# Patient Record
Sex: MALE | Race: WHITE | NOT HISPANIC OR LATINO | Employment: FULL TIME | URBAN - METROPOLITAN AREA
[De-identification: names, ages, dates, MRNs, and addresses within clinical notes are randomized per-mention and may not be internally consistent; named-entity substitution may affect disease eponyms.]

---

## 2018-07-05 ENCOUNTER — EVALUATION (OUTPATIENT)
Dept: PHYSICAL THERAPY | Facility: CLINIC | Age: 68
End: 2018-07-05
Payer: COMMERCIAL

## 2018-07-05 ENCOUNTER — TRANSCRIBE ORDERS (OUTPATIENT)
Dept: PHYSICAL THERAPY | Facility: CLINIC | Age: 68
End: 2018-07-05

## 2018-07-05 DIAGNOSIS — Z96.649 PRESENCE OF ARTIFICIAL HIP, UNSPECIFIED LATERALITY: Primary | ICD-10-CM

## 2018-07-05 DIAGNOSIS — Z96.642 H/O TOTAL HIP ARTHROPLASTY, LEFT: Primary | ICD-10-CM

## 2018-07-05 PROCEDURE — G8991 OTHER PT/OT GOAL STATUS: HCPCS

## 2018-07-05 PROCEDURE — 97161 PT EVAL LOW COMPLEX 20 MIN: CPT

## 2018-07-05 PROCEDURE — G8990 OTHER PT/OT CURRENT STATUS: HCPCS

## 2018-07-05 RX ORDER — ASPIRIN 325 MG
325 TABLET ORAL DAILY
COMMUNITY
End: 2019-11-05

## 2018-07-05 RX ORDER — EZETIMIBE AND SIMVASTATIN 10; 80 MG/1; MG/1
1 TABLET ORAL
COMMUNITY
End: 2018-07-20 | Stop reason: SDUPTHER

## 2018-07-05 RX ORDER — FENOFIBRATE 160 MG/1
160 TABLET ORAL DAILY
Status: ON HOLD | COMMUNITY
End: 2019-11-02 | Stop reason: SDUPTHER

## 2018-07-05 RX ORDER — OMEGA-3-ACID ETHYL ESTERS 1 G/1
2 CAPSULE, LIQUID FILLED ORAL 2 TIMES DAILY
Status: ON HOLD | COMMUNITY
End: 2019-11-02 | Stop reason: SDUPTHER

## 2018-07-05 RX ORDER — LEVOTHYROXINE SODIUM 0.03 MG/1
12.5 TABLET ORAL DAILY
Status: ON HOLD | COMMUNITY
End: 2019-11-02 | Stop reason: SDUPTHER

## 2018-07-05 RX ORDER — AMLODIPINE AND VALSARTAN 5; 160 MG/1; MG/1
1 TABLET ORAL DAILY
COMMUNITY
End: 2018-11-20 | Stop reason: DRUGHIGH

## 2018-07-05 RX ORDER — RANITIDINE 150 MG/1
150 TABLET ORAL 2 TIMES DAILY
Status: ON HOLD | COMMUNITY
End: 2019-11-02 | Stop reason: SDUPTHER

## 2018-07-05 NOTE — PROGRESS NOTES
PT Evaluation     Today's date: 2018  Patient name: Iliana Frias  : 1950  MRN: [id number]  Referring provider: Natalio Zhang MD  Dx:   Encounter Diagnosis     ICD-10-CM    1  H/O total hip arthroplasty, left [id number] 766                   Assessment  Impairments: abnormal gait, abnormal or restricted ROM, activity intolerance, impaired balance, impaired physical strength, lacks appropriate home exercise program, pain with function and weight-bearing intolerance  Functional limitations: pain with ambulation and daily activities, difficulty sleeping  Assessment details: Iliana Frias is a 79 y o  male presenting to PT with pain, decreased hip range of motion, decreased LE strength, balance deficits, and decreased tolerance to activity secondary to L VADIM 2 weeks ago  Patient would benefit from skilled PT services to address these impairments and to maximize function in order to improve quality of life  Thank you for the referral   Understanding of Dx/Px/POC: good   Prognosis: good    Goals  STG  1) L hip ROM will improve 50% in 4 weeks  2) L hip strength will improve 1/2 grade in 4 weeks  LTG  1) Patient is independent in HEP  2) Patient will ascend/descend one flight of stairs independently with no increased pain in 8 weeks  3) Ambulation will be improved to PLOF in 8 weeks  4) Patient will have one consecutive week of uninterrupted sleep due to pain within 8 weeks  5) Patient will return to full work schedule within 8 weeks      Plan  Patient would benefit from: skilled physical therapy  Planned modality interventions: cryotherapy  Planned therapy interventions: balance/weight bearing training, home exercise program, therapeutic exercise, therapeutic activities, stretching, strengthening, patient education, neuromuscular re-education and manual therapy  Frequency: 2x week  Duration in weeks: 8  Treatment plan discussed with: patient        Subjective Evaluation    History of Present Illness  Date of surgery: 2018  Mechanism of injury: Patient presents 2 weeks s/p L anterior approach VADIM  He ambulates into clinic today independently, no AD  He states he used a walker for about 3 days after procedure, and was able to begin walking without it comfortably  He had home PT after d/c from hospital, and states he has been performing them faithfully every day  He states he would like to return to a full work schedule, which requires him to be standing/walking for most of the day  Quality of life: good    Pain  Current pain ratin  At best pain ratin  At worst pain ratin  Quality: discomfort and sharp  Relieving factors: ice  Aggravating factors: stair climbing, walking and standing  Progression: no change      Diagnostic Tests  X-ray: normal  Treatments  Previous treatment: home therapy  Current treatment: physical therapy  Discharged from (in last 30 days): inpatient hospitalization and home health care  Patient Goals  Patient goals for therapy: decreased pain, decreased edema, improved balance, increased motion, increased strength, independence with ADLs/IADLs and return to work  Patient goal: full mass schedule, be able to walk and stand most of the day        Objective     Observations   Left Hip  Positive for adhesive scar, edema, incision and trophic changes       Additional Observation Details  Incision appears clean and intact, with steri-strips applied throughout    Tenderness     Additional Tenderness Details  No TTP reported throughout L hip    Active Range of Motion   Left Hip   Flexion: 80 degrees with pain  Extension: 0 degrees   Abduction: 30 degrees     Right Hip   Normal active range of motion    Passive Range of Motion   Left Hip   Flexion: 90 degrees with pain  Extension: WFL  Abduction: WFL  Adduction: WFL  External rotation (prone): [facility]  Internal rotation (prone): 10 and prone pain degrees with pain    Strength/Myotome Testing     Left Hip   Planes of Motion   Flexion: 3-  Extension: 3+  Abduction: 3+  Adduction: 3+    Isolated Muscles   Gluteus elyssa: 2+  Gluteus medius: 3+    HR: 84 bpm  Flowsheet Rows      Most Recent Value   PT/OT G-Codes   Current Score  47   Projected Score  68   FOTO information reviewed  Yes   Assessment Type  Evaluation   G code set  Other PT/OT Primary   Other PT Primary Current Status ()  CK   Other PT Primary Goal Status ()  CJ          Precautions: cardiac history, current cigarette smoker     Daily Treatment Diary      Manual                        PROM L hip                                                                                                                             Exercise Diary                        Bike                       LAQ                       Heel slides                       Calf stretch                       HS stretch                       SLR flex                       SLR abduction                       Glute bridge                                              Mini squats                       HR/TR                       Standing hip flexion                       Standing march                       Standing hip abduction                       Standing hip extension                                                                                                                                                     Modalities                        CP prn

## 2018-07-05 NOTE — LETTER
2018    [facility] [address]    Patient: Mat Branch   YOB: 1950   Date of Visit: 2018     Encounter Diagnosis     ICD-10-CM    1  H/O total hip arthroplasty, left K17 276        Dear Dr Orion Walker:    Please review the attached Plan of Care from [facility] recent visit  Please verify that you agree therapy should continue by signing the attached document and sending it back to our office  If you have any questions or concerns, please don't hesitate to call  Sincerely,    Sharon Saeed, PT      Referring Provider:      I certify that I have read the below Plan of Care and certify the need for these services furnished under this plan of treatment while under my care  Jessica Santos MD  [facility] [address]  VIA Facsimile: [phone]          PT Evaluation     Today's date: 2018  Patient name: Mat Branch  : 1950  MRN: [id number]  Referring provider: Justin Benton MD  Dx:   Encounter Diagnosis     ICD-10-CM    1  H/O total hip arthroplasty, left F62 277                   Assessment  Impairments: abnormal gait, abnormal or restricted ROM, activity intolerance, impaired balance, impaired physical strength, lacks appropriate home exercise program, pain with function and weight-bearing intolerance  Functional limitations: pain with ambulation and daily activities, difficulty sleeping  Assessment details: Mat Branch is a 79 y o  male presenting to PT with pain, decreased hip range of motion, decreased LE strength, balance deficits, and decreased tolerance to activity secondary to L VADIM 2 weeks ago  Patient would benefit from skilled PT services to address these impairments and to maximize function in order to improve quality of life   Thank you for the referral   Understanding of Dx/Px/POC: good   Prognosis: good    Goals  STG  1) L hip ROM will improve 50% in 4 weeks  2) L hip strength will improve 1/2 grade in 4 weeks  LTG  1) Patient is independent in HEP  2) Patient will ascend/descend one flight of stairs independently with no increased pain in 8 weeks  3) Ambulation will be improved to PLOF in 8 weeks  4) Patient will have one consecutive week of uninterrupted sleep due to pain within 8 weeks  5) Patient will return to full work schedule within 8 weeks  Plan  Patient would benefit from: skilled physical therapy  Planned modality interventions: cryotherapy  Planned therapy interventions: balance/weight bearing training, home exercise program, therapeutic exercise, therapeutic activities, stretching, strengthening, patient education, neuromuscular re-education and manual therapy  Frequency: 2x week  Duration in weeks: 8  Treatment plan discussed with: patient        Subjective Evaluation    History of Present Illness  Date of surgery: 2018  Mechanism of injury: Patient presents 2 weeks s/p L anterior approach VADIM  He ambulates into clinic today independently, no AD  He states he used a walker for about 3 days after procedure, and was able to begin walking without it comfortably  He had home PT after d/c from hospital, and states he has been performing them faithfully every day  He states he would like to return to a full work schedule, which requires him to be standing/walking for most of the day    Quality of life: good    Pain  Current pain ratin  At best pain ratin  At worst pain ratin  Quality: discomfort and sharp  Relieving factors: ice  Aggravating factors: stair climbing, walking and standing  Progression: no change      Diagnostic Tests  X-ray: normal  Treatments  Previous treatment: home therapy  Current treatment: physical therapy  Discharged from (in last 30 days): inpatient hospitalization and home health care  Patient Goals  Patient goals for therapy: decreased pain, decreased edema, improved balance, increased motion, increased strength, independence with ADLs/IADLs and return to work  Patient goal: full mass schedule, be able to walk and stand most of the day        Objective     Observations   Left Hip  Positive for adhesive scar, edema, incision and trophic changes       Additional Observation Details  Incision appears clean and intact, with steri-strips applied throughout    Tenderness     Additional Tenderness Details  No TTP reported throughout L hip    Active Range of Motion   Left Hip   Flexion: 80 degrees with pain  Extension: 0 degrees   Abduction: 30 degrees     Right Hip   Normal active range of motion    Passive Range of Motion   Left Hip   Flexion: 90 degrees with pain  Extension: WFL  Abduction: WFL  Adduction: WFL  External rotation (prone): WFL  Internal rotation (prone): 10 and prone pain degrees with pain    Strength/Myotome Testing     Left Hip   Planes of Motion   Flexion: 3-  Extension: 3+  Abduction: 3+  Adduction: 3+    Isolated Muscles   Gluteus elyssa: 2+  Gluteus medius: 3+    HR: 84 bpm  Flowsheet Rows      Most Recent Value   PT/OT G-Codes   Current Score  47   Projected Score  68   FOTO information reviewed  Yes   Assessment Type  Evaluation   G code set  Other PT/OT Primary   Other PT Primary Current Status ()  CK   Other PT Primary Goal Status ()  CJ          Precautions: cardiac history, current cigarette smoker     Daily Treatment Diary      Manual                        PROM L hip                                                                                                                             Exercise Diary                        Bike                       LAQ                       Heel slides                       Calf stretch                       HS stretch                       SLR flex                       SLR abduction                       Glute bridge                                              Mini squats                       HR/TR                       Standing hip flexion                       Standing march                       Standing hip abduction                       Standing hip extension                                                                                                                                                     Modalities                        CP prn

## 2018-07-11 ENCOUNTER — OFFICE VISIT (OUTPATIENT)
Dept: PHYSICAL THERAPY | Facility: CLINIC | Age: 68
End: 2018-07-11
Payer: COMMERCIAL

## 2018-07-11 DIAGNOSIS — Z96.642 H/O TOTAL HIP ARTHROPLASTY, LEFT: Primary | ICD-10-CM

## 2018-07-11 PROCEDURE — 97110 THERAPEUTIC EXERCISES: CPT

## 2018-07-11 PROCEDURE — 97140 MANUAL THERAPY 1/> REGIONS: CPT

## 2018-07-11 NOTE — PROGRESS NOTES
Daily Note     Today's date: 2018  Patient name: Ruth Ann Ritchie  : 1950  MRN: [id number]  Referring provider: Jude Mcnamara MD  Dx:   Encounter Diagnosis     ICD-10-CM    1  H/O total hip arthroplasty, left [id number]                   Subjective: Patient noted no pain pre or post treatment  Objective: See treatment diary below      Assessment: Tolerated treatment well  Patient was able to perform added exercises without increase of pain or complaints  Patient would benefit from continued PT      Plan: Continue per plan of care        Precautions: cardiac history, current cigarette smoker     Daily Treatment Diary      Manual                        PROM L hip  10 min                                                                                                                           Exercise Diary                        Bike 5'                     LAQ  5"x10                     Heel slides                       Calf stretch  seated 30"x3                     HS stretch  30"x3                     SLR flex  10x                     SLR abduction                       Glute bridge  5"10x                                            Mini squats  10xea                     HR/TR  20xea                     Standing hip flexion                       Standing march  20x                     Standing hip abduction                       Standing hip extension                                                                                                                                                     Modalities                        CP prn

## 2018-07-12 ENCOUNTER — OFFICE VISIT (OUTPATIENT)
Dept: PHYSICAL THERAPY | Facility: CLINIC | Age: 68
End: 2018-07-12
Payer: COMMERCIAL

## 2018-07-12 DIAGNOSIS — Z96.642 H/O TOTAL HIP ARTHROPLASTY, LEFT: Primary | ICD-10-CM

## 2018-07-12 PROCEDURE — 97140 MANUAL THERAPY 1/> REGIONS: CPT

## 2018-07-12 PROCEDURE — 97110 THERAPEUTIC EXERCISES: CPT

## 2018-07-12 NOTE — PROGRESS NOTES
Daily Note     Today's date: 2018  Patient name: Edenilson Black  : 1950  MRN: [id number]  Referring provider: Selina Arce MD  Dx:   Encounter Diagnosis     ICD-10-CM    1  H/O total hip arthroplasty, left B33 552                   Subjective: Patient has no complaints upon arrival today  He states he felt fine after yesterday's visit  He asked about return to work, as he was given information about returning 8-12 weeks from the date of surgery, and states he feels he is ready to return in 10 days  Objective: See treatment diary below  Progressed strengthening program this visit  Advised patient to contact surgeon's office about possible early return to work  Assessment: Tolerated treatment well  Patient able to tolerate addition of strengthening exercises with a few rest breaks required due to cardio and muscle endurance limitations  Continue to progress strengthening as patient tolerates  Patient would benefit from continued PT      Plan: Continue per plan of care  Progress treatment as tolerated           Precautions: cardiac history, current cigarette smoker     Daily Treatment Diary      Manual                      PROM L hip  10 min  AN                                                                                                                         Exercise Diary                      Bike 5' 6'                   LAQ 5"x10 3" 2x10                   Calf stretch seated 30"x3 NV                   HS stretch 30"x3 NV                   SLR flex 10x 10x                   SLR abduction   NP                   Glute bridge 5"10x 3" 2x10                                          Mini squats 10x 10x                   HR/TR 20xea 30 ea                   Standing hip flexion   10x                   Standing march 20x 2x10                   Standing hip abduction   10x                   Standing hip extension   10x                                                                                                                                                 Modalities                        CP prn

## 2018-07-16 ENCOUNTER — OFFICE VISIT (OUTPATIENT)
Dept: PHYSICAL THERAPY | Facility: CLINIC | Age: 68
End: 2018-07-16
Payer: COMMERCIAL

## 2018-07-16 DIAGNOSIS — Z96.642 H/O TOTAL HIP ARTHROPLASTY, LEFT: Primary | ICD-10-CM

## 2018-07-16 PROCEDURE — 97110 THERAPEUTIC EXERCISES: CPT

## 2018-07-16 PROCEDURE — 97140 MANUAL THERAPY 1/> REGIONS: CPT

## 2018-07-16 NOTE — PROGRESS NOTES
Daily Note     Today's date: 2018  Patient name: Nara Amaya  : 1950  MRN: [id number]  Referring provider: John Ruby MD  Dx:   Encounter Diagnosis     ICD-10-CM    1  H/O total hip arthroplasty, left G68 498                   Subjective: Patient denies pain or issues with L hip since previous session, stating he is feeling well  Objective: See treatment diary below  Assessment: Tolerated treatment well  Improved tolerance to strengthening activities this visit  Good performance of hip strength exercises, with patient showing appropriate level of fatigue due to muscle weakness  Patient would benefit from continued PT      Plan: Continue per plan of care  Progress treatment as tolerated           Precautions: cardiac history, current cigarette smoker     Daily Treatment Diary      Manual                    PROM L hip  10 min  AN  AN                                                                                                                       Exercise Diary                    Bike 5' 6' 7'                 LAQ 5"x10 3" 2x10 1# 2x10                 Calf stretch seated 30"x3 NV 30"x3                 HS stretch 30"x3 NV 30"x3                 SLR flex 10x 10x 2x10                 SLR abduction   NP NP                 Glute bridge 5"10x 3" 2x10 3" 2x10                                        Mini squats 10x 10x 2x10                 HR/TR 20xea 30 ea 30 ea                 Standing hip flexion   10x 2x10                 Standing march 20x 2x10 2x10                 Standing hip abduction   10x 2x10                 Standing hip extension   10x 2x10                                                                                                                                               Modalities                        CP prn

## 2018-07-19 ENCOUNTER — OFFICE VISIT (OUTPATIENT)
Dept: PHYSICAL THERAPY | Facility: CLINIC | Age: 68
End: 2018-07-19
Payer: COMMERCIAL

## 2018-07-19 DIAGNOSIS — Z96.642 H/O TOTAL HIP ARTHROPLASTY, LEFT: Primary | ICD-10-CM

## 2018-07-19 PROCEDURE — 97110 THERAPEUTIC EXERCISES: CPT

## 2018-07-19 PROCEDURE — 97140 MANUAL THERAPY 1/> REGIONS: CPT

## 2018-07-19 NOTE — PROGRESS NOTES
Daily Note     Today's date: 2018  Patient name: Rafy Sorensen  : 1950  MRN: [id number]  Referring provider: Phillip Corbin MD  Dx:   Encounter Diagnosis     ICD-10-CM    1  H/O total hip arthroplasty, left Z96 642        Start Time: 1372  Stop Time: 6264  Total time in clinic (min): 40 minutes    Subjective: Pt reports he is feeling good today, denied any pain currently  Mostly gets sore at night time  Objective: See treatment diary below    Precautions: cardiac history, current cigarette smoker     Daily Treatment Diary      Manual                  PROM L hip  10 min  AN  AN  NA                                                                                                                     Exercise Diary                  Bike 5' 6' 7'  7'               LAQ 5"x10 3" 2x10 1# 2x10  1# 2 x10               Calf stretch seated 30"x3 NV 30"x3  30"x3               HS stretch 30"x3 NV 30"x3  30" x 3               SLR flex 10x 10x 2x10  2 x10               SLR abduction   NP NP  2x10               Glute bridge 5"10x 3" 2x10 3" 2x10  3"  2 x10                                      Mini squats 10x 10x 2x10  2 x 10               HR/TR 20xea 30 ea 30 ea  30 ea               Standing hip flexion   10x 2x10  2x10               Standing march 20x 2x10 2x10  2x10               Standing hip abduction   10x 2x10  2 x 10                Standing hip extension   10x 2x10  2x10                                                                                                                                             Modalities                        CP prn                                                                           Assessment: Tolerated treatment well  No exacerbations of pain noted during treatment  Pt with good form and posture noted with exercises  Patient would benefit from continued PT  Plan: Continue per plan of care

## 2018-07-20 DIAGNOSIS — E78.5 HYPERLIPIDEMIA, UNSPECIFIED HYPERLIPIDEMIA TYPE: Primary | ICD-10-CM

## 2018-07-23 RX ORDER — EZETIMIBE AND SIMVASTATIN 10; 80 MG/1; MG/1
1 TABLET ORAL
Qty: 90 TABLET | Refills: 3 | Status: SHIPPED | OUTPATIENT
Start: 2018-07-23 | End: 2018-11-20 | Stop reason: DRUGHIGH

## 2018-11-20 ENCOUNTER — OFFICE VISIT (OUTPATIENT)
Dept: CARDIOLOGY CLINIC | Facility: CLINIC | Age: 68
End: 2018-11-20
Payer: COMMERCIAL

## 2018-11-20 VITALS
SYSTOLIC BLOOD PRESSURE: 160 MMHG | BODY MASS INDEX: 26.54 KG/M2 | WEIGHT: 149.8 LBS | OXYGEN SATURATION: 97 % | DIASTOLIC BLOOD PRESSURE: 80 MMHG | HEART RATE: 90 BPM | HEIGHT: 63 IN

## 2018-11-20 DIAGNOSIS — I49.1 ECTOPIC ATRIAL RHYTHM: ICD-10-CM

## 2018-11-20 DIAGNOSIS — I71.2 THORACIC AORTIC ANEURYSM WITHOUT RUPTURE (HCC): ICD-10-CM

## 2018-11-20 DIAGNOSIS — I10 ESSENTIAL HYPERTENSION, BENIGN: ICD-10-CM

## 2018-11-20 DIAGNOSIS — R09.89 BRUIT OF LEFT CAROTID ARTERY: ICD-10-CM

## 2018-11-20 DIAGNOSIS — E78.5 HYPERLIPIDEMIA, UNSPECIFIED HYPERLIPIDEMIA TYPE: ICD-10-CM

## 2018-11-20 DIAGNOSIS — I25.119 ATHEROSCLEROSIS OF NATIVE CORONARY ARTERY WITH ANGINA PECTORIS, UNSPECIFIED WHETHER NATIVE OR TRANSPLANTED HEART (HCC): Primary | ICD-10-CM

## 2018-11-20 PROBLEM — I71.20 THORACIC AORTIC ANEURYSM WITHOUT RUPTURE: Status: ACTIVE | Noted: 2018-11-20

## 2018-11-20 PROCEDURE — 93000 ELECTROCARDIOGRAM COMPLETE: CPT | Performed by: INTERNAL MEDICINE

## 2018-11-20 PROCEDURE — 99214 OFFICE O/P EST MOD 30 MIN: CPT | Performed by: INTERNAL MEDICINE

## 2018-11-20 RX ORDER — AMLODIPINE AND OLMESARTAN MEDOXOMIL 5; 40 MG/1; MG/1
1 TABLET ORAL DAILY
Qty: 90 TABLET | Refills: 5 | Status: SHIPPED | OUTPATIENT
Start: 2018-11-20 | End: 2018-11-26 | Stop reason: CLARIF

## 2018-11-20 RX ORDER — EZETIMIBE AND SIMVASTATIN 10; 40 MG/1; MG/1
1 TABLET ORAL
Qty: 90 TABLET | Refills: 5 | Status: ON HOLD | OUTPATIENT
Start: 2018-11-20 | End: 2019-11-02 | Stop reason: SDUPTHER

## 2018-11-20 NOTE — ASSESSMENT & PLAN NOTE
Ectopic low atrial rhythm with frequent premature atrial contractions is noted  He had similar rhythm during the last follow-up visit also  Is asymptomatic  Normal left ventricular function by echocardiogram in March 2018  Will obtain and review last blood work done in May 2018 to ensure electrolytes are okay  Will do a 24 hour Holter monitor to assess for PAC burden

## 2018-11-20 NOTE — PATIENT INSTRUCTIONS
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 in July 2018  10  Mild hypothyroidism  11  Pre diabetes    Echocardiogram March 2018 showed mildly increased left ventricular wall thickness, normal left ventricular systolic function and hyperdynamic wall motion, normal diastolic function, EF around 65%  Mild aortic valve sclerosis, trace tricuspid valve regurgitation, no pulmonary hypertension, borderline dilated aortic root  CTA of chest significant for 4 cm ascending thoracic aortic aneurysm with a focal atelectasis or scarring lower lungs without any lung mass or effusion  CARDIOLOGY ASSESSMENT & PLAN:  Essential hypertension, benign  Blood pressure is noted to be slightly elevated although repeat blood pressure check is better  Given that valsartan his on recall with like to change his antihypertensive regimen  We are discontinuing Exforge and beginning amlodipine-almost start an 5/40 mg once daily  Advised to monitor salt intake and quit smoking  We would like to have a repeat blood pressure check in 2-3 weeks following initiation of regimen  Goal blood pressure is less than 130/80 consistently  Bruit of left carotid artery  He has mild carotid bruit in the left carotid artery  Has no history of TIA/CVA  Is on reasonable statin therapy along with Zetia  He has followed with Dr Gladys Kwok in the past and would like to follow up with him  We will facilitate this      Thoracic aortic aneurysm without rupture (HCC)  Mild ectasia of ascending thoracic aorta is noted without prior history of rupture  We will consider follow-up evaluation in 1-2 years  Meanwhile optimal blood pressure control will be the strategy  Ectopic atrial rhythm  Ectopic low atrial rhythm with frequent premature atrial contractions is noted  He had similar rhythm during the last follow-up visit also  Is asymptomatic  Normal left ventricular function by echocardiogram in March 2018  Will obtain and review last blood work done in May 2018 to ensure electrolytes are okay  Will do a 24 hour Holter monitor to assess for PAC burden

## 2018-11-20 NOTE — PROGRESS NOTES
[address] [address]    [address], [address]  [address]  [address]  Phone#  [phone]  Fax#  [phone]  *-*-*-*-*-*-*-*-*-*-*-*-*-*-*-*-*-*-*-*-*-*-*-*-*-*-*-*-*-*-*-*-*-*-*-*-*-*-*-*-*-*-*-*-*-*-*-*-*-*-*-*-*-*    Meghan Headley DATE: 11/20/18 1:56 PM    PATIENT NAME: Marylou [facility]   1950    [id number]  Age: 79 y o  Sex: male    AUTHOR: Shlomo Frye MD  PRIMARYCARE PHYSICIAN: Shane Ross DO    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 in July 2018  10  Mild hypothyroidism  11  Pre diabetes    Echocardiogram March 2018 showed mildly increased left ventricular wall thickness, normal left ventricular systolic function and hyperdynamic wall motion, normal diastolic function, EF around 65%  Mild aortic valve sclerosis, trace tricuspid valve regurgitation, no pulmonary hypertension, borderline dilated aortic root  CTA of chest significant for 4 cm ascending thoracic aortic aneurysm with a focal atelectasis or scarring lower lungs without any lung mass or effusion  CURRENT ECG:  Results for orders placed or performed in visit on 11/20/18   POCT ECG    Narrative    Suspected ectopic atrial rhythm with frequent premature atrial contractions  P-wave morphology is negative in inferior leads suggestive of low atrial rhythm  Normal intervals and no significant ST T wave abnormalities    HR 90 bpm        CARDIOLOGY ASSESSMENT & PLAN:  Essential hypertension, benign  Blood pressure is noted to be slightly elevated although repeat blood pressure check is better  Given that valsartan his on recall with like to change his antihypertensive regimen  We are discontinuing Exforge and beginning amlodipine-almost start an 5/40 mg once daily  Advised to monitor salt intake and quit smoking  We would like to have a repeat blood pressure check in 2-3 weeks following initiation of regimen  Goal blood pressure is less than 130/80 consistently  Bruit of left carotid artery  He has mild carotid bruit in the left carotid artery  Has no history of TIA/CVA  Is on reasonable statin therapy along with Zetia  He has followed with Dr Forrest Jenkins in the past and would like to follow up with him  We will facilitate this  Thoracic aortic aneurysm without rupture (HCC)  Mild ectasia of ascending thoracic aorta is noted without prior history of rupture  We will consider follow-up evaluation in 1-2 years  Meanwhile optimal blood pressure control will be the strategy  Ectopic atrial rhythm  Ectopic low atrial rhythm with frequent premature atrial contractions is noted  He had similar rhythm during the last follow-up visit also  Is asymptomatic  Normal left ventricular function by echocardiogram in March 2018  Will obtain and review last blood work done in May 2018 to ensure electrolytes are okay  Will do a 24 hour Holter monitor to assess for PAC burden  INTERVAL HISTORY & HISTORY OF PRESENT ILLNESS:  Lidia Antonio is here for follow-up regarding his cardiac comorbidities which include:  Hypertension, mild thoracic aortic aneurysm and other comorbidities  He has undergone hip replacement procedure in July 2018  Reports overall being well with no significant symptoms  He is less active than before due to undergoing hip replacement  Has had no recent decline in exercise tolerance  Also reports that has been placed on levothyroxine and metformin by his primary physician Dr Mendoza Gar      There have been no recent active symptoms of chest discomfort or exertional angina  There is no dyspnea with usual activities or exertion  No orthopnea, PND or pedal edema  No palpitations, lightheadedness, presyncope, or syncope  Denies any recent hospitalizations or other illnesses  Reports being compliant with medications  Continues to smoke half a pack of cigarettes a day  REVIEW OF SYMPTOMS:    Positive for:  Slight decrease in mobility  No specific cardiac or other symptoms  Negative for: All remaining as reviewed below and in HPI  SYSTEM SYMPTOMS REVIEWED:  General--weight change, fever, night sweats  Respirato  Cardiovascular--chest pain, syncope, dyspnea on exertion, edema, decline in exercise tolerance, claudication   Gastrointestinal--persistent vomiting, diarrhea, abdominal distention, blood in stool   Muscular or skeletal--joint pain or swelling   Neurologic--headaches, syncope, abnormal movement  Hematologic--history of easy bruising and bleeding   Endocrine--thyroid enlargement, heat or cold intolerance, polyuria   Psychiatric--anxiety, depression     *-*-*-*-*-*-*-*-*-*-*-*-*-*-*-*-*-*-*-*-*-*-*-*-*-*-*-*-*-*-*-*-*-*-*-*-*-*-*-*-*-*-*-*-*-*-*-*-*-*-*-*-*-*-  VITAL SIGNS:  Vitals:    11/20/18 1312   BP: 160/80   BP Location: Left arm   Patient Position: Sitting   Cuff Size: Adult   Pulse: 90   SpO2: 97%   Weight: 67 9 kg (149 lb 12 8 oz)   Height: 5' 3" (1 6 m)     Reports that he just took his medications a short while ago today  Otherwise has been compliant with medications  Repeat blood pressure is around 145/85    *-*-*-*-*-*-*-*-*-*-*-*-*-*-*-*-*-*-*-*-*-*-*-*-*-*-*-*-*-*-*-*-*-*-*-*-*-*-*-*-*-*-*-*-*-*-*-*-*-*-*-*-*-*-  PHYSICAL EXAM:  General Appearance:    Alert, cooperative, no distress, appears stated age   Head, Eyes, ENT:    No obvious abnormality, moist mucous mebranes  Neck:   Supple, no JVD  Bruit noted in the left carotid   Back:     Symmetric, no curvature     Lungs: Respirations unlabored  Clear to auscultation bilaterally,    Chest wall:    No tenderness or deformity   Heart:    Regular rate and rhythm interrupted with premature beats, slightly distant HSs, no sig murmur appreciated  Abdomen:     Soft, non-tender, No obvious masses, or organomegaly   Extremities:   Extremities normal, no cyanosis or edema    Skin:   Skin color, texture, turgor normal, no rashes or lesions     *-*-*-*-*-*-*-*-*-*-*-*-*-*-*-*-*-*-*-*-*-*-*-*-*-*-*-*-*-*-*-*-*-*-*-*-*-*-*-*-*-*-*-*-*-*-*-*-*-*-*-*-*-*-  CURRENT MEDICATION LIST:    Current Outpatient Prescriptions:     aspirin 325 mg tablet, Take 325 mg by mouth daily, Disp: , Rfl:     co-enzyme Q-10 30 MG capsule, Take 30 mg by mouth 3 (three) times a day, Disp: , Rfl:     fenofibrate (TRIGLIDE) 160 MG tablet, Take 160 mg by mouth daily, Disp: , Rfl:     levothyroxine 25 mcg tablet, Take 12 5 mcg by mouth daily, Disp: , Rfl:     metFORMIN (GLUCOPHAGE) 500 mg tablet, Take 500 mg by mouth 2 (two) times a day with meals, Disp: , Rfl:     Multiple Vitamins-Minerals (MULTIVITAMIN ADULT PO), every 24 hours, Disp: , Rfl:     omega-3-acid ethyl esters (LOVAZA) 1 g capsule, Take 2 g by mouth 2 (two) times a day, Disp: , Rfl:     ranitidine (ZANTAC) 150 mg tablet, Take 150 mg by mouth 2 (two) times a day, Disp: , Rfl:     amlodipine-olmesartan (YONIS) 5-40 MG, Take 1 tablet by mouth daily, Disp: 90 tablet, Rfl: 5    ezetimibe-simvastatin (VYTORIN) 10-40 mg per tablet, Take 1 tablet by mouth daily at bedtime, Disp: 90 tablet, Rfl: 5    ALLERGIES:  Allergies   Allergen Reactions    Rofecoxib        *-*-*-*-*-*-*-*-*-*-*-*-*-*-*-*-*-*-*-*-*-*-*-*-*-*-*-*-*-*-*-*-*-*-*-*-*-*-*-*-*-*-*-*-*-*-*-*-*-*-*-*-*-*-    LABORATORY DATA:  I have personally reviewed pertinent labs      No results found for: NA, K, CL, CO2, ANIONGAP, BUN, CREATININE, EGFR, GLUCOSE, CALCIUM, AST, ALT, ALKPHOS, PROT, BILITOT, MG  No results found for: WBC, HGB, PLT  No results found for: PT, PTT, INR  No results found for: CKMB, BNP, DIGOXIN  No results found for: TSH  No results found for: CHOL, HDL, LDL, TRIG   No results found for: HGBA1C  No results found for: BLOODCX, SPUTUMCULTUR, GRAMSTAIN, URINECX, WOUNDCULT, BODYFLUIDCUL, MRSACULTURE, INFLUAPCR, INFLUBPCR, RSVPCR, LEGIONELLAUR, CDIFFTOXINB    *-*-*-*-*-*-*-*-*-*-*-*-*-*-*-*-*-*-*-*-*-*-*-*-*-*-*-*-*-*-*-*-*-*-*-*-*-*-*-*-*-*-*-*-*-*-*-*-*-*-*-*-*-*-  PREVIOUS CARDIOLOGY & RADIOLOGY RESULTS:  Results for orders placed in visit on 18   Echo complete with contrast if indicated    Narrative [facility] [id number]  [address], [id number] [phone]    Cardiovascular Report  _________________________________________________________________         Transthoracic Echocardiogram Report  Sherrie Kulkarni    MRN:                 [id number]  Account :           [de-identified]  Accession :         [id number]  Exam Date:           2018 12:45  Patient Location:    O  Ordering Phys:       Yesenia Morales, M CRUZ    Fabiroel Rubio)  Attending Phys:      GLO Garcia    (Zonia Thompson)  Technologist:        [facility],JOSE    Age:  74             :   1950           Gender:   M  Wt:   140 lb         Ht:    66 in  Indication:  CAD  BP:         /       HR:    Rhythm:              sinus  Technical Quality:   fair but adequate      MEASUREMENTS  2D ECHO  Body Surface Area                 1 7 m²  LV Diastolic Diameter PLAX        3 9 cm  LV Systolic Diameter PLAX         2 3 cm  IVS Diastolic Thickness           1 1 cm  LVPW Diastolic Thickness          1 0 cm  LV Relative Wall Thickness        0 5  LVOT Diameter                     2 2 cm  Aortic Root Diameter              4 1 cm  LA Systolic Diameter LX           3 2 cm  LA Area                           7 3 cm²  LV Ejection Fraction MOD 4C       70 7 %  LA Volume AL                      14 0 cm³  LA Volume AL Index                8 1 cm³/m²  RA Area 4C View                   9 2 cm²    DOPPLER  AV Peak Velocity                  122 2 cm/s  AV Peak Gradient                  6 0 mmHg  AV Mean Gradient                  3 4 mmHg  AV Velocity Time Integral         22 2 cm  LVOT Peak Velocity                113 9 cm/s  LVOT Peak Gradient                5 2 mmHg  LVOT Velocity Time Integral       22 4 cm  AV Area Cont Eq vti               4 0 cm²  AV Area Cont Eq pk                3 7 cm²  MV Area PHT                       3 7 cm²  Mitral E Point Velocity           66 5 cm/s  Mitral A Point Velocity           84 6 cm/s  Mitral E to A Ratio               0 8  MV E' Velocity                    5 9 cm/s  Mitral E to MV E' Ratio           11 3  TR Peak Velocity                  146 1 cm/s  TR Peak Gradient                  8 5 mmHg  TAPSE                             2 5 cm  Right Atrial Pressure             5 0 mmHg  Pulmonary Artery Systolic Pressu  29 5 mmHg  Right Ventricular Systolic Press  58 7 mmHg  PV Peak Velocity                  100 4 cm/s  PV Peak Gradient                  4 0 mmHg  Pulmonary Artery Diastolic Press  7 0 mmHg      FINDINGS  Left Ventricle  Normal left ventricular cavity size, mildly increased wall  thickness, normal left ventricular systolic function with  hyperdynamic wall motion  Ejection fraction is greater than 65%  Normal diastolic function  Right Ventricle  Normal right ventricle size and systolic function  Normal  estimated right ventricular systolic pressure, 14 mmHg  Right Atrium  Normal right atrial size  Left Atrium  Normal left atrial size  Aneurysmal interatrial septum with  bulge towards the right atrium  No color flow Doppler evidence  of patent foramen ovale or atrial septal defect    Mitral Valve  Normal appearing mitral valve leaflets  No significant mitral  valve regurgitation  Aortic Valve  Trileaflet aortic valve with mild sclerosis  No aortic valve  stenosis or regurgitation      Tricuspid Valve  Trace, physiologic tricuspid valve regurgitation  Pulmonic Valve  No pulmonic valve regurgitation  Pericardium  No pericardial effusion  Aorta  Mildly dilated aortic root and visualize portion of proximal  ascending aorta  Possible mild thoracic aortic aneurysm  Additional Findings  Inferior vena cava is normal in size and demonstrates normal  pulsatile change with sniff test     CONCLUSIONS  - Mildly increased left ventricular wall thickness, normal left  ventricular systolic function and hyperdynamic wall motion  EF  greater than 65%  - Normal diastolic function   - No significant chamber hypertrophy or enlargement  - Mild aortic valve sclerosis  No aortic valve stenosis or  regurgitation   - No significant mitral valve stenosis or regurgitation   - Trace, physiologic tricuspid valve regurgitation   - No pulmonary hypertension   - No pericardial effusion   - Mildly dilated aortic root and visualize portion of proximal  ascending aorta  Possible mild thoracic aortic aneurysm  Compared to previous echocardiogram from 7/29/2015 there is  overall no significant change  Shlomo Frye  (Electronically Signed)  Final Date:      19 March 2018 14:40  CV Report                   [facility]      [id number]  Final                                                     Page 3     No results found for this or any previous visit  No results found for this or any previous visit  No results found for this or any previous visit  Patient was never admitted       *-*-*-*-*-*-*-*-*-*-*-*-*-*-*-*-*-*-*-*-*-*-*-*-*-*-*-*-*-*-*-*-*-*-*-*-*-*-*-*-*-*-*-*-*-*-*-*-*-*-*-*-*-*-  SIGNATURES:   @S@   Shlomo Frye MD     CC:   Alexey Townsend, DO Richelle Jacobson DO   *-*-*-*-*-*-*-*-*-*-*-*-*-*-*-*-*-*-*-*-*-*-*-*-*-*-*-*-*-*-*-*-*-*-*-*-*-*-*-*-*-*-*-*-*-*-*-*-*-*-*-*-*-*-    Social History     Social History    Marital status: Single     Spouse name: N/A    Number of children: N/A    Years of education: N/A     Occupational History  Not on file  Social History Main Topics    Smoking status: Current Every Day Smoker     Packs/day: 1 00     Years: 50 00     Types: Cigarettes    Smokeless tobacco: Never Used      Comment: Heavy tobacco smoker; 10 cigarettes per day as per [employer]    Alcohol use Not on file    Drug use: Unknown    Sexual activity: Not on file     Other Topics Concern    Not on file     Social History Narrative    No narrative on file      No family history on file    Past Surgical History:   Procedure Laterality Date    CARDIAC CATHETERIZATION      CERVICAL FUSION      LUMBAR FUSION

## 2018-11-20 NOTE — ASSESSMENT & PLAN NOTE
He has mild carotid bruit in the left carotid artery  Has no history of TIA/CVA  Is on reasonable statin therapy along with Zetia  He has followed with Dr Chris Brannon in the past and would like to follow up with him  We will facilitate this

## 2018-11-20 NOTE — LETTER
November 20, 2018     Laura Littlejohn, [address] [facility] [address]    Patient: Ashu Mina   YOB: 1950   Date of Visit: 11/20/2018       Dear Dr Mayte Sin: Thank you for referring Zee Lucio to me for evaluation  Below are my notes for this consultation  If you have questions, please do not hesitate to call me  I look forward to following your patient along with you  Sincerely,        Shlomo Frye MD        CC: MD Shlomo Brown MD  11/20/2018  1:52 PM  [facility]   [facility]   [address] [facility]    [address], [address] [facility]  [id number]  Phone#  [phone]  Fax#  [phone]  *-*-*-*-*-*-*-*-*-*-*-*-*-*-*-*-*-*-*-*-*-*-*-*-*-*-*-*-*-*-*-*-*-*-*-*-*-*-*-*-*-*-*-*-*-*-*-*-*-*-*-*-*-*    Jackson Coronel DATE: 11/20/18 1:37 PM    PATIENT NAME: Mabel Leyva [facility]   1950    [id number]  Age: 79 y o  Sex: male    AUTHOR: Shlomo Frye MD  PRIMARYCARE PHYSICIAN: Laura Littlejohn DO    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 in July 2018  10  Mild hypothyroidism  11  Pre diabetes    Echocardiogram March 2018 showed mildly increased left ventricular wall thickness, normal left ventricular systolic function and hyperdynamic wall motion, normal diastolic function, EF around 65%  Mild aortic valve sclerosis, trace tricuspid valve regurgitation, no pulmonary hypertension, borderline dilated aortic root      CTA of chest significant for 4 cm ascending thoracic aortic aneurysm with a focal atelectasis or scarring lower lungs without any lung mass or effusion  CURRENT ECG:  Results for orders placed or performed in visit on 11/20/18   POCT ECG    Narrative    Suspected ectopic atrial rhythm with frequent premature atrial contractions  P-wave morphology is negative in inferior leads suggestive of low atrial rhythm  Normal intervals and no significant ST T wave abnormalities  HR 90 bpm        CARDIOLOGY ASSESSMENT & PLAN:  No problem-specific Assessment & Plan notes found for this encounter  INTERVAL HISTORY & HISTORY OF PRESENT ILLNESS:  Yolette Shaver is here for follow-up regarding his cardiac comorbidities which include:  Hypertension, mild thoracic aortic aneurysm and other comorbidities  He has undergone hip replacement procedure in July 2018  Reports overall being well with no significant symptoms  He is less active than before due to undergoing hip replacement  Has had no recent decline in exercise tolerance  Also reports that has been placed on levothyroxine and metformin by his primary physician Dr Ashwin Polk  There have been no recent active symptoms of chest discomfort or exertional angina  There is no dyspnea with usual activities or exertion  No orthopnea, PND or pedal edema  No palpitations, lightheadedness, presyncope, or syncope  Denies any recent hospitalizations or other illnesses  Reports being compliant with medications  Continues to smoke half a pack of cigarettes a day  REVIEW OF SYMPTOMS:    Positive for:  Slight decrease in mobility  No specific cardiac or other symptoms  Negative for: All remaining as reviewed below and in HPI  SYSTEM SYMPTOMS REVIEWED:  Generalweight change, fever, night sweats  Respirato      Cardiovascularchest pain, syncope, dyspnea on exertion, edema, decline in exercise tolerance, claudication   Gastrointestinalpersistent vomiting, diarrhea, abdominal distention, blood in stool Muscular or skeletaljoint pain or swelling   Neurologicheadaches, syncope, abnormal movement  Hematologichistory of easy bruising and bleeding   Endocrinethyroid enlargement, heat or cold intolerance, polyuria   Psychiatricanxiety, depression     *-*-*-*-*-*-*-*-*-*-*-*-*-*-*-*-*-*-*-*-*-*-*-*-*-*-*-*-*-*-*-*-*-*-*-*-*-*-*-*-*-*-*-*-*-*-*-*-*-*-*-*-*-*-  VITAL SIGNS:  Vitals:    11/20/18 1312   BP: 160/80   BP Location: Left arm   Patient Position: Sitting   Cuff Size: Adult   Pulse: 90   SpO2: 97%   Weight: 67 9 kg (149 lb 12 8 oz)   Height: 5' 3" (1 6 m)     Reports that he just took his medications a short while ago today  Otherwise has been compliant with medications  Repeat blood pressure is around 145/85    *-*-*-*-*-*-*-*-*-*-*-*-*-*-*-*-*-*-*-*-*-*-*-*-*-*-*-*-*-*-*-*-*-*-*-*-*-*-*-*-*-*-*-*-*-*-*-*-*-*-*-*-*-*-  PHYSICAL EXAM:  General Appearance:    Alert, cooperative, no distress, appears stated age   Head, Eyes, ENT:    No obvious abnormality, moist mucous mebranes  Neck:   Supple, no JVD  Bruit noted in the left carotid   Back:     Symmetric, no curvature  Lungs:     Respirations unlabored  Clear to auscultation bilaterally,    Chest wall:    No tenderness or deformity   Heart:    Regular rate and rhythm interrupted with premature beats, slightly distant HSs, no sig murmur appreciated     Abdomen:     Soft, non-tender, No obvious masses, or organomegaly   Extremities:   Extremities normal, no cyanosis or edema    Skin:   Skin color, texture, turgor normal, no rashes or lesions     *-*-*-*-*-*-*-*-*-*-*-*-*-*-*-*-*-*-*-*-*-*-*-*-*-*-*-*-*-*-*-*-*-*-*-*-*-*-*-*-*-*-*-*-*-*-*-*-*-*-*-*-*-*-  CURRENT MEDICATION LIST:    Current Outpatient Prescriptions:     amLODIPine-valsartan (EXFORGE) 5-160 MG per tablet, Take 1 tablet by mouth daily, Disp: , Rfl:     aspirin 325 mg tablet, Take 325 mg by mouth daily, Disp: , Rfl:     co-enzyme Q-10 30 MG capsule, Take 30 mg by mouth 3 (three) times a day, Disp: , Rfl:     ezetimibe-simvastatin (VYTORIN) 10-80 MG per tablet, Take 1 tablet by mouth daily at bedtime, Disp: 90 tablet, Rfl: 3    fenofibrate (TRIGLIDE) 160 MG tablet, Take 160 mg by mouth daily, Disp: , Rfl:     levothyroxine 25 mcg tablet, Take 12 5 mcg by mouth daily, Disp: , Rfl:     metFORMIN (GLUCOPHAGE) 500 mg tablet, Take 500 mg by mouth 2 (two) times a day with meals, Disp: , Rfl:     Multiple Vitamins-Minerals (MULTIVITAMIN ADULT PO), every 24 hours, Disp: , Rfl:     omega-3-acid ethyl esters (LOVAZA) 1 g capsule, Take 2 g by mouth 2 (two) times a day, Disp: , Rfl:     ranitidine (ZANTAC) 150 mg tablet, Take 150 mg by mouth 2 (two) times a day, Disp: , Rfl:     ALLERGIES:  Allergies   Allergen Reactions    Rofecoxib        *-*-*-*-*-*-*-*-*-*-*-*-*-*-*-*-*-*-*-*-*-*-*-*-*-*-*-*-*-*-*-*-*-*-*-*-*-*-*-*-*-*-*-*-*-*-*-*-*-*-*-*-*-*-    LABORATORY DATA:  I have personally reviewed pertinent labs      No results found for: NA, K, CL, CO2, ANIONGAP, BUN, CREATININE, EGFR, GLUCOSE, CALCIUM, AST, ALT, ALKPHOS, PROT, BILITOT, MG  No results found for: WBC, HGB, PLT  No results found for: PT, PTT, INR  No results found for: CKMB, BNP, DIGOXIN  No results found for: TSH  No results found for: CHOL, HDL, LDL, TRIG   No results found for: HGBA1C  No results found for: Roberto Hall, GRAMSTAIN, URINECX, WOUNDCULT, BODYFLUIDCUL, MRSACULTURE, INFLUAPCR, INFLUBPCR, RSVPCR, LEGIONELLAUR, CDIFFTOXINB    *-*-*-*-*-*-*-*-*-*-*-*-*-*-*-*-*-*-*-*-*-*-*-*-*-*-*-*-*-*-*-*-*-*-*-*-*-*-*-*-*-*-*-*-*-*-*-*-*-*-*-*-*-*-  PREVIOUS CARDIOLOGY & RADIOLOGY RESULTS:  Results for orders placed in visit on 03/19/18   Echo complete with contrast if indicated    Narrative [facility] [address] [id number]  Marisol Gonzáles [address] [place] [address], [id number] [phone]    Cardiovascular Report  _________________________________________________________________         Transthoracic Echocardiogram Report  KARINA Farzana So    MRN:                 [id number]  Account :           [de-identified]  Accession :         [id number]  Exam Date:           2018 12:45  Patient Location:    O  Ordering Phys:       GLO Grimes)  Attending Phys:      GLO Grimes)  Technologist:        [facility] JOSE ZHAO    Age:  74             :   1950           Gender:   M  Wt:   140 lb         Ht:    66 in  Indication:  CAD  BP:         /       HR:    Rhythm:              sinus  Technical Quality:   fair but adequate      MEASUREMENTS  2D ECHO  Body Surface Area                 1 7 m²  LV Diastolic Diameter PLAX        3 9 cm  LV Systolic Diameter PLAX         2 3 cm  IVS Diastolic Thickness           1 1 cm  LVPW Diastolic Thickness          1 0 cm  LV Relative Wall Thickness        0 5  LVOT Diameter                     2 2 cm  Aortic Root Diameter              4 1 cm  LA Systolic Diameter LX           3 2 cm  LA Area                           7 3 cm²  LV Ejection Fraction MOD 4C       70 7 %  LA Volume AL                      14 0 cm³  LA Volume AL Index                8 1 cm³/m²  RA Area 4C View                   9 2 cm²    DOPPLER  AV Peak Velocity                  122 2 cm/s  AV Peak Gradient                  6 0 mmHg  AV Mean Gradient                  3 4 mmHg  AV Velocity Time Integral         22 2 cm  LVOT Peak Velocity                113 9 cm/s  LVOT Peak Gradient                5 2 mmHg  LVOT Velocity Time Integral       22 4 cm  AV Area Cont Eq vti               4 0 cm²  AV Area Cont Eq pk                3 7 cm²  MV Area PHT                       3 7 cm²  Mitral E Point Velocity           66 5 cm/s  Mitral A Point Velocity           84 6 cm/s  Mitral E to A Ratio               0 8  MV E' Velocity                    5 9 cm/s  Mitral E to MV E' Ratio           11 3  TR Peak Velocity                  146 1 cm/s  TR Peak Gradient                  8 5 mmHg  TAPSE                             2 5 cm  Right Atrial Pressure             5 0 mmHg  Pulmonary Artery Systolic Pressu  66 2 mmHg  Right Ventricular Systolic Press  31 5 mmHg  PV Peak Velocity                  100 4 cm/s  PV Peak Gradient                  4 0 mmHg  Pulmonary Artery Diastolic Press  7 0 mmHg      FINDINGS  Left Ventricle  Normal left ventricular cavity size, mildly increased wall  thickness, normal left ventricular systolic function with  hyperdynamic wall motion  Ejection fraction is greater than 65%  Normal diastolic function  Right Ventricle  Normal right ventricle size and systolic function  Normal  estimated right ventricular systolic pressure, 14 mmHg  Right Atrium  Normal right atrial size  Left Atrium  Normal left atrial size  Aneurysmal interatrial septum with  bulge towards the right atrium  No color flow Doppler evidence  of patent foramen ovale or atrial septal defect    Mitral Valve  Normal appearing mitral valve leaflets  No significant mitral  valve regurgitation  Aortic Valve  Trileaflet aortic valve with mild sclerosis  No aortic valve  stenosis or regurgitation  Tricuspid Valve  Trace, physiologic tricuspid valve regurgitation  Pulmonic Valve  No pulmonic valve regurgitation  Pericardium  No pericardial effusion  Aorta  Mildly dilated aortic root and visualize portion of proximal  ascending aorta  Possible mild thoracic aortic aneurysm  Additional Findings  Inferior vena cava is normal in size and demonstrates normal  pulsatile change with sniff test     CONCLUSIONS  - Mildly increased left ventricular wall thickness, normal left  ventricular systolic function and hyperdynamic wall motion  EF  greater than 65%  - Normal diastolic function   - No significant chamber hypertrophy or enlargement  - Mild aortic valve sclerosis   No aortic valve stenosis or  regurgitation   - No significant mitral valve stenosis or regurgitation   - Trace, physiologic tricuspid valve regurgitation   - No pulmonary hypertension   - No pericardial effusion   - Mildly dilated aortic root and visualize portion of proximal  ascending aorta  Possible mild thoracic aortic aneurysm  Compared to previous echocardiogram from 7/29/2015 there is  overall no significant change  Shlomo Frye  (Electronically Signed)  Final Date:      19 March 2018 14:40  CV Report                   Helayne Spatz      [id number]  Final                                                     Page 3     No results found for this or any previous visit  No results found for this or any previous visit  No results found for this or any previous visit  Patient was never admitted  *-*-*-*-*-*-*-*-*-*-*-*-*-*-*-*-*-*-*-*-*-*-*-*-*-*-*-*-*-*-*-*-*-*-*-*-*-*-*-*-*-*-*-*-*-*-*-*-*-*-*-*-*-*-  SIGNATURES:   @XLL@   Shlomo Frye MD     CC:   Sue Enrique, DO Wylie Serve, DO   *-*-*-*-*-*-*-*-*-*-*-*-*-*-*-*-*-*-*-*-*-*-*-*-*-*-*-*-*-*-*-*-*-*-*-*-*-*-*-*-*-*-*-*-*-*-*-*-*-*-*-*-*-*-    Social History     Social History    Marital status: Single     Spouse name: N/A    Number of children: N/A    Years of education: N/A     Occupational History    Not on file  Social History Main Topics    Smoking status: Current Every Day Smoker     Packs/day: 1 00     Years: 50 00     Types: Cigarettes    Smokeless tobacco: Never Used      Comment: Heavy tobacco smoker; 10 cigarettes per day as per [employer]    Alcohol use Not on file    Drug use: Unknown    Sexual activity: Not on file     Other Topics Concern    Not on file     Social History Narrative    No narrative on file      No family history on file    Past Surgical History:   Procedure Laterality Date    CARDIAC CATHETERIZATION      CERVICAL FUSION      LUMBAR FUSION

## 2018-11-20 NOTE — ASSESSMENT & PLAN NOTE
Mild ectasia of ascending thoracic aorta is noted without prior history of rupture  We will consider follow-up evaluation in 1-2 years  Meanwhile optimal blood pressure control will be the strategy

## 2018-11-20 NOTE — ASSESSMENT & PLAN NOTE
Blood pressure is noted to be slightly elevated although repeat blood pressure check is better  Given that valsartan his on recall with like to change his antihypertensive regimen  We are discontinuing Exforge and beginning amlodipine-almost start an 5/40 mg once daily  Advised to monitor salt intake and quit smoking  We would like to have a repeat blood pressure check in 2-3 weeks following initiation of regimen  Goal blood pressure is less than 130/80 consistently

## 2018-11-26 ENCOUNTER — TELEPHONE (OUTPATIENT)
Dept: CARDIOLOGY CLINIC | Facility: CLINIC | Age: 68
End: 2018-11-26

## 2018-11-26 DIAGNOSIS — I10 ESSENTIAL HYPERTENSION, BENIGN: Primary | ICD-10-CM

## 2018-11-26 NOTE — TELEPHONE ENCOUNTER
Called patient told him that Jeff denied original rx that Dr Julia Mckeon so he changed it to Amlodipine 5 mg daily and losarten 50 mg daily   new rx called to [facility]

## 2018-11-27 ENCOUNTER — TRANSCRIBE ORDERS (OUTPATIENT)
Dept: ADMINISTRATIVE | Facility: HOSPITAL | Age: 68
End: 2018-11-27

## 2018-11-27 DIAGNOSIS — I65.23 BILATERAL CAROTID ARTERY STENOSIS: Primary | ICD-10-CM

## 2018-11-28 RX ORDER — LOSARTAN POTASSIUM 50 MG/1
50 TABLET ORAL DAILY
Qty: 90 TABLET | Refills: 2 | Status: ON HOLD
Start: 2018-11-28 | End: 2019-11-02 | Stop reason: SDUPTHER

## 2018-11-28 RX ORDER — AMLODIPINE BESYLATE 5 MG/1
5 TABLET ORAL DAILY
Qty: 90 TABLET | Refills: 2 | Status: SHIPPED | OUTPATIENT
Start: 2018-11-28 | End: 2018-12-10 | Stop reason: SDUPTHER

## 2018-11-29 ENCOUNTER — HOSPITAL ENCOUNTER (OUTPATIENT)
Dept: NON INVASIVE DIAGNOSTICS | Facility: HOSPITAL | Age: 68
Discharge: HOME | End: 2018-11-29
Attending: INTERNAL MEDICINE
Payer: COMMERCIAL

## 2018-11-29 DIAGNOSIS — I10 ESSENTIAL HYPERTENSION, BENIGN: ICD-10-CM

## 2018-11-29 PROCEDURE — 93225 XTRNL ECG REC<48 HRS REC: CPT

## 2018-12-04 ENCOUNTER — HOSPITAL ENCOUNTER (OUTPATIENT)
Dept: NON INVASIVE DIAGNOSTICS | Facility: HOSPITAL | Age: 68
Discharge: HOME | End: 2018-12-04
Attending: SURGERY
Payer: COMMERCIAL

## 2018-12-04 DIAGNOSIS — I65.23 BILATERAL CAROTID ARTERY STENOSIS: ICD-10-CM

## 2018-12-04 PROCEDURE — 93880 EXTRACRANIAL BILAT STUDY: CPT | Performed by: SURGERY

## 2018-12-04 PROCEDURE — 93880 EXTRACRANIAL BILAT STUDY: CPT

## 2018-12-06 PROCEDURE — 93227 XTRNL ECG REC<48 HR R&I: CPT | Performed by: INTERNAL MEDICINE

## 2018-12-10 ENCOUNTER — TRANSCRIBE ORDERS (OUTPATIENT)
Dept: ADMINISTRATIVE | Facility: HOSPITAL | Age: 68
End: 2018-12-10

## 2018-12-10 ENCOUNTER — CLINICAL SUPPORT (OUTPATIENT)
Dept: CARDIOLOGY CLINIC | Facility: CLINIC | Age: 68
End: 2018-12-10
Payer: COMMERCIAL

## 2018-12-10 ENCOUNTER — APPOINTMENT (OUTPATIENT)
Dept: LAB | Facility: HOSPITAL | Age: 68
End: 2018-12-10
Attending: INTERNAL MEDICINE
Payer: COMMERCIAL

## 2018-12-10 VITALS
BODY MASS INDEX: 27.11 KG/M2 | SYSTOLIC BLOOD PRESSURE: 160 MMHG | DIASTOLIC BLOOD PRESSURE: 80 MMHG | HEIGHT: 63 IN | WEIGHT: 153 LBS

## 2018-12-10 DIAGNOSIS — I10 HYPERTENSION, UNSPECIFIED TYPE: Primary | ICD-10-CM

## 2018-12-10 DIAGNOSIS — I10 ESSENTIAL HYPERTENSION, BENIGN: ICD-10-CM

## 2018-12-10 LAB
ALBUMIN SERPL BCP-MCNC: 4.3 G/DL (ref 3–5.2)
ALP SERPL-CCNC: 50 U/L (ref 43–122)
ALT SERPL W P-5'-P-CCNC: 54 U/L (ref 9–52)
ANION GAP SERPL CALCULATED.3IONS-SCNC: 5 MMOL/L (ref 5–14)
AST SERPL W P-5'-P-CCNC: 57 U/L (ref 17–59)
BILIRUB SERPL-MCNC: 0.4 MG/DL
BUN SERPL-MCNC: 18 MG/DL (ref 5–25)
CALCIUM ALBUM COR SERPL-MCNC: 10 MG/DL (ref 8.3–10.1)
CALCIUM SERPL-MCNC: 10.2 MG/DL (ref 8.4–10.2)
CHLORIDE SERPL-SCNC: 101 MMOL/L (ref 97–108)
CO2 SERPL-SCNC: 35 MMOL/L (ref 22–30)
CREAT SERPL-MCNC: 1.05 MG/DL (ref 0.7–1.5)
GFR SERPL CREATININE-BSD FRML MDRD: 74 ML/MIN/1.73SQ M
GLUCOSE SERPL-MCNC: 101 MG/DL (ref 70–99)
POTASSIUM SERPL-SCNC: 4.1 MMOL/L (ref 3.6–5)
PROT SERPL-MCNC: 7.3 G/DL (ref 5.9–8.4)
SODIUM SERPL-SCNC: 141 MMOL/L (ref 137–147)

## 2018-12-10 PROCEDURE — 36415 COLL VENOUS BLD VENIPUNCTURE: CPT

## 2018-12-10 PROCEDURE — 80053 COMPREHEN METABOLIC PANEL: CPT

## 2018-12-10 PROCEDURE — 99211 OFF/OP EST MAY X REQ PHY/QHP: CPT

## 2018-12-10 RX ORDER — AMLODIPINE BESYLATE 10 MG/1
10 TABLET ORAL DAILY
Qty: 90 TABLET | Refills: 3 | Status: ON HOLD | OUTPATIENT
Start: 2018-12-10 | End: 2019-11-02 | Stop reason: SDUPTHER

## 2018-12-10 NOTE — PATIENT INSTRUCTIONS
Patient came into the office today for 2-3 week BP check and brought his own home BP cuff  On Office cuff patient's blood pressure was 158/70 on left Arm, on patient's cuff was 160/80 on the right arm  Patient was taking amlodipine 5MG and Dr Marge Maguire is increasing this to 5 MG Twice a day equaling to 10MG a day  Patient is going to be coming back in the next 2 weeks for another Nurse visit BP check  If BP is still high when he comes back, Dr Marge Maguire will start him on Metoprolol and Losartan  Patient also has blood work to be done for his CMP to Check his Kidney Function

## 2018-12-10 NOTE — PROGRESS NOTES
Patient came into the office today for 2-3 week BP check and brought his own home BP cuff  On Office cuff patient's blood pressure was 158/70 on left Arm, on patient's cuff was 160/80 on the right arm  Patient was taking amlodipine 5MG and Dr Thalia Perdue is increasing this to 5 MG Twice a day equaling to 10MG a day  Patient is going to be coming back in the next 2 weeks for another Nurse visit BP check  If BP is still high when he comes back, Dr Thalia Perdue will start him on Metoprolol and Losartan  Patient also has blood work to be done for his CMP to Check his Kidney Function

## 2018-12-10 NOTE — PROGRESS NOTES
Home blood pressures are noted to be elevated with average blood pressure is 150s to 160s over 70s to 90s  We are increasing the amlodipine to 10 mg daily  I have given him a slip for blood work to assess renal function  If renal function is normal will increase the losartan to 100 mg daily  If it is not we will add metoprolol succinate 25 mg to his regimen  A 2 week follow-up nurse visit is being arranged  If the average blood pressures are still about target (less than 130/80) then will add metoprolol succinate

## 2018-12-28 ENCOUNTER — CLINICAL SUPPORT (OUTPATIENT)
Dept: CARDIOLOGY CLINIC | Facility: CLINIC | Age: 68
End: 2018-12-28
Payer: COMMERCIAL

## 2018-12-28 VITALS
BODY MASS INDEX: 27.11 KG/M2 | DIASTOLIC BLOOD PRESSURE: 60 MMHG | HEIGHT: 63 IN | HEART RATE: 88 BPM | WEIGHT: 153 LBS | SYSTOLIC BLOOD PRESSURE: 130 MMHG | OXYGEN SATURATION: 98 %

## 2018-12-28 DIAGNOSIS — I10 HYPERTENSION, UNSPECIFIED TYPE: Primary | ICD-10-CM

## 2018-12-28 PROCEDURE — 99211 OFF/OP EST MAY X REQ PHY/QHP: CPT

## 2018-12-28 NOTE — PATIENT INSTRUCTIONS
Pt was seen today for a 2 week Blood pressure nurse visit  Patient states he feels well with no symptoms  Blood pressure was 130/60  He did  his CMP, miroslava did say that it was normal and to continue his medications as instructed  Placed on a recall list for a 6 month return

## 2019-02-20 PROBLEM — E78.5 HYPERLIPIDEMIA: Status: ACTIVE | Noted: 2019-02-20

## 2019-02-20 PROBLEM — R73.03 PREDIABETES: Status: ACTIVE | Noted: 2019-02-20

## 2019-02-20 PROBLEM — E03.9 HYPOTHYROIDISM (ACQUIRED): Status: ACTIVE | Noted: 2019-02-20

## 2019-07-16 PROCEDURE — 88341 IMHCHEM/IMCYTCHM EA ADD ANTB: CPT | Performed by: PATHOLOGY

## 2019-07-16 PROCEDURE — 88305 TISSUE EXAM BY PATHOLOGIST: CPT | Performed by: PATHOLOGY

## 2019-07-16 PROCEDURE — 88342 IMHCHEM/IMCYTCHM 1ST ANTB: CPT | Performed by: PATHOLOGY

## 2019-07-17 ENCOUNTER — LAB REQUISITION (OUTPATIENT)
Dept: LAB | Facility: HOSPITAL | Age: 69
End: 2019-07-17
Payer: COMMERCIAL

## 2019-07-17 DIAGNOSIS — D49.2 NEOPLASM OF UNSPECIFIED BEHAVIOR OF BONE, SOFT TISSUE, AND SKIN: ICD-10-CM

## 2019-10-23 ENCOUNTER — APPOINTMENT (EMERGENCY)
Dept: CT IMAGING | Facility: HOSPITAL | Age: 69
DRG: 438 | End: 2019-10-23
Payer: COMMERCIAL

## 2019-10-23 ENCOUNTER — HOSPITAL ENCOUNTER (INPATIENT)
Facility: HOSPITAL | Age: 69
LOS: 8 days | Discharge: HOME | DRG: 438 | End: 2019-11-02
Attending: EMERGENCY MEDICINE | Admitting: FAMILY MEDICINE
Payer: COMMERCIAL

## 2019-10-23 ENCOUNTER — OFFICE VISIT (OUTPATIENT)
Dept: URGENT CARE | Facility: CLINIC | Age: 69
End: 2019-10-23
Payer: COMMERCIAL

## 2019-10-23 VITALS
OXYGEN SATURATION: 95 % | TEMPERATURE: 98.3 F | RESPIRATION RATE: 20 BRPM | HEART RATE: 97 BPM | SYSTOLIC BLOOD PRESSURE: 152 MMHG | DIASTOLIC BLOOD PRESSURE: 66 MMHG

## 2019-10-23 DIAGNOSIS — E03.9 HYPOTHYROIDISM (ACQUIRED): ICD-10-CM

## 2019-10-23 DIAGNOSIS — E78.2 MIXED HYPERLIPIDEMIA: ICD-10-CM

## 2019-10-23 DIAGNOSIS — I10 ESSENTIAL HYPERTENSION, BENIGN: ICD-10-CM

## 2019-10-23 DIAGNOSIS — R10.30 LOWER ABDOMINAL PAIN: Primary | ICD-10-CM

## 2019-10-23 DIAGNOSIS — K52.9 JEJUNITIS: ICD-10-CM

## 2019-10-23 DIAGNOSIS — E78.5 HYPERLIPIDEMIA, UNSPECIFIED HYPERLIPIDEMIA TYPE: ICD-10-CM

## 2019-10-23 DIAGNOSIS — K85.30 DRUG-INDUCED ACUTE PANCREATITIS WITHOUT INFECTION OR NECROSIS: ICD-10-CM

## 2019-10-23 DIAGNOSIS — Z72.0 TOBACCO ABUSE: ICD-10-CM

## 2019-10-23 DIAGNOSIS — R73.03 PREDIABETES: ICD-10-CM

## 2019-10-23 DIAGNOSIS — R10.84 GENERALIZED ABDOMINAL PAIN: Primary | ICD-10-CM

## 2019-10-23 PROBLEM — R10.9 ABDOMINAL PAIN: Status: ACTIVE | Noted: 2019-10-23

## 2019-10-23 LAB
ALBUMIN SERPL BCP-MCNC: 4 G/DL (ref 3–5.2)
ALP SERPL-CCNC: 45 U/L (ref 43–122)
ALT SERPL W P-5'-P-CCNC: 25 U/L (ref 9–52)
ANION GAP SERPL CALCULATED.3IONS-SCNC: 8 MMOL/L (ref 5–14)
AST SERPL W P-5'-P-CCNC: 40 U/L (ref 17–59)
BASOPHILS # BLD AUTO: 0.1 THOUSANDS/ΜL (ref 0–0.1)
BASOPHILS NFR BLD AUTO: 1 % (ref 0–1)
BILIRUB SERPL-MCNC: 0.5 MG/DL
BILIRUB UR QL STRIP: NEGATIVE
BUN SERPL-MCNC: 15 MG/DL (ref 5–25)
CALCIUM SERPL-MCNC: 9.7 MG/DL (ref 8.4–10.2)
CHLORIDE SERPL-SCNC: 103 MMOL/L (ref 97–108)
CLARITY UR: CLEAR
CO2 SERPL-SCNC: 29 MMOL/L (ref 22–30)
COLOR UR: YELLOW
CREAT SERPL-MCNC: 0.85 MG/DL (ref 0.7–1.5)
EOSINOPHIL # BLD AUTO: 0.4 THOUSAND/ΜL (ref 0–0.4)
EOSINOPHIL NFR BLD AUTO: 5 % (ref 0–6)
ERYTHROCYTE [DISTWIDTH] IN BLOOD BY AUTOMATED COUNT: 14.2 %
GFR SERPL CREATININE-BSD FRML MDRD: 90 ML/MIN/1.73SQ M
GLUCOSE SERPL-MCNC: 95 MG/DL (ref 70–99)
GLUCOSE UR STRIP-MCNC: NEGATIVE MG/DL
HCT VFR BLD AUTO: 37.4 % (ref 41–53)
HGB BLD-MCNC: 12.9 G/DL (ref 13.5–17.5)
HGB UR QL STRIP.AUTO: NEGATIVE
KETONES UR STRIP-MCNC: NEGATIVE MG/DL
LACTATE SERPL-SCNC: 1.2 MMOL/L (ref 0.7–2)
LEUKOCYTE ESTERASE UR QL STRIP: NEGATIVE
LIPASE SERPL-CCNC: 791 U/L (ref 23–300)
LYMPHOCYTES # BLD AUTO: 1.7 THOUSANDS/ΜL (ref 0.5–4)
LYMPHOCYTES NFR BLD AUTO: 21 % (ref 25–45)
MCH RBC QN AUTO: 34 PG (ref 26–34)
MCHC RBC AUTO-ENTMCNC: 34.6 G/DL (ref 31–36)
MCV RBC AUTO: 98 FL (ref 80–100)
MONOCYTES # BLD AUTO: 0.7 THOUSAND/ΜL (ref 0.2–0.9)
MONOCYTES NFR BLD AUTO: 9 % (ref 1–10)
NEUTROPHILS # BLD AUTO: 5.2 THOUSANDS/ΜL (ref 1.8–7.8)
NEUTS SEG NFR BLD AUTO: 64 % (ref 45–65)
NITRITE UR QL STRIP: NEGATIVE
PH UR STRIP.AUTO: 7 [PH]
PLATELET # BLD AUTO: 334 THOUSANDS/UL (ref 150–450)
PMV BLD AUTO: 8.8 FL (ref 8.9–12.7)
POTASSIUM SERPL-SCNC: 3.6 MMOL/L (ref 3.6–5)
PROT SERPL-MCNC: 7 G/DL (ref 5.9–8.4)
PROT UR STRIP-MCNC: NEGATIVE MG/DL
RBC # BLD AUTO: 3.81 MILLION/UL (ref 4.5–5.9)
SL AMB  POCT GLUCOSE, UA: NEGATIVE
SL AMB LEUKOCYTE ESTERASE,UA: NEGATIVE
SL AMB POCT BILIRUBIN,UA: NEGATIVE
SL AMB POCT BLOOD,UA: NORMAL
SL AMB POCT CLARITY,UA: CLEAR
SL AMB POCT COLOR,UA: YELLOW
SL AMB POCT KETONES,UA: NEGATIVE
SL AMB POCT NITRITE,UA: NEGATIVE
SL AMB POCT PH,UA: 6.5
SL AMB POCT SPECIFIC GRAVITY,UA: 1.01
SL AMB POCT URINE PROTEIN: NORMAL
SL AMB POCT UROBILINOGEN: 0.2
SODIUM SERPL-SCNC: 140 MMOL/L (ref 137–147)
SP GR UR STRIP.AUTO: 1.01 (ref 1–1.04)
UROBILINOGEN UA: NEGATIVE MG/DL
WBC # BLD AUTO: 8 THOUSAND/UL (ref 4.5–11)

## 2019-10-23 PROCEDURE — 83690 ASSAY OF LIPASE: CPT | Performed by: EMERGENCY MEDICINE

## 2019-10-23 PROCEDURE — 81003 URINALYSIS AUTO W/O SCOPE: CPT | Performed by: EMERGENCY MEDICINE

## 2019-10-23 PROCEDURE — 81002 URINALYSIS NONAUTO W/O SCOPE: CPT | Performed by: PHYSICIAN ASSISTANT

## 2019-10-23 PROCEDURE — 99285 EMERGENCY DEPT VISIT HI MDM: CPT

## 2019-10-23 PROCEDURE — 74177 CT ABD & PELVIS W/CONTRAST: CPT

## 2019-10-23 PROCEDURE — 85025 COMPLETE CBC W/AUTO DIFF WBC: CPT | Performed by: EMERGENCY MEDICINE

## 2019-10-23 PROCEDURE — S9088 SERVICES PROVIDED IN URGENT: HCPCS | Performed by: PHYSICIAN ASSISTANT

## 2019-10-23 PROCEDURE — 36415 COLL VENOUS BLD VENIPUNCTURE: CPT | Performed by: EMERGENCY MEDICINE

## 2019-10-23 PROCEDURE — 80053 COMPREHEN METABOLIC PANEL: CPT | Performed by: EMERGENCY MEDICINE

## 2019-10-23 PROCEDURE — 99213 OFFICE O/P EST LOW 20 MIN: CPT | Performed by: PHYSICIAN ASSISTANT

## 2019-10-23 PROCEDURE — 96376 TX/PRO/DX INJ SAME DRUG ADON: CPT

## 2019-10-23 PROCEDURE — 93005 ELECTROCARDIOGRAM TRACING: CPT

## 2019-10-23 PROCEDURE — 96361 HYDRATE IV INFUSION ADD-ON: CPT

## 2019-10-23 PROCEDURE — 99285 EMERGENCY DEPT VISIT HI MDM: CPT | Performed by: EMERGENCY MEDICINE

## 2019-10-23 PROCEDURE — 83605 ASSAY OF LACTIC ACID: CPT | Performed by: EMERGENCY MEDICINE

## 2019-10-23 PROCEDURE — 96374 THER/PROPH/DIAG INJ IV PUSH: CPT

## 2019-10-23 RX ORDER — MORPHINE SULFATE 4 MG/ML
4 INJECTION, SOLUTION INTRAMUSCULAR; INTRAVENOUS ONCE
Status: COMPLETED | OUTPATIENT
Start: 2019-10-23 | End: 2019-10-23

## 2019-10-23 RX ADMIN — SODIUM CHLORIDE 1000 ML: 0.9 INJECTION, SOLUTION INTRAVENOUS at 19:35

## 2019-10-23 RX ADMIN — MORPHINE SULFATE 4 MG: 4 INJECTION INTRAVENOUS at 22:37

## 2019-10-23 RX ADMIN — MORPHINE SULFATE 4 MG: 4 INJECTION INTRAVENOUS at 19:52

## 2019-10-23 RX ADMIN — IOHEXOL 100 ML: 350 INJECTION, SOLUTION INTRAVENOUS at 20:08

## 2019-10-23 NOTE — PATIENT INSTRUCTIONS
Due to the severity of pain  recommend patient proceed to the emergency room for further evaluation

## 2019-10-23 NOTE — PROGRESS NOTES
[facility]    NAME: Yissel Tinajero is a 81 y o  male  : 1950    MRN: [id number]  DATE: 2019  TIME: 6:21 PM    Assessment and Plan   Lower abdominal pain [R10 30]  1  Lower abdominal pain  POCT urine dip    Transfer to other facility       Patient Instructions   Patient Instructions   Due to the severity of pain  recommend patient proceed to the emergency room for further evaluation  Chief Complaint     Chief Complaint   Patient presents with    Abdominal Pain     Patient with lower abdominal pain  Denies anyu nausea and or vomitting  Has had it for approx 1 week  History of Present Illness   Yissel Tinajero presents to the clinic c/o  81-year-old male comes in with intermittent abdominal pain that has been going on for about a week  Pain is a constant ache that will shoot up to 10/10 pain  Pain is central and will radiate bilateral lower abdomen  Sometimes pain will radiate towards rectum  Pain is as bad as a kidney stone but feels completely different than previous kidney stones he has had  Denies any changes in bowel or bladder  Denies any nausea or vomiting  Pain seems to be worse after eating but says his appetite seems to be normal   He has never had pain like this before  Pain does not improve or worsen before after passing bowel or bladder  Pain is affecting his sleep and he may wake up 3-4 times a night due to sharp pain  He cannot find a comfortable position when it hits  Normal bowel habits 1 regular bowel movement every morning  No changes  No visible blood  Normal urinary habits are nocturia 1 time per night  This has not changed  He has not taken anything for pain as he says that he does not want to mask the pain  He has history of high blood pressure, hyperlipidemia, pre diabetes, carotid bruit, thoracic aortic aneurysm  He is a smoker      He does not currently have a primary care provider since insurance switched with   Dc[facility]  Review of Systems   Review of Systems   Constitutional: Positive for activity change and appetite change  Negative for chills, diaphoresis, fatigue and fever  Respiratory: Negative  Cardiovascular: Negative  Gastrointestinal: Positive for abdominal pain  Negative for abdominal distention, anal bleeding, blood in stool, constipation, diarrhea, nausea, rectal pain and vomiting  Genitourinary: Negative  Current Medications     Long-Term Medications   Medication Sig Dispense Refill    amLODIPine (NORVASC) 10 mg tablet Take 1 tablet (10 mg total) by mouth daily 90 tablet 3    aspirin 325 mg tablet Take 325 mg by mouth daily      ezetimibe-simvastatin (VYTORIN) 10-40 mg per tablet Take 1 tablet by mouth daily at bedtime 90 tablet 5    fenofibrate (TRIGLIDE) 160 MG tablet Take 160 mg by mouth daily      levothyroxine 25 mcg tablet Take 12 5 mcg by mouth daily      losartan (COZAAR) 50 mg tablet Take 1 tablet (50 mg total) by mouth daily 90 tablet 2    metFORMIN (GLUCOPHAGE) 500 mg tablet Take 500 mg by mouth 2 (two) times a day with meals      omega-3-acid ethyl esters (LOVAZA) 1 g capsule Take 2 g by mouth 2 (two) times a day      ranitidine (ZANTAC) 150 mg tablet Take 150 mg by mouth 2 (two) times a day         Current Allergies     Allergies as of 10/23/2019 - Reviewed 10/23/2019   Allergen Reaction Noted    Rofecoxib  03/21/2018          The following portions of the patient's history were reviewed and updated as appropriate: allergies, current medications, past family history, past medical history, past social history, past surgical history and problem list   Past Medical History:   Diagnosis Date    Avascular necrosis of bone of hip, left ([address] )     Hiatal hernia     Hyperlipidemia      Past Surgical History:   Procedure Laterality Date    CARDIAC CATHETERIZATION      CERVICAL FUSION      LUMBAR FUSION       History reviewed  No pertinent family history      Objective   BP 152/66   Pulse 97   Temp 98 3 °F (36 8 °C) (Tympanic)   Resp 20   SpO2 95%   No LMP for male patient  Physical Exam     Physical Exam   Constitutional: He is oriented to person, place, and time  He appears well-developed and well-nourished  Non-toxic appearance  He does not appear ill  No distress  Eyes: No scleral icterus  Cardiovascular: Normal rate, regular rhythm and normal heart sounds  Exam reveals no gallop and no friction rub  No murmur heard  Pulmonary/Chest: Effort normal and breath sounds normal  No stridor  No respiratory distress  He has no wheezes  He has no rhonchi  He has no rales  Abdominal: Soft  Normal appearance  Bowel sounds are increased  There is no hepatosplenomegaly  There is tenderness in the right lower quadrant, suprapubic area, left upper quadrant and left lower quadrant  There is no rigidity, no guarding and no CVA tenderness  No hernia  Genitourinary:   Genitourinary Comments: Deferred   Neurological: He is alert and oriented to person, place, and time  Skin: Skin is warm and dry  No pallor  Psychiatric: He has a normal mood and affect  His behavior is normal    Nursing note and vitals reviewed

## 2019-10-23 NOTE — ED PROVIDER NOTES
History  Chief Complaint   Patient presents with    Abdominal Pain     low abdominal pain the "shoots from side to side" hx kidney stones seen at urgent care sent her because there was blood in my urine     80 yo male presents with abdominal pain x 10 days  The patient reports an intermittent poorly localized lower abdominal pain that "shoots from side to side"  The pain has been progressively increasing in severity and frequency over the past few days  Last night It woke the patient from sleep 4 or 5 times so he decided to get "checked out" at an urgent care  The provider found blood in his urine and sent him to the ED to "rule out a kidney stone"  No back or flank pain  He denies N/V/D  (+) "Normal" bowel movement this morning  No fevers/chills  No dysuria or gross hematuria  (+) Moderate alcohol intake --> "a few [address] iced teas" with dinner earlier this week  (+) Mild abdominal distension  No other specific complaints  Prior to Admission Medications   Prescriptions Last Dose Informant Patient Reported? Taking?    Multiple Vitamins-Minerals (MULTIVITAMIN ADULT PO) 10/23/2019 at Unknown time Self Yes Yes   Sig: every 24 hours   amLODIPine (NORVASC) 10 mg tablet 10/23/2019 at Unknown time Self No Yes   Sig: Take 1 tablet (10 mg total) by mouth daily   aspirin 325 mg tablet 10/23/2019 at Unknown time Self Yes Yes   Sig: Take 325 mg by mouth daily   co-enzyme Q-10 30 MG capsule 10/23/2019 at Unknown time Self Yes Yes   Sig: Take 30 mg by mouth 3 (three) times a day   ezetimibe-simvastatin (VYTORIN) 10-40 mg per tablet 10/22/2019 at Unknown time Self No Yes   Sig: Take 1 tablet by mouth daily at bedtime   fenofibrate (TRIGLIDE) 160 MG tablet 10/23/2019 at Unknown time Self Yes Yes   Sig: Take 160 mg by mouth daily   levothyroxine 25 mcg tablet 10/23/2019 at Unknown time Self Yes Yes   Sig: Take 12 5 mcg by mouth daily   losartan (COZAAR) 50 mg tablet 10/23/2019 at Unknown time Self No Yes   Sig: Take 1 tablet (50 mg total) by mouth daily   metFORMIN (GLUCOPHAGE) 500 mg tablet 10/23/2019 at Unknown time Self Yes Yes   Sig: Take 500 mg by mouth 2 (two) times a day with meals   omega-3-acid ethyl esters (LOVAZA) 1 g capsule 10/23/2019 at Unknown time Self Yes Yes   Sig: Take 2 g by mouth 2 (two) times a day   ranitidine (ZANTAC) 150 mg tablet 10/23/2019 at Unknown time Self Yes Yes   Sig: Take 150 mg by mouth 2 (two) times a day      Facility-Administered Medications: None       Past Medical History:   Diagnosis Date    Avascular necrosis of bone of hip, left (HCC)     Hiatal hernia     Hyperlipidemia        Past Surgical History:   Procedure Laterality Date    CARDIAC CATHETERIZATION      CERVICAL FUSION      LUMBAR FUSION         History reviewed  No pertinent family history  I have reviewed and agree with the history as documented  Social History     Tobacco Use    Smoking status: Current Every Day Smoker     Packs/day: 1 00     Years: 50 00     Pack years: 50 00     Types: Cigarettes    Smokeless tobacco: Never Used    Tobacco comment: Heavy tobacco smoker; 10 cigarettes per day as per [employer]   Substance Use Topics    Alcohol use: Yes     Frequency: Monthly or less     Drinks per session: 1 or 2     Binge frequency: Never    Drug use: Never        Review of Systems   Constitutional: Negative for chills and fever  HENT: Negative for sore throat  Respiratory: Negative for cough and shortness of breath  Cardiovascular: Negative for chest pain and palpitations  Gastrointestinal: Positive for abdominal distention and abdominal pain  Negative for blood in stool, diarrhea, nausea and vomiting  Endocrine: Negative for cold intolerance and heat intolerance  Genitourinary: Negative for dysuria, flank pain, hematuria, scrotal swelling and testicular pain  Musculoskeletal: Negative for back pain  Skin: Negative for rash  Allergic/Immunologic: Negative for immunocompromised state  Neurological: Negative for headaches  Hematological: Negative for adenopathy  Psychiatric/Behavioral: The patient is not nervous/anxious  Physical Exam  Physical Exam   Constitutional: He is oriented to person, place, and time  He appears well-developed and well-nourished  No distress  HENT:   Head: Normocephalic and atraumatic  Eyes: Pupils are equal, round, and reactive to light  EOM are normal    Neck: Normal range of motion  Neck supple  Cardiovascular: Normal rate and regular rhythm  Pulmonary/Chest: Effort normal and breath sounds normal  No respiratory distress  Abdominal: Soft  He exhibits distension  There is generalized tenderness  There is no rebound, no guarding, no tenderness at McBurney's point and negative Uribe's sign  Musculoskeletal: Normal range of motion  He exhibits no edema  Neurological: He is alert and oriented to person, place, and time  Skin: Skin is warm and dry  Psychiatric: He has a normal mood and affect         Vital Signs  ED Triage Vitals   Temperature Pulse Respirations Blood Pressure SpO2   10/23/19 1853 10/23/19 1853 10/23/19 1853 10/23/19 1853 10/23/19 1853   98 7 °F (37 1 °C) 97 16 (!) 176/102 96 %      Temp Source Heart Rate Source Patient Position - Orthostatic VS BP Location FiO2 (%)   10/23/19 1853 10/23/19 2221 10/23/19 1853 10/23/19 1853 --   Tympanic Monitor Sitting Left arm       Pain Score       10/23/19 1853       Worst Possible Pain           Vitals:    10/24/19 0430 10/24/19 0709 10/24/19 1457 10/24/19 1504   BP:  134/75 141/76 141/76   Pulse: 90 69 68 68   Patient Position - Orthostatic VS:  Lying Lying Lying         Visual Acuity      ED Medications  Medications   amLODIPine (NORVASC) tablet 10 mg (10 mg Oral Given 10/24/19 0811)   aspirin tablet 325 mg (325 mg Oral Given 10/24/19 0811)   fenofibrate (TRICOR) tablet 168 mg (168 mg Oral Given 10/24/19 0812)   levothyroxine tablet 12 5 mcg (12 5 mcg Oral Given 10/24/19 0619)   losartan (COZAAR) tablet 50 mg (50 mg Oral Given 10/24/19 0811)   sodium chloride 0 9 % infusion (75 mL/hr Intravenous Rate/Dose Change 10/24/19 1438)   nicotine (NICODERM CQ) 21 mg/24 hr TD 24 hr patch 1 patch (1 patch Transdermal Not Given 10/24/19 0812)   pantoprazole (PROTONIX) injection 40 mg (40 mg Intravenous Given 10/24/19 0812)   enoxaparin (LOVENOX) subcutaneous injection 30 mg (30 mg Subcutaneous Given 10/24/19 0811)   influenza vaccine, high-dose (FLUZONE HIGH-DOSE) IM injection ZACK 0 5 mL (0 mL Intramuscular Hold 10/24/19 0559)   pravastatin (PRAVACHOL) tablet 80 mg (has no administration in time range)   morphine injection 2 mg (2 mg Intravenous Given 10/24/19 1244)   dicyclomine (BENTYL) capsule 10 mg (has no administration in time range)   magnesium sulfate IVPB (premix) SOLN 1 g (1 g Intravenous New Bag 10/24/19 1504)   sodium chloride 0 9 % bolus 1,000 mL (0 mL Intravenous Stopped 10/23/19 2236)   morphine (PF) 4 mg/mL injection 4 mg (4 mg Intravenous Given 10/1952)   iohexol (OMNIPAQUE) 350 MG/ML injection (MULTI-DOSE) 100 mL (100 mL Intravenous Given 10/23/19 2008)   morphine (PF) 4 mg/mL injection 4 mg (4 mg Intravenous Given 10/23/19 2237)   morphine injection 2 mg (2 mg Intravenous Given 10/24/19 0619)   potassium chloride (K-DUR,KLOR-CON) CR tablet 40 mEq (40 mEq Oral Given 10/24/19 1437)       Diagnostic Studies  Results Reviewed     Procedure Component Value Units Date/Time    Lipase [[id number]]  (Abnormal) Collected:  10/23/19 1931    Lab Status:  Final result Specimen:  Blood from Arm, Right Updated:  10/23/19 1954     Lipase 791 u/L     CMP [[id number]]  (Normal) Collected:  10/23/19 1931    Lab Status:  Final result Specimen:  Blood from Arm, Right Updated:  10/23/19 1954     Sodium 140 mmol/L      Potassium 3 6 mmol/L      Chloride 103 mmol/L      CO2 29 mmol/L      ANION GAP 8 mmol/L      BUN 15 mg/dL      Creatinine 0 85 mg/dL      Glucose 95 mg/dL      Calcium 9 7 mg/dL      AST 40 U/L ALT 25 U/L      Alkaline Phosphatase 45 U/L      Total Protein 7 0 g/dL      Albumin 4 0 g/dL      Total Bilirubin 0 50 mg/dL      eGFR 90 ml/min/1 73sq m     Narrative:       [place] guidelines for Chronic Kidney Disease (CKD):     Stage 1 with normal or high GFR (GFR > 90 mL/min/1 73 square meters)    Stage 2 Mild CKD (GFR = 60-89 mL/min/1 73 square meters)    Stage 3A Moderate CKD (GFR = 45-59 mL/min/1 73 square meters)    Stage 3B Moderate CKD (GFR = 30-44 mL/min/1 73 square meters)    Stage 4 Severe CKD (GFR = 15-29 mL/min/1 73 square meters)    Stage 5 End Stage CKD (GFR <15 mL/min/1 73 square meters)  Note: GFR calculation is accurate only with a steady state creatinine    Lactic acid, plasma [[id number]]  (Normal) Collected:  10/23/19 1931    Lab Status:  Final result Specimen:  Blood from Arm, Right Updated:  10/23/19 1950     LACTIC ACID 1 2 mmol/L     Narrative:       Result may be elevated if tourniquet was used during collection      CBC and differential [[id number]]  (Abnormal) Collected:  10/23/19 1931    Lab Status:  Final result Specimen:  Blood from Arm, Right Updated:  10/23/19 1944     WBC 8 00 Thousand/uL      RBC 3 81 Million/uL      Hemoglobin 12 9 g/dL      Hematocrit 37 4 %      MCV 98 fL      MCH 34 0 pg      MCHC 34 6 g/dL      RDW 14 2 %      MPV 8 8 fL      Platelets 328 Thousands/uL      Neutrophils Relative 64 %      Lymphocytes Relative 21 %      Monocytes Relative 9 %      Eosinophils Relative 5 %      Basophils Relative 1 %      Neutrophils Absolute 5 20 Thousands/µL      Lymphocytes Absolute 1 70 Thousands/µL      Monocytes Absolute 0 70 Thousand/µL      Eosinophils Absolute 0 40 Thousand/µL      Basophils Absolute 0 10 Thousands/µL     UA w Reflex to Microscopic w Reflex to Culture [[id number]]  (Normal) Collected:  10/23/19 1932    Lab Status:  Final result Specimen:  Urine, Clean Catch Updated:  10/23/19 1943     Color, UA Yellow     Clarity, UA Clear     Specific [place], UA 1 010     pH, UA 7 0     Leukocytes, UA Negative     Nitrite, UA Negative     Protein, UA Negative mg/dl      Glucose, UA Negative mg/dl      Ketones, UA Negative mg/dl      Bilirubin, UA Negative     Blood, UA Negative     UROBILINOGEN UA Negative mg/dL                  CT abdomen pelvis with contrast   Final Result by Daren Calvert MD (10/23 2036)      Findings suggestive of inflammatory or infectious jejunitis and mild distal duodenitis  No evidence of bowel obstruction or, colitis or diverticulitis  Workstation performed: [id number]                    Procedures  ECG 12 Lead Documentation Only  Date/Time: 10/23/2019 11:04 PM  Performed by: La Nena Malloy MD  Authorized by: La Nena Malloy MD     Indications / Diagnosis:  Abdominal Pain  ECG reviewed by me, the ED Provider: yes    Patient location:  ED  Interpretation:     Interpretation: normal    Rate:     ECG rate:  72 bpm    ECG rate assessment: normal    Rhythm:     Rhythm: sinus rhythm    Ectopy:     Ectopy: none    QRS:     QRS axis:  Normal  ST segments:     ST segments:  Normal  T waves:     T waves: normal             ED Course                               MDM  Number of Diagnoses or Management Options  Generalized abdominal pain:   Diagnosis management comments: The patient is comfortable appearing but with diffuse abdominal tenderness on exam  Vital signs are stable  Unclear etiology of his symptoms  Will check basic labs, UA, lipase, and a CT A/P  IVFs/Morphine administered, will continue to monitor in the ED  23:00 Mildly elevated lipase and nonspecific CT findings do not necessarily explain the patient's pain  Will place in the Observation Unit for overnight monitoring and further workup  Inpatient bed requested         Amount and/or Complexity of Data Reviewed  Clinical lab tests: ordered and reviewed  Tests in the radiology section of CPT®: ordered and reviewed    Patient Progress  Patient progress: stable      Disposition  Final diagnoses:   Generalized abdominal pain     Time reflects when diagnosis was documented in both MDM as applicable and the Disposition within this note     Time User Action Codes Description Comment    10/23/2019 11:02 PM Graciela Summers Add [R10 84] Generalized abdominal pain       ED Disposition     ED Disposition Condition Date/Time Comment    Admit Stable Wed Oct 23, 2019 11:02 PM Case was discussed with JUAN and the patient's admission status was agreed to be Admission Status: observation status to the service of Dr Diann Adams   Follow-up Information    None         Current Discharge Medication List      CONTINUE these medications which have NOT CHANGED    Details   amLODIPine (NORVASC) 10 mg tablet Take 1 tablet (10 mg total) by mouth daily  Qty: 90 tablet, Refills: 3    Associated Diagnoses: Essential hypertension, benign      aspirin 325 mg tablet Take 325 mg by mouth daily      co-enzyme Q-10 30 MG capsule Take 30 mg by mouth 3 (three) times a day      ezetimibe-simvastatin (VYTORIN) 10-40 mg per tablet Take 1 tablet by mouth daily at bedtime  Qty: 90 tablet, Refills: 5    Associated Diagnoses: Hyperlipidemia, unspecified hyperlipidemia type      fenofibrate (TRIGLIDE) 160 MG tablet Take 160 mg by mouth daily      levothyroxine 25 mcg tablet Take 12 5 mcg by mouth daily      losartan (COZAAR) 50 mg tablet Take 1 tablet (50 mg total) by mouth daily  Qty: 90 tablet, Refills: 2    Associated Diagnoses: Essential hypertension, benign      metFORMIN (GLUCOPHAGE) 500 mg tablet Take 500 mg by mouth 2 (two) times a day with meals      Multiple Vitamins-Minerals (MULTIVITAMIN ADULT PO) every 24 hours      omega-3-acid ethyl esters (LOVAZA) 1 g capsule Take 2 g by mouth 2 (two) times a day      ranitidine (ZANTAC) 150 mg tablet Take 150 mg by mouth 2 (two) times a day           No discharge procedures on file      ED Provider  Electronically Signed by           Jeremy Jeffery MD  10/24/19 1615

## 2019-10-24 PROBLEM — Z72.0 TOBACCO ABUSE: Status: ACTIVE | Noted: 2019-10-24

## 2019-10-24 LAB
ANION GAP SERPL CALCULATED.3IONS-SCNC: 5 MMOL/L (ref 5–14)
ATRIAL RATE: 72 BPM
BASOPHILS # BLD AUTO: 0 THOUSANDS/ΜL (ref 0–0.1)
BASOPHILS NFR BLD AUTO: 0 % (ref 0–1)
BUN SERPL-MCNC: 12 MG/DL (ref 5–25)
CALCIUM SERPL-MCNC: 8.6 MG/DL (ref 8.4–10.2)
CHLORIDE SERPL-SCNC: 106 MMOL/L (ref 97–108)
CO2 SERPL-SCNC: 29 MMOL/L (ref 22–30)
CREAT SERPL-MCNC: 0.79 MG/DL (ref 0.7–1.5)
EOSINOPHIL # BLD AUTO: 0.5 THOUSAND/ΜL (ref 0–0.4)
EOSINOPHIL NFR BLD AUTO: 8 % (ref 0–6)
ERYTHROCYTE [DISTWIDTH] IN BLOOD BY AUTOMATED COUNT: 14.3 %
EST. AVERAGE GLUCOSE BLD GHB EST-MCNC: 100 MG/DL
GFR SERPL CREATININE-BSD FRML MDRD: 93 ML/MIN/1.73SQ M
GLUCOSE SERPL-MCNC: 87 MG/DL (ref 70–99)
HBA1C MFR BLD: 5.1 % (ref 4.2–6.3)
HCT VFR BLD AUTO: 33.4 % (ref 41–53)
HCV AB SER QL: NORMAL
HGB BLD-MCNC: 11.3 G/DL (ref 13.5–17.5)
LYMPHOCYTES # BLD AUTO: 1.7 THOUSANDS/ΜL (ref 0.5–4)
LYMPHOCYTES NFR BLD AUTO: 26 % (ref 25–45)
MAGNESIUM SERPL-MCNC: 1.4 MG/DL (ref 1.6–2.3)
MCH RBC QN AUTO: 33.3 PG (ref 26–34)
MCHC RBC AUTO-ENTMCNC: 33.8 G/DL (ref 31–36)
MCV RBC AUTO: 98 FL (ref 80–100)
MONOCYTES # BLD AUTO: 0.6 THOUSAND/ΜL (ref 0.2–0.9)
MONOCYTES NFR BLD AUTO: 9 % (ref 1–10)
NEUTROPHILS # BLD AUTO: 3.7 THOUSANDS/ΜL (ref 1.8–7.8)
NEUTS SEG NFR BLD AUTO: 57 % (ref 45–65)
PHOSPHATE SERPL-MCNC: 2.9 MG/DL (ref 2.5–4.8)
PLATELET # BLD AUTO: 298 THOUSANDS/UL (ref 150–450)
PMV BLD AUTO: 8.3 FL (ref 8.9–12.7)
POTASSIUM SERPL-SCNC: 3.3 MMOL/L (ref 3.6–5)
PR INTERVAL: 144 MS
QRS AXIS: 40 DEGREES
QRSD INTERVAL: 84 MS
QT INTERVAL: 404 MS
QTC INTERVAL: 442 MS
RBC # BLD AUTO: 3.39 MILLION/UL (ref 4.5–5.9)
SODIUM SERPL-SCNC: 140 MMOL/L (ref 137–147)
T WAVE AXIS: 38 DEGREES
TSH SERPL DL<=0.05 MIU/L-ACNC: 5.31 UIU/ML (ref 0.47–4.68)
VENTRICULAR RATE: 72 BPM
WBC # BLD AUTO: 6.6 THOUSAND/UL (ref 4.5–11)

## 2019-10-24 PROCEDURE — 84100 ASSAY OF PHOSPHORUS: CPT | Performed by: NURSE PRACTITIONER

## 2019-10-24 PROCEDURE — C9113 INJ PANTOPRAZOLE SODIUM, VIA: HCPCS | Performed by: NURSE PRACTITIONER

## 2019-10-24 PROCEDURE — 93010 ELECTROCARDIOGRAM REPORT: CPT | Performed by: INTERNAL MEDICINE

## 2019-10-24 PROCEDURE — 86803 HEPATITIS C AB TEST: CPT | Performed by: NURSE PRACTITIONER

## 2019-10-24 PROCEDURE — 83735 ASSAY OF MAGNESIUM: CPT | Performed by: NURSE PRACTITIONER

## 2019-10-24 PROCEDURE — 99226 PR SBSQ OBSERVATION CARE/DAY 35 MINUTES: CPT | Performed by: FAMILY MEDICINE

## 2019-10-24 PROCEDURE — 99220 PR INITIAL OBSERVATION CARE/DAY 70 MINUTES: CPT | Performed by: NURSE PRACTITIONER

## 2019-10-24 PROCEDURE — 80048 BASIC METABOLIC PNL TOTAL CA: CPT | Performed by: NURSE PRACTITIONER

## 2019-10-24 PROCEDURE — 84443 ASSAY THYROID STIM HORMONE: CPT | Performed by: NURSE PRACTITIONER

## 2019-10-24 PROCEDURE — 85025 COMPLETE CBC W/AUTO DIFF WBC: CPT | Performed by: NURSE PRACTITIONER

## 2019-10-24 PROCEDURE — 83036 HEMOGLOBIN GLYCOSYLATED A1C: CPT | Performed by: NURSE PRACTITIONER

## 2019-10-24 RX ORDER — FENOFIBRATE 48 MG/1
160 TABLET, COATED ORAL DAILY
Status: DISCONTINUED | OUTPATIENT
Start: 2019-10-24 | End: 2019-11-02 | Stop reason: HOSPADM

## 2019-10-24 RX ORDER — ASPIRIN 325 MG
325 TABLET ORAL DAILY
Status: DISCONTINUED | OUTPATIENT
Start: 2019-10-24 | End: 2019-11-02 | Stop reason: HOSPADM

## 2019-10-24 RX ORDER — LOSARTAN POTASSIUM 50 MG/1
50 TABLET ORAL DAILY
Status: DISCONTINUED | OUTPATIENT
Start: 2019-10-24 | End: 2019-11-02 | Stop reason: HOSPADM

## 2019-10-24 RX ORDER — MAGNESIUM SULFATE 1 G/100ML
1 INJECTION INTRAVENOUS ONCE
Status: COMPLETED | OUTPATIENT
Start: 2019-10-24 | End: 2019-10-24

## 2019-10-24 RX ORDER — POTASSIUM CHLORIDE 20 MEQ/1
40 TABLET, EXTENDED RELEASE ORAL ONCE
Status: COMPLETED | OUTPATIENT
Start: 2019-10-24 | End: 2019-10-24

## 2019-10-24 RX ORDER — LEVOTHYROXINE SODIUM 0.03 MG/1
12.5 TABLET ORAL
Status: DISCONTINUED | OUTPATIENT
Start: 2019-10-24 | End: 2019-11-02 | Stop reason: HOSPADM

## 2019-10-24 RX ORDER — SODIUM CHLORIDE 9 MG/ML
50 INJECTION, SOLUTION INTRAVENOUS CONTINUOUS
Status: DISCONTINUED | OUTPATIENT
Start: 2019-10-24 | End: 2019-10-28

## 2019-10-24 RX ORDER — EZETIMIBE 10 MG/1
10 TABLET ORAL
Status: DISCONTINUED | OUTPATIENT
Start: 2019-10-24 | End: 2019-10-24

## 2019-10-24 RX ORDER — PRAVASTATIN SODIUM 40 MG
80 TABLET ORAL
Status: DISCONTINUED | OUTPATIENT
Start: 2019-10-24 | End: 2019-10-26

## 2019-10-24 RX ORDER — AMLODIPINE BESYLATE 10 MG/1
10 TABLET ORAL DAILY
Status: DISCONTINUED | OUTPATIENT
Start: 2019-10-24 | End: 2019-11-02 | Stop reason: HOSPADM

## 2019-10-24 RX ORDER — PANTOPRAZOLE SODIUM 40 MG/1
40 INJECTION, POWDER, FOR SOLUTION INTRAVENOUS
Status: DISCONTINUED | OUTPATIENT
Start: 2019-10-24 | End: 2019-11-01

## 2019-10-24 RX ORDER — NICOTINE 21 MG/24HR
1 PATCH, TRANSDERMAL 24 HOURS TRANSDERMAL DAILY
Status: DISCONTINUED | OUTPATIENT
Start: 2019-10-24 | End: 2019-11-02 | Stop reason: HOSPADM

## 2019-10-24 RX ORDER — PRAVASTATIN SODIUM 40 MG
80 TABLET ORAL
Status: DISCONTINUED | OUTPATIENT
Start: 2019-10-24 | End: 2019-10-24

## 2019-10-24 RX ORDER — DICYCLOMINE HYDROCHLORIDE 10 MG/1
10 CAPSULE ORAL 4 TIMES DAILY PRN
Status: DISCONTINUED | OUTPATIENT
Start: 2019-10-24 | End: 2019-10-27

## 2019-10-24 RX ADMIN — FENOFIBRATE 168 MG: 48 TABLET, FILM COATED ORAL at 08:12

## 2019-10-24 RX ADMIN — SODIUM CHLORIDE 125 ML/HR: 0.9 INJECTION, SOLUTION INTRAVENOUS at 01:35

## 2019-10-24 RX ADMIN — PRAVASTATIN SODIUM 80 MG: 40 TABLET ORAL at 21:43

## 2019-10-24 RX ADMIN — AMLODIPINE BESYLATE 10 MG: 10 TABLET ORAL at 08:11

## 2019-10-24 RX ADMIN — MAGNESIUM SULFATE IN DEXTROSE 1 G: 10 INJECTION, SOLUTION INTRAVENOUS at 15:04

## 2019-10-24 RX ADMIN — SODIUM CHLORIDE 75 ML/HR: 0.9 INJECTION, SOLUTION INTRAVENOUS at 18:04

## 2019-10-24 RX ADMIN — ENOXAPARIN SODIUM 30 MG: 30 INJECTION SUBCUTANEOUS at 08:11

## 2019-10-24 RX ADMIN — POTASSIUM CHLORIDE 40 MEQ: 20 TABLET, EXTENDED RELEASE ORAL at 14:37

## 2019-10-24 RX ADMIN — MORPHINE SULFATE 2 MG: 2 INJECTION, SOLUTION INTRAMUSCULAR; INTRAVENOUS at 06:19

## 2019-10-24 RX ADMIN — MORPHINE SULFATE 2 MG: 2 INJECTION, SOLUTION INTRAMUSCULAR; INTRAVENOUS at 18:04

## 2019-10-24 RX ADMIN — PANTOPRAZOLE SODIUM 40 MG: 40 INJECTION, POWDER, FOR SOLUTION INTRAVENOUS at 08:12

## 2019-10-24 RX ADMIN — LOSARTAN POTASSIUM 50 MG: 50 TABLET, FILM COATED ORAL at 08:11

## 2019-10-24 RX ADMIN — MORPHINE SULFATE 2 MG: 2 INJECTION, SOLUTION INTRAMUSCULAR; INTRAVENOUS at 08:09

## 2019-10-24 RX ADMIN — LEVOTHYROXINE SODIUM 12.5 MCG: 25 TABLET ORAL at 06:19

## 2019-10-24 RX ADMIN — ASPIRIN 325 MG ORAL TABLET 325 MG: 325 PILL ORAL at 08:11

## 2019-10-24 RX ADMIN — MORPHINE SULFATE 2 MG: 2 INJECTION, SOLUTION INTRAMUSCULAR; INTRAVENOUS at 12:44

## 2019-10-24 RX ADMIN — MORPHINE SULFATE 2 MG: 2 INJECTION, SOLUTION INTRAMUSCULAR; INTRAVENOUS at 21:44

## 2019-10-24 NOTE — SOCIAL WORK
LOS: 0 GMLOS: none    SW met w/ pt to present w/ and explain observation notice  Pt understood and had no other questions or concerns  Pt signed  Pt did not want a copy  SW placed original in scan bin to be scanned into EHR

## 2019-10-24 NOTE — SOCIAL WORK
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

## 2019-10-24 NOTE — UTILIZATION REVIEW
Initial Clinical Review  - Refaxed due to OBS order date correction (initially typed as 12/23)    Admission: Date/Time/Statement: 10/23 @ [address] This Encounter   Procedures    Place in Observation (expected length of stay for this patient is less than two midnights)     Standing Status:   Standing     Number of Occurrences:   1     Order Specific Question:   Admitting Physician     Answer:   Violeta  [[id number]]     Order Specific Question:   Level of Care     Answer:   Med Surg [16]     ED Arrival Information     Expected Arrival Acuity Means of Arrival Escorted By Service Admission Type    10/23/2019  10/23/2019 18:41 Emergent Walk-In Self Hospitalist Emergency    Arrival Complaint    Abdominal Pain        Chief Complaint   Patient presents with    Abdominal Pain     low abdominal pain the "shoots from side to side" hx kidney stones seen at urgent care sent her because there was blood in my urine     Assessment/Plan: 82 y/o male presents to ED from [facility] with abd pain for 10 days  Radiates to all quadrants  CT abdomen pelvis shows 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, mild inflammation of the distal 3rd and 4th portion of the duodenum, mild wall thickening of proximal jejunal loops, diverticulosis throughout the colon without diverticulitis or colitis, no bowel obstruction noted, pancreas unremarkable  On exam, decreased breath sounds, abd distension, decreased bowel sounds, abd tenderness  Admitted as observation due to abd pain  Continue IVF  IV PPI  GI consult  NPO   10/24 GI consult:  Patient's overall symptom complex may very well be from small bowel inflammation given its diffuse pattern which is nonlocalized  The more significant sharp pain which comes and goes randomly sounds much like spasticity  Clear liquid diet  Dicyclomine  Continue abd exams  Repeat lipase 10/25  Hold on EGD for now    10/24 Continued abd pain 6-8/10 and spasmodic in nature  Continue IV PPI, pain control      ED Triage Vitals   Temperature Pulse Respirations Blood Pressure SpO2   10/23/19 1853 10/23/19 1853 10/23/19 1853 10/23/19 1853 10/23/19 1853   98 7 °F (37 1 °C) 97 16 (!) 176/102 96 %      Temp Source Heart Rate Source Patient Position - Orthostatic VS BP Location FiO2 (%)   10/23/19 1853 10/23/19 2221 10/23/19 1853 10/23/19 1853 --   Tympanic Monitor Sitting Left arm       Pain Score       10/23/19 1853       Worst Possible Pain        Wt Readings from Last 1 Encounters:   10/24/19 69 5 kg (153 lb 3 5 oz)     Additional Vital Signs:   10/24/19 0709  97 3 °F (36 3 °C)   69  18  134/75  91 %  Nasal cannula 2L   10/24/19 0015  99 6 °F (37 6 °C)  81  18  111/53       10/23/19 2221    79  16  134/80  96 %  None (Room air)       Pertinent Labs/Diagnostic Test Results:   Results from last 7 days   Lab Units 10/24/19  0447 10/23/19  1931   WBC Thousand/uL 6 60 8 00   HEMOGLOBIN g/dL 11 3* 12 9*   HEMATOCRIT % 33 4* 37 4*   PLATELETS Thousands/uL 298 334   NEUTROS ABS Thousands/µL 3 70 5 20         Results from last 7 days   Lab Units 10/24/19  0447 10/23/19  1931   SODIUM mmol/L 140 140   POTASSIUM mmol/L 3 3* 3 6   CHLORIDE mmol/L 106 103   CO2 mmol/L 29 29   ANION GAP mmol/L 5 8   BUN mg/dL 12 15   CREATININE mg/dL 0 79 0 85   EGFR ml/min/1 73sq m 93 90   CALCIUM mg/dL 8 6 9 7   MAGNESIUM mg/dL 1 4*  --    PHOSPHORUS mg/dL 2 9  --      Results from last 7 days   Lab Units 10/23/19  1931   AST U/L 40   ALT U/L 25   ALK PHOS U/L 45   TOTAL PROTEIN g/dL 7 0   ALBUMIN g/dL 4 0   TOTAL BILIRUBIN mg/dL 0 50         Results from last 7 days   Lab Units 10/24/19  0447 10/23/19  1931   GLUCOSE RANDOM mg/dL 87 95       Results from last 7 days   Lab Units 10/24/19  0447   TSH 3RD GENERATON uIU/mL 5 310*         Results from last 7 days   Lab Units 10/23/19  1931   LACTIC ACID mmol/L 1 2     Results from last 7 days   Lab Units 10/23/19  1931   LIPASE u/L 791*     Results from last 7 days   Lab Units 10/23/19  1932 10/23/19  1803   CLARITY UA  Clear clear   COLOR UA  Yellow yellow   SPEC GRAV UA  1 010  --    PH UA  7 0  --    GLUCOSE UA mg/dl Negative negative   KETONES UA mg/dl Negative negative   BLOOD UA  Negative trace   PROTEIN UA mg/dl Negative trace   NITRITE UA  Negative negative   BILIRUBIN UA  Negative  --    BILIRUBIN UA POC   --  negative   UROBILINOGEN UA mg/dL Negative 0 2   LEUKOCYTES UA  Negative negative     10/23 EKG:  Normal sinus rhythm    10/23 CT abd/pelvis:  Findings suggestive of inflammatory or infectious jejunitis and mild distal duodenitis   No evidence of bowel obstruction or, colitis or diverticulitis      ED Treatment:   Medication Administration from 10/23/2019 1822 to 10/24/2019 0011       Date/Time Order Dose Route Action     10/23/2019 1935 sodium chloride 0 9 % bolus 1,000 mL 1,000 mL Intravenous New Bag     10/23/2019 1952 morphine (PF) 4 mg/mL injection 4 mg 4 mg Intravenous Given     10/23/2019 2237 morphine (PF) 4 mg/mL injection 4 mg 4 mg Intravenous Given        Past Medical History:   Diagnosis Date    Avascular necrosis of bone of hip, left (HCC)     Hiatal hernia     Hyperlipidemia      Present on Admission:   Essential hypertension, benign   Prediabetes   Hyperlipidemia   Hypothyroidism (acquired)   Abdominal pain   Tobacco abuse      Admitting Diagnosis: Abdominal pain [R10 9]  Generalized abdominal pain [R10 84]  Age/Sex: 81 y o  male  Admission Orders:    Medications:  amLODIPine 10 mg Oral Daily   aspirin 325 mg Oral Daily   co-enzyme Q-10 30 mg Oral Daily   enoxaparin 30 mg Subcutaneous Q24H [address]   ezetimibe 10 mg Oral HS   And      pravastatin 80 mg Oral HS   fenofibrate 168 mg Oral Daily   influenza vaccine 0 5 mL Intramuscular Once   levothyroxine 12 5 mcg Oral Early Morning   losartan 50 mg Oral Daily   nicotine 1 patch Transdermal Daily   pantoprazole 40 mg Intravenous Q24H [address]       sodium chloride 125 mL/hr Intravenous Continuous       dicyclomine 10 mg Oral 4x Daily PRN   morphine injection 2 mg Intravenous Q4H PRN x2       IP CONSULT TO GASTROENTEROLOGY  IP CONSULT TO CASE MANAGEMENT   SCDs  VS  Clear liquid diet    Network Utilization Review Department  [email or web address]  ATTENTION: Please call with any questions or concerns to [phone] and carefully listen to the prompts so that you are directed to the right person  All voicemails are confidential   [employer] all requests for admission clinical reviews, approved or denied determinations and any other requests to dedicated fax number below belonging to the campus where the patient is receiving treatment    FACILITY NAME UR FAX NUMBER   ADMISSION DENIALS (Administrative/Medical Necessity) [address] [facility] (Maternity/NICU/Pediatrics) [phone]   [facility] [address] [facility] [address] [facility] [phone]   [facility] [place] [address] [place] [address] [phone]   Rudy Contreras [address] [facility] [address] [place] [address] [phone]

## 2019-10-24 NOTE — ASSESSMENT & PLAN NOTE
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

## 2019-10-24 NOTE — NURSING NOTE
Pt asleep in bed  No signs of distress  Assessment remains unchanged  Pt appeared to be in pain  Pt stated that Vicodine did not help her  Also she was itching  oxycodone admin and benadryl    itching imporoved, pt still c/o pain  admin dil  The patient c/o pain

## 2019-10-24 NOTE — CONSULTS
Patient MRN: [id number]  Date of Service: 10/24/2019  Referring Physician: slim  Provider Creating Note: Kandace Portillo MD  PCP: Jaquan Fischer  Reason for Consult: abd pain  HPI  Dennys Nayak is a 81 y o  male who was admitted with Abdominal pain  He describes being in good health until about 1 week ago when he started having abdominal pain  This would occur in a somewhat generalized pattern in would be fairly intense lasting for 20-30 seconds and then resolving  No fever  No chills  No nausea or vomiting  No diarrhea or change in bowel habits  No GI bleeding  No new medicines  Patient does drink some alcohol but only at with dinner and nothing in excess  Has not happened before  Colonoscopy performed 2 years ago by Dr Mary Ellen Pack was supposedly unremarkable as patient was told to follow-up in 10 years  CT scan of the abdomen showing distal duodenal and jejunal inflammation  Duodenal diverticulum does not appear to be inflamed  Pain occurs at night  Also, he believes pain may also be slightly increased after meals  No signs or symptoms of peripheral vascular disease  Patient does mention having intermittent acid reflux symptoms for which he has been on ranitidine  Past Medical History:   Diagnosis Date    Avascular necrosis of bone of hip, left ([address] )     Hiatal hernia     Hyperlipidemia      Past Surgical History:   Procedure Laterality Date    CARDIAC CATHETERIZATION      CERVICAL FUSION      LUMBAR FUSION       Medications  Home Medications:   Prior to Admission medications    Medication Sig Start Date End Date Taking?  Authorizing Provider   amLODIPine (NORVASC) 10 mg tablet Take 1 tablet (10 mg total) by mouth daily 12/10/18  Yes Shlomo Frye MD   aspirin 325 mg tablet Take 325 mg by mouth daily   Yes Historical Provider, MD   co-enzyme Q-10 30 MG capsule Take 30 mg by mouth 3 (three) times a day   Yes Historical Provider, MD   ezetimibe-simvastatin (VYTORIN) 10-40 mg per tablet Take 1 tablet by mouth daily at bedtime 11/20/18  Yes Shlomo Frye MD   fenofibrate (TRIGLIDE) 160 MG tablet Take 160 mg by mouth daily   Yes Historical Provider, MD   levothyroxine 25 mcg tablet Take 12 5 mcg by mouth daily   Yes Historical Provider, MD   losartan (COZAAR) 50 mg tablet Take 1 tablet (50 mg total) by mouth daily 11/28/18  Yes Shlomo Frye MD   metFORMIN (GLUCOPHAGE) 500 mg tablet Take 500 mg by mouth 2 (two) times a day with meals   Yes Historical Provider, MD   Multiple Vitamins-Minerals (MULTIVITAMIN ADULT PO) every 24 hours   Yes Historical Provider, MD   omega-3-acid ethyl esters (LOVAZA) 1 g capsule Take 2 g by mouth 2 (two) times a day   Yes Historical Provider, MD   ranitidine (ZANTAC) 150 mg tablet Take 150 mg by mouth 2 (two) times a day   Yes Historical Provider, MD       Inhouse Medications    Current Facility-Administered Medications:     amLODIPine (NORVASC) tablet 10 mg, 10 mg, Oral, Daily, 10 mg at 10/24/19 0811    aspirin tablet 325 mg, 325 mg, Oral, Daily, 325 mg at 10/24/19 0811    co-enzyme Q-10 capsule 30 mg, 30 mg, Oral, Daily    enoxaparin (LOVENOX) subcutaneous injection 30 mg, 30 mg, Subcutaneous, Q24H DAMASO, 30 mg at 10/24/19 0811    ezetimibe (ZETIA) tablet 10 mg, 10 mg, Oral, HS **AND** pravastatin (PRAVACHOL) tablet 80 mg, 80 mg, Oral, HS    fenofibrate (TRICOR) tablet 168 mg, 168 mg, Oral, Daily, 168 mg at 10/24/19 0475    influenza vaccine, high-dose (FLUZONE HIGH-DOSE) IM injection ZACK 0 5 mL, 0 5 mL, Intramuscular, Once, Stopped at 10/24/19 0559    levothyroxine tablet 12 5 mcg, 12 5 mcg, Oral, Early Morning, 12 5 mcg at 10/24/19 0619    losartan (COZAAR) tablet 50 mg, 50 mg, Oral, Daily, 50 mg at 10/24/19 0811    morphine injection 2 mg, 2 mg, Intravenous, Q4H PRN, 2 mg at 10/24/19 0809    nicotine (NICODERM CQ) 21 mg/24 hr TD 24 hr patch 1 patch, 1 patch, Transdermal, Daily    pantoprazole (PROTONIX) injection 40 mg, 40 mg, Intravenous, Q24H [address], 40 mg at 10/24/19 0782    sodium chloride 0 9 % infusion, 125 mL/hr, Intravenous, Continuous, 125 mL/hr at 10/24/19 0135    Allergies  Allergies   Allergen Reactions    Rofecoxib      Social History   reports that he has been smoking cigarettes  He has a 50 00 pack-year smoking history  He has never used smokeless tobacco  He reports that he drinks alcohol  He reports that he does not use drugs  Family History  History reviewed  No pertinent family history  ROS  ROS: Denies CP, SOB, fever, weight loss, adenopathy, rash  All others negative except as noted in HPI  Objective   Vitals  Blood pressure 134/75, pulse 69, temperature (!) 97 3 °F (36 3 °C), temperature source Temporal, resp  rate 18, height 5' 3" (1 6 m), weight 69 5 kg (153 lb 3 5 oz), SpO2 91 %  Physical Exam   No distress  Patient's reading the newspaper  No icterus  No jaundice  Breathing nonlabored  Pulses regular  Abdomen is soft with mild diffuse tenderness throughout  No rebound or guarding  Bowel sounds present  No masses  No hernias appreciated  No extremity edema  Mood is pleasant  Alert orient x3, cooperative  Laboratory Studies  Lab Results   Component Value Date    WBC 6 60 10/24/2019    HGB 11 3 (L) 10/24/2019    HCT 33 4 (L) 10/24/2019     10/24/2019    MCV 98 10/24/2019     Lab Results   Component Value Date    CREATININE 0 79 10/24/2019    BUN 12 10/24/2019    SODIUM 140 10/24/2019    K 3 3 (L) 10/24/2019     10/24/2019    CO2 29 10/24/2019    CALCIUM 8 6 10/24/2019    ALKPHOS 45 10/23/2019    AST 40 10/23/2019    ALT 25 10/23/2019     Lipase 700  Imaging and Other Studies  CT scan results reviewed    Assessment and Plan:  Patient's overall symptom complex may very well be from small bowel inflammation given its diffuse pattern which is nonlocalized  The more significant sharp pain which comes and goes randomly sounds much like spasticity    The elevated lipase is of uncertain significance given that the pain does not seem to be localized to the epigastrium and does not radiate towards his back  Also pancreas appears normal on imaging study  Duodenal diverticulum I believe is an incidental finding as there does not seem to be any active inflammation within it  His symptoms do not seem to correlate with mesenteric ischemia as I would not expected to have pain nocturnally additionally, symptoms are new in onset and there has been no weight loss  Viral causes of inflammation would be most common in this situation, however, lack of fever diarrhea and leukocytosis which ordinarily accompany enteritis makes this cause also questionable      Suggestion  Clear liquid diet  Dicyclomine  Recheck lipase tomorrow  Serial abdominal exam  Will hold on endoscopic evaluation at this point as I do not think they have high yield given the location of his inflammatory changes    Principal Problem:    Abdominal pain  Active Problems:    Essential hypertension, benign    Prediabetes    Hypothyroidism (acquired)    Hyperlipidemia    Tobacco abuse      Carolina Masters MD

## 2019-10-24 NOTE — ASSESSMENT & PLAN NOTE
· Continue home amlodipine and losartan with holds  · Goal to maintain systolic blood pressure less than 160

## 2019-10-24 NOTE — PLAN OF CARE
Problem: Potential for Falls  Goal: Patient will remain free of falls  Description  INTERVENTIONS:  - Assess patient frequently for physical needs  -  Identify cognitive and physical deficits and behaviors that affect risk of falls    -  [place] fall precautions as indicated by assessment   - Educate patient/family on patient safety including physical limitations  - Instruct patient to call for assistance with activity based on assessment  - Modify environment to reduce risk of injury  - Consider OT/PT consult to assist with strengthening/mobility  Outcome: Progressing     Problem: PAIN - ADULT  Goal: Verbalizes/displays adequate comfort level or baseline comfort level  Description  Interventions:  - Encourage patient to monitor pain and request assistance  - Assess pain using appropriate pain scale  - Administer analgesics based on type and severity of pain and evaluate response  - Implement non-pharmacological measures as appropriate and evaluate response  - Consider cultural and social influences on pain and pain management  - Notify physician/advanced practitioner if interventions unsuccessful or patient reports new pain  Outcome: Progressing     Problem: INFECTION - ADULT  Goal: Absence or prevention of progression during hospitalization  Description  INTERVENTIONS:  - Assess and monitor for signs and symptoms of infection  - Monitor lab/diagnostic results  - Monitor all insertion sites, i e  indwelling lines, tubes, and drains  - Monitor endotracheal if appropriate and nasal secretions for changes in amount and color  - [place] appropriate cooling/warming therapies per order  - Administer medications as ordered  - Instruct and encourage patient and family to use good hand hygiene technique  - Identify and instruct in appropriate isolation precautions for identified infection/condition  Outcome: Progressing     Problem: SAFETY ADULT  Goal: Maintain or return to baseline ADL function  Description  INTERVENTIONS:  -  Assess patient's ability to carry out ADLs; assess patient's baseline for ADL function and identify physical deficits which impact ability to perform ADLs (bathing, care of mouth/teeth, toileting, grooming, dressing, etc )  - Assess/evaluate cause of self-care deficits   - Assess range of motion  - Assess patient's mobility; develop plan if impaired  - Assess patient's need for assistive devices and provide as appropriate  - Encourage maximum independence but intervene and supervise when necessary  - Involve family in performance of ADLs  - Assess for home care needs following discharge   - Consider OT consult to assist with ADL evaluation and planning for discharge  - Provide patient education as appropriate  Outcome: Progressing  Goal: Maintain or return mobility status to optimal level  Description  INTERVENTIONS:  - Assess patient's baseline mobility status (ambulation, transfers, stairs, etc )    - Identify cognitive and physical deficits and behaviors that affect mobility  - Identify mobility aids required to assist with transfers and/or ambulation (gait belt, sit-to-stand, lift, walker, cane, etc )  - [place] fall precautions as indicated by assessment  - Record patient progress and toleration of activity level on Mobility SBAR; progress patient to next Phase/Stage  - Instruct patient to call for assistance with activity based on assessment  - Consider rehabilitation consult to assist with strengthening/weightbearing, etc   Outcome: Progressing     Problem: DISCHARGE PLANNING  Goal: Discharge to home or other facility with appropriate resources  Description  INTERVENTIONS:  - Identify barriers to discharge w/patient and caregiver  - Arrange for needed discharge resources and transportation as appropriate  - Identify discharge learning needs (meds, wound care, etc )  - Arrange for interpretive services to assist at discharge as needed  - Refer to Case Management Department for coordinating discharge planning if the patient needs post-hospital services based on physician/advanced practitioner order or complex needs related to functional status, cognitive ability, or social support system  Outcome: Progressing     Problem: Knowledge Deficit  Goal: Patient/family/caregiver demonstrates understanding of disease process, treatment plan, medications, and discharge instructions  Description  Complete learning assessment and assess knowledge base    Interventions:  - Provide teaching at level of understanding  - Provide teaching via preferred learning methods  Outcome: Progressing

## 2019-10-24 NOTE — H&P
H&P- Inder Joyner 1950, 81 y o  male MRN: [id number]    Unit/Bed#: 7T [facility] 702-01 Encounter: [id number]    Primary Care Provider: Yuliya Castellanos DO   Date and time admitted to hospital: 10/23/2019  6:47 PM        * Abdominal pain  Assessment & Plan  · Reports to the emergency department tonight with a chief complaint of lower abdominal pain  · States that the pain has been ongoing for 1 week and at the pain comes and surges of 20-30 seconds, is sharp and can radiate to all quadrants  · He denies the pain being postprandial in nature, and does not endorse any aggravating or relieving factors  · He denies nausea, vomiting, diarrhea, constipation, blood in stools, hematuria  · He does endorse a previous history of kidney stones, however states this pain is different in nature  · He is afebrile, hemodynamically stable, white count within normal limits  · Lipase notably elevated at 791  · CT abdomen pelvis -- Wind[place] diverticulum again noted in the 2nd portion of the duodenum  Mild inflammation of the distal 3rd and 4th portion of the duodenum  Mild wall thickening proximal jejunal loops; Diverticulosis throughout the colon without diverticulitis or colitis; No bowel obstruction  · Received 1 L normal saline bolus in emergency department, will continue NSS @125mL/hour  · Protonix 40 mg IV Q 24 hours  · Serial abdominal exams  · GI consult pending  · Maintain NPO for now    Tobacco abuse  Assessment & Plan  · Cessation counseling  · Nicotine patch ordered  · Nasal cannula oxygen p r n   To maintain oxygen saturations over 88%    Hyperlipidemia  Assessment & Plan  · Continue home statin    Hypothyroidism (acquired)  Assessment & Plan  · Check TSH  · Continue home Synthroid    Prediabetes  Assessment & Plan  · Takes metformin as outpatient  · Check HbA1c  · Hold on SSI coverage for now    Essential hypertension, benign  Assessment & Plan  · Continue home amlodipine and losartan with holds  · Goal to maintain systolic blood pressure less than 160        VTE Prophylaxis: Enoxaparin (Lovenox)  / sequential compression device   Code Status:  Full code  POLST: POLST is not applicable to this patient  Discussion with family:  Patient will contact family independently    Anticipated Length of Stay:  Patient will be admitted on an Observation basis with an anticipated length of stay of less than 2 midnights  Justification for Hospital Stay:  Generalized abdominal pain    Total Time for Visit, including Counseling / Coordination of Care: 30 minutes  Greater than 50% of this total time spent on direct patient counseling and coordination of care  Chief Complaint:   My stomach hurts    History of Present Illness:    Oleg Thomas is a 81 y o  male with a past medical history of essential hypertension, prediabetes, hypothyroidism, hyperlipidemia, long-term tobacco use presents to the emergency department tonight with a chief complaint of one-week history of lower abdominal pain  He states the pain is sharp in nature, comes in spurts of 20-30 seconds, during which can radiate to all quadrants  He denies the pain being postprandial in nature, and does not endorse any aggravating or relieving factors  At its worse, the pain is a 10/10, and at its best the pain is nonexistent  He also endorses accompanying flatulence and abdominal distention  He denies nausea, vomiting, diarrhea, constipation, hematuria, flank pain, blood in stool, fevers  He is afebrile and without leukocytosis  His lipase is elevated at 791  CT abdomen pelvis shows 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, mild inflammation of the distal 3rd and 4th portion of the duodenum, mild wall thickening of proximal jejunal loops, diverticulosis throughout the colon without diverticulitis or colitis, no bowel obstruction noted, pancreas unremarkable    He reports feeling only mild to moderately improved after normal saline bolus and morphine administration in the emergency department  On assessment, he is sitting in hospital bed in no acute distress, is able to participate fully H&P  He is admitted as observation status for GI consult and IV fluid administration  Review of Systems:    Review of Systems   Constitutional: Negative for activity change, appetite change, chills, diaphoresis, fatigue and fever  HENT: Negative for congestion, sore throat, trouble swallowing and voice change  Eyes: Negative  Respiratory: Negative for cough, choking, chest tightness, shortness of breath and wheezing  Cardiovascular: Negative  Gastrointestinal: Positive for abdominal distention and abdominal pain  Negative for blood in stool, constipation, diarrhea, nausea and vomiting  Endocrine: Negative  Genitourinary: Negative for difficulty urinating, flank pain and hematuria  Musculoskeletal: Negative  Skin: Negative  Allergic/Immunologic: Negative  Neurological: Negative for dizziness, syncope, weakness, light-headedness, numbness and headaches  Hematological: Negative  Psychiatric/Behavioral: Negative  All other systems reviewed and are negative  Past Medical and Surgical History:     Past Medical History:   Diagnosis Date    Avascular necrosis of bone of hip, left ([address] )     Hiatal hernia     Hyperlipidemia        Past Surgical History:   Procedure Laterality Date    CARDIAC CATHETERIZATION      CERVICAL FUSION      LUMBAR FUSION         Meds/Allergies:    Prior to Admission medications    Medication Sig Start Date End Date Taking?  Authorizing Provider   amLODIPine (NORVASC) 10 mg tablet Take 1 tablet (10 mg total) by mouth daily 12/10/18  Yes Shlomo Frye MD   aspirin 325 mg tablet Take 325 mg by mouth daily   Yes Historical Provider, MD   co-enzyme Q-10 30 MG capsule Take 30 mg by mouth 3 (three) times a day   Yes Historical Provider, MD   ezetimibe-simvastatin (VYTORIN) 10-40 mg per tablet Take 1 tablet by mouth daily at bedtime 11/20/18  Yes Shlomo Frye MD   fenofibrate (TRIGLIDE) 160 MG tablet Take 160 mg by mouth daily   Yes Historical Provider, MD   levothyroxine 25 mcg tablet Take 12 5 mcg by mouth daily   Yes Historical Provider, MD   losartan (COZAAR) 50 mg tablet Take 1 tablet (50 mg total) by mouth daily 11/28/18  Yes Shlomo Frye MD   metFORMIN (GLUCOPHAGE) 500 mg tablet Take 500 mg by mouth 2 (two) times a day with meals   Yes Historical Provider, MD   Multiple Vitamins-Minerals (MULTIVITAMIN ADULT PO) every 24 hours   Yes Historical Provider, MD   omega-3-acid ethyl esters (LOVAZA) 1 g capsule Take 2 g by mouth 2 (two) times a day   Yes Historical Provider, MD   ranitidine (ZANTAC) 150 mg tablet Take 150 mg by mouth 2 (two) times a day   Yes Historical Provider, MD     I have reviewed home medications with patient personally  Allergies: Allergies   Allergen Reactions    Rofecoxib        Social History:     Marital Status: Single   Occupation:  Hospital clergy  Patient Pre-hospital Living Situation:  Lives at home alone  Patient Pre-hospital Level of Mobility:  Independent  Patient Pre-hospital Diet Restrictions:  None  Substance Use History:   Social History     Substance and Sexual Activity   Alcohol Use Not on file     Social History     Tobacco Use   Smoking Status Current Every Day Smoker    Packs/day: 1 00    Years: 50 00    Pack years: 50 00    Types: Cigarettes   Smokeless Tobacco Never Used   Tobacco Comment    Heavy tobacco smoker; 10 cigarettes per day as per [employer]     Social History     Substance and Sexual Activity   Drug Use Not on file       Family History:    History reviewed  No pertinent family history      Physical Exam:     Vitals:   Blood Pressure: 134/80 (10/23/19 2221)  Pulse: 79 (10/23/19 2221)  Temperature: 98 7 °F (37 1 °C) (10/23/19 1853)  Temp Source: Tympanic (10/23/19 1853)  Respirations: 16 (10/23/19 2221)  SpO2: 96 % (10/23/19 2221)    Physical Exam Constitutional: He is oriented to person, place, and time  Vital signs are normal  He appears well-developed and well-nourished  Non-toxic appearance  He does not have a sickly appearance  He does not appear ill  No distress  Nasal cannula in place  HENT:   Head: Normocephalic and atraumatic  Mouth/Throat: Oropharynx is clear and moist and mucous membranes are normal  No oropharyngeal exudate  Eyes: Pupils are equal, round, and reactive to light  EOM and lids are normal  Right eye exhibits no discharge  Left eye exhibits no discharge  No scleral icterus  Neck: Normal range of motion  Neck supple  No tracheal deviation present  Cardiovascular: Normal rate, regular rhythm and intact distal pulses  Exam reveals no gallop and no friction rub  No murmur heard  Pulses:       Radial pulses are 2+ on the right side, and 2+ on the left side  Dorsalis pedis pulses are 1+ on the right side, and 1+ on the left side  Heart sounds distant   Pulmonary/Chest: Effort normal  No accessory muscle usage  No tachypnea  No respiratory distress  He has decreased breath sounds (throughout posteriorly)  He exhibits no tenderness  Abdominal: Soft  He exhibits distension  Bowel sounds are decreased  There is tenderness (diffusely)  There is no guarding  Genitourinary:   Genitourinary Comments: Voiding independently   Musculoskeletal: Normal range of motion  He exhibits no edema, tenderness or deformity  Lymphadenopathy:     He has no cervical adenopathy  Neurological: He is alert and oriented to person, place, and time  No cranial nerve deficit  GCS eye subscore is 4  GCS verbal subscore is 5  GCS motor subscore is 6  Skin: Skin is warm and dry  Capillary refill takes less than 2 seconds  No rash noted  He is not diaphoretic  No erythema  No pallor  Psychiatric: He has a normal mood and affect   His speech is normal and behavior is normal  Judgment and thought content normal    Nursing note and vitals reviewed  Additional Data:     Lab Results: I have personally reviewed pertinent reports  Results from last 7 days   Lab Units 10/23/19  1931   WBC Thousand/uL 8 00   HEMOGLOBIN g/dL 12 9*   HEMATOCRIT % 37 4*   PLATELETS Thousands/uL 334   NEUTROS PCT % 64   LYMPHS PCT % 21*   MONOS PCT % 9   EOS PCT % 5     Results from last 7 days   Lab Units 10/23/19  1931   SODIUM mmol/L 140   POTASSIUM mmol/L 3 6   CHLORIDE mmol/L 103   CO2 mmol/L 29   BUN mg/dL 15   CREATININE mg/dL 0 85   ANION GAP mmol/L 8   CALCIUM mg/dL 9 7   ALBUMIN g/dL 4 0   TOTAL BILIRUBIN mg/dL 0 50   ALK PHOS U/L 45   ALT U/L 25   AST U/L 40   GLUCOSE RANDOM mg/dL 95                 Results from last 7 days   Lab Units 10/23/19  1931   LACTIC ACID mmol/L 1 2       Imaging: I have personally reviewed pertinent reports  CT abdomen pelvis with contrast   Final Result by Bryanna Purcell MD (10/23 2036)      Findings suggestive of inflammatory or infectious jejunitis and mild distal duodenitis  No evidence of bowel obstruction or, colitis or diverticulitis  Workstation performed: [id number]             EKG, Pathology, and Other Studies Reviewed on Admission:   · EKG:  Heart rate within normal limits    Allscripts / Epic Records Reviewed: Yes     ** Please Note: This note has been constructed using a voice recognition system   **

## 2019-10-24 NOTE — ASSESSMENT & PLAN NOTE
· Reports to the emergency department tonight with a chief complaint of lower abdominal pain  · States that the pain has been ongoing for 1 week and at the pain comes and surges of 20-30 seconds, is sharp and can radiate to all quadrants  · He denies the pain being postprandial in nature, and does not endorse any aggravating or relieving factors  · He denies nausea, vomiting, diarrhea, constipation, blood in stools, hematuria  · He does endorse a previous history of kidney stones, however states this pain is different in nature  · He is afebrile, hemodynamically stable, white count within normal limits  · Lipase notably elevated at 791  · CT abdomen pelvis -- [place] diverticulum again noted in the 2nd portion of the duodenum  Mild inflammation of the distal 3rd and 4th portion of the duodenum  Mild wall thickening proximal jejunal loops; Diverticulosis throughout the colon without diverticulitis or colitis;  No bowel obstruction  · Received 1 L normal saline bolus in emergency department, will continue NSS @125mL/hour  · Protonix 40 mg IV Q 24 hours  · Serial abdominal exams  · GI consult pending  · Maintain NPO for now

## 2019-10-24 NOTE — PLAN OF CARE
Problem: DISCHARGE PLANNING - CARE MANAGEMENT  Goal: Discharge to post-acute care or home with appropriate resources  Description  INTERVENTIONS:  - Conduct assessment to determine patient/family and health care team treatment goals, and need for post-acute services based on payer coverage, community resources, and patient preferences, and barriers to discharge  - Address psychosocial, clinical, and financial barriers to discharge as identified in assessment in conjunction with the patient/family and health care team  - Arrange appropriate level of post-acute services according to patients   needs and preference and payer coverage in collaboration with the physician and health care team  - Communicate with and update the patient/family, physician, and health care team regarding progress on the discharge plan  - Arrange appropriate transportation to post-acute venues  Outcome: Progressing   - Social work will follow for plan of care for any discharge needs that arise

## 2019-10-24 NOTE — PLAN OF CARE
Problem: Potential for Falls  Goal: Patient will remain free of falls  Description  INTERVENTIONS:  - Assess patient frequently for physical needs  -  Identify cognitive and physical deficits and behaviors that affect risk of falls    -  [place] fall precautions as indicated by assessment   - Educate patient/family on patient safety including physical limitations  - Instruct patient to call for assistance with activity based on assessment  - Modify environment to reduce risk of injury  - Consider OT/PT consult to assist with strengthening/mobility  Outcome: Progressing     Problem: PAIN - ADULT  Goal: Verbalizes/displays adequate comfort level or baseline comfort level  Description  Interventions:  - Encourage patient to monitor pain and request assistance  - Assess pain using appropriate pain scale  - Administer analgesics based on type and severity of pain and evaluate response  - Implement non-pharmacological measures as appropriate and evaluate response  - Consider cultural and social influences on pain and pain management  - Notify physician/advanced practitioner if interventions unsuccessful or patient reports new pain  Outcome: Progressing     Problem: INFECTION - ADULT  Goal: Absence or prevention of progression during hospitalization  Description  INTERVENTIONS:  - Assess and monitor for signs and symptoms of infection  - Monitor lab/diagnostic results  - Monitor all insertion sites, i e  indwelling lines, tubes, and drains  - Monitor endotracheal if appropriate and nasal secretions for changes in amount and color  - [place] appropriate cooling/warming therapies per order  - Administer medications as ordered  - Instruct and encourage patient and family to use good hand hygiene technique  - Identify and instruct in appropriate isolation precautions for identified infection/condition  Outcome: Progressing     Problem: SAFETY ADULT  Goal: Maintain or return to baseline ADL function  Description  INTERVENTIONS:  -  Assess patient's ability to carry out ADLs; assess patient's baseline for ADL function and identify physical deficits which impact ability to perform ADLs (bathing, care of mouth/teeth, toileting, grooming, dressing, etc )  - Assess/evaluate cause of self-care deficits   - Assess range of motion  - Assess patient's mobility; develop plan if impaired  - Assess patient's need for assistive devices and provide as appropriate  - Encourage maximum independence but intervene and supervise when necessary  - Involve family in performance of ADLs  - Assess for home care needs following discharge   - Consider OT consult to assist with ADL evaluation and planning for discharge  - Provide patient education as appropriate  Outcome: Progressing  Goal: Maintain or return mobility status to optimal level  Description  INTERVENTIONS:  - Assess patient's baseline mobility status (ambulation, transfers, stairs, etc )    - Identify cognitive and physical deficits and behaviors that affect mobility  - Identify mobility aids required to assist with transfers and/or ambulation (gait belt, sit-to-stand, lift, walker, cane, etc )  - [place] fall precautions as indicated by assessment  - Record patient progress and toleration of activity level on Mobility SBAR; progress patient to next Phase/Stage  - Instruct patient to call for assistance with activity based on assessment  - Consider rehabilitation consult to assist with strengthening/weightbearing, etc   Outcome: Progressing     Problem: DISCHARGE PLANNING  Goal: Discharge to home or other facility with appropriate resources  Description  INTERVENTIONS:  - Identify barriers to discharge w/patient and caregiver  - Arrange for needed discharge resources and transportation as appropriate  - Identify discharge learning needs (meds, wound care, etc )  - Arrange for interpretive services to assist at discharge as needed  - Refer to Case Management Department for coordinating discharge planning if the patient needs post-hospital services based on physician/advanced practitioner order or complex needs related to functional status, cognitive ability, or social support system  Outcome: Progressing     Problem: Knowledge Deficit  Goal: Patient/family/caregiver demonstrates understanding of disease process, treatment plan, medications, and discharge instructions  Description  Complete learning assessment and assess knowledge base    Interventions:  - Provide teaching at level of understanding  - Provide teaching via preferred learning methods  Outcome: Progressing     Problem: DISCHARGE PLANNING - CARE MANAGEMENT  Goal: Discharge to post-acute care or home with appropriate resources  Description  INTERVENTIONS:  - Conduct assessment to determine patient/family and health care team treatment goals, and need for post-acute services based on payer coverage, community resources, and patient preferences, and barriers to discharge  - Address psychosocial, clinical, and financial barriers to discharge as identified in assessment in conjunction with the patient/family and health care team  - Arrange appropriate level of post-acute services according to patients   needs and preference and payer coverage in collaboration with the physician and health care team  - Communicate with and update the patient/family, physician, and health care team regarding progress on the discharge plan  - Arrange appropriate transportation to post-acute venues  Outcome: Progressing

## 2019-10-24 NOTE — ASSESSMENT & PLAN NOTE
· Cessation counseling  · Nicotine patch ordered  · Nasal cannula oxygen p r n   To maintain oxygen saturations over 88%

## 2019-10-24 NOTE — NURSING NOTE
Pt received to the unit  Pt resting comfortably in bed  AAO x4  Denies any  pain  Lung clear/diminshed bilaterally  Denies chest pain/SOB  Vitals stable  All needs and call bell within reach of patient  Will continue to monitor

## 2019-10-24 NOTE — H&P (VIEW-ONLY)
Patient MRN: [id number]  Date of Service: 10/24/2019  Referring Physician: slim  Provider Creating Note: Kendra Mann MD  PCP: Emma Aburto  Reason for Consult: abd pain  HPI  Star Elizabeth is a 81 y o  male who was admitted with Abdominal pain  He describes being in good health until about 1 week ago when he started having abdominal pain  This would occur in a somewhat generalized pattern in would be fairly intense lasting for 20-30 seconds and then resolving  No fever  No chills  No nausea or vomiting  No diarrhea or change in bowel habits  No GI bleeding  No new medicines  Patient does drink some alcohol but only at with dinner and nothing in excess  Has not happened before  Colonoscopy performed 2 years ago by Dr Kayden Paz was supposedly unremarkable as patient was told to follow-up in 10 years  CT scan of the abdomen showing distal duodenal and jejunal inflammation  Duodenal diverticulum does not appear to be inflamed  Pain occurs at night  Also, he believes pain may also be slightly increased after meals  No signs or symptoms of peripheral vascular disease  Patient does mention having intermittent acid reflux symptoms for which he has been on ranitidine  Past Medical History:   Diagnosis Date    Avascular necrosis of bone of hip, left ([address] )     Hiatal hernia     Hyperlipidemia      Past Surgical History:   Procedure Laterality Date    CARDIAC CATHETERIZATION      CERVICAL FUSION      LUMBAR FUSION       Medications  Home Medications:   Prior to Admission medications    Medication Sig Start Date End Date Taking?  Authorizing Provider   amLODIPine (NORVASC) 10 mg tablet Take 1 tablet (10 mg total) by mouth daily 12/10/18  Yes Shlomo Frye MD   aspirin 325 mg tablet Take 325 mg by mouth daily   Yes Historical Provider, MD   co-enzyme Q-10 30 MG capsule Take 30 mg by mouth 3 (three) times a day   Yes Historical Provider, MD   ezetimibe-simvastatin (VYTORIN) 10-40 mg per tablet Take 1 tablet by mouth daily at bedtime 11/20/18  Yes Shlomo Frye MD   fenofibrate (TRIGLIDE) 160 MG tablet Take 160 mg by mouth daily   Yes Historical Provider, MD   levothyroxine 25 mcg tablet Take 12 5 mcg by mouth daily   Yes Historical Provider, MD   losartan (COZAAR) 50 mg tablet Take 1 tablet (50 mg total) by mouth daily 11/28/18  Yes Shlomo Frye MD   metFORMIN (GLUCOPHAGE) 500 mg tablet Take 500 mg by mouth 2 (two) times a day with meals   Yes Historical Provider, MD   Multiple Vitamins-Minerals (MULTIVITAMIN ADULT PO) every 24 hours   Yes Historical Provider, MD   omega-3-acid ethyl esters (LOVAZA) 1 g capsule Take 2 g by mouth 2 (two) times a day   Yes Historical Provider, MD   ranitidine (ZANTAC) 150 mg tablet Take 150 mg by mouth 2 (two) times a day   Yes Historical Provider, MD       Inhouse Medications    Current Facility-Administered Medications:     amLODIPine (NORVASC) tablet 10 mg, 10 mg, Oral, Daily, 10 mg at 10/24/19 0811    aspirin tablet 325 mg, 325 mg, Oral, Daily, 325 mg at 10/24/19 0811    co-enzyme Q-10 capsule 30 mg, 30 mg, Oral, Daily    enoxaparin (LOVENOX) subcutaneous injection 30 mg, 30 mg, Subcutaneous, Q24H DAMASO, 30 mg at 10/24/19 0811    ezetimibe (ZETIA) tablet 10 mg, 10 mg, Oral, HS **AND** pravastatin (PRAVACHOL) tablet 80 mg, 80 mg, Oral, HS    fenofibrate (TRICOR) tablet 168 mg, 168 mg, Oral, Daily, 168 mg at 10/24/19 7126    influenza vaccine, high-dose (FLUZONE HIGH-DOSE) IM injection ZACK 0 5 mL, 0 5 mL, Intramuscular, Once, Stopped at 10/24/19 0559    levothyroxine tablet 12 5 mcg, 12 5 mcg, Oral, Early Morning, 12 5 mcg at 10/24/19 0619    losartan (COZAAR) tablet 50 mg, 50 mg, Oral, Daily, 50 mg at 10/24/19 0811    morphine injection 2 mg, 2 mg, Intravenous, Q4H PRN, 2 mg at 10/24/19 0809    nicotine (NICODERM CQ) 21 mg/24 hr TD 24 hr patch 1 patch, 1 patch, Transdermal, Daily    pantoprazole (PROTONIX) injection 40 mg, 40 mg, Intravenous, Q24H [facility], 40 mg at 10/24/19 2169    sodium chloride 0 9 % infusion, 125 mL/hr, Intravenous, Continuous, 125 mL/hr at 10/24/19 0135    Allergies  Allergies   Allergen Reactions    Rofecoxib      Social History   reports that he has been smoking cigarettes  He has a 50 00 pack-year smoking history  He has never used smokeless tobacco  He reports that he drinks alcohol  He reports that he does not use drugs  Family History  History reviewed  No pertinent family history  ROS  ROS: Denies CP, SOB, fever, weight loss, adenopathy, rash  All others negative except as noted in HPI  Objective   Vitals  Blood pressure 134/75, pulse 69, temperature (!) 97 3 °F (36 3 °C), temperature source Temporal, resp  rate 18, height 5' 3" (1 6 m), weight 69 5 kg (153 lb 3 5 oz), SpO2 91 %  Physical Exam   No distress  Patient's reading the newspaper  No icterus  No jaundice  Breathing nonlabored  Pulses regular  Abdomen is soft with mild diffuse tenderness throughout  No rebound or guarding  Bowel sounds present  No masses  No hernias appreciated  No extremity edema  Mood is pleasant  Alert orient x3, cooperative  Laboratory Studies  Lab Results   Component Value Date    WBC 6 60 10/24/2019    HGB 11 3 (L) 10/24/2019    HCT 33 4 (L) 10/24/2019     10/24/2019    MCV 98 10/24/2019     Lab Results   Component Value Date    CREATININE 0 79 10/24/2019    BUN 12 10/24/2019    SODIUM 140 10/24/2019    K 3 3 (L) 10/24/2019     10/24/2019    CO2 29 10/24/2019    CALCIUM 8 6 10/24/2019    ALKPHOS 45 10/23/2019    AST 40 10/23/2019    ALT 25 10/23/2019     Lipase 700  Imaging and Other Studies  CT scan results reviewed    Assessment and Plan:  Patient's overall symptom complex may very well be from small bowel inflammation given its diffuse pattern which is nonlocalized  The more significant sharp pain which comes and goes randomly sounds much like spasticity    The elevated lipase is of uncertain significance given that the pain does not seem to be localized to the epigastrium and does not radiate towards his back  Also pancreas appears normal on imaging study  Duodenal diverticulum I believe is an incidental finding as there does not seem to be any active inflammation within it  His symptoms do not seem to correlate with mesenteric ischemia as I would not expected to have pain nocturnally additionally, symptoms are new in onset and there has been no weight loss  Viral causes of inflammation would be most common in this situation, however, lack of fever diarrhea and leukocytosis which ordinarily accompany enteritis makes this cause also questionable      Suggestion  Clear liquid diet  Dicyclomine  Recheck lipase tomorrow  Serial abdominal exam  Will hold on endoscopic evaluation at this point as I do not think they have high yield given the location of his inflammatory changes    Principal Problem:    Abdominal pain  Active Problems:    Essential hypertension, benign    Prediabetes    Hypothyroidism (acquired)    Hyperlipidemia    Tobacco abuse      Jasen Barnett MD

## 2019-10-24 NOTE — PROGRESS NOTES
Progress Note - Collette Shad 1950, 81 y o  male MRN: [id number]    Unit/Bed#: 7[facility] 702-01 Encounter: [id number]    Primary Care Provider: Martin Rankin DO   Date and time admitted to hospital: 10/23/2019  6:47 PM        * Abdominal pain  Assessment & Plan  Patient has abdominal pain for the last 1 week  He still having severe pain which is 6 to 8/10 and spasmodic in nature  · Lipase notably elevated at 791  · CT abdomen pelvis -- [place] diverticulum again noted in the 2nd portion of the duodenum  Mild inflammation of the distal 3rd and 4th portion of the duodenum  Mild wall thickening proximal jejunal loops; Diverticulosis throughout the colon without diverticulitis or colitis; No bowel obstruction  · Protonix 40 mg IV Q 24 hours  · Maintain NPO for now    Will maintain bowel rest today  Pain is out of proportion to findings at this time  Will recheck lipase level tomorrow and advanced diet tomorrow if the pain is better controlled  Discussed with GI  Tobacco abuse  Assessment & Plan  · Cessation counseling  · Nicotine patch ordered  · Nasal cannula oxygen p r n  To maintain oxygen saturations over 88%    Hyperlipidemia  Assessment & Plan  · Continue home statin    Hypothyroidism (acquired)  Assessment & Plan  · TSH is 5 31  · Continue home Synthroid    Prediabetes  Assessment & Plan  · Takes metformin as outpatient  · Hemoglobin A1c is 5 1  Discontinue metformin    Essential hypertension, benign  Assessment & Plan  · Continue home amlodipine and losartan with holds  · Goal to maintain systolic blood pressure less than 160      VTE Pharmacologic Prophylaxis:   Pharmacologic: Enoxaparin (Lovenox)  Mechanical VTE Prophylaxis in Place: Yes    Patient Centered Rounds: I have performed bedside rounds with nursing staff today      Discussions with Specialists or Other Care Team Provider:  Discussed with GI    Education and Discussions with Family / Patient:  Discussed with patient at bedside about hospital course    Time Spent for Care: 30 minutes  More than 50% of total time spent on counseling and coordination of care as described above  Current Length of Stay: 0 day(s)    Current Patient Status: Observation     Discharge Plan:  Pending progress    Code Status: Level 1 - Full Code      Subjective:   Patient complains of 6 to 8/10 abdominal pain which is spasmodic in nature which comes and goes  Denies any nausea ,vomiting or diarrhea  He has been suffering with the spasm and pains for the last 1 week and they mainly occur more at night    Objective:     Vitals:   Temp (24hrs), Av 9 °F (37 2 °C), Min:97 3 °F (36 3 °C), Max:99 7 °F (37 6 °C)    Temp:  [97 3 °F (36 3 °C)-99 7 °F (37 6 °C)] 97 3 °F (36 3 °C)  HR:  [69-97] 69  Resp:  [16-20] 18  BP: (111-176)/() 134/75  SpO2:  [91 %-96 %] 91 %  Body mass index is 27 14 kg/m²  Input and Output Summary (last 24 hours): Intake/Output Summary (Last 24 hours) at 10/24/2019 1413  Last data filed at 10/24/2019 1300  Gross per 24 hour   Intake 2420 ml   Output 150 ml   Net 2270 ml       Physical Exam:     Physical Exam   Constitutional: He is oriented to person, place, and time  He appears well-developed and well-nourished  HENT:   Head: Normocephalic and atraumatic  Right Ear: External ear normal    Left Ear: External ear normal    Mouth/Throat: Oropharynx is clear and moist    Eyes: Pupils are equal, round, and reactive to light  Conjunctivae and EOM are normal    Neck: Normal range of motion  Neck supple  Cardiovascular: Normal rate, regular rhythm, normal heart sounds and intact distal pulses  Pulmonary/Chest: Effort normal and breath sounds normal    Abdominal: Soft  Bowel sounds are normal  He exhibits no mass  There is tenderness  There is no rebound and no guarding  Tenderness upper epigastric region and periumbilical region   Genitourinary:   Genitourinary Comments: deferred   Musculoskeletal: Normal range of motion  Neurological: He is alert and oriented to person, place, and time  He has normal reflexes  Cranial nerves 2-12 are normal   Normal neurological exam   Skin: Skin is warm and dry  No rash noted  Psychiatric: He has a normal mood and affect  Nursing note and vitals reviewed  Additional Data:     Labs:    Results from last 7 days   Lab Units 10/24/19  0447   WBC Thousand/uL 6 60   HEMOGLOBIN g/dL 11 3*   HEMATOCRIT % 33 4*   PLATELETS Thousands/uL 298   NEUTROS PCT % 57   LYMPHS PCT % 26   MONOS PCT % 9   EOS PCT % 8*     Results from last 7 days   Lab Units 10/24/19  0447 10/23/19  1931   SODIUM mmol/L 140 140   POTASSIUM mmol/L 3 3* 3 6   CHLORIDE mmol/L 106 103   CO2 mmol/L 29 29   BUN mg/dL 12 15   CREATININE mg/dL 0 79 0 85   ANION GAP mmol/L 5 8   CALCIUM mg/dL 8 6 9 7   ALBUMIN g/dL  --  4 0   TOTAL BILIRUBIN mg/dL  --  0 50   ALK PHOS U/L  --  45   ALT U/L  --  25   AST U/L  --  40   GLUCOSE RANDOM mg/dL 87 95             Results from last 7 days   Lab Units 10/24/19  0447   HEMOGLOBIN A1C % 5 1     Results from last 7 days   Lab Units 10/23/19  1931   LACTIC ACID mmol/L 1 2           * I Have Reviewed All Lab Data Listed Above  * Additional Pertinent Lab Tests Reviewed:  [address] 68 Admission Reviewed    Imaging:    Imaging Reports Reviewed Today Include:  CT abdomen pelvis  Imaging Personally Reviewed by Myself Includes:  CT abdomen pelvis    Recent Cultures (last 7 days):           Last 24 Hours Medication List:     Current Facility-Administered Medications:  amLODIPine 10 mg Oral Daily ALEX Garner    aspirin 325 mg Oral Daily ALEX Garner    dicyclomine 10 mg Oral 4x Daily PRN Kash Cueto MD    enoxaparin 30 mg Subcutaneous Q24H 7945 ALEX Bui    fenofibrate 168 mg Oral Daily ALEX Garner    influenza vaccine 0 5 mL Intramuscular Once ALEX Garner    levothyroxine 12 5 mcg Oral Early Morning ALEX Garner    losartan 50 mg Oral Daily [place] ALEX Calabrese    magnesium sulfate 1 g Intravenous Once Keon Arriaga MD    morphine injection 2 mg Intravenous Q4H PRN Keon Arriaga MD    nicotine 1 patch Transdermal Daily ALEX Mas    pantoprazole 40 mg Intravenous Q24H 7969 ALEX Bui    potassium chloride 40 mEq Oral Once Keon Arriaga MD    pravastatin 80 mg Oral HS Edu Gold MD    sodium chloride 75 mL/hr Intravenous Continuous Keon Arriaga MD Last Rate: 125 mL/hr (10/24/19 0135)        Today, Patient Was Seen By: Keon Arriaga MD    ** Please Note: Dictation voice to text software may have been used in the creation of this document   **

## 2019-10-25 ENCOUNTER — APPOINTMENT (OUTPATIENT)
Dept: CT IMAGING | Facility: HOSPITAL | Age: 69
DRG: 438 | End: 2019-10-25
Payer: COMMERCIAL

## 2019-10-25 PROBLEM — J96.01 ACUTE RESPIRATORY FAILURE WITH HYPOXIA (HCC): Status: ACTIVE | Noted: 2019-10-25

## 2019-10-25 PROBLEM — E78.2 MIXED HYPERLIPIDEMIA: Status: ACTIVE | Noted: 2019-02-20

## 2019-10-25 LAB
ALBUMIN SERPL BCP-MCNC: 3.3 G/DL (ref 3–5.2)
ALP SERPL-CCNC: 39 U/L (ref 43–122)
ALT SERPL W P-5'-P-CCNC: 31 U/L (ref 9–52)
AMYLASE SERPL-CCNC: 210 IU/L (ref 30–110)
ANION GAP SERPL CALCULATED.3IONS-SCNC: 3 MMOL/L (ref 5–14)
AST SERPL W P-5'-P-CCNC: 42 U/L (ref 17–59)
BILIRUB SERPL-MCNC: 0.4 MG/DL
BUN SERPL-MCNC: 9 MG/DL (ref 5–25)
CALCIUM SERPL-MCNC: 8.3 MG/DL (ref 8.4–10.2)
CHLORIDE SERPL-SCNC: 106 MMOL/L (ref 97–108)
CO2 SERPL-SCNC: 28 MMOL/L (ref 22–30)
CREAT SERPL-MCNC: 0.74 MG/DL (ref 0.7–1.5)
GFR SERPL CREATININE-BSD FRML MDRD: 95 ML/MIN/1.73SQ M
GLUCOSE SERPL-MCNC: 93 MG/DL (ref 70–99)
LACTATE SERPL-SCNC: 1.2 MMOL/L (ref 0.7–2)
LDH SERPL-CCNC: 533 U/L (ref 313–618)
LIPASE SERPL-CCNC: 659 U/L (ref 23–300)
MAGNESIUM SERPL-MCNC: 1.6 MG/DL (ref 1.6–2.3)
POTASSIUM SERPL-SCNC: 3.6 MMOL/L (ref 3.6–5)
PROT SERPL-MCNC: 6 G/DL (ref 5.9–8.4)
SODIUM SERPL-SCNC: 137 MMOL/L (ref 137–147)

## 2019-10-25 PROCEDURE — 71275 CT ANGIOGRAPHY CHEST: CPT

## 2019-10-25 PROCEDURE — 99254 IP/OBS CNSLTJ NEW/EST MOD 60: CPT | Performed by: SPECIALIST

## 2019-10-25 PROCEDURE — C9113 INJ PANTOPRAZOLE SODIUM, VIA: HCPCS | Performed by: NURSE PRACTITIONER

## 2019-10-25 PROCEDURE — 83735 ASSAY OF MAGNESIUM: CPT | Performed by: FAMILY MEDICINE

## 2019-10-25 PROCEDURE — 74177 CT ABD & PELVIS W/CONTRAST: CPT

## 2019-10-25 PROCEDURE — 83605 ASSAY OF LACTIC ACID: CPT | Performed by: FAMILY MEDICINE

## 2019-10-25 PROCEDURE — 83690 ASSAY OF LIPASE: CPT | Performed by: INTERNAL MEDICINE

## 2019-10-25 PROCEDURE — 83615 LACTATE (LD) (LDH) ENZYME: CPT | Performed by: FAMILY MEDICINE

## 2019-10-25 PROCEDURE — 80053 COMPREHEN METABOLIC PANEL: CPT | Performed by: FAMILY MEDICINE

## 2019-10-25 PROCEDURE — 99233 SBSQ HOSP IP/OBS HIGH 50: CPT | Performed by: FAMILY MEDICINE

## 2019-10-25 PROCEDURE — 82150 ASSAY OF AMYLASE: CPT | Performed by: FAMILY MEDICINE

## 2019-10-25 RX ORDER — OXYCODONE HYDROCHLORIDE AND ACETAMINOPHEN 5; 325 MG/1; MG/1
2 TABLET ORAL EVERY 4 HOURS PRN
Status: DISCONTINUED | OUTPATIENT
Start: 2019-10-25 | End: 2019-10-26

## 2019-10-25 RX ORDER — DICYCLOMINE HYDROCHLORIDE 10 MG/1
10 CAPSULE ORAL
Status: DISCONTINUED | OUTPATIENT
Start: 2019-10-25 | End: 2019-10-27

## 2019-10-25 RX ORDER — HYDROMORPHONE HCL/PF 1 MG/ML
0.6 SYRINGE (ML) INJECTION
Status: DISCONTINUED | OUTPATIENT
Start: 2019-10-25 | End: 2019-10-26

## 2019-10-25 RX ORDER — LEVOFLOXACIN 5 MG/ML
750 INJECTION, SOLUTION INTRAVENOUS EVERY 24 HOURS
Status: DISCONTINUED | OUTPATIENT
Start: 2019-10-25 | End: 2019-11-01

## 2019-10-25 RX ORDER — OXYCODONE HYDROCHLORIDE AND ACETAMINOPHEN 5; 325 MG/1; MG/1
1 TABLET ORAL EVERY 4 HOURS PRN
Status: DISCONTINUED | OUTPATIENT
Start: 2019-10-25 | End: 2019-10-26

## 2019-10-25 RX ADMIN — LEVOTHYROXINE SODIUM 12.5 MCG: 25 TABLET ORAL at 05:36

## 2019-10-25 RX ADMIN — PRAVASTATIN SODIUM 80 MG: 40 TABLET ORAL at 21:13

## 2019-10-25 RX ADMIN — DICYCLOMINE HYDROCHLORIDE 10 MG: 10 CAPSULE ORAL at 12:47

## 2019-10-25 RX ADMIN — MORPHINE SULFATE 2 MG: 2 INJECTION, SOLUTION INTRAMUSCULAR; INTRAVENOUS at 05:36

## 2019-10-25 RX ADMIN — PANTOPRAZOLE SODIUM 40 MG: 40 INJECTION, POWDER, FOR SOLUTION INTRAVENOUS at 09:01

## 2019-10-25 RX ADMIN — HYDROMORPHONE HYDROCHLORIDE 0.6 MG: 1 INJECTION, SOLUTION INTRAMUSCULAR; INTRAVENOUS; SUBCUTANEOUS at 15:33

## 2019-10-25 RX ADMIN — ENOXAPARIN SODIUM 30 MG: 30 INJECTION SUBCUTANEOUS at 09:03

## 2019-10-25 RX ADMIN — DICYCLOMINE HYDROCHLORIDE 10 MG: 10 CAPSULE ORAL at 15:33

## 2019-10-25 RX ADMIN — DICYCLOMINE HYDROCHLORIDE 10 MG: 10 CAPSULE ORAL at 09:02

## 2019-10-25 RX ADMIN — LOSARTAN POTASSIUM 50 MG: 50 TABLET, FILM COATED ORAL at 09:02

## 2019-10-25 RX ADMIN — METRONIDAZOLE 500 MG: 500 INJECTION, SOLUTION INTRAVENOUS at 17:31

## 2019-10-25 RX ADMIN — AMLODIPINE BESYLATE 10 MG: 10 TABLET ORAL at 09:03

## 2019-10-25 RX ADMIN — SODIUM CHLORIDE 75 ML/HR: 0.9 INJECTION, SOLUTION INTRAVENOUS at 05:38

## 2019-10-25 RX ADMIN — LEVOFLOXACIN 750 MG: 750 INJECTION, SOLUTION INTRAVENOUS at 14:59

## 2019-10-25 RX ADMIN — ASPIRIN 325 MG ORAL TABLET 325 MG: 325 PILL ORAL at 09:03

## 2019-10-25 RX ADMIN — MORPHINE SULFATE 2 MG: 2 INJECTION, SOLUTION INTRAMUSCULAR; INTRAVENOUS at 09:01

## 2019-10-25 RX ADMIN — DICYCLOMINE HYDROCHLORIDE 10 MG: 10 CAPSULE ORAL at 21:12

## 2019-10-25 RX ADMIN — FENOFIBRATE 168 MG: 48 TABLET, FILM COATED ORAL at 09:02

## 2019-10-25 RX ADMIN — HYDROMORPHONE HYDROCHLORIDE 0.6 MG: 1 INJECTION, SOLUTION INTRAMUSCULAR; INTRAVENOUS; SUBCUTANEOUS at 21:07

## 2019-10-25 RX ADMIN — IOHEXOL 100 ML: 350 INJECTION, SOLUTION INTRAVENOUS at 12:03

## 2019-10-25 NOTE — ASSESSMENT & PLAN NOTE
Patient has abdominal pain for the last 1 week  He still having severe pain which is 6 to 8/10 and spasmodic in nature  Patient Has no improvement in his abdominal pain  Actually little worse today  Will repeat a stat CT of the abdomen with IV and p o  Contrast   Also ask General surgery for evaluation  · Lipase notably elevated at 791- 659  · CT abdomen pelvis -- [place] diverticulum again noted in the 2nd portion of the duodenum  Mild inflammation of the distal 3rd and 4th portion of the duodenum  Mild wall thickening proximal jejunal loops; Diverticulosis throughout the colon without diverticulitis or colitis; No bowel obstruction  · Protonix 40 mg IV Q 24 hours    Patient's pain and discomfort in the abdomen is out of proportion to his exam findings  Will see on the repeat CT scan if there is any change  Will also do a stat LDH and amylase level    Keep NPO for now

## 2019-10-25 NOTE — NURSING NOTE
Pt resting comfortably in bed, c/o abd pain rated as 8/10 morphine given, will monitor for effectiveness  Pt in no distress  No other concerns verbalized   Will monitor

## 2019-10-25 NOTE — NURSING NOTE
2nd 1/2 of contrast given  Pt resting comfortably in bed  No complaints  Pt no BM, just passing gas  Call bell ib reach  Will continue to monitor

## 2019-10-25 NOTE — UTILIZATION REVIEW
OBSERVATION 10/23 @ 5527 CONVERTED TO INPATIENT ADMISSION 10/25 @ 1655 DUE TO WORSENING ABD PAIN WITH ABNORMAL CT RESULTS NOW NEEDING IV ANTIBIOTICS, IV DILAUDID FOR PAIN CONTROL, CONTINUED IVF PER GENERAL SURGERY  10/25/19 1655  Inpatient Admission Once     Transfer Service: General Medicine       Question Answer Comment   Admitting Physician Alexei Morales    Level of Care Med Surg    Estimated length of stay More than 2 Midnights    Certification I certify that inpatient services are medically necessary for this patient for a duration of greater than two midnights  See H&P and MD Progress Notes for additional information about the patient's course of treatment  abd pain       10/25/19 1655       Continued Stay Review    Date: 10/25                          Current Patient Class: INPATIENT  Current Level of Care: med/surg    HPI:69 y o  male initially admitted as observation on 10/23 due to abd pain  Assessment/Plan:   10/25 Converted to Inpatient admission  Acute hypoxic respiratory failure with O2 sat 84% on RA  Check CTA PE study  Continues to c/o abd pain requiring IV morphine  States worse than yesterday  Keep NPO  Stat LDH, amylase  General surgery consult:  Duodenal diverticulitis vs acute pancreatitis possibly secondary to duodenal diverticulitis vs enteritis  Start IV antibiotics  IV dilaudid added for pain control      Pertinent Labs/Diagnostic Results:   Results from last 7 days   Lab Units 10/24/19  0447 10/23/19  1931   WBC Thousand/uL 6 60 8 00   HEMOGLOBIN g/dL 11 3* 12 9*   HEMATOCRIT % 33 4* 37 4*   PLATELETS Thousands/uL 298 334   NEUTROS ABS Thousands/µL 3 70 5 20         Results from last 7 days   Lab Units 10/25/19  0446 10/24/19  0447 10/23/19  1931   SODIUM mmol/L 137 140 140   POTASSIUM mmol/L 3 6 3 3* 3 6   CHLORIDE mmol/L 106 106 103   CO2 mmol/L 28 29 29   ANION GAP mmol/L 3* 5 8   BUN mg/dL 9 12 15   CREATININE mg/dL 0 74 0 79 0 85   EGFR ml/min/1 73sq m 95 93 90   CALCIUM mg/dL 8 3* 8 6 9 7   MAGNESIUM mg/dL 1 6 1 4*  --    PHOSPHORUS mg/dL  --  2 9  --      Results from last 7 days   Lab Units 10/25/19  0446 10/23/19  1931   AST U/L 42 40   ALT U/L 31 25   ALK PHOS U/L 39* 45   TOTAL PROTEIN g/dL 6 0 7 0   ALBUMIN g/dL 3 3 4 0   TOTAL BILIRUBIN mg/dL 0 40 0 50         Results from last 7 days   Lab Units 10/25/19  0446 10/24/19  0447 10/23/19  1931   GLUCOSE RANDOM mg/dL 93 87 95         Results from last 7 days   Lab Units 10/24/19  0447   HEMOGLOBIN A1C % 5 1   EAG mg/dl 100     Results from last 7 days   Lab Units 10/24/19  0447   TSH 3RD GENERATON uIU/mL 5 310*         Results from last 7 days   Lab Units 10/25/19  0914 10/23/19  1931   LACTIC ACID mmol/L 1 2 1 2       Results from last 7 days   Lab Units 10/24/19  0449   HEP C AB  Non-reactive     Results from last 7 days   Lab Units 10/25/19  0446 10/23/19  1931   LIPASE u/L 659* 791*   AMYLASE IU/L 210*  --      Results from last 7 days   Lab Units 10/23/19  1932 10/23/19  1803   CLARITY UA  Clear clear   COLOR UA  Yellow yellow   SPEC GRAV UA  1 010  --    PH UA  7 0  --    GLUCOSE UA mg/dl Negative negative   KETONES UA mg/dl Negative negative   BLOOD UA  Negative trace   PROTEIN UA mg/dl Negative trace   NITRITE UA  Negative negative   BILIRUBIN UA  Negative  --    BILIRUBIN UA POC   --  negative   UROBILINOGEN UA mg/dL Negative 0 2   LEUKOCYTES UA  Negative negative     10/25 CT abd/pelvis with PE study:  1  No evidence of pulmonary embolus  2  Mild COPD  3  4 mm nodular density right upper lobe favored to represent a small focus of mucus plugging   Tiny endobronchial lesion cannot be excluded   Follow-up CT chest in 6 months recommended to ensure stability/resolution   Suspected adherent mucus along the   left mainstem bronchus can also be reevaluated at that time    4  Persistent thickening and adjacent inflammatory changes about the duodenojejunal junction with mild thickening of proximal jejunal loops   Findings could represent represent infectious or inflammatory enteritis   Reactive changes from acute   pancreatitis cannot be excluded   No evidence of bowel obstruction  5  Fatty liver with prominent left lobe and lobular surface for which cirrhosis cannot be excluded  6  Mildly prominent low density branching structures in the pancreatic head favored to represent ductal prominence  This can be evaluated with outpatient MRCP  7   Small amount of abdominopelvic ascites, slightly increased  8  Trace bilateral pleural effusions with mild dependent atelectasis      Vital Signs:   10/25/19 0705  97 7 °F (36 5 °C)  74  18  143/73  93 %  Nasal cannula 2L   10/24/19 2344  98 5 °F (36 9 °C)  61  18  120/67  92 %  Nasal cannula 2L   10/24/19 1539          93 %  Nasal cannula 2L   10/24/19 1504    68  16  141/76  89 %Abnormal   Nasal cannula 3L   10/24/19 1457  98 6 °F (37 °C)  68  20  141/76  84 %Abnormal    None (Room air)       Medications:     Medications:  amLODIPine 10 mg Oral Daily   aspirin 325 mg Oral Daily   dicyclomine 10 mg Oral 4x Daily (AC & HS)   enoxaparin 30 mg Subcutaneous Q24H DAMASO   fenofibrate 168 mg Oral Daily   influenza vaccine 0 5 mL Intramuscular Once   levothyroxine 12 5 mcg Oral Early Morning   losartan 50 mg Oral Daily   nicotine 1 patch Transdermal Daily   pantoprazole 40 mg Intravenous Q24H DAMASO   pravastatin 80 mg Oral HS       sodium chloride 75 mL/hr Intravenous Continuous       dicyclomine 10 mg Oral 4x Daily PRN x1   morphine injection 2 mg Intravenous Q4H PRN x2     levofloxacin (LEVAQUIN) IVPB (premix) 750 mg   Dose: 750 mg  Freq: Every 24 hours Route: IV  Start: 10/25/19 1500  metroNIDAZOLE (FLAGYL) IVPB (premix) 500 mg   Dose: 500 mg  Freq: Every 8 hours Route: IV  Start: 10/25/19 1600  HYDROmorphone (DILAUDID) injection 0 6 mg   Dose: 0 6 mg  Freq: Every 3 hours PRN Route: IV x1 10/25    Discharge Plan: TBD    Network Utilization Review Department  [email or web address]  ATTENTION: Please call with any questions or concerns to [phone] and carefully listen to the prompts so that you are directed to the right person  All voicemails are confidential   Mandy Thompson all requests for admission clinical reviews, approved or denied determinations and any other requests to dedicated fax number below belonging to the campus where the patient is receiving treatment    FACILITY NAME UR FAX NUMBER   ADMISSION DENIALS (Administrative/Medical Necessity) [address] [facility] (Maternity/NICU/Pediatrics) [phone]   [facility] [id number] [place] [address] [address] [facility] [phone]   [facility] [address] [facility] [phone]   Elana Turner [address] [facility] [address] [place] [address] [phone]

## 2019-10-25 NOTE — ASSESSMENT & PLAN NOTE
Patient has acute hypoxic respiratory failure  Patient does not have any history of using any oxygen at home and here he is 84% on room air  He does have an extensive smoking history however on recent CT scan abdomen the lungs look clear    Will do an acute CT PE study

## 2019-10-25 NOTE — CONSULTS
Chief Complaint:  Abdominal pain      History of Present Illness:  Patient is a 82-year-old white male who is the [employer]  here at [facility]   He presented to the the hospital yesterday with a one-week history of abdominal pain  Pain is spasmodic in nature in the mid abdomen  He denies ever having this pain before  CT scan of the abdomen and pelvis demonstrated a duodenal diverticulum along with 2nd portion of the duodenum  There was some mild inflammation around the 3rd and 4th portion of the duodenum  Also some proximal mild the thickening of the jejunum  His lipase was also just under 800  He was admitted to the medical service for further diagnosis and treatment  Hospital day 2 he developed actually worsening abdominal pain out of proportion with physical exam and a repeat CT scan was obtained of the abdomen pelvis and also chest with p o  And IV contrast   There was no evidence of PE  Persistent thickening and adjacent inflammatory changes along the duodenal jejunal junction  Possible acute pancreatitis with ductal prominence  Consultation was obtained with general surgery in regards to this  Past Medical History:   Past Medical History:   Diagnosis Date    Avascular necrosis of bone of hip, left ([address] )     Hiatal hernia     Hyperlipidemia          Past Surgical History:    Past Surgical History:   Procedure Laterality Date    CARDIAC CATHETERIZATION      CERVICAL FUSION      LUMBAR FUSION           Allergies:     Allergies   Allergen Reactions    Rofecoxib          Medications:    Current Facility-Administered Medications:     amLODIPine (NORVASC) tablet 10 mg, 10 mg, Oral, Daily, ALEX Martinez, 10 mg at 10/25/19 1430    aspirin tablet 325 mg, 325 mg, Oral, Daily, ALEX Martinez, 325 mg at 10/25/19 0999    dicyclomine (BENTYL) capsule 10 mg, 10 mg, Oral, 4x Daily PRN, Kendra Mann MD, 10 mg at 10/25/19 0902    dicyclomine (BENTYL) capsule 10 mg, 10 mg, Oral, 4x Daily (AC & HS), Elis Ray MD, 10 mg at 10/25/19 1247    enoxaparin (LOVENOX) subcutaneous injection 30 mg, 30 mg, Subcutaneous, Q24H [facility], ALEX Martinez, 30 mg at 10/25/19 7655    fenofibrate (TRICOR) tablet 168 mg, 168 mg, Oral, Daily, ALEX Martinez, 168 mg at 10/25/19 0902    influenza vaccine, high-dose (FLUZONE HIGH-DOSE) IM injection ZACK 0 5 mL, 0 5 mL, Intramuscular, Once, ALEX Martinez, Stopped at 10/24/19 0559    levothyroxine tablet 12 5 mcg, 12 5 mcg, Oral, Early Morning, ALEX Martinez, 12 5 mcg at 10/25/19 0536    losartan (COZAAR) tablet 50 mg, 50 mg, Oral, Daily, ALEX Martinez, 50 mg at 10/25/19 0902    morphine injection 2 mg, 2 mg, Intravenous, Q4H PRN, Elis Ray MD, 2 mg at 10/25/19 0901    nicotine (NICODERM CQ) 21 mg/24 hr TD 24 hr patch 1 patch, 1 patch, Transdermal, Daily, ALEX Martinez    pantoprazole (PROTONIX) injection 40 mg, 40 mg, Intravenous, Q24H [facility], ALEX Martinez, 40 mg at 10/25/19 0901    [DISCONTINUED] ezetimibe (ZETIA) tablet 10 mg, 10 mg, Oral, HS **AND** pravastatin (PRAVACHOL) tablet 80 mg, 80 mg, Oral, HS, Starla Mehta MD, 80 mg at 10/24/19 2143    sodium chloride 0 9 % infusion, 75 mL/hr, Intravenous, Continuous, Elis Ray MD, Last Rate: 75 mL/hr at 10/25/19 0538, 75 mL/hr at 10/25/19 0538      Social History:  Social History     Social History     Substance and Sexual Activity   Alcohol Use Yes    Frequency: Monthly or less    Drinks per session: 1 or 2    Binge frequency: Never     Social History     Substance and Sexual Activity   Drug Use Never     Social History     Tobacco Use   Smoking Status Current Every Day Smoker    Packs/day: 1 00    Years: 50 00    Pack years: 50 00    Types: Cigarettes   Smokeless Tobacco Never Used   Tobacco Comment    Heavy tobacco smoker; 10 cigarettes per day as per [employer]         Family History:  History reviewed  No pertinent family history        Review of Systems: Please refer to the formal H&P but a 12 point review of systems was negative  Vitals:  Vitals:    10/25/19 0705   BP: 143/73   Pulse: 74   Resp: 18   Temp: 97 7 °F (36 5 °C)   SpO2: 93%       Physical Exam:  Patient is a middle-aged white male who is awake alert no distress  Vital signs as above  Abdomen soft flat tender in the epigastric area no palpable masses or hernias are noted  Lab Results: I have personally reviewed pertinent reports  See below  Imaging: I have personally reviewed pertinent reports  EKG, Pathology, and Other Studies: I have personally reviewed pertinent reports       Admission on 10/23/2019   Component Date Value    WBC 10/23/2019 8 00     RBC 10/23/2019 3 81*    Hemoglobin 10/23/2019 12 9*    Hematocrit 10/23/2019 37 4*    MCV 10/23/2019 98     MCH 10/23/2019 34 0     MCHC 10/23/2019 34 6     RDW 10/23/2019 14 2     MPV 10/23/2019 8 8*    Platelets 47/10/1270 334     Neutrophils Relative 10/23/2019 64     Lymphocytes Relative 10/23/2019 21*    Monocytes Relative 10/23/2019 9     Eosinophils Relative 10/23/2019 5     Basophils Relative 10/23/2019 1     Neutrophils Absolute 10/23/2019 5 20     Lymphocytes Absolute 10/23/2019 1 70     Monocytes Absolute 10/23/2019 0 70     Eosinophils Absolute 10/23/2019 0 40     Basophils Absolute 10/23/2019 0 10     Sodium 10/23/2019 140     Potassium 10/23/2019 3 6     Chloride 10/23/2019 103     CO2 10/23/2019 29     ANION GAP 10/23/2019 8     BUN 10/23/2019 15     Creatinine 10/23/2019 0 85     Glucose 10/23/2019 95     Calcium 10/23/2019 9 7     AST 10/23/2019 40     ALT 10/23/2019 25     Alkaline Phosphatase 10/23/2019 45     Total Protein 10/23/2019 7 0     Albumin 10/23/2019 4 0     Total Bilirubin 10/23/2019 0 50     eGFR 10/23/2019 90     Lipase 10/23/2019 791*    Color, UA 10/23/2019 Yellow     Clarity, UA 10/23/2019 Clear     Specific Gravity, UA 10/23/2019 1 010     pH, UA 10/23/2019 7 0     Leukocytes, UA 10/23/2019 Negative     Nitrite, UA 10/23/2019 Negative     Protein, UA 10/23/2019 Negative     Glucose, UA 10/23/2019 Negative     Ketones, UA 10/23/2019 Negative     Bilirubin, UA 10/23/2019 Negative     Blood, UA 10/23/2019 Negative     UROBILINOGEN UA 10/23/2019 Negative     LACTIC ACID 10/23/2019 1 2     TSH 3RD GENERATON 10/24/2019 5 310*    Sodium 10/24/2019 140     Potassium 10/24/2019 3 3*    Chloride 10/24/2019 106     CO2 10/24/2019 29     ANION GAP 10/24/2019 5     BUN 10/24/2019 12     Creatinine 10/24/2019 0 79     Glucose 10/24/2019 87     Calcium 10/24/2019 8 6     eGFR 10/24/2019 93     Magnesium 10/24/2019 1 4*    Phosphorus 10/24/2019 2 9     WBC 10/24/2019 6 60     RBC 10/24/2019 3 39*    Hemoglobin 10/24/2019 11 3*    Hematocrit 10/24/2019 33 4*    MCV 10/24/2019 98     MCH 10/24/2019 33 3     MCHC 10/24/2019 33 8     RDW 10/24/2019 14 3     MPV 10/24/2019 8 3*    Platelets 04/42/2629 298     Neutrophils Relative 10/24/2019 57     Lymphocytes Relative 10/24/2019 26     Monocytes Relative 10/24/2019 9     Eosinophils Relative 10/24/2019 8*    Basophils Relative 10/24/2019 0     Neutrophils Absolute 10/24/2019 3 70     Lymphocytes Absolute 10/24/2019 1 70     Monocytes Absolute 10/24/2019 0 60     Eosinophils Absolute 10/24/2019 0 50*    Basophils Absolute 10/24/2019 0 00     Hemoglobin A1C 10/24/2019 5 1     EAG 10/24/2019 100     Hepatitis C Ab 10/24/2019 Non-reactive     Ventricular Rate 10/23/2019 72     Atrial Rate 10/23/2019 72     NE Interval 10/23/2019 144     QRSD Interval 10/23/2019 84     QT Interval 10/23/2019 404     QTC Interval 10/23/2019 442     QRS Axis 10/23/2019 40     T Wave Axis 10/23/2019 38     Lipase 10/25/2019 659*    Magnesium 10/25/2019 1 6     Sodium 10/25/2019 137     Potassium 10/25/2019 3 6     Chloride 10/25/2019 106     CO2 10/25/2019 28     ANION GAP 10/25/2019 3*    BUN 10/25/2019 9     Creatinine 10/25/2019 0 74     Glucose 10/25/2019 93     Calcium 10/25/2019 8 3*    AST 10/25/2019 42     ALT 10/25/2019 31     Alkaline Phosphatase 10/25/2019 39*    Total Protein 10/25/2019 6 0     Albumin 10/25/2019 3 3     Total Bilirubin 10/25/2019 0 40     eGFR 10/25/2019 95     LD 10/25/2019 533     Amylase 10/25/2019 210*    LACTIC ACID 10/25/2019 1 2          Impression:  Duodenal diverticulitis? Acute pancreatitis possibly secondary to duodenal diverticulitis? Enteritis? Plan:  Discussed with internal medicine  Would recommend antibiotics for duodenal diverticulitis plus-minus enteritis  Follow clinical course  Will follow with you

## 2019-10-25 NOTE — PROGRESS NOTES
Progress Note - Ashleigh Potter 1950, 81 y o  male MRN: [id number]    Unit/Bed#: 7T [facility] 702-02 Encounter: [id number]    Primary Care Provider: Kevin Bacon DO   Date and time admitted to hospital: 10/23/2019  6:47 PM        Acute respiratory failure with hypoxia [facility])  Assessment & Plan  Patient has acute hypoxic respiratory failure  Patient does not have any history of using any oxygen at home and here he is 84% on room air  He does have an extensive smoking history however on recent CT scan abdomen the lungs look clear  Will do an acute CT PE study    * Abdominal pain  Assessment & Plan  Patient has abdominal pain for the last 1 week  He still having severe pain which is 6 to 8/10 and spasmodic in nature  Patient Has no improvement in his abdominal pain  Actually little worse today  Will repeat a stat CT of the abdomen with IV and p o  Contrast   Also ask General surgery for evaluation  · Lipase notably elevated at 791- 659  · CT abdomen pelvis -- [place] diverticulum again noted in the 2nd portion of the duodenum  Mild inflammation of the distal 3rd and 4th portion of the duodenum  Mild wall thickening proximal jejunal loops; Diverticulosis throughout the colon without diverticulitis or colitis; No bowel obstruction  · Protonix 40 mg IV Q 24 hours    Patient's pain and discomfort in the abdomen is out of proportion to his exam findings  Will see on the repeat CT scan if there is any change  Will also do a stat LDH and amylase level  Keep NPO for now    Tobacco abuse  Assessment & Plan  · Cessation counseling  · Nicotine patch ordered  · Nasal cannula oxygen p r n  To maintain oxygen saturations over 88%    Mixed hyperlipidemia  Assessment & Plan  · Continue home statin    Hypothyroidism (acquired)  Assessment & Plan  · TSH is 5 31  · Continue home Synthroid    Prediabetes  Assessment & Plan  · Takes metformin as outpatient  · Hemoglobin A1c is 5 1    Discontinue metformin    Essential hypertension, benign  Assessment & Plan  · Continue home amlodipine and losartan with holds  · Goal to maintain systolic blood pressure less than 160      VTE Pharmacologic Prophylaxis:   Pharmacologic: Enoxaparin (Lovenox)  Mechanical VTE Prophylaxis in Place: Yes    Patient Centered Rounds: I have performed bedside rounds with nursing staff today  Discussions with Specialists or Other Care Team Provider:  Will discuss with GI and surgery today    Education and Discussions with Family / Patient:  Discussed with patient at bedside about hospital course    Time Spent for Care: 45 minutes  More than 50% of total time spent on counseling and coordination of care as described above  Current Length of Stay: 0 day(s)    Current Patient Status: Observation   Certification Statement: The patient will continue to require additional inpatient hospital stay due to Acute abdominal pain and shortness of breath    Discharge Plan:  Pending progress    Code Status: Level 1 - Full Code      Subjective:   Patient denies any chest pain or shortness of breath  He is however hypoxic and was 84% on room air  He is also complaining of 6 to 8/10 abdominal pain and discomfort  He says it is spasmodic discomforting pain which he is very distressed from it is actually worse than yesterday    Objective:     Vitals:   Temp (24hrs), Av 3 °F (36 8 °C), Min:97 7 °F (36 5 °C), Max:98 6 °F (37 °C)    Temp:  [97 7 °F (36 5 °C)-98 6 °F (37 °C)] 97 7 °F (36 5 °C)  HR:  [61-74] 74  Resp:  [16-20] 18  BP: (120-143)/(67-76) 143/73  SpO2:  [84 %-93 %] 93 %  Body mass index is 26 87 kg/m²  Input and Output Summary (last 24 hours): Intake/Output Summary (Last 24 hours) at 10/25/2019 0849  Last data filed at 10/25/2019 0538  Gross per 24 hour   Intake 1660 ml   Output 2060 ml   Net -400 ml       Physical Exam:     Physical Exam   Constitutional: He is oriented to person, place, and time  He appears well-developed and well-nourished   He appears distressed  HENT:   Head: Normocephalic and atraumatic  Right Ear: External ear normal    Left Ear: External ear normal    Mouth/Throat: Oropharynx is clear and moist    Eyes: Conjunctivae and EOM are normal    Neck: Normal range of motion  Neck supple  Cardiovascular: Normal rate, regular rhythm and normal heart sounds  Pulmonary/Chest: Effort normal    Decreased breath sounds bilateral bases   Abdominal: Soft  He exhibits distension  He exhibits no mass  There is tenderness  There is no rebound and no guarding  Genitourinary:   Genitourinary Comments: deferred   Musculoskeletal: Normal range of motion  Neurological: He is alert and oriented to person, place, and time  He has normal reflexes  Cranial nerves 2-12 are normal   Normal neurological exam   Skin: Skin is warm and dry  No rash noted  Psychiatric: He has a normal mood and affect  Nursing note and vitals reviewed  Additional Data:     Labs:    Results from last 7 days   Lab Units 10/24/19  0447   WBC Thousand/uL 6 60   HEMOGLOBIN g/dL 11 3*   HEMATOCRIT % 33 4*   PLATELETS Thousands/uL 298   NEUTROS PCT % 57   LYMPHS PCT % 26   MONOS PCT % 9   EOS PCT % 8*     Results from last 7 days   Lab Units 10/25/19  0446   SODIUM mmol/L 137   POTASSIUM mmol/L 3 6   CHLORIDE mmol/L 106   CO2 mmol/L 28   BUN mg/dL 9   CREATININE mg/dL 0 74   ANION GAP mmol/L 3*   CALCIUM mg/dL 8 3*   ALBUMIN g/dL 3 3   TOTAL BILIRUBIN mg/dL 0 40   ALK PHOS U/L 39*   ALT U/L 31   AST U/L 42   GLUCOSE RANDOM mg/dL 93             Results from last 7 days   Lab Units 10/24/19  0447   HEMOGLOBIN A1C % 5 1     Results from last 7 days   Lab Units 10/23/19  1931   LACTIC ACID mmol/L 1 2           * I Have Reviewed All Lab Data Listed Above  * Additional Pertinent Lab Tests Reviewed:  All Labs For Current Hospital Admission Reviewed    Imaging:    Imaging Reports Reviewed Today Include:  CT abdomen pelvis  Imaging Personally Reviewed by Myself Includes:  CT abdomen pelvis    Recent Cultures (last 7 days):           Last 24 Hours Medication List:     Current Facility-Administered Medications:  amLODIPine 10 mg Oral Daily ALEX Garner    aspirin 325 mg Oral Daily ALEX Garner    dicyclomine 10 mg Oral 4x Daily PRN Kash Cueto MD    dicyclomine 10 mg Oral 4x Daily (AC & HS) Maggie Carbone MD    enoxaparin 30 mg Subcutaneous Q24H [address] ALEX Garner    fenofibrate 168 mg Oral Daily ALEX Garner    influenza vaccine 0 5 mL Intramuscular Once ALEX Garner    levothyroxine 12 5 mcg Oral Early Morning ALEX Garner    losartan 50 mg Oral Daily ALEX Garner    morphine injection 2 mg Intravenous Q4H PRN Maggie Carbone MD    nicotine 1 patch Transdermal Daily ALEX Garner    pantoprazole 40 mg Intravenous Q24H 7955 ALEX Bui    pravastatin 80 mg Oral HS Edi Duncan MD    sodium chloride 75 mL/hr Intravenous Continuous Maggie Carbone MD Last Rate: 75 mL/hr (10/25/19 0538)        Today, Patient Was Seen By: Maggie Carbone MD    ** Please Note: Dictation voice to text software may have been used in the creation of this document   **

## 2019-10-25 NOTE — PLAN OF CARE
Problem: Potential for Falls  Goal: Patient will remain free of falls  Description  INTERVENTIONS:  - Assess patient frequently for physical needs  -  Identify cognitive and physical deficits and behaviors that affect risk of falls    -  [place] fall precautions as indicated by assessment   - Educate patient/family on patient safety including physical limitations  - Instruct patient to call for assistance with activity based on assessment  - Modify environment to reduce risk of injury  - Consider OT/PT consult to assist with strengthening/mobility  Outcome: Progressing     Problem: PAIN - ADULT  Goal: Verbalizes/displays adequate comfort level or baseline comfort level  Description  Interventions:  - Encourage patient to monitor pain and request assistance  - Assess pain using appropriate pain scale  - Administer analgesics based on type and severity of pain and evaluate response  - Implement non-pharmacological measures as appropriate and evaluate response  - Consider cultural and social influences on pain and pain management  - Notify physician/advanced practitioner if interventions unsuccessful or patient reports new pain  Outcome: Progressing     Problem: INFECTION - ADULT  Goal: Absence or prevention of progression during hospitalization  Description  INTERVENTIONS:  - Assess and monitor for signs and symptoms of infection  - Monitor lab/diagnostic results  - Monitor all insertion sites, i e  indwelling lines, tubes, and drains  - Monitor endotracheal if appropriate and nasal secretions for changes in amount and color  - [place] appropriate cooling/warming therapies per order  - Administer medications as ordered  - Instruct and encourage patient and family to use good hand hygiene technique  - Identify and instruct in appropriate isolation precautions for identified infection/condition  Outcome: Progressing     Problem: SAFETY ADULT  Goal: Maintain or return to baseline ADL function  Description  INTERVENTIONS:  -  Assess patient's ability to carry out ADLs; assess patient's baseline for ADL function and identify physical deficits which impact ability to perform ADLs (bathing, care of mouth/teeth, toileting, grooming, dressing, etc )  - Assess/evaluate cause of self-care deficits   - Assess range of motion  - Assess patient's mobility; develop plan if impaired  - Assess patient's need for assistive devices and provide as appropriate  - Encourage maximum independence but intervene and supervise when necessary  - Involve family in performance of ADLs  - Assess for home care needs following discharge   - Consider OT consult to assist with ADL evaluation and planning for discharge  - Provide patient education as appropriate  Outcome: Progressing  Goal: Maintain or return mobility status to optimal level  Description  INTERVENTIONS:  - Assess patient's baseline mobility status (ambulation, transfers, stairs, etc )    - Identify cognitive and physical deficits and behaviors that affect mobility  - Identify mobility aids required to assist with transfers and/or ambulation (gait belt, sit-to-stand, lift, walker, cane, etc )  - [place] fall precautions as indicated by assessment  - Record patient progress and toleration of activity level on Mobility SBAR; progress patient to next Phase/Stage  - Instruct patient to call for assistance with activity based on assessment  - Consider rehabilitation consult to assist with strengthening/weightbearing, etc   Outcome: Progressing     Problem: DISCHARGE PLANNING  Goal: Discharge to home or other facility with appropriate resources  Description  INTERVENTIONS:  - Identify barriers to discharge w/patient and caregiver  - Arrange for needed discharge resources and transportation as appropriate  - Identify discharge learning needs (meds, wound care, etc )  - Arrange for interpretive services to assist at discharge as needed  - Refer to Case Management Department for coordinating discharge planning if the patient needs post-hospital services based on physician/advanced practitioner order or complex needs related to functional status, cognitive ability, or social support system  Outcome: Progressing     Problem: Knowledge Deficit  Goal: Patient/family/caregiver demonstrates understanding of disease process, treatment plan, medications, and discharge instructions  Description  Complete learning assessment and assess knowledge base    Interventions:  - Provide teaching at level of understanding  - Provide teaching via preferred learning methods  Outcome: Progressing     Problem: DISCHARGE PLANNING - CARE MANAGEMENT  Goal: Discharge to post-acute care or home with appropriate resources  Description  INTERVENTIONS:  - Conduct assessment to determine patient/family and health care team treatment goals, and need for post-acute services based on payer coverage, community resources, and patient preferences, and barriers to discharge  - Address psychosocial, clinical, and financial barriers to discharge as identified in assessment in conjunction with the patient/family and health care team  - Arrange appropriate level of post-acute services according to patients   needs and preference and payer coverage in collaboration with the physician and health care team  - Communicate with and update the patient/family, physician, and health care team regarding progress on the discharge plan  - Arrange appropriate transportation to post-acute venues  Outcome: Progressing

## 2019-10-26 PROBLEM — K52.9 JEJUNITIS: Status: ACTIVE | Noted: 2019-10-26

## 2019-10-26 PROBLEM — K85.30 DRUG-INDUCED ACUTE PANCREATITIS WITHOUT INFECTION OR NECROSIS: Status: ACTIVE | Noted: 2019-10-23

## 2019-10-26 LAB
ALBUMIN SERPL BCP-MCNC: 3.1 G/DL (ref 3–5.2)
ALP SERPL-CCNC: 39 U/L (ref 43–122)
ALT SERPL W P-5'-P-CCNC: 30 U/L (ref 9–52)
ANION GAP SERPL CALCULATED.3IONS-SCNC: 4 MMOL/L (ref 5–14)
AST SERPL W P-5'-P-CCNC: 39 U/L (ref 17–59)
BILIRUB SERPL-MCNC: 0.4 MG/DL
BUN SERPL-MCNC: 7 MG/DL (ref 5–25)
CALCIUM SERPL-MCNC: 8.4 MG/DL (ref 8.4–10.2)
CHLORIDE SERPL-SCNC: 106 MMOL/L (ref 97–108)
CHOLEST SERPL-MCNC: 86 MG/DL
CO2 SERPL-SCNC: 27 MMOL/L (ref 22–30)
CREAT SERPL-MCNC: 0.83 MG/DL (ref 0.7–1.5)
ERYTHROCYTE [DISTWIDTH] IN BLOOD BY AUTOMATED COUNT: 14.2 %
GFR SERPL CREATININE-BSD FRML MDRD: 91 ML/MIN/1.73SQ M
GLUCOSE SERPL-MCNC: 95 MG/DL (ref 70–99)
HCT VFR BLD AUTO: 34.1 % (ref 41–53)
HDLC SERPL-MCNC: 19 MG/DL
HGB BLD-MCNC: 11.7 G/DL (ref 13.5–17.5)
LDLC SERPL CALC-MCNC: 38 MG/DL
LIPASE SERPL-CCNC: 397 U/L (ref 23–300)
MCH RBC QN AUTO: 33.9 PG (ref 26–34)
MCHC RBC AUTO-ENTMCNC: 34.2 G/DL (ref 31–36)
MCV RBC AUTO: 99 FL (ref 80–100)
NONHDLC SERPL-MCNC: 67 MG/DL
PLATELET # BLD AUTO: 270 THOUSANDS/UL (ref 150–450)
PMV BLD AUTO: 8.7 FL (ref 8.9–12.7)
POTASSIUM SERPL-SCNC: 3.5 MMOL/L (ref 3.6–5)
PROT SERPL-MCNC: 5.9 G/DL (ref 5.9–8.4)
RBC # BLD AUTO: 3.43 MILLION/UL (ref 4.5–5.9)
SODIUM SERPL-SCNC: 137 MMOL/L (ref 137–147)
TRIGL SERPL-MCNC: 143 MG/DL
WBC # BLD AUTO: 5.5 THOUSAND/UL (ref 4.5–11)

## 2019-10-26 PROCEDURE — 99232 SBSQ HOSP IP/OBS MODERATE 35: CPT | Performed by: INTERNAL MEDICINE

## 2019-10-26 PROCEDURE — C9113 INJ PANTOPRAZOLE SODIUM, VIA: HCPCS | Performed by: NURSE PRACTITIONER

## 2019-10-26 PROCEDURE — 99232 SBSQ HOSP IP/OBS MODERATE 35: CPT | Performed by: SPECIALIST

## 2019-10-26 PROCEDURE — 85027 COMPLETE CBC AUTOMATED: CPT | Performed by: FAMILY MEDICINE

## 2019-10-26 PROCEDURE — 83690 ASSAY OF LIPASE: CPT | Performed by: FAMILY MEDICINE

## 2019-10-26 PROCEDURE — 99232 SBSQ HOSP IP/OBS MODERATE 35: CPT | Performed by: FAMILY MEDICINE

## 2019-10-26 PROCEDURE — 80061 LIPID PANEL: CPT | Performed by: FAMILY MEDICINE

## 2019-10-26 PROCEDURE — 80053 COMPREHEN METABOLIC PANEL: CPT | Performed by: FAMILY MEDICINE

## 2019-10-26 RX ORDER — HYDROCODONE BITARTRATE AND ACETAMINOPHEN 5; 325 MG/1; MG/1
1 TABLET ORAL EVERY 4 HOURS PRN
Status: DISCONTINUED | OUTPATIENT
Start: 2019-10-26 | End: 2019-10-27

## 2019-10-26 RX ORDER — HYDROCODONE BITARTRATE AND ACETAMINOPHEN 5; 325 MG/1; MG/1
2 TABLET ORAL EVERY 4 HOURS PRN
Status: DISCONTINUED | OUTPATIENT
Start: 2019-10-26 | End: 2019-10-27

## 2019-10-26 RX ORDER — HYDROCODONE BITARTRATE AND ACETAMINOPHEN 5; 325 MG/1; MG/1
1 TABLET ORAL EVERY 4 HOURS PRN
Status: DISCONTINUED | OUTPATIENT
Start: 2019-10-26 | End: 2019-10-26

## 2019-10-26 RX ORDER — OXYCODONE HYDROCHLORIDE AND ACETAMINOPHEN 5; 325 MG/1; MG/1
2 TABLET ORAL EVERY 4 HOURS PRN
Status: DISCONTINUED | OUTPATIENT
Start: 2019-10-26 | End: 2019-10-26

## 2019-10-26 RX ORDER — HYDROMORPHONE HCL/PF 1 MG/ML
0.8 SYRINGE (ML) INJECTION
Status: DISCONTINUED | OUTPATIENT
Start: 2019-10-26 | End: 2019-10-28

## 2019-10-26 RX ORDER — ONDANSETRON 4 MG/1
4 TABLET, ORALLY DISINTEGRATING ORAL EVERY 6 HOURS PRN
Status: DISCONTINUED | OUTPATIENT
Start: 2019-10-26 | End: 2019-11-02 | Stop reason: HOSPADM

## 2019-10-26 RX ORDER — HYDROCODONE BITARTRATE AND ACETAMINOPHEN 5; 325 MG/1; MG/1
2 TABLET ORAL EVERY 4 HOURS PRN
Status: DISCONTINUED | OUTPATIENT
Start: 2019-10-26 | End: 2019-10-26

## 2019-10-26 RX ORDER — HYDROMORPHONE HCL/PF 1 MG/ML
0.8 SYRINGE (ML) INJECTION
Status: DISCONTINUED | OUTPATIENT
Start: 2019-10-26 | End: 2019-10-26

## 2019-10-26 RX ORDER — OXYCODONE HYDROCHLORIDE AND ACETAMINOPHEN 5; 325 MG/1; MG/1
1 TABLET ORAL ONCE
Status: COMPLETED | OUTPATIENT
Start: 2019-10-26 | End: 2019-10-26

## 2019-10-26 RX ORDER — HYDROMORPHONE HCL/PF 1 MG/ML
1 SYRINGE (ML) INJECTION
Status: DISCONTINUED | OUTPATIENT
Start: 2019-10-26 | End: 2019-10-26

## 2019-10-26 RX ORDER — ACETAMINOPHEN 325 MG/1
650 TABLET ORAL EVERY 6 HOURS PRN
Status: DISCONTINUED | OUTPATIENT
Start: 2019-10-26 | End: 2019-11-02 | Stop reason: HOSPADM

## 2019-10-26 RX ORDER — POTASSIUM CHLORIDE 20 MEQ/1
20 TABLET, EXTENDED RELEASE ORAL ONCE
Status: COMPLETED | OUTPATIENT
Start: 2019-10-26 | End: 2019-10-26

## 2019-10-26 RX ADMIN — PANTOPRAZOLE SODIUM 40 MG: 40 INJECTION, POWDER, FOR SOLUTION INTRAVENOUS at 08:23

## 2019-10-26 RX ADMIN — OXYCODONE HYDROCHLORIDE AND ACETAMINOPHEN 1 TABLET: 5; 325 TABLET ORAL at 10:02

## 2019-10-26 RX ADMIN — HYDROMORPHONE HYDROCHLORIDE 0.8 MG: 1 INJECTION, SOLUTION INTRAMUSCULAR; INTRAVENOUS; SUBCUTANEOUS at 19:56

## 2019-10-26 RX ADMIN — ENOXAPARIN SODIUM 30 MG: 30 INJECTION SUBCUTANEOUS at 08:23

## 2019-10-26 RX ADMIN — HYDROMORPHONE HYDROCHLORIDE 0.6 MG: 1 INJECTION, SOLUTION INTRAMUSCULAR; INTRAVENOUS; SUBCUTANEOUS at 05:22

## 2019-10-26 RX ADMIN — POTASSIUM CHLORIDE 20 MEQ: 20 TABLET, EXTENDED RELEASE ORAL at 08:37

## 2019-10-26 RX ADMIN — DICYCLOMINE HYDROCHLORIDE 10 MG: 10 CAPSULE ORAL at 21:22

## 2019-10-26 RX ADMIN — DICYCLOMINE HYDROCHLORIDE 10 MG: 10 CAPSULE ORAL at 15:44

## 2019-10-26 RX ADMIN — LEVOTHYROXINE SODIUM 12.5 MCG: 25 TABLET ORAL at 05:08

## 2019-10-26 RX ADMIN — DICYCLOMINE HYDROCHLORIDE 10 MG: 10 CAPSULE ORAL at 11:37

## 2019-10-26 RX ADMIN — SODIUM CHLORIDE 50 ML/HR: 0.9 INJECTION, SOLUTION INTRAVENOUS at 08:25

## 2019-10-26 RX ADMIN — ASPIRIN 325 MG ORAL TABLET 325 MG: 325 PILL ORAL at 08:24

## 2019-10-26 RX ADMIN — FENOFIBRATE 168 MG: 48 TABLET, FILM COATED ORAL at 08:23

## 2019-10-26 RX ADMIN — METRONIDAZOLE 500 MG: 500 INJECTION, SOLUTION INTRAVENOUS at 08:23

## 2019-10-26 RX ADMIN — LEVOFLOXACIN 750 MG: 750 INJECTION, SOLUTION INTRAVENOUS at 15:44

## 2019-10-26 RX ADMIN — DICYCLOMINE HYDROCHLORIDE 10 MG: 10 CAPSULE ORAL at 08:24

## 2019-10-26 RX ADMIN — METRONIDAZOLE 500 MG: 500 INJECTION, SOLUTION INTRAVENOUS at 17:30

## 2019-10-26 RX ADMIN — AMLODIPINE BESYLATE 10 MG: 10 TABLET ORAL at 08:24

## 2019-10-26 RX ADMIN — OXYCODONE HYDROCHLORIDE AND ACETAMINOPHEN 2 TABLET: 5; 325 TABLET ORAL at 15:44

## 2019-10-26 RX ADMIN — LOSARTAN POTASSIUM 50 MG: 50 TABLET, FILM COATED ORAL at 08:24

## 2019-10-26 RX ADMIN — METRONIDAZOLE 500 MG: 500 INJECTION, SOLUTION INTRAVENOUS at 00:52

## 2019-10-26 RX ADMIN — OXYCODONE HYDROCHLORIDE AND ACETAMINOPHEN 1 TABLET: 5; 325 TABLET ORAL at 11:37

## 2019-10-26 NOTE — NURSING NOTE
Patient is A+Ox4, VSS   C/O mid-upper abdominal pain 8-10, worse after eating Jell-O per patient  Tolerating liquids  Percocet given, will reassess for effectiveness  Ambulates to BR with steady gait  Voiding adequately  IVFs infusing as ordered  Patient is sitting up comfortably in bed, call bell in reach  Will continue to monitor closely

## 2019-10-26 NOTE — ASSESSMENT & PLAN NOTE
Patient has acute hypoxic respiratory failure  Patient does not have any history of using any oxygen at home and here he is 84% on room air  He does have an extensive smoking history   CT PE study was negative    Patient was found to have small bilateral pleural effusions  Will decrease IV fluid rate and observe for now

## 2019-10-26 NOTE — ASSESSMENT & PLAN NOTE
· Continue home tricor as patient has history of hypertriglyceridemia    · Check lipid panel  · Hold statin

## 2019-10-26 NOTE — ASSESSMENT & PLAN NOTE
Patient CT abdomen pelvis shows acute jejunitis and also duodenitis  Initially try to treat without antibiotics however his pain was very severe  Will continue IV fluid hydration and clear liquid diet for now and also placed on Levaquin and Flagyl yesterday evening

## 2019-10-26 NOTE — NURSING NOTE
A&Ox4  Pt c/o abdominal pain intermittently throughout shift that was managed well with prn dilaudid  Lungs CTA  No SOB  Heart sounds present  No edema  Pulses +2 in all extremities  ABD soft, ND, with +BS x4  Skin warm D&I  VSS  IV site C/D/I and infusing  Pt resting comfortably with call bell in reach

## 2019-10-26 NOTE — ASSESSMENT & PLAN NOTE
Patient has abdominal pain for the last 1 week  He is still having severe pain which is 6 /10 and spasmodic in nature  Patient states he had a rough night with the pain and is starting to slowly feel a little bit better this morning  · Lipase notably elevated at [id number]  · CT abdomen pelvis repeated yesterday shows acute peripancreatic edema  No gallstones or pancreatic ductal stones visualized  · Check lipid panel  · Patient states he does have problems with triglycerides and does take multiple medications to help control his cholesterol which may be playing a factor in causing his acute pancreatitis  · Patient denies heavy alcohol use  · Protonix 40 mg IV Q 24 hours  · Will advance his diet to a full liquid diet by the evening if his pain stays controlled    · Continue Dilaudid for severe pain and Percocet for moderate pain and Tylenol for mild pain

## 2019-10-26 NOTE — PROGRESS NOTES
Progress Note - Mark Mean 1950, 81 y o  male MRN: [id number]    Unit/Bed#: 7T [facility] 702-02 Encounter: [id number]    Primary Care Provider: Baldo Draper DO   Date and time admitted to hospital: 10/23/2019  6:47 PM        Acute respiratory failure with hypoxia [facility])  Assessment & Plan  Patient has acute hypoxic respiratory failure  Patient does not have any history of using any oxygen at home and here he is 84% on room air  He does have an extensive smoking history   CT PE study was negative  Patient was found to have small bilateral pleural effusions  Will decrease IV fluid rate and observe for now    * Drug-induced acute pancreatitis without infection or necrosis  Assessment & Plan  Patient has abdominal pain for the last 1 week  He is still having severe pain which is 6 /10 and spasmodic in nature  Patient states he had a rough night with the pain and is starting to slowly feel a little bit better this morning  · Lipase notably elevated at [id number]  · CT abdomen pelvis repeated yesterday shows acute peripancreatic edema  No gallstones or pancreatic ductal stones visualized  · Check lipid panel  · Patient states he does have problems with triglycerides and does take multiple medications to help control his cholesterol which may be playing a factor in causing his acute pancreatitis  · Patient denies heavy alcohol use  · Protonix 40 mg IV Q 24 hours  · Will advance his diet to a full liquid diet by the evening if his pain stays controlled  · Continue Dilaudid for severe pain and Percocet for moderate pain and Tylenol for mild pain    Jejunitis  Assessment & Plan  Patient CT abdomen pelvis shows acute jejunitis and also duodenitis  Initially try to treat without antibiotics however his pain was very severe  Will continue IV fluid hydration and clear liquid diet for now and also placed on Levaquin and Flagyl yesterday evening      Tobacco abuse  Assessment & Plan  · Cessation counseling  · Nicotine patch ordered  · Nasal cannula oxygen p r n  To maintain oxygen saturations over 88%    Mixed hyperlipidemia  Assessment & Plan  · Continue home tricor as patient has history of hypertriglyceridemia  · Check lipid panel  · Hold statin    Hypothyroidism (acquired)  Assessment & Plan  · TSH is 5 31  · Continue home Synthroid    Prediabetes  Assessment & Plan  · Takes metformin as outpatient  · Hemoglobin A1c is 5 1  Discontinue metformin    Essential hypertension, benign  Assessment & Plan  · Continue home amlodipine and losartan with holds  · Goal to maintain systolic blood pressure less than 160      VTE Pharmacologic Prophylaxis:   Pharmacologic: Enoxaparin (Lovenox)  Mechanical VTE Prophylaxis in Place: Yes    Patient Centered Rounds: I have performed bedside rounds with nursing staff today  Discussions with Specialists or Other Care Team Provider:  Discussed with surgery    Education and Discussions with Family / Patient:  Discussed with patient at bedside about hospital course    Time Spent for Care: 30 minutes  More than 50% of total time spent on counseling and coordination of care as described above  Current Length of Stay: 1 day(s)    Current Patient Status: Inpatient   Certification Statement: The patient will continue to require additional inpatient hospital stay due to Abdominal pain    Discharge Plan:  Pending progress  If patient's pain is well controlled in the next 24-48 hours he will be discharged home  Will try to advance his diet to full liquid diet by dinner today    Code Status: Level 1 - Full Code      Subjective:   Patient states that he still has pain which is 4 to 5/10 in his abdomen but it is a little better than yesterday  He had a rough night and was in a lot of pain last night and needed Dilaudid    He denies any nausea vomiting but states that he had no appetite last 2 days    Objective:     Vitals:   Temp (24hrs), Av 6 °F (36 4 °C), Min:96 9 °F (36 1 °C), Max:98 1 °F (36 7 °C)    Temp:  [96 9 °F (36 1 °C)-98 1 °F (36 7 °C)] 96 9 °F (36 1 °C)  HR:  [68-78] 68  Resp:  [18] 18  BP: (112-135)/(67-70) 135/70  SpO2:  [86 %-94 %] 94 %  Body mass index is 26 67 kg/m²  Input and Output Summary (last 24 hours): Intake/Output Summary (Last 24 hours) at 10/26/2019 0832  Last data filed at 10/26/2019 0825  Gross per 24 hour   Intake 3357 5 ml   Output 2500 ml   Net 857 5 ml       Physical Exam:     Physical Exam   Constitutional: He is oriented to person, place, and time  He appears well-developed and well-nourished  He appears distressed  HENT:   Head: Normocephalic and atraumatic  Right Ear: External ear normal    Left Ear: External ear normal    Mouth/Throat: Oropharynx is clear and moist    Eyes: Pupils are equal, round, and reactive to light  Conjunctivae and EOM are normal    Neck: Normal range of motion  Neck supple  Cardiovascular: Normal rate, regular rhythm and normal heart sounds  Pulmonary/Chest: Effort normal    Mild decreased breath sounds bilateral bases   Abdominal: Soft  Bowel sounds are normal  He exhibits distension  He exhibits no mass  There is tenderness  There is no rebound and no guarding  Genitourinary:   Genitourinary Comments: deferred   Musculoskeletal: Normal range of motion  Neurological: He is alert and oriented to person, place, and time  He has normal reflexes  Cranial nerves 2-12 are normal   Normal neurological exam   Skin: Skin is warm and dry  No rash noted  Psychiatric: He has a normal mood and affect  Nursing note and vitals reviewed        Additional Data:     Labs:    Results from last 7 days   Lab Units 10/26/19  0453 10/24/19  0447   WBC Thousand/uL 5 50 6 60   HEMOGLOBIN g/dL 11 7* 11 3*   HEMATOCRIT % 34 1* 33 4*   PLATELETS Thousands/uL 270 298   NEUTROS PCT %  --  57   LYMPHS PCT %  --  26   MONOS PCT %  --  9   EOS PCT %  --  8*     Results from last 7 days   Lab Units 10/26/19  0643   SODIUM mmol/L 137   POTASSIUM mmol/L 3 5*   CHLORIDE mmol/L 106   CO2 mmol/L 27   BUN mg/dL 7   CREATININE mg/dL 0 83   ANION GAP mmol/L 4*   CALCIUM mg/dL 8 4   ALBUMIN g/dL 3 1   TOTAL BILIRUBIN mg/dL 0 40   ALK PHOS U/L 39*   ALT U/L 30   AST U/L 39   GLUCOSE RANDOM mg/dL 95             Results from last 7 days   Lab Units 10/24/19  0447   HEMOGLOBIN A1C % 5 1     Results from last 7 days   Lab Units 10/25/19  0914 10/23/19  1931   LACTIC ACID mmol/L 1 2 1 2           * I Have Reviewed All Lab Data Listed Above  * Additional Pertinent Lab Tests Reviewed:  [address] Admission Reviewed    Imaging:    Imaging Reports Reviewed Today Include:  CT chest abdomen pelvis  Imaging Personally Reviewed by Myself Includes:  CT chest abdomen pelvis    Recent Cultures (last 7 days):           Last 24 Hours Medication List:     Current Facility-Administered Medications:  acetaminophen 650 mg Oral Q6H PRN Charity Kent MD    amLODIPine 10 mg Oral Daily ALEX Davis    aspirin 325 mg Oral Daily Cheung NetteALEX cordova    dicyclomine 10 mg Oral 4x Daily PRN Dariela Gamboa MD    dicyclomine 10 mg Oral 4x Daily (AC & HS) Charity Kent MD    enoxaparin 30 mg Subcutaneous Q24H [facility] ALEX Davis    fenofibrate 168 mg Oral Daily Cheung Nette, SAPNANP    HYDROmorphone 0 8 mg Intravenous Q3H PRN Charity Kent MD    influenza vaccine 0 5 mL Intramuscular Once ALEX Davis    levofloxacin 750 mg Intravenous Q24H Charity Kent MD Last Rate: 750 mg (10/25/19 6809)   levothyroxine 12 5 mcg Oral Early Morning Cheung ALEX Perdue    losartan 50 mg Oral Daily ALEX Davis    metroNIDAZOLE 500 mg Intravenous Q8H Charity Kent MD Last Rate: 500 mg (10/26/19 0998)   nicotine 1 patch Transdermal Daily ALEX Davis    oxyCODONE-acetaminophen 1 tablet Oral Q4H PRN Evangelina Barrett MD    pantoprazole 40 mg Intravenous Q24H [facility] ALEX Davis    potassium chloride 20 mEq Oral Once Charity Kent MD    sodium chloride 50 mL/hr Intravenous Continuous Akua Phillips MD Last Rate: 50 mL/hr (10/26/19 1973)        Today, Patient Was Seen By: Akua Phillips MD    ** Please Note: Dictation voice to text software may have been used in the creation of this document   **

## 2019-10-27 PROCEDURE — C9113 INJ PANTOPRAZOLE SODIUM, VIA: HCPCS | Performed by: NURSE PRACTITIONER

## 2019-10-27 PROCEDURE — 83516 IMMUNOASSAY NONANTIBODY: CPT | Performed by: INTERNAL MEDICINE

## 2019-10-27 PROCEDURE — 93005 ELECTROCARDIOGRAM TRACING: CPT

## 2019-10-27 PROCEDURE — 99232 SBSQ HOSP IP/OBS MODERATE 35: CPT | Performed by: SPECIALIST

## 2019-10-27 PROCEDURE — 99232 SBSQ HOSP IP/OBS MODERATE 35: CPT | Performed by: FAMILY MEDICINE

## 2019-10-27 PROCEDURE — 86255 FLUORESCENT ANTIBODY SCREEN: CPT | Performed by: INTERNAL MEDICINE

## 2019-10-27 PROCEDURE — 82787 IGG 1 2 3 OR 4 EACH: CPT | Performed by: INTERNAL MEDICINE

## 2019-10-27 PROCEDURE — 82784 ASSAY IGA/IGD/IGG/IGM EACH: CPT | Performed by: INTERNAL MEDICINE

## 2019-10-27 PROCEDURE — 99232 SBSQ HOSP IP/OBS MODERATE 35: CPT | Performed by: INTERNAL MEDICINE

## 2019-10-27 RX ORDER — FUROSEMIDE 10 MG/ML
20 INJECTION INTRAMUSCULAR; INTRAVENOUS ONCE
Status: COMPLETED | OUTPATIENT
Start: 2019-10-27 | End: 2019-10-27

## 2019-10-27 RX ADMIN — METRONIDAZOLE 500 MG: 500 INJECTION, SOLUTION INTRAVENOUS at 13:57

## 2019-10-27 RX ADMIN — SODIUM CHLORIDE 50 ML/HR: 0.9 INJECTION, SOLUTION INTRAVENOUS at 05:47

## 2019-10-27 RX ADMIN — ENOXAPARIN SODIUM 30 MG: 30 INJECTION SUBCUTANEOUS at 08:49

## 2019-10-27 RX ADMIN — LEVOFLOXACIN 750 MG: 750 INJECTION, SOLUTION INTRAVENOUS at 15:03

## 2019-10-27 RX ADMIN — LEVOTHYROXINE SODIUM 12.5 MCG: 25 TABLET ORAL at 05:42

## 2019-10-27 RX ADMIN — METRONIDAZOLE 500 MG: 500 INJECTION, SOLUTION INTRAVENOUS at 21:41

## 2019-10-27 RX ADMIN — HYDROCODONE BITARTRATE AND ACETAMINOPHEN 1 TABLET: 5; 325 TABLET ORAL at 05:48

## 2019-10-27 RX ADMIN — HYDROMORPHONE HYDROCHLORIDE 0.8 MG: 1 INJECTION, SOLUTION INTRAMUSCULAR; INTRAVENOUS; SUBCUTANEOUS at 15:07

## 2019-10-27 RX ADMIN — ONDANSETRON 4 MG: 4 TABLET, ORALLY DISINTEGRATING ORAL at 12:07

## 2019-10-27 RX ADMIN — LOSARTAN POTASSIUM 50 MG: 50 TABLET, FILM COATED ORAL at 08:50

## 2019-10-27 RX ADMIN — FUROSEMIDE 20 MG: 10 INJECTION, SOLUTION INTRAMUSCULAR; INTRAVENOUS at 09:32

## 2019-10-27 RX ADMIN — ASPIRIN 325 MG ORAL TABLET 325 MG: 325 PILL ORAL at 08:50

## 2019-10-27 RX ADMIN — DICYCLOMINE HYDROCHLORIDE 10 MG: 10 CAPSULE ORAL at 05:44

## 2019-10-27 RX ADMIN — AMLODIPINE BESYLATE 10 MG: 10 TABLET ORAL at 08:50

## 2019-10-27 RX ADMIN — METRONIDAZOLE 500 MG: 500 INJECTION, SOLUTION INTRAVENOUS at 05:51

## 2019-10-27 RX ADMIN — DICYCLOMINE HYDROCHLORIDE 10 MG: 10 CAPSULE ORAL at 05:43

## 2019-10-27 RX ADMIN — HYDROMORPHONE HYDROCHLORIDE 0.8 MG: 1 INJECTION, SOLUTION INTRAMUSCULAR; INTRAVENOUS; SUBCUTANEOUS at 08:59

## 2019-10-27 RX ADMIN — PANTOPRAZOLE SODIUM 40 MG: 40 INJECTION, POWDER, FOR SOLUTION INTRAVENOUS at 08:50

## 2019-10-27 RX ADMIN — FENOFIBRATE 168 MG: 48 TABLET, FILM COATED ORAL at 08:49

## 2019-10-27 NOTE — PROGRESS NOTES
Progress Note - Michael Hernandez 1950, 81 y o  male MRN: [id number]    Unit/Bed#: 7T [facility] 702-02 Encounter: [id number]    Primary Care Provider: Sierra Phillip DO   Date and time admitted to hospital: 10/23/2019  6:47 PM        Acute respiratory failure with hypoxia [facility])  Assessment & Plan  Patient has acute hypoxic respiratory failure  Patient does not have any history of using any oxygen at home and here he is 84% on room air  He does have an extensive smoking history   CT PE study was negative  Patient was found to have small bilateral pleural effusions  Will decrease IV fluid rate and observe for now  Also give a dose of Lasix 20 mg IV once to help with his shortness of breath    * Drug-induced acute pancreatitis without infection or necrosis  Assessment & Plan  Patient has abdominal pain for the last 1 week  He is still having severe pain which is 6 /10 and spasmodic in nature  Patient states he had a rough night with the pain and also had 2 episodes of vomiting  Patient states he still feels like he has not improved over the last 3 days  Will discuss with GI and plan for EGD in the morning if they agree  · Lipase notably elevated at 791- [id number]  · CT abdomen pelvis repeated yesterday shows acute peripancreatic edema  No gallstones or pancreatic ductal stones visualized  · Lipid panel normal  · Patient states he does have problems with triglycerides and does take multiple medications to help control his cholesterol which may be playing a factor in causing his acute pancreatitis  · Patient denies heavy alcohol use  · Protonix 40 mg IV Q 24 hours    Jejunitis  Assessment & Plan  Patient CT abdomen pelvis shows acute jejunitis and also duodenitis  Initially try to treat without antibiotics however his pain was very severe  Will continue IV fluid hydration and full liquid diet for now and also placed on Levaquin and Flagyl yesterday evening      Tobacco abuse  Assessment & Plan  · Cessation counseling  · Nicotine patch ordered  · Nasal cannula oxygen p r n  To maintain oxygen saturations over 88%    Mixed hyperlipidemia  Assessment & Plan  · Continue home tricor as patient has history of hypertriglyceridemia  · Lipid panel shows normal triglyceride level  · Hold statin    Hypothyroidism (acquired)  Assessment & Plan  · TSH is 5 31  · Continue home Synthroid    Prediabetes  Assessment & Plan  · Takes metformin as outpatient  · Hemoglobin A1c is 5 1  Discontinue metformin    Essential hypertension, benign  Assessment & Plan  · Continue home amlodipine and losartan with holds  · Goal to maintain systolic blood pressure less than 160      VTE Pharmacologic Prophylaxis:   Pharmacologic: Enoxaparin (Lovenox)  Mechanical VTE Prophylaxis in Place: Yes    Patient Centered Rounds: I have performed bedside rounds with nursing staff today  Discussions with Specialists or Other Care Team Provider:  Discussed with surgery    Education and Discussions with Family / Patient:  Discussed with patient at bedside about hospital course    Time Spent for Care: 30 minutes  More than 50% of total time spent on counseling and coordination of care as described above  Current Length of Stay: 2 day(s)    Current Patient Status: Inpatient   Certification Statement: The patient will continue to require additional inpatient hospital stay due to Abdominal pain    Discharge Plan:  Pending progress  Still having significant abdominal pain and discomfort  Will have EGD done possibly tomorrow    Code Status: Level 1 - Full Code      Subjective:   Patient complains of 5/10 epigastric pain and he had 2 episodes of vomiting yesterday evening  He still does not feel well and feels like he is not making any progress    Denies any diarrhea    Objective:     Vitals:   Temp (24hrs), Av 3 °F (36 8 °C), Min:97 8 °F (36 6 °C), Max:98 6 °F (37 °C)    Temp:  [97 8 °F (36 6 °C)-98 6 °F (37 °C)] 98 6 °F (37 °C)  HR:  [67-78] 71  Resp: [16-20] 18  BP: (105-153)/(63-81) 153/81  SpO2:  [92 %-96 %] 96 %  Body mass index is 26 71 kg/m²  Input and Output Summary (last 24 hours): Intake/Output Summary (Last 24 hours) at 10/27/2019 1129  Last data filed at 10/27/2019 0817  Gross per 24 hour   Intake 1080 ml   Output 1400 ml   Net -320 ml       Physical Exam:     Physical Exam   Constitutional: He is oriented to person, place, and time  He appears well-developed and well-nourished  HENT:   Head: Normocephalic and atraumatic  Right Ear: External ear normal    Left Ear: External ear normal    Mouth/Throat: Oropharynx is clear and moist    Eyes: Pupils are equal, round, and reactive to light  Conjunctivae and EOM are normal    Neck: Normal range of motion  Neck supple  Cardiovascular: Normal rate, regular rhythm, normal heart sounds and intact distal pulses  Pulmonary/Chest: Effort normal and breath sounds normal    Abdominal: Soft  Bowel sounds are normal  He exhibits no mass  There is tenderness  There is no rebound and no guarding  Paraumbilical tenderness noted   Genitourinary:   Genitourinary Comments: deferred   Musculoskeletal: Normal range of motion  Neurological: He is alert and oriented to person, place, and time  He has normal reflexes  Cranial nerves 2-12 are normal   Normal neurological exam   Skin: Skin is warm and dry  No rash noted  Psychiatric: He has a normal mood and affect  Nursing note and vitals reviewed        Additional Data:     Labs:    Results from last 7 days   Lab Units 10/26/19  0453 10/24/19  0447   WBC Thousand/uL 5 50 6 60   HEMOGLOBIN g/dL 11 7* 11 3*   HEMATOCRIT % 34 1* 33 4*   PLATELETS Thousands/uL 270 298   NEUTROS PCT %  --  57   LYMPHS PCT %  --  26   MONOS PCT %  --  9   EOS PCT %  --  8*     Results from last 7 days   Lab Units 10/26/19  0643   SODIUM mmol/L 137   POTASSIUM mmol/L 3 5*   CHLORIDE mmol/L 106   CO2 mmol/L 27   BUN mg/dL 7   CREATININE mg/dL 0 83   ANION GAP mmol/L 4* CALCIUM mg/dL 8 4   ALBUMIN g/dL 3 1   TOTAL BILIRUBIN mg/dL 0 40   ALK PHOS U/L 39*   ALT U/L 30   AST U/L 39   GLUCOSE RANDOM mg/dL 95             Results from last 7 days   Lab Units 10/24/19  0447   HEMOGLOBIN A1C % 5 1     Results from last 7 days   Lab Units 10/25/19  0914 10/23/19  1931   LACTIC ACID mmol/L 1 2 1 2           * I Have Reviewed All Lab Data Listed Above  * Additional Pertinent Lab Tests Reviewed: Chon [address] Admission Reviewed    Imaging:    Imaging Reports Reviewed Today Include:  None  Imaging Personally Reviewed by Myself Includes:  None    Recent Cultures (last 7 days):           Last 24 Hours Medication List:     Current Facility-Administered Medications:  acetaminophen 650 mg Oral Q6H PRN Tam Kumar MD    amLODIPine 10 mg Oral Daily ALEX Roa    aspirin 325 mg Oral Daily ALEX Roa    enoxaparin 30 mg Subcutaneous Q24H 7955 ALEX Bui    fenofibrate 168 mg Oral Daily ALEX Roa    HYDROmorphone 0 8 mg Intravenous Q3H PRN Tam Kumar MD    influenza vaccine 0 5 mL Intramuscular Once ALEX Roa    levofloxacin 750 mg Intravenous Q24H Tam Kumar MD Last Rate: 750 mg (10/26/19 1544)   levothyroxine 12 5 mcg Oral Early Morning ALEX Roa    losartan 50 mg Oral Daily ALEX Roa    metroNIDAZOLE 500 mg Intravenous Q8H Tam Kumar MD Last Rate: 500 mg (10/27/19 0551)   nicotine 1 patch Transdermal Daily ALEX Roa    ondansetron 4 mg Oral Q6H PRN Sharyn FERRER PA-C    pantoprazole 40 mg Intravenous Q24H [address] ALEX Roa    sodium chloride 50 mL/hr Intravenous Continuous Tam Kumar MD Last Rate: 50 mL/hr (10/27/19 0547)        Today, Patient Was Seen By: Tam Kumar MD    ** Please Note: Dictation voice to text software may have been used in the creation of this document   **

## 2019-10-27 NOTE — ASSESSMENT & PLAN NOTE
Patient has acute hypoxic respiratory failure  Patient does not have any history of using any oxygen at home and here he is 84% on room air  He does have an extensive smoking history   CT PE study was negative    Patient was found to have small bilateral pleural effusions  Will decrease IV fluid rate and observe for now  Also give a dose of Lasix 20 mg IV once to help with his shortness of breath

## 2019-10-27 NOTE — ASSESSMENT & PLAN NOTE
Patient CT abdomen pelvis shows acute jejunitis and also duodenitis  Initially try to treat without antibiotics however his pain was very severe  Will continue IV fluid hydration and full liquid diet for now and also placed on Levaquin and Flagyl yesterday evening

## 2019-10-27 NOTE — PROGRESS NOTES
[facility]  Progress Note - Reyes Stephens 81 y o  male MRN: [id number]    Unit/Bed#: 7T [facility] 702-02 Encounter: [id number]    Subjective:   Endorses persistent lower quadrant abdominal discomfort  Ate clear liquids today and some pudding and tolerated without issue    Objective:     Vitals: Blood pressure 110/75, pulse 78, temperature 98 5 °F (36 9 °C), temperature source Temporal, resp  rate 16, height 5' 3" (1 6 m), weight 68 3 kg (150 lb 9 2 oz), SpO2 92 %  ,Body mass index is 26 67 kg/m²  Intake/Output Summary (Last 24 hours) at 10/26/2019 2317  Last data filed at 10/26/2019 2305  Gross per 24 hour   Intake 2780 ml   Output 975 ml   Net 1805 ml       Physical Exam:   Physical Exam:    General : elderly mamle normocephalic, atraumatic  Eyes : EOMI  Oropharynx : Mucous membranes are moist  Neck : No lymphadenopathy  CVS : S1 and S2 audible, regular  Respiratory : Clear to auscultation bilaterally     Abdomen : soft, epigastric tenderness to moderate palpation, no rebound/guarding, mild periumbilical tenderness, nondistended, normoactive bowel bowel sounds    Musculoskeletal : no edema bilaterally  Skin : No rashes  Neuro : Alert, Awake and Oriented x 3           Invasive Devices     Peripheral Intravenous Line            Peripheral IV 10/25/19 Left;Proximal;Ventral (anterior) Forearm 1 day                        Lab, Imaging and other studies:   Admission on 10/23/2019   Component Date Value    WBC 10/23/2019 8 00     RBC 10/23/2019 3 81*    Hemoglobin 10/23/2019 12 9*    Hematocrit 10/23/2019 37 4*    MCV 10/23/2019 98     MCH 10/23/2019 34 0     MCHC 10/23/2019 34 6     RDW 10/23/2019 14 2     MPV 10/23/2019 8 8*    Platelets 88/20/0314 334     Neutrophils Relative 10/23/2019 64     Lymphocytes Relative 10/23/2019 21*    Monocytes Relative 10/23/2019 9     Eosinophils Relative 10/23/2019 5     Basophils Relative 10/23/2019 1     Neutrophils Absolute 10/23/2019 5 20     Lymphocytes Absolute 10/23/2019 1 70     Monocytes Absolute 10/23/2019 0 70     Eosinophils Absolute 10/23/2019 0 40     Basophils Absolute 10/23/2019 0 10     Sodium 10/23/2019 140     Potassium 10/23/2019 3 6     Chloride 10/23/2019 103     CO2 10/23/2019 29     ANION GAP 10/23/2019 8     BUN 10/23/2019 15     Creatinine 10/23/2019 0 85     Glucose 10/23/2019 95     Calcium 10/23/2019 9 7     AST 10/23/2019 40     ALT 10/23/2019 25     Alkaline Phosphatase 10/23/2019 45     Total Protein 10/23/2019 7 0     Albumin 10/23/2019 4 0     Total Bilirubin 10/23/2019 0 50     eGFR 10/23/2019 90     Lipase 10/23/2019 791*    Color, UA 10/23/2019 Yellow     Clarity, UA 10/23/2019 Clear     Specific Gravity, UA 10/23/2019 1 010     pH, UA 10/23/2019 7 0     Leukocytes, UA 10/23/2019 Negative     Nitrite, UA 10/23/2019 Negative     Protein, UA 10/23/2019 Negative     Glucose, UA 10/23/2019 Negative     Ketones, UA 10/23/2019 Negative     Bilirubin, UA 10/23/2019 Negative     Blood, UA 10/23/2019 Negative     UROBILINOGEN UA 10/23/2019 Negative     LACTIC ACID 10/23/2019 1 2     TSH 3RD GENERATON 10/24/2019 5 310*    Sodium 10/24/2019 140     Potassium 10/24/2019 3 3*    Chloride 10/24/2019 106     CO2 10/24/2019 29     ANION GAP 10/24/2019 5     BUN 10/24/2019 12     Creatinine 10/24/2019 0 79     Glucose 10/24/2019 87     Calcium 10/24/2019 8 6     eGFR 10/24/2019 93     Magnesium 10/24/2019 1 4*    Phosphorus 10/24/2019 2 9     WBC 10/24/2019 6 60     RBC 10/24/2019 3 39*    Hemoglobin 10/24/2019 11 3*    Hematocrit 10/24/2019 33 4*    MCV 10/24/2019 98     MCH 10/24/2019 33 3     MCHC 10/24/2019 33 8     RDW 10/24/2019 14 3     MPV 10/24/2019 8 3*    Platelets 64/45/2583 298     Neutrophils Relative 10/24/2019 57     Lymphocytes Relative 10/24/2019 26     Monocytes Relative 10/24/2019 9     Eosinophils Relative 10/24/2019 8*    Basophils Relative 10/24/2019 0  Neutrophils Absolute 10/24/2019 3 70     Lymphocytes Absolute 10/24/2019 1 70     Monocytes Absolute 10/24/2019 0 60     Eosinophils Absolute 10/24/2019 0 50*    Basophils Absolute 10/24/2019 0 00     Hemoglobin A1C 10/24/2019 5 1     EAG 10/24/2019 100     Hepatitis C Ab 10/24/2019 Non-reactive     Ventricular Rate 10/23/2019 72     Atrial Rate 10/23/2019 72     RI Interval 10/23/2019 144     QRSD Interval 10/23/2019 84     QT Interval 10/23/2019 404     QTC Interval 10/23/2019 442     QRS Axis 10/23/2019 40     T Wave Axis 10/23/2019 38     Lipase 10/25/2019 659*    Magnesium 10/25/2019 1 6     Sodium 10/25/2019 137     Potassium 10/25/2019 3 6     Chloride 10/25/2019 106     CO2 10/25/2019 28     ANION GAP 10/25/2019 3*    BUN 10/25/2019 9     Creatinine 10/25/2019 0 74     Glucose 10/25/2019 93     Calcium 10/25/2019 8 3*    AST 10/25/2019 42     ALT 10/25/2019 31     Alkaline Phosphatase 10/25/2019 39*    Total Protein 10/25/2019 6 0     Albumin 10/25/2019 3 3     Total Bilirubin 10/25/2019 0 40     eGFR 10/25/2019 95     LD 10/25/2019 533     Amylase 10/25/2019 210*    LACTIC ACID 10/25/2019 1 2     WBC 10/26/2019 5 50     RBC 10/26/2019 3 43*    Hemoglobin 10/26/2019 11 7*    Hematocrit 10/26/2019 34 1*    MCV 10/26/2019 99     MCH 10/26/2019 33 9     MCHC 10/26/2019 34 2     RDW 10/26/2019 14 2     Platelets 31/40/0325 270     MPV 10/26/2019 8 7*    Sodium 10/26/2019 137     Potassium 10/26/2019 3 5*    Chloride 10/26/2019 106     CO2 10/26/2019 27     ANION GAP 10/26/2019 4*    BUN 10/26/2019 7     Creatinine 10/26/2019 0 83     Glucose 10/26/2019 95     Calcium 10/26/2019 8 4     AST 10/26/2019 39     ALT 10/26/2019 30     Alkaline Phosphatase 10/26/2019 39*    Total Protein 10/26/2019 5 9     Albumin 10/26/2019 3 1     Total Bilirubin 10/26/2019 0 40     eGFR 10/26/2019 91     Lipase 10/26/2019 397*    Cholesterol 10/26/2019 86     Triglycerides 10/26/2019 143     HDL, Direct 10/26/2019 19*    LDL Calculated 10/26/2019 38     Non-HDL-Chol (CHOL-HDL) 10/26/2019 67        I have personally reviewed pertinent reports  Assessment:  63-year-old male admitted with lower abdominal pain, initial CT showed inflammation of the jejunum and duodenum  Lipase on admission was 791  Repeat CT from 10/25 shows peripancreatic edema with mildly prominent low-density branching structures in the head concerning for ductal prominence  Liver function tests are within normal limits  His presentation may be consistent with groove pancreatitis given his elevated lipase, pancreatic and small bowel abnormalities on imaging  Would recommend supportive care with slow diet advancement, pain control and IV fluids given his ongoing abdominal discomfort  He may require further imaging with MRCP this admission versus referral for endoscopic ultrasound to exclude a pancreatic malignancy as an outpatient pending his clinical course  He is not having diarrhea which would be more consistent with infectious enteritis   WBC is normal      - Increase IVF to LR 150cc per hour until tolerating adequate PO  - Pain control per primary team  - for pancreatitis workup: send  igg4, celiac serologies, calcium and triglycerides (ordered)  - Continue full liquid diet as tolerated, if worsening abdominal pain, please make NPO  - If diarrhea please send stool studies for c diff and culture    Plan discussed with overnight covering provider, MEGHAN Fermin

## 2019-10-27 NOTE — ASSESSMENT & PLAN NOTE
Patient has abdominal pain for the last 1 week  He is still having severe pain which is 6 /10 and spasmodic in nature  Patient states he had a rough night with the pain and also had 2 episodes of vomiting  Patient states he still feels like he has not improved over the last 3 days  Will discuss with GI and plan for EGD in the morning if they agree  · Lipase notably elevated at [phone]  · CT abdomen pelvis repeated yesterday shows acute peripancreatic edema  No gallstones or pancreatic ductal stones visualized  · Lipid panel normal  · Patient states he does have problems with triglycerides and does take multiple medications to help control his cholesterol which may be playing a factor in causing his acute pancreatitis    · Patient denies heavy alcohol use  · Protonix 40 mg IV Q 24 hours

## 2019-10-27 NOTE — UTILIZATION REVIEW
Notification of Inpatient Admission/Inpatient Authorization Request  This is a Notification of Inpatient Admission/Request for Inpatient Authorization for our facility [facility]  Be advised that this patient was admitted to our facility under Inpatient Status  Please contact the Utilization Review Department where the patient is receiving care services for additional admission information  Facility: [facility]  Place of Service Code: 21   Place of Service Name: Inpatient Hospital  Presentation Date & Time: 10/23/2019  6:47 PM  Inpatient Admission Date & Time: 10/25/19 1655  Discharge Date & Time: No discharge date for patient encounter  Discharge Disposition (if discharged): Final discharge disposition not confirmed  Attending Physician and NPI#: Wen Ontiveros, [address] Kevin Rich [[id number]]  Admission Orders (From admission, onward)     Ordered        10/25/19 1655  Inpatient Admission  Once         10/23/19 2302  Place in Observation (expected length of stay for this patient is less than two midnights)  Once                 Network Utilization Review Department  Phone: [phone]; Fax [phone]  [email or web address]  ATTENTION: Please call with any questions or concerns to [phone] and carefully listen to the prompts so that you are directed to the right person  All voicemails are confidential   Lo Baltazar all requests for admission clinical reviews, approved or denied determinations and any other requests to dedicated fax number below belonging to the campus where the patient is receiving treatment

## 2019-10-27 NOTE — PROGRESS NOTES
Events of last night noted  Discussed with Dr Lashawn Rai DC Percocet  Continue Dilaudid  Complains of pain intermittently with nausea  No further vomiting  Normal bowel movement yesterday  Physical exam:  Middle-aged white male awake alert in bed in no distress  Abdomen flat firm tender in the midepigastric area  No masses noted  LFTs within normal limits  Lipase normalizing    CT scan:  Duodenitis/jejunitis among other things    EGD?

## 2019-10-27 NOTE — NURSING NOTE
Patient is A+Ox4, VSS   C/O 5/10 abdominal pain, Dilaudid given with relief  Patient is tolerating full liquid diet, denies N/V this morning  Patient states he thinks the tomato soup and pudding did not agree with his stomach last night and that is why he vomited x 1  Patient stated he felt immediate relief  Ambulates to  with steady gait  Patient is on O2-2L via n/c, dyspnic on exertion and notably SOB today  Lasix given as ordered and EKG complete  Patient is resting comfortably in bed, call bell in reach  Will continue to monitor closely

## 2019-10-27 NOTE — ASSESSMENT & PLAN NOTE
· Continue home tricor as patient has history of hypertriglyceridemia    · Lipid panel shows normal triglyceride level  · Hold statin

## 2019-10-27 NOTE — NURSING NOTE
Pt alert and oriented times three  Pt complaining of nausea from the percocet  Medicated with 0 8 of dilaudid which help the pain  Pt did vomit earlier  Call bell with in reach, continue to monitor

## 2019-10-28 ENCOUNTER — ANESTHESIA EVENT (INPATIENT)
Dept: GASTROENTEROLOGY | Facility: HOSPITAL | Age: 69
DRG: 438 | End: 2019-10-28
Payer: COMMERCIAL

## 2019-10-28 ENCOUNTER — ANESTHESIA (INPATIENT)
Dept: GASTROENTEROLOGY | Facility: HOSPITAL | Age: 69
DRG: 438 | End: 2019-10-28
Payer: COMMERCIAL

## 2019-10-28 ENCOUNTER — APPOINTMENT (INPATIENT)
Dept: GASTROENTEROLOGY | Facility: HOSPITAL | Age: 69
DRG: 438 | End: 2019-10-28
Payer: COMMERCIAL

## 2019-10-28 LAB
ALBUMIN SERPL BCP-MCNC: 3.2 G/DL (ref 3–5.2)
ALP SERPL-CCNC: 39 U/L (ref 43–122)
ALT SERPL W P-5'-P-CCNC: 31 U/L (ref 9–52)
ANION GAP SERPL CALCULATED.3IONS-SCNC: 8 MMOL/L (ref 5–14)
AST SERPL W P-5'-P-CCNC: 47 U/L (ref 17–59)
ATRIAL RATE: 63 BPM
BILIRUB SERPL-MCNC: 0.6 MG/DL
BUN SERPL-MCNC: 7 MG/DL (ref 5–25)
CALCIUM SERPL-MCNC: 9 MG/DL (ref 8.4–10.2)
CHLORIDE SERPL-SCNC: 104 MMOL/L (ref 97–108)
CO2 SERPL-SCNC: 27 MMOL/L (ref 22–30)
CREAT SERPL-MCNC: 0.88 MG/DL (ref 0.7–1.5)
ERYTHROCYTE [DISTWIDTH] IN BLOOD BY AUTOMATED COUNT: 14.4 %
GFR SERPL CREATININE-BSD FRML MDRD: 89 ML/MIN/1.73SQ M
GLUCOSE SERPL-MCNC: 84 MG/DL (ref 70–99)
HCT VFR BLD AUTO: 33.1 % (ref 41–53)
HGB BLD-MCNC: 11.1 G/DL (ref 13.5–17.5)
INR PPP: 1.23 (ref 0.91–1.09)
LIPASE SERPL-CCNC: 345 U/L (ref 23–300)
MCH RBC QN AUTO: 33.4 PG (ref 26–34)
MCHC RBC AUTO-ENTMCNC: 33.5 G/DL (ref 31–36)
MCV RBC AUTO: 100 FL (ref 80–100)
P AXIS: 54 DEGREES
PLATELET # BLD AUTO: 254 THOUSANDS/UL (ref 150–450)
PMV BLD AUTO: 9.2 FL (ref 8.9–12.7)
POTASSIUM SERPL-SCNC: 3.7 MMOL/L (ref 3.6–5)
PR INTERVAL: 184 MS
PROT SERPL-MCNC: 6 G/DL (ref 5.9–8.4)
PROTHROMBIN TIME: 13.6 SECONDS (ref 9.8–12)
QRS AXIS: 37 DEGREES
QRSD INTERVAL: 74 MS
QT INTERVAL: 424 MS
QTC INTERVAL: 433 MS
RBC # BLD AUTO: 3.31 MILLION/UL (ref 4.5–5.9)
SODIUM SERPL-SCNC: 139 MMOL/L (ref 137–147)
T WAVE AXIS: 42 DEGREES
VENTRICULAR RATE: 63 BPM
WBC # BLD AUTO: 5.2 THOUSAND/UL (ref 4.5–11)

## 2019-10-28 PROCEDURE — 88305 TISSUE EXAM BY PATHOLOGIST: CPT | Performed by: PATHOLOGY

## 2019-10-28 PROCEDURE — 80053 COMPREHEN METABOLIC PANEL: CPT | Performed by: FAMILY MEDICINE

## 2019-10-28 PROCEDURE — 0DJ08ZZ INSPECTION OF UPPER INTESTINAL TRACT, VIA NATURAL OR ARTIFICIAL OPENING ENDOSCOPIC: ICD-10-PCS | Performed by: INTERNAL MEDICINE

## 2019-10-28 PROCEDURE — C9113 INJ PANTOPRAZOLE SODIUM, VIA: HCPCS | Performed by: NURSE PRACTITIONER

## 2019-10-28 PROCEDURE — 85027 COMPLETE CBC AUTOMATED: CPT | Performed by: FAMILY MEDICINE

## 2019-10-28 PROCEDURE — 99232 SBSQ HOSP IP/OBS MODERATE 35: CPT | Performed by: FAMILY MEDICINE

## 2019-10-28 PROCEDURE — 83690 ASSAY OF LIPASE: CPT | Performed by: FAMILY MEDICINE

## 2019-10-28 PROCEDURE — 93010 ELECTROCARDIOGRAM REPORT: CPT | Performed by: INTERNAL MEDICINE

## 2019-10-28 PROCEDURE — 85610 PROTHROMBIN TIME: CPT | Performed by: PHYSICIAN ASSISTANT

## 2019-10-28 RX ORDER — LIDOCAINE HYDROCHLORIDE 10 MG/ML
INJECTION, SOLUTION INFILTRATION; PERINEURAL AS NEEDED
Status: DISCONTINUED | OUTPATIENT
Start: 2019-10-28 | End: 2019-10-28 | Stop reason: SURG

## 2019-10-28 RX ORDER — FUROSEMIDE 10 MG/ML
20 INJECTION INTRAMUSCULAR; INTRAVENOUS ONCE
Status: COMPLETED | OUTPATIENT
Start: 2019-10-28 | End: 2019-10-28

## 2019-10-28 RX ORDER — PROPOFOL 10 MG/ML
INJECTION, EMULSION INTRAVENOUS AS NEEDED
Status: DISCONTINUED | OUTPATIENT
Start: 2019-10-28 | End: 2019-10-28 | Stop reason: SURG

## 2019-10-28 RX ADMIN — AMLODIPINE BESYLATE 10 MG: 10 TABLET ORAL at 09:00

## 2019-10-28 RX ADMIN — PANTOPRAZOLE SODIUM 40 MG: 40 INJECTION, POWDER, FOR SOLUTION INTRAVENOUS at 13:40

## 2019-10-28 RX ADMIN — SODIUM CHLORIDE 50 ML/HR: 0.9 INJECTION, SOLUTION INTRAVENOUS at 05:28

## 2019-10-28 RX ADMIN — PROPOFOL 100 MG: 10 INJECTION, EMULSION INTRAVENOUS at 12:00

## 2019-10-28 RX ADMIN — FENOFIBRATE 168 MG: 48 TABLET, FILM COATED ORAL at 13:40

## 2019-10-28 RX ADMIN — LOSARTAN POTASSIUM 50 MG: 50 TABLET, FILM COATED ORAL at 09:01

## 2019-10-28 RX ADMIN — MORPHINE SULFATE 2 MG: 2 INJECTION, SOLUTION INTRAMUSCULAR; INTRAVENOUS at 12:31

## 2019-10-28 RX ADMIN — METRONIDAZOLE 500 MG: 500 INJECTION, SOLUTION INTRAVENOUS at 13:41

## 2019-10-28 RX ADMIN — FUROSEMIDE 20 MG: 10 INJECTION, SOLUTION INTRAVENOUS at 09:55

## 2019-10-28 RX ADMIN — ASPIRIN 325 MG ORAL TABLET 325 MG: 325 PILL ORAL at 13:40

## 2019-10-28 RX ADMIN — LEVOFLOXACIN 750 MG: 750 INJECTION, SOLUTION INTRAVENOUS at 14:38

## 2019-10-28 RX ADMIN — METRONIDAZOLE 500 MG: 500 INJECTION, SOLUTION INTRAVENOUS at 21:40

## 2019-10-28 RX ADMIN — SODIUM CHLORIDE: 0.9 INJECTION, SOLUTION INTRAVENOUS at 11:51

## 2019-10-28 RX ADMIN — METRONIDAZOLE 500 MG: 500 INJECTION, SOLUTION INTRAVENOUS at 05:47

## 2019-10-28 RX ADMIN — LIDOCAINE HYDROCHLORIDE 50 MG: 10 INJECTION, SOLUTION INFILTRATION; PERINEURAL at 12:00

## 2019-10-28 RX ADMIN — MORPHINE SULFATE 2 MG: 2 INJECTION, SOLUTION INTRAMUSCULAR; INTRAVENOUS at 21:42

## 2019-10-28 NOTE — ASSESSMENT & PLAN NOTE
Patient CT abdomen pelvis shows acute jejunitis and also duodenitis  Initially try to treat without antibiotics however his pain was very severe    Will continue IV fluid hydration and full liquid diet for now and cont on Levaquin and Flagyl

## 2019-10-28 NOTE — PROGRESS NOTES
Progress Note - Nery Smalls 1950, 81 y o  male MRN: [id number]    Unit/Bed#: 7T [facility] 702-02 Encounter: [id number]    Primary Care Provider: Randy Sawyer DO   Date and time admitted to hospital: 10/23/2019  6:47 PM      Patient's pulse ox is 95% on 1 for now and his breathing is better compared to last week when his pulse ox was down to 84%  Acute respiratory failure with hypoxia [facility])  Assessment & Plan  Patient has acute hypoxic respiratory failure  Patient does not have any history of using any oxygen at home and here he is 84% on room air  He does have an extensive smoking history   CT PE study was negative  Patient was found to have small bilateral pleural effusions  Will decrease IV fluid rate and observe for now  Also give a dose of Lasix 20 mg IV once to help with his shortness of breath again    * Drug-induced acute pancreatitis without infection or necrosis  Assessment & Plan  Patient has abdominal pain for the last 1 week  He had pretty severe pain for the last 3 days and today finally his pain is decreased  Will discuss with GI and plan for EGD today if they agree  · Lipase notably elevated at [phone]- [phone]  · CT abdomen pelvis repeated and shows acute peripancreatic edema  No gallstones or pancreatic ductal stones visualized  · Lipid panel normal  · Patient states he does have problems with triglycerides and does take multiple medications to help control his cholesterol which may be playing a factor in causing his acute pancreatitis  It may also be secondary to this adjacent duodenitis/jejunitis  · Patient denies heavy alcohol use  · Protonix 40 mg IV Q 24 hours  · Discontinue Dilaudid and use p r n  Morphine IV if required for pain control    Jejunitis  Assessment & Plan  Patient CT abdomen pelvis shows acute jejunitis and also duodenitis  Initially try to treat without antibiotics however his pain was very severe    Will continue IV fluid hydration and full liquid diet for now and cont on Levaquin and Flagyl     Tobacco abuse  Assessment & Plan  · Cessation counseling  · Nicotine patch ordered  · Nasal cannula oxygen p r n  To maintain oxygen saturations over 88%    Mixed hyperlipidemia  Assessment & Plan  · Continue home tricor as patient has history of hypertriglyceridemia  · Lipid panel shows normal triglyceride level  · Hold statin    Hypothyroidism (acquired)  Assessment & Plan  · TSH is 5 31  · Continue home Synthroid    Prediabetes  Assessment & Plan  · Takes metformin as outpatient  · Hemoglobin A1c is 5 1  Discontinue metformin    Essential hypertension, benign  Assessment & Plan  · Continue home amlodipine and losartan with holds  · Goal to maintain systolic blood pressure less than 160    Acute hypoxic respiratory failure:  Patient has no prior history of using oxygen  He has been here and while he has been here  He does have a longstanding history of smoking and does have some small bilateral pleural effusions  Will give him a dose of Lasix again and try to wean him off oxygen if required  Probably require an oxygen requirement study prior to discharge  VTE Pharmacologic Prophylaxis:   Pharmacologic: Enoxaparin (Lovenox)  Mechanical VTE Prophylaxis in Place: Yes    Patient Centered Rounds: I have performed bedside rounds with nursing staff today  Discussions with Specialists or Other Care Team Provider:  Discussed with GI    Education and Discussions with Family / Patient:  Discussed with patient at bedside about hospital course    Time Spent for Care: 30 minutes  More than 50% of total time spent on counseling and coordination of care as described above  Current Length of Stay: 3 day(s)    Current Patient Status: Inpatient   Certification Statement: The patient will continue to require additional inpatient hospital stay due to Abdominal pain and acute pancreatitis    Discharge Plan:  Pending progress    Will see what EGD shows and also advancement to a soft diet after EGD and observe his pain    Code Status: Level 1 - Full Code      Subjective:   Patient states that his abdominal pain has finally decreased since yesterday evening  There is no nausea vomiting reported  He is now NPO for an EGD this morning  He still states that he needs oxygen to help his breathing    Objective:     Vitals:   Temp (24hrs), Av 7 °F (36 5 °C), Min:97 5 °F (36 4 °C), Max:98 1 °F (36 7 °C)    Temp:  [97 5 °F (36 4 °C)-98 1 °F (36 7 °C)] 98 1 °F (36 7 °C)  HR:  [68-73] 68  Resp:  [18] 18  BP: (130-135)/(59-78) 130/59  SpO2:  [92 %-95 %] 95 %  Body mass index is 25 97 kg/m²  Input and Output Summary (last 24 hours): Intake/Output Summary (Last 24 hours) at 10/28/2019 0851  Last data filed at 10/28/2019 5454  Gross per 24 hour   Intake 2020 ml   Output 3000 ml   Net -980 ml       Physical Exam:     Physical Exam   Constitutional: He is oriented to person, place, and time  He appears well-developed and well-nourished  HENT:   Head: Normocephalic and atraumatic  Right Ear: External ear normal    Left Ear: External ear normal    Mouth/Throat: Oropharynx is clear and moist    Eyes: Pupils are equal, round, and reactive to light  Conjunctivae and EOM are normal    Neck: Normal range of motion  Neck supple  Cardiovascular: Normal rate, regular rhythm, normal heart sounds and intact distal pulses  Pulmonary/Chest:   Moderate air entry bilaterally with mild decreased breath sounds bilateral bases   Abdominal: Soft  Bowel sounds are normal  He exhibits no mass  There is no tenderness  There is no rebound and no guarding  Genitourinary:   Genitourinary Comments: deferred   Musculoskeletal: Normal range of motion  Neurological: He is alert and oriented to person, place, and time  He has normal reflexes  Cranial nerves 2-12 are normal   Normal neurological exam   Skin: Skin is warm and dry  No rash noted  Psychiatric: He has a normal mood and affect     Nursing note and vitals reviewed  Additional Data:     Labs:    Results from last 7 days   Lab Units 10/28/19  0435  10/24/19  0447   WBC Thousand/uL 5 20   < > 6 60   HEMOGLOBIN g/dL 11 1*   < > 11 3*   HEMATOCRIT % 33 1*   < > 33 4*   PLATELETS Thousands/uL 254   < > 298   NEUTROS PCT %  --   --  57   LYMPHS PCT %  --   --  26   MONOS PCT %  --   --  9   EOS PCT %  --   --  8*    < > = values in this interval not displayed  Results from last 7 days   Lab Units 10/28/19  0435   SODIUM mmol/L 139   POTASSIUM mmol/L 3 7   CHLORIDE mmol/L 104   CO2 mmol/L 27   BUN mg/dL 7   CREATININE mg/dL 0 88   ANION GAP mmol/L 8   CALCIUM mg/dL 9 0   ALBUMIN g/dL 3 2   TOTAL BILIRUBIN mg/dL 0 60   ALK PHOS U/L 39*   ALT U/L 31   AST U/L 47   GLUCOSE RANDOM mg/dL 84             Results from last 7 days   Lab Units 10/24/19  0447   HEMOGLOBIN A1C % 5 1     Results from last 7 days   Lab Units 10/25/19  0914 10/23/19  1931   LACTIC ACID mmol/L 1 2 1 2           * I Have Reviewed All Lab Data Listed Above  * Additional Pertinent Lab Tests Reviewed:  [facility] Admission Reviewed    Imaging:    Imaging Reports Reviewed Today Include:  None  Imaging Personally Reviewed by Myself Includes:  None    Recent Cultures (last 7 days):           Last 24 Hours Medication List:     Current Facility-Administered Medications:  acetaminophen 650 mg Oral Q6H PRN Milagros Chaudhry MD    amLODIPine 10 mg Oral Daily ALEX Augustine    aspirin 325 mg Oral Daily ALEX Augustine    enoxaparin 30 mg Subcutaneous Q24H 7955 ALEX Bui    fenofibrate 168 mg Oral Daily ALEX Augustine    influenza vaccine 0 5 mL Intramuscular Once ALEX Augustine    levofloxacin 750 mg Intravenous Q24H Milagros Chaudhry MD Last Rate: 750 mg (10/27/19 4966)   levothyroxine 12 5 mcg Oral Early Morning ALEX Augustine    losartan 50 mg Oral Daily ALEX Augustine    metroNIDAZOLE 500 mg Intravenous Q8H Milagros Chaudhry MD Last Rate: 500 mg (10/28/19 2722) morphine injection 2 mg Intravenous Q4H PRN Maggie Carbone MD    nicotine 1 patch Transdermal Daily ALEX Garner    ondansetron 4 mg Oral Q6H PRN Perico FERRER PA-C    pantoprazole 40 mg Intravenous Q24H [address] ALEX Garner    sodium chloride 50 mL/hr Intravenous Continuous Maggie Carbone MD Last Rate: 50 mL/hr (10/28/19 0528)        Today, Patient Was Seen By: Maggie Carbone MD    ** Please Note: Dictation voice to text software may have been used in the creation of this document   **

## 2019-10-28 NOTE — ASSESSMENT & PLAN NOTE
Patient has acute hypoxic respiratory failure  Patient does not have any history of using any oxygen at home and here he is 84% on room air  He does have an extensive smoking history   CT PE study was negative    Patient was found to have small bilateral pleural effusions  Will decrease IV fluid rate and observe for now  Also give a dose of Lasix 20 mg IV once to help with his shortness of breath again

## 2019-10-28 NOTE — PROGRESS NOTES
[facility]  Progress Note - Joyce Neri 81 y o  male MRN: [id number]    Unit/Bed#: 7T [facility] 702-02 Encounter: [id number]    Assessment/Plan:  Joyce Neri is a 81 y o  male p/w abdominal pain with CT and labs c/f pancreatitis with secondary inflammation of the bowel vs groove pancreatitis vs primary duodenitis/jejunitis  Overall he is improving slightly  No blood work performed today but prior blood work reviewed, as well as recent CT imaging      -- Continue IV fluids  -- Pain control and anti-emetics PRN  -- Please obtain IgG4, celiac serologies, triglycerides  -- Advance diet as tolerated  -- Consider EGD on this admission  -- May also need MRCP on this admission  [facility]  Available on [employer]  [email or web address]       Subjective:   Patient seen this AM  He continues to have some abdominal pain but overall feels better  Tolerating diet okay  Overall he feels slightly better  No fevers, chills  Objective:     Vitals: Blood pressure 135/78, pulse 73, temperature 97 6 °F (36 4 °C), temperature source Temporal, resp  rate 18, height 5' 3" (1 6 m), weight 68 4 kg (150 lb 12 7 oz), SpO2 92 %  ,Body mass index is 26 71 kg/m²        Intake/Output Summary (Last 24 hours) at 10/27/2019 2001  Last data filed at 10/27/2019 1701  Gross per 24 hour   Intake 1080 ml   Output 3200 ml   Net -2120 ml       Review of Systems: as per HPI  10 point ROS reviewed and negative, except as above    Physical Exam:     General: Well-appearing, NAD  Eyes: EOMI, no conjunctival icterus or pallor  ENT: No oral lesions appreciated on visualization  Lymph: No cervical, supraclavicular, submandibular, sara-umbilical lymphadenopathy noted  CV: RRR, no m/r/g appreciated  Resp: CTAB, normal chest rise  Abdominal: Soft, non-tender, non-distended, +bowel sounds, no masses appreciated  Extremities: Warm, no deformities, no edema  Skin: No rosemary  Neuro: CN II-XII grossly intact  Psych: Normal affect      Invasive Devices     Peripheral Intravenous Line            Peripheral IV 10/27/19 Right Hand less than 1 day                       Lab Results   Component Value Date    WBC 5 50 10/26/2019    HGB 11 7 (L) 10/26/2019    HCT 34 1 (L) 10/26/2019    MCV 99 10/26/2019     10/26/2019     Lab Results   Component Value Date    SODIUM 137 10/26/2019    K 3 5 (L) 10/26/2019     10/26/2019    CO2 27 10/26/2019    AGAP 4 (L) 10/26/2019    BUN 7 10/26/2019    CREATININE 0 83 10/26/2019    GLUC 95 10/26/2019    CALCIUM 8 4 10/26/2019    AST 39 10/26/2019    ALT 30 10/26/2019    ALKPHOS 39 (L) 10/26/2019    TP 5 9 10/26/2019    TBILI 0 40 10/26/2019    EGFR 91 10/26/2019         CBC: No results found for: WBC, HGB, HCT, MCV, PLT, ADJUSTEDWBC, MCH, MCHC, RDW, MPV, NRBC,   CMP: No results found for: NA, K, CL, CO2, ANIONGAP, BUN, CREATININE, GLUCOSE, CALCIUM, AST, ALT, ALKPHOS, PROT, BILITOT, EGFR,   Lipase: No results found for: LIPASE,  PT/INR: No results found for: PT, INR,   Troponin: No results found for: TROPONINI,   Magnesium: No components found for: MAG,   Phosphorous: No results found for: PHOS  Imaging Studies: I have personally reviewed pertinent reports  CT PE:  1  No evidence of pulmonary embolus  2  Mild COPD  3  4 mm nodular density right upper lobe favored to represent a small focus of mucus plugging  Tiny endobronchial lesion cannot be excluded  Follow-up CT chest in 6 months recommended to ensure stability/resolution  Suspected adherent mucus along the   left mainstem bronchus can also be reevaluated at that time  4  Persistent thickening and adjacent inflammatory changes about the duodenojejunal junction with mild thickening of proximal jejunal loops  Findings could represent represent infectious or inflammatory enteritis  Reactive changes from acute   pancreatitis cannot be excluded    No evidence of bowel obstruction  5  Fatty liver with prominent left lobe and lobular surface for which cirrhosis cannot be excluded  6  Mildly prominent low density branching structures in the pancreatic head favored to represent ductal prominence  This can be evaluated with outpatient MRCP  7   Small amount of abdominopelvic ascites, slightly increased  8  Trace bilateral pleural effusions with mild dependent atelectasis

## 2019-10-28 NOTE — PROGRESS NOTES
Patient Name: Inder Joyner  Patient MRN: [id number]  Date: 10/28/19  Service: [facility]    Subjective   Patient is feeling better overall with less abdominal pain  No events reported overnight per RN  NPO for possible endoscopy  Denies shortness of breath or dyspnea on exertion  No chest pain  Patient reports remote history of ulcer disease diagnosed by endoscopy 20+ years ago  He reports taking Aleve b i d  daily for at least 2 years for shoulder pain  Takes ranitidine daily  Vitals  Blood pressure 130/59, pulse 68, temperature 98 1 °F (36 7 °C), temperature source Temporal, resp  rate 18, height 5' 3" (1 6 m), weight 66 5 kg (146 lb 9 7 oz), SpO2 95 %  Physical Exam:     General Appearance:    Awake, alert, oriented x3, no distress, well developed, well    nourished   Head:    Normocephalic without obvious abnormality   Eyes:    PERRL, conjunctiva/corneas clear, EOM's intact        Neck:   Supple, no adenopathy   Throat:   Mucous membranes moist   Lungs:     Clear to auscultation bilaterally, no wheezing or rhonchi   Heart:    Regular rate and rhythm, S1 and S2 normal, no murmur   Abdomen:     Soft, diffusely tender, non-distended  bowel sounds active  No    masses, rebound or guarding  Extremities:   Extremities without edema   Psych  Derm:   Normal affect    No jaundice   Neurologic:   CNII-XII grossly intact   Speech intact         Laboratory Studies  Results from last 7 days   Lab Units 10/28/19  0435 10/26/19  0453 10/24/19  0447 10/23/19  1931   WBC Thousand/uL 5 20 5 50 6 60 8 00   HEMOGLOBIN g/dL 11 1* 11 7* 11 3* 12 9*   HEMATOCRIT % 33 1* 34 1* 33 4* 37 4*   PLATELETS Thousands/uL 254 270 298 334     Results from last 7 days   Lab Units 10/28/19  0435 10/26/19  0643 10/25/19  0446 10/24/19  0447 10/23/19  1931   SODIUM mmol/L 139 137 137 140 140   POTASSIUM mmol/L 3 7 3 5* 3 6 3 3* 3 6   CHLORIDE mmol/L 104 106 106 106 103   CO2 mmol/L 27 27 28 29 29   BUN mg/dL 7 7 9 12 15   CREATININE mg/dL 0 88 0 83 0 74 0 79 0 85   CALCIUM mg/dL 9 0 8 4 8 3* 8 6 9 7   ALBUMIN g/dL 3 2 3 1 3 3  --  4 0   TOTAL BILIRUBIN mg/dL 0 60 0 40 0 40  --  0 50   ALK PHOS U/L 39* 39* 39*  --  45   ALT U/L 31 30 31  --  25   AST U/L 47 39 42  --  40     Lab Results   Component Value Date    LIPASE 345 (H) 10/28/2019    LIPASE 397 (H) 10/26/2019    LIPASE 659 (H) 10/25/2019       Imaging and Other Studies      Inhouse Medications     Current Facility-Administered Medications:     acetaminophen (TYLENOL) tablet 650 mg, 650 mg, Oral, Q6H PRN    amLODIPine (NORVASC) tablet 10 mg, 10 mg, Oral, Daily, 10 mg at 10/27/19 0850    aspirin tablet 325 mg, 325 mg, Oral, Daily, 325 mg at 10/27/19 0850    enoxaparin (LOVENOX) subcutaneous injection 30 mg, 30 mg, Subcutaneous, Q24H DAMASO, 30 mg at 10/27/19 0849    fenofibrate (TRICOR) tablet 168 mg, 168 mg, Oral, Daily, 168 mg at 10/27/19 0849    HYDROmorphone (DILAUDID) injection 0 8 mg, 0 8 mg, Intravenous, Q3H PRN, 0 8 mg at 10/27/19 1507    influenza vaccine, high-dose (FLUZONE HIGH-DOSE) IM injection ZACK 0 5 mL, 0 5 mL, Intramuscular, Once, Stopped at 10/24/19 0559    levofloxacin (LEVAQUIN) IVPB (premix) 750 mg, 750 mg, Intravenous, Q24H, 750 mg at 10/27/19 1503    levothyroxine tablet 12 5 mcg, 12 5 mcg, Oral, Early Morning, 12 5 mcg at 10/27/19 0542    losartan (COZAAR) tablet 50 mg, 50 mg, Oral, Daily, 50 mg at 10/27/19 0850    metroNIDAZOLE (FLAGYL) IVPB (premix) 500 mg, 500 mg, Intravenous, Q8H, 500 mg at 10/28/19 0547    nicotine (NICODERM CQ) 21 mg/24 hr TD 24 hr patch 1 patch, 1 patch, Transdermal, Daily    ondansetron (ZOFRAN-ODT) dispersible tablet 4 mg, 4 mg, Oral, Q6H PRN, 4 mg at 10/27/19 1207    pantoprazole (PROTONIX) injection 40 mg, 40 mg, Intravenous, Q24H [address], 40 mg at 10/27/19 0850    sodium chloride 0 9 % infusion, 50 mL/hr, Intravenous, Continuous, 50 mL/hr at 10/28/19 0528      Assessment/Plan:    1   Acute diffuse abdominal pain with CT and labs consistent with pancreatitis and secondary inflammation of the small bowel, 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  Patient denied heavy alcohol use but does admit to social use  History of abnormal triglycerides, currently normal  Repeat CT 10/25 shows persistent peripancreatic edema and some branching low-density foci within the pancreatic head which may represent minimally dilated ductal structures  Lipase has improved  Would continue supportive care with pain control, antiemetics, IV fluids and bowel rest   Would consider further evaluation with MRCP vs referral for endoscopic ultrasound to exclude pancreatic malignancy  Await pending IgG levels, celiac serologies  Will plan for EGD today  Continue NPO  Discussed with Dr Petrona Forrest  2  Acute respiratory failure with hypoxia in the setting of significant smoking history  CT PE study negative but shows small bilateral pleural effusions  Currently 95% on O2 NC  Given Lasix per slim  3  Fatty liver with slightly low lobular surface contour suggesting cirrhosis noted  LFTs normal  Avoid hepatotoxic medications, alcohol  4  Remote history of peptic ulcer disease  Continue Protonix  Taking Aleve twice daily for shoulder pain - will evaluate with EGD for ulcer disease          Bryanna Ramirez PA-C

## 2019-10-28 NOTE — ANESTHESIA PREPROCEDURE EVALUATION
Review of Systems/Medical History          Cardiovascular  Hyperlipidemia, Hypertension ,    Pulmonary  Smoker ,   Comment: Pt was hypoxic this am with small B/l pleural effusion   Cause of hypoxia uncertain   Pt no h/o hypoxia ain past or COPD      GI/Hepatic     Hiatal hernia,   Comment: H/o pancreatitis jeunitis and duodenitis           Endo/Other  Diabetes type 2 Oral agent, History of thyroid disease , hypothyroidism,      GYN       Hematology   Musculoskeletal       Neurology   Psychology           Physical Exam    Airway    Mallampati score: III  TM Distance: >3 FB  Neck ROM: full     Dental       Cardiovascular  Cardiovascular exam normal    Pulmonary  Pulmonary exam normal Breath sounds clear to auscultation,     Other Findings        Anesthesia Plan  ASA Score- 3     Anesthesia Type- IV sedation with anesthesia with ASA Monitors  Additional Monitors:   Airway Plan:         Plan Factors-Patient not instructed to abstain from smoking on day of procedure  Patient did not smoke on day of surgery  Induction-     Postoperative Plan-     Informed Consent- Anesthetic plan and risks discussed with patient  I personally reviewed this patient with the CRNA  Discussed and agreed on the Anesthesia Plan with the CRNA             Lab Results   Component Value Date    HGBA1C 5 1 10/24/2019       Lab Results   Component Value Date    K 3 7 10/28/2019     10/28/2019    CO2 27 10/28/2019    BUN 7 10/28/2019    CREATININE 0 88 10/28/2019    CALCIUM 9 0 10/28/2019    CORRECTEDCA 10 0 12/10/2018    AST 47 10/28/2019    ALT 31 10/28/2019    ALKPHOS 39 (L) 10/28/2019    EGFR 89 10/28/2019       Lab Results   Component Value Date    WBC 5 20 10/28/2019    HGB 11 1 (L) 10/28/2019    HCT 33 1 (L) 10/28/2019     10/28/2019     10/28/2019     Normal sinus rhythm  Normal ECG  When compared with ECG of 27-OCT-2019 09:41, (unconfirmed)  No significant change was found  Confirmed by Yovany Perdue ([id number]) on 10/28/2019 7:55:06 AM    ECHO march 2018   CONCLUSIONS  - Mildly increased left ventricular wall thickness, normal left  ventricular systolic function and hyperdynamic wall motion  EF  greater than 65%  - Normal diastolic function   - No significant chamber hypertrophy or enlargement  - Mild aortic valve sclerosis  No aortic valve stenosis or  regurgitation   - No significant mitral valve stenosis or regurgitation   - Trace, physiologic tricuspid valve regurgitation   - No pulmonary hypertension   - No pericardial effusion   - Mildly dilated aortic root and visualize portion of proximal  ascending aorta   Possible mild thoracic aortic aneurysm      Compared to previous echocardiogram from 7/29/2015 there is  overall no significant change      Ather Uday  (Electronically Signed)  Final Date:      19 March 2018 14:40

## 2019-10-28 NOTE — ASSESSMENT & PLAN NOTE
Patient has abdominal pain for the last 1 week  He had pretty severe pain for the last 3 days and today finally his pain is decreased  Will discuss with GI and plan for EGD today if they agree  · Lipase notably elevated at [phone]  · CT abdomen pelvis repeated and shows acute peripancreatic edema  No gallstones or pancreatic ductal stones visualized  · Lipid panel normal  · Patient states he does have problems with triglycerides and does take multiple medications to help control his cholesterol which may be playing a factor in causing his acute pancreatitis  It may also be secondary to this adjacent duodenitis/jejunitis  · Patient denies heavy alcohol use  · Protonix 40 mg IV Q 24 hours  · Discontinue Dilaudid and use p r n   Morphine IV if required for pain control

## 2019-10-28 NOTE — ANESTHESIA POSTPROCEDURE EVALUATION
Post-Op Assessment Note    CV Status:  Stable  Pain Score: 0    Pain management: adequate     Mental Status:  Alert and awake   Hydration Status:  Euvolemic   PONV Controlled:  Controlled   Airway Patency:  Patent   Post Op Vitals Reviewed: Yes      Staff: CRNA           /63 (10/28/19 1211)    Temp      Pulse 84 (10/28/19 1211)   Resp 14 (10/28/19 1211)    SpO2 98 % (10/28/19 1211)

## 2019-10-28 NOTE — PLAN OF CARE
Problem: Potential for Falls  Goal: Patient will remain free of falls  Description  INTERVENTIONS:  - Assess patient frequently for physical needs  -  Identify cognitive and physical deficits and behaviors that affect risk of falls    -  Warners fall precautions as indicated by assessment   - Educate patient/family on patient safety including physical limitations  - Instruct patient to call for assistance with activity based on assessment  - Modify environment to reduce risk of injury  - Consider OT/PT consult to assist with strengthening/mobility  Outcome: Progressing     Problem: PAIN - ADULT  Goal: Verbalizes/displays adequate comfort level or baseline comfort level  Description  Interventions:  - Encourage patient to monitor pain and request assistance  - Assess pain using appropriate pain scale  - Administer analgesics based on type and severity of pain and evaluate response  - Implement non-pharmacological measures as appropriate and evaluate response  - Consider cultural and social influences on pain and pain management  - Notify physician/advanced practitioner if interventions unsuccessful or patient reports new pain  Outcome: Progressing     Problem: INFECTION - ADULT  Goal: Absence or prevention of progression during hospitalization  Description  INTERVENTIONS:  - Assess and monitor for signs and symptoms of infection  - Monitor lab/diagnostic results  - Monitor all insertion sites, i e  indwelling lines, tubes, and drains  - Monitor endotracheal if appropriate and nasal secretions for changes in amount and color  - Warners appropriate cooling/warming therapies per order  - Administer medications as ordered  - Instruct and encourage patient and family to use good hand hygiene technique  - Identify and instruct in appropriate isolation precautions for identified infection/condition  Outcome: Progressing     Problem: SAFETY ADULT  Goal: Maintain or return to baseline ADL function  Description  INTERVENTIONS:  -  Assess patient's ability to carry out ADLs; assess patient's baseline for ADL function and identify physical deficits which impact ability to perform ADLs (bathing, care of mouth/teeth, toileting, grooming, dressing, etc )  - Assess/evaluate cause of self-care deficits   - Assess range of motion  - Assess patient's mobility; develop plan if impaired  - Assess patient's need for assistive devices and provide as appropriate  - Encourage maximum independence but intervene and supervise when necessary  - Involve family in performance of ADLs  - Assess for home care needs following discharge   - Consider OT consult to assist with ADL evaluation and planning for discharge  - Provide patient education as appropriate  Outcome: Progressing  Goal: Maintain or return mobility status to optimal level  Description  INTERVENTIONS:  - Assess patient's baseline mobility status (ambulation, transfers, stairs, etc )    - Identify cognitive and physical deficits and behaviors that affect mobility  - Identify mobility aids required to assist with transfers and/or ambulation (gait belt, sit-to-stand, lift, walker, cane, etc )  - Bowler fall precautions as indicated by assessment  - Record patient progress and toleration of activity level on Mobility SBAR; progress patient to next Phase/Stage  - Instruct patient to call for assistance with activity based on assessment  - Consider rehabilitation consult to assist with strengthening/weightbearing, etc   Outcome: Progressing     Problem: DISCHARGE PLANNING  Goal: Discharge to home or other facility with appropriate resources  Description  INTERVENTIONS:  - Identify barriers to discharge w/patient and caregiver  - Arrange for needed discharge resources and transportation as appropriate  - Identify discharge learning needs (meds, wound care, etc )  - Arrange for interpretive services to assist at discharge as needed  - Refer to Case Management Department for coordinating discharge planning if the patient needs post-hospital services based on physician/advanced practitioner order or complex needs related to functional status, cognitive ability, or social support system  Outcome: Progressing     Problem: DISCHARGE PLANNING - CARE MANAGEMENT  Goal: Discharge to post-acute care or home with appropriate resources  Description  INTERVENTIONS:  - Conduct assessment to determine patient/family and health care team treatment goals, and need for post-acute services based on payer coverage, community resources, and patient preferences, and barriers to discharge  - Address psychosocial, clinical, and financial barriers to discharge as identified in assessment in conjunction with the patient/family and health care team  - Arrange appropriate level of post-acute services according to patients   needs and preference and payer coverage in collaboration with the physician and health care team  - Communicate with and update the patient/family, physician, and health care team regarding progress on the discharge plan  - Arrange appropriate transportation to post-acute venues  Outcome: Progressing     Problem: Knowledge Deficit  Goal: Patient/family/caregiver demonstrates understanding of disease process, treatment plan, medications, and discharge instructions  Description  Complete learning assessment and assess knowledge base    Interventions:  - Provide teaching at level of understanding  - Provide teaching via preferred learning methods  Outcome: Progressing

## 2019-10-28 NOTE — INTERVAL H&P NOTE
H&P reviewed  After examining the patient I find no changes in the patients condition since the H&P had been written      Vitals:    10/28/19 0900   BP: 123/63   Pulse:    Resp:    Temp:    SpO2:

## 2019-10-28 NOTE — NURSING NOTE
Pt was resting well, denies any pain  No complaints of any nausea  NPO after midnight for possible EGD  Call bell with in reach, continue to monitor

## 2019-10-29 ENCOUNTER — APPOINTMENT (INPATIENT)
Dept: MRI IMAGING | Facility: HOSPITAL | Age: 69
DRG: 438 | End: 2019-10-29
Payer: COMMERCIAL

## 2019-10-29 LAB
ALBUMIN SERPL BCP-MCNC: 3.6 G/DL (ref 3–5.2)
ALP SERPL-CCNC: 49 U/L (ref 43–122)
ALT SERPL W P-5'-P-CCNC: 43 U/L (ref 9–52)
ANION GAP SERPL CALCULATED.3IONS-SCNC: 8 MMOL/L (ref 5–14)
AST SERPL W P-5'-P-CCNC: 59 U/L (ref 17–59)
BILIRUB SERPL-MCNC: 0.4 MG/DL
BUN SERPL-MCNC: 13 MG/DL (ref 5–25)
CALCIUM SERPL-MCNC: 9.2 MG/DL (ref 8.4–10.2)
CHLORIDE SERPL-SCNC: 103 MMOL/L (ref 97–108)
CO2 SERPL-SCNC: 26 MMOL/L (ref 22–30)
CREAT SERPL-MCNC: 0.95 MG/DL (ref 0.7–1.5)
ENDOMYSIUM IGA SER QL: NEGATIVE
ERYTHROCYTE [DISTWIDTH] IN BLOOD BY AUTOMATED COUNT: 14.4 %
GFR SERPL CREATININE-BSD FRML MDRD: 82 ML/MIN/1.73SQ M
GLIADIN PEPTIDE IGA SER-ACNC: 4 UNITS (ref 0–19)
GLIADIN PEPTIDE IGG SER-ACNC: 2 UNITS (ref 0–19)
GLUCOSE SERPL-MCNC: 102 MG/DL (ref 70–99)
HCT VFR BLD AUTO: 36.4 % (ref 41–53)
HGB BLD-MCNC: 12.5 G/DL (ref 13.5–17.5)
IGA SERPL-MCNC: 336 MG/DL (ref 61–437)
IGG SERPL-MCNC: 755 MG/DL (ref 700–1600)
IGG1 SER-MCNC: 407 MG/DL (ref 248–810)
IGG2 SER-MCNC: 169 MG/DL (ref 130–555)
IGG3 SER-MCNC: 77 MG/DL (ref 15–102)
IGG4 SER-MCNC: 22 MG/DL (ref 2–96)
LIPASE SERPL-CCNC: 728 U/L (ref 23–300)
MCH RBC QN AUTO: 33.4 PG (ref 26–34)
MCHC RBC AUTO-ENTMCNC: 34.5 G/DL (ref 31–36)
MCV RBC AUTO: 97 FL (ref 80–100)
PLATELET # BLD AUTO: 281 THOUSANDS/UL (ref 150–450)
PMV BLD AUTO: 8.8 FL (ref 8.9–12.7)
POTASSIUM SERPL-SCNC: 3.5 MMOL/L (ref 3.6–5)
PROT SERPL-MCNC: 6.4 G/DL (ref 5.9–8.4)
RBC # BLD AUTO: 3.75 MILLION/UL (ref 4.5–5.9)
SODIUM SERPL-SCNC: 137 MMOL/L (ref 137–147)
TTG IGA SER-ACNC: <2 U/ML (ref 0–3)
TTG IGG SER-ACNC: <2 U/ML (ref 0–5)
WBC # BLD AUTO: 6.3 THOUSAND/UL (ref 4.5–11)

## 2019-10-29 PROCEDURE — 76376 3D RENDER W/INTRP POSTPROCES: CPT

## 2019-10-29 PROCEDURE — C9113 INJ PANTOPRAZOLE SODIUM, VIA: HCPCS | Performed by: NURSE PRACTITIONER

## 2019-10-29 PROCEDURE — 80053 COMPREHEN METABOLIC PANEL: CPT | Performed by: PHYSICIAN ASSISTANT

## 2019-10-29 PROCEDURE — 99232 SBSQ HOSP IP/OBS MODERATE 35: CPT | Performed by: INTERNAL MEDICINE

## 2019-10-29 PROCEDURE — 83690 ASSAY OF LIPASE: CPT | Performed by: PHYSICIAN ASSISTANT

## 2019-10-29 PROCEDURE — 74181 MRI ABDOMEN W/O CONTRAST: CPT

## 2019-10-29 PROCEDURE — 85027 COMPLETE CBC AUTOMATED: CPT | Performed by: PHYSICIAN ASSISTANT

## 2019-10-29 RX ORDER — DEXTROSE AND SODIUM CHLORIDE 5; .9 G/100ML; G/100ML
75 INJECTION, SOLUTION INTRAVENOUS CONTINUOUS
Status: DISCONTINUED | OUTPATIENT
Start: 2019-10-29 | End: 2019-11-01

## 2019-10-29 RX ADMIN — ONDANSETRON 4 MG: 4 TABLET, ORALLY DISINTEGRATING ORAL at 17:31

## 2019-10-29 RX ADMIN — AMLODIPINE BESYLATE 10 MG: 10 TABLET ORAL at 08:10

## 2019-10-29 RX ADMIN — LEVOFLOXACIN 750 MG: 750 INJECTION, SOLUTION INTRAVENOUS at 14:11

## 2019-10-29 RX ADMIN — MORPHINE SULFATE 2 MG: 2 INJECTION, SOLUTION INTRAMUSCULAR; INTRAVENOUS at 08:04

## 2019-10-29 RX ADMIN — METRONIDAZOLE 500 MG: 500 INJECTION, SOLUTION INTRAVENOUS at 13:08

## 2019-10-29 RX ADMIN — MORPHINE SULFATE 2 MG: 2 INJECTION, SOLUTION INTRAMUSCULAR; INTRAVENOUS at 21:48

## 2019-10-29 RX ADMIN — ONDANSETRON 4 MG: 4 TABLET, ORALLY DISINTEGRATING ORAL at 11:01

## 2019-10-29 RX ADMIN — LOSARTAN POTASSIUM 50 MG: 50 TABLET, FILM COATED ORAL at 08:11

## 2019-10-29 RX ADMIN — MORPHINE SULFATE 2 MG: 2 INJECTION, SOLUTION INTRAMUSCULAR; INTRAVENOUS at 13:05

## 2019-10-29 RX ADMIN — MORPHINE SULFATE 2 MG: 2 INJECTION, SOLUTION INTRAMUSCULAR; INTRAVENOUS at 17:31

## 2019-10-29 RX ADMIN — PANTOPRAZOLE SODIUM 40 MG: 40 INJECTION, POWDER, FOR SOLUTION INTRAVENOUS at 08:03

## 2019-10-29 RX ADMIN — ASPIRIN 325 MG ORAL TABLET 325 MG: 325 PILL ORAL at 08:03

## 2019-10-29 RX ADMIN — FENOFIBRATE 168 MG: 48 TABLET, FILM COATED ORAL at 08:03

## 2019-10-29 RX ADMIN — METRONIDAZOLE 500 MG: 500 INJECTION, SOLUTION INTRAVENOUS at 06:12

## 2019-10-29 RX ADMIN — LEVOTHYROXINE SODIUM 12.5 MCG: 25 TABLET ORAL at 06:14

## 2019-10-29 RX ADMIN — METRONIDAZOLE 500 MG: 500 INJECTION, SOLUTION INTRAVENOUS at 21:43

## 2019-10-29 RX ADMIN — DEXTROSE AND SODIUM CHLORIDE 75 ML/HR: 5; 900 INJECTION, SOLUTION INTRAVENOUS at 17:28

## 2019-10-29 RX ADMIN — ENOXAPARIN SODIUM 30 MG: 30 INJECTION SUBCUTANEOUS at 08:03

## 2019-10-29 NOTE — ASSESSMENT & PLAN NOTE
Patient CT abdomen pelvis shows acute jejunitis and also duodenitis  Initially try to treat without antibiotics however his pain was very severe    Will continue IV fluid hydration and full liquid diet for now and cont on Levaquin and Flagyl   It is now solid but low-fat low residual started by GI

## 2019-10-29 NOTE — PROGRESS NOTES
Progress Note - Leighann Pratt 1950, 81 y o  male MRN: [id number]    Unit/Bed#: 7T [facility] 702-02 Encounter: [id number]    Primary Care Provider: Yodit Parikh DO   Date and time admitted to hospital: 10/23/2019  6:47 PM        Jejunitis  Assessment & Plan  Patient CT abdomen pelvis shows acute jejunitis and also duodenitis  Initially try to treat without antibiotics however his pain was very severe  Will continue IV fluid hydration and full liquid diet for now and cont on Levaquin and Flagyl   It is now solid but low-fat low residual started by GI    Acute respiratory failure with hypoxia [facility])  Assessment & Plan  Patient has acute hypoxic respiratory failure  Patient does not have any history of using any oxygen at home and here he is 84% on room air  He does have an extensive smoking history   CT PE study was negative  Patient was found to have small bilateral pleural effusions  Will decrease IV fluid rate and observe for now  Also give a dose of Lasix 20 mg IV once to help with his shortness of breath again  Shortness of breath is improved now complaining abdominal pulse ox 92 without oxygen  Bilateral small  pleural effusion etiology most probably secondary to fluid will check 2D echo had last echo done in 2018, also some evidence of fluid in the belly changes in liver cirrhosis cannot be    Tobacco abuse  Assessment & Plan  · Cessation counseling  · Nicotine patch ordered  · Nasal cannula oxygen p r n  To maintain oxygen saturations over 88%    Mixed hyperlipidemia  Assessment & Plan  · Continue home tricor as patient has history of hypertriglyceridemia  · Lipid panel shows normal triglyceride level  · Hold statin    Hypothyroidism (acquired)  Assessment & Plan  · TSH is 5 31  · Continue home Synthroid    Prediabetes  Assessment & Plan  · Takes metformin as outpatient  · Hemoglobin A1c is 5 1    Discontinue metformin    Essential hypertension, benign  Assessment & Plan  · Continue home amlodipine and losartan with holds  · Goal to maintain systolic blood pressure less than 160    * Drug-induced acute pancreatitis without infection or necrosis  Assessment & Plan  Patient has abdominal pain for the last 1 week  He had pretty severe pain for the last 3 days and today finally his pain is decreased  Will discuss with GI and plan for EGD today if they agree  · Lipase notably elevated at [phone]- [phone]  · CT abdomen pelvis repeated and shows acute peripancreatic edema  No gallstones or pancreatic ductal stones visualized  · Lipid panel normal  · Patient states he does have problems with triglycerides and does take multiple medications to help control his cholesterol which may be playing a factor in causing his acute pancreatitis  It may also be secondary to this adjacent duodenitis/jejunitis  · Patient denies heavy alcohol use  · Protonix 40 mg IV Q 24 hours  · Discontinue Dilaudid and use p r n  Morphine IV if required for pain control  · Today abdominal pain is worse generalized did have some gravy discussed with GI low-fat diet MR CASTRO looking for pancreatic and duodenal duct  · Continue pain management PPI                        VTE Pharmacologic Prophylaxis:   Pharmacologic: Enoxaparin (Lovenox)  Mechanical VTE Prophylaxis in Place: Yes    Patient Centered Rounds: I have performed bedside rounds with nursing staff today  Discussions with Specialists or Other Care Team Provider:  GI    Education and Discussions with Family / Patient:  Discussed with patient in detail all medical in for may    Time Spent for Care: 30 minutes  More than 50% of total time spent on counseling and coordination of care as described above      Current Length of Stay: 4 day(s)    Current Patient Status: Inpatient   Certification Statement: The patient will continue to require additional inpatient hospital stay due to Abdominal pain    Discharge Plan:  When medically stable    Code Status: Level 1 - Full Code      Subjective: Complaining of generalized abdominal pain but no nausea vomiting no diarrhea pain is more worse than yesterday discussed with GI he will be going for MRCP    Objective:     Vitals:   Temp (24hrs), Av 5 °F (36 9 °C), Min:97 7 °F (36 5 °C), Max:98 8 °F (37 1 °C)    Temp:  [97 7 °F (36 5 °C)-98 8 °F (37 1 °C)] 97 7 °F (36 5 °C)  HR:  [69-88] 82  Resp:  [14-20] 18  BP: (107-156)/(57-83) 156/83  SpO2:  [92 %-98 %] 92 %  Body mass index is 25 85 kg/m²  Input and Output Summary (last 24 hours): Intake/Output Summary (Last 24 hours) at 10/29/2019 0901  Last data filed at 10/29/2019 0616  Gross per 24 hour   Intake 604 17 ml   Output    Net 604 17 ml       Physical Exam:     Physical Exam   Constitutional: He is oriented to person, place, and time  He appears well-developed and well-nourished  HENT:   Head: Normocephalic and atraumatic  Mouth/Throat: Oropharynx is clear and moist    Eyes: Pupils are equal, round, and reactive to light  Conjunctivae and EOM are normal    Neck: Normal range of motion  Neck supple  Cardiovascular: Normal rate, regular rhythm, normal heart sounds and intact distal pulses  Pulmonary/Chest: Effort normal    Decreased breath sound at bases   Abdominal: Soft  Bowel sounds are normal  He exhibits no mass  There is tenderness  There is no rebound and no guarding  Generalized discomfort tenderness in the mid abdomen  But no guarding   Genitourinary:   Genitourinary Comments: deferred   Musculoskeletal: Normal range of motion  Neurological: He is alert and oriented to person, place, and time  He has normal reflexes  Cranial nerves 2-12 are normal   Normal neurological exam   Skin: Skin is warm and dry  No rash noted  Psychiatric: He has a normal mood and affect  Nursing note and vitals reviewed          Additional Data:     Labs:    Results from last 7 days   Lab Units 10/28/19  0435  10/24/19  0447   WBC Thousand/uL 5 20   < > 6 60   HEMOGLOBIN g/dL 11 1*   < > 11 3* HEMATOCRIT % 33 1*   < > 33 4*   PLATELETS Thousands/uL 254   < > 298   NEUTROS PCT %  --   --  57   LYMPHS PCT %  --   --  26   MONOS PCT %  --   --  9   EOS PCT %  --   --  8*    < > = values in this interval not displayed  Results from last 7 days   Lab Units 10/28/19  0435   SODIUM mmol/L 139   POTASSIUM mmol/L 3 7   CHLORIDE mmol/L 104   CO2 mmol/L 27   BUN mg/dL 7   CREATININE mg/dL 0 88   ANION GAP mmol/L 8   CALCIUM mg/dL 9 0   ALBUMIN g/dL 3 2   TOTAL BILIRUBIN mg/dL 0 60   ALK PHOS U/L 39*   ALT U/L 31   AST U/L 47   GLUCOSE RANDOM mg/dL 84     Results from last 7 days   Lab Units 10/28/19  0946   INR  1 23*         Results from last 7 days   Lab Units 10/24/19  0447   HEMOGLOBIN A1C % 5 1     Results from last 7 days   Lab Units 10/25/19  0914 10/23/19  1931   LACTIC ACID mmol/L 1 2 1 2           * I Have Reviewed All Lab Data Listed Above  * Additional Pertinent Lab Tests Reviewed: All Labs Within Last 24 Hours Reviewed    Imaging:    Imaging Reports Reviewed Today Include:  MRI pending  Imaging Personally Reviewed by Myself Includes:   Old reviewed    Recent Cultures (last 7 days):           Last 24 Hours Medication List:     Current Facility-Administered Medications:  acetaminophen 650 mg Oral Q6H PRN Marylu Persaud MD    amLODIPine 10 mg Oral Daily Nicholas Comas, CRNP    aspirin 325 mg Oral Daily Nicholas Comas, CRNP    enoxaparin 30 mg Subcutaneous Q24H [address] Treviño, ALEX    fenofibrate 168 mg Oral Daily Nicholas Comas, CRBRONSON    influenza vaccine 0 5 mL Intramuscular Once Nicholas Comas, CRNP    levofloxacin 750 mg Intravenous Q24H Marylu Persaud MD Last Rate: 750 mg (10/28/19 1438)   levothyroxine 12 5 mcg Oral Early Morning Nicholas Comas, CRNP    losartan 50 mg Oral Daily Frederick Comas, CRNP    metroNIDAZOLE 500 mg Intravenous Q8H Marylu Persaud MD Last Rate: 500 mg (10/29/19 0612)   morphine injection 2 mg Intravenous Q4H PRN Marylu Persaud MD    nicotine 1 patch Transdermal Daily Nicholas Comas, CRNP ondansetron 4 mg Oral Q6H PRN Kellie Brice PA-C    pantoprazole 40 mg Intravenous Q24H 7940 ALEX Bui         Today, Patient Was Seen By: Lola River MD    ** Please Note: Dictation voice to text software may have been used in the creation of this document   **

## 2019-10-29 NOTE — ASSESSMENT & PLAN NOTE
Patient has acute hypoxic respiratory failure  Patient does not have any history of using any oxygen at home and here he is 84% on room air  He does have an extensive smoking history   CT PE study was negative    Patient was found to have small bilateral pleural effusions  Will decrease IV fluid rate and observe for now  Also give a dose of Lasix 20 mg IV once to help with his shortness of breath again  Shortness of breath is improved now complaining abdominal pulse ox 92 without oxygen  Bilateral small  pleural effusion etiology most probably secondary to fluid will check 2D echo had last echo done in 2018, also some evidence of fluid in the belly changes in liver cirrhosis cannot be

## 2019-10-29 NOTE — CASE MANAGEMENT
Patient continues to require inpatient hospitalization, currently being treated with IV ABX for jejunitis

## 2019-10-29 NOTE — ASSESSMENT & PLAN NOTE
Patient has abdominal pain for the last 1 week  He had pretty severe pain for the last 3 days and today finally his pain is decreased  Will discuss with GI and plan for EGD today if they agree  · Lipase notably elevated at [phone]- [phone]  · CT abdomen pelvis repeated and shows acute peripancreatic edema  No gallstones or pancreatic ductal stones visualized  · Lipid panel normal  · Patient states he does have problems with triglycerides and does take multiple medications to help control his cholesterol which may be playing a factor in causing his acute pancreatitis  It may also be secondary to this adjacent duodenitis/jejunitis  · Patient denies heavy alcohol use  · Protonix 40 mg IV Q 24 hours  · Discontinue Dilaudid and use p r n   Morphine IV if required for pain control  · Today abdominal pain is worse generalized did have some gravy discussed with GI low-fat diet MR CASTRO looking for pancreatic and duodenal duct  · Continue pain management PPI

## 2019-10-29 NOTE — PLAN OF CARE
Problem: Potential for Falls  Goal: Patient will remain free of falls  Description  INTERVENTIONS:  - Assess patient frequently for physical needs  -  Identify cognitive and physical deficits and behaviors that affect risk of falls    -  [place] fall precautions as indicated by assessment   - Educate patient/family on patient safety including physical limitations  - Instruct patient to call for assistance with activity based on assessment  - Modify environment to reduce risk of injury  - Consider OT/PT consult to assist with strengthening/mobility  Outcome: Progressing     Problem: PAIN - ADULT  Goal: Verbalizes/displays adequate comfort level or baseline comfort level  Description  Interventions:  - Encourage patient to monitor pain and request assistance  - Assess pain using appropriate pain scale  - Administer analgesics based on type and severity of pain and evaluate response  - Implement non-pharmacological measures as appropriate and evaluate response  - Consider cultural and social influences on pain and pain management  - Notify physician/advanced practitioner if interventions unsuccessful or patient reports new pain  Outcome: Progressing     Problem: INFECTION - ADULT  Goal: Absence or prevention of progression during hospitalization  Description  INTERVENTIONS:  - Assess and monitor for signs and symptoms of infection  - Monitor lab/diagnostic results  - Monitor all insertion sites, i e  indwelling lines, tubes, and drains  - Monitor endotracheal if appropriate and nasal secretions for changes in amount and color  - [place] appropriate cooling/warming therapies per order  - Administer medications as ordered  - Instruct and encourage patient and family to use good hand hygiene technique  - Identify and instruct in appropriate isolation precautions for identified infection/condition  Outcome: Progressing     Problem: SAFETY ADULT  Goal: Maintain or return to baseline ADL function  Description  INTERVENTIONS:  -  Assess patient's ability to carry out ADLs; assess patient's baseline for ADL function and identify physical deficits which impact ability to perform ADLs (bathing, care of mouth/teeth, toileting, grooming, dressing, etc )  - Assess/evaluate cause of self-care deficits   - Assess range of motion  - Assess patient's mobility; develop plan if impaired  - Assess patient's need for assistive devices and provide as appropriate  - Encourage maximum independence but intervene and supervise when necessary  - Involve family in performance of ADLs  - Assess for home care needs following discharge   - Consider OT consult to assist with ADL evaluation and planning for discharge  - Provide patient education as appropriate  Outcome: Progressing  Goal: Maintain or return mobility status to optimal level  Description  INTERVENTIONS:  - Assess patient's baseline mobility status (ambulation, transfers, stairs, etc )    - Identify cognitive and physical deficits and behaviors that affect mobility  - Identify mobility aids required to assist with transfers and/or ambulation (gait belt, sit-to-stand, lift, walker, cane, etc )  - [place] fall precautions as indicated by assessment  - Record patient progress and toleration of activity level on Mobility SBAR; progress patient to next Phase/Stage  - Instruct patient to call for assistance with activity based on assessment  - Consider rehabilitation consult to assist with strengthening/weightbearing, etc   Outcome: Progressing     Problem: DISCHARGE PLANNING  Goal: Discharge to home or other facility with appropriate resources  Description  INTERVENTIONS:  - Identify barriers to discharge w/patient and caregiver  - Arrange for needed discharge resources and transportation as appropriate  - Identify discharge learning needs (meds, wound care, etc )  - Arrange for interpretive services to assist at discharge as needed  - Refer to Case Management Department for coordinating discharge planning if the patient needs post-hospital services based on physician/advanced practitioner order or complex needs related to functional status, cognitive ability, or social support system  Outcome: Progressing     Problem: Knowledge Deficit  Goal: Patient/family/caregiver demonstrates understanding of disease process, treatment plan, medications, and discharge instructions  Description  Complete learning assessment and assess knowledge base    Interventions:  - Provide teaching at level of understanding  - Provide teaching via preferred learning methods  Outcome: Progressing     Problem: DISCHARGE PLANNING - CARE MANAGEMENT  Goal: Discharge to post-acute care or home with appropriate resources  Description  INTERVENTIONS:  - Conduct assessment to determine patient/family and health care team treatment goals, and need for post-acute services based on payer coverage, community resources, and patient preferences, and barriers to discharge  - Address psychosocial, clinical, and financial barriers to discharge as identified in assessment in conjunction with the patient/family and health care team  - Arrange appropriate level of post-acute services according to patients   needs and preference and payer coverage in collaboration with the physician and health care team  - Communicate with and update the patient/family, physician, and health care team regarding progress on the discharge plan  - Arrange appropriate transportation to post-acute venues  Outcome: Progressing

## 2019-10-29 NOTE — UTILIZATION REVIEW
Continued Stay Review    Date: 10/29                          Current Patient Class: INPATIENT  Current Level of Care: med/surg    HPI:69 y o  male initially admitted as observation on 10/23 and converted to inpatient admission on 10/25 due to worsening abd pain with abnormal CT results  Assessment/Plan:   10/29 Pt continues to have abd pain requiring IV morphine  Also continues with nausea  Reports episode of excruciating abd pain with nausea late last night after regular diet for dinner  On exam, diffusely tender abd with guarding  Acute pancreatitis with progressively rising lipase  Continue IV PPI  MRCP pending      Pertinent Labs/Diagnostic Results:   Results from last 7 days   Lab Units 10/29/19  0836 10/28/19  0435 10/26/19  0453 10/24/19  0447 10/23/19  1931   WBC Thousand/uL 6 30 5 20 5 50 6 60 8 00   HEMOGLOBIN g/dL 12 5* 11 1* 11 7* 11 3* 12 9*   HEMATOCRIT % 36 4* 33 1* 34 1* 33 4* 37 4*   PLATELETS Thousands/uL 281 254 270 298 334   NEUTROS ABS Thousands/µL  --   --   --  3 70 5 20         Results from last 7 days   Lab Units 10/29/19  0836 10/28/19  0435 10/26/19  0643 10/25/19  0446 10/24/19  0447   SODIUM mmol/L 137 139 137 137 140   POTASSIUM mmol/L 3 5* 3 7 3 5* 3 6 3 3*   CHLORIDE mmol/L 103 104 106 106 106   CO2 mmol/L 26 27 27 28 29   ANION GAP mmol/L 8 8 4* 3* 5   BUN mg/dL 13 7 7 9 12   CREATININE mg/dL 0 95 0 88 0 83 0 74 0 79   EGFR ml/min/1 73sq m 82 89 91 95 93   CALCIUM mg/dL 9 2 9 0 8 4 8 3* 8 6   MAGNESIUM mg/dL  --   --   --  1 6 1 4*   PHOSPHORUS mg/dL  --   --   --   --  2 9     Results from last 7 days   Lab Units 10/29/19  0836 10/28/19  0435 10/26/19  0643 10/25/19  0446 10/23/19  1931   AST U/L 59 47 39 42 40   ALT U/L 43 31 30 31 25   ALK PHOS U/L 49 39* 39* 39* 45   TOTAL PROTEIN g/dL 6 4 6 0 5 9 6 0 7 0   ALBUMIN g/dL 3 6 3 2 3 1 3 3 4 0   TOTAL BILIRUBIN mg/dL 0 40 0 60 0 40 0 40 0 50         Results from last 7 days   Lab Units 10/29/19  0836 10/28/19  0430 10/26/19  0643 10/25/19  0446 10/24/19  0447 10/23/19  1931   GLUCOSE RANDOM mg/dL 102* 84 95 93 87 95         Results from last 7 days   Lab Units 10/24/19  0447   HEMOGLOBIN A1C % 5 1   EAG mg/dl 100     Results from last 7 days   Lab Units 10/28/19  0946   PROTIME seconds 13 6*   INR  1 23*     Results from last 7 days   Lab Units 10/24/19  0447   TSH 3RD GENERATON uIU/mL 5 310*         Results from last 7 days   Lab Units 10/25/19  0914 10/23/19  1931   LACTIC ACID mmol/L 1 2 1 2     Results from last 7 days   Lab Units 10/24/19  0449   HEP C AB  Non-reactive     Results from last 7 days   Lab Units 10/29/19  0836 10/28/19  0435 10/26/19  0643 10/25/19  0446   LIPASE u/L 728* 345* 397* 659*   AMYLASE IU/L  --   --   --  210*     Results from last 7 days   Lab Units 10/23/19  1932 10/23/19  1803   CLARITY UA  Clear clear   COLOR UA  Yellow yellow   SPEC GRAV UA  1 010  --    PH UA  7 0  --    GLUCOSE UA mg/dl Negative negative   KETONES UA mg/dl Negative negative   BLOOD UA  Negative trace   PROTEIN UA mg/dl Negative trace   NITRITE UA  Negative negative   BILIRUBIN UA  Negative  --    BILIRUBIN UA POC   --  negative   UROBILINOGEN UA mg/dL Negative 0 2   LEUKOCYTES UA  Negative negative     10/28 EGD:  IMPRESSION:  Mild duodenitis only-distal duodenum and jejunum appeared normal  Nonobstructing Schatzki's ring  Small duodenal diverticulum versus prior peptic ulcer disease     MRCP pending    Vital Signs:   10/29/19 1501  98 2 °F (36 8 °C)  96  18  118/68  95 %  None (Room air)   10/29/19 0810  97 7 °F (36 5 °C)  82  18  156/83  92 %  None (Room air)       Medications:     Medications:  amLODIPine 10 mg Oral Daily   aspirin 325 mg Oral Daily   enoxaparin 30 mg Subcutaneous Q24H [facility]   fenofibrate 168 mg Oral Daily   influenza vaccine 0 5 mL Intramuscular Once   levofloxacin 750 mg Intravenous Q24H   levothyroxine 12 5 mcg Oral Early Morning   losartan 50 mg Oral Daily   metroNIDAZOLE 500 mg Intravenous Q8H nicotine 1 patch Transdermal Daily   pantoprazole 40 mg Intravenous Q24H [address]          acetaminophen 650 mg Oral Q6H PRN   morphine injection 2 mg Intravenous Q4H PRN x2   ondansetron 4 mg Oral Q6H PRN x1     sodium chloride 0 9 % infusion   Rate: 50 mL/hr Dose: 50 mL/hr  Freq: Continuous Route: IV  Indications of Use: IV Hydration  Last Dose: Stopped (10/28/19 1633)  Start: 10/24/19 0030 End: 10/28/19 1626    Discharge Plan: D    Network Utilization Review Department  [email or web address]  ATTENTION: Please call with any questions or concerns to [phone] and carefully listen to the prompts so that you are directed to the right person  All voicemails are confidential   Pina Burden all requests for admission clinical reviews, approved or denied determinations and any other requests to dedicated fax number below belonging to the campus where the patient is receiving treatment    FACILITY NAME UR FAX NUMBER   ADMISSION DENIALS (Administrative/Medical Necessity) [address] [facility] (Maternity/NICU/Pediatrics) [phone]   [facility] [address] [place] [address] [address] [facility] [phone]   Anders Buer[facility] [place] [address] [facility] [phone]   Sarthak Gunn [address] [phone]

## 2019-10-29 NOTE — PROGRESS NOTES
Patient Name: Yomaira Velasquez  Patient MRN: [id number]  Date: 10/29/19  Service: [facility]    Subjective   Patient reports brown/formed BM yesterday afternoon  No diarrhea reported  He ate mashed potatoes with gravy and beef around 6:00p yesterday evening  Around midnight he began having excruciating diffuse abdominal pain with nausea (no vomiting)  No fevers or chills  Still complains of abdominal pain this morning requiring narcotics  Feels less nauseated sitting up  AM labs ordered/pending  Vitals  Blood pressure 115/57, pulse 78, temperature 98 5 °F (36 9 °C), resp  rate 16, height 5' 3" (1 6 m), weight 66 2 kg (145 lb 15 1 oz), SpO2 93 %  Physical Exam:     General Appearance:    Awake, alert, oriented x3, mild distress 2/2 abdominal pain,   well developed, well  nourished   Head:    Normocephalic without obvious abnormality   Eyes:    PERRL, conjunctiva/corneas clear, EOM's intact        Neck:   Supple, no adenopathy   Throat:   Mucous membranes moist   Lungs:     Clear to auscultation bilaterally, no wheezing or rhonchi   Heart:    Regular rate and rhythm, S1 and S2 normal, no murmur   Abdomen:     Soft, diffusely tender  bowel sounds normoactive  No    masses, rebound  + guarding   Extremities:   Extremities without edema   Psych  Derm:   Normal affect    No jaundice   Neurologic:   CNII-XII grossly intact   Speech intact         Laboratory Studies  Results from last 7 days   Lab Units 10/28/19  0946 10/28/19  0435 10/26/19  0453 10/24/19  0447 10/23/19  1931   WBC Thousand/uL  --  5 20 5 50 6 60 8 00   HEMOGLOBIN g/dL  --  11 1* 11 7* 11 3* 12 9*   HEMATOCRIT %  --  33 1* 34 1* 33 4* 37 4*   PLATELETS Thousands/uL  --  254 270 298 334   INR  1 23*  --   --   --   --      Results from last 7 days   Lab Units 10/28/19  0435 10/26/19  0643 10/25/19  0446 10/24/19  0447 10/23/19  1931   SODIUM mmol/L 139 137 137 140 140   POTASSIUM mmol/L 3 7 3 5* 3 6 3 3* 3 6   CHLORIDE mmol/L 104 106 106 106 103   CO2 mmol/L 27 27 28 29 29   BUN mg/dL 7 7 9 12 15   CREATININE mg/dL 0 88 0 83 0 74 0 79 0 85   CALCIUM mg/dL 9 0 8 4 8 3* 8 6 9 7   ALBUMIN g/dL 3 2 3 1 3 3  --  4 0   TOTAL BILIRUBIN mg/dL 0 60 0 40 0 40  --  0 50   ALK PHOS U/L 39* 39* 39*  --  45   ALT U/L 31 30 31  --  25   AST U/L 47 39 42  --  40     Lab Results   Component Value Date    LIPASE 345 (H) 10/28/2019    LIPASE 397 (H) 10/26/2019    LIPASE 659 (H) 10/25/2019       Imaging and Other Studies      Inhouse Medications     Current Facility-Administered Medications:     acetaminophen (TYLENOL) tablet 650 mg, 650 mg, Oral, Q6H PRN    amLODIPine (NORVASC) tablet 10 mg, 10 mg, Oral, Daily, 10 mg at 10/28/19 0900    aspirin tablet 325 mg, 325 mg, Oral, Daily, 325 mg at 10/28/19 1340    enoxaparin (LOVENOX) subcutaneous injection 30 mg, 30 mg, Subcutaneous, Q24H [address], Stopped at 10/28/19 0900    fenofibrate (TRICOR) tablet 168 mg, 168 mg, Oral, Daily, 168 mg at 10/28/19 1340    influenza vaccine, high-dose (FLUZONE HIGH-DOSE) IM injection ZACK 0 5 mL, 0 5 mL, Intramuscular, Once, Stopped at 10/24/19 0559    levofloxacin (LEVAQUIN) IVPB (premix) 750 mg, 750 mg, Intravenous, Q24H, 750 mg at 10/28/19 1438    levothyroxine tablet 12 5 mcg, 12 5 mcg, Oral, Early Morning, 12 5 mcg at 10/29/19 0614    losartan (COZAAR) tablet 50 mg, 50 mg, Oral, Daily, 50 mg at 10/28/19 0901    metroNIDAZOLE (FLAGYL) IVPB (premix) 500 mg, 500 mg, Intravenous, Q8H, 500 mg at 10/29/19 0612    morphine injection 2 mg, 2 mg, Intravenous, Q4H PRN, 2 mg at 10/28/19 2142    nicotine (NICODERM CQ) 21 mg/24 hr TD 24 hr patch 1 patch, 1 patch, Transdermal, Daily    ondansetron (ZOFRAN-ODT) dispersible tablet 4 mg, 4 mg, Oral, Q6H PRN, 4 mg at 10/27/19 1207    pantoprazole (PROTONIX) injection 40 mg, 40 mg, Intravenous, Q24H [address], 40 mg at 10/28/19 1340      Assessment/Plan:    1   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 with CT and labs consistent with pancreatitis and secondary inflammation of the small bowel  Patient denied heavy alcohol (+social/professional use)  History of abnormal triglycerides, currently normal  Repeat CT 10/25 shows persistent peripancreatic edema and some branching low-density foci within the pancreatic head which may represent minimally dilated ductal structures  Lipase has improved  EGD yesterday - mild duodenitis, duodenal tic vs prior ulcer, non-obstructing Schatzki's ring (not dilated w/o dysphagia complaints)  Pathology pending  Would continue supportive care with pain control, antiemetics, IV fluids and bowel rest  Discussed with Dr Johann Rosenthal - MRCP today, AM labs pending  Consider downgrading diet, if no improvement  Also, consider referral for endoscopic ultrasound to exclude pancreatic malignancy  IgG levels, celiac serologies pending  2  Jejunitis/duodenitis on imaging  Duodenitis noted on endoscopy, no evidence of jejunitis on endoscopy to almost ligament of Treitz  No diarrhea/fever/leukocytosis  Consider discontinuing antibiotics  Diet as tolerated (low fat/ fiber/residue)  3  Acute respiratory failure with hypoxia in the setting of significant smoking history, improved  CT PE study negative but shows small bilateral pleural effusions  Currently 93% on RA  Mgmt per slim  4  Fatty liver with slightly low lobular surface contour suggesting cirrhosis noted  LFTs, platelets, albumin normal  Mild coagulopathy noted  Possible early cirrhosis - consider serologic workup (BARAK, AMA, ASMA, etc)  HCV AB negative  Avoid hepatotoxic medications, alcohol  5  Remote history of peptic ulcer disease  Changed home Pepcid to IV Protonix, PO once able to tolerate food  Taking Aleve twice daily for shoulder pain - EGD results as noted above            Filiberto Veloz PA-C

## 2019-10-30 LAB
ALBUMIN SERPL BCP-MCNC: 3.1 G/DL (ref 3–5.2)
ALP SERPL-CCNC: 38 U/L (ref 43–122)
ALT SERPL W P-5'-P-CCNC: 38 U/L (ref 9–52)
ANION GAP SERPL CALCULATED.3IONS-SCNC: 5 MMOL/L (ref 5–14)
AST SERPL W P-5'-P-CCNC: 52 U/L (ref 17–59)
BILIRUB SERPL-MCNC: 0.3 MG/DL
BUN SERPL-MCNC: 11 MG/DL (ref 5–25)
CALCIUM SERPL-MCNC: 8.7 MG/DL (ref 8.4–10.2)
CHLORIDE SERPL-SCNC: 106 MMOL/L (ref 97–108)
CO2 SERPL-SCNC: 27 MMOL/L (ref 22–30)
CREAT SERPL-MCNC: 0.81 MG/DL (ref 0.7–1.5)
ERYTHROCYTE [DISTWIDTH] IN BLOOD BY AUTOMATED COUNT: 14.5 %
GFR SERPL CREATININE-BSD FRML MDRD: 92 ML/MIN/1.73SQ M
GLUCOSE SERPL-MCNC: 116 MG/DL (ref 70–99)
HCT VFR BLD AUTO: 34.7 % (ref 41–53)
HGB BLD-MCNC: 11.9 G/DL (ref 13.5–17.5)
LIPASE SERPL-CCNC: 772 U/L (ref 23–300)
MCH RBC QN AUTO: 33.8 PG (ref 26–34)
MCHC RBC AUTO-ENTMCNC: 34.4 G/DL (ref 31–36)
MCV RBC AUTO: 99 FL (ref 80–100)
PLATELET # BLD AUTO: 248 THOUSANDS/UL (ref 150–450)
PMV BLD AUTO: 9.1 FL (ref 8.9–12.7)
POTASSIUM SERPL-SCNC: 3.3 MMOL/L (ref 3.6–5)
PROT SERPL-MCNC: 5.8 G/DL (ref 5.9–8.4)
RBC # BLD AUTO: 3.52 MILLION/UL (ref 4.5–5.9)
SODIUM SERPL-SCNC: 138 MMOL/L (ref 137–147)
WBC # BLD AUTO: 5.8 THOUSAND/UL (ref 4.5–11)

## 2019-10-30 PROCEDURE — 99232 SBSQ HOSP IP/OBS MODERATE 35: CPT | Performed by: INTERNAL MEDICINE

## 2019-10-30 PROCEDURE — 85027 COMPLETE CBC AUTOMATED: CPT | Performed by: PHYSICIAN ASSISTANT

## 2019-10-30 PROCEDURE — 99231 SBSQ HOSP IP/OBS SF/LOW 25: CPT | Performed by: SPECIALIST

## 2019-10-30 PROCEDURE — C9113 INJ PANTOPRAZOLE SODIUM, VIA: HCPCS | Performed by: NURSE PRACTITIONER

## 2019-10-30 PROCEDURE — 80053 COMPREHEN METABOLIC PANEL: CPT | Performed by: PHYSICIAN ASSISTANT

## 2019-10-30 PROCEDURE — 83690 ASSAY OF LIPASE: CPT | Performed by: PHYSICIAN ASSISTANT

## 2019-10-30 RX ORDER — POTASSIUM CHLORIDE 20 MEQ/1
40 TABLET, EXTENDED RELEASE ORAL ONCE
Status: COMPLETED | OUTPATIENT
Start: 2019-10-30 | End: 2019-10-30

## 2019-10-30 RX ADMIN — ENOXAPARIN SODIUM 30 MG: 30 INJECTION SUBCUTANEOUS at 08:43

## 2019-10-30 RX ADMIN — METRONIDAZOLE 500 MG: 500 INJECTION, SOLUTION INTRAVENOUS at 05:34

## 2019-10-30 RX ADMIN — LOSARTAN POTASSIUM 50 MG: 50 TABLET, FILM COATED ORAL at 08:44

## 2019-10-30 RX ADMIN — METRONIDAZOLE 500 MG: 500 INJECTION, SOLUTION INTRAVENOUS at 14:05

## 2019-10-30 RX ADMIN — ASPIRIN 325 MG ORAL TABLET 325 MG: 325 PILL ORAL at 08:44

## 2019-10-30 RX ADMIN — MORPHINE SULFATE 2 MG: 2 INJECTION, SOLUTION INTRAMUSCULAR; INTRAVENOUS at 11:29

## 2019-10-30 RX ADMIN — LEVOTHYROXINE SODIUM 12.5 MCG: 25 TABLET ORAL at 05:35

## 2019-10-30 RX ADMIN — MORPHINE SULFATE 2 MG: 2 INJECTION, SOLUTION INTRAMUSCULAR; INTRAVENOUS at 05:42

## 2019-10-30 RX ADMIN — POTASSIUM CHLORIDE 40 MEQ: 20 TABLET, EXTENDED RELEASE ORAL at 11:26

## 2019-10-30 RX ADMIN — AMLODIPINE BESYLATE 10 MG: 10 TABLET ORAL at 08:44

## 2019-10-30 RX ADMIN — PANTOPRAZOLE SODIUM 40 MG: 40 INJECTION, POWDER, FOR SOLUTION INTRAVENOUS at 08:44

## 2019-10-30 RX ADMIN — DEXTROSE AND SODIUM CHLORIDE 75 ML/HR: 5; 900 INJECTION, SOLUTION INTRAVENOUS at 19:47

## 2019-10-30 RX ADMIN — MORPHINE SULFATE 2 MG: 2 INJECTION, SOLUTION INTRAMUSCULAR; INTRAVENOUS at 19:47

## 2019-10-30 RX ADMIN — LEVOFLOXACIN 750 MG: 750 INJECTION, SOLUTION INTRAVENOUS at 15:20

## 2019-10-30 RX ADMIN — FENOFIBRATE 168 MG: 48 TABLET, FILM COATED ORAL at 08:44

## 2019-10-30 RX ADMIN — DEXTROSE AND SODIUM CHLORIDE 75 ML/HR: 5; 900 INJECTION, SOLUTION INTRAVENOUS at 05:39

## 2019-10-30 RX ADMIN — METRONIDAZOLE 500 MG: 500 INJECTION, SOLUTION INTRAVENOUS at 21:31

## 2019-10-30 NOTE — PROGRESS NOTES
Pt seen    Discussed MRI results with him      Currently NPO  Discussed with Dr Lamonte Sharma per Medicine

## 2019-10-30 NOTE — NURSING NOTE
On initial rounds patient in no apparent distress  Skin warm and dry to touch  Pleasant and cooperative  Remain NPO and understanding verbalized  No change in abdominal pain as per patient  D 5 NS @ 75 CC  Cc per hour infusing well  Ambulates well by self  Lungs clear but decreased at bases  Voids adequate amount of urine  All safety maintained  Will continue to monitor

## 2019-10-30 NOTE — ASSESSMENT & PLAN NOTE
Patient has acute hypoxic respiratory failure  Patient does not have any history of using any oxygen at home and here he is 84% on room air  He does have an extensive smoking history   CT PE study was negative    Patient was found to have small bilateral pleural effusions  Will decrease IV fluid rate and observe for now  Also give a dose of Lasix 20 mg IV once to help with his shortness of breath again  Shortness of breath is improved now complaining abdominal pulse ox 92 without oxygen  Bilateral small  pleural effusion etiology most probably secondary to fluid will check 2D echo had last echo done in 2018, pulmonary status stable pulse ox 93 on room air

## 2019-10-30 NOTE — NURSING NOTE
Patient remain in stable condition  No change in cfondition from this am  All safety maintained  Will continue to monitor

## 2019-10-30 NOTE — ASSESSMENT & PLAN NOTE
Patient has abdominal pain for the last 1 week  He had pretty severe pain for the last 3 days and today finally his pain is decreased  Will discuss with GI and plan for EGD today if they agree  · Lipase notably elevated at [phone]  · CT abdomen pelvis repeated and shows acute peripancreatic edema  No gallstones or pancreatic ductal stones visualized  · Lipid panel normal  · Patient states he does have problems with triglycerides and does take multiple medications to help control his cholesterol which may be playing a factor in causing his acute pancreatitis  It may also be secondary to this adjacent duodenitis/jejunitis  · Patient denies heavy alcohol use  · Protonix 40 mg IV Q 24 hours  · Discontinue Dilaudid and use p r n   Morphine IV if required for pain control  · Today abdominal pain is worse generalized did have some gravy discussed with GI low-fat diet MR CP looking for pancreatic and duodenal duct  · Continue pain management PPI  · MRI confirmed acute pancreatitis no other pathology lipase 722 pain persist  · Start clear liquid

## 2019-10-30 NOTE — PROGRESS NOTES
Progress Note - Junior Keith 1950, 81 y o  male MRN: [id number]    Unit/Bed#: 7T [facility] 702-02 Encounter: [id number]    Primary Care Provider: Jude Tejada DO   Date and time admitted to hospital: 10/23/2019  6:47 PM        Jejunitis  Assessment & Plan  Patient CT abdomen pelvis shows acute jejunitis and also duodenitis  Initially try to treat without antibiotics however his pain was very severe  Will continue IV fluid hydration and full liquid diet for now and cont on Levaquin and Flagyl   It is now solid but low-fat low residual started by GI    Acute respiratory failure with hypoxia [facility])  Assessment & Plan  Patient has acute hypoxic respiratory failure  Patient does not have any history of using any oxygen at home and here he is 84% on room air  He does have an extensive smoking history   CT PE study was negative  Patient was found to have small bilateral pleural effusions  Will decrease IV fluid rate and observe for now  Also give a dose of Lasix 20 mg IV once to help with his shortness of breath again  Shortness of breath is improved now complaining abdominal pulse ox 92 without oxygen  Bilateral small  pleural effusion etiology most probably secondary to fluid will check 2D echo had last echo done in 2018, pulmonary status stable pulse ox 93 on room air    Tobacco abuse  Assessment & Plan  · Cessation counseling  · Nicotine patch ordered  · Nasal cannula oxygen p r n  To maintain oxygen saturations over 88%    Mixed hyperlipidemia  Assessment & Plan  · Continue home tricor as patient has history of hypertriglyceridemia  · Lipid panel shows normal triglyceride level  · Hold statin    Hypothyroidism (acquired)  Assessment & Plan  · TSH is 5 31  · Continue home Synthroid    Prediabetes  Assessment & Plan  · Takes metformin as outpatient  · Hemoglobin A1c is 5 1    Discontinue metformin    Essential hypertension, benign  Assessment & Plan  · Continue home amlodipine and losartan with holds  · Goal to maintain systolic blood pressure less than 160    * Drug-induced acute pancreatitis without infection or necrosis  Assessment & Plan  Patient has abdominal pain for the last 1 week  He had pretty severe pain for the last 3 days and today finally his pain is decreased  Will discuss with GI and plan for EGD today if they agree  · Lipase notably elevated at [phone]  · CT abdomen pelvis repeated and shows acute peripancreatic edema  No gallstones or pancreatic ductal stones visualized  · Lipid panel normal  · Patient states he does have problems with triglycerides and does take multiple medications to help control his cholesterol which may be playing a factor in causing his acute pancreatitis  It may also be secondary to this adjacent duodenitis/jejunitis  · Patient denies heavy alcohol use  · Protonix 40 mg IV Q 24 hours  · Discontinue Dilaudid and use p r n  Morphine IV if required for pain control  · Today abdominal pain is worse generalized did have some gravy discussed with GI low-fat diet MR CASTRO looking for pancreatic and duodenal duct  · Continue pain management PPI  · MRI confirmed acute pancreatitis no other pathology lipase 722 pain persist  · Start clear liquid                        VTE Pharmacologic Prophylaxis:   Pharmacologic: Enoxaparin (Lovenox)  Mechanical VTE Prophylaxis in Place: Yes    Patient Centered Rounds: I have performed bedside rounds with nursing staff today  Discussions with Specialists or Other Care Team Provider:  Surgeon yesterday discussed with GI    Education and Discussions with Family / Patient:  Patient gave all the medical information and MRI report    Time Spent for Care: 30 minutes  More than 50% of total time spent on counseling and coordination of care as described above      Current Length of Stay: 5 day(s)    Current Patient Status: Inpatient   Certification Statement: The patient will continue to require additional inpatient hospital stay due to Acute pancreatitis    Discharge Plan:  home    Code Status: Level 1 - Full Code      Subjective:   Abdominal pain persist much improved little better than yesterday no nausea no vomiting no chest pain or shortness of breath    Objective:     Vitals:   Temp (24hrs), Av 1 °F (37 3 °C), Min:98 2 °F (36 8 °C), Max:100 °F (37 8 °C)    Temp:  [98 2 °F (36 8 °C)-100 °F (37 8 °C)] 100 °F (37 8 °C)  HR:  [64-96] 79  Resp:  [16-18] 18  BP: ()/(56-70) 115/70  SpO2:  [92 %-95 %] 93 %  Body mass index is 25 74 kg/m²  Input and Output Summary (last 24 hours): Intake/Output Summary (Last 24 hours) at 10/30/2019 1114  Last data filed at 10/30/2019 0833  Gross per 24 hour   Intake 1280 ml   Output    Net 1280 ml       Physical Exam:     Physical Exam   Constitutional: He is oriented to person, place, and time  He appears well-developed and well-nourished  HENT:   Head: Normocephalic and atraumatic  Right Ear: External ear normal    Left Ear: External ear normal    Mouth/Throat: Oropharynx is clear and moist    Eyes: Pupils are equal, round, and reactive to light  Conjunctivae and EOM are normal    Neck: Normal range of motion  Neck supple  Cardiovascular: Normal rate, regular rhythm, normal heart sounds and intact distal pulses  Pulmonary/Chest: Effort normal and breath sounds normal    Decreased breath sound bilaterally at bases   Abdominal: Soft  Bowel sounds are normal  He exhibits distension  He exhibits no mass  There is tenderness  There is no rebound and no guarding  Generalized discomfort less than yesterday   Genitourinary:   Genitourinary Comments: deferred   Musculoskeletal: Normal range of motion  No edema   Neurological: He is alert and oriented to person, place, and time  He has normal reflexes  Cranial nerves 2-12 are normal   Normal neurological exam   Skin: Skin is warm and dry  No rash noted  Psychiatric: He has a normal mood and affect     Nursing note and vitals reviewed  Additional Data:     Labs:    Results from last 7 days   Lab Units 10/30/19  0429  10/24/19  0447   WBC Thousand/uL 5 80   < > 6 60   HEMOGLOBIN g/dL 11 9*   < > 11 3*   HEMATOCRIT % 34 7*   < > 33 4*   PLATELETS Thousands/uL 248   < > 298   NEUTROS PCT %  --   --  57   LYMPHS PCT %  --   --  26   MONOS PCT %  --   --  9   EOS PCT %  --   --  8*    < > = values in this interval not displayed  Results from last 7 days   Lab Units 10/30/19  0429   SODIUM mmol/L 138   POTASSIUM mmol/L 3 3*   CHLORIDE mmol/L 106   CO2 mmol/L 27   BUN mg/dL 11   CREATININE mg/dL 0 81   ANION GAP mmol/L 5   CALCIUM mg/dL 8 7   ALBUMIN g/dL 3 1   TOTAL BILIRUBIN mg/dL 0 30   ALK PHOS U/L 38*   ALT U/L 38   AST U/L 52   GLUCOSE RANDOM mg/dL 116*     Results from last 7 days   Lab Units 10/28/19  0946   INR  1 23*         Results from last 7 days   Lab Units 10/24/19  0447   HEMOGLOBIN A1C % 5 1     Results from last 7 days   Lab Units 10/25/19  0914 10/23/19  1931   LACTIC ACID mmol/L 1 2 1 2           * I Have Reviewed All Lab Data Listed Above  * Additional Pertinent Lab Tests Reviewed: All Labs For Current Hospital Admission Reviewed    Imaging:    Imaging Reports Reviewed Today Include:  Yesterday MRI read  Imaging Personally Reviewed by Myself Includes:   All old reviewed    Recent Cultures (last 7 days):           Last 24 Hours Medication List:     Current Facility-Administered Medications:  acetaminophen 650 mg Oral Q6H PRN Olamide Buchanan MD    amLODIPine 10 mg Oral Daily Jose F Dill, ALEX    aspirin 325 mg Oral Daily Jose F Dill, SAPNANP    dextrose 5 % and sodium chloride 0 9 % 75 mL/hr Intravenous Continuous Johnie Whittington MD Last Rate: 75 mL/hr (10/30/19 0539)   enoxaparin 30 mg Subcutaneous Q24H 7955 ALEX Bui    fenofibrate 168 mg Oral Daily Jose F Dill, ALEX    influenza vaccine 0 5 mL Intramuscular Once Jose FALEX Ordonez    levofloxacin 750 mg Intravenous Q24H Olamide Buchanan MD Last Rate: 750 mg (10/29/19 1411)   levothyroxine 12 5 mcg Oral Early Morning Mona Ran, CRNP    losartan 50 mg Oral Daily Mona Ran, CRNP    metroNIDAZOLE 500 mg Intravenous Q8H Willie Esposito MD Last Rate: Stopped (10/30/19 0604)   morphine injection 2 mg Intravenous Q4H PRN Willie Esposito MD    nicotine 1 patch Transdermal Daily Sarah Ran, CRNP    ondansetron 4 mg Oral Q6H PRN Kehinde FERRER PA-C    pantoprazole 40 mg Intravenous Q24H [address] Mona Ran, CRNP    potassium chloride 40 mEq Oral Once Paul Amador MD         Today, Patient Was Seen By: Paul Amador MD    ** Please Note: Dictation voice to text software may have been used in the creation of this document   **

## 2019-10-30 NOTE — PROGRESS NOTES
Patient Name: Yoli Dixon  Patient MRN: [id number]  Date: 10/30/19  Service: [facility]    Subjective   Yoli Dixon is a 81 y o  male who was admitted with Drug-induced acute pancreatitis without infection or necrosis  He continues to have upper abdominal pain however it has improved since yesterday  MRI/MRCP showed acute pancreatitis and fluid-filled structures in the pancreatic head likely related to the acute pancreatitis with a linear collection which appeared to communicate with the adjacent peripancreatic fluid  Patient was also noted to have mild  hepatic steatosis and small fluid collection adjacent to the duodenum likely related to the pancreatitis  The lipase has slightly increased from yesterday and is currently 772  His LFTs are within normal limits and his WBCs are within normal limits  Patient denies: Chest pain, shortness of breath, fever, weight loss, rash, adenopathy  All others negative except as noted in HPI  Objective     Vitals  Blood pressure 115/70, pulse 79, temperature 100 °F (37 8 °C), temperature source Temporal, resp  rate 18, height 5' 3" (1 6 m), weight 65 9 kg (145 lb 4 5 oz), SpO2 93 %  General: Alert, no apparent distress  Eyes: No scleral icterus  ENT: MMM  Card: RRR no murmur  Lungs: Clear to ascultation b/l  No wheezes, rales, rhonchi  Abdomen: Soft  Epigastric right left upper quadrant tenderness on palpation  Mildly distended  Bowel sounds present and normoactive  No hepatosplenomegaly    Skin: No jaundice  Neuro: Alert and oriented x3        Laboratory Studies  Lab Results   Component Value Date    CREATININE 0 81 10/30/2019    BUN 11 10/30/2019    SODIUM 138 10/30/2019    K 3 3 (L) 10/30/2019     10/30/2019    CO2 27 10/30/2019    CALCIUM 8 7 10/30/2019    ALKPHOS 38 (L) 10/30/2019    AST 52 10/30/2019    ALT 38 10/30/2019     Lab Results   Component Value Date    WBC 5 80 10/30/2019    HGB 11 9 (L) 10/30/2019    HCT 34 7 (L) 10/30/2019     10/30/2019    MCV 99 10/30/2019     Lab Results   Component Value Date    PROTIME 13 6 (H) 10/28/2019    INR 1 23 (H) 10/28/2019       Imaging and Other Studies    Inhouse Medications       Current Facility-Administered Medications:     acetaminophen (TYLENOL) tablet 650 mg, 650 mg, Oral, Q6H PRN    amLODIPine (NORVASC) tablet 10 mg, 10 mg, Oral, Daily, 10 mg at 10/29/19 0810    aspirin tablet 325 mg, 325 mg, Oral, Daily, 325 mg at 10/29/19 0803    dextrose 5 % and sodium chloride 0 9 % infusion, 75 mL/hr, Intravenous, Continuous, 75 mL/hr at 10/30/19 0539    enoxaparin (LOVENOX) subcutaneous injection 30 mg, 30 mg, Subcutaneous, Q24H DAMASO, 30 mg at 10/29/19 0803    fenofibrate (TRICOR) tablet 168 mg, 168 mg, Oral, Daily, 168 mg at 10/29/19 0803    influenza vaccine, high-dose (FLUZONE HIGH-DOSE) IM injection ZACK 0 5 mL, 0 5 mL, Intramuscular, Once, Stopped at 10/24/19 0559    levofloxacin (LEVAQUIN) IVPB (premix) 750 mg, 750 mg, Intravenous, Q24H, 750 mg at 10/29/19 1411    levothyroxine tablet 12 5 mcg, 12 5 mcg, Oral, Early Morning, 12 5 mcg at 10/30/19 0535    losartan (COZAAR) tablet 50 mg, 50 mg, Oral, Daily, 50 mg at 10/29/19 0811    metroNIDAZOLE (FLAGYL) IVPB (premix) 500 mg, 500 mg, Intravenous, Q8H, Stopped at 10/30/19 0604    morphine injection 2 mg, 2 mg, Intravenous, Q4H PRN, 2 mg at 10/30/19 0542    nicotine (NICODERM CQ) 21 mg/24 hr TD 24 hr patch 1 patch, 1 patch, Transdermal, Daily    ondansetron (ZOFRAN-ODT) dispersible tablet 4 mg, 4 mg, Oral, Q6H PRN, 4 mg at 10/29/19 1731    pantoprazole (PROTONIX) injection 40 mg, 40 mg, Intravenous, Q24H [address], 40 mg at 10/29/19 0803      Assessment/Plan:  1  Acute pancreatitis with secondary inflammation of the small bowel  Continue NPO with IV hydration  Patient will need follow-up MRI/MRCP following resolution of acute illness to exclude neoplastic cyst   Patient may also benefit from endoscopic ultrasound    Check lipase in a m  2   Jejunitis/duodenitis on imaging  Status post EGD on 10/28 showed mild duodenitis, duodenal diverticulum versus prior ulcer and nonobstructing Schatzki's ring  Pathology is pending  Continue supportive care with antiemetics and bowel rest   3  Fatty liver with lobular surface contour suggesting early cirrhosis  LFTs are normal, platelets and albumin are normal   Mild coagulopathy noted  BARAK, anti mitochondrial antibody, anti smooth muscle antibody and HCV antibody are negative  Avoid hepatic toxic medications and alcohol      ALEX Olivares

## 2019-10-31 LAB
ANION GAP SERPL CALCULATED.3IONS-SCNC: 6 MMOL/L (ref 5–14)
BUN SERPL-MCNC: 5 MG/DL (ref 5–25)
CALCIUM SERPL-MCNC: 8.9 MG/DL (ref 8.4–10.2)
CHLORIDE SERPL-SCNC: 106 MMOL/L (ref 97–108)
CO2 SERPL-SCNC: 26 MMOL/L (ref 22–30)
CREAT SERPL-MCNC: 0.74 MG/DL (ref 0.7–1.5)
GFR SERPL CREATININE-BSD FRML MDRD: 95 ML/MIN/1.73SQ M
GLUCOSE SERPL-MCNC: 115 MG/DL (ref 70–99)
LIPASE SERPL-CCNC: 540 U/L (ref 23–300)
LIPASE SERPL-CCNC: 549 U/L (ref 23–300)
MAGNESIUM SERPL-MCNC: 1.5 MG/DL (ref 1.6–2.3)
POTASSIUM SERPL-SCNC: 3.3 MMOL/L (ref 3.6–5)
SODIUM SERPL-SCNC: 138 MMOL/L (ref 137–147)

## 2019-10-31 PROCEDURE — 99232 SBSQ HOSP IP/OBS MODERATE 35: CPT | Performed by: INTERNAL MEDICINE

## 2019-10-31 PROCEDURE — 99232 SBSQ HOSP IP/OBS MODERATE 35: CPT | Performed by: SPECIALIST

## 2019-10-31 PROCEDURE — 80048 BASIC METABOLIC PNL TOTAL CA: CPT | Performed by: INTERNAL MEDICINE

## 2019-10-31 PROCEDURE — 83690 ASSAY OF LIPASE: CPT | Performed by: INTERNAL MEDICINE

## 2019-10-31 PROCEDURE — C9113 INJ PANTOPRAZOLE SODIUM, VIA: HCPCS | Performed by: NURSE PRACTITIONER

## 2019-10-31 PROCEDURE — 83735 ASSAY OF MAGNESIUM: CPT | Performed by: INTERNAL MEDICINE

## 2019-10-31 RX ORDER — POTASSIUM CHLORIDE 20 MEQ/1
40 TABLET, EXTENDED RELEASE ORAL 2 TIMES DAILY
Status: DISCONTINUED | OUTPATIENT
Start: 2019-10-31 | End: 2019-11-01

## 2019-10-31 RX ADMIN — POTASSIUM CHLORIDE 40 MEQ: 20 TABLET, EXTENDED RELEASE ORAL at 11:46

## 2019-10-31 RX ADMIN — ASPIRIN 325 MG ORAL TABLET 325 MG: 325 PILL ORAL at 08:24

## 2019-10-31 RX ADMIN — ENOXAPARIN SODIUM 30 MG: 30 INJECTION SUBCUTANEOUS at 08:24

## 2019-10-31 RX ADMIN — PANTOPRAZOLE SODIUM 40 MG: 40 INJECTION, POWDER, FOR SOLUTION INTRAVENOUS at 08:24

## 2019-10-31 RX ADMIN — AMLODIPINE BESYLATE 10 MG: 10 TABLET ORAL at 08:52

## 2019-10-31 RX ADMIN — FENOFIBRATE: 48 TABLET, FILM COATED ORAL at 08:27

## 2019-10-31 RX ADMIN — METRONIDAZOLE 500 MG: 500 INJECTION, SOLUTION INTRAVENOUS at 13:47

## 2019-10-31 RX ADMIN — MORPHINE SULFATE 2 MG: 2 INJECTION, SOLUTION INTRAMUSCULAR; INTRAVENOUS at 19:45

## 2019-10-31 RX ADMIN — MORPHINE SULFATE 2 MG: 2 INJECTION, SOLUTION INTRAMUSCULAR; INTRAVENOUS at 08:43

## 2019-10-31 RX ADMIN — DEXTROSE AND SODIUM CHLORIDE 75 ML/HR: 5; 900 INJECTION, SOLUTION INTRAVENOUS at 08:52

## 2019-10-31 RX ADMIN — METRONIDAZOLE 500 MG: 500 INJECTION, SOLUTION INTRAVENOUS at 05:57

## 2019-10-31 RX ADMIN — LOSARTAN POTASSIUM 50 MG: 50 TABLET, FILM COATED ORAL at 08:52

## 2019-10-31 RX ADMIN — LEVOFLOXACIN 750 MG: 750 INJECTION, SOLUTION INTRAVENOUS at 14:23

## 2019-10-31 RX ADMIN — METRONIDAZOLE 500 MG: 500 INJECTION, SOLUTION INTRAVENOUS at 21:23

## 2019-10-31 RX ADMIN — POTASSIUM CHLORIDE 40 MEQ: 20 TABLET, EXTENDED RELEASE ORAL at 18:09

## 2019-10-31 RX ADMIN — DEXTROSE AND SODIUM CHLORIDE 75 ML/HR: 5; 900 INJECTION, SOLUTION INTRAVENOUS at 22:04

## 2019-10-31 RX ADMIN — LEVOTHYROXINE SODIUM 12.5 MCG: 25 TABLET ORAL at 05:57

## 2019-10-31 NOTE — ASSESSMENT & PLAN NOTE
Patient has acute hypoxic respiratory failure  Patient does not have any history of using any oxygen at home and here he is 84% on room air  He does have an extensive smoking history   CT PE study was negative    Patient was found to have small bilateral pleural effusions  Will decrease IV fluid rate and observe for now  Also give a dose of Lasix 20 mg IV once to help with his shortness of breath again  Shortness of breath is improved now complaining abdominal pulse ox 92 without oxygen  Bilateral small  pleural effusion etiology most probably secondary to fluid will check 2D echo had last echo done in 2018, pulmonary status stable pulse ox 93 on room air  Also may be secondary to cirrhosis says some fluid in the belly also

## 2019-10-31 NOTE — NURSING NOTE
A&Ox3  No c/o pain  Lungs CTA  No SOB  HR regular  No edema  +PP  ABD soft NT ND with +BS  Skin warm D&I  VSS  Resting comfortably with call bell in reach

## 2019-10-31 NOTE — PROGRESS NOTES
Progress Note - Dennys East 1950, 81 y o  male MRN: [id number]    Unit/Bed#: 7T [facility] 702-02 Encounter: [id number]    Primary Care Provider: Jaquan Fischer DO   Date and time admitted to hospital: 10/23/2019  6:47 PM        Jejunitis  Assessment & Plan  Patient CT abdomen pelvis shows acute jejunitis and also duodenitis  Initially try to treat without antibiotics however his pain was very severe  Will continue IV fluid hydration and full liquid diet for now and cont on Levaquin and Flagyl   It is now solid but low-fat low residual started by GI  Restart diet today  Patient abdominal pain is more from pancreatitis and jejunitis    Acute respiratory failure with hypoxia [facility])  Assessment & Plan  Patient has acute hypoxic respiratory failure  Patient does not have any history of using any oxygen at home and here he is 84% on room air  He does have an extensive smoking history   CT PE study was negative  Patient was found to have small bilateral pleural effusions  Will decrease IV fluid rate and observe for now  Also give a dose of Lasix 20 mg IV once to help with his shortness of breath again  Shortness of breath is improved now complaining abdominal pulse ox 92 without oxygen  Bilateral small  pleural effusion etiology most probably secondary to fluid will check 2D echo had last echo done in 2018, pulmonary status stable pulse ox 93 on room air  Also may be secondary to cirrhosis says some fluid in the belly also    Tobacco abuse  Assessment & Plan  · Cessation counseling  · Nicotine patch ordered  · Nasal cannula oxygen p r n  To maintain oxygen saturations over 88%    Mixed hyperlipidemia  Assessment & Plan  · Continue home tricor as patient has history of hypertriglyceridemia    · Lipid panel shows normal triglyceride level  · Hold statin    Hypothyroidism (acquired)  Assessment & Plan  · TSH is 5 31  · Continue home Synthroid    Prediabetes  Assessment & Plan  · Takes metformin as outpatient  · Hemoglobin A1c is 5 1  Discontinue metformin    Essential hypertension, benign  Assessment & Plan  · Continue home amlodipine and losartan with holds  · Goal to maintain systolic blood pressure less than 160    * Drug-induced acute pancreatitis without infection or necrosis  Assessment & Plan  Patient has abdominal pain for the last 1 week  He had pretty severe pain for the last 3 days and today finally his pain is decreased  Will discuss with GI and plan for EGD today if they agree  · Lipase notably elevated at [phone]- [phone]  · CT abdomen pelvis repeated and shows acute peripancreatic edema  No gallstones or pancreatic ductal stones visualized  · Lipid panel normal  · Patient states he does have problems with triglycerides and does take multiple medications to help control his cholesterol which may be playing a factor in causing his acute pancreatitis  It may also be secondary to this adjacent duodenitis/jejunitis  · Patient denies heavy alcohol use  · Protonix 40 mg IV Q 24 hours  · Discontinue Dilaudid and use p r n  Morphine IV if required for pain control  · Today abdominal pain is worse generalized did have some gravy discussed with GI low-fat diet MR CP looking for pancreatic and duodenal duct  · Continue pain management PPI  · MRI confirmed acute pancreatitis no other pathology lipase 722 pain persist  · Start clear liquid  · Tolerating clear liquid well pain is little better lipase dropped to 500  · Increase the diet low-fat low residual cardiac diet                        VTE Pharmacologic Prophylaxis:   Pharmacologic: Enoxaparin (Lovenox)  Mechanical VTE Prophylaxis in Place: No    Patient Centered Rounds: I have performed bedside rounds with nursing staff today  Discussions with Specialists or Other Care Team Provider:  None    Education and Discussions with Family / Patient:  Discussed with patient    Time Spent for Care: 30 minutes    More than 50% of total time spent on counseling and coordination of care as described above  Current Length of Stay: 6 day(s)    Current Patient Status: Inpatient   Certification Statement: The patient will continue to require additional inpatient hospital stay due to Acute pancreatitis    Discharge Plan:  when medically stable    Code Status: Level 1 - Full Code      Subjective:   Pain is around 5 nausea no vomiting wants to eat some food    Objective:     Vitals:   Temp (24hrs), Av 9 °F (36 6 °C), Min:97 °F (36 1 °C), Max:98 7 °F (37 1 °C)    Temp:  [97 °F (36 1 °C)-98 7 °F (37 1 °C)] 97 °F (36 1 °C)  HR:  [66-80] 76  Resp:  [18] 18  BP: ()/(62-82) 121/82  SpO2:  [94 %-95 %] 94 %  Body mass index is 25 81 kg/m²  Input and Output Summary (last 24 hours): Intake/Output Summary (Last 24 hours) at 10/31/2019 1200  Last data filed at 10/31/2019 0900  Gross per 24 hour   Intake 1366 ml   Output    Net 1366 ml       Physical Exam:     Physical Exam   Constitutional: He is oriented to person, place, and time  He appears well-developed and well-nourished  HENT:   Head: Normocephalic and atraumatic  Right Ear: External ear normal    Left Ear: External ear normal    Mouth/Throat: Oropharynx is clear and moist    Eyes: Pupils are equal, round, and reactive to light  Conjunctivae and EOM are normal    Neck: Normal range of motion  Neck supple  Cardiovascular: Normal rate, regular rhythm, normal heart sounds and intact distal pulses  Pulmonary/Chest: Effort normal and breath sounds normal    Quite lung   Abdominal: Soft  Bowel sounds are normal  He exhibits distension  He exhibits no mass  There is tenderness  There is no rebound and no guarding  Generalized discomfort epigastric and right upper quadrant more tender   Genitourinary:   Genitourinary Comments: deferred   Musculoskeletal: Normal range of motion  Neurological: He is alert and oriented to person, place, and time  He has normal reflexes     Cranial nerves 2-12 are normal   Normal neurological exam   Skin: Skin is warm and dry  No rash noted  Psychiatric: He has a normal mood and affect  Nursing note and vitals reviewed  Additional Data:     Labs:    Results from last 7 days   Lab Units 10/30/19  0429   WBC Thousand/uL 5 80   HEMOGLOBIN g/dL 11 9*   HEMATOCRIT % 34 7*   PLATELETS Thousands/uL 248     Results from last 7 days   Lab Units 10/31/19  0431 10/30/19  0429   SODIUM mmol/L 138 138   POTASSIUM mmol/L 3 3* 3 3*   CHLORIDE mmol/L 106 106   CO2 mmol/L 26 27   BUN mg/dL 5 11   CREATININE mg/dL 0 74 0 81   ANION GAP mmol/L 6 5   CALCIUM mg/dL 8 9 8 7   ALBUMIN g/dL  --  3 1   TOTAL BILIRUBIN mg/dL  --  0 30   ALK PHOS U/L  --  38*   ALT U/L  --  38   AST U/L  --  52   GLUCOSE RANDOM mg/dL 115* 116*     Results from last 7 days   Lab Units 10/28/19  0946   INR  1 23*             Results from last 7 days   Lab Units 10/25/19  0914   LACTIC ACID mmol/L 1 2           * I Have Reviewed All Lab Data Listed Above  * Additional Pertinent Lab Tests Reviewed: All Labs For Current Hospital Admission Reviewed    Imaging:    Imaging Reports Reviewed Today Include:  None today  Imaging Personally Reviewed by Myself Includes:   Old reviewed    Recent Cultures (last 7 days):           Last 24 Hours Medication List:     Current Facility-Administered Medications:  acetaminophen 650 mg Oral Q6H PRN Olamide Buchanan MD    amLODIPine 10 mg Oral Daily Jose F Dill, SAPNANP    aspirin 325 mg Oral Daily Jose FSAPNA OrdonezNP    dextrose 5 % and sodium chloride 0 9 % 75 mL/hr Intravenous Continuous Johnie Whittington MD Last Rate: 75 mL/hr (10/31/19 0852)   enoxaparin 30 mg Subcutaneous Q24H [facility] Jose FALEX Ordonez    fenofibrate 168 mg Oral Daily Jose F MichelelALEX    influenza vaccine 0 5 mL Intramuscular Once ALEX Kelley    levofloxacin 750 mg Intravenous Q24H Olamide Buchanan MD Last Rate: Stopped (10/30/19 1700)   levothyroxine 12 5 mcg Oral Early Morning Jose F SAPNA NiceNP    losartan 50 mg Oral Daily ALEX Garner    metroNIDAZOLE 500 mg Intravenous Q8H Maggie Carbone MD Last Rate: 500 mg (10/31/19 0557)   morphine injection 2 mg Intravenous Q4H PRN Maggie Carbone MD    nicotine 1 patch Transdermal Daily ALEX Garner    ondansetron 4 mg Oral Q6H PRN Liliana Cheung PA-C    pantoprazole 40 mg Intravenous Q24H [address] ALEX Garner    potassium chloride 40 mEq Oral BID Saul Contreras MD         Today, Patient Was Seen By: Saul Contreras MD    ** Please Note: Dictation voice to text software may have been used in the creation of this document   **

## 2019-10-31 NOTE — PLAN OF CARE
Problem: Potential for Falls  Goal: Patient will remain free of falls  Description  INTERVENTIONS:  - Assess patient frequently for physical needs  -  Identify cognitive and physical deficits and behaviors that affect risk of falls    -  [place] fall precautions as indicated by assessment   - Educate patient/family on patient safety including physical limitations  - Instruct patient to call for assistance with activity based on assessment  - Modify environment to reduce risk of injury  - Consider OT/PT consult to assist with strengthening/mobility  Outcome: Progressing     Problem: PAIN - ADULT  Goal: Verbalizes/displays adequate comfort level or baseline comfort level  Description  Interventions:  - Encourage patient to monitor pain and request assistance  - Assess pain using appropriate pain scale  - Administer analgesics based on type and severity of pain and evaluate response  - Implement non-pharmacological measures as appropriate and evaluate response  - Consider cultural and social influences on pain and pain management  - Notify physician/advanced practitioner if interventions unsuccessful or patient reports new pain  Outcome: Progressing     Problem: INFECTION - ADULT  Goal: Absence or prevention of progression during hospitalization  Description  INTERVENTIONS:  - Assess and monitor for signs and symptoms of infection  - Monitor lab/diagnostic results  - Monitor all insertion sites, i e  indwelling lines, tubes, and drains  - Monitor endotracheal if appropriate and nasal secretions for changes in amount and color  - [place] appropriate cooling/warming therapies per order  - Administer medications as ordered  - Instruct and encourage patient and family to use good hand hygiene technique  - Identify and instruct in appropriate isolation precautions for identified infection/condition  Outcome: Progressing     Problem: SAFETY ADULT  Goal: Maintain or return to baseline ADL function  Description  INTERVENTIONS:  -  Assess patient's ability to carry out ADLs; assess patient's baseline for ADL function and identify physical deficits which impact ability to perform ADLs (bathing, care of mouth/teeth, toileting, grooming, dressing, etc )  - Assess/evaluate cause of self-care deficits   - Assess range of motion  - Assess patient's mobility; develop plan if impaired  - Assess patient's need for assistive devices and provide as appropriate  - Encourage maximum independence but intervene and supervise when necessary  - Involve family in performance of ADLs  - Assess for home care needs following discharge   - Consider OT consult to assist with ADL evaluation and planning for discharge  - Provide patient education as appropriate  Outcome: Progressing  Goal: Maintain or return mobility status to optimal level  Description  INTERVENTIONS:  - Assess patient's baseline mobility status (ambulation, transfers, stairs, etc )    - Identify cognitive and physical deficits and behaviors that affect mobility  - Identify mobility aids required to assist with transfers and/or ambulation (gait belt, sit-to-stand, lift, walker, cane, etc )  - [place] fall precautions as indicated by assessment  - Record patient progress and toleration of activity level on Mobility SBAR; progress patient to next Phase/Stage  - Instruct patient to call for assistance with activity based on assessment  - Consider rehabilitation consult to assist with strengthening/weightbearing, etc   Outcome: Progressing     Problem: DISCHARGE PLANNING  Goal: Discharge to home or other facility with appropriate resources  Description  INTERVENTIONS:  - Identify barriers to discharge w/patient and caregiver  - Arrange for needed discharge resources and transportation as appropriate  - Identify discharge learning needs (meds, wound care, etc )  - Arrange for interpretive services to assist at discharge as needed  - Refer to Case Management Department for coordinating discharge planning if the patient needs post-hospital services based on physician/advanced practitioner order or complex needs related to functional status, cognitive ability, or social support system  Outcome: Progressing     Problem: Knowledge Deficit  Goal: Patient/family/caregiver demonstrates understanding of disease process, treatment plan, medications, and discharge instructions  Description  Complete learning assessment and assess knowledge base  Interventions:  - Provide teaching at level of understanding  - Provide teaching via preferred learning methods  Outcome: Progressing     Problem: DISCHARGE PLANNING - CARE MANAGEMENT  Goal: Discharge to post-acute care or home with appropriate resources  Description  INTERVENTIONS:  - Conduct assessment to determine patient/family and health care team treatment goals, and need for post-acute services based on payer coverage, community resources, and patient preferences, and barriers to discharge  - Address psychosocial, clinical, and financial barriers to discharge as identified in assessment in conjunction with the patient/family and health care team  - Arrange appropriate level of post-acute services according to patients   needs and preference and payer coverage in collaboration with the physician and health care team  - Communicate with and update the patient/family, physician, and health care team regarding progress on the discharge plan  - Arrange appropriate transportation to post-acute venues  Outcome: Progressing     Problem: Nutrition/Hydration-ADULT  Goal: Nutrient/Hydration intake appropriate for improving, restoring or maintaining nutritional needs  Description  Monitor and assess patient's nutrition/hydration status for malnutrition  Collaborate with interdisciplinary team and initiate plan and interventions as ordered  Monitor patient's weight and dietary intake as ordered or per policy   Utilize nutrition screening tool and intervene as necessary  Determine patient's food preferences and provide high-protein, high-caloric foods as appropriate       INTERVENTIONS:  - Monitor oral intake, urinary output, labs, and treatment plans  - Assess nutrition and hydration status and recommend course of action  - Evaluate amount of meals eaten  - Assist patient with eating if necessary   - Allow adequate time for meals  - Recommend/ encourage appropriate diets, oral nutritional supplements, and vitamin/mineral supplements  - Order, calculate, and assess calorie counts as needed  - Recommend, monitor, and adjust tube feedings and TPN/PPN based on assessed needs  - Assess need for intravenous fluids  - Provide specific nutrition/hydration education as appropriate  - Include patient/family/caregiver in decisions related to nutrition  Outcome: Progressing

## 2019-10-31 NOTE — ASSESSMENT & PLAN NOTE
Patient has abdominal pain for the last 1 week  He had pretty severe pain for the last 3 days and today finally his pain is decreased  Will discuss with GI and plan for EGD today if they agree  · Lipase notably elevated at [phone]- [phone]  · CT abdomen pelvis repeated and shows acute peripancreatic edema  No gallstones or pancreatic ductal stones visualized  · Lipid panel normal  · Patient states he does have problems with triglycerides and does take multiple medications to help control his cholesterol which may be playing a factor in causing his acute pancreatitis  It may also be secondary to this adjacent duodenitis/jejunitis  · Patient denies heavy alcohol use  · Protonix 40 mg IV Q 24 hours  · Discontinue Dilaudid and use p r n   Morphine IV if required for pain control  · Today abdominal pain is worse generalized did have some gravy discussed with GI low-fat diet MR CP looking for pancreatic and duodenal duct  · Continue pain management PPI  · MRI confirmed acute pancreatitis no other pathology lipase 722 pain persist  · Start clear liquid  · Tolerating clear liquid well pain is little better lipase dropped to 500  · Increase the diet low-fat low residual cardiac diet

## 2019-10-31 NOTE — PROGRESS NOTES
Feeling better today  Abdominal pain all but gone  Started on p o  Low-fat diet low residue  Appears to be tolerating well  Abdomen soft flat minimal tenderness noted  Impression:  Appears to be slowly improving from pancreatitis    Plan:  If continues to do well possible SCOTT Melara Discussed with Medicine

## 2019-10-31 NOTE — NURSING NOTE
Patient remain in stable condition, no change in condition from this am  All safety maintained  Will continue to monitor

## 2019-10-31 NOTE — ASSESSMENT & PLAN NOTE
Patient CT abdomen pelvis shows acute jejunitis and also duodenitis  Initially try to treat without antibiotics however his pain was very severe    Will continue IV fluid hydration and full liquid diet for now and cont on Levaquin and Flagyl   It is now solid but low-fat low residual started by GI  Restart diet today  Patient abdominal pain is more from pancreatitis and jejunitis

## 2019-10-31 NOTE — NURSING NOTE
On initial rounds patient in no apparent distress  Skin warm and dry to touch  Pleasant and cooperative  Stated pain at level 6 to abdomen and does have tenderness to mid abdomren, was medicated with Morphine  Ambulates well by self States he has no appetite but denies nausea or vomiting  Voids adequate amount of urine  D5 ns @ 75 cc infusing well  All safety maintained  Will continue to monitor

## 2019-11-01 PROCEDURE — C9113 INJ PANTOPRAZOLE SODIUM, VIA: HCPCS | Performed by: NURSE PRACTITIONER

## 2019-11-01 PROCEDURE — 99232 SBSQ HOSP IP/OBS MODERATE 35: CPT | Performed by: INTERNAL MEDICINE

## 2019-11-01 PROCEDURE — 99232 SBSQ HOSP IP/OBS MODERATE 35: CPT | Performed by: SPECIALIST

## 2019-11-01 RX ORDER — TRAMADOL HYDROCHLORIDE 50 MG/1
50 TABLET ORAL EVERY 6 HOURS PRN
Status: DISCONTINUED | OUTPATIENT
Start: 2019-11-01 | End: 2019-11-02 | Stop reason: HOSPADM

## 2019-11-01 RX ORDER — PANTOPRAZOLE SODIUM 40 MG/1
40 TABLET, DELAYED RELEASE ORAL
Status: DISCONTINUED | OUTPATIENT
Start: 2019-11-01 | End: 2019-11-02 | Stop reason: HOSPADM

## 2019-11-01 RX ADMIN — DEXTROSE AND SODIUM CHLORIDE 75 ML/HR: 5; 900 INJECTION, SOLUTION INTRAVENOUS at 10:14

## 2019-11-01 RX ADMIN — PANTOPRAZOLE SODIUM 40 MG: 40 INJECTION, POWDER, FOR SOLUTION INTRAVENOUS at 08:01

## 2019-11-01 RX ADMIN — FENOFIBRATE 168 MG: 48 TABLET, FILM COATED ORAL at 08:01

## 2019-11-01 RX ADMIN — MORPHINE SULFATE 2 MG: 2 INJECTION, SOLUTION INTRAMUSCULAR; INTRAVENOUS at 08:01

## 2019-11-01 RX ADMIN — ASPIRIN 325 MG ORAL TABLET 325 MG: 325 PILL ORAL at 08:01

## 2019-11-01 RX ADMIN — AMLODIPINE BESYLATE 10 MG: 10 TABLET ORAL at 08:01

## 2019-11-01 RX ADMIN — ONDANSETRON 4 MG: 4 TABLET, ORALLY DISINTEGRATING ORAL at 10:14

## 2019-11-01 RX ADMIN — PANCRELIPASE 24000 UNITS: 24000; 76000; 120000 CAPSULE, DELAYED RELEASE PELLETS ORAL at 16:08

## 2019-11-01 RX ADMIN — LOSARTAN POTASSIUM 50 MG: 50 TABLET, FILM COATED ORAL at 08:01

## 2019-11-01 RX ADMIN — ENOXAPARIN SODIUM 30 MG: 30 INJECTION SUBCUTANEOUS at 08:01

## 2019-11-01 RX ADMIN — TRAMADOL HYDROCHLORIDE 50 MG: 50 TABLET, FILM COATED ORAL at 11:16

## 2019-11-01 RX ADMIN — PANTOPRAZOLE SODIUM 40 MG: 40 TABLET, DELAYED RELEASE ORAL at 16:08

## 2019-11-01 RX ADMIN — LEVOTHYROXINE SODIUM 12.5 MCG: 25 TABLET ORAL at 05:22

## 2019-11-01 RX ADMIN — PANCRELIPASE 24000 UNITS: 24000; 76000; 120000 CAPSULE, DELAYED RELEASE PELLETS ORAL at 11:16

## 2019-11-01 RX ADMIN — TRAMADOL HYDROCHLORIDE 50 MG: 50 TABLET, FILM COATED ORAL at 19:13

## 2019-11-01 RX ADMIN — POTASSIUM CHLORIDE 40 MEQ: 20 TABLET, EXTENDED RELEASE ORAL at 08:02

## 2019-11-01 RX ADMIN — METRONIDAZOLE 500 MG: 500 INJECTION, SOLUTION INTRAVENOUS at 05:22

## 2019-11-01 RX ADMIN — ONDANSETRON 4 MG: 4 TABLET, ORALLY DISINTEGRATING ORAL at 10:13

## 2019-11-01 NOTE — ASSESSMENT & PLAN NOTE
Patient CT abdomen pelvis shows acute jejunitis and also duodenitis  Initially try to treat without antibiotics however his pain was very severe    Will continue IV fluid hydration and full liquid diet for now and cont on Levaquin and Flagyl   It is now solid but low-fat low residual started by GI  Restart diet today  Patient abdominal pain is more from pancreatitis and jejunitis  Noted GI and note biopsy negative most pain is secondary to pancreatitis will DC antibiotic

## 2019-11-01 NOTE — PLAN OF CARE
Problem: Potential for Falls  Goal: Patient will remain free of falls  Description  INTERVENTIONS:  - Assess patient frequently for physical needs  -  Identify cognitive and physical deficits and behaviors that affect risk of falls    -  [place] fall precautions as indicated by assessment   - Educate patient/family on patient safety including physical limitations  - Instruct patient to call for assistance with activity based on assessment  - Modify environment to reduce risk of injury  - Consider OT/PT consult to assist with strengthening/mobility  Outcome: Progressing     Problem: PAIN - ADULT  Goal: Verbalizes/displays adequate comfort level or baseline comfort level  Description  Interventions:  - Encourage patient to monitor pain and request assistance  - Assess pain using appropriate pain scale  - Administer analgesics based on type and severity of pain and evaluate response  - Implement non-pharmacological measures as appropriate and evaluate response  - Consider cultural and social influences on pain and pain management  - Notify physician/advanced practitioner if interventions unsuccessful or patient reports new pain  Outcome: Progressing     Problem: INFECTION - ADULT  Goal: Absence or prevention of progression during hospitalization  Description  INTERVENTIONS:  - Assess and monitor for signs and symptoms of infection  - Monitor lab/diagnostic results  - Monitor all insertion sites, i e  indwelling lines, tubes, and drains  - Monitor endotracheal if appropriate and nasal secretions for changes in amount and color  - [place] appropriate cooling/warming therapies per order  - Administer medications as ordered  - Instruct and encourage patient and family to use good hand hygiene technique  - Identify and instruct in appropriate isolation precautions for identified infection/condition  Outcome: Progressing     Problem: SAFETY ADULT  Goal: Maintain or return to baseline ADL function  Description  INTERVENTIONS:  -  Assess patient's ability to carry out ADLs; assess patient's baseline for ADL function and identify physical deficits which impact ability to perform ADLs (bathing, care of mouth/teeth, toileting, grooming, dressing, etc )  - Assess/evaluate cause of self-care deficits   - Assess range of motion  - Assess patient's mobility; develop plan if impaired  - Assess patient's need for assistive devices and provide as appropriate  - Encourage maximum independence but intervene and supervise when necessary  - Involve family in performance of ADLs  - Assess for home care needs following discharge   - Consider OT consult to assist with ADL evaluation and planning for discharge  - Provide patient education as appropriate  Outcome: Progressing  Goal: Maintain or return mobility status to optimal level  Description  INTERVENTIONS:  - Assess patient's baseline mobility status (ambulation, transfers, stairs, etc )    - Identify cognitive and physical deficits and behaviors that affect mobility  - Identify mobility aids required to assist with transfers and/or ambulation (gait belt, sit-to-stand, lift, walker, cane, etc )  - [place] fall precautions as indicated by assessment  - Record patient progress and toleration of activity level on Mobility SBAR; progress patient to next Phase/Stage  - Instruct patient to call for assistance with activity based on assessment  - Consider rehabilitation consult to assist with strengthening/weightbearing, etc   Outcome: Progressing     Problem: DISCHARGE PLANNING  Goal: Discharge to home or other facility with appropriate resources  Description  INTERVENTIONS:  - Identify barriers to discharge w/patient and caregiver  - Arrange for needed discharge resources and transportation as appropriate  - Identify discharge learning needs (meds, wound care, etc )  - Arrange for interpretive services to assist at discharge as needed  - Refer to Case Management Department for coordinating discharge planning if the patient needs post-hospital services based on physician/advanced practitioner order or complex needs related to functional status, cognitive ability, or social support system  Outcome: Progressing     Problem: Knowledge Deficit  Goal: Patient/family/caregiver demonstrates understanding of disease process, treatment plan, medications, and discharge instructions  Description  Complete learning assessment and assess knowledge base  Interventions:  - Provide teaching at level of understanding  - Provide teaching via preferred learning methods  Outcome: Progressing     Problem: DISCHARGE PLANNING - CARE MANAGEMENT  Goal: Discharge to post-acute care or home with appropriate resources  Description  INTERVENTIONS:  - Conduct assessment to determine patient/family and health care team treatment goals, and need for post-acute services based on payer coverage, community resources, and patient preferences, and barriers to discharge  - Address psychosocial, clinical, and financial barriers to discharge as identified in assessment in conjunction with the patient/family and health care team  - Arrange appropriate level of post-acute services according to patients   needs and preference and payer coverage in collaboration with the physician and health care team  - Communicate with and update the patient/family, physician, and health care team regarding progress on the discharge plan  - Arrange appropriate transportation to post-acute venues  Outcome: Progressing     Problem: Nutrition/Hydration-ADULT  Goal: Nutrient/Hydration intake appropriate for improving, restoring or maintaining nutritional needs  Description  Monitor and assess patient's nutrition/hydration status for malnutrition  Collaborate with interdisciplinary team and initiate plan and interventions as ordered  Monitor patient's weight and dietary intake as ordered or per policy   Utilize nutrition screening tool and intervene as necessary  Determine patient's food preferences and provide high-protein, high-caloric foods as appropriate       INTERVENTIONS:  - Monitor oral intake, urinary output, labs, and treatment plans  - Assess nutrition and hydration status and recommend course of action  - Evaluate amount of meals eaten  - Assist patient with eating if necessary   - Allow adequate time for meals  - Recommend/ encourage appropriate diets, oral nutritional supplements, and vitamin/mineral supplements  - Order, calculate, and assess calorie counts as needed  - Recommend, monitor, and adjust tube feedings and TPN/PPN based on assessed needs  - Assess need for intravenous fluids  - Provide specific nutrition/hydration education as appropriate  - Include patient/family/caregiver in decisions related to nutrition  Outcome: Progressing

## 2019-11-01 NOTE — PLAN OF CARE
Problem: Nutrition/Hydration-ADULT  Goal: Nutrient/Hydration intake appropriate for improving, restoring or maintaining nutritional needs  Description  Monitor and assess patient's nutrition/hydration status for malnutrition  Collaborate with interdisciplinary team and initiate plan and interventions as ordered  Monitor patient's weight and dietary intake as ordered or per policy  Utilize nutrition screening tool and intervene as necessary  Determine patient's food preferences and provide high-protein, high-caloric foods as appropriate  INTERVENTIONS:  - Monitor oral intake, urinary output, labs, and treatment plans  - Assess nutrition and hydration status and recommend course of action  - Evaluate amount of meals eaten  - Assist patient with eating if necessary   - Allow adequate time for meals  - Recommend/ encourage appropriate diets, oral nutritional supplements, and vitamin/mineral supplements  - Order, calculate, and assess calorie counts as needed  - Recommend, monitor, and adjust tube feedings and TPN/PPN based on assessed needs  - Assess need for intravenous fluids  - Provide specific nutrition/hydration education as appropriate  - Include patient/family/caregiver in decisions related to nutrition  Outcome: Progressing   PT tolerating po, no abd pain, no n/v/d, but having early satiety  Discussed small frequent meals, low fat diet

## 2019-11-01 NOTE — PROGRESS NOTES
Progress Note - Malka Chol 1950, 81 y o  male MRN: [id number]    Unit/Bed#: 7T [facility] 702-02 Encounter: [id number]    Primary Care Provider: Nathanael Flores DO   Date and time admitted to hospital: 10/23/2019  6:47 PM        Jejunitis  Assessment & Plan  Patient CT abdomen pelvis shows acute jejunitis and also duodenitis  Initially try to treat without antibiotics however his pain was very severe  Will continue IV fluid hydration and full liquid diet for now and cont on Levaquin and Flagyl   It is now solid but low-fat low residual started by GI  Restart diet today  Patient abdominal pain is more from pancreatitis and jejunitis  Noted GI and note biopsy negative most pain is secondary to pancreatitis will DC antibiotic    Acute respiratory failure with hypoxia [facility])  Assessment & Plan  Patient has acute hypoxic respiratory failure  Patient does not have any history of using any oxygen at home and here he is 84% on room air  He does have an extensive smoking history   CT PE study was negative  Patient was found to have small bilateral pleural effusions  Will decrease IV fluid rate and observe for now  Also give a dose of Lasix 20 mg IV once to help with his shortness of breath again  Shortness of breath is improved now complaining abdominal pulse ox 92 without oxygen  Bilateral small  pleural effusion etiology most probably secondary to fluid will check 2D echo had last echo done in 2018, pulmonary status stable pulse ox 93 on room air      Tobacco abuse  Assessment & Plan  · Cessation counseling  · Nicotine patch ordered  · Nasal cannula oxygen p r n  To maintain oxygen saturations over 88%    Mixed hyperlipidemia  Assessment & Plan  · Continue home tricor as patient has history of hypertriglyceridemia    · Lipid panel shows normal triglyceride level  · Hold statin    Hypothyroidism (acquired)  Assessment & Plan  · TSH is 5 31  · Continue home Synthroid    Prediabetes  Assessment & Plan  · Takes metformin as outpatient  · Hemoglobin A1c is 5 1  Discontinue metformin    Essential hypertension, benign  Assessment & Plan  · Continue home amlodipine and losartan with holds  · Goal to maintain systolic blood pressure less than 160    * Drug-induced acute pancreatitis without infection or necrosis  Assessment & Plan  Patient has abdominal pain for the last 1 week  He had pretty severe pain for the last 3 days and today finally his pain is decreased  Will discuss with GI and plan for EGD today if they agree  · Lipase notably elevated at [phone]- [phone]  · CT abdomen pelvis repeated and shows acute peripancreatic edema  No gallstones or pancreatic ductal stones visualized  · Lipid panel normal  · Patient states he does have problems with triglycerides and does take multiple medications to help control his cholesterol which may be playing a factor in causing his acute pancreatitis  It may also be secondary to this adjacent duodenitis/jejunitis  · Patient denies heavy alcohol use  · Protonix 40 mg IV Q 24 hours  · Discontinue Dilaudid and use p r n  Morphine IV if required for pain control  · Today abdominal pain is worse generalized did have some gravy discussed with GI low-fat diet MR CP looking for pancreatic and duodenal duct  · Continue pain management PPI  · MRI confirmed acute pancreatitis no other pathology lipase 722 pain persist  · Tolerating diet okay but complaining of is still pain no change in pain  · Lipase is above 500  · Add Creon may have full to relieve pain                        VTE Pharmacologic Prophylaxis:   Pharmacologic: Enoxaparin (Lovenox)  Mechanical VTE Prophylaxis in Place: Yes    Patient Centered Rounds: I have performed bedside rounds with nursing staff today      Discussions with Specialists or Other Care Team Provider:  Noted GI a note    Education and Discussions with Family / Patient:  Discussed with patient in detail patient is improving but pain is still persist will add Creon may be helpful with pain control    Time Spent for Care: 30 minutes  More than 50% of total time spent on counseling and coordination of care as described above  Current Length of Stay: 7 day(s)    Current Patient Status: Inpatient   Certification Statement: The patient will continue to require additional inpatient hospital stay due to Next 24 hour    Discharge Plan:  home    Code Status: Level 1 - Full Code      Subjective: This morning complaining of some nausea also pain is still same the way he came in not much improved the tolerating p o  Intake well lipase is 549 will change IV to p o  Medication     Objective:     Vitals:   Temp (24hrs), Av 7 °F (36 5 °C), Min:97 3 °F (36 3 °C), Max:98 °F (36 7 °C)    Temp:  [97 3 °F (36 3 °C)-98 °F (36 7 °C)] 97 3 °F (36 3 °C)  HR:  [63-70] 70  Resp:  [18-20] 20  BP: (114-151)/(67-72) 151/67  SpO2:  [94 %-96 %] 94 %  Body mass index is 25 85 kg/m²  Input and Output Summary (last 24 hours): Intake/Output Summary (Last 24 hours) at 2019 1038  Last data filed at 2019 1014  Gross per 24 hour   Intake 2672 5 ml   Output    Net 2672 5 ml       Physical Exam:     Physical Exam   Constitutional: He is oriented to person, place, and time  He appears well-developed and well-nourished  HENT:   Head: Normocephalic and atraumatic  Right Ear: External ear normal    Left Ear: External ear normal    Mouth/Throat: Oropharynx is clear and moist    Eyes: Pupils are equal, round, and reactive to light  Conjunctivae and EOM are normal    Neck: Normal range of motion  Neck supple  Cardiovascular: Normal rate, regular rhythm, normal heart sounds and intact distal pulses  Pulmonary/Chest: Effort normal and breath sounds normal    Decreased breath sound   Abdominal: Soft  Bowel sounds are normal  He exhibits distension  He exhibits no mass  There is tenderness  There is no rebound and no guarding     Generalized discomfort more tenderness in the mid abdomen   Genitourinary:   Genitourinary Comments: deferred   Musculoskeletal: Normal range of motion  Neurological: He is alert and oriented to person, place, and time  He has normal reflexes  Cranial nerves 2-12 are normal   Normal neurological exam   Skin: Skin is warm and dry  No rash noted  Psychiatric: He has a normal mood and affect  Nursing note and vitals reviewed  Additional Data:     Labs:    Results from last 7 days   Lab Units 10/30/19  0429   WBC Thousand/uL 5 80   HEMOGLOBIN g/dL 11 9*   HEMATOCRIT % 34 7*   PLATELETS Thousands/uL 248     Results from last 7 days   Lab Units 10/31/19  0431 10/30/19  0429   SODIUM mmol/L 138 138   POTASSIUM mmol/L 3 3* 3 3*   CHLORIDE mmol/L 106 106   CO2 mmol/L 26 27   BUN mg/dL 5 11   CREATININE mg/dL 0 74 0 81   ANION GAP mmol/L 6 5   CALCIUM mg/dL 8 9 8 7   ALBUMIN g/dL  --  3 1   TOTAL BILIRUBIN mg/dL  --  0 30   ALK PHOS U/L  --  38*   ALT U/L  --  38   AST U/L  --  52   GLUCOSE RANDOM mg/dL 115* 116*     Results from last 7 days   Lab Units 10/28/19  0946   INR  1 23*                       * I Have Reviewed All Lab Data Listed Above  * Additional Pertinent Lab Tests Reviewed: All Labs For Current Hospital Admission Reviewed    Imaging:    Imaging Reports Reviewed Today Include:  None today  Imaging Personally Reviewed by Myself Includes:   Old reviewed    Recent Cultures (last 7 days):           Last 24 Hours Medication List:     Current Facility-Administered Medications:  acetaminophen 650 mg Oral Q6H PRN Lizeth Parkinson MD   amLODIPine 10 mg Oral Daily ALEX White   aspirin 325 mg Oral Daily ALEX White   enoxaparin 30 mg Subcutaneous Q24H 7955 ALEX Bui   fenofibrate 168 mg Oral Daily ALEX White   influenza vaccine 0 5 mL Intramuscular Once ALEX White   levothyroxine 12 5 mcg Oral Early Morning ALEX White   losartan 50 mg Oral Daily ALEX White   nicotine 1 patch Transdermal Daily ALEX White ondansetron 4 mg Oral Q6H PRN Dierdre Class V, PA-C   pancrelipase (Lip-Prot-Amyl) 24,000 Units Oral TID With Meals Elis Ray MD   pantoprazole 40 mg Intravenous Q24H [address] ALEX Martinez   traMADol 50 mg Oral Q6H PRN Valeria Bello MD        Today, Patient Was Seen By: Valeria Bello MD    ** Please Note: Dictation voice to text software may have been used in the creation of this document   **

## 2019-11-01 NOTE — PROGRESS NOTES
[facility]  Progress Note - Kt Preston 81 y o  male MRN: [id number]    Unit/Bed#: 7T [facility] 702-02 Encounter: [id number]    Assessment/Plan:  Kt Preston is a 81 y o  male p/w pancreatitis  He has been doing better in terms of ability to tolerate PO, but still ongoing pain  Imaging with fluid collections, likely related to pancreatitis  Overall, he is improving, with the exception of the pain  EGD biopsies were normal      -- Continue to advance diet as tolerated  -- IV fluids  -- Pain control as needed  Would transition to PO pain medications in anticipation of discharge hopefully early next week (with close outpatient follow-up)  -- Will need outpatient imaging for follow-up of the cysts    Subjective:   Patient seen this AM  He continues to have abdominal pain, reportedly unchanged  However, he reports that he has been tolerating a diet without difficulty and he denies nausea or vomiting  Last BM Wednesday and reportedly normal, without BRBPR or melena  No additional complaints at this time  Objective:     Vitals: Blood pressure 151/67, pulse 70, temperature (!) 97 3 °F (36 3 °C), temperature source Temporal, resp  rate 20, height 5' 3" (1 6 m), weight 66 2 kg (145 lb 15 1 oz), SpO2 94 %  ,Body mass index is 25 85 kg/m²        Intake/Output Summary (Last 24 hours) at 11/1/2019 0825  Last data filed at 11/1/2019 0556  Gross per 24 hour   Intake 1880 ml   Output 300 ml   Net 1580 ml       Review of Systems: as per HPI  10 point ROS reviewed and negative, except as above    Physical Exam:     General: Well-appearing, NAD  Eyes: EOMI, no conjunctival icterus or pallor  ENT: No oral lesions appreciated on visualization  Lymph: No sara-umbilical lymphadenopathy noted  CV: RRR, no m/r/g appreciated  Resp: CTAB, normal chest rise  Abdominal: Soft, non-tender, non-distended, +bowel sounds, no masses appreciated  Extremities: Warm, no deformities, no edema  Skin: No rashes  Neuro: CN II-XII grossly intact  Psych: Normal affect        Invasive Devices     Peripheral Intravenous Line            Peripheral IV 10/27/19 Right Hand 4 days                 Lab Results   Component Value Date    WBC 5 80 10/30/2019    HGB 11 9 (L) 10/30/2019    HCT 34 7 (L) 10/30/2019    MCV 99 10/30/2019     10/30/2019     Lab Results   Component Value Date    SODIUM 138 10/31/2019    K 3 3 (L) 10/31/2019     10/31/2019    CO2 26 10/31/2019    AGAP 6 10/31/2019    BUN 5 10/31/2019    CREATININE 0 74 10/31/2019    GLUC 115 (H) 10/31/2019    CALCIUM 8 9 10/31/2019    AST 52 10/30/2019    ALT 38 10/30/2019    ALKPHOS 38 (L) 10/30/2019    TP 5 8 (L) 10/30/2019    TBILI 0 30 10/30/2019    EGFR 95 10/31/2019     Lab Results   Component Value Date    LIPASE 549 (H) 10/31/2019     EGD (10/28/2019): Mild duodenitis only-distal duodenum and jejunum appeared normal  Nonobstructing Schatzki's ring  Small duodenal diverticulum versus prior peptic ulcer disease     Path (10/28/2019):  A  Stomach, Body, Biopsy:  - Oxyntic-type gastric mucosa with no specific pathologic change  Imaging Studies: I have personally reviewed pertinent reports  MRI/MRCP (10/29/2019):  1  Acute pancreatitis again seen with fluid and stranding mainly surrounding the thickened third portion of the duodenum  2   Fluid-filled structures in the pancreatic head seen on CT do not communicate with the normal appearing pancreatic duct  These are likely pancreatic fluid collections related to acute pancreatitis  A linear collection appears to communicate with adjacent peripancreatic fluid  A follow-up MRI/MRCP is recommended following resolution of acute illness to exclude neoplastic cysts  3  Small fluid collection adjacent to the duodenum also likely related to pancreatitis  4   Mild hepatic steatosis  CT CAP (10/25/2019):  1  No evidence of pulmonary embolus  2  Mild COPD     3  4 mm nodular density right upper lobe favored to represent a small focus of mucus plugging  Tiny endobronchial lesion cannot be excluded  Follow-up CT chest in 6 months recommended to ensure stability/resolution  Suspected adherent mucus along the   left mainstem bronchus can also be reevaluated at that time  4  Persistent thickening and adjacent inflammatory changes about the duodenojejunal junction with mild thickening of proximal jejunal loops  Findings could represent represent infectious or inflammatory enteritis  Reactive changes from acute pancreatitis cannot be excluded  No evidence of bowel obstruction  5  Fatty liver with prominent left lobe and lobular surface for which cirrhosis cannot be excluded  6  Mildly prominent low density branching structures in the pancreatic head favored to represent ductal prominence  This can be evaluated with outpatient MRCP  7   Small amount of abdominopelvic ascites, slightly increased  8  Trace bilateral pleural effusions with mild dependent atelectasis

## 2019-11-01 NOTE — ASSESSMENT & PLAN NOTE
Patient has abdominal pain for the last 1 week  He had pretty severe pain for the last 3 days and today finally his pain is decreased  Will discuss with GI and plan for EGD today if they agree  · Lipase notably elevated at [phone]- [phone]  · CT abdomen pelvis repeated and shows acute peripancreatic edema  No gallstones or pancreatic ductal stones visualized  · Lipid panel normal  · Patient states he does have problems with triglycerides and does take multiple medications to help control his cholesterol which may be playing a factor in causing his acute pancreatitis  It may also be secondary to this adjacent duodenitis/jejunitis  · Patient denies heavy alcohol use  · Protonix 40 mg IV Q 24 hours  · Discontinue Dilaudid and use p r n   Morphine IV if required for pain control  · Today abdominal pain is worse generalized did have some gravy discussed with GI low-fat diet MR CP looking for pancreatic and duodenal duct  · Continue pain management PPI  · MRI confirmed acute pancreatitis no other pathology lipase 722 pain persist  · Tolerating diet okay but complaining of is still pain no change in pain  · Lipase is above 500  · Add Creon may have full to relieve pain

## 2019-11-01 NOTE — NURSING NOTE
Received this evening alert and awake  Pt pleasant and cooperative  C/o abdominal pain, morphine given as ordered  Pt in no distress  No other complaints verbalized at this time   Will monitor

## 2019-11-01 NOTE — PLAN OF CARE
Problem: Potential for Falls  Goal: Patient will remain free of falls  Description  INTERVENTIONS:  - Assess patient frequently for physical needs  -  Identify cognitive and physical deficits and behaviors that affect risk of falls    -  [place] fall precautions as indicated by assessment   - Educate patient/family on patient safety including physical limitations  - Instruct patient to call for assistance with activity based on assessment  - Modify environment to reduce risk of injury  - Consider OT/PT consult to assist with strengthening/mobility  Outcome: Progressing     Problem: PAIN - ADULT  Goal: Verbalizes/displays adequate comfort level or baseline comfort level  Description  Interventions:  - Encourage patient to monitor pain and request assistance  - Assess pain using appropriate pain scale  - Administer analgesics based on type and severity of pain and evaluate response  - Implement non-pharmacological measures as appropriate and evaluate response  - Consider cultural and social influences on pain and pain management  - Notify physician/advanced practitioner if interventions unsuccessful or patient reports new pain  Outcome: Progressing     Problem: INFECTION - ADULT  Goal: Absence or prevention of progression during hospitalization  Description  INTERVENTIONS:  - Assess and monitor for signs and symptoms of infection  - Monitor lab/diagnostic results  - Monitor all insertion sites, i e  indwelling lines, tubes, and drains  - Monitor endotracheal if appropriate and nasal secretions for changes in amount and color  - [place] appropriate cooling/warming therapies per order  - Administer medications as ordered  - Instruct and encourage patient and family to use good hand hygiene technique  - Identify and instruct in appropriate isolation precautions for identified infection/condition  Outcome: Progressing     Problem: SAFETY ADULT  Goal: Maintain or return to baseline ADL function  Description  INTERVENTIONS:  -  Assess patient's ability to carry out ADLs; assess patient's baseline for ADL function and identify physical deficits which impact ability to perform ADLs (bathing, care of mouth/teeth, toileting, grooming, dressing, etc )  - Assess/evaluate cause of self-care deficits   - Assess range of motion  - Assess patient's mobility; develop plan if impaired  - Assess patient's need for assistive devices and provide as appropriate  - Encourage maximum independence but intervene and supervise when necessary  - Involve family in performance of ADLs  - Assess for home care needs following discharge   - Consider OT consult to assist with ADL evaluation and planning for discharge  - Provide patient education as appropriate  Outcome: Progressing  Goal: Maintain or return mobility status to optimal level  Description  INTERVENTIONS:  - Assess patient's baseline mobility status (ambulation, transfers, stairs, etc )    - Identify cognitive and physical deficits and behaviors that affect mobility  - Identify mobility aids required to assist with transfers and/or ambulation (gait belt, sit-to-stand, lift, walker, cane, etc )  - [place] fall precautions as indicated by assessment  - Record patient progress and toleration of activity level on Mobility SBAR; progress patient to next Phase/Stage  - Instruct patient to call for assistance with activity based on assessment  - Consider rehabilitation consult to assist with strengthening/weightbearing, etc   Outcome: Progressing     Problem: DISCHARGE PLANNING  Goal: Discharge to home or other facility with appropriate resources  Description  INTERVENTIONS:  - Identify barriers to discharge w/patient and caregiver  - Arrange for needed discharge resources and transportation as appropriate  - Identify discharge learning needs (meds, wound care, etc )  - Arrange for interpretive services to assist at discharge as needed  - Refer to Case Management Department for coordinating discharge planning if the patient needs post-hospital services based on physician/advanced practitioner order or complex needs related to functional status, cognitive ability, or social support system  Outcome: Progressing     Problem: Knowledge Deficit  Goal: Patient/family/caregiver demonstrates understanding of disease process, treatment plan, medications, and discharge instructions  Description  Complete learning assessment and assess knowledge base  Interventions:  - Provide teaching at level of understanding  - Provide teaching via preferred learning methods  Outcome: Progressing     Problem: DISCHARGE PLANNING - CARE MANAGEMENT  Goal: Discharge to post-acute care or home with appropriate resources  Description  INTERVENTIONS:  - Conduct assessment to determine patient/family and health care team treatment goals, and need for post-acute services based on payer coverage, community resources, and patient preferences, and barriers to discharge  - Address psychosocial, clinical, and financial barriers to discharge as identified in assessment in conjunction with the patient/family and health care team  - Arrange appropriate level of post-acute services according to patients   needs and preference and payer coverage in collaboration with the physician and health care team  - Communicate with and update the patient/family, physician, and health care team regarding progress on the discharge plan  - Arrange appropriate transportation to post-acute venues  Outcome: Progressing     Problem: Nutrition/Hydration-ADULT  Goal: Nutrient/Hydration intake appropriate for improving, restoring or maintaining nutritional needs  Description  Monitor and assess patient's nutrition/hydration status for malnutrition  Collaborate with interdisciplinary team and initiate plan and interventions as ordered  Monitor patient's weight and dietary intake as ordered or per policy   Utilize nutrition screening tool and intervene as necessary  Determine patient's food preferences and provide high-protein, high-caloric foods as appropriate       INTERVENTIONS:  - Monitor oral intake, urinary output, labs, and treatment plans  - Assess nutrition and hydration status and recommend course of action  - Evaluate amount of meals eaten  - Assist patient with eating if necessary   - Allow adequate time for meals  - Recommend/ encourage appropriate diets, oral nutritional supplements, and vitamin/mineral supplements  - Order, calculate, and assess calorie counts as needed  - Recommend, monitor, and adjust tube feedings and TPN/PPN based on assessed needs  - Assess need for intravenous fluids  - Provide specific nutrition/hydration education as appropriate  - Include patient/family/caregiver in decisions related to nutrition  Outcome: Progressing

## 2019-11-01 NOTE — PROGRESS NOTES
Father looks fine today  Sitting up in bed reading the paper  Still complains of abdominal pain but less  Complains of epigastric fullness which is consistent with his pancreatitis  Tolerating his diet  Good GI function  Started on pancreatic enzymes  Anticipate DC soon  Discussed with internal medicine

## 2019-11-02 VITALS
OXYGEN SATURATION: 92 % | BODY MASS INDEX: 25.55 KG/M2 | TEMPERATURE: 99.7 F | HEART RATE: 74 BPM | DIASTOLIC BLOOD PRESSURE: 83 MMHG | HEIGHT: 63 IN | RESPIRATION RATE: 18 BRPM | WEIGHT: 144.18 LBS | SYSTOLIC BLOOD PRESSURE: 137 MMHG

## 2019-11-02 PROBLEM — K52.9 JEJUNITIS: Status: RESOLVED | Noted: 2019-10-26 | Resolved: 2019-11-02

## 2019-11-02 PROBLEM — K85.30 DRUG-INDUCED ACUTE PANCREATITIS WITHOUT INFECTION OR NECROSIS: Status: RESOLVED | Noted: 2019-10-23 | Resolved: 2019-11-02

## 2019-11-02 PROBLEM — J96.01 ACUTE RESPIRATORY FAILURE WITH HYPOXIA (HCC): Status: RESOLVED | Noted: 2019-10-25 | Resolved: 2019-11-02

## 2019-11-02 LAB
ANION GAP SERPL CALCULATED.3IONS-SCNC: 6 MMOL/L (ref 5–14)
BUN SERPL-MCNC: 13 MG/DL (ref 5–25)
CALCIUM SERPL-MCNC: 9.5 MG/DL (ref 8.4–10.2)
CHLORIDE SERPL-SCNC: 106 MMOL/L (ref 97–108)
CO2 SERPL-SCNC: 26 MMOL/L (ref 22–30)
CREAT SERPL-MCNC: 0.89 MG/DL (ref 0.7–1.5)
GFR SERPL CREATININE-BSD FRML MDRD: 88 ML/MIN/1.73SQ M
GLUCOSE SERPL-MCNC: 96 MG/DL (ref 70–99)
LIPASE SERPL-CCNC: 838 U/L (ref 23–300)
POTASSIUM SERPL-SCNC: 3.8 MMOL/L (ref 3.6–5)
SODIUM SERPL-SCNC: 138 MMOL/L (ref 137–147)

## 2019-11-02 PROCEDURE — 83690 ASSAY OF LIPASE: CPT | Performed by: INTERNAL MEDICINE

## 2019-11-02 PROCEDURE — 99239 HOSP IP/OBS DSCHRG MGMT >30: CPT | Performed by: STUDENT IN AN ORGANIZED HEALTH CARE EDUCATION/TRAINING PROGRAM

## 2019-11-02 PROCEDURE — 80048 BASIC METABOLIC PNL TOTAL CA: CPT | Performed by: INTERNAL MEDICINE

## 2019-11-02 RX ORDER — LOSARTAN POTASSIUM 50 MG/1
50 TABLET ORAL DAILY
Qty: 90 TABLET | Refills: 0
Start: 2019-11-02 | End: 2019-11-02 | Stop reason: SDUPTHER

## 2019-11-02 RX ORDER — PANTOPRAZOLE SODIUM 40 MG/1
40 TABLET, DELAYED RELEASE ORAL DAILY
Qty: 90 TABLET | Refills: 0 | Status: SHIPPED | OUTPATIENT
Start: 2019-11-02 | End: 2020-02-28

## 2019-11-02 RX ORDER — OXYCODONE HYDROCHLORIDE AND ACETAMINOPHEN 5; 325 MG/1; MG/1
1 TABLET ORAL EVERY 4 HOURS PRN
Qty: 18 TABLET | Refills: 0 | Status: SHIPPED | OUTPATIENT
Start: 2019-11-02 | End: 2019-11-05

## 2019-11-02 RX ORDER — LEVOTHYROXINE SODIUM 0.03 MG/1
12.5 TABLET ORAL DAILY
Qty: 45 TABLET | Refills: 0 | Status: SHIPPED | OUTPATIENT
Start: 2019-11-02 | End: 2019-12-19 | Stop reason: SDUPTHER

## 2019-11-02 RX ORDER — ONDANSETRON 4 MG/1
4 TABLET, ORALLY DISINTEGRATING ORAL EVERY 6 HOURS PRN
Qty: 20 TABLET | Refills: 0 | Status: SHIPPED | OUTPATIENT
Start: 2019-11-02 | End: 2019-11-05

## 2019-11-02 RX ORDER — EZETIMIBE AND SIMVASTATIN 10; 40 MG/1; MG/1
1 TABLET ORAL
Qty: 90 TABLET | Refills: 0 | Status: SHIPPED | OUTPATIENT
Start: 2019-11-02 | End: 2019-11-05

## 2019-11-02 RX ORDER — OMEGA-3-ACID ETHYL ESTERS 1 G/1
2 CAPSULE, LIQUID FILLED ORAL 2 TIMES DAILY
Qty: 360 CAPSULE | Refills: 0 | Status: SHIPPED | OUTPATIENT
Start: 2019-11-02 | End: 2019-11-05

## 2019-11-02 RX ORDER — NICOTINE 21 MG/24HR
1 PATCH, TRANSDERMAL 24 HOURS TRANSDERMAL DAILY
Qty: 28 PATCH | Refills: 0 | Status: SHIPPED | OUTPATIENT
Start: 2019-11-03 | End: 2019-11-05

## 2019-11-02 RX ORDER — ELECTROLYTES/DEXTROSE
1 SOLUTION, ORAL ORAL EVERY 24 HOURS
Qty: 90 TABLET | Refills: 0 | Status: SHIPPED | OUTPATIENT
Start: 2019-11-02 | End: 2019-11-05

## 2019-11-02 RX ORDER — AMLODIPINE BESYLATE 10 MG/1
10 TABLET ORAL DAILY
Qty: 90 TABLET | Refills: 0 | Status: SHIPPED | OUTPATIENT
Start: 2019-11-02 | End: 2020-02-07

## 2019-11-02 RX ORDER — FENOFIBRATE 160 MG/1
160 TABLET ORAL DAILY
Qty: 90 TABLET | Refills: 0 | Status: SHIPPED | OUTPATIENT
Start: 2019-11-02 | End: 2019-11-05

## 2019-11-02 RX ORDER — RANITIDINE 150 MG/1
150 TABLET ORAL 2 TIMES DAILY
Qty: 180 TABLET | Refills: 0 | Status: SHIPPED | OUTPATIENT
Start: 2019-11-02 | End: 2020-03-06 | Stop reason: HOSPADM

## 2019-11-02 RX ORDER — LOSARTAN POTASSIUM 50 MG/1
50 TABLET ORAL DAILY
Qty: 90 TABLET | Refills: 0
Start: 2019-11-02 | End: 2019-12-19 | Stop reason: SDUPTHER

## 2019-11-02 RX ADMIN — TRAMADOL HYDROCHLORIDE 50 MG: 50 TABLET, FILM COATED ORAL at 09:18

## 2019-11-02 RX ADMIN — AMLODIPINE BESYLATE 10 MG: 10 TABLET ORAL at 09:18

## 2019-11-02 RX ADMIN — LEVOTHYROXINE SODIUM 12.5 MCG: 25 TABLET ORAL at 06:10

## 2019-11-02 RX ADMIN — PANTOPRAZOLE SODIUM 40 MG: 40 TABLET, DELAYED RELEASE ORAL at 06:10

## 2019-11-02 RX ADMIN — ASPIRIN 325 MG ORAL TABLET 325 MG: 325 PILL ORAL at 09:17

## 2019-11-02 RX ADMIN — LOSARTAN POTASSIUM 50 MG: 50 TABLET, FILM COATED ORAL at 09:18

## 2019-11-02 RX ADMIN — FENOFIBRATE 168 MG: 48 TABLET, FILM COATED ORAL at 09:18

## 2019-11-02 RX ADMIN — PANCRELIPASE 24000 UNITS: 24000; 76000; 120000 CAPSULE, DELAYED RELEASE PELLETS ORAL at 09:19

## 2019-11-02 NOTE — ASSESSMENT & PLAN NOTE
· Lipase notably elevated at 791-> 659->397->345  · CT abdomen pelvis repeated and shows acute peripancreatic edema  No gallstones or pancreatic ductal stones visualized  · Lipid panel normal  · Patient states he does have problems with triglycerides and does take multiple medications to help control his cholesterol which may be playing a factor in causing his acute pancreatitis  It may also be secondary to this adjacent duodenitis/jejunitis    · Patient denies heavy alcohol use  · Protonix 40 mg IV Q 24 hours->transitioned to PO   · Today abdominal pain is worse generalized did have some gravy discussed with GI low-fat diet MR CP looking for pancreatic and duodenal duct  · Continue pain management PPI  · MRI confirmed acute pancreatitis no other pathology lipase 722 pain persist  · Tolerating diet okay   · Lipase is above 500  · Add Creon may have full to relieve pain  · Now clinically improving and stable for discharge

## 2019-11-02 NOTE — ASSESSMENT & PLAN NOTE
Patient had acute hypoxic respiratory failure  Patient does not have any history of using any oxygen at home and here he is 84% on room air  He does have an extensive smoking history   CT PE study was negative    Patient was found to have small bilateral pleural effusions   IV fluid rate decreased   Also given a dose of Lasix 20 mg IV once to help with his shortness of breath again  Shortness of breath is improved without oxygen  Bilateral small  pleural effusion etiology most probably secondary to fluid will check 2D echo had last echo done in 2018, pulmonary status stable

## 2019-11-02 NOTE — ASSESSMENT & PLAN NOTE
Patient CT abdomen pelvis shows acute jejunitis and also duodenitis  Initially tried to treat without antibiotics however his pain was very severe    It is now solid but low-fat low residual started by GI  Diet restarted prior to discharge   Patient abdominal pain is more from pancreatitis and jejunitis, now controlled  Noted GI and note biopsy negative most pain is secondary to pancreatitis, antibiotics discontinued  Started on pancreatic enzymes

## 2019-11-02 NOTE — PLAN OF CARE
Problem: Potential for Falls  Goal: Patient will remain free of falls  Description  INTERVENTIONS:  - Assess patient frequently for physical needs  -  Identify cognitive and physical deficits and behaviors that affect risk of falls    -  [place] fall precautions as indicated by assessment   - Educate patient/family on patient safety including physical limitations  - Instruct patient to call for assistance with activity based on assessment  - Modify environment to reduce risk of injury  - Consider OT/PT consult to assist with strengthening/mobility  Outcome: Adequate for Discharge     Problem: PAIN - ADULT  Goal: Verbalizes/displays adequate comfort level or baseline comfort level  Description  Interventions:  - Encourage patient to monitor pain and request assistance  - Assess pain using appropriate pain scale  - Administer analgesics based on type and severity of pain and evaluate response  - Implement non-pharmacological measures as appropriate and evaluate response  - Consider cultural and social influences on pain and pain management  - Notify physician/advanced practitioner if interventions unsuccessful or patient reports new pain  Outcome: Adequate for Discharge     Problem: INFECTION - ADULT  Goal: Absence or prevention of progression during hospitalization  Description  INTERVENTIONS:  - Assess and monitor for signs and symptoms of infection  - Monitor lab/diagnostic results  - Monitor all insertion sites, i e  indwelling lines, tubes, and drains  - Monitor endotracheal if appropriate and nasal secretions for changes in amount and color  - [place] appropriate cooling/warming therapies per order  - Administer medications as ordered  - Instruct and encourage patient and family to use good hand hygiene technique  - Identify and instruct in appropriate isolation precautions for identified infection/condition  Outcome: Adequate for Discharge     Problem: SAFETY ADULT  Goal: Maintain or return to baseline ADL function  Description  INTERVENTIONS:  -  Assess patient's ability to carry out ADLs; assess patient's baseline for ADL function and identify physical deficits which impact ability to perform ADLs (bathing, care of mouth/teeth, toileting, grooming, dressing, etc )  - Assess/evaluate cause of self-care deficits   - Assess range of motion  - Assess patient's mobility; develop plan if impaired  - Assess patient's need for assistive devices and provide as appropriate  - Encourage maximum independence but intervene and supervise when necessary  - Involve family in performance of ADLs  - Assess for home care needs following discharge   - Consider OT consult to assist with ADL evaluation and planning for discharge  - Provide patient education as appropriate  Outcome: Adequate for Discharge  Goal: Maintain or return mobility status to optimal level  Description  INTERVENTIONS:  - Assess patient's baseline mobility status (ambulation, transfers, stairs, etc )    - Identify cognitive and physical deficits and behaviors that affect mobility  - Identify mobility aids required to assist with transfers and/or ambulation (gait belt, sit-to-stand, lift, walker, cane, etc )  - [place] fall precautions as indicated by assessment  - Record patient progress and toleration of activity level on Mobility SBAR; progress patient to next Phase/Stage  - Instruct patient to call for assistance with activity based on assessment  - Consider rehabilitation consult to assist with strengthening/weightbearing, etc   Outcome: Adequate for Discharge     Problem: DISCHARGE PLANNING  Goal: Discharge to home or other facility with appropriate resources  Description  INTERVENTIONS:  - Identify barriers to discharge w/patient and caregiver  - Arrange for needed discharge resources and transportation as appropriate  - Identify discharge learning needs (meds, wound care, etc )  - Arrange for interpretive services to assist at discharge as needed  - Refer to Case Management Department for coordinating discharge planning if the patient needs post-hospital services based on physician/advanced practitioner order or complex needs related to functional status, cognitive ability, or social support system  Outcome: Adequate for Discharge     Problem: Knowledge Deficit  Goal: Patient/family/caregiver demonstrates understanding of disease process, treatment plan, medications, and discharge instructions  Description  Complete learning assessment and assess knowledge base  Interventions:  - Provide teaching at level of understanding  - Provide teaching via preferred learning methods  Outcome: Adequate for Discharge     Problem: DISCHARGE PLANNING - CARE MANAGEMENT  Goal: Discharge to post-acute care or home with appropriate resources  Description  INTERVENTIONS:  - Conduct assessment to determine patient/family and health care team treatment goals, and need for post-acute services based on payer coverage, community resources, and patient preferences, and barriers to discharge  - Address psychosocial, clinical, and financial barriers to discharge as identified in assessment in conjunction with the patient/family and health care team  - Arrange appropriate level of post-acute services according to patients   needs and preference and payer coverage in collaboration with the physician and health care team  - Communicate with and update the patient/family, physician, and health care team regarding progress on the discharge plan  - Arrange appropriate transportation to post-acute venues  Outcome: Adequate for Discharge     Problem: Nutrition/Hydration-ADULT  Goal: Nutrient/Hydration intake appropriate for improving, restoring or maintaining nutritional needs  Description  Monitor and assess patient's nutrition/hydration status for malnutrition  Collaborate with interdisciplinary team and initiate plan and interventions as ordered    Monitor patient's weight and dietary intake as ordered or per policy  Utilize nutrition screening tool and intervene as necessary  Determine patient's food preferences and provide high-protein, high-caloric foods as appropriate       INTERVENTIONS:  - Monitor oral intake, urinary output, labs, and treatment plans  - Assess nutrition and hydration status and recommend course of action  - Evaluate amount of meals eaten  - Assist patient with eating if necessary   - Allow adequate time for meals  - Recommend/ encourage appropriate diets, oral nutritional supplements, and vitamin/mineral supplements  - Order, calculate, and assess calorie counts as needed  - Recommend, monitor, and adjust tube feedings and TPN/PPN based on assessed needs  - Assess need for intravenous fluids  - Provide specific nutrition/hydration education as appropriate  - Include patient/family/caregiver in decisions related to nutrition  Outcome: Adequate for Discharge

## 2019-11-02 NOTE — ASSESSMENT & PLAN NOTE
· Continue home tricor as patient has history of hypertriglyceridemia    · Lipid panel shows normal triglyceride level

## 2019-11-02 NOTE — CASE MANAGEMENT
CM made aware by attending that patient does not currently have a PCP, CM spoke to patient regarding info link, he can call the number to get established with a PCP, placed on AVS and patient verbalized understanding  CM explained IMM #2, patient signed and original placed into bin for scanning into EHR, copy given to patient

## 2019-11-02 NOTE — DISCHARGE SUMMARY
Discharge- Marko Paiz [facility] CHILD AND ADOLESCENT [facility] 1950, 81 y o  male MRN: [id number]    Unit/Bed#: 7T [facility] 702-02 Encounter: [id number]    Primary Care Provider: Yvrose Aguayo DO   Date and time admitted to hospital: 10/23/2019  6:47 PM        Jejunitis  Assessment & Plan  Patient CT abdomen pelvis shows acute jejunitis and also duodenitis  Initially tried to treat without antibiotics however his pain was very severe  It is now solid but low-fat low residual started by GI  Diet restarted prior to discharge   Patient abdominal pain is more from pancreatitis and jejunitis, now controlled  Noted GI and note biopsy negative most pain is secondary to pancreatitis, antibiotics discontinued  Started on pancreatic enzymes     Acute respiratory failure with hypoxia [facility])  Assessment & Plan  Patient had acute hypoxic respiratory failure  Patient does not have any history of using any oxygen at home and here he is 84% on room air  He does have an extensive smoking history   CT PE study was negative  Patient was found to have small bilateral pleural effusions   IV fluid rate decreased   Also given a dose of Lasix 20 mg IV once to help with his shortness of breath again  Shortness of breath is improved without oxygen  Bilateral small  pleural effusion etiology most probably secondary to fluid will check 2D echo had last echo done in 2018, pulmonary status stable       Tobacco abuse  Assessment & Plan  · Cessation counseling      Mixed hyperlipidemia  Assessment & Plan  · Continue home tricor as patient has history of hypertriglyceridemia  · Lipid panel shows normal triglyceride level      Hypothyroidism (acquired)  Assessment & Plan  · TSH is 5 31  · Continue home Synthroid    Prediabetes  Assessment & Plan  · Takes metformin as outpatient  · Hemoglobin A1c is 5 1    Discontinue metformin    Essential hypertension, benign  Assessment & Plan  · Continue home amlodipine and losartan     * Drug-induced acute pancreatitis without infection or necrosis  Assessment & Plan  · Lipase notably elevated at 791-> 659->397->345  · CT abdomen pelvis repeated and shows acute peripancreatic edema  No gallstones or pancreatic ductal stones visualized  · Lipid panel normal  · Patient states he does have problems with triglycerides and does take multiple medications to help control his cholesterol which may be playing a factor in causing his acute pancreatitis  It may also be secondary to this adjacent duodenitis/jejunitis  · Patient denies heavy alcohol use  · Protonix 40 mg IV Q 24 hours->transitioned to PO   · Today abdominal pain is worse generalized did have some gravy discussed with GI low-fat diet MR CP looking for pancreatic and duodenal duct  · Continue pain management PPI  · MRI confirmed acute pancreatitis no other pathology lipase 722 pain persist  · Tolerating diet okay   · Lipase is above 500  · Add Creon may have full to relieve pain  · Now clinically improving and stable for discharge           Discharging Physician / Practitioner: Sherly Crawford MD  PCP: Jessica Fish DO  Admission Date:   Admission Orders (From admission, onward)     Ordered        10/25/19 1655  Inpatient Admission  Once         10/23/19 2302  Place in Observation (expected length of stay for this patient is less than two midnights)  Once                   Discharge Date: 11/02/19    Disposition:      Other: Home    For Discharges to [address] [facility]:   · Not Applicable to this Patient - Not Applicable to this Patient    Reason for Admission:  Abdominal pain    Discharge Diagnoses:     Please see assessment and plan section above for further details regarding discharge diagnoses       Resolved Problems  Date Reviewed: 11/1/2019    None          Consultations During Hospital Stay:  · Gastroenterology    Procedures Performed:   · EGD     Medication Adjustments and Discharge Medications:  · Summary of Medication Adjustments made as a result of this hospitalization: Pancreatic enzymes, Percocet  · Medication Dosing Tapers - Please refer to Discharge Medication List for details on any medication dosing tapers (if applicable to patient)  · Medications being temporarily held (include recommended restart time):  Discontinued metformin  · Discharge Medication List: See after visit summary for reconciled discharge medications  Wound Care Recommendations:  When applicable, please see wound care section of After Visit Summary  Instructions for any Catheters / Lines Present at Discharge (including removal date, if applicable):  None    Significant Findings / Test Results:   · Acute pancreatitis    Incidental Findings:   · None     Test Results Pending at Discharge (will require follow up): · None     Outpatient Tests Requested:  · None    Complications:  Acute hypoxic respiratory failure    Hospital Course:     Mark Peace is a 81 y o  male patient who originally presented to the hospital on 10/23/2019 due to abdominal pain  Admitted for pancreatitis with course complicated by hypoxic respiratory failure suspected to be secondary to IV fluid hydration  Gastroenterology was consulted and endoscopy was performed  Clinically progress throughout hospital stay and eventually medically cleared for discharge on 11/02/2019  Condition at Discharge: good     Discharge Day Visit / Exam:     Subjective:  Patient feels well today, pain is 2/10    Vitals: Blood Pressure: 137/83 (11/02/19 0713)  Pulse: 74 (11/02/19 0713)  Temperature: 99 7 °F (37 6 °C) (11/02/19 0713)  Temp Source: Temporal (11/02/19 0713)  Respirations: 18 (11/02/19 0713)  Height: 5' 3" (160 cm) (10/24/19 0430)  Weight - Scale: 65 4 kg (144 lb 2 9 oz) (11/02/19 0544)  SpO2: 92 % (11/02/19 0713)  Exam:   No acute distress resting comfortably  Regular rate rhythm  Clear to auscultation bilaterally  Bowel sounds positive, minimal tenderness in epigastric region, nondistended  No lower extremity edema  Alert and oriented x3  Discussion with Family:  Acute pancreatitis    Goals of Care Discussions:  · Code Status at Discharge: Prior  · Were there any Goals of Care Discussions during Hospitalization?: No  · Results of any General Goals of Care Discussions:  None   · POLST Completed: No   · If POLST Completed, Summary of POLST Agreement Provided Here:  None   · OK to Rehospitalize if Needed? No    Discharge instructions/Information to patient and family:   See after visit summary section titled Discharge Instructions for information provided to patient and family  Planned Readmission:  None      Discharge Statement:  I spent 30 minutes discharging the patient  This time was spent on the day of discharge  I had direct contact with the patient on the day of discharge  Greater than 50% of the total time was spent examining patient, answering all patient questions, arranging and discussing plan of care with patient as well as directly providing post-discharge instructions  Additional time then spent on discharge activities      ** Please Note: This note has been constructed using a voice recognition system **

## 2019-11-02 NOTE — DISCHARGE INSTRUCTIONS
Pancreatitis   WHAT YOU NEED TO KNOW:   Pancreatitis is inflammation of your pancreas  The pancreas is an organ that makes insulin  It also makes enzymes (digestive juices) that help your body digest food  Pancreatitis may be an acute (short-term) problem that happens only once  It may become a chronic (long-term) problem that comes and goes over time  WHILE YOU ARE HERE:   Informed consent  is a legal document that explains the tests, treatments, or procedures that you may need  Informed consent means you understand what will be done and can make decisions about what you want  You give your permission when you sign the consent form  You can have someone sign this form for you if you are not able to sign it  You have the right to understand your medical care in words you know  Before you sign the consent form, understand the risks and benefits of what will be done  Make sure all your questions are answered  An IV  is a small tube placed in your vein that is used to give you medicine or liquids  You may need extra oxygen  if your blood oxygen level is lower than it should be  You may get oxygen through a mask placed over your nose and mouth or through small tubes placed in your nostrils  Ask your healthcare provider before you take off the mask or oxygen tubing  A pulse oximeter  is a device that measures the amount of oxygen in your blood  A cord with a clip or sticky strip is placed on your finger, ear, or toe  The other end of the cord is hooked to a machine  Telemetry  is continuous monitoring of your heart rhythm  Sticky pads placed on your skin connect to an EKG machine that records your heart rhythm  Medicines:   · Antibiotics  treat a bacterial infection  · Nausea medicine  helps calm your stomach and prevents vomiting  · Pain medicine  may be given  Do not wait until the pain is severe before you ask for more medicine    Nutrition support:  If you are not able to eat food normally, you may need to be fed through a feeding tube or a catheter  You may need any of the following:  · Total parenteral nutrition (TPN)  is given through your IV  TPN provides your body with nutrition such as protein, sugar, and vitamins  · A nasogastric (NG) tube  is inserted through your nose and guided down into your stomach  Food and medicine may be given through the NG tube  The tube may instead be attached to suction if your stomach needs to be empty  · A jejunostomy tube (J-tube)  is inserted through an incision in your abdomen  The end of the tube goes into your intestine  The tube is used to give you liquids, food, and medicine  Tests: You may be given contrast liquid to help your pancreas show up better in the pictures  Tell the healthcare provider if you have ever had an allergic reaction to contrast liquid  You may need any of the following:  · Blood tests  may show infection, pancreas function, or provide information about your overall health  · An x-ray, ultrasound, or CT  may show the cause of your pancreatitis and help healthcare providers plan your treatment  · Fine needle aspiration  is a procedure used to find tissue damage or infection in your pancreas  · Endoscopic retrograde cholangiopancreatography (ERCP)  is a procedure used to find stones, tumors, or narrowed bile ducts  · Magnetic retrograde cholangiopancreatography (MRCP)  is similar to ERCP but uses magnetic waves to find gallstones, masses, or other problems  Surgery  may be needed to remove your gallbladder if gallstones are causing your pancreatitis  Surgery may also be done to open blocked ducts or drain pockets of pus caused by infection  RISKS:   Pancreatitis may lead to tissue damage and infection inside the pancreas  It can cause bleeding and fluid leakage into the abdomen  This can lead to low blood pressure, and failure of other organs  Pancreatitis can be life-threatening     CARE AGREEMENT:   You have the right to help plan your care  Learn about your health condition and how it may be treated  Discuss treatment options with your caregivers to decide what care you want to receive  You always have the right to refuse treatment  © 2017 [employer] [employer] Information is for End User's use only and may not be sold, redistributed or otherwise used for commercial purposes  All illustrations and images included in CareNotes® are the copyrighted property of [employer]  or Shailesh Casanova  The above information is an  only  It is not intended as medical advice for individual conditions or treatments  Talk to your doctor, nurse or pharmacist before following any medical regimen to see if it is safe and effective for you

## 2019-11-02 NOTE — NURSING NOTE
Received this evening alert and awake, very pleasant and cooperative  C/o abd pain, tramadol given as ordered  No distress noted   Will monitor

## 2019-11-04 ENCOUNTER — TRANSITIONAL CARE MANAGEMENT (OUTPATIENT)
Dept: FAMILY MEDICINE CLINIC | Facility: CLINIC | Age: 69
End: 2019-11-04

## 2019-11-05 ENCOUNTER — OFFICE VISIT (OUTPATIENT)
Dept: FAMILY MEDICINE CLINIC | Facility: CLINIC | Age: 69
End: 2019-11-05
Payer: COMMERCIAL

## 2019-11-05 VITALS
OXYGEN SATURATION: 97 % | DIASTOLIC BLOOD PRESSURE: 82 MMHG | SYSTOLIC BLOOD PRESSURE: 134 MMHG | RESPIRATION RATE: 16 BRPM | BODY MASS INDEX: 25.76 KG/M2 | WEIGHT: 145.4 LBS | HEIGHT: 63 IN | TEMPERATURE: 98.4 F | HEART RATE: 76 BPM

## 2019-11-05 DIAGNOSIS — M19.90 ARTHRITIS: ICD-10-CM

## 2019-11-05 DIAGNOSIS — I71.2 THORACIC AORTIC ANEURYSM WITHOUT RUPTURE (HCC): ICD-10-CM

## 2019-11-05 DIAGNOSIS — K86.1 CHRONIC PANCREATITIS, UNSPECIFIED PANCREATITIS TYPE (HCC): Primary | ICD-10-CM

## 2019-11-05 DIAGNOSIS — Z23 NEED FOR PNEUMOCOCCAL VACCINATION: ICD-10-CM

## 2019-11-05 DIAGNOSIS — E78.49 OTHER HYPERLIPIDEMIA: ICD-10-CM

## 2019-11-05 DIAGNOSIS — I25.119 ATHEROSCLEROSIS OF NATIVE CORONARY ARTERY WITH ANGINA PECTORIS, UNSPECIFIED WHETHER NATIVE OR TRANSPLANTED HEART (HCC): ICD-10-CM

## 2019-11-05 DIAGNOSIS — Z23 FLU VACCINE NEED: ICD-10-CM

## 2019-11-05 DIAGNOSIS — I25.10 CORONARY ARTERY DISEASE INVOLVING NATIVE CORONARY ARTERY OF NATIVE HEART WITHOUT ANGINA PECTORIS: ICD-10-CM

## 2019-11-05 PROCEDURE — 99496 TRANSJ CARE MGMT HIGH F2F 7D: CPT | Performed by: INTERNAL MEDICINE

## 2019-11-05 RX ORDER — ATORVASTATIN CALCIUM 10 MG/1
10 TABLET, FILM COATED ORAL DAILY
Qty: 30 TABLET | Refills: 5 | Status: SHIPPED | OUTPATIENT
Start: 2019-11-05 | End: 2020-02-07 | Stop reason: HOSPADM

## 2019-11-05 NOTE — PROGRESS NOTES
Assessment/Plan:         Diagnoses and all orders for this visit:    Chronic pancreatitis, unspecified pancreatitis type ([facility] ); R/O malignancy : I Stopped all his OTC Meds, and stopped Aleve, Vytorin,regular aspirin,     -     Ambulatory referral to Gastroenterology; as FU     RTc in 1mo w :  -     Comprehensive metabolic panel; Future  -     CBC and differential; Future  -     Ferritin; Future  -     Hemoglobin A1C; Future  -     Amylase; Future  -     Lipase; Future  -     Magnesium; Future  -     Lipid panel; Future  -     Vitamin D 25 hydroxy; Future  -     Thyroid Antibodies Panel; Future  -     T4, free; Future  -     TSH, 3rd generation; Future  -     MRI abdomen w wo contrast and mrcp;  ASAP  [facility] Future  -     Urinalysis with reflex to microscopic  -     Vitamin B12; Future  -     diclofenac sodium (VOLTAREN) 1 %; Apply 2 g topically 4 (four) times a day Apply to shoulders    Need for pneumococcal vaccination; with next Visit    Flu vaccine need; with next visit    Thoracic aortic aneurysm without rupture ([facility] ); repeat CTA chest in 3 mos  -     VAS carotid complete study; Future    Atherosclerosis of native coronary artery with angina pectoris, unspecified whether native or transplanted heart [facility])  -     Ambulatory referral to Cardiology; Future  -     VAS carotid complete study; Future    Other hyperlipidemia; Change Vytorin To :  -     atorvastatin (LIPITOR) 10 mg tablet; Take 1 tablet (10 mg total) by mouth daily   Stop Fenofibrate  RTc in 1mo w : blood work  Coronary artery disease involving native coronary artery of native heart without angina pectoris  -     aspirin 81 MG tablet; Take 1 tablet (81 mg total) by mouth daily  -     Ambulatory referral to Cardiology; Future    Arthritis;  Mostly shoulders,  STOP Aleve  Try Moist heat  And Voltaren Gel TId    Other orders  -     Cancel: influenza vaccine, [id number], high-dose, PF 0 5 mL (FLUZONE HIGH-DOSE)  -     Cancel: PNEUMOCOCCAL CONJUGATE VACCINE 13-VALENT GREATER THAN 6 MONTHS  -     Discontinue: metFORMIN (GLUCOPHAGE) 500 mg tablet        Subjective:      Patient ID: Nery Smalls is a 81 y o  male  81 Y O man is here for Post hospital visit, I reviewed w pt Detailed H/P and D/C Summary, and med List,     The following portions of the patient's history were reviewed and updated as appropriate: allergies, current medications, past family history, past medical history, past social history, past surgical history and problem list     Review of Systems   Constitutional: Negative for chills, fatigue and fever  HENT: Negative for congestion, facial swelling, sore throat, trouble swallowing and voice change  Eyes: Negative for pain, discharge and visual disturbance  Respiratory: Negative for cough, shortness of breath and wheezing  Cardiovascular: Negative for chest pain, palpitations and leg swelling  Gastrointestinal: Positive for abdominal pain  Negative for blood in stool, constipation, diarrhea and nausea  Endocrine: Negative for polydipsia, polyphagia and polyuria  Genitourinary: Negative for difficulty urinating, hematuria and urgency  Musculoskeletal: Positive for arthralgias  Negative for myalgias  Skin: Negative for rash  Neurological: Negative for dizziness, tremors, weakness and headaches  Hematological: Negative for adenopathy  Does not bruise/bleed easily  Psychiatric/Behavioral: Negative for dysphoric mood, sleep disturbance and suicidal ideas  Objective:      /82 (BP Location: Left arm, Patient Position: Sitting, Cuff Size: Standard)   Pulse 76   Temp 98 4 °F (36 9 °C) (Probe)   Resp 16   Ht 5' 3" (1 6 m)   Wt 66 kg (145 lb 6 4 oz)   SpO2 97%   BMI 25 76 kg/m²          Physical Exam   Constitutional: He is oriented to person, place, and time  He appears well-nourished  No distress  HENT:   Head: Normocephalic  Mouth/Throat: Oropharynx is clear and moist  No oropharyngeal exudate     Eyes: Pupils are equal, round, and reactive to light  Conjunctivae are normal  No scleral icterus  Neck: Neck supple  No thyromegaly present  Cardiovascular: Normal rate and regular rhythm  Murmur heard  Pulmonary/Chest: Effort normal and breath sounds normal  No respiratory distress  He has no wheezes  He has no rales  Abdominal: Soft  Bowel sounds are normal  He exhibits no distension  There is tenderness  There is no rebound and no guarding  Musculoskeletal: He exhibits tenderness  He exhibits no edema  Lymphadenopathy:     He has no cervical adenopathy  Neurological: He is alert and oriented to person, place, and time  No cranial nerve deficit  Coordination normal    Skin: No erythema  No pallor  Psychiatric: He has a normal mood and affect  BMI Counseling: Body mass index is 25 76 kg/m²  The BMI is above normal  Nutrition recommendations include reducing portion sizes and decreasing overall calorie intake

## 2019-11-05 NOTE — PATIENT INSTRUCTIONS
Low Fat Diet   AMBULATORY CARE:   A low-fat diet  is an eating plan that is low in total fat, unhealthy fat, and cholesterol  You may need to follow a low-fat diet if you have trouble digesting or absorbing fat  You may also need to follow this diet if you have high cholesterol  You can also lower your cholesterol by increasing the amount of fiber in your diet  Soluble fiber is a type of fiber that helps to decrease cholesterol levels  Different types of fat in food:   · Limit unhealthy fats  A diet that is high in cholesterol, saturated fat, and trans fat may cause unhealthy cholesterol levels  Unhealthy cholesterol levels increase your risk of heart disease  ¨ Cholesterol:  Limit intake of cholesterol to less than 200 mg per day  Cholesterol is found in meat, eggs, and dairy  ¨ Saturated fat:  Limit saturated fat to less than 7% of your total daily calories  Ask your dietitian how many calories you need each day  Saturated fat is found in butter, cheese, ice cream, whole milk, and palm oil  Saturated fat is also found in meat, such as beef, pork, chicken skin, and processed meats  Processed meats include sausage, hot dogs, and bologna  ¨ Trans fat:  Avoid trans fat as much as possible  Trans fat is used in fried and baked foods  Foods that say trans fat free on the label may still have up to 0 5 grams of trans fat per serving  · Include healthy fats  Replace foods that are high in saturated and trans fat with foods high in healthy fats  This may help to decrease high cholesterol levels  ¨ Monounsaturated fats: These are found in avocados, nuts, and vegetable oils, such as olive, canola, and sunflower oil  ¨ Polyunsaturated fats: These can be found in vegetable oils, such as soybean or corn oil  Omega-3 fats can help to decrease the risk of heart disease  Omega-3 fats are found in fish, such as salmon, herring, trout, and tuna   Omega-3 fats can also be found in plant foods, such as walnuts, flaxseed, soybeans, and canola oil    Foods to limit or avoid:   · Grains:      ¨ Snacks that are made with partially hydrogenated oils, such as chips, regular crackers, and butter-flavored popcorn    ¨ High-fat baked goods, such as biscuits, croissants, doughnuts, pies, cookies, and pastries    · Dairy:      ¨ Whole milk, 2% milk, and yogurt and ice cream made with whole milk    ¨ Half and half creamer, heavy cream, and whipping cream    ¨ Cheese, cream cheese, and sour cream    · Meats and proteins:      ¨ High-fat cuts of meat (T-bone steak, regular hamburger, and ribs)    ¨ Fried meat, poultry (turkey and chicken), and fish    ¨ Poultry (chicken and turkey) with skin    ¨ Cold cuts (salami or bologna), hot dogs, moreno, and sausage    ¨ Whole eggs and egg yolks    · Vegetables and fruits with added fat:      ¨ Fried vegetables or vegetables in butter or high-fat sauces, such as cream or cheese sauces    ¨ Fried fruit or fruit served with butter or cream    · Fats:      ¨ Butter, stick margarine, and shortening    ¨ Coconut, palm oil, and palm kernel oil  Foods to include:   · Grains:      ¨ Whole-grain breads, cereals, pasta, and brown rice    ¨ Low-fat crackers and pretzels    · Vegetables and fruits:      ¨ Fresh, frozen, or canned vegetables (no salt or low-sodium)    ¨ Fresh, frozen, dried, or canned fruit (canned in light syrup or fruit juice)    ¨ Avocado    · Low-fat dairy products:      ¨ Nonfat (skim) or 1% milk    ¨ Nonfat or low-fat cheese, yogurt, and cottage cheese    · Meats and proteins:      ¨ Chicken or turkey with no skin    ¨ Baked or broiled fish    ¨ Lean beef and pork (loin, round, extra lean hamburger)    ¨ Beans and peas, unsalted nuts, soy products    ¨ Egg whites and substitutes    ¨ Seeds and nuts    · Fats:      ¨ Unsaturated oil, such as canola, olive, peanut, soybean, or sunflower oil    ¨ Soft or liquid margarine and vegetable oil spread    ¨ Low-fat salad dressing  Other ways to decrease fat:   · Read food labels before you buy foods  Choose foods that have less than 30% of calories from fat  Choose low-fat or fat-free dairy products  Remember that fat free does not mean calorie free  These foods still contain calories, and too many calories can lead to weight gain  · Trim fat from meat and avoid fried food  Trim all visible fat from meat before you cook it  Remove the skin from poultry  Do not lopez meat, fish, or poultry  Bake, roast, boil, or broil these foods instead  Avoid fried foods  Eat a baked potato instead of Western Diane fries  Steam vegetables instead of sautéing them in butter  · Add less fat to foods  Use imitation moreno bits on salads and baked potatoes instead of regular moreno bits  Use fat-free or low-fat salad dressings instead of regular dressings  Use low-fat or nonfat butter-flavored topping instead of regular butter or margarine on popcorn and other foods  Ways to decrease fat in recipes:  Replace high-fat ingredients with low-fat or nonfat ones  This may cause baked goods to be drier than usual  You may need to use nonfat cooking spray on pans to prevent food from sticking  You also may need to change the amount of other ingredients, such as water, in the recipe  Try the following:  · Use low-fat or light margarine instead of regular margarine or shortening  · Use lean ground turkey breast or chicken, or lean ground beef (less than 5% fat) instead of hamburger  · Add 1 teaspoon of canola oil to 8 ounces of skim milk instead of using cream or half and half  · Use grated zucchini, carrots, or apples in breads instead of coconut  · Use blenderized, low-fat cottage cheese, plain tofu, or low-fat ricotta cheese instead of cream cheese  · Use 1 egg white and 1 teaspoon of canola oil, or use ¼ cup (2 ounces) of fat-free egg substitute instead of a whole egg       · Replace half of the oil that is called for in a recipe with applesauce when you bake  Use 3 tablespoons of cocoa powder and 1 tablespoon of canola oil instead of a square of baking chocolate  How to increase fiber:  Eat enough high-fiber foods to get 20 to 30 grams of fiber every day  Slowly increase your fiber intake to avoid stomach cramps, gas, and other problems  · Eat 3 ounces of whole-grain foods each day  An ounce is about 1 slice of bread  Eat whole-grain breads, such as whole-wheat bread  Whole wheat, whole-wheat flour, or other whole grains should be listed as the first ingredient on the food label  Replace white flour with whole-grain flour or use half of each in recipes  Whole-grain flour is heavier than white flour, so you may have to add more yeast or baking powder  · Eat a high-fiber cereal for breakfast   Oatmeal is a good source of soluble fiber  Look for cereals that have bran or fiber in the name  Choose whole-grain products, such as brown rice, barley, and whole-wheat pasta  · Eat more beans, peas, and lentils  For example, add beans to soups or salads  Eat at least 5 cups of fruits and vegetables each day  Eat fruits and vegetables with the peel because the peel is high in fiber  © 2017 [address] [employer] Information is for End User's use only and may not be sold, redistributed or otherwise used for commercial purposes  All illustrations and images included in CareNotes® are the copyrighted property of [employer] D [employer] M [employer]  or Shailesh Casanova  The above information is an  only  It is not intended as medical advice for individual conditions or treatments  Talk to your doctor, nurse or pharmacist before following any medical regimen to see if it is safe and effective for you  Heart Healthy Diet   AMBULATORY CARE:   A heart healthy diet  is an eating plan low in total fat, unhealthy fats, and sodium (salt)  A heart healthy diet helps decrease your risk for heart disease and stroke   Limit the amount of fat you eat to 25% to 35% of your total daily calories  Limit sodium to less than 2,300 mg each day  Healthy fats:  Healthy fats can help improve cholesterol levels  The risk for heart disease is decreased when cholesterol levels are normal  Choose healthy fats, such as the following:  · Unsaturated fat  is found in foods such as soybean, canola, olive, corn, and safflower oils  It is also found in soft tub margarine that is made with liquid vegetable oil  · Omega-3 fat  is found in certain fish, such as salmon, tuna, and trout, and in walnuts and flaxseed  Unhealthy fats:  Unhealthy fats can cause unhealthy cholesterol levels in your blood and increase your risk of heart disease  Limit unhealthy fats, such as the following:  · Cholesterol  is found in animal foods, such as eggs and lobster, and in dairy products made from whole milk  Limit cholesterol to less than 300 milligrams (mg) each day  You may need to limit cholesterol to 200 mg each day if you have heart disease  · Saturated fat  is found in meats, such as moreno and hamburger  It is also found in chicken or turkey skin, whole milk, and butter  Limit saturated fat to less than 7% of your total daily calories  Limit saturated fat to less than 6% if you have heart disease or are at increased risk for it  · Trans fat  is found in packaged foods, such as potato chips and cookies  It is also in hard margarine, some fried foods, and shortening  Avoid trans fats as much as possible    Heart healthy foods and drinks to include:  Ask your dietitian or healthcare provider how many servings to have from each of the following food groups:  · Grains:      ¨ Whole-wheat breads, cereals, and pastas, and brown rice    ¨ Low-fat, low-sodium crackers and chips    · Vegetables:      ¨ Broccoli, green beans, green peas, and spinach    ¨ Collards, kale, and lima beans    ¨ Carrots, sweet potatoes, tomatoes, and peppers    ¨ Canned vegetables with no salt added    · Fruits:      ¨ Bananas, peaches, pears, and pineapple    ¨ Grapes, raisins, and dates    ¨ Oranges, tangerines, grapefruit, orange juice, and grapefruit juice    ¨ Apricots, mangoes, melons, and papaya    ¨ Raspberries and strawberries    ¨ Canned fruit with no added sugar    · Low-fat dairy products:      ¨ Nonfat (skim) milk, 1% milk, and low-fat almond, cashew, or soy milks fortified with calcium    ¨ Low-fat cheese, regular or frozen yogurt, and cottage cheese    · Meats and proteins , such as lean cuts of beef and pork (loin, leg, round), skinless chicken and turkey, legumes, soy products, egg whites, and nuts  Foods and drinks to limit or avoid:  Ask your dietitian or healthcare provider about these and other foods that are high in unhealthy fat, sodium, and sugar:  · Snack or packaged foods , such as frozen dinners, cookies, macaroni and cheese, and cereals with more than 300 mg of sodium per serving    · Canned or dry mixes  for cakes, soups, sauces, or gravies    · Vegetables with added sodium , such as instant potatoes, vegetables with added sauces, or regular canned vegetables    · Other foods high in sodium , such as ketchup, barbecue sauce, salad dressing, pickles, olives, soy sauce, and miso    · High-fat dairy foods  such as whole or 2% milk, cream cheese, or sour cream, and cheeses     · High-fat protein foods  such as high-fat cuts of beef (T-bone steaks, ribs), chicken or turkey with skin, and organ meats, such as liver    · Cured or smoked meats , such as hot dogs, moreno, and sausage    · Unhealthy fats and oils , such as butter, stick margarine, shortening, and cooking oils such as coconut or palm oil    · Food and drinks high in sugar , such as soft drinks (soda), sports drinks, sweetened tea, candy, cake, cookies, pies, and doughnuts  Other diet guidelines to follow:   · Eat more foods containing omega-3 fats  Eat fish high in omega-3 fats at least 2 times a week  · Limit alcohol    Too much alcohol can damage your heart and raise your blood pressure  Women should limit alcohol to 1 drink a day  Men should limit alcohol to 2 drinks a day  A drink of alcohol is 12 ounces of beer, 5 ounces of wine, or 1½ ounces of liquor  · Choose low-sodium foods  High-sodium foods can lead to high blood pressure  Add little or no salt to food you prepare  Use herbs and spices in place of salt  · Eat more fiber  to help lower cholesterol levels  Eat at least 5 servings of fruits and vegetables each day  Eat 3 ounces of whole-grain foods each day  Legumes (beans) are also a good source of fiber  Lifestyle guidelines:   · Do not smoke  Nicotine and other chemicals in cigarettes and cigars can cause lung and heart damage  Ask your healthcare provider for information if you currently smoke and need help to quit  E-cigarettes or smokeless tobacco still contain nicotine  Talk to your healthcare provider before you use these products  · Exercise regularly  to help you maintain a healthy weight and improve your blood pressure and cholesterol levels  Ask your healthcare provider about the best exercise plan for you  Do not start an exercise program without asking your healthcare provider  Follow up with your healthcare provider as directed:  Write down your questions so you remember to ask them during your visits  © 2017 [address] [facility] Information is for End User's use only and may not be sold, redistributed or otherwise used for commercial purposes  All illustrations and images included in CareNotes® are the copyrighted property of [employer]  or Shailesh Casanova  The above information is an  only  It is not intended as medical advice for individual conditions or treatments  Talk to your doctor, nurse or pharmacist before following any medical regimen to see if it is safe and effective for you  Calorie Counting Diet   WHAT YOU NEED TO KNOW:   What is a calorie counting diet?   It is a meal plan based on counting calories each day to reach a healthy body weight  You will need to eat fewer calories if you are trying to lose weight  Weight loss may decrease your risk for certain health problems or improve your health if you have health problems  Some of these health problems include heart disease, high blood pressure, and diabetes  What foods should I avoid? Your dietitian will tell you if you need to avoid certain foods based on your body weight and health condition  You may need to avoid high-fat foods if you are at risk for or have heart disease  You may need to eat fewer foods from the breads and starches food group if you have diabetes  How many calories are in foods? The following is a list of foods and drinks with the approximate number of calories in each  Check the food label to find the exact number of calories  A dietitian can tell you how many calories you should have from each food group each day    · Carbohydrate:      ¨ ½ of a 3-inch bagel, 1 slice of bread, or ½ of a hamburger bun or hot dog bun (80)    ¨ 1 (8-inch) flour tortilla or ½ cup of cooked rice (100)    ¨ 1 (6-inch) corn tortilla (80)    ¨ 1 (6-inch) pancake or 1 cup of bran flakes cereal (110)    ¨ ½ cup of cooked cereal (80)    ¨ ½ cup of cooked pasta (85)    ¨ 1 ounce of pretzels (100)    ¨ 3 cups of air-popped popcorn without butter or oil (80)    · Dairy:      ¨ 1 cup of skim or 1% milk (90)    ¨ 1 cup of 2% milk (120)    ¨ 1 cup of whole milk (160)    ¨ 1 cup of 2% chocolate milk (220)    ¨ 1 ounce of low-fat cheese with 3 grams of fat per ounce (70)    ¨ 1 ounce of cheddar cheese (114)    ¨ ½ cup of 1% fat cottage cheese (80)    ¨ 1 cup of plain or sugar-free, fat-free yogurt (90)    · Protein foods:      ¨ 3 ounces of fish (not breaded or fried) (95)    ¨ 3 ounces of breaded, fried fish (195)    ¨ ¾ cup of tuna canned in water (105)    ¨ 3 ounces of chicken breast without skin (105)    ¨ 1 fried chicken breast with skin (350)    ¨ ¼ cup of fat free egg substitute (40)    ¨ 1 large egg (75)    ¨ 3 ounces of lean beef or pork (165)    ¨ 3 ounces of fried pork chop or ham (185)    ¨ ½ cup of cooked dried beans, such as kidney, sales, lentils, or navy (115)    ¨ 3 ounces of bologna or lunch meat (225)    ¨ 2 links of breakfast sausage (140)    · Vegetables:      ¨ ½ cup of sliced mushrooms (10)    ¨ 1 cup of salad greens, such as lettuce, spinach, or ronaldo (15)    ¨ ½ cup of steamed asparagus (20)    ¨ ½ cup of cooked summer squash, zucchini squash, or green or wax beans (25)    ¨ 1 cup of broccoli or cauliflower florets, or 1 medium tomato (25)    ¨ 1 large raw carrot or ½ cup of cooked carrots (40)    ¨ ? of a medium cucumber or 1 stalk of celery (5)    ¨ 1 small baked potato (160)    ¨ 1 cup of breaded, fried vegetables (230)    · Fruit:      ¨ 1 (6-inch) banana (55)     ¨ ½ of a 4-inch grapefruit (55)    ¨ 15 grapes (60)    ¨ 1 medium orange or apple (70)    ¨ 1 large peach (65)    ¨ 1 cup of fresh pineapple chunks (75)    ¨ 1 cup of melon cubes (50)    ¨ 1¼ cups of whole strawberries (45)    ¨ ½ cup of fruit canned in juice (55)    ¨ ½ cup of fruit canned in heavy syrup (110)    ¨ ?  cup of raisins (130)    ¨ ½ cup of unsweetened fruit juice (60)    ¨ ½ cup of grape, cranberry, or prune juice (90)    · Fat:      ¨ 10 peanuts or 2 teaspoons of peanut butter (55)    ¨ 2 tablespoons of avocado or 1 tablespoon of regular salad dressing (45)    ¨ 2 slices of moreno (90)    ¨ 1 teaspoon of oil, such as safflower, canola, corn, or olive oil (45)    ¨ 2 teaspoons of low-fat margarine, or 1 tablespoon of low-fat mayonnaise (50)    ¨ 1 teaspoon of regular margarine (40)    ¨ 1 tablespoon of regular mayonnaise (135)    ¨ 1 tablespoon of cream cheese or 2 tablespoons of low-fat cream cheese (45)    ¨ 2 tablespoons of vegetable shortening (215)    · Dessert and sweets:      ¨ 8 animal crackers or 5 vanilla wafers (80)    ¨ 1 frozen fruit juice bar (80)    ¨ ½ cup of ice milk or low-fat frozen yogurt (90)    ¨ ½ cup of sherbet or sorbet (125)    ¨ ½ cup of sugar-free pudding or custard (60)    ¨ ½ cup of ice cream (140)    ¨ ½ cup of pudding or custard (175)    ¨ 1 (2-inch) square chocolate brownie (185)    · Combination foods:      ¨ Bean burrito made with an 8-inch tortilla, without cheese (275)    ¨ Chicken breast sandwich with lettuce and tomato (325)    ¨ 1 cup of chicken noodle soup (60)    ¨ 1 beef taco (175)    ¨ Regular hamburger with lettuce and tomato (310)    ¨ Regular cheeseburger with lettuce and tomato (410)     ¨ ¼ of a 12-inch cheese pizza (280)    ¨ Fried fish sandwich with lettuce and tomato (425)    ¨ Hot dog and bun (275)    ¨ 1½ cups of macaroni and cheese (310)    ¨ Taco salad with a fried tortilla shell (870)    · Low-calorie foods:      ¨ 1 tablespoon of ketchup or 1 tablespoon of fat free sour cream (15)    ¨ 1 teaspoon of mustard (5)    ¨ ¼ cup of salsa (20)    ¨ 1 large dill pickle (15)    ¨ 1 tablespoon of fat free salad dressing (10)    ¨ 2 teaspoons of low-sugar, light jam or jelly, or 1 tablespoon of sugar-free syrup (15)    ¨ 1 sugar-free popsicle (15)    ¨ 1 cup of club soda, seltzer water, or diet soda (0)  CARE AGREEMENT:   You have the right to help plan your care  Discuss treatment options with your caregivers to decide what care you want to receive  You always have the right to refuse treatment  The above information is an  only  It is not intended as medical advice for individual conditions or treatments  Talk to your doctor, nurse or pharmacist before following any medical regimen to see if it is safe and effective for you  © 2017 [address] [employer] Information is for End User's use only and may not be sold, redistributed or otherwise used for commercial purposes   All illustrations and images included in CareNotes® are the copyrighted property of [employer]  or [employer]

## 2019-11-07 ENCOUNTER — TELEPHONE (OUTPATIENT)
Dept: FAMILY MEDICINE CLINIC | Facility: CLINIC | Age: 69
End: 2019-11-07

## 2019-11-07 NOTE — UTILIZATION REVIEW
Notification of Discharge  This is a Notification of Discharge from our facility [address]  Please be advised that this patient has been discharge from our facility  Below you will find the admission and discharge date and time including the patients disposition  PRESENTATION DATE: 10/23/2019  6:47 PM    IP ADMISSION DATE: 10/25/19 1655   DISCHARGE DATE: 11/2/2019 11:49 AM  DISPOSITION: Home/Self Care Home/Self Care   Admission Orders listed below:  Admission Orders (From admission, onward)     Ordered        10/25/19 1655  Inpatient Admission  Once         10/23/19 2302  Place in Observation (expected length of stay for this patient is less than two midnights)  Once                   Please contact the UR Department if additional information is required to close this patient's authorization/case  [facility] Utilization Review Department  Main: [phone] x carefully listen to the prompts  All voicemails are confidential   [email or web address]  Send all requests for admission clinical reviews, approved or denied determinations and any other requests to dedicated fax number below belonging to the campus where the patient is receiving treatment    List of dedicated fax numbers:  [address] [place] [address] DENIALS (Administrative/Medical Necessity) [phone]   [address]  (Maternity/NICU/Pediatrics) [phone]   Mahamed Sue [phone]   James Roberts [phone]   Conor Francos [phone]   [address] [facility] 15[address] [facility] [phone]   Elana Turner 2000 [facility] [address] [phone]

## 2019-11-12 ENCOUNTER — HOSPITAL ENCOUNTER (OUTPATIENT)
Dept: MRI IMAGING | Facility: HOSPITAL | Age: 69
Discharge: HOME | End: 2019-11-12
Payer: COMMERCIAL

## 2019-11-12 DIAGNOSIS — K86.1 CHRONIC PANCREATITIS, UNSPECIFIED PANCREATITIS TYPE (HCC): ICD-10-CM

## 2019-11-12 PROCEDURE — 74183 MRI ABD W/O CNTR FLWD CNTR: CPT

## 2019-11-12 PROCEDURE — A9585 GADOBUTROL INJECTION: HCPCS | Performed by: INTERNAL MEDICINE

## 2019-11-12 RX ADMIN — GADOBUTROL 6 ML: 604.72 INJECTION INTRAVENOUS at 13:54

## 2019-11-13 ENCOUNTER — HOSPITAL ENCOUNTER (OUTPATIENT)
Dept: NON INVASIVE DIAGNOSTICS | Facility: HOSPITAL | Age: 69
Discharge: HOME | End: 2019-11-13
Payer: COMMERCIAL

## 2019-11-13 ENCOUNTER — APPOINTMENT (OUTPATIENT)
Dept: LAB | Facility: HOSPITAL | Age: 69
End: 2019-11-13
Payer: COMMERCIAL

## 2019-11-13 DIAGNOSIS — I25.119 ATHEROSCLEROSIS OF NATIVE CORONARY ARTERY WITH ANGINA PECTORIS, UNSPECIFIED WHETHER NATIVE OR TRANSPLANTED HEART (HCC): ICD-10-CM

## 2019-11-13 DIAGNOSIS — K86.1 CHRONIC PANCREATITIS, UNSPECIFIED PANCREATITIS TYPE (HCC): ICD-10-CM

## 2019-11-13 DIAGNOSIS — I71.2 THORACIC AORTIC ANEURYSM WITHOUT RUPTURE (HCC): ICD-10-CM

## 2019-11-13 LAB
25(OH)D3 SERPL-MCNC: 48.6 NG/ML (ref 30–100)
ALBUMIN SERPL BCP-MCNC: 4.4 G/DL (ref 3–5.2)
ALP SERPL-CCNC: 52 U/L (ref 43–122)
ALT SERPL W P-5'-P-CCNC: 39 U/L (ref 9–52)
AMYLASE SERPL-CCNC: 181 IU/L (ref 30–110)
ANION GAP SERPL CALCULATED.3IONS-SCNC: 7 MMOL/L (ref 5–14)
AST SERPL W P-5'-P-CCNC: 60 U/L (ref 17–59)
BASOPHILS # BLD AUTO: 0 THOUSANDS/ΜL (ref 0–0.1)
BASOPHILS NFR BLD AUTO: 0 % (ref 0–1)
BILIRUB SERPL-MCNC: 0.5 MG/DL
BILIRUB UR QL STRIP: NEGATIVE
BUN SERPL-MCNC: 17 MG/DL (ref 5–25)
CALCIUM ALBUM COR SERPL-MCNC: 10.4 MG/DL (ref 8.3–10.1)
CALCIUM SERPL-MCNC: 10.7 MG/DL (ref 8.4–10.2)
CHLORIDE SERPL-SCNC: 103 MMOL/L (ref 97–108)
CHOLEST SERPL-MCNC: 139 MG/DL
CLARITY UR: CLEAR
CO2 SERPL-SCNC: 30 MMOL/L (ref 22–30)
COLOR UR: YELLOW
CREAT SERPL-MCNC: 0.84 MG/DL (ref 0.7–1.5)
EOSINOPHIL # BLD AUTO: 0.4 THOUSAND/ΜL (ref 0–0.4)
EOSINOPHIL NFR BLD AUTO: 7 % (ref 0–6)
ERYTHROCYTE [DISTWIDTH] IN BLOOD BY AUTOMATED COUNT: 14.4 %
FERRITIN SERPL-MCNC: 279 NG/ML (ref 8–388)
GFR SERPL CREATININE-BSD FRML MDRD: 91 ML/MIN/1.73SQ M
GLUCOSE P FAST SERPL-MCNC: 107 MG/DL (ref 70–99)
GLUCOSE UR STRIP-MCNC: NEGATIVE MG/DL
HCT VFR BLD AUTO: 38.7 % (ref 41–53)
HDLC SERPL-MCNC: 32 MG/DL
HGB BLD-MCNC: 13.5 G/DL (ref 13.5–17.5)
HGB UR QL STRIP.AUTO: NEGATIVE
KETONES UR STRIP-MCNC: NEGATIVE MG/DL
LDLC SERPL CALC-MCNC: 74 MG/DL
LEUKOCYTE ESTERASE UR QL STRIP: NEGATIVE
LIPASE SERPL-CCNC: 719 U/L (ref 23–300)
LYMPHOCYTES # BLD AUTO: 1.1 THOUSANDS/ΜL (ref 0.5–4)
LYMPHOCYTES NFR BLD AUTO: 19 % (ref 25–45)
MAGNESIUM SERPL-MCNC: 1.5 MG/DL (ref 1.6–2.3)
MCH RBC QN AUTO: 33.7 PG (ref 26–34)
MCHC RBC AUTO-ENTMCNC: 35 G/DL (ref 31–36)
MCV RBC AUTO: 96 FL (ref 80–100)
MONOCYTES # BLD AUTO: 0.5 THOUSAND/ΜL (ref 0.2–0.9)
MONOCYTES NFR BLD AUTO: 9 % (ref 1–10)
NEUTROPHILS # BLD AUTO: 3.8 THOUSANDS/ΜL (ref 1.8–7.8)
NEUTS SEG NFR BLD AUTO: 65 % (ref 45–65)
NITRITE UR QL STRIP: NEGATIVE
NONHDLC SERPL-MCNC: 107 MG/DL
PH UR STRIP.AUTO: 6 [PH]
PLATELET # BLD AUTO: 391 THOUSANDS/UL (ref 150–450)
PMV BLD AUTO: 9.4 FL (ref 8.9–12.7)
POTASSIUM SERPL-SCNC: 4.4 MMOL/L (ref 3.6–5)
PROT SERPL-MCNC: 7.9 G/DL (ref 5.9–8.4)
PROT UR STRIP-MCNC: NEGATIVE MG/DL
RBC # BLD AUTO: 4.02 MILLION/UL (ref 4.5–5.9)
SODIUM SERPL-SCNC: 140 MMOL/L (ref 137–147)
SP GR UR STRIP.AUTO: 1.02 (ref 1–1.04)
T4 FREE SERPL-MCNC: 1.48 NG/DL (ref 0.76–1.46)
TRIGL SERPL-MCNC: 167 MG/DL
TSH SERPL DL<=0.05 MIU/L-ACNC: 2.37 UIU/ML (ref 0.47–4.68)
UROBILINOGEN UA: NEGATIVE MG/DL
VIT B12 SERPL-MCNC: 1174 PG/ML (ref 100–900)
WBC # BLD AUTO: 5.8 THOUSAND/UL (ref 4.5–11)

## 2019-11-13 PROCEDURE — 84439 ASSAY OF FREE THYROXINE: CPT

## 2019-11-13 PROCEDURE — 84443 ASSAY THYROID STIM HORMONE: CPT

## 2019-11-13 PROCEDURE — 93880 EXTRACRANIAL BILAT STUDY: CPT | Performed by: SURGERY

## 2019-11-13 PROCEDURE — 86800 THYROGLOBULIN ANTIBODY: CPT

## 2019-11-13 PROCEDURE — 80061 LIPID PANEL: CPT

## 2019-11-13 PROCEDURE — 82150 ASSAY OF AMYLASE: CPT

## 2019-11-13 PROCEDURE — 82607 VITAMIN B-12: CPT

## 2019-11-13 PROCEDURE — 36415 COLL VENOUS BLD VENIPUNCTURE: CPT

## 2019-11-13 PROCEDURE — 82728 ASSAY OF FERRITIN: CPT

## 2019-11-13 PROCEDURE — 80053 COMPREHEN METABOLIC PANEL: CPT

## 2019-11-13 PROCEDURE — 86376 MICROSOMAL ANTIBODY EACH: CPT

## 2019-11-13 PROCEDURE — 82306 VITAMIN D 25 HYDROXY: CPT

## 2019-11-13 PROCEDURE — 83735 ASSAY OF MAGNESIUM: CPT

## 2019-11-13 PROCEDURE — 93880 EXTRACRANIAL BILAT STUDY: CPT

## 2019-11-13 PROCEDURE — 85025 COMPLETE CBC W/AUTO DIFF WBC: CPT

## 2019-11-13 PROCEDURE — 83690 ASSAY OF LIPASE: CPT

## 2019-11-14 DIAGNOSIS — I65.22 STENOSIS OF LEFT CAROTID ARTERY: Primary | ICD-10-CM

## 2019-11-14 LAB
THYROGLOB AB SERPL-ACNC: <1 IU/ML (ref 0–0.9)
THYROPEROXIDASE AB SERPL-ACNC: 24 IU/ML (ref 0–34)

## 2019-11-20 ENCOUNTER — APPOINTMENT (OUTPATIENT)
Dept: LAB | Age: 69
End: 2019-11-20
Payer: COMMERCIAL

## 2019-11-20 ENCOUNTER — OFFICE VISIT (OUTPATIENT)
Dept: FAMILY MEDICINE CLINIC | Facility: CLINIC | Age: 69
End: 2019-11-20
Payer: COMMERCIAL

## 2019-11-20 VITALS
TEMPERATURE: 98.5 F | WEIGHT: 139.6 LBS | DIASTOLIC BLOOD PRESSURE: 80 MMHG | HEIGHT: 63 IN | RESPIRATION RATE: 14 BRPM | HEART RATE: 82 BPM | BODY MASS INDEX: 24.73 KG/M2 | OXYGEN SATURATION: 99 % | SYSTOLIC BLOOD PRESSURE: 136 MMHG

## 2019-11-20 DIAGNOSIS — E83.42 HYPOMAGNESEMIA: ICD-10-CM

## 2019-11-20 DIAGNOSIS — R10.9 ABDOMINAL PAIN, UNSPECIFIED ABDOMINAL LOCATION: ICD-10-CM

## 2019-11-20 DIAGNOSIS — E83.52 HYPERCALCEMIA: ICD-10-CM

## 2019-11-20 DIAGNOSIS — K86.1 CHRONIC PANCREATITIS, UNSPECIFIED PANCREATITIS TYPE (HCC): ICD-10-CM

## 2019-11-20 DIAGNOSIS — K86.1 CHRONIC PANCREATITIS, UNSPECIFIED PANCREATITIS TYPE (HCC): Primary | ICD-10-CM

## 2019-11-20 LAB
ALBUMIN SERPL BCP-MCNC: 3.9 G/DL (ref 3.5–5)
ALP SERPL-CCNC: 69 U/L (ref 46–116)
ALT SERPL W P-5'-P-CCNC: 44 U/L (ref 12–78)
ANION GAP SERPL CALCULATED.3IONS-SCNC: 10 MMOL/L (ref 4–13)
AST SERPL W P-5'-P-CCNC: 51 U/L (ref 5–45)
BASOPHILS # BLD AUTO: 0.02 THOUSANDS/ΜL (ref 0–0.1)
BASOPHILS NFR BLD AUTO: 0 % (ref 0–1)
BILIRUB SERPL-MCNC: 0.36 MG/DL (ref 0.2–1)
BUN SERPL-MCNC: 12 MG/DL (ref 5–25)
CA-I BLD-SCNC: 1.22 MMOL/L (ref 1.12–1.32)
CALCIUM SERPL-MCNC: 9.9 MG/DL (ref 8.3–10.1)
CHLORIDE SERPL-SCNC: 106 MMOL/L (ref 100–108)
CO2 SERPL-SCNC: 25 MMOL/L (ref 21–32)
CREAT SERPL-MCNC: 0.79 MG/DL (ref 0.6–1.3)
EOSINOPHIL # BLD AUTO: 0.39 THOUSAND/ΜL (ref 0–0.61)
EOSINOPHIL NFR BLD AUTO: 7 % (ref 0–6)
ERYTHROCYTE [DISTWIDTH] IN BLOOD BY AUTOMATED COUNT: 13.8 % (ref 11.6–15.1)
EST. AVERAGE GLUCOSE BLD GHB EST-MCNC: 108 MG/DL
GFR SERPL CREATININE-BSD FRML MDRD: 93 ML/MIN/1.73SQ M
GLUCOSE P FAST SERPL-MCNC: 99 MG/DL (ref 65–99)
HBA1C MFR BLD: 5.4 % (ref 4.2–6.3)
HCT VFR BLD AUTO: 38.6 % (ref 36.5–49.3)
HGB BLD-MCNC: 12.8 G/DL (ref 12–17)
IMM GRANULOCYTES # BLD AUTO: 0.02 THOUSAND/UL (ref 0–0.2)
IMM GRANULOCYTES NFR BLD AUTO: 0 % (ref 0–2)
LIPASE SERPL-CCNC: 942 U/L (ref 73–393)
LYMPHOCYTES # BLD AUTO: 1.29 THOUSANDS/ΜL (ref 0.6–4.47)
LYMPHOCYTES NFR BLD AUTO: 22 % (ref 14–44)
MCH RBC QN AUTO: 32.7 PG (ref 26.8–34.3)
MCHC RBC AUTO-ENTMCNC: 33.2 G/DL (ref 31.4–37.4)
MCV RBC AUTO: 99 FL (ref 82–98)
MONOCYTES # BLD AUTO: 0.58 THOUSAND/ΜL (ref 0.17–1.22)
MONOCYTES NFR BLD AUTO: 10 % (ref 4–12)
NEUTROPHILS # BLD AUTO: 3.52 THOUSANDS/ΜL (ref 1.85–7.62)
NEUTS SEG NFR BLD AUTO: 61 % (ref 43–75)
NRBC BLD AUTO-RTO: 0 /100 WBCS
PLATELET # BLD AUTO: 349 THOUSANDS/UL (ref 149–390)
PMV BLD AUTO: 11.5 FL (ref 8.9–12.7)
POTASSIUM SERPL-SCNC: 3.8 MMOL/L (ref 3.5–5.3)
PROT SERPL-MCNC: 7.5 G/DL (ref 6.4–8.2)
PTH-INTACT SERPL-MCNC: 12.7 PG/ML (ref 18.4–80.1)
RBC # BLD AUTO: 3.92 MILLION/UL (ref 3.88–5.62)
SODIUM SERPL-SCNC: 141 MMOL/L (ref 136–145)
WBC # BLD AUTO: 5.82 THOUSAND/UL (ref 4.31–10.16)

## 2019-11-20 PROCEDURE — 4004F PT TOBACCO SCREEN RCVD TLK: CPT | Performed by: INTERNAL MEDICINE

## 2019-11-20 PROCEDURE — 83036 HEMOGLOBIN GLYCOSYLATED A1C: CPT

## 2019-11-20 PROCEDURE — 83970 ASSAY OF PARATHORMONE: CPT

## 2019-11-20 PROCEDURE — 80053 COMPREHEN METABOLIC PANEL: CPT

## 2019-11-20 PROCEDURE — 82330 ASSAY OF CALCIUM: CPT

## 2019-11-20 PROCEDURE — 83690 ASSAY OF LIPASE: CPT

## 2019-11-20 PROCEDURE — 1160F RVW MEDS BY RX/DR IN RCRD: CPT | Performed by: INTERNAL MEDICINE

## 2019-11-20 PROCEDURE — 99214 OFFICE O/P EST MOD 30 MIN: CPT | Performed by: INTERNAL MEDICINE

## 2019-11-20 PROCEDURE — 36415 COLL VENOUS BLD VENIPUNCTURE: CPT

## 2019-11-20 PROCEDURE — 1101F PT FALLS ASSESS-DOCD LE1/YR: CPT | Performed by: INTERNAL MEDICINE

## 2019-11-20 PROCEDURE — 85025 COMPLETE CBC W/AUTO DIFF WBC: CPT

## 2019-11-20 RX ORDER — OXYCODONE HYDROCHLORIDE 5 MG/1
5 TABLET ORAL EVERY 6 HOURS PRN
Qty: 20 TABLET | Refills: 0 | Status: SHIPPED | OUTPATIENT
Start: 2019-11-20 | End: 2020-01-08 | Stop reason: ALTCHOICE

## 2019-11-20 NOTE — PROGRESS NOTES
Tobacco Cessation Counseling: Tobacco cessation counseling was provided  The patient is sincerely urged to quit consumption of tobacco  He is not ready to quit tobacco  Medication options and side effects of medication discussed  Patient refused medication  Assessment/Plan:         Diagnoses and all orders for this visit:    Chronic pancreatitis, unspecified pancreatitis type ([facility] [address] ); Cause ? ? It could be Due to Multiple Untreated factors, ?? ; including Meds and Hypercalcemia     Continue same, RTC in 3-4 weeks w :  -     Comprehensive metabolic panel; Future  -     Lipase; Future  Start :  -     oxyCODONE (ROXICODONE) 5 mg immediate release tablet; Take 1 tablet (5 mg total) by mouth every 6 (six) hours as needed for moderate pain or severe pain (as needed ONLY)Max Daily Amount: 20 mg  20/0  -     Ambulatory referral to Gastroenterology; Future  -     CBC and differential; Future    Hypercalcemia; cause ?? RTC in 3-4 weeks w :  -     Calcium, ionized; Future  -     PTH, intact; Future  -     NM parathyroid scan w spect; Future    Hypomagnesemia; start :  -     magnesium oxide (MAG-OX) 400 mg; Take 1 tablet (400 mg total) by mouth 2 (two) times a day    Abdominal pain, unspecified abdominal location; as above,     -     oxyCODONE (ROXICODONE) 5 mg immediate release tablet; Take 1 tablet (5 mg total) by mouth every 6 (six) hours as needed for moderate pain or severe pain (as needed ONLY)Max Daily Amount: 20 mg  -     Ambulatory referral to Gastroenterology; Future        Subjective:      Patient ID: J Carlos Ibanez is a 81 y o  male  81 Y O man is here for Re Check from last visit, he felt two episodes of acute abdominal pain in last week, recent Blood work, med List and MRCP all reviewed w pt in Detail          The following portions of the patient's history were reviewed and updated as appropriate: allergies, current medications, past family history, past medical history, past social history, past surgical history and problem list     Review of Systems   Constitutional: Negative for chills, fatigue and fever  HENT: Negative for congestion, facial swelling, sore throat, trouble swallowing and voice change  Eyes: Negative for pain, discharge and visual disturbance  Respiratory: Negative for cough, shortness of breath and wheezing  Cardiovascular: Negative for chest pain, palpitations and leg swelling  Gastrointestinal: Positive for abdominal pain and nausea  Negative for blood in stool, constipation and diarrhea  Endocrine: Negative for polydipsia, polyphagia and polyuria  Genitourinary: Negative for difficulty urinating, hematuria and urgency  Musculoskeletal: Negative for arthralgias and myalgias  Skin: Negative for rash  Neurological: Negative for dizziness, tremors, weakness and headaches  Hematological: Negative for adenopathy  Does not bruise/bleed easily  Psychiatric/Behavioral: Negative for dysphoric mood, sleep disturbance and suicidal ideas  Objective:      /80 (BP Location: Left arm, Patient Position: Sitting, Cuff Size: Standard)   Pulse 82   Temp 98 5 °F (36 9 °C) (Probe)   Resp 14   Ht 5' 3" (1 6 m)   Wt 63 3 kg (139 lb 9 6 oz)   SpO2 99%   BMI 24 73 kg/m²          Physical Exam   Constitutional: He is oriented to person, place, and time  He appears well-nourished  No distress  HENT:   Head: Normocephalic  Mouth/Throat: Oropharynx is clear and moist  No oropharyngeal exudate  Eyes: Pupils are equal, round, and reactive to light  Conjunctivae are normal  No scleral icterus  Neck: Neck supple  Thyromegaly present  Cardiovascular: Normal rate and regular rhythm  Murmur heard  Pulmonary/Chest: Effort normal and breath sounds normal  No respiratory distress  He has no wheezes  He has no rales  Abdominal: Soft  Bowel sounds are normal  He exhibits no distension  There is tenderness  There is no rebound and no guarding     Musculoskeletal: He exhibits no edema or tenderness  Lymphadenopathy:     He has no cervical adenopathy  Neurological: He is alert and oriented to person, place, and time  No cranial nerve deficit  Coordination normal    Skin: No erythema  No pallor  Psychiatric: He has a normal mood and affect

## 2019-11-29 ENCOUNTER — TELEPHONE (OUTPATIENT)
Dept: FAMILY MEDICINE CLINIC | Facility: CLINIC | Age: 69
End: 2019-11-29

## 2019-11-29 NOTE — TELEPHONE ENCOUNTER
Patient called inquiring about the results of his last labs  Pt is concerned about his lipase level going up instead of going down  Please advise  Thanks

## 2019-12-02 DIAGNOSIS — K85.30 DRUG-INDUCED ACUTE PANCREATITIS WITHOUT INFECTION OR NECROSIS: Primary | ICD-10-CM

## 2019-12-02 DIAGNOSIS — R10.9 ACUTE ABDOMINAL PAIN: ICD-10-CM

## 2019-12-16 ENCOUNTER — TELEPHONE (OUTPATIENT)
Dept: FAMILY MEDICINE CLINIC | Facility: CLINIC | Age: 69
End: 2019-12-16

## 2019-12-16 DIAGNOSIS — K85.30 DRUG-INDUCED ACUTE PANCREATITIS WITHOUT INFECTION OR NECROSIS: Primary | ICD-10-CM

## 2019-12-16 DIAGNOSIS — E83.52 HYPERCALCEMIA: ICD-10-CM

## 2019-12-17 ENCOUNTER — APPOINTMENT (OUTPATIENT)
Dept: LAB | Facility: HOSPITAL | Age: 69
End: 2019-12-17
Payer: COMMERCIAL

## 2019-12-17 ENCOUNTER — TELEPHONE (OUTPATIENT)
Dept: FAMILY MEDICINE CLINIC | Facility: CLINIC | Age: 69
End: 2019-12-17

## 2019-12-17 DIAGNOSIS — K85.30 DRUG-INDUCED ACUTE PANCREATITIS WITHOUT INFECTION OR NECROSIS: ICD-10-CM

## 2019-12-17 DIAGNOSIS — E83.52 HYPERCALCEMIA: ICD-10-CM

## 2019-12-17 LAB
ALBUMIN SERPL BCP-MCNC: 4.1 G/DL (ref 3–5.2)
ALP SERPL-CCNC: 89 U/L (ref 43–122)
ALT SERPL W P-5'-P-CCNC: 45 U/L (ref 9–52)
ANION GAP SERPL CALCULATED.3IONS-SCNC: 10 MMOL/L (ref 5–14)
AST SERPL W P-5'-P-CCNC: 47 U/L (ref 17–59)
BASOPHILS # BLD AUTO: 0 THOUSANDS/ΜL (ref 0–0.1)
BASOPHILS NFR BLD AUTO: 0 % (ref 0–1)
BILIRUB SERPL-MCNC: 0.6 MG/DL
BUN SERPL-MCNC: 10 MG/DL (ref 5–25)
CALCIUM SERPL-MCNC: 9.2 MG/DL (ref 8.4–10.2)
CHLORIDE SERPL-SCNC: 102 MMOL/L (ref 97–108)
CO2 SERPL-SCNC: 26 MMOL/L (ref 22–30)
CREAT SERPL-MCNC: 0.82 MG/DL (ref 0.7–1.5)
EOSINOPHIL # BLD AUTO: 0.2 THOUSAND/ΜL (ref 0–0.4)
EOSINOPHIL NFR BLD AUTO: 3 % (ref 0–6)
ERYTHROCYTE [DISTWIDTH] IN BLOOD BY AUTOMATED COUNT: 13.6 %
GFR SERPL CREATININE-BSD FRML MDRD: 92 ML/MIN/1.73SQ M
GLUCOSE P FAST SERPL-MCNC: 98 MG/DL (ref 70–99)
HCT VFR BLD AUTO: 40.8 % (ref 41–53)
HGB BLD-MCNC: 14.2 G/DL (ref 13.5–17.5)
LIPASE SERPL-CCNC: 605 U/L (ref 23–300)
LYMPHOCYTES # BLD AUTO: 1.2 THOUSANDS/ΜL (ref 0.5–4)
LYMPHOCYTES NFR BLD AUTO: 17 % (ref 25–45)
MCH RBC QN AUTO: 33.5 PG (ref 26–34)
MCHC RBC AUTO-ENTMCNC: 34.7 G/DL (ref 31–36)
MCV RBC AUTO: 97 FL (ref 80–100)
MONOCYTES # BLD AUTO: 0.6 THOUSAND/ΜL (ref 0.2–0.9)
MONOCYTES NFR BLD AUTO: 8 % (ref 1–10)
NEUTROPHILS # BLD AUTO: 4.9 THOUSANDS/ΜL (ref 1.8–7.8)
NEUTS SEG NFR BLD AUTO: 72 % (ref 45–65)
PLATELET # BLD AUTO: 233 THOUSANDS/UL (ref 150–450)
PMV BLD AUTO: 9 FL (ref 8.9–12.7)
POTASSIUM SERPL-SCNC: 3.6 MMOL/L (ref 3.6–5)
PROT SERPL-MCNC: 7.4 G/DL (ref 5.9–8.4)
RBC # BLD AUTO: 4.23 MILLION/UL (ref 4.5–5.9)
SODIUM SERPL-SCNC: 138 MMOL/L (ref 137–147)
T4 FREE SERPL-MCNC: 1.42 NG/DL (ref 0.76–1.46)
TSH SERPL DL<=0.05 MIU/L-ACNC: 1.41 UIU/ML (ref 0.47–4.68)
WBC # BLD AUTO: 6.9 THOUSAND/UL (ref 4.5–11)

## 2019-12-17 PROCEDURE — 84443 ASSAY THYROID STIM HORMONE: CPT

## 2019-12-17 PROCEDURE — 84439 ASSAY OF FREE THYROXINE: CPT

## 2019-12-17 PROCEDURE — 85025 COMPLETE CBC W/AUTO DIFF WBC: CPT

## 2019-12-17 PROCEDURE — 83690 ASSAY OF LIPASE: CPT

## 2019-12-17 PROCEDURE — 80053 COMPREHEN METABOLIC PANEL: CPT

## 2019-12-17 PROCEDURE — 36415 COLL VENOUS BLD VENIPUNCTURE: CPT

## 2019-12-19 ENCOUNTER — OFFICE VISIT (OUTPATIENT)
Dept: FAMILY MEDICINE CLINIC | Facility: CLINIC | Age: 69
End: 2019-12-19
Payer: COMMERCIAL

## 2019-12-19 ENCOUNTER — CONSULT (OUTPATIENT)
Dept: VASCULAR SURGERY | Facility: CLINIC | Age: 69
End: 2019-12-19
Payer: COMMERCIAL

## 2019-12-19 VITALS
HEIGHT: 63 IN | SYSTOLIC BLOOD PRESSURE: 144 MMHG | DIASTOLIC BLOOD PRESSURE: 70 MMHG | BODY MASS INDEX: 24.8 KG/M2 | HEART RATE: 106 BPM | WEIGHT: 140 LBS

## 2019-12-19 VITALS
BODY MASS INDEX: 24.73 KG/M2 | TEMPERATURE: 98 F | DIASTOLIC BLOOD PRESSURE: 74 MMHG | SYSTOLIC BLOOD PRESSURE: 136 MMHG | HEART RATE: 84 BPM | WEIGHT: 139.6 LBS | OXYGEN SATURATION: 98 % | HEIGHT: 63 IN | RESPIRATION RATE: 14 BRPM

## 2019-12-19 DIAGNOSIS — Z72.0 TOBACCO ABUSE: ICD-10-CM

## 2019-12-19 DIAGNOSIS — I65.22 STENOSIS OF LEFT CAROTID ARTERY: ICD-10-CM

## 2019-12-19 DIAGNOSIS — E03.9 HYPOTHYROIDISM (ACQUIRED): ICD-10-CM

## 2019-12-19 DIAGNOSIS — K85.30 DRUG-INDUCED ACUTE PANCREATITIS WITHOUT INFECTION OR NECROSIS: ICD-10-CM

## 2019-12-19 DIAGNOSIS — M54.31 RIGHT SIDED SCIATICA: ICD-10-CM

## 2019-12-19 DIAGNOSIS — I65.23 CAROTID STENOSIS, ASYMPTOMATIC, BILATERAL: Primary | Chronic | ICD-10-CM

## 2019-12-19 DIAGNOSIS — Z12.11 SPECIAL SCREENING FOR MALIGNANT NEOPLASMS, COLON: ICD-10-CM

## 2019-12-19 DIAGNOSIS — I10 ESSENTIAL HYPERTENSION, BENIGN: Primary | ICD-10-CM

## 2019-12-19 DIAGNOSIS — Z23 NEED FOR INFLUENZA VACCINATION: ICD-10-CM

## 2019-12-19 PROCEDURE — 1160F RVW MEDS BY RX/DR IN RCRD: CPT | Performed by: INTERNAL MEDICINE

## 2019-12-19 PROCEDURE — 3078F DIAST BP <80 MM HG: CPT | Performed by: INTERNAL MEDICINE

## 2019-12-19 PROCEDURE — 4004F PT TOBACCO SCREEN RCVD TLK: CPT | Performed by: INTERNAL MEDICINE

## 2019-12-19 PROCEDURE — 1101F PT FALLS ASSESS-DOCD LE1/YR: CPT | Performed by: INTERNAL MEDICINE

## 2019-12-19 PROCEDURE — 99214 OFFICE O/P EST MOD 30 MIN: CPT | Performed by: INTERNAL MEDICINE

## 2019-12-19 PROCEDURE — 99244 OFF/OP CNSLTJ NEW/EST MOD 40: CPT | Performed by: SURGERY

## 2019-12-19 PROCEDURE — 3008F BODY MASS INDEX DOCD: CPT | Performed by: INTERNAL MEDICINE

## 2019-12-19 PROCEDURE — 3075F SYST BP GE 130 - 139MM HG: CPT | Performed by: INTERNAL MEDICINE

## 2019-12-19 RX ORDER — ZOLPIDEM TARTRATE 10 MG/1
TABLET ORAL
COMMUNITY
Start: 2019-11-22

## 2019-12-19 RX ORDER — LEVOTHYROXINE SODIUM 0.03 MG/1
12.5 TABLET ORAL DAILY
Qty: 45 TABLET | Refills: 3 | Status: SHIPPED | OUTPATIENT
Start: 2019-12-19 | End: 2020-11-09 | Stop reason: SDUPTHER

## 2019-12-19 RX ORDER — LOSARTAN POTASSIUM 50 MG/1
50 TABLET ORAL DAILY
Qty: 90 TABLET | Refills: 3
Start: 2019-12-19 | End: 2019-12-27 | Stop reason: SDUPTHER

## 2019-12-19 NOTE — PROGRESS NOTES
Assessment/Plan:    Carotid stenosis, asymptomatic, bilateral  1  Asymptomatic high-grade left internal carotid artery stenosis  We discussed the findings on recent duplex evaluation which do show a significant progression in the degree of stenosis since previous imaging approximately 1 year ago  We did discuss the pathophysiology of carotid occlusive disease and the indications for further evaluation and treatment  At this point we will obtain a CT angiogram to better define the degree of stenosis following which will make further treatment recommendations  2  Asymptomatic moderate right internal carotid artery stenosis  No further intervention is necessary in this regard  We will plan on continued follow-up following treatment of the contralateral stenosis  We did discuss the importance of continued medical management for which he is already on appropriate antiplatelet and statin therapy  We also discussed the importance of risk factor modification specificly the importance of smoking cessation  Tobacco abuse  Cigarette smoking  We discussed the relationship between cigarette smoking and atherosclerotic disease along with the importance of smoking cessation as it relates to disease progression and recurrence  Diagnoses and all orders for this visit:    Carotid stenosis, asymptomatic, bilateral  -     CTA neck w wo contrast; Future    Stenosis of left carotid artery  -     Ambulatory referral to Vascular Surgery    Tobacco abuse          Subjective:      Patient ID: Yomaira Velasquez is a 81 y o  male  Pt is new to our office  He was referred here by Dr Wale Jauregui  Pt had a CV done 11/13/2019  Pt denies any headaches, dizziness, sudden loss of vision, slurred speech, trouble swallowing, TIA or Stroke symptoms  He is taking ASA 81 mg and Atorvastatin 10 mg  Pt is a current smoker      73-year-old smoker with known carotid occlusive disease was found to have progression of left carotid artery stenosis on recent duplex  On evaluation today he denies any specific symptoms which would be consistent with TIA, CVA or amaurosis fugax  He remains relatively active but is currently recovering from a bout of pancreatitis  He specifically denies any chest pain, shortness of breath or claudication symptoms  On review of duplex evaluation 11/13/2019 there is a moderate right internal carotid artery stenosis with flow velocity 166/31 centimeters/second  On the left there is a high-grade stenosis with velocity of 353/108 centimeters/second  On comparison to previous study 12/04/2018 there is significant progression on the left at which time velocity was 172/82 centimeters/second  The following portions of the patient's history were reviewed and updated as appropriate: allergies, current medications, past family history, past medical history, past social history, past surgical history and problem list     The ROS as bellow was reviewed and changes made as indictated       Review of Systems   Constitutional: Positive for fatigue  HENT: Negative  Eyes: Negative  Respiratory: Negative  Cardiovascular: Negative  Gastrointestinal: Positive for abdominal pain  Endocrine: Negative  Genitourinary: Negative  Musculoskeletal: Positive for back pain  Skin: Negative  Allergic/Immunologic: Negative  Neurological: Negative  Hematological: Negative  Psychiatric/Behavioral: Negative  Objective:      /70 (BP Location: Left arm, Patient Position: Sitting, Cuff Size: Standard)   Pulse (!) 106   Ht 5' 3" (1 6 m)   Wt 63 5 kg (140 lb)   BMI 24 80 kg/m²          Physical Exam   Constitutional: He is oriented to person, place, and time  He appears well-developed and well-nourished  HENT:   Head: Normocephalic and atraumatic  Eyes: Pupils are equal, round, and reactive to light  EOM are normal    Neck: Normal range of motion  Neck supple  No JVD present   Carotid bruit is present (Left bruit)  Cardiovascular: Normal rate and regular rhythm  No murmur heard  Pulses:       Carotid pulses are 2+ on the right side, and 2+ on the left side with bruit  Radial pulses are 2+ on the right side, and 2+ on the left side  Pulmonary/Chest: Effort normal and breath sounds normal  No respiratory distress  Musculoskeletal: Normal range of motion  He exhibits no edema or tenderness  Neurological: He is alert and oriented to person, place, and time  He has normal strength  No sensory deficit  Skin: Skin is warm and dry  Psychiatric: He has a normal mood and affect

## 2019-12-19 NOTE — ASSESSMENT & PLAN NOTE
Cigarette smoking  We discussed the relationship between cigarette smoking and atherosclerotic disease along with the importance of smoking cessation as it relates to disease progression and recurrence

## 2019-12-19 NOTE — ASSESSMENT & PLAN NOTE
1  Asymptomatic high-grade left internal carotid artery stenosis  We discussed the findings on recent duplex evaluation which do show a significant progression in the degree of stenosis since previous imaging approximately 1 year ago  We did discuss the pathophysiology of carotid occlusive disease and the indications for further evaluation and treatment  At this point we will obtain a CT angiogram to better define the degree of stenosis following which will make further treatment recommendations  2  Asymptomatic moderate right internal carotid artery stenosis  No further intervention is necessary in this regard  We will plan on continued follow-up following treatment of the contralateral stenosis  We did discuss the importance of continued medical management for which he is already on appropriate antiplatelet and statin therapy  We also discussed the importance of risk factor modification specificly the importance of smoking cessation

## 2019-12-19 NOTE — LETTER
December 19, 2019     Natalee Gaming, [address]     Patient: J Carlos Ibanez   YOB: 1950   Date of Visit: 12/19/2019       Dear Dr Harshal Li: Thank you for referring Pascale Burks to me for evaluation  Below are the relevant portions of my assessment and plan of care  Carotid stenosis, asymptomatic, bilateral  1  Asymptomatic high-grade left internal carotid artery stenosis  We discussed the findings on recent duplex evaluation which do show a significant progression in the degree of stenosis since previous imaging approximately 1 year ago  We did discuss the pathophysiology of carotid occlusive disease and the indications for further evaluation and treatment  At this point we will obtain a CT angiogram to better define the degree of stenosis following which will make further treatment recommendations  2  Asymptomatic moderate right internal carotid artery stenosis  No further intervention is necessary in this regard  We will plan on continued follow-up following treatment of the contralateral stenosis  We did discuss the importance of continued medical management for which he is already on appropriate antiplatelet and statin therapy  We also discussed the importance of risk factor modification specificly the importance of smoking cessation  Tobacco abuse  Cigarette smoking  We discussed the relationship between cigarette smoking and atherosclerotic disease along with the importance of smoking cessation as it relates to disease progression and recurrence  If you have questions, please do not hesitate to call me  I look forward to following Sarah Callahan along with you           Sincerely,        Danielle Shen MD        CC: No Recipients

## 2019-12-19 NOTE — PATIENT INSTRUCTIONS
Carotid stenosis, asymptomatic, bilateral  1  Asymptomatic high-grade left internal carotid artery stenosis  We discussed the findings on recent duplex evaluation which do show a significant progression in the degree of stenosis since previous imaging approximately 1 year ago  We did discuss the pathophysiology of carotid occlusive disease and the indications for further evaluation and treatment  At this point we will obtain a CT angiogram to better define the degree of stenosis following which will make further treatment recommendations  2  Asymptomatic moderate right internal carotid artery stenosis  No further intervention is necessary in this regard  We will plan on continued follow-up following treatment of the contralateral stenosis  We did discuss the importance of continued medical management for which he is already on appropriate antiplatelet and statin therapy  We also discussed the importance of risk factor modification specificly the importance of smoking cessation  Tobacco abuse  Cigarette smoking  We discussed the relationship between cigarette smoking and atherosclerotic disease along with the importance of smoking cessation as it relates to disease progression and recurrence

## 2019-12-19 NOTE — PROGRESS NOTES
Assessment/Plan:         Diagnoses and all orders for this visit:    Essential hypertension, benign: stable, Continue same, and Renew :  -     losartan (COZAAR) 50 mg tablet; Take 1 tablet (50 mg total) by mouth daily  RTC in 3 Mos w blood work  Special screening for malignant neoplasms, colon  -     Cologuard; Future    Need for influenza vaccination; Pt Refused    Hypothyroidism (acquired); stable Renew :  -     levothyroxine 25 mcg tablet; Take 0 5 tablets (12 5 mcg total) by mouth daily    Drug-induced acute pancreatitis without infection or necrosis: Improving Nicely,Gradually  Continue Same  RTC in 2-3 mos w :  -     Comprehensive metabolic panel; Future  -     Lipase; Future  -     CBC and differential; Future  -     UA (URINE) with reflex to Scope    Right sided sciatica; Moist Heat  Home P T   RTc in 1mo if PRN  Tobacco Cessation Counseling: Tobacco cessation counseling was provided  Medication options and side effects of medication discussed  Patient refused medication  Tobacco use screening or tobacco cessation counseling was not performed due to other medical reasons  Other orders  -     Cancel: influenza vaccine, [id number], high-dose, PF 0 5 mL (FLUZONE HIGH-DOSE)  -     zolpidem (AMBIEN) 10 mg tablet        Subjective:      Patient ID: Marty Puckett is a 81 y o  male  81 Y O man is here for Regular check up, he feels Better, No more abd pain, recently his Right sciatica is getting slightly worse, Recent Blood work, and Med List reviewed w Pt in Detail    The following portions of the patient's history were reviewed and updated as appropriate: allergies, current medications, past medical history, past social history, past surgical history and problem list     Review of Systems   Constitutional: Positive for fatigue  Negative for chills and fever  HENT: Negative for congestion, facial swelling, sore throat, trouble swallowing and voice change      Eyes: Negative for pain, discharge and visual disturbance  Respiratory: Negative for cough, shortness of breath and wheezing  Cardiovascular: Negative for chest pain, palpitations and leg swelling  Gastrointestinal: Negative for abdominal pain, blood in stool, constipation, diarrhea and nausea  Endocrine: Negative for polydipsia, polyphagia and polyuria  Genitourinary: Negative for difficulty urinating, hematuria and urgency  Musculoskeletal: Positive for back pain  Negative for arthralgias and myalgias  Skin: Negative for rash  Neurological: Positive for numbness  Negative for dizziness, tremors, weakness and headaches  Hematological: Negative for adenopathy  Does not bruise/bleed easily  Psychiatric/Behavioral: Negative for dysphoric mood, sleep disturbance and suicidal ideas  Objective:      /74 (BP Location: Left arm, Patient Position: Sitting, Cuff Size: Standard)   Pulse 84   Temp 98 °F (36 7 °C) (Probe)   Resp 14   Ht 5' 3" (1 6 m)   Wt 63 3 kg (139 lb 9 6 oz)   SpO2 98%   BMI 24 73 kg/m²          Physical Exam   Constitutional: He is oriented to person, place, and time  He appears well-nourished  No distress  HENT:   Head: Normocephalic  Mouth/Throat: Oropharynx is clear and moist  No oropharyngeal exudate  Eyes: Pupils are equal, round, and reactive to light  Conjunctivae are normal  No scleral icterus  Neck: Neck supple  No thyromegaly present  Cardiovascular: Normal rate and regular rhythm  Murmur heard  Pulmonary/Chest: Effort normal and breath sounds normal  No respiratory distress  He has no wheezes  He has no rales  Abdominal: Soft  Bowel sounds are normal  He exhibits no distension  There is no tenderness  There is no rebound and no guarding  Musculoskeletal: He exhibits no edema or tenderness  Lymphadenopathy:     He has no cervical adenopathy  Neurological: He is alert and oriented to person, place, and time  No cranial nerve deficit   Coordination normal    Mild Right Sciatic neuropathy   Skin: No erythema  No pallor  Psychiatric: He has a normal mood and affect

## 2019-12-20 DIAGNOSIS — E78.2 MIXED HYPERLIPIDEMIA: ICD-10-CM

## 2019-12-20 DIAGNOSIS — I10 ESSENTIAL HYPERTENSION, BENIGN: ICD-10-CM

## 2019-12-20 DIAGNOSIS — K85.30 DRUG-INDUCED ACUTE PANCREATITIS WITHOUT INFECTION OR NECROSIS: ICD-10-CM

## 2019-12-20 DIAGNOSIS — R73.03 PREDIABETES: ICD-10-CM

## 2019-12-21 RX ORDER — OMEGA-3-ACID ETHYL ESTERS 1 G/1
2 CAPSULE, LIQUID FILLED ORAL 2 TIMES DAILY
Qty: 360 CAPSULE | Refills: 1 | OUTPATIENT
Start: 2019-12-21 | End: 2020-03-20

## 2019-12-21 RX ORDER — AMLODIPINE BESYLATE 10 MG/1
10 TABLET ORAL DAILY
Qty: 90 TABLET | Refills: 1 | OUTPATIENT
Start: 2019-12-21 | End: 2020-03-20

## 2019-12-21 RX ORDER — RANITIDINE 150 MG/1
150 TABLET ORAL 2 TIMES DAILY
Qty: 180 TABLET | Refills: 1 | OUTPATIENT
Start: 2019-12-21 | End: 2020-03-20

## 2019-12-21 RX ORDER — PANTOPRAZOLE SODIUM 40 MG/1
40 TABLET, DELAYED RELEASE ORAL DAILY
Qty: 90 TABLET | Refills: 1 | OUTPATIENT
Start: 2019-12-21 | End: 2020-03-20

## 2019-12-21 RX ORDER — FENOFIBRATE 160 MG/1
160 TABLET ORAL DAILY
Qty: 90 TABLET | Refills: 1 | OUTPATIENT
Start: 2019-12-21 | End: 2020-03-20

## 2019-12-27 ENCOUNTER — APPOINTMENT (EMERGENCY)
Dept: RADIOLOGY | Facility: HOSPITAL | Age: 69
End: 2019-12-27
Payer: COMMERCIAL

## 2019-12-27 ENCOUNTER — APPOINTMENT (EMERGENCY)
Dept: CT IMAGING | Facility: HOSPITAL | Age: 69
End: 2019-12-27
Payer: COMMERCIAL

## 2019-12-27 ENCOUNTER — HOSPITAL ENCOUNTER (EMERGENCY)
Facility: HOSPITAL | Age: 69
Discharge: HOME | End: 2019-12-27
Attending: EMERGENCY MEDICINE | Admitting: EMERGENCY MEDICINE
Payer: COMMERCIAL

## 2019-12-27 VITALS
BODY MASS INDEX: 25.74 KG/M2 | HEART RATE: 84 BPM | SYSTOLIC BLOOD PRESSURE: 154 MMHG | WEIGHT: 145.28 LBS | DIASTOLIC BLOOD PRESSURE: 85 MMHG | OXYGEN SATURATION: 96 % | RESPIRATION RATE: 20 BRPM | TEMPERATURE: 97.8 F

## 2019-12-27 DIAGNOSIS — M54.16 RADICULOPATHY OF LUMBAR REGION: ICD-10-CM

## 2019-12-27 DIAGNOSIS — M54.50 ACUTE RIGHT-SIDED LOW BACK PAIN: ICD-10-CM

## 2019-12-27 DIAGNOSIS — I10 ESSENTIAL HYPERTENSION, BENIGN: ICD-10-CM

## 2019-12-27 DIAGNOSIS — M47.9 SPONDYLOSIS: Primary | ICD-10-CM

## 2019-12-27 LAB
ALBUMIN SERPL BCP-MCNC: 3.9 G/DL (ref 3–5.2)
ALP SERPL-CCNC: 96 U/L (ref 43–122)
ALT SERPL W P-5'-P-CCNC: 32 U/L (ref 9–52)
ANION GAP SERPL CALCULATED.3IONS-SCNC: 8 MMOL/L (ref 5–14)
AST SERPL W P-5'-P-CCNC: 40 U/L (ref 17–59)
BASOPHILS # BLD AUTO: 0.1 THOUSANDS/ΜL (ref 0–0.1)
BASOPHILS NFR BLD AUTO: 1 % (ref 0–1)
BILIRUB SERPL-MCNC: 0.4 MG/DL
BUN SERPL-MCNC: 13 MG/DL (ref 5–25)
CALCIUM SERPL-MCNC: 9.1 MG/DL (ref 8.4–10.2)
CHLORIDE SERPL-SCNC: 102 MMOL/L (ref 97–108)
CO2 SERPL-SCNC: 28 MMOL/L (ref 22–30)
CREAT SERPL-MCNC: 0.74 MG/DL (ref 0.7–1.5)
CRP SERPL QL: 20.8 MG/L
EOSINOPHIL # BLD AUTO: 0.3 THOUSAND/ΜL (ref 0–0.4)
EOSINOPHIL NFR BLD AUTO: 4 % (ref 0–6)
ERYTHROCYTE [DISTWIDTH] IN BLOOD BY AUTOMATED COUNT: 13.6 %
ERYTHROCYTE [SEDIMENTATION RATE] IN BLOOD: 45 MM/HOUR (ref 1–20)
GFR SERPL CREATININE-BSD FRML MDRD: 95 ML/MIN/1.73SQ M
GLUCOSE SERPL-MCNC: 128 MG/DL (ref 70–99)
HCT VFR BLD AUTO: 38 % (ref 41–53)
HGB BLD-MCNC: 13.3 G/DL (ref 13.5–17.5)
LYMPHOCYTES # BLD AUTO: 1.3 THOUSANDS/ΜL (ref 0.5–4)
LYMPHOCYTES NFR BLD AUTO: 18 % (ref 25–45)
MCH RBC QN AUTO: 33.7 PG (ref 26–34)
MCHC RBC AUTO-ENTMCNC: 35 G/DL (ref 31–36)
MCV RBC AUTO: 96 FL (ref 80–100)
MONOCYTES # BLD AUTO: 0.6 THOUSAND/ΜL (ref 0.2–0.9)
MONOCYTES NFR BLD AUTO: 9 % (ref 1–10)
NEUTROPHILS # BLD AUTO: 4.9 THOUSANDS/ΜL (ref 1.8–7.8)
NEUTS SEG NFR BLD AUTO: 69 % (ref 45–65)
NT-PROBNP SERPL-MCNC: 358 PG/ML (ref 0–299)
PLATELET # BLD AUTO: 241 THOUSANDS/UL (ref 150–450)
PMV BLD AUTO: 9 FL (ref 8.9–12.7)
POTASSIUM SERPL-SCNC: 3.7 MMOL/L (ref 3.6–5)
PROT SERPL-MCNC: 7 G/DL (ref 5.9–8.4)
RBC # BLD AUTO: 3.95 MILLION/UL (ref 4.5–5.9)
SODIUM SERPL-SCNC: 138 MMOL/L (ref 137–147)
TROPONIN I SERPL-MCNC: <0.01 NG/ML (ref 0–0.03)
WBC # BLD AUTO: 7.1 THOUSAND/UL (ref 4.5–11)

## 2019-12-27 PROCEDURE — 99284 EMERGENCY DEPT VISIT MOD MDM: CPT

## 2019-12-27 PROCEDURE — 80053 COMPREHEN METABOLIC PANEL: CPT | Performed by: PHYSICIAN ASSISTANT

## 2019-12-27 PROCEDURE — 99284 EMERGENCY DEPT VISIT MOD MDM: CPT | Performed by: PHYSICIAN ASSISTANT

## 2019-12-27 PROCEDURE — 93005 ELECTROCARDIOGRAM TRACING: CPT

## 2019-12-27 PROCEDURE — 72131 CT LUMBAR SPINE W/O DYE: CPT

## 2019-12-27 PROCEDURE — 71046 X-RAY EXAM CHEST 2 VIEWS: CPT

## 2019-12-27 PROCEDURE — 86140 C-REACTIVE PROTEIN: CPT | Performed by: PHYSICIAN ASSISTANT

## 2019-12-27 PROCEDURE — 83880 ASSAY OF NATRIURETIC PEPTIDE: CPT | Performed by: PHYSICIAN ASSISTANT

## 2019-12-27 PROCEDURE — 36415 COLL VENOUS BLD VENIPUNCTURE: CPT | Performed by: PHYSICIAN ASSISTANT

## 2019-12-27 PROCEDURE — 85652 RBC SED RATE AUTOMATED: CPT | Performed by: PHYSICIAN ASSISTANT

## 2019-12-27 PROCEDURE — 84484 ASSAY OF TROPONIN QUANT: CPT | Performed by: PHYSICIAN ASSISTANT

## 2019-12-27 PROCEDURE — 85025 COMPLETE CBC W/AUTO DIFF WBC: CPT | Performed by: PHYSICIAN ASSISTANT

## 2019-12-27 RX ORDER — LIDOCAINE 50 MG/G
1 PATCH TOPICAL ONCE
Status: DISCONTINUED | OUTPATIENT
Start: 2019-12-27 | End: 2019-12-27 | Stop reason: HOSPADM

## 2019-12-27 RX ORDER — METHOCARBAMOL 500 MG/1
500 TABLET, FILM COATED ORAL 2 TIMES DAILY
Qty: 20 TABLET | Refills: 0 | Status: SHIPPED | OUTPATIENT
Start: 2019-12-27 | End: 2020-03-10

## 2019-12-27 RX ORDER — ACETAMINOPHEN 325 MG/1
650 TABLET ORAL ONCE
Status: COMPLETED | OUTPATIENT
Start: 2019-12-27 | End: 2019-12-27

## 2019-12-27 RX ORDER — METHOCARBAMOL 500 MG/1
500 TABLET, FILM COATED ORAL ONCE
Status: COMPLETED | OUTPATIENT
Start: 2019-12-27 | End: 2019-12-27

## 2019-12-27 RX ORDER — TRAMADOL HYDROCHLORIDE 50 MG/1
50 TABLET ORAL ONCE
Status: COMPLETED | OUTPATIENT
Start: 2019-12-27 | End: 2019-12-27

## 2019-12-27 RX ORDER — TRAMADOL HYDROCHLORIDE 50 MG/1
50 TABLET ORAL EVERY 6 HOURS PRN
Qty: 20 TABLET | Refills: 0 | Status: SHIPPED | OUTPATIENT
Start: 2019-12-27 | End: 2020-01-06

## 2019-12-27 RX ORDER — LOSARTAN POTASSIUM 50 MG/1
TABLET ORAL
Qty: 90 TABLET | Refills: 0 | Status: SHIPPED | OUTPATIENT
Start: 2019-12-27 | End: 2020-02-07 | Stop reason: SDUPTHER

## 2019-12-27 RX ADMIN — TRAMADOL HYDROCHLORIDE 50 MG: 50 TABLET, FILM COATED ORAL at 14:23

## 2019-12-27 RX ADMIN — LIDOCAINE 1 PATCH: 50 PATCH TOPICAL at 13:31

## 2019-12-27 RX ADMIN — ACETAMINOPHEN 650 MG: 325 TABLET ORAL at 13:28

## 2019-12-27 RX ADMIN — METHOCARBAMOL TABLETS 500 MG: 500 TABLET, COATED ORAL at 14:22

## 2019-12-27 NOTE — ED PROVIDER NOTES
History  Chief Complaint   Patient presents with    Back Pain     lower back avril that radaites into rt leg for 1 week  states it feels like his sciatica from the past  foot feels numb as well    Foot Swelling     bilat foot swelling since yesterday     Patient is a 81-year-old male who endorses a past medical history significant for hypertension, hyperlipidemia and previous cardiac catheterizations who presents today initially for evaluation right-sided low back pain however incidentally reports that he also has bilateral lower leg swelling which was worse yesterday  Patient reports he took a friend's dose of Lasix and notes he is not prescribed Lasix  Patient denies any relief of the bilateral lower leg swelling from Lasix      History provided by:  Patient  Back Pain   Location:  Lumbar spine  Quality:  Aching  Radiates to:  R posterior upper leg and L posterior upper leg  Pain severity:  Moderate  Pain is:  Same all the time  Onset quality:  Gradual  Duration:  1 week  Timing:  Intermittent  Progression:  Unchanged  Chronicity:  Recurrent  Context comment:  Previous surgery  Relieved by:  Being still  Worsened by:  Bending and twisting  Ineffective treatments:  None tried  Associated symptoms: no abdominal pain, no chest pain, no dysuria, no fever and no numbness    Ankle Swelling   Location:  Leg  Injury: no    Leg location:  L leg and R leg  Pain details:     Quality:  Pressure    Radiates to:  Does not radiate    Severity:  Moderate    Onset quality:  Gradual    Duration:  1 day    Timing:  Constant    Progression:  Worsening  Chronicity:  New  Ineffective treatments: lasix  Associated symptoms: back pain    Associated symptoms: no fatigue and no fever        Prior to Admission Medications   Prescriptions Last Dose Informant Patient Reported? Taking?    amLODIPine (NORVASC) 10 mg tablet  Self No No   Sig: Take 1 tablet (10 mg total) by mouth daily   aspirin 81 MG tablet  Self No No   Sig: Take 1 tablet (81 mg total) by mouth daily   atorvastatin (LIPITOR) 10 mg tablet  Self No No   Sig: Take 1 tablet (10 mg total) by mouth daily   diclofenac sodium (VOLTAREN) 1 %  Self No No   Sig: Apply 2 g topically 4 (four) times a day Apply to shoulders      levothyroxine 25 mcg tablet  Self No No   Sig: Take 0 5 tablets (12 5 mcg total) by mouth daily   losartan (COZAAR) 50 mg tablet   No No   Sig: TAKE 1 TABLET ONCE A DAY   magnesium oxide (MAG-OX) 400 mg  Self No No   Sig: Take 1 tablet (400 mg total) by mouth 2 (two) times a day   oxyCODONE (ROXICODONE) 5 mg immediate release tablet  Self No No   Sig: Take 1 tablet (5 mg total) by mouth every 6 (six) hours as needed for moderate pain or severe pain (as needed ONLY)Max Daily Amount: 20 mg   pancrelipase, Lip-Prot-Amyl, (CREON) 24,000 units  Self No No   Sig: Take 1 capsule (24,000 Units total) by mouth 3 (three) times a day with meals   pantoprazole (PROTONIX) 40 mg tablet  Self No No   Sig: Take 1 tablet (40 mg total) by mouth daily   ranitidine (ZANTAC) 150 mg tablet  Self No No   Sig: Take 1 tablet (150 mg total) by mouth 2 (two) times a day   zolpidem (AMBIEN) 10 mg tablet  Self Yes No      Facility-Administered Medications: None       Past Medical History:   Diagnosis Date    Avascular necrosis of bone of hip, left (HCC)     Hiatal hernia     Hyperlipidemia        Past Surgical History:   Procedure Laterality Date    CARDIAC CATHETERIZATION      CERVICAL FUSION      LUMBAR FUSION         History reviewed  No pertinent family history  I have reviewed and agree with the history as documented      Social History     Tobacco Use    Smoking status: Current Every Day Smoker     Packs/day: 0 50     Years: 50 00     Pack years: 25 00     Types: Cigarettes    Smokeless tobacco: Never Used    Tobacco comment: Heavy tobacco smoker; 10 cigarettes per day as per [employer]   Substance Use Topics    Alcohol use: Yes     Frequency: Monthly or less     Drinks per session: 1 or 2 Binge frequency: Never     Comment: rare    Drug use: Never        Review of Systems   Constitutional: Negative for chills, fatigue and fever  HENT: Negative for congestion, ear pain, rhinorrhea and sore throat  Eyes: Negative for redness  Respiratory: Negative for chest tightness and shortness of breath  Cardiovascular: Positive for leg swelling  Negative for chest pain and palpitations  Gastrointestinal: Negative for abdominal pain, nausea and vomiting  Genitourinary: Negative for dysuria and hematuria  Musculoskeletal: Positive for back pain  Skin: Negative for rash  Neurological: Negative for dizziness, syncope, light-headedness and numbness  Physical Exam  Physical Exam   Constitutional: He is oriented to person, place, and time  He appears well-developed and well-nourished  HENT:   Head: Normocephalic and atraumatic  Eyes: Pupils are equal, round, and reactive to light  Neck: Normal range of motion  Cardiovascular: Normal rate, regular rhythm and normal heart sounds  Pulmonary/Chest: Effort normal and breath sounds normal    Abdominal: Soft  There is no tenderness  There is no guarding  Musculoskeletal: He exhibits tenderness  Back:    No midline spinal tenderness, deformities, crepitus, step-off, skin changes noted to the area of pain  Bilateral lower leg swelling, 2+ in some areas including the posterior malleolar areas, 1+ across the dorsal aspect of the foot  Lymphadenopathy:     He has no cervical adenopathy  Neurological: He is alert and oriented to person, place, and time  Skin: Skin is warm and dry  Capillary refill takes less than 2 seconds  Psychiatric: He has a normal mood and affect  Nursing note and vitals reviewed        Vital Signs  ED Triage Vitals [12/27/19 1257]   Temperature Pulse Respirations Blood Pressure SpO2   97 8 °F (36 6 °C) 94 16 122/67 96 %      Temp Source Heart Rate Source Patient Position - Orthostatic VS BP Location FiO2 (%)   Tympanic Monitor Sitting Left arm --      Pain Score       8           Vitals:    12/27/19 1257 12/27/19 1424 12/27/19 1535   BP: 122/67 130/61 154/85   Pulse: 94 84 84   Patient Position - Orthostatic VS: Sitting Lying Lying         Visual Acuity      ED Medications  Medications   lidocaine (LIDODERM) 5 % patch 1 patch (1 patch Topical Medication Applied 12/27/19 1331)   acetaminophen (TYLENOL) tablet 650 mg (650 mg Oral Given 12/27/19 1328)   methocarbamol (ROBAXIN) tablet 500 mg (500 mg Oral Given 12/27/19 1422)   traMADol (ULTRAM) tablet 50 mg (50 mg Oral Given 12/27/19 1423)       Diagnostic Studies  Results Reviewed     Procedure Component Value Units Date/Time    Sedimentation rate, automated [[id number]]  (Abnormal) Collected:  12/27/19 1322    Lab Status:  Final result Specimen:  Blood from Arm, Right Updated:  12/27/19 1603     Sed Rate 45 mm/hour     C-reactive protein [[id number]]  (Abnormal) Collected:  12/27/19 1322    Lab Status:  Final result Specimen:  Blood from Arm, Right Updated:  12/27/19 1536     CRP 20 8 mg/L     Troponin I [[id number]]  (Normal) Collected:  12/27/19 1322    Lab Status:  Final result Specimen:  Blood from Arm, Right Updated:  12/27/19 1405     Troponin I <0 01 ng/mL     NT-BNP PRO [[id number]]  (Abnormal) Collected:  12/27/19 1322    Lab Status:  Final result Specimen:  Blood from Arm, Right Updated:  12/27/19 1404     NT-proBNP 358 pg/mL     Comprehensive metabolic panel [[id number]]  (Abnormal) Collected:  12/27/19 1322    Lab Status:  Final result Specimen:  Blood from Arm, Right Updated:  12/27/19 1401     Sodium 138 mmol/L      Potassium 3 7 mmol/L      Chloride 102 mmol/L      CO2 28 mmol/L      ANION GAP 8 mmol/L      BUN 13 mg/dL      Creatinine 0 74 mg/dL      Glucose 128 mg/dL      Calcium 9 1 mg/dL      AST 40 U/L      ALT 32 U/L      Alkaline Phosphatase 96 U/L      Total Protein 7 0 g/dL      Albumin 3 9 g/dL      Total Bilirubin 0 40 mg/dL      eGFR 95 ml/min/1 73sq m     Narrative:       National Kid[employer] Disease Foundation guidelines for Chronic Kidney Disease (CKD):     Stage 1 with normal or high GFR (GFR > 90 mL/min/1 73 square meters)    Stage 2 Mild CKD (GFR = 60-89 mL/min/1 73 square meters)    Stage 3A Moderate CKD (GFR = 45-59 mL/min/1 73 square meters)    Stage 3B Moderate CKD (GFR = 30-44 mL/min/1 73 square meters)    Stage 4 Severe CKD (GFR = 15-29 mL/min/1 73 square meters)    Stage 5 End Stage CKD (GFR <15 mL/min/1 73 square meters)  Note: GFR calculation is accurate only with a steady state creatinine    CBC and differential [[id number]]  (Abnormal) Collected:  12/27/19 1322    Lab Status:  Final result Specimen:  Blood from Arm, Right Updated:  12/27/19 1345     WBC 7 10 Thousand/uL      RBC 3 95 Million/uL      Hemoglobin 13 3 g/dL      Hematocrit 38 0 %      MCV 96 fL      MCH 33 7 pg      MCHC 35 0 g/dL      RDW 13 6 %      MPV 9 0 fL      Platelets 066 Thousands/uL      Neutrophils Relative 69 %      Lymphocytes Relative 18 %      Monocytes Relative 9 %      Eosinophils Relative 4 %      Basophils Relative 1 %      Neutrophils Absolute 4 90 Thousands/µL      Lymphocytes Absolute 1 30 Thousands/µL      Monocytes Absolute 0 60 Thousand/µL      Eosinophils Absolute 0 30 Thousand/µL      Basophils Absolute 0 10 Thousands/µL                  XR chest 2 views   ED Interpretation by Germaine Mcelroy PA-C (12/27 1415)   No acute consolidations or cardiopulmonary abnormalities noted  Final Result by Theresa Dias MD (12/27 8150)      Emphysematous changes are noted  No focal consolidation, pleural effusion, or pneumothorax  Workstation performed: [id number]         CT spine lumbar without contrast   Final Result by Michelle Tobar MD (12/27 6089)      Marked degenerative spondylosis        There is severe canal narrowing at L4-L5 which is multifactorial   More moderate to severe narrowing is seen at L3-L4 which is also multifactorial       Vacuum phenomenon protrudes into the right L5-S1 neural foramen and could cause radiculopathy and potential right L5 root compression  This was not evident on the prior abdomen CT  There is progressive loss of disc height at L4-5 and L5-S1 loss of vacuum phenomenon  This could be mechanical with loss of disc height but unusual in 2 successive vertebral segments in a fairly short period of time  There is no rivera endplate    destruction seen although small cystic areas have developed and could represent small erosions  Early discitis is difficult to exclude and should be correlated with erythrocyte sedimentation rate  MRI follow-up may be indicated        This was discussed with MEGHAN Grant         Workstation performed: [id number]                    Procedures  ECG 12 Lead Documentation Only  Date/Time: 12/27/2019 1:50 PM  Performed by: Stephani Goncalves PA-C  Authorized by: Stephani Goncalves PA-C     Indications / Diagnosis:  B/l leg swelling  ECG reviewed by me, the ED Provider: yes    Patient location:  ED  Previous ECG:     Previous ECG:  Compared to current    Comparison ECG info:  10/28/19    Similarity:  No change    Comparison to cardiac monitor: Yes    Interpretation:     Interpretation: normal    Rate:     ECG rate:  89    ECG rate assessment: normal    Rhythm:     Rhythm: sinus rhythm    Ectopy:     Ectopy: PAC    QRS:     QRS axis:  Normal    QRS intervals:  Normal  Conduction:     Conduction: normal    ST segments:     ST segments:  Normal  T waves:     T waves: normal    Q waves:     Q waves:  V3, V2 and V1  Comments:      qtc [id number]             ED Course  ED Course as of Dec 27 1638   Fri Dec 27, 2019   1534 Marked degenerative spondylosis      There is severe canal narrowing at L4-L5 which is multifactorial   More moderate to severe narrowing is seen at L3-L4 which is also multifactorial      Vacuum phenomenon protrudes into the right L5-S1 neural foramen and could cause radiculopathy and potential right L5 root compression  This was not evident on the prior abdomen CT      There is progressive loss of disc height at L4-5 and L5-S1 loss of vacuum phenomenon  This could be mechanical with loss of disc height but unusual in 2 successive vertebral segments in a fairly short period of time  There is no rivera endplate   destruction seen although small cystic areas have developed and could represent small erosions      Early discitis is difficult to exclude and should be correlated with erythrocyte sedimentation rate  MRI follow-up may be indicated          2834 Patient was reexamined at this time and informed of laboratory and/or imaging results and was found to be stable for discharge  Return to emergency department criteria was reviewed with the patient who verbalized understanding and was agreeable to discharge and the treatment plan at this time  [phone] Patient advised to follow up with MRI ASAP  MDM      Disposition  Final diagnoses:   Spondylosis   Radiculopathy of lumbar region   Acute right-sided low back pain     Time reflects when diagnosis was documented in both MDM as applicable and the Disposition within this note     Time User Action Codes Description Comment    12/27/2019  3:51 PM Asenath Laser Add [M47 9] Spondylosis     12/27/2019  3:52 PM Irene Montesta [M54 16] Radiculopathy of lumbar region     12/27/2019  3:53 PM Asenath Laser Add [M54 5] Acute right-sided low back pain       ED Disposition     ED Disposition Condition Date/Time Comment    Discharge Good Fri Dec 27, 2019  3:51 PM [facility] discharge to home/self care              Follow-up Information     Follow up With Specialties Details Why Contact Info    Steve Castillo MD Orthopedic Surgery Schedule an appointment as soon as possible for a visit in 1 day  [address] Discharge Medication List as of 12/27/2019  3:54 PM      START taking these medications    Details   losartan (COZAAR) 50 mg tablet TAKE 1 TABLET ONCE A DAY, Normal      methocarbamol (ROBAXIN) 500 mg tablet Take 1 tablet (500 mg total) by mouth 2 (two) times a day, Starting Fri 12/27/2019, Normal      traMADol (ULTRAM) 50 mg tablet Take 1 tablet (50 mg total) by mouth every 6 (six) hours as needed for moderate pain for up to 10 days, Starting Fri 12/27/2019, Until Mon 1/6/2020, Normal         CONTINUE these medications which have NOT CHANGED    Details   amLODIPine (NORVASC) 10 mg tablet Take 1 tablet (10 mg total) by mouth daily, Starting Sat 11/2/2019, Until Fri 1/31/2020, Normal      aspirin 81 MG tablet Take 1 tablet (81 mg total) by mouth daily, Starting Tue 11/5/2019, OTC      atorvastatin (LIPITOR) 10 mg tablet Take 1 tablet (10 mg total) by mouth daily, Starting Tue 11/5/2019, Normal      diclofenac sodium (VOLTAREN) 1 % Apply 2 g topically 4 (four) times a day Apply to shoulders   , Starting Tue 11/5/2019, Normal      levothyroxine 25 mcg tablet Take 0 5 tablets (12 5 mcg total) by mouth daily, Starting Thu 12/19/2019, Until Wed 3/18/2020, Normal      magnesium oxide (MAG-OX) 400 mg Take 1 tablet (400 mg total) by mouth 2 (two) times a day, Starting Wed 11/20/2019, Normal      oxyCODONE (ROXICODONE) 5 mg immediate release tablet Take 1 tablet (5 mg total) by mouth every 6 (six) hours as needed for moderate pain or severe pain (as needed ONLY)Max Daily Amount: 20 mg, Starting Wed 11/20/2019, Normal      pancrelipase, Lip-Prot-Amyl, (CREON) 24,000 units Take 1 capsule (24,000 Units total) by mouth 3 (three) times a day with meals, Starting Sat 11/2/2019, Until Fri 1/31/2020, Normal      pantoprazole (PROTONIX) 40 mg tablet Take 1 tablet (40 mg total) by mouth daily, Starting Sat 11/2/2019, Until Fri 1/31/2020, Normal      ranitidine (ZANTAC) 150 mg tablet Take 1 tablet (150 mg total) by mouth 2 (two) times a day, Starting Sat 11/2/2019, Until Fri 1/31/2020, Normal      zolpidem (AMBIEN) 10 mg tablet Starting Fri 11/22/2019, Historical Med           No discharge procedures on file      ED Provider  Electronically Signed by           Farooq Serna PA-C  12/27/19 6215

## 2019-12-29 LAB
ATRIAL RATE: 89 BPM
PR INTERVAL: 136 MS
QRS AXIS: 25 DEGREES
QRSD INTERVAL: 76 MS
QT INTERVAL: 364 MS
QTC INTERVAL: 442 MS
T WAVE AXIS: 39 DEGREES
VENTRICULAR RATE: 89 BPM

## 2019-12-29 PROCEDURE — 93010 ELECTROCARDIOGRAM REPORT: CPT | Performed by: INTERNAL MEDICINE

## 2019-12-30 ENCOUNTER — HOSPITAL ENCOUNTER (OUTPATIENT)
Dept: CT IMAGING | Facility: HOSPITAL | Age: 69
Discharge: HOME | End: 2019-12-30
Attending: SURGERY
Payer: COMMERCIAL

## 2019-12-30 DIAGNOSIS — I65.23 CAROTID STENOSIS, ASYMPTOMATIC, BILATERAL: ICD-10-CM

## 2019-12-30 PROCEDURE — 70498 CT ANGIOGRAPHY NECK: CPT

## 2019-12-30 RX ADMIN — IOHEXOL 85 ML: 350 INJECTION, SOLUTION INTRAVENOUS at 13:46

## 2019-12-31 ENCOUNTER — HOSPITAL ENCOUNTER (OUTPATIENT)
Dept: MRI IMAGING | Facility: HOSPITAL | Age: 69
Discharge: HOME | End: 2019-12-31
Attending: ORTHOPAEDIC SURGERY
Payer: COMMERCIAL

## 2019-12-31 ENCOUNTER — OFFICE VISIT (OUTPATIENT)
Dept: OBGYN CLINIC | Facility: CLINIC | Age: 69
End: 2019-12-31
Payer: COMMERCIAL

## 2019-12-31 VITALS — HEIGHT: 63 IN | BODY MASS INDEX: 25.69 KG/M2 | WEIGHT: 145 LBS

## 2019-12-31 DIAGNOSIS — M54.16 RADICULOPATHY, LUMBAR REGION: Primary | ICD-10-CM

## 2019-12-31 DIAGNOSIS — M54.16 RADICULOPATHY, LUMBAR REGION: ICD-10-CM

## 2019-12-31 PROCEDURE — A9585 GADOBUTROL INJECTION: HCPCS | Performed by: ORTHOPAEDIC SURGERY

## 2019-12-31 PROCEDURE — 99203 OFFICE O/P NEW LOW 30 MIN: CPT | Performed by: ORTHOPAEDIC SURGERY

## 2019-12-31 PROCEDURE — 72158 MRI LUMBAR SPINE W/O & W/DYE: CPT

## 2019-12-31 RX ORDER — OXYCODONE HYDROCHLORIDE AND ACETAMINOPHEN 5; 325 MG/1; MG/1
1 TABLET ORAL EVERY 6 HOURS PRN
Qty: 6 TABLET | Refills: 0 | Status: ON HOLD | OUTPATIENT
Start: 2019-12-31 | End: 2020-02-21 | Stop reason: SDUPTHER

## 2019-12-31 RX ADMIN — GADOBUTROL 6 ML: 604.72 INJECTION INTRAVENOUS at 15:11

## 2019-12-31 NOTE — LETTER
December 31, 2019     Osmani Mancerarie, [address] [place] [address]     Patient: Junior Keith   YOB: 1950   Date of Visit: 12/31/2019       Dear Dr Daniel Barnett: Thank you for referring Ahsan Sinai to me for evaluation  Below are my notes for this consultation  If you have questions, please do not hesitate to call me  I look forward to following your patient along with you  Sincerely,        Mt Cochran MD        CC: MD [place] Morgan MD  12/31/2019  2:46 PM  Incomplete  Chief Complaint   Patient presents with    Spine - Pain           Assessment:  Right L5 radiculopathy    Plan :  I am concerned by his true sciatica and I believe this is a  right L5 radiculopathy  With his previous surgery and with the significant spinal stenosis seen on x-ray, it is important to localize the site of maximum pressure on the sciatic nerve  For that reason I sent him for a  stat MRI of his lumbar spine with and without contrast because of the previous surgery  I wrote for Percocet 5/325 to be taken 1 every 6 hours if needed for severe pain only and he should not take the medicine if he has to do any driving or activity that entails  concentration as this medicine will tend to make him sleepy  He should do no lifting or any other strenuous activities for now  I have made an appointment for him to see Dr Clarita Gonzáles, one  of our interventional spine doctors, for evaluation and possible epidural steroids on Thursday 01/02/2020 at 10:30 a m  in the morning at our [facility] office  I also will speak to his cardiologist to see if this EKG of some extra heartbeats is of anything significant and if it needs any further treatment    I will call the patient once I personally reviewe the MRI and talk to the cardiologist    HPI:   This 61-year-old white male  who works here at [employer] has had the acute onset of back and severe right leg pain for the last 2 weeks  He has had 2 prior lumbar laminectomies in the 80s and  2 cervical  laminectomies in the 90s and 2000s  He had no injury but just woke up with severe right leg pain going into his foot associated with numbness, tingling, paresthesias  Cannot sleep at night  He denies loss of control of bowel or bladder or urinary incontinence  He has no underlying medical diseases that are contributing to this  He has been to the emergency room twice and has had a CT scan of his back which reported spinal stenosis  "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    PMHx:         Past Medical History:   Diagnosis Date    Avascular necrosis of bone of hip, left ([address] )     Hiatal hernia     Hyperlipidemia        Past Surgical History:   Procedure Laterality Date    CARDIAC CATHETERIZATION      CERVICAL FUSION      LUMBAR FUSION         History reviewed  No pertinent family history      Social History     Socioeconomic History    Marital status: Single     Spouse name: Not on file    Number of children: Not on file    Years of education: Not on file    Highest education level: Not on file   Occupational History    Not on file   Social Needs    Financial resource strain: Not on file    Food insecurity:     Worry: Not on file     Inability: Not on file    Transportation needs:     Medical: Not on file     Non-medical: Not on file   Tobacco Use    Smoking status: Current Every Day Smoker     Packs/day: 0 50     Years: 50 00     Pack years: 25 00     Types: Cigarettes    Smokeless tobacco: Never Used    Tobacco comment: Heavy tobacco smoker; 10 cigarettes per day as per [employer]   Substance and Sexual Activity    Alcohol use: Yes     Frequency: Monthly or less     Drinks per session: 1 or 2     Binge frequency: Never     Comment: rare    Drug use: Never    Sexual activity: Never     Comment: DAVIDSON   Lifestyle    Physical activity:     Days per week: Not on file Minutes per session: Not on file    Stress: Not on file   Relationships    Social connections:     Talks on phone: Not on file     Gets together: Not on file     Attends Buddhism service: Not on file     Active member of club or organization: Not on file     Attends meetings of clubs or organizations: Not on file     Relationship status: Not on file    Intimate partner violence:     Fear of current or ex partner: Not on file     Emotionally abused: Not on file     Physically abused: Not on file     Forced sexual activity: Not on file   Other Topics Concern    Not on file   Social History Narrative    Not on file       Current Outpatient Medications   Medication Sig Dispense Refill    amLODIPine (NORVASC) 10 mg tablet Take 1 tablet (10 mg total) by mouth daily 90 tablet 0    aspirin 81 MG tablet Take 1 tablet (81 mg total) by mouth daily      atorvastatin (LIPITOR) 10 mg tablet Take 1 tablet (10 mg total) by mouth daily 30 tablet 5    diclofenac sodium (VOLTAREN) 1 % Apply 2 g topically 4 (four) times a day Apply to shoulders     200 g 2    levothyroxine 25 mcg tablet Take 0 5 tablets (12 5 mcg total) by mouth daily 45 tablet 3    losartan (COZAAR) 50 mg tablet TAKE 1 TABLET ONCE A DAY 90 tablet 0    magnesium oxide (MAG-OX) 400 mg Take 1 tablet (400 mg total) by mouth 2 (two) times a day 60 tablet 1    methocarbamol (ROBAXIN) 500 mg tablet Take 1 tablet (500 mg total) by mouth 2 (two) times a day 20 tablet 0    oxyCODONE (ROXICODONE) 5 mg immediate release tablet Take 1 tablet (5 mg total) by mouth every 6 (six) hours as needed for moderate pain or severe pain (as needed ONLY)Max Daily Amount: 20 mg 20 tablet 0    pancrelipase, Lip-Prot-Amyl, (CREON) 24,000 units Take 1 capsule (24,000 Units total) by mouth 3 (three) times a day with meals 270 capsule 0    pantoprazole (PROTONIX) 40 mg tablet Take 1 tablet (40 mg total) by mouth daily 90 tablet 0    ranitidine (ZANTAC) 150 mg tablet Take 1 tablet (150 mg total) by mouth 2 (two) times a day 180 tablet 0    traMADol (ULTRAM) 50 mg tablet Take 1 tablet (50 mg total) by mouth every 6 (six) hours as needed for moderate pain for up to 10 days 20 tablet 0    zolpidem (AMBIEN) 10 mg tablet        No current facility-administered medications for this visit  Allergies: Patient has no known allergies  ROS:  Positive for arrhythmia, AVN of his left hip, pre diabetes with sugar issues, and orthopedic complaints as above The remaining 9/12 systems on the intake sheet that I reviewed were negative  PE:  Ht 5' 3" (1 6 m)   Wt 65 8 kg (145 lb)   BMI 25 69 kg/m²    Constitutional: The patient was  oriented to person, place, and time  Well-developed and well-nourished  In moderate distress  HEENT: Vision intact  Hearing normal  Swallowing normal   Head: Normocephalic  Cardiovascular: Intact distal pulses  Pulse regular  Pulmonary/Chest: Effort normal  No respiratory distress  Neurological: Alert and oriented to person, place, and time  Skin: Skin is warm  Psychiatric: Normal mood and affect  Ortho Exam:  He was limping but not using a cane or crutches  He was nontender midline of his back or SI did or SI joints, but had exquisite tenderness on palpation the right sciatic notch with reproduction of sciatica  He is not tender over the greater trochanter  Hip motion both in internal external rotation was full and equal bilaterally  He had no knee pain and no popliteal adenopathy  No calf tenderness  He had decreased patellar and Achilles reflexes equal bilaterally  He had definite weakness of great toe extension on the right compared to the left  Straight leg raising on the left was negative but positive at 30° for reproduction of sciatica  Pinprick showed increased sensation over the medial border of his foot with normal sensation over the lateral border on sole of his foot    He had a good posterior tibial pulse with some irregular beats palpated  Studies reviewed:  I personally reviewed the CT scan of his lumbar spine as well as the radiologist's report there is marked disc space narrowing with obliteration of disc space at L4-5 and L5-S1  Straightening of the normal cervical lumbar lordosis  He had anterior osteophyte formation noted  His CT scan showed moderate to severe spinal stenosis L4-5 with associated moderate stenotic  changes above that area

## 2019-12-31 NOTE — PATIENT INSTRUCTIONS
I am concerned by his true sciatica and I believe this is a  right L5 radiculopathy  With his previous surgery and with the significant spinal stenosis seen on x-ray, it is important to localize the site of maximum pressure on the sciatic nerve  For that reason I sent him for a  stat MRI of his lumbar spine with and without contrast because of the previous surgery  I wrote for Percocet 5/325 to be taken 1 every 6 hours if needed for severe pain only and he should not take the medicine if he has to do any driving or activity that entails  concentration as this medicine will tend to make him sleepy  He should do no lifting or any other strenuous activities for now  I have made an appointment for him to see Dr Kamar Coello, one  of our interventional spine doctors, for evaluation and possible epidural steroids on Thursday 01/02/2020 at 10:30 a m  in the morning at our [facility] office  I also will speak to his cardiologist to see if this EKG of some extra heartbeats is of anything significant and if it needs any further treatment    I will call the patient once I personally reviewe the MRI and talk to the cardiologist

## 2019-12-31 NOTE — PROGRESS NOTES
Chief Complaint   Patient presents with    Spine - Pain           Assessment:  Right L5 radiculopathy    Plan :  I am concerned by his true sciatica and I believe this is a  right L5 radiculopathy  With his previous surgery and with the significant spinal stenosis seen on x-ray, it is important to localize the site of maximum pressure on the sciatic nerve  For that reason I sent him for a  stat MRI of his lumbar spine with and without contrast because of the previous surgery  I wrote for Percocet 5/325 to be taken 1 every 6 hours if needed for severe pain only and he should not take the medicine if he has to do any driving or activity that entails  concentration as this medicine will tend to make him sleepy  He should do no lifting or any other strenuous activities for now  I have made an appointment for him to see Dr Gerardo Kim, one  of our interventional spine doctors, for evaluation and possible epidural steroids on Thursday 01/02/2020 at 10:30 a m  in the morning at our [facility] office  I also will speak to his cardiologist to see if this EKG of some extra heartbeats is of anything significant and if it needs any further treatment  I will call the patient once I personally reviewe the MRI and talk to the cardiologist    HPI:   This 61-year-old white male  who works [facility] at [facility] has had the acute onset of back and severe right leg pain for the last 2 weeks  He has had 2 prior lumbar laminectomies in the 80s and  2 cervical  laminectomies in the 90s and 2000s  He had no injury but just woke up with severe right leg pain going into his foot associated with numbness, tingling, paresthesias  Cannot sleep at night  He denies loss of control of bowel or bladder or urinary incontinence  He has no underlying medical diseases that are contributing to this    He has been to the emergency room twice and has had a CT scan of his back which reported spinal stenosis  "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    PMHx:         Past Medical History:   Diagnosis Date    Avascular necrosis of bone of hip, left ([address] )     Hiatal hernia     Hyperlipidemia        Past Surgical History:   Procedure Laterality Date    CARDIAC CATHETERIZATION      CERVICAL FUSION      LUMBAR FUSION         History reviewed  No pertinent family history      Social History     Socioeconomic History    Marital status: Single     Spouse name: Not on file    Number of children: Not on file    Years of education: Not on file    Highest education level: Not on file   Occupational History    Not on file   Social Needs    Financial resource strain: Not on file    Food insecurity:     Worry: Not on file     Inability: Not on file    Transportation needs:     Medical: Not on file     Non-medical: Not on file   Tobacco Use    Smoking status: Current Every Day Smoker     Packs/day: 0 50     Years: 50 00     Pack years: 25 00     Types: Cigarettes    Smokeless tobacco: Never Used    Tobacco comment: Heavy tobacco smoker; 10 cigarettes per day as per [employer]   Substance and Sexual Activity    Alcohol use: Yes     Frequency: Monthly or less     Drinks per session: 1 or 2     Binge frequency: Never     Comment: rare    Drug use: Never    Sexual activity: Never     Comment: DAVIDSON   Lifestyle    Physical activity:     Days per week: Not on file     Minutes per session: Not on file    Stress: Not on file   Relationships    Social connections:     Talks on phone: Not on file     Gets together: Not on file     Attends [employer] service: Not on file     Active member of club or organization: Not on file     Attends meetings of clubs or organizations: Not on file     Relationship status: Not on file    Intimate partner violence:     Fear of current or ex partner: Not on file     Emotionally abused: Not on file     Physically abused: Not on file     Forced sexual activity: Not on file   Other Topics Concern    Not on file   Social History Narrative    Not on file       Current Outpatient Medications   Medication Sig Dispense Refill    amLODIPine (NORVASC) 10 mg tablet Take 1 tablet (10 mg total) by mouth daily 90 tablet 0    aspirin 81 MG tablet Take 1 tablet (81 mg total) by mouth daily      atorvastatin (LIPITOR) 10 mg tablet Take 1 tablet (10 mg total) by mouth daily 30 tablet 5    diclofenac sodium (VOLTAREN) 1 % Apply 2 g topically 4 (four) times a day Apply to shoulders    200 g 2    levothyroxine 25 mcg tablet Take 0 5 tablets (12 5 mcg total) by mouth daily 45 tablet 3    losartan (COZAAR) 50 mg tablet TAKE 1 TABLET ONCE A DAY 90 tablet 0    magnesium oxide (MAG-OX) 400 mg Take 1 tablet (400 mg total) by mouth 2 (two) times a day 60 tablet 1    methocarbamol (ROBAXIN) 500 mg tablet Take 1 tablet (500 mg total) by mouth 2 (two) times a day 20 tablet 0    oxyCODONE (ROXICODONE) 5 mg immediate release tablet Take 1 tablet (5 mg total) by mouth every 6 (six) hours as needed for moderate pain or severe pain (as needed ONLY)Max Daily Amount: 20 mg 20 tablet 0    pancrelipase, Lip-Prot-Amyl, (CREON) 24,000 units Take 1 capsule (24,000 Units total) by mouth 3 (three) times a day with meals 270 capsule 0    pantoprazole (PROTONIX) 40 mg tablet Take 1 tablet (40 mg total) by mouth daily 90 tablet 0    ranitidine (ZANTAC) 150 mg tablet Take 1 tablet (150 mg total) by mouth 2 (two) times a day 180 tablet 0    traMADol (ULTRAM) 50 mg tablet Take 1 tablet (50 mg total) by mouth every 6 (six) hours as needed for moderate pain for up to 10 days 20 tablet 0    zolpidem (AMBIEN) 10 mg tablet        No current facility-administered medications for this visit  Allergies: Patient has no known allergies      ROS:  Positive for arrhythmia, AVN of his left hip, pre diabetes with sugar issues, and orthopedic complaints as above The remaining 9/12 systems on the intake sheet that I reviewed were negative  PE:  Ht 5' 3" (1 6 m)   Wt 65 8 kg (145 lb)   BMI 25 69 kg/m²   Constitutional: The patient was  oriented to person, place, and time  Well-developed and well-nourished  In moderate distress  HEENT: Vision intact  Hearing normal  Swallowing normal   Head: Normocephalic  Cardiovascular: Intact distal pulses  Pulse regular  Pulmonary/Chest: Effort normal  No respiratory distress  Neurological: Alert and oriented to person, place, and time  Skin: Skin is warm  Psychiatric: Normal mood and affect  Ortho Exam:  He was limping but not using a cane or crutches  He was nontender midline of his back or SI did or SI joints, but had exquisite tenderness on palpation the right sciatic notch with reproduction of sciatica  He is not tender over the greater trochanter  Hip motion both in internal external rotation was full and equal bilaterally  He had no knee pain and no popliteal adenopathy  No calf tenderness  He had decreased patellar and Achilles reflexes equal bilaterally  He had definite weakness of great toe extension on the right compared to the left  Straight leg raising on the left was negative but positive at 30° for reproduction of sciatica  Pinprick showed increased sensation over the medial border of his foot with normal sensation over the lateral border on sole of his foot  He had a good posterior tibial pulse with some irregular beats palpated  Studies reviewed:  I personally reviewed the CT scan of his lumbar spine as well as the radiologist's report there is marked disc space narrowing with obliteration of disc space at L4-5 and L5-S1  Straightening of the normal cervical lumbar lordosis  He had anterior osteophyte formation noted  His CT scan showed moderate to severe spinal stenosis L4-5 with associated moderate stenotic  changes above that area

## 2020-01-02 ENCOUNTER — CONSULT (OUTPATIENT)
Dept: PAIN MEDICINE | Facility: MEDICAL CENTER | Age: 70
End: 2020-01-02
Payer: COMMERCIAL

## 2020-01-02 VITALS
HEART RATE: 87 BPM | RESPIRATION RATE: 14 BRPM | WEIGHT: 138 LBS | HEIGHT: 63 IN | DIASTOLIC BLOOD PRESSURE: 73 MMHG | BODY MASS INDEX: 24.45 KG/M2 | SYSTOLIC BLOOD PRESSURE: 159 MMHG

## 2020-01-02 DIAGNOSIS — M48.061 SPINAL STENOSIS OF LUMBAR REGION WITHOUT NEUROGENIC CLAUDICATION: ICD-10-CM

## 2020-01-02 DIAGNOSIS — M54.16 RADICULOPATHY, LUMBAR REGION: Primary | ICD-10-CM

## 2020-01-02 PROCEDURE — 99244 OFF/OP CNSLTJ NEW/EST MOD 40: CPT | Performed by: PHYSICAL MEDICINE & REHABILITATION

## 2020-01-02 RX ORDER — GABAPENTIN 300 MG/1
300 CAPSULE ORAL 3 TIMES DAILY
Qty: 90 CAPSULE | Refills: 1 | Status: SHIPPED | OUTPATIENT
Start: 2020-01-02 | End: 2020-03-10

## 2020-01-02 NOTE — PROGRESS NOTES
Assessment  1  Radiculopathy, lumbar region    2  Spinal stenosis of lumbar region without neurogenic claudication        Plan  1  We'll schedule patient for right S1 transforaminal epidural steroid injection  This may need to be repeated  There is severe narrowing on the right at the L5-S1 foramen likely too narrow to safely injected at this level  Could consider selective nerve root block if requested by spine surgery  Complete risks and benefits including bleeding, infection, tissue reaction, allergic reaction were discussed  Verbal consent obtained  2  If this fails to provide significant benefit would refer to Dr Eliu Holliday for surgical management  3  Initiate gabapentin 300 mg q h s  And titrate up to t i d  Dosing  My impressions and treatment recommendations were discussed in detail with the patient who verbalized understanding and had no further questions  Discharge instructions were provided  I personally saw and examined the patient and I agree with the above discussed plan of care  No orders of the defined types were placed in this encounter  No orders of the defined types were placed in this encounter  History of Present Illness    Yoli Dixon is a 81 y o  male referred from Dr Eliseo Verdugo for consultation regarding back and radiating right leg pain consistent with lumbar radiculopathy  Patient does have prior history of lumbar spine surgery x2  He has been having worsening of pain over the past month without any inciting event or trauma describes moderate to severe intensity pain rated as an 8/10 which is nearly constant and worse in the evening  He describes this as burning, shooting, numbness, sharp, cutting, pins and needles, pressure-like, throbbing  He is having some subjective lower extremity weakness and notices somewhat of a footdrop on the right  Aggravating factors include bending, exercise, coughing, sneezing    Alleviating factors include prayer and sitting  Diagnostic studies include MRI of the lumbar spine  Please see report for full details  Not currently taking any pain medications  Not using nerve stabilizing medications  I have personally reviewed and/or updated the patient's past medical history, past surgical history, family history, social history, current medications, allergies, and vital signs today  Review of Systems   Constitutional: Negative for fever and unexpected weight change  HENT: Negative for trouble swallowing  Eyes: Negative for visual disturbance  Respiratory: Negative for shortness of breath and wheezing  Cardiovascular: Positive for leg swelling  Negative for chest pain and palpitations  Gastrointestinal: Negative for constipation, diarrhea, nausea and vomiting  Endocrine: Negative for cold intolerance, heat intolerance and polydipsia  Genitourinary: Negative for difficulty urinating and frequency  Musculoskeletal: Positive for back pain (right lumbar and down leg)  Negative for arthralgias, gait problem, joint swelling and myalgias  Skin: Negative for rash  Neurological: Positive for numbness (right foot and toes)  Negative for dizziness, seizures, syncope, weakness and headaches  Hematological: Does not bruise/bleed easily  Psychiatric/Behavioral: Negative for dysphoric mood  All other systems reviewed and are negative        Patient Active Problem List   Diagnosis    Essential hypertension, benign    Carotid stenosis, asymptomatic, bilateral    Thoracic aortic aneurysm without rupture (HCC)    Ectopic atrial rhythm    Prediabetes    Hypothyroidism (acquired)    Mixed hyperlipidemia    Drug-induced acute pancreatitis without infection or necrosis    Tobacco abuse    Acute respiratory failure with hypoxia ([facility] [address] )    Jejunitis    Atherosclerosis of native coronary artery with angina pectoris (HCC)    Radiculopathy, lumbar region       Past Medical History:   Diagnosis Date    Avascular necrosis of bone of hip, left (HCC)     CAD (coronary artery disease)     Hiatal hernia     Hyperlipidemia     Hypertension        Past Surgical History:   Procedure Laterality Date    BACK SURGERY      CARDIAC CATHETERIZATION      CERVICAL FUSION      JOINT REPLACEMENT      LUMBAR FUSION      NECK SURGERY      ORTHOPEDIC SURGERY      SPINAL FUSION         Family History   Family history unknown: Yes       Social History     Occupational History    Occupation: [employer]   Tobacco Use    Smoking status: Current Every Day Smoker     Packs/day: 0 50     Years: 50 00     Pack years: 25 00     Types: Cigarettes    Smokeless tobacco: Never Used    Tobacco comment: Heavy tobacco smoker; 10 cigarettes per day as per [employer]   Substance and Sexual Activity    Alcohol use: Yes     Frequency: Monthly or less     Drinks per session: 1 or 2     Binge frequency: Never     Comment: rare    Drug use: Never    Sexual activity: Never     Comment: DAVIDSON       Current Outpatient Medications on File Prior to Visit   Medication Sig    amLODIPine (NORVASC) 10 mg tablet Take 1 tablet (10 mg total) by mouth daily    aspirin 81 MG tablet Take 1 tablet (81 mg total) by mouth daily    atorvastatin (LIPITOR) 10 mg tablet Take 1 tablet (10 mg total) by mouth daily    diclofenac sodium (VOLTAREN) 1 % Apply 2 g topically 4 (four) times a day Apply to shoulders        levothyroxine 25 mcg tablet Take 0 5 tablets (12 5 mcg total) by mouth daily    losartan (COZAAR) 50 mg tablet TAKE 1 TABLET ONCE A DAY    magnesium oxide (MAG-OX) 400 mg Take 1 tablet (400 mg total) by mouth 2 (two) times a day    pancrelipase, Lip-Prot-Amyl, (CREON) 24,000 units Take 1 capsule (24,000 Units total) by mouth 3 (three) times a day with meals    pantoprazole (PROTONIX) 40 mg tablet Take 1 tablet (40 mg total) by mouth daily    ranitidine (ZANTAC) 150 mg tablet Take 1 tablet (150 mg total) by mouth 2 (two) times a day    zolpidem (AMBIEN) 10 mg tablet     methocarbamol (ROBAXIN) 500 mg tablet Take 1 tablet (500 mg total) by mouth 2 (two) times a day (Patient not taking: Reported on 1/2/2020)    oxyCODONE (ROXICODONE) 5 mg immediate release tablet Take 1 tablet (5 mg total) by mouth every 6 (six) hours as needed for moderate pain or severe pain (as needed ONLY)Max Daily Amount: 20 mg (Patient not taking: Reported on 1/2/2020)    oxyCODONE-acetaminophen (PERCOCET) 5-325 mg per tablet Take 1 tablet by mouth every 6 (six) hours as needed for moderate painMax Daily Amount: 4 tablets (Patient not taking: Reported on 1/2/2020)    traMADol (ULTRAM) 50 mg tablet Take 1 tablet (50 mg total) by mouth every 6 (six) hours as needed for moderate pain for up to 10 days (Patient not taking: Reported on 1/2/2020)     No current facility-administered medications on file prior to visit  No Known Allergies    Physical Exam    /73   Pulse 87   Resp 14   Ht 5' 3" (1 6 m)   Wt 62 6 kg (138 lb)   BMI 24 45 kg/m²     LUMBAR  General: Well-developed, well-nourished individual in no acute distress  Mental: Appropriate mood and affect  Grossly oriented with coherent speech and thought processing   Neuro:  Cranial nerves: Cranial nerve function is grossly intact bilaterally   Strength: Bilateral lower extremity strength is normal and symmetric except for right ankle dorsiflexion which is graded 4/5  No atrophy or tone abnormalities noted   Reflexes: Bilateral lower extremity muscle stretch reflexes are physiologic and symmetric   No ankle clonus is noted   Sensation: No loss of sensation is noted   SLR/Foraminal Compression Maneuvers: Straight leg raising in the sitting position is positive for radicular pain on the right  Gait:  Gait/gross motor: Gait is antalgic on the right   Station is normal  Toe walking, heel walking  are except for heel walking on the right which is difficult secondary to weakness with dorsiflexion  Musculoskeletal:  Palpation: Inspection and palpation of the spine and extremities are unremarkable   Spine: Normal pain-free range of motion except for lumbar extension which reproduces radiating right leg pain  No gross axial skeletal deformities   Skin: Skin inspection grossly negative for erythema, breakdown, or concerning lesions in affected area   Lymph: No lymphadenopathy is appreciated in the involved extremity   Vessels: No lower extremity edema   Lungs: Breathing is comfortable and regular  No dyspnea noted during examination   Eyes: Visual field grossly intact to confrontation  No redness appreciated  ENT: No craniofacial deformities or asymmetry  No neck masses appreciated  Imaging  Study Result     MRI LUMBAR SPINE WITH AND WITHOUT CONTRAST     INDICATION: [id number]: Radiculopathy, lumbar region  Low back pain radiating down the right leg  History of back surgery in 1980s and early 1990s      COMPARISON:  CT lumbar spine study of December 27, 2019      TECHNIQUE:  Sagittal T1, sagittal T2, sagittal inversion recovery, axial T1 and axial T2, coronal T2  Sagittal and axial T1 postcontrast      IV Contrast:  6 mL of gadobutrol injection (MULTI-DOSE)      IMAGE QUALITY:  Diagnostic     FINDINGS:     VERTEBRAL BODIES:  There are 5 lumbar type vertebral bodies  Mild reverse S-shaped scoliosis with a slight levoscoliotic curvature in the upper lumbar spine and a dextroscoliotic curvature in the mid to lower lumbar spine  Straightening of the cervical   spine  No evidence of spondylolisthesis  No lateral subluxation  The vertebral bodies are maintained in stature  Mildly heterogeneous with degenerative endplate changes noted at L4-L5 and L5-S1  SACRUM:  Normal signal within the sacrum  No evidence   of insufficiency or stress fracture      DISTAL CORD AND CONUS:  Normal size and signal within the distal cord and conus    Prominent dorsal epidural lipomatosis throughout the lumbar spine beginning at the T12-L1 level and noted to the L4-L5 level  This results in dorsal indentation on the   thecal sac, most notably from L3 to L5      PARASPINAL SOFT TISSUES:  Bilateral renal cysts  No prevertebral or paravertebral soft tissue edema      LOWER THORACIC DISC SPACES:  Multilevel disc space narrowing and disc desiccation with endplate edema due to degenerative disease at T11-T12 the disc bulges in the lower thoracic spine resulting mild ventral indentation on the thecal sac  There is mild   foraminal stenosis bilaterally at T11-T12      LUMBAR DISC SPACES:  Multilevel disc desiccation with disc height loss most notably at L4-L5 and L5-S1  Scattered intravertebral discal gas noted in the lower thoracic and upper to mid lumbar spine      L1-L2:  Circumferential disc bulge and mild endplate osteophytic ridging with mild spinal stenosis and bilateral foraminal narrowing      L2-L3:  Circumferential disc bulge with mild prominence of the dorsal epidural fat  Mild spinal stenosis  Moderate left-sided facet arthropathy with mild left greater than right foraminal narrowing      L3-L4:  Endplate osteophytic ridging and moderate bilateral facet arthropathy with a circumferential disc bulge and prominent dorsal epidural fat resulting in severe spinal stenosis  There is mild to moderate bilateral foraminal stenosis      L4-L5:  Endplate osteophytic ridging and circumferential disc bulge with moderate bilateral facet arthropathy  Mild prominence of the dorsal epidural fat  These findings result in severe spinal stenosis and mild bilateral foraminal narrowing      L5-S1:  Disc bulge and endplate osteophytic ridging with a right foraminal and far lateral disc osteophyte complex  There is bilateral subarticular recess stenosis and bilateral facet arthropathy with postoperative changes of prior right   hemilaminectomy  The thecal sac remains patent    There is severe distal right foraminal stenosis  There is also suggestion of moderate left foraminal stenosis  This is primarily due to facet spurring and endplate osteophytic ridging      POSTCONTRAST IMAGING:  No abnormal enhancement      IMPRESSION:     Mild S-shaped scoliosis with straightening of the lumbar spine and multilevel spondylosis with prominence of the dorsal epidural fat resulting in severe spinal stenosis at L3-L4 and L4-L5      Variable degrees of foraminal stenosis, most notable at L5-S1 where there is marked endplate osteophytic ridging and facet spurring resulting in severe right and moderate foraminal stenosis      Postoperative changes of right L5 laminectomy      No pathologic enhancement to suggest arachnoiditis    The right S1 nerve root sleeve appears slightly thicker possibly due to chronic inflammatory change         Workstation performed: [id number]

## 2020-01-08 ENCOUNTER — HOSPITAL ENCOUNTER (OUTPATIENT)
Dept: RADIOLOGY | Facility: MEDICAL CENTER | Age: 70
Discharge: HOME | End: 2020-01-08
Attending: PHYSICAL MEDICINE & REHABILITATION
Payer: COMMERCIAL

## 2020-01-08 VITALS
HEART RATE: 77 BPM | DIASTOLIC BLOOD PRESSURE: 79 MMHG | OXYGEN SATURATION: 99 % | SYSTOLIC BLOOD PRESSURE: 156 MMHG | RESPIRATION RATE: 18 BRPM | TEMPERATURE: 97.8 F

## 2020-01-08 DIAGNOSIS — M54.16 RADICULOPATHY, LUMBAR REGION: ICD-10-CM

## 2020-01-08 DIAGNOSIS — M48.061 SPINAL STENOSIS OF LUMBAR REGION WITHOUT NEUROGENIC CLAUDICATION: ICD-10-CM

## 2020-01-08 PROCEDURE — 64483 NJX AA&/STRD TFRM EPI L/S 1: CPT | Performed by: PHYSICAL MEDICINE & REHABILITATION

## 2020-01-08 PROCEDURE — 64484 NJX AA&/STRD TFRM EPI L/S EA: CPT | Performed by: PHYSICAL MEDICINE & REHABILITATION

## 2020-01-08 RX ORDER — PAPAVERINE HCL 150 MG
20 CAPSULE, EXTENDED RELEASE ORAL ONCE
Status: COMPLETED | OUTPATIENT
Start: 2020-01-08 | End: 2020-01-08

## 2020-01-08 RX ORDER — LIDOCAINE HYDROCHLORIDE 10 MG/ML
5 INJECTION, SOLUTION EPIDURAL; INFILTRATION; INTRACAUDAL; PERINEURAL ONCE
Status: COMPLETED | OUTPATIENT
Start: 2020-01-08 | End: 2020-01-08

## 2020-01-08 RX ADMIN — LIDOCAINE HYDROCHLORIDE 2.5 ML: 10 INJECTION, SOLUTION EPIDURAL; INFILTRATION; INTRACAUDAL; PERINEURAL at 13:56

## 2020-01-08 RX ADMIN — DEXAMETHASONE SODIUM PHOSPHATE 15 MG: 10 INJECTION, SOLUTION INTRAMUSCULAR; INTRAVENOUS at 13:58

## 2020-01-08 RX ADMIN — IOHEXOL 2 ML: 300 INJECTION, SOLUTION INTRAVENOUS at 13:58

## 2020-01-08 NOTE — DISCHARGE INSTRUCTIONS
Epidural Steroid Injection   WHAT YOU NEED TO KNOW:   An epidural steroid injection (ALVINA) is a procedure to inject steroid medicine into the epidural space  The epidural space is between your spinal cord and vertebrae  Steroids reduce inflammation and fluid buildup in your spine that may be causing pain  You may be given pain medicine along with the steroids  ACTIVITY  · Do not drive or operate machinery today  · No strenuous activity today - bending, lifting, etc   · You may resume normal activites starting tomorrow - start slowly and as tolerated  · You may shower today, but no tub baths or hot tubs  · You may have numbness for several hours from the local anesthetic  Please use caution and common sense, especially with weight-bearing activities  CARE OF THE INJECTION SITE  · If you have soreness or pain, apply ice to the area today (20 minutes on/20 minutes off)  · Starting tomorrow, you may use warm, moist heat or ice if needed  · You may have an increase or change in your discomfort for 36-48 hours after your treatment  · Apply ice and continue with any pain medication you have been prescribed  · Notify [facility] if you have any of the following: redness, drainage, swelling, headache, stiff neck or fever above 100°F     SPECIAL INSTRUCTIONS  · Our office will contact you in approximately 7 days for a progress report  MEDICATIONS  · Continue to take all routine medications  · Our office may have instructed you to hold some medications  If you have a problem specifically related to your procedure, please call our office at [phone]  Problems not related to your procedure should be directed to your primary care physician

## 2020-01-08 NOTE — H&P
History of Present Illness: The patient is a 81 y o  male who presents with complaints of right back and leg pain    Patient Active Problem List   Diagnosis    Essential hypertension, benign    Carotid stenosis, asymptomatic, bilateral    Thoracic aortic aneurysm without rupture ([address] )    Ectopic atrial rhythm    Prediabetes    Hypothyroidism (acquired)    Mixed hyperlipidemia    Drug-induced acute pancreatitis without infection or necrosis    Tobacco abuse    Acute respiratory failure with hypoxia (HCC)    Jejunitis    Atherosclerosis of native coronary artery with angina pectoris (HCC)    Radiculopathy, lumbar region    Spinal stenosis of lumbar region without neurogenic claudication       Past Medical History:   Diagnosis Date    Avascular necrosis of bone of hip, left (HCC)     CAD (coronary artery disease)     Hiatal hernia     Hyperlipidemia     Hypertension        Past Surgical History:   Procedure Laterality Date    BACK SURGERY      CARDIAC CATHETERIZATION      CERVICAL FUSION      JOINT REPLACEMENT      LUMBAR FUSION      NECK SURGERY      ORTHOPEDIC SURGERY      SPINAL FUSION           Current Outpatient Medications:     amLODIPine (NORVASC) 10 mg tablet, Take 1 tablet (10 mg total) by mouth daily, Disp: 90 tablet, Rfl: 0    aspirin 81 MG tablet, Take 1 tablet (81 mg total) by mouth daily, Disp: , Rfl:     atorvastatin (LIPITOR) 10 mg tablet, Take 1 tablet (10 mg total) by mouth daily, Disp: 30 tablet, Rfl: 5    diclofenac sodium (VOLTAREN) 1 %, Apply 2 g topically 4 (four) times a day Apply to shoulders   , Disp: 200 g, Rfl: 2    gabapentin (NEURONTIN) 300 mg capsule, Take 1 capsule (300 mg total) by mouth 3 (three) times a day, Disp: 90 capsule, Rfl: 1    levothyroxine 25 mcg tablet, Take 0 5 tablets (12 5 mcg total) by mouth daily, Disp: 45 tablet, Rfl: 3    losartan (COZAAR) 50 mg tablet, TAKE 1 TABLET ONCE A DAY, Disp: 90 tablet, Rfl: 0    magnesium oxide (MAG-OX) 400 mg, Take 1 tablet (400 mg total) by mouth 2 (two) times a day, Disp: 60 tablet, Rfl: 1    methocarbamol (ROBAXIN) 500 mg tablet, Take 1 tablet (500 mg total) by mouth 2 (two) times a day, Disp: 20 tablet, Rfl: 0    pancrelipase, Lip-Prot-Amyl, (CREON) 24,000 units, Take 1 capsule (24,000 Units total) by mouth 3 (three) times a day with meals, Disp: 270 capsule, Rfl: 0    pantoprazole (PROTONIX) 40 mg tablet, Take 1 tablet (40 mg total) by mouth daily, Disp: 90 tablet, Rfl: 0    ranitidine (ZANTAC) 150 mg tablet, Take 1 tablet (150 mg total) by mouth 2 (two) times a day, Disp: 180 tablet, Rfl: 0    oxyCODONE-acetaminophen (PERCOCET) 5-325 mg per tablet, Take 1 tablet by mouth every 6 (six) hours as needed for moderate painMax Daily Amount: 4 tablets (Patient not taking: Reported on 1/2/2020), Disp: 6 tablet, Rfl: 0    zolpidem (AMBIEN) 10 mg tablet, Take by mouth daily at bedtime as needed , Disp: , Rfl:     Current Facility-Administered Medications:     dexamethasone (PF) (DECADRON) injection 20 mg, 20 mg, Epidural, Once, Crocker Amy, DO    iohexol (OMNIPAQUE) 300 mg/mL injection 50 mL, 50 mL, Epidural, Once, Crocker Amy, DO    lidocaine (PF) (XYLOCAINE-MPF) 1 % injection 5 mL, 5 mL, Infiltration, Once, Crocker Amy, DO    No Known Allergies    Physical Exam:   Vitals:    01/08/20 1345   BP: 164/71   Pulse: 90   Resp: 18   Temp: 97 8 °F (36 6 °C)   SpO2: 99%     General: Awake, Alert, Oriented x 3, Mood and affect appropriate  Respiratory: Respirations even and unlabored  Cardiovascular: Peripheral pulses intact; no edema  Musculoskeletal Exam:  Right back leg pain    ASA Score:  2  Patient/Chart Verification  Patient ID Verified: Verbal  Consents Confirmed: Procedural, To be obtained in the Pre-Procedure area  H&P( within 30 days) Verified: To be obtained in the Pre-Procedure area  Allergies Reviewed:  Yes  Anticoag/NSAID held?: NA  Currently on antibiotics?: No  Pregnancy denied?: NA    Assessment:   1  Radiculopathy, lumbar region    2   Spinal stenosis of lumbar region without neurogenic claudication        Plan: (R) L4-5 and (R) S1 TFESI

## 2020-01-15 ENCOUNTER — TELEPHONE (OUTPATIENT)
Dept: PAIN MEDICINE | Facility: CLINIC | Age: 70
End: 2020-01-15

## 2020-01-16 NOTE — TELEPHONE ENCOUNTER
Patient states  he has  0 % of improvement & pain level 10/10  Rickey Dang Patient has pain for the first 6 days  Patient would like to discuss his status with the nurse  Please advise puma        Patient #[phone]

## 2020-01-20 NOTE — TELEPHONE ENCOUNTER
Please follow up with pt, offer follow up visit with me or referral to Dr Eliu Holliday at this point

## 2020-01-21 NOTE — TELEPHONE ENCOUNTER
Took call transferred from phone room  S/W pt  Advised pt of the same  Pt already scheduled for SOVS by phone room for 1/30 at 11 am w/ JE  Pt stated he had no change with the injection  In the am, his pain is a 10/10 and now it is a 8/10 and doesn't go down much below that  Pt stated he can take tylenol and NSAIDs such as ibuprofen  Advised pt to take tylenol OTC and to follow the directions on the bottle and to not take more then 3,000 mg in 24 hrs  Advised pt for ibuprofen to follow the directions on the bottle  Pt stated he uses lidocaine patches and they don't help much  Pt stated showering with warm water helps  Pt stated he does not have percocet left  [facility] on file  Please advise

## 2020-01-22 NOTE — TELEPHONE ENCOUNTER
S/W pt  Advised pt of the same and explained titration schedule to him  Pt had his schedule in front of him  Pt verbalized understanding

## 2020-01-23 ENCOUNTER — TELEPHONE (OUTPATIENT)
Dept: ADMINISTRATIVE | Facility: HOSPITAL | Age: 70
End: 2020-01-23

## 2020-01-23 ENCOUNTER — OFFICE VISIT (OUTPATIENT)
Dept: VASCULAR SURGERY | Facility: CLINIC | Age: 70
End: 2020-01-23
Payer: COMMERCIAL

## 2020-01-23 ENCOUNTER — OFFICE VISIT (OUTPATIENT)
Dept: OBGYN CLINIC | Facility: CLINIC | Age: 70
End: 2020-01-23
Payer: COMMERCIAL

## 2020-01-23 VITALS
WEIGHT: 141 LBS | BODY MASS INDEX: 24.98 KG/M2 | SYSTOLIC BLOOD PRESSURE: 118 MMHG | HEIGHT: 63 IN | DIASTOLIC BLOOD PRESSURE: 62 MMHG | HEART RATE: 107 BPM

## 2020-01-23 VITALS
WEIGHT: 138 LBS | SYSTOLIC BLOOD PRESSURE: 141 MMHG | DIASTOLIC BLOOD PRESSURE: 80 MMHG | HEIGHT: 63 IN | BODY MASS INDEX: 24.45 KG/M2 | HEART RATE: 105 BPM

## 2020-01-23 DIAGNOSIS — M54.31 RIGHT SIDED SCIATICA: Primary | ICD-10-CM

## 2020-01-23 DIAGNOSIS — Z72.0 TOBACCO ABUSE: ICD-10-CM

## 2020-01-23 DIAGNOSIS — I65.23 CAROTID STENOSIS, ASYMPTOMATIC, BILATERAL: Primary | Chronic | ICD-10-CM

## 2020-01-23 PROCEDURE — 99213 OFFICE O/P EST LOW 20 MIN: CPT | Performed by: ORTHOPAEDIC SURGERY

## 2020-01-23 PROCEDURE — 99214 OFFICE O/P EST MOD 30 MIN: CPT | Performed by: SURGERY

## 2020-01-23 RX ORDER — CEFAZOLIN SODIUM 1 G/50ML
1000 SOLUTION INTRAVENOUS ONCE
Status: CANCELLED | OUTPATIENT
Start: 2020-01-23 | End: 2020-01-23

## 2020-01-23 RX ORDER — CHLORHEXIDINE GLUCONATE 0.12 MG/ML
15 RINSE ORAL ONCE
Status: CANCELLED | OUTPATIENT
Start: 2020-01-23 | End: 2020-01-23

## 2020-01-23 NOTE — LETTER
January 23, 2020     Rob[place] Lema, [address] [place] [address] [address]    Patient: Saul Gonsalez   YOB: 1950   Date of Visit: 1/23/2020       Dear Dr Valentina Pretty: Thank you for referring Leonard Wills to me for evaluation  Below are my notes for this consultation  If you have questions, please do not hesitate to call me  I look forward to following your patient along with you  Sincerely,        Wojciech Kamara MD        CC: MD Yloanda Barakat, DO Wojciech Kamara MD  1/23/2020  1:36 PM  Sign at close encounter  Chief Complaint   Patient presents with    Spine - Follow-up           Assessment:  Right L5 radiculopathy    Plan :  Unfortunately, the epidural steroid injection that he had has not changed his symptoms and he is just as painful as before  Even though he is trying to carry on his normal activities of daily living, this pain is excruciating and is interfering with his life  I therefore will send him to see my partner Dr Lily Pinto, our excellent spine surgical colleague, for his opinion about further treatment- be it repeat blocks or consideration of other treatment including possible surgery  He will continue with modification of his activities for now and further treatment will be ordered by my partner  I have made an appointment for him on 01/27/2020 at our [place] office    HPI:   This 28-year-old white male  who works here at [employer] has had the acute onset of back and severe right leg pain for the last 2 weeks  He has had 2 prior lumbar laminectomies in the 80s and  2 cervical  laminectomies in the 90s and 2000s  He had no injury but just woke up with severe right leg pain going into his foot associated with numbness, tingling, paresthesias  Cannot sleep at night  He denies loss of control of bowel or bladder or urinary incontinence  He has no underlying medical diseases that are contributing to this    He has been to the emergency room twice and has had a CT scan of his back which reported spinal stenosis  "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   I sent him for an MRI at the time of his initial visit on 12/31 19 which showed severe spinal stenosis at L3-4 and L4-5 and sent him to begin a series of epidural steroid blocks to try to treat this conservatively  He returns now on 01/23/2020  He did have a right L4-5 and right S1 transforaminal epidural steroid injection on 01/08/2020 by Dr Sweta Cunningham at our [address] [place] [address] office  He is no better and the pain is incapacitating    The remainder of this patient's past medical history, family history, social history, medicines, and allergies was reviewed in the chart  Please see HPI for pertinent review of systems  All other systems reviewed are negative  He has a history of drug-induced pancreatitis hypothyroidism, coronary artery disease, carotid stenosis, AVN of his left hip, hyperlipidemia, pre diabetes, among others  PE:  Ht 5' 3" (1 6 m)   Wt 62 6 kg (138 lb)   BMI 24 45 kg/m²    He was limping and in moderate distress again  He was not using a cane  He was nontender to palpation in the midline of his back or SI joints, but had persistent tenderness on palpation the right sciatic notch with reproduction of sciatica  He was not tender over the greater trochanter  Hip motion both in internal external rotation was full and equal bilaterally  He had no knee tenderness  He showed no calf tenderness  He had 1+ patellar and absent Achilles reflexes equal bilaterally  He had persistent weakness of great toe extension on the right compared to the left  Straight leg raising on the left was negative but positive on the right  at 30- 40° for reproduction of sciatica  Pinprick showed increased sensation over the medial border of his foot with normal sensation over the lateral border and sole of his right foot    He had some swelling and  tenderness in the instep of the right foot He had a good posterior tibial pulse with some irregular beats palpated  Studies reviewed:  I personally reviewed the CT scan of his lumbar spine as well as the radiologist's report there is marked disc space narrowing with obliteration of disc space at L4-5 and L5-S1  Straightening of the normal cervical lumbar lordosis  He had anterior osteophyte formation noted  His CT scan showed moderate to severe spinal stenosis L4-5 with associated moderate stenotic  changes above that area  His MRI showed moderate to severe stenosis at L3-4 and L4-5 and I reviewed both the films and the radiologist's report  He also has foraminal stenosis most marked at right L5-S1 resulting in severe narrowing by report    He has has  had a previous right L5 laminectomy

## 2020-01-23 NOTE — PROGRESS NOTES
Chief Complaint   Patient presents with    Spine - Follow-up           Assessment:  Right L5 radiculopathy    Plan :  Unfortunately, the epidural steroid injection that he had has not changed his symptoms and he is just as painful as before  Even though he is trying to carry on his normal activities of daily living, this pain is excruciating and is interfering with his life  I therefore will send him to see my partner Dr Jazz Phoenix, our excellent spine surgical colleague, for his opinion about further treatment- be it repeat blocks or consideration of other treatment including possible surgery  He will continue with modification of his activities for now and further treatment will be ordered by my partner  I have made an appointment for him on 01/27/2020 at our [place] office    HPI:   This 61-year-old white male  who works here at [employer] [facility] [employer] has had the acute onset of back and severe right leg pain for the last 2 weeks  He has had 2 prior lumbar laminectomies in the 80s and  2 cervical  laminectomies in the 90s and 2000s  He had no injury but just woke up with severe right leg pain going into his foot associated with numbness, tingling, paresthesias  Cannot sleep at night  He denies loss of control of bowel or bladder or urinary incontinence  He has no underlying medical diseases that are contributing to this  He has been to the emergency room twice and has had a CT scan of his back which reported spinal stenosis  "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   I sent him for an MRI at the time of his initial visit on 12/31 19 which showed severe spinal stenosis at L3-4 and L4-5 and sent him to begin a series of epidural steroid blocks to try to treat this conservatively  He returns now on 01/23/2020  He did have a right L4-5 and right S1 transforaminal epidural steroid injection on 01/08/2020 by Dr Jimenez President at our [facility] office    He is no better and the pain is incapacitating    The remainder of this patient's past medical history, family history, social history, medicines, and allergies was reviewed in the chart  Please see HPI for pertinent review of systems  All other systems reviewed are negative  He has a history of drug-induced pancreatitis hypothyroidism, coronary artery disease, carotid stenosis, AVN of his left hip, hyperlipidemia, pre diabetes, among others  PE:  Ht 5' 3" (1 6 m)   Wt 62 6 kg (138 lb)   BMI 24 45 kg/m²   He was limping and in moderate distress again  He was not using a cane  He was nontender to palpation in the midline of his back or SI joints, but had persistent tenderness on palpation the right sciatic notch with reproduction of sciatica  He was not tender over the greater trochanter  Hip motion both in internal external rotation was full and equal bilaterally  He had no knee tenderness  He showed no calf tenderness  He had 1+ patellar and absent Achilles reflexes equal bilaterally  He had persistent weakness of great toe extension on the right compared to the left  Straight leg raising on the left was negative but positive on the right  at 30- 40° for reproduction of sciatica  Pinprick showed increased sensation over the medial border of his foot with normal sensation over the lateral border and sole of his right foot  He had some swelling and  tenderness in the instep of the right foot He had a good posterior tibial pulse with some irregular beats palpated  Studies reviewed:  I personally reviewed the CT scan of his lumbar spine as well as the radiologist's report there is marked disc space narrowing with obliteration of disc space at L4-5 and L5-S1  Straightening of the normal cervical lumbar lordosis  He had anterior osteophyte formation noted  His CT scan showed moderate to severe spinal stenosis L4-5 with associated moderate stenotic  changes above that area    His MRI showed moderate to severe stenosis at L3-4 and L4-5 and I reviewed both the films and the radiologist's report  He also has foraminal stenosis most marked at right L5-S1 resulting in severe narrowing by report    He has has  had a previous right L5 laminectomy

## 2020-01-23 NOTE — LETTER
January 23, 2020     [place] [address] [address]    Patient: Felicity Montejo   YOB: 1950   Date of Visit: 1/23/2020       Dear Dr Isaak Red: Thank you for referring Felipe Rutherford to me for evaluation  Below are the relevant portions of my assessment and plan of care  Diagnoses and all orders for this visit:    Carotid stenosis, asymptomatic, bilateral  Asymptomatic high-grade left carotid artery stenosis  We discussed the findings on recent CT angiogram along with the indications for treatment, the treatment options available and their associated risks and benefits  After discussion we will plan on proceeding with left carotid endarterectomy following cardiac clearance  Tobacco abuse  Cigarette smoking  We discussed the importance of smoking cessation especially in the perioperative period  If you have questions, please do not hesitate to call me  I look forward to following Hadley Candelario along with you           Sincerely,        Yolanda Barry MD        CC: No Recipients

## 2020-01-23 NOTE — TELEPHONE ENCOUNTER
Patient needs cardiac clearance for Left Carotid Endarterectomy  Spoke with Gertrude Hall from Dr Mega Bernstein office in [facility] and she scheduled pt for [email or web address] pt agreed

## 2020-01-23 NOTE — ASSESSMENT & PLAN NOTE
Asymptomatic high-grade left carotid artery stenosis  We discussed the findings on recent CT angiogram along with the indications for treatment, the treatment options available and their associated risks and benefits  After discussion we will plan on proceeding with left carotid endarterectomy following cardiac clearance

## 2020-01-23 NOTE — ASSESSMENT & PLAN NOTE
Cigarette smoking  We discussed the importance of smoking cessation especially in the perioperative period

## 2020-01-23 NOTE — PATIENT INSTRUCTIONS
Carotid stenosis, asymptomatic, bilateral  Asymptomatic high-grade left carotid artery stenosis  We discussed the findings on recent CT angiogram along with the indications for treatment, the treatment options available and their associated risks and benefits  After discussion we will plan on proceeding with left carotid endarterectomy following cardiac clearance  Tobacco abuse  Cigarette smoking  We discussed the importance of smoking cessation especially in the perioperative period

## 2020-01-23 NOTE — PROGRESS NOTES
Assessment/Plan:    Carotid stenosis, asymptomatic, bilateral  Asymptomatic high-grade left carotid artery stenosis  We discussed the findings on recent CT angiogram along with the indications for treatment, the treatment options available and their associated risks and benefits  After discussion we will plan on proceeding with left carotid endarterectomy following cardiac clearance  Tobacco abuse  Cigarette smoking  We discussed the importance of smoking cessation especially in the perioperative period  Diagnoses and all orders for this visit:    Carotid stenosis, asymptomatic, bilateral  -     Ambulatory referral to Cardiology; Future    Tobacco abuse          Subjective:      Patient ID: Oelg Thomas is a 81 y o  male  Pt is here to review CTA neck done 12/30/2019  Pt denies any headaches, dizziness, sudden loss of vision, slurred speech, trouble swallowing, TIA or stroke symptoms  He is taking ASA 81 mg and Atorvastatin 10 mg  Pt is a current daily smoker  68-year-old presents following CT angiogram to evaluate asymptomatic carotid artery stenosis  On evaluation today he denies any focal neurologic event which would be consistent with TIA, CVA or amaurosis fugax  He has remained active with no limitations  Of note he also continues to smoke but is working with his medical doctor on plans for smoking cessation  CT angiogram 12/30/2019 with very focal high-grade stenosis of the proximal internal carotid artery which is hard to grade based on the focal nature but appears to be greater than 75%  There is then some calcific disease throughout the proximal 2 cm of the internal carotid artery with further areas of stenosis of 50-60%  On the right there is a 50-60% stenosis  This concurs with the duplex findings      I have spent 30 minutes with Patient  today in which greater than 50% of this time was spent in counseling/coordination of care regarding Diagnostic results, Prognosis, Risks and benefits of tx options, Patient and family education, Risk factor reductions and Impressions  The following portions of the patient's history were reviewed and updated as appropriate: allergies, current medications, past family history, past medical history, past social history, past surgical history and problem list     The ROS as bellow was reviewed and changes made as indictated       Review of Systems   Constitutional: Negative  HENT: Negative  Eyes: Negative  Respiratory: Negative  Cardiovascular: Positive for leg swelling  Gastrointestinal: Negative  Genitourinary: Negative  Musculoskeletal: Positive for back pain  Skin: Negative  Allergic/Immunologic: Negative  Neurological: Negative  Hematological: Negative  Psychiatric/Behavioral: Negative            Objective:      /62 (BP Location: Left arm, Patient Position: Sitting, Cuff Size: Standard)   Pulse (!) 107   Ht 5' 3" (1 6 m)   Wt 64 kg (141 lb)   BMI 24 98 kg/m²          Physical Exam      Operative Scheduling Information:    Hospital:  [facility]    Physician:  Keanu Ro    Surgery:  Left carotid endarterectomy    Urgency:  Standard    Case Length:  Normal    Post-op Bed:  ICU    OR Table:  Standard    Equipment Needs:  Vascular technologist    Medication Instructions:  Aspirin:   Continue (do not hold)    Hydration:  No

## 2020-01-23 NOTE — PATIENT INSTRUCTIONS
Plan :  Unfortunately, the epidural steroid injection that he had has not changed his symptoms and he is just as painful as before  Even though he is trying to carry on his normal activities of daily living, this pain is excruciating and is interfering with his life  I therefore will send him to see my partner Dr Obey Villatoro, our excellent spine surgical colleague, for his opinion about further treatment- be it repeat blocks or consideration of other treatment including possible surgery  He will continue with modification of his activities for now and further treatment will be ordered by my partner    I have made an appointment for him on 01/27/2020 at our [place] office

## 2020-01-27 ENCOUNTER — OFFICE VISIT (OUTPATIENT)
Dept: OBGYN CLINIC | Facility: HOSPITAL | Age: 70
End: 2020-01-27
Payer: COMMERCIAL

## 2020-01-27 VITALS
HEART RATE: 101 BPM | HEIGHT: 63 IN | WEIGHT: 141 LBS | DIASTOLIC BLOOD PRESSURE: 76 MMHG | SYSTOLIC BLOOD PRESSURE: 159 MMHG | BODY MASS INDEX: 24.98 KG/M2

## 2020-01-27 DIAGNOSIS — M48.061 SPINAL STENOSIS OF LUMBAR REGION, UNSPECIFIED WHETHER NEUROGENIC CLAUDICATION PRESENT: Primary | ICD-10-CM

## 2020-01-27 DIAGNOSIS — M54.16 RADICULOPATHY, LUMBAR REGION: ICD-10-CM

## 2020-01-27 PROCEDURE — 99214 OFFICE O/P EST MOD 30 MIN: CPT | Performed by: ORTHOPAEDIC SURGERY

## 2020-01-27 RX ORDER — ACETAMINOPHEN 325 MG/1
975 TABLET ORAL ONCE
Status: CANCELLED | OUTPATIENT
Start: 2020-01-27 | End: 2020-01-27

## 2020-01-27 RX ORDER — CHLORHEXIDINE GLUCONATE 0.12 MG/ML
15 RINSE ORAL ONCE
Status: CANCELLED | OUTPATIENT
Start: 2020-01-27 | End: 2020-01-27

## 2020-01-27 RX ORDER — GABAPENTIN 300 MG/1
300 CAPSULE ORAL ONCE
Status: CANCELLED | OUTPATIENT
Start: 2020-01-27 | End: 2020-01-27

## 2020-01-27 RX ORDER — CEFAZOLIN SODIUM 2 G/50ML
2000 SOLUTION INTRAVENOUS ONCE
Status: CANCELLED | OUTPATIENT
Start: 2020-01-27 | End: 2020-01-27

## 2020-01-27 NOTE — PROGRESS NOTES
Assessment/Plan:    Multilevel lumbar stenosis  Patient has had previous lumbar procedures performed for issues surrounding radiculopathy but now has recurrent symptoms which are debilitating and not responsive to conservative management including injections  Patient is a candidate for lumbar fusion L2-S1 with possible additional levels  They understand risks of surgery to include but not limited to bleeding, infection, CSF leak, nerve damage, possible persistent symptoms, possible need for reoperation  They will need medical clearance and blood work  The above stated was discussed in layman's terms and the patient expressed understanding  All questions were answered to the patient's satisfaction  Alternatives were reviewed  Patient was encouraged to get a second opinion  Post-operative expectations were discussed  Patient demonstrated understanding  Written consent was obtained  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  Follow up after surgery       Problem List Items Addressed This Visit        Nervous and Auditory    Radiculopathy, lumbar region      Other Visit Diagnoses     Spinal stenosis of lumbar region, unspecified whether neurogenic claudication present    -  Primary            Subjective:      Patient ID: Felicity Montejo is a 81 y o  male  HPI  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 who presents to the office for evaluation of low back and right lower extremity pain ongoing for roughly 3 weeks  He denies any specific injury or trauma  He was referred to us by Dr Yamila Ching  He has history of 2 lumbar laminectomies 80s-90s and 2 cervical laminectomies 90s-00s  He experiences low back pain that goes down his right leg to his right foot  He has associated numbness  He has tried an injection without relief  His quality of life has diminished due to his symptoms and limitations      The following portions of the patient's history were reviewed and updated as appropriate: current medications, past family history, past medical history, past social history, past surgical history and problem list     Review of Systems   Constitutional: Negative for fever and unexpected weight change  HENT: Negative for hearing loss, nosebleeds and sore throat  Eyes: Negative for pain, redness and visual disturbance  Respiratory: Negative for cough, shortness of breath and wheezing  Cardiovascular: Negative for chest pain, palpitations and leg swelling  Gastrointestinal: Negative for abdominal pain, nausea and vomiting  Endocrine: Negative for polydipsia and polyuria  Genitourinary: Negative for dysuria and hematuria  Musculoskeletal: Positive for back pain  Skin: Negative for rash and wound  Neurological: Negative for dizziness and headaches  Psychiatric/Behavioral: Negative for agitation and suicidal ideas  Objective:      /76   Pulse 101   Ht 5' 3" (1 6 m)   Wt 64 kg (141 lb)   BMI 24 98 kg/m²          Physical Exam   Constitutional: He is oriented to person, place, and time  He appears well-developed and well-nourished  HENT:   Head: Normocephalic and atraumatic  Eyes: Pupils are equal, round, and reactive to light  Neck: Neck supple  Cardiovascular: Intact distal pulses  Pulmonary/Chest: Breath sounds normal    Neurological: He is alert and oriented to person, place, and time  Skin: Skin is warm and dry  Psychiatric: He has a normal mood and affect   His behavior is normal        Patient ambulates without assistance  Non-tender to palpation   Modified straight leg raise positive on the right  Strength L2-S1 5/5 bilaterally with exception of right foot dorsiflexion 4/5 and right EHL 4/5  Sensation diminished right L4-5 distribution    Imaging  MRI of lumbar spine 12/31/2019 was reviewed and shows multilevel stenosis L2-S1    Scribe Attestation    I,:   Bryant Badillo am acting as a scribe while in the presence of the attending physician :        I,: Coby Esparza MD personally performed the services described in this documentation    as scribed in my presence :

## 2020-01-29 ENCOUNTER — TELEPHONE (OUTPATIENT)
Dept: OTHER | Facility: OTHER | Age: 70
End: 2020-01-29

## 2020-02-07 ENCOUNTER — OFFICE VISIT (OUTPATIENT)
Dept: CARDIOLOGY CLINIC | Facility: CLINIC | Age: 70
End: 2020-02-07
Payer: COMMERCIAL

## 2020-02-07 VITALS
HEIGHT: 63 IN | BODY MASS INDEX: 25.52 KG/M2 | DIASTOLIC BLOOD PRESSURE: 62 MMHG | HEART RATE: 89 BPM | WEIGHT: 144 LBS | SYSTOLIC BLOOD PRESSURE: 128 MMHG

## 2020-02-07 DIAGNOSIS — E78.2 MIXED HYPERLIPIDEMIA: ICD-10-CM

## 2020-02-07 DIAGNOSIS — Z72.0 TOBACCO ABUSE: ICD-10-CM

## 2020-02-07 DIAGNOSIS — I49.1 ECTOPIC ATRIAL RHYTHM: ICD-10-CM

## 2020-02-07 DIAGNOSIS — I71.2 THORACIC AORTIC ANEURYSM WITHOUT RUPTURE (HCC): ICD-10-CM

## 2020-02-07 DIAGNOSIS — Z01.810 PRE-OPERATIVE CARDIOVASCULAR EXAMINATION: ICD-10-CM

## 2020-02-07 DIAGNOSIS — I10 ESSENTIAL HYPERTENSION, BENIGN: ICD-10-CM

## 2020-02-07 DIAGNOSIS — I65.23 CAROTID STENOSIS, ASYMPTOMATIC, BILATERAL: Primary | Chronic | ICD-10-CM

## 2020-02-07 PROBLEM — I25.119 ATHEROSCLEROSIS OF NATIVE CORONARY ARTERY WITH ANGINA PECTORIS (HCC): Status: RESOLVED | Noted: 2019-11-05 | Resolved: 2020-02-07

## 2020-02-07 PROCEDURE — 93000 ELECTROCARDIOGRAM COMPLETE: CPT | Performed by: INTERNAL MEDICINE

## 2020-02-07 PROCEDURE — 99215 OFFICE O/P EST HI 40 MIN: CPT | Performed by: INTERNAL MEDICINE

## 2020-02-07 PROCEDURE — 3008F BODY MASS INDEX DOCD: CPT | Performed by: INTERNAL MEDICINE

## 2020-02-07 PROCEDURE — 4004F PT TOBACCO SCREEN RCVD TLK: CPT | Performed by: INTERNAL MEDICINE

## 2020-02-07 PROCEDURE — 3074F SYST BP LT 130 MM HG: CPT | Performed by: INTERNAL MEDICINE

## 2020-02-07 PROCEDURE — 1160F RVW MEDS BY RX/DR IN RCRD: CPT | Performed by: INTERNAL MEDICINE

## 2020-02-07 PROCEDURE — 3078F DIAST BP <80 MM HG: CPT | Performed by: INTERNAL MEDICINE

## 2020-02-07 RX ORDER — LOSARTAN POTASSIUM 100 MG/1
100 TABLET ORAL DAILY
Qty: 90 TABLET | Refills: 3 | Status: ON HOLD | OUTPATIENT
Start: 2020-02-07 | End: 2020-03-06 | Stop reason: SDUPTHER

## 2020-02-07 RX ORDER — EZETIMIBE AND SIMVASTATIN 10; 40 MG/1; MG/1
1 TABLET ORAL
Qty: 90 TABLET | Refills: 6 | Status: SHIPPED | OUTPATIENT
Start: 2020-02-07 | End: 2020-11-09 | Stop reason: SDUPTHER

## 2020-02-07 NOTE — ASSESSMENT & PLAN NOTE
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 with multiple comorbidities who is being considered for left carotid artery endarterectomy surgery, a procedure with inherent intermediate to high risk  His active cardiac conditions include hypertension, ectopic atrial rhythm without symptoms, dyslipidemia and unexplained lower extremity edema  Though he was previously noted to have mild thoracic aortic aneurysm measuring 4 cm his most recent CT scan of chest in October 2019 did not and identify dilated aorta  His exercise tolerance is good  His blood pressure is well controlled  Today his physical examination is significant for bilateral left greater than right bruit in the carotid arteries, clear lungs, regular rate and rhythm, grade 1 to 2+ bilateral right greater than left lower extremity edema  His ECG is similar to his previous ECGs and is consistent with ectopic atrial rhythm with premature atrial contractions  His overall risk for major adverse cardiac event relating to planned surgery is moderate  Given his risk factors and conditions we would like to do stress test and an echocardiogram for further risk stratification   - a Lexiscan nuclear stress test and a transthoracic echocardiogram are being arranged  Etiology of lower extremity edema is unclear  It is likely secondary to amlodipine use  - we are discontinuing amlodipine and increasing the dose of losartan to 100 mg daily  - I would like him to record random blood pressures and get follow-up blood work to reassess renal function and electrolytes with increased dose of losartan  Will also check an NT proBNP level with next blood work  Will give him furosemide 20 mg pills with instruction to take it every other day for next 2 weeks until the edema completely resolved  Increase intake of potassium rich diet is encouraged with use of furosemide      We again reinforced with him the importance of quitting smoking and he understands and will be working on it

## 2020-02-07 NOTE — H&P (VIEW-ONLY)
[facility]  [id number] [facility], [place] [address]  Phone#  [phone]  Fax#  [phone]  *-*-*-*-*-*-*-*-*-*-*-*-*-*-*-*-*-*-*-*-*-*-*-*-*-*-*-*-*-*-*-*-*-*-*-*-*-*-*-*-*-*-*-*-*-*-*-*-*-*-*-*-*-*  Livan Mass DATE: 02/07/20 5:05 PM  PATIENT NAME: [facility]   1950    [id number]  Age: 81 y o  Sex: male  AUTHOR: Shlomo Frye MD  PRIMARYCARE PHYSICIAN: Deann Dutta MD    DIAGNOSES:  1  Benign essential hypertension  2  Mild ascending thoracic aorta aneurysm  3  History of mild mitral and tricuspid valve regurgitation  4  History of mild inter atrial septal aneurysm without evidence of ASD or PFO without history of TIA/CVA  5  History of hiatal hernia  6  History of colonic polyps  7  History of C diff in the past  8  Carotid artery disease with significant occlusive disease in left carotid artery  9  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 in July 2018, also status post cervical and lumbar spinal fusion surgeries  10  Mild hypothyroidism  11  Pre diabetes  12  History of acute pancreatitis secondary to use of metformin in October 2019    Echocardiogram March 2018 showed mildly increased left ventricular wall thickness, normal left ventricular systolic function and hyperdynamic wall motion, normal diastolic function, EF around 65%  Mild aortic valve sclerosis, trace tricuspid valve regurgitation, no pulmonary hypertension, borderline dilated aortic root  CTA of chest significant for 4 cm ascending thoracic aortic aneurysm with a focal atelectasis or scarring lower lungs without any lung mass or effusion  CURRENT ECG:  Results for orders placed or performed in visit on 02/07/20   POCT ECG    Narrative    Suspected ectopic atrial rhythm with frequent premature atrial contractions  P-wave morphology is negative in inferior leads suggestive of low atrial rhythm    Normal intervals and no significant ST T wave abnormalities  HR 89 bpm        CARDIOLOGY ASSESSMENT & PLAN:  1  Carotid stenosis, asymptomatic, bilateral  ezetimibe-simvastatin (VYTORIN) 10-40 mg per tablet    Echo complete with contrast if indicated    NM myocardial perfusion spect (rx stress and/or rest)    COMPREHENSIVE METABOLIC GYOPH(18)    NT-BNP PRO    Lipid panel    CBC and differential    Protime-INR   2  Essential hypertension, benign  losartan (COZAAR) 100 MG tablet   3  Thoracic aortic aneurysm without rupture (HCC)  ezetimibe-simvastatin (VYTORIN) 10-40 mg per tablet    Echo complete with contrast if indicated    NM myocardial perfusion spect (rx stress and/or rest)    COMPREHENSIVE METABOLIC WOYLW(52)    NT-BNP PRO    Lipid panel    CBC and differential    Protime-INR   4  Ectopic atrial rhythm  ezetimibe-simvastatin (VYTORIN) 10-40 mg per tablet    Echo complete with contrast if indicated    NM myocardial perfusion spect (rx stress and/or rest)    COMPREHENSIVE METABOLIC SMRRK(76)    NT-BNP PRO    Lipid panel    CBC and differential    Protime-INR   5  Tobacco abuse  ezetimibe-simvastatin (VYTORIN) 10-40 mg per tablet    Echo complete with contrast if indicated    NM myocardial perfusion spect (rx stress and/or rest)    COMPREHENSIVE METABOLIC YQRFI(91)    NT-BNP PRO    Lipid panel    CBC and differential    Protime-INR   6  Mixed hyperlipidemia  ezetimibe-simvastatin (VYTORIN) 10-40 mg per tablet   7  Pre-operative cardiovascular examination  POCT ECG     Pre-operative cardiovascular examination  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 with multiple comorbidities who is being considered for left carotid artery endarterectomy surgery, a procedure with inherent intermediate to high risk  His active cardiac conditions include hypertension, ectopic atrial rhythm without symptoms, dyslipidemia and unexplained lower extremity edema    Though he was previously noted to have mild thoracic aortic aneurysm measuring 4 cm his most recent CT scan of chest in October 2019 did not and identify dilated aorta  His exercise tolerance is good  His blood pressure is well controlled  Today his physical examination is significant for bilateral left greater than right bruit in the carotid arteries, clear lungs, regular rate and rhythm, grade 1 to 2+ bilateral right greater than left lower extremity edema  His ECG is similar to his previous ECGs and is consistent with ectopic atrial rhythm with premature atrial contractions  His overall risk for major adverse cardiac event relating to planned surgery is moderate  Given his risk factors and conditions we would like to do stress test and an echocardiogram for further risk stratification   - a Lexiscan nuclear stress test and a transthoracic echocardiogram are being arranged  Etiology of lower extremity edema is unclear  It is likely secondary to amlodipine use  - we are discontinuing amlodipine and increasing the dose of losartan to 100 mg daily  - I would like him to record random blood pressures and get follow-up blood work to reassess renal function and electrolytes with increased dose of losartan  Will also check an NT proBNP level with next blood work  Will give him furosemide 20 mg pills with instruction to take it every other day for next 2 weeks until the edema completely resolved  Increase intake of potassium rich diet is encouraged with use of furosemide  We again reinforced with him the importance of quitting smoking and he understands and will be working on it  INTERVAL HISTORY & HISTORY OF PRESENT ILLNESS:  Joyce Neri is here for follow-up regarding his cardiac comorbidities which include:  Essential hypertension, ascending thoracic aortic aneurysm, dyslipidemia  He is here for follow-up  He has been diagnosed with carotid artery stenosis and is being considered for left carotid endarterectomy  The procedure has not yet been scheduled  A cardiac clearance has been recommended  He is is also being evaluated for surgery for spinal stenosis which will be done after his carotid surgery  Since his last visit in 2018 he has had episode of acute pancreatitis in October 2019 which was attributed to use of metformin  He was hospitalized at [facility]  He had no complications  From a symptom perspective he mentions that for the last 3 weeks he has been experiencing swelling in his lower extremities with right lower extremity more swollen than left  Besides the lower extremity swelling he has had no other significant symptom  There has been no recent chest pain, unusual shortness of breath, orthopnea, PND, dizziness or lightheadedness or presyncope/syncope  His exercise tolerance is unchanged  He continues to smoke 5-6 cigarettes a day  Denies any alcohol use  REVIEW OF SYMPTOMS:    Positive for:  Lower extremity swelling  Negative for: All remaining as reviewed below and in HPI      SYSTEM SYMPTOMS REVIEWED:  Generalweight change, fever, night sweats  Respiratorycough, wheezing, shortness of breath, sputum production  Cardiovascularchest pain, syncope, dyspnea on exertion, edema, decline in exercise tolerance, claudication   Gastrointestinalpersistent vomiting, diarrhea, abdominal distention, blood in stool   Muscular or skeletaljoint pain or swelling   Neurologicheadaches, syncope, abnormal movement  Hematologichistory of easy bruising and bleeding   Endocrinethyroid enlargement, heat or cold intolerance, polyuria   Psychiatricanxiety, depression     *-*-*-*-*-*-*-*-*-*-*-*-*-*-*-*-*-*-*-*-*-*-*-*-*-*-*-*-*-*-*-*-*-*-*-*-*-*-*-*-*-*-*-*-*-*-*-*-*-*-*-*-*-*-  VITAL SIGNS:  Vitals:    02/07/20 1610   BP: 128/62   Pulse: 89   Weight: 65 3 kg (144 lb)   Height: 5' 3" (1 6 m)     Weight (last 2 days)     Date/Time   Weight    02/07/20 1610   65 3 (144)           ,   Wt Readings from Last 3 Encounters:   02/07/20 65 3 kg (144 lb)   01/27/20 64 kg (141 lb)   01/23/20 64 kg (141 lb)    , Body mass index is 25 51 kg/m²  *-*-*-*-*-*-*-*-*-*-*-*-*-*-*-*-*-*-*-*-*-*-*-*-*-*-*-*-*-*-*-*-*-*-*-*-*-*-*-*-*-*-*-*-*-*-*-*-*-*-*-*-*-*-  PHYSICAL EXAM:  General Appearance:    Alert, cooperative, no distress, appears stated age   Head, Eyes, ENT:    No obvious abnormality, moist mucous mebranes  Neck:   Supple, no carotid bruit or JVD   Back:     Symmetric, no curvature  Lungs:     Respirations unlabored  Clear to auscultation bilaterally,    Chest wall:    No tenderness or deformity   Heart:    Regular rate and rhythm, S1 and S2 normal, no murmur, rub  or gallop  Abdomen:     Soft, non-tender, No obvious masses, or organomegaly   Extremities:    1+ lower extremity edema is noted with worse edema and right lower extremity   Skin:   Skin color, texture, turgor normal, no rashes or lesions     *-*-*-*-*-*-*-*-*-*-*-*-*-*-*-*-*-*-*-*-*-*-*-*-*-*-*-*-*-*-*-*-*-*-*-*-*-*-*-*-*-*-*-*-*-*-*-*-*-*-*-*-*-*-  CURRENT MEDICATION LIST:    Current Outpatient Medications:     aspirin 81 MG tablet, Take 1 tablet (81 mg total) by mouth daily, Disp: , Rfl:     diclofenac sodium (VOLTAREN) 1 %, Apply 2 g topically 4 (four) times a day Apply to shoulders   , Disp: 200 g, Rfl: 2    gabapentin (NEURONTIN) 300 mg capsule, Take 1 capsule (300 mg total) by mouth 3 (three) times a day, Disp: 90 capsule, Rfl: 1    levothyroxine 25 mcg tablet, Take 0 5 tablets (12 5 mcg total) by mouth daily, Disp: 45 tablet, Rfl: 3    losartan (COZAAR) 100 MG tablet, Take 1 tablet (100 mg total) by mouth daily, Disp: 90 tablet, Rfl: 3    magnesium oxide (MAG-OX) 400 mg, Take 1 tablet (400 mg total) by mouth 2 (two) times a day, Disp: 60 tablet, Rfl: 1    pancrelipase, Lip-Prot-Amyl, (CREON) 24,000 units, Take 1 capsule (24,000 Units total) by mouth 3 (three) times a day with meals (Patient taking differently: Take 24,000 units of lipase by mouth 3 (three) times a day with meals ), Disp: 270 capsule, Rfl: 0    pantoprazole (PROTONIX) 40 mg tablet, Take 1 tablet (40 mg total) by mouth daily, Disp: 90 tablet, Rfl: 0    ranitidine (ZANTAC) 150 mg tablet, Take 1 tablet (150 mg total) by mouth 2 (two) times a day, Disp: 180 tablet, Rfl: 0    zolpidem (AMBIEN) 10 mg tablet, Take by mouth daily at bedtime as needed , Disp: , Rfl:     ezetimibe-simvastatin (VYTORIN) 10-40 mg per tablet, Take 1 tablet by mouth daily at bedtime, Disp: 90 tablet, Rfl: 6    methocarbamol (ROBAXIN) 500 mg tablet, Take 1 tablet (500 mg total) by mouth 2 (two) times a day (Patient not taking: Reported on 2/7/2020), Disp: 20 tablet, Rfl: 0    oxyCODONE-acetaminophen (PERCOCET) 5-325 mg per tablet, Take 1 tablet by mouth every 6 (six) hours as needed for moderate painMax Daily Amount: 4 tablets (Patient not taking: Reported on 2/7/2020), Disp: 6 tablet, Rfl: 0    ALLERGIES:  No Known Allergies    *-*-*-*-*-*-*-*-*-*-*-*-*-*-*-*-*-*-*-*-*-*-*-*-*-*-*-*-*-*-*-*-*-*-*-*-*-*-*-*-*-*-*-*-*-*-*-*-*-*-*-*-*-*-  The LABORATORY DATA:  I have personally reviewed pertinent labs            Potassium   Date Value Ref Range Status   12/27/2019 3 7 3 6 - 5 0 mmol/L Final   12/17/2019 3 6 3 6 - 5 0 mmol/L Final   11/20/2019 3 8 3 5 - 5 3 mmol/L Final     Chloride   Date Value Ref Range Status   12/27/2019 102 97 - 108 mmol/L Final   12/17/2019 102 97 - 108 mmol/L Final   11/20/2019 106 100 - 108 mmol/L Final     CO2   Date Value Ref Range Status   12/27/2019 28 22 - 30 mmol/L Final   12/17/2019 26 22 - 30 mmol/L Final   11/20/2019 25 21 - 32 mmol/L Final     BUN   Date Value Ref Range Status   12/27/2019 13 5 - 25 mg/dL Final   12/17/2019 10 5 - 25 mg/dL Final   11/20/2019 12 5 - 25 mg/dL Final     Creatinine   Date Value Ref Range Status   12/27/2019 0 74 0 70 - 1 50 mg/dL Final     Comment:     Standardized to IDMS reference method   12/17/2019 0 82 0 70 - 1 50 mg/dL Final     Comment:     Standardized to IDMS reference method   11/20/2019 0 79 0 60 - 1 30 mg/dL Final Comment:     Standardized to IDMS reference method     eGFR   Date Value Ref Range Status   12/27/2019 95 >60 ml/min/1 73sq m Final   12/17/2019 92 >60 ml/min/1 73sq m Final   11/20/2019 93 ml/min/1 73sq m Final     Calcium   Date Value Ref Range Status   12/27/2019 9 1 8 4 - 10 2 mg/dL Final   12/17/2019 9 2 8 4 - 10 2 mg/dL Final   11/20/2019 9 9 8 3 - 10 1 mg/dL Final     AST   Date Value Ref Range Status   12/27/2019 40 17 - 59 U/L Final     Comment:       Specimen collection should occur prior to Sulfasalazine administration due to the potential for falsely depressed results  12/17/2019 47 17 - 59 U/L Final     Comment:       Specimen collection should occur prior to Sulfasalazine administration due to the potential for falsely depressed results  11/20/2019 51 (H) 5 - 45 U/L Final     Comment:       Specimen collection should occur prior to Sulfasalazine administration due to the potential for falsely depressed results  ALT   Date Value Ref Range Status   12/27/2019 32 9 - 52 U/L Final     Comment:       Specimen collection should occur prior to Sulfasalazine administration due to the potential for falsely depressed results  12/17/2019 45 9 - 52 U/L Final     Comment:       Specimen collection should occur prior to Sulfasalazine administration due to the potential for falsely depressed results  11/20/2019 44 12 - 78 U/L Final     Comment:       Specimen collection should occur prior to Sulfasalazine and/or Sulfapyridine administration due to the potential for falsely depressed results        Alkaline Phosphatase   Date Value Ref Range Status   12/27/2019 96 43 - 122 U/L Final   12/17/2019 89 43 - 122 U/L Final   11/20/2019 69 46 - 116 U/L Final     Magnesium   Date Value Ref Range Status   11/13/2019 1 5 (L) 1 6 - 2 3 mg/dL Final   10/31/2019 1 5 (L) 1 6 - 2 3 mg/dL Final   10/25/2019 1 6 1 6 - 2 3 mg/dL Final     WBC   Date Value Ref Range Status   12/27/2019 7 10 4 50 - 11 00 Thousand/uL Final 12/17/2019 6 90 4 50 - 11 00 Thousand/uL Final   11/20/2019 5 82 4 31 - 10 16 Thousand/uL Final     Hemoglobin   Date Value Ref Range Status   12/27/2019 13 3 (L) 13 5 - 17 5 g/dL Final   12/17/2019 14 2 13 5 - 17 5 g/dL Final   11/20/2019 12 8 12 0 - 17 0 g/dL Final     Platelets   Date Value Ref Range Status   12/27/2019 241 150 - 450 Thousands/uL Final   12/17/2019 233 150 - 450 Thousands/uL Final   11/20/2019 349 149 - 390 Thousands/uL Final     INR   Date Value Ref Range Status   10/28/2019 1 23 (H) 0 91 - 1 09 Final     No results found for: CKMB, DIGOXIN  No results found for: TSH  HDL, Direct   Date Value Ref Range Status   11/13/2019 32 (L) >=40 mg/dL Final     Comment:       HDL Cholesterol:       Low     <41 mg/dL  Specimen collection should occur prior to Metamizole administration due to the potential for falsley depressed results  Triglycerides   Date Value Ref Range Status   11/13/2019 167 (H) <150 mg/dL Final     Comment:       Triglyceride:     Normal          <150 mg/dl     Borderline High 150-199 mg/dl     High            200-499 mg/dl        Very High       >499 mg/dl    Specimen collection should occur prior to N-Acetylcysteine or Metamizole administration due to the potential for falsely depressed results        Hemoglobin A1C   Date Value Ref Range Status   11/20/2019 5 4 4 2 - 6 3 % Final     No results found for: Doreatha Danes, GRAMSTAIN, URINECX, WOUNDCULT, BODYFLUIDCUL, MRSACULTURE, INFLUAPCR, INFLUBPCR, RSVPCR, LEGIONELLAUR, CDIFFTOXINB    *-*-*-*-*-*-*-*-*-*-*-*-*-*-*-*-*-*-*-*-*-*-*-*-*-*-*-*-*-*-*-*-*-*-*-*-*-*-*-*-*-*-*-*-*-*-*-*-*-*-*-*-*-*-  PREVIOUS CARDIOLOGY & RADIOLOGY RESULTS:  Results for orders placed in visit on 03/19/18   Echo complete with contrast if indicated    Narrative [phone] [place], [address] [phone]    Cardiovascular Report  _________________________________________________________________ Transthoracic Echocardiogram Report  sOmani Guy    MRN:                 [id number]  Account :           [de-identified]  Accession :         [id number]  Exam Date:           2018 12:45  Patient Location:    O  Ordering Phys:       GLO Angulo)  Attending Phys:      GLO Angulo)  Technologist:        [facility]    Age:  74             :   1950           Gender:   M  Wt:   140 lb         Ht:    66 in  Indication:  CAD  BP:         /       HR:    Rhythm:              sinus  Technical Quality:   fair but adequate      MEASUREMENTS  2D ECHO  Body Surface Area                 1 7 m²  LV Diastolic Diameter PLAX        3 9 cm  LV Systolic Diameter PLAX         2 3 cm  IVS Diastolic Thickness           1 1 cm  LVPW Diastolic Thickness          1 0 cm  LV Relative Wall Thickness        0 5  LVOT Diameter                     2 2 cm  Aortic Root Diameter              4 1 cm  LA Systolic Diameter LX           3 2 cm  LA Area                           7 3 cm²  LV Ejection Fraction MOD 4C       70 7 %  LA Volume AL                      14 0 cm³  LA Volume AL Index                8 1 cm³/m²  RA Area 4C View                   9 2 cm²    DOPPLER  AV Peak Velocity                  122 2 cm/s  AV Peak Gradient                  6 0 mmHg  AV Mean Gradient                  3 4 mmHg  AV Velocity Time Integral         22 2 cm  LVOT Peak Velocity                113 9 cm/s  LVOT Peak Gradient                5 2 mmHg  LVOT Velocity Time Integral       22 4 cm  AV Area Cont Eq vti               4 0 cm²  AV Area Cont Eq pk                3 7 cm²  MV Area PHT                       3 7 cm²  Mitral E Point Velocity           66 5 cm/s  Mitral A Point Velocity           84 6 cm/s  Mitral E to A Ratio               0 8  MV E' Velocity                    5 9 cm/s  Mitral E to MV E' Ratio           11 3  TR Peak Velocity                  146 1 cm/s  TR Peak Gradient 8 5 mmHg  TAPSE                             2 5 cm  Right Atrial Pressure             5 0 mmHg  Pulmonary Artery Systolic Pressu  93 6 mmHg  Right Ventricular Systolic Press  91 1 mmHg  PV Peak Velocity                  100 4 cm/s  PV Peak Gradient                  4 0 mmHg  Pulmonary Artery Diastolic Press  7 0 mmHg      FINDINGS  Left Ventricle  Normal left ventricular cavity size, mildly increased wall  thickness, normal left ventricular systolic function with  hyperdynamic wall motion  Ejection fraction is greater than 65%  Normal diastolic function  Right Ventricle  Normal right ventricle size and systolic function  Normal  estimated right ventricular systolic pressure, 14 mmHg  Right Atrium  Normal right atrial size  Left Atrium  Normal left atrial size  Aneurysmal interatrial septum with  bulge towards the right atrium  No color flow Doppler evidence  of patent foramen ovale or atrial septal defect    Mitral Valve  Normal appearing mitral valve leaflets  No significant mitral  valve regurgitation  Aortic Valve  Trileaflet aortic valve with mild sclerosis  No aortic valve  stenosis or regurgitation  Tricuspid Valve  Trace, physiologic tricuspid valve regurgitation  Pulmonic Valve  No pulmonic valve regurgitation  Pericardium  No pericardial effusion  Aorta  Mildly dilated aortic root and visualize portion of proximal  ascending aorta  Possible mild thoracic aortic aneurysm  Additional Findings  Inferior vena cava is normal in size and demonstrates normal  pulsatile change with sniff test     CONCLUSIONS  - Mildly increased left ventricular wall thickness, normal left  ventricular systolic function and hyperdynamic wall motion  EF  greater than 65%  - Normal diastolic function   - No significant chamber hypertrophy or enlargement  - Mild aortic valve sclerosis   No aortic valve stenosis or  regurgitation   - No significant mitral valve stenosis or regurgitation   - Trace, physiologic tricuspid valve regurgitation   - No pulmonary hypertension   - No pericardial effusion   - Mildly dilated aortic root and visualize portion of proximal  ascending aorta  Possible mild thoracic aortic aneurysm  Compared to previous echocardiogram from 7/29/2015 there is  overall no significant change  Shlomo Frye  (Electronically Signed)  Final Date:      19 March 2018 14:40  CV Report                   Daphney Mendse      [id number]  Final                                                     Page 3     No results found for this or any previous visit  No results found for this or any previous visit  No results found for this or any previous visit  FL spine and pain procedure  Indication:  Leg pain  Preoperative diagnosis:  Lumbar radiculitis  Postoperative diagnosis:  Lumbar radiculitis    Procedure: Fluoroscopically-guided right L4-5 and right S1 transforaminal   epidural steroid injection under fluoroscopy    EBL:  none  Specimens:  not applicable    After discussing the risks, benefits, and alternatives to the procedure,   the patient expressed understanding and wished to proceed  The patient   was brought to the fluoroscopy suite and placed in the prone position  A   procedural pause was conducted to verify:  correct patient identity,   procedure to be performed and as applicable, correct side and site,   correct patient position, and availability of implants, special equipment   and special requirements  After identifying the right L4 pedicle   fluoroscopically with an oblique view, the skin was sterilely prepped and   draped in the usual fashion using Chloraprep skin prep  The skin and   subcutaneous tissues were anesthetized with 0 5% lidocaine  A 3 5 in 22   gauge spinal needle was then advanced under fluoroscopic guidance to the   neural foramen    Appropriate foraminal depth was determined with a lateral   fluoroscopic view, and AP visualization confirmed needle positioning at approximately the 6 o'clock position relative to the pedicle  After   negative aspiration, Omnipaque 300 contrast was injected using live   fluoroscopy confirming appropriate transforaminal spread without evidence   of intravascular or intrathecal uptake  Next, a 1 5 ml solution   consisting of 7 5 mg of dexamethasone in sterile saline was injected   slowly and incrementally into the epidural space  Following the injection   the needle was withdrawn slightly and flushed with lidocaine as it was   fully extracted  The procedure was then repeated in the exact same way at the right S1   pedicle with a 3 5 inch 22 gauge spinal needle  The patient tolerated the procedure well and there were no apparent   complications  The patient did not develop any new neurologic deficits  After appropriate observation, the patient was dismissed from the clinic   in good condition under their own power                       *-*-*-*-*-*-*-*-*-*-*-*-*-*-*-*-*-*-*-*-*-*-*-*-*-*-*-*-*-*-*-*-*-*-*-*-*-*-*-*-*-*-*-*-*-*-*-*-*-*-*-*-*-*-  SIGNATURES:   @JQS@   Shlomo Frye MD     *-*-*-*-*-*-*-*-*-*-*-*-*-*-*-*-*-*-*-*-*-*-*-*-*-*-*-*-*-*-*-*-*-*-*-*-*-*-*-*-*-*-*-*-*-*-*-*-*-*-*-*-*-*-    Social History     Socioeconomic History    Marital status: Single     Spouse name: Not on file    Number of children: Not on file    Years of education: Not on file    Highest education level: Not on file   Occupational History    Occupation: [employer] Needs    Financial resource strain: Not on file    Food insecurity:     Worry: Not on file     Inability: Not on file    Transportation needs:     Medical: Not on file     Non-medical: Not on file   Tobacco Use    Smoking status: Current Every Day Smoker     Packs/day: 0 50     Years: 50 00     Pack years: 25 00     Types: Cigarettes    Smokeless tobacco: Never Used    Tobacco comment: Heavy tobacco smoker; 10 cigarettes per day as per [employer]   Substance and Sexual Activity    Alcohol use: Yes     Frequency: Monthly or less     Drinks per session: 1 or 2     Binge frequency: Never     Comment: rare    Drug use: Never    Sexual activity: Never     Comment: DAVIDSON   Lifestyle    Physical activity:     Days per week: Not on file     Minutes per session: Not on file    Stress: Not on file   Relationships    Social connections:     Talks on phone: Not on file     Gets together: Not on file     Attends Mandaeism service: Not on file     Active member of club or organization: Not on file     Attends meetings of clubs or organizations: Not on file     Relationship status: Not on file    Intimate partner violence:     Fear of current or ex partner: Not on file     Emotionally abused: Not on file     Physically abused: Not on file     Forced sexual activity: Not on file   Other Topics Concern    Not on file   Social History Narrative    Lives independently      Family History   Family history unknown: Yes     Past Surgical History:   Procedure Laterality Date    [address]

## 2020-02-07 NOTE — PROGRESS NOTES
[facility]  [address] [facility], [place] [address] [address]  Phone#  [phone]  Fax#  [phone]  *-*-*-*-*-*-*-*-*-*-*-*-*-*-*-*-*-*-*-*-*-*-*-*-*-*-*-*-*-*-*-*-*-*-*-*-*-*-*-*-*-*-*-*-*-*-*-*-*-*-*-*-*-*  Maria Esther [place] DATE: 02/07/20 5:05 PM  PATIENT NAME: Walt Soliz [facility]   1950    [id number]  Age: 81 y o  Sex: male  AUTHOR: Shlomo Frye MD  PRIMARYCARE PHYSICIAN: Kristel Shane MD    DIAGNOSES:  1  Benign essential hypertension  2  Mild ascending thoracic aorta aneurysm  3  History of mild mitral and tricuspid valve regurgitation  4  History of mild inter atrial septal aneurysm without evidence of ASD or PFO without history of TIA/CVA  5  History of hiatal hernia  6  History of colonic polyps  7  History of C diff in the past  8  Carotid artery disease with significant occlusive disease in left carotid artery  9  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 in July 2018, also status post cervical and lumbar spinal fusion surgeries  10  Mild hypothyroidism  11  Pre diabetes  12  History of acute pancreatitis secondary to use of metformin in October 2019    Echocardiogram March 2018 showed mildly increased left ventricular wall thickness, normal left ventricular systolic function and hyperdynamic wall motion, normal diastolic function, EF around 65%  Mild aortic valve sclerosis, trace tricuspid valve regurgitation, no pulmonary hypertension, borderline dilated aortic root  CTA of chest significant for 4 cm ascending thoracic aortic aneurysm with a focal atelectasis or scarring lower lungs without any lung mass or effusion  CURRENT ECG:  Results for orders placed or performed in visit on 02/07/20   POCT ECG    Narrative    Suspected ectopic atrial rhythm with frequent premature atrial contractions  P-wave morphology is negative in inferior leads suggestive of low atrial rhythm    Normal intervals and no significant ST T wave abnormalities  HR 89 bpm        CARDIOLOGY ASSESSMENT & PLAN:  1  Carotid stenosis, asymptomatic, bilateral  ezetimibe-simvastatin (VYTORIN) 10-40 mg per tablet    Echo complete with contrast if indicated    NM myocardial perfusion spect (rx stress and/or rest)    COMPREHENSIVE METABOLIC OXUFR(97)    NT-BNP PRO    Lipid panel    CBC and differential    Protime-INR   2  Essential hypertension, benign  losartan (COZAAR) 100 MG tablet   3  Thoracic aortic aneurysm without rupture (HCC)  ezetimibe-simvastatin (VYTORIN) 10-40 mg per tablet    Echo complete with contrast if indicated    NM myocardial perfusion spect (rx stress and/or rest)    COMPREHENSIVE METABOLIC SNUIA(75)    NT-BNP PRO    Lipid panel    CBC and differential    Protime-INR   4  Ectopic atrial rhythm  ezetimibe-simvastatin (VYTORIN) 10-40 mg per tablet    Echo complete with contrast if indicated    NM myocardial perfusion spect (rx stress and/or rest)    COMPREHENSIVE METABOLIC QUZLN(13)    NT-BNP PRO    Lipid panel    CBC and differential    Protime-INR   5  Tobacco abuse  ezetimibe-simvastatin (VYTORIN) 10-40 mg per tablet    Echo complete with contrast if indicated    NM myocardial perfusion spect (rx stress and/or rest)    COMPREHENSIVE METABOLIC EXKKR(20)    NT-BNP PRO    Lipid panel    CBC and differential    Protime-INR   6  Mixed hyperlipidemia  ezetimibe-simvastatin (VYTORIN) 10-40 mg per tablet   7  Pre-operative cardiovascular examination  POCT ECG     Pre-operative cardiovascular examination  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 with multiple comorbidities who is being considered for left carotid artery endarterectomy surgery, a procedure with inherent intermediate to high risk  His active cardiac conditions include hypertension, ectopic atrial rhythm without symptoms, dyslipidemia and unexplained lower extremity edema    Though he was previously noted to have mild thoracic aortic aneurysm measuring 4 cm his most recent CT scan of chest in October 2019 did not and identify dilated aorta  His exercise tolerance is good  His blood pressure is well controlled  Today his physical examination is significant for bilateral left greater than right bruit in the carotid arteries, clear lungs, regular rate and rhythm, grade 1 to 2+ bilateral right greater than left lower extremity edema  His ECG is similar to his previous ECGs and is consistent with ectopic atrial rhythm with premature atrial contractions  His overall risk for major adverse cardiac event relating to planned surgery is moderate  Given his risk factors and conditions we would like to do stress test and an echocardiogram for further risk stratification   - a Lexiscan nuclear stress test and a transthoracic echocardiogram are being arranged  Etiology of lower extremity edema is unclear  It is likely secondary to amlodipine use  - we are discontinuing amlodipine and increasing the dose of losartan to 100 mg daily  - I would like him to record random blood pressures and get follow-up blood work to reassess renal function and electrolytes with increased dose of losartan  Will also check an NT proBNP level with next blood work  Will give him furosemide 20 mg pills with instruction to take it every other day for next 2 weeks until the edema completely resolved  Increase intake of potassium rich diet is encouraged with use of furosemide  We again reinforced with him the importance of quitting smoking and he understands and will be working on it  INTERVAL HISTORY & HISTORY OF PRESENT ILLNESS:  Roberto Reyes is here for follow-up regarding his cardiac comorbidities which include:  Essential hypertension, ascending thoracic aortic aneurysm, dyslipidemia  He is here for follow-up  He has been diagnosed with carotid artery stenosis and is being considered for left carotid endarterectomy  The procedure has not yet been scheduled  A cardiac clearance has been recommended  He is is also being evaluated for surgery for spinal stenosis which will be done after his carotid surgery  Since his last visit in 2018 he has had episode of acute pancreatitis in October 2019 which was attributed to use of metformin  He was hospitalized at [facility]  He had no complications  From a symptom perspective he mentions that for the last 3 weeks he has been experiencing swelling in his lower extremities with right lower extremity more swollen than left  Besides the lower extremity swelling he has had no other significant symptom  There has been no recent chest pain, unusual shortness of breath, orthopnea, PND, dizziness or lightheadedness or presyncope/syncope  His exercise tolerance is unchanged  He continues to smoke 5-6 cigarettes a day  Denies any alcohol use  REVIEW OF SYMPTOMS:    Positive for:  Lower extremity swelling  Negative for: All remaining as reviewed below and in HPI      SYSTEM SYMPTOMS REVIEWED:  General--weight change, fever, night sweats  Respiratory--cough, wheezing, shortness of breath, sputum production  Cardiovascular--chest pain, syncope, dyspnea on exertion, edema, decline in exercise tolerance, claudication   Gastrointestinal--persistent vomiting, diarrhea, abdominal distention, blood in stool   Muscular or skeletal--joint pain or swelling   Neurologic--headaches, syncope, abnormal movement  Hematologic--history of easy bruising and bleeding   Endocrine--thyroid enlargement, heat or cold intolerance, polyuria   Psychiatric--anxiety, depression     *-*-*-*-*-*-*-*-*-*-*-*-*-*-*-*-*-*-*-*-*-*-*-*-*-*-*-*-*-*-*-*-*-*-*-*-*-*-*-*-*-*-*-*-*-*-*-*-*-*-*-*-*-*-  VITAL SIGNS:  Vitals:    02/07/20 1610   BP: 128/62   Pulse: 89   Weight: 65 3 kg (144 lb)   Height: 5' 3" (1 6 m)     Weight (last 2 days)     Date/Time   Weight    02/07/20 1610   65 3 (144)           ,   Wt Readings from Last 3 Encounters:   02/07/20 65 3 kg (144 lb)   01/27/20 64 kg (141 lb) 01/23/20 64 kg (141 lb)    , Body mass index is 25 51 kg/m²  *-*-*-*-*-*-*-*-*-*-*-*-*-*-*-*-*-*-*-*-*-*-*-*-*-*-*-*-*-*-*-*-*-*-*-*-*-*-*-*-*-*-*-*-*-*-*-*-*-*-*-*-*-*-  PHYSICAL EXAM:  General Appearance:    Alert, cooperative, no distress, appears stated age   Head, Eyes, ENT:    No obvious abnormality, moist mucous mebranes  Neck:   Supple, no carotid bruit or JVD   Back:     Symmetric, no curvature  Lungs:     Respirations unlabored  Clear to auscultation bilaterally,    Chest wall:    No tenderness or deformity   Heart:    Regular rate and rhythm, S1 and S2 normal, no murmur, rub  or gallop  Abdomen:     Soft, non-tender, No obvious masses, or organomegaly   Extremities:    1+ lower extremity edema is noted with worse edema and right lower extremity   Skin:   Skin color, texture, turgor normal, no rashes or lesions     *-*-*-*-*-*-*-*-*-*-*-*-*-*-*-*-*-*-*-*-*-*-*-*-*-*-*-*-*-*-*-*-*-*-*-*-*-*-*-*-*-*-*-*-*-*-*-*-*-*-*-*-*-*-  CURRENT MEDICATION LIST:    Current Outpatient Medications:     aspirin 81 MG tablet, Take 1 tablet (81 mg total) by mouth daily, Disp: , Rfl:     diclofenac sodium (VOLTAREN) 1 %, Apply 2 g topically 4 (four) times a day Apply to shoulders   , Disp: 200 g, Rfl: 2    gabapentin (NEURONTIN) 300 mg capsule, Take 1 capsule (300 mg total) by mouth 3 (three) times a day, Disp: 90 capsule, Rfl: 1    levothyroxine 25 mcg tablet, Take 0 5 tablets (12 5 mcg total) by mouth daily, Disp: 45 tablet, Rfl: 3    losartan (COZAAR) 100 MG tablet, Take 1 tablet (100 mg total) by mouth daily, Disp: 90 tablet, Rfl: 3    magnesium oxide (MAG-OX) 400 mg, Take 1 tablet (400 mg total) by mouth 2 (two) times a day, Disp: 60 tablet, Rfl: 1    pancrelipase, Lip-Prot-Amyl, (CREON) 24,000 units, Take 1 capsule (24,000 Units total) by mouth 3 (three) times a day with meals (Patient taking differently: Take 24,000 units of lipase by mouth 3 (three) times a day with meals ), Disp: 270 capsule, Rfl: 0    pantoprazole (PROTONIX) 40 mg tablet, Take 1 tablet (40 mg total) by mouth daily, Disp: 90 tablet, Rfl: 0    ranitidine (ZANTAC) 150 mg tablet, Take 1 tablet (150 mg total) by mouth 2 (two) times a day, Disp: 180 tablet, Rfl: 0    zolpidem (AMBIEN) 10 mg tablet, Take by mouth daily at bedtime as needed , Disp: , Rfl:     ezetimibe-simvastatin (VYTORIN) 10-40 mg per tablet, Take 1 tablet by mouth daily at bedtime, Disp: 90 tablet, Rfl: 6    methocarbamol (ROBAXIN) 500 mg tablet, Take 1 tablet (500 mg total) by mouth 2 (two) times a day (Patient not taking: Reported on 2/7/2020), Disp: 20 tablet, Rfl: 0    oxyCODONE-acetaminophen (PERCOCET) 5-325 mg per tablet, Take 1 tablet by mouth every 6 (six) hours as needed for moderate painMax Daily Amount: 4 tablets (Patient not taking: Reported on 2/7/2020), Disp: 6 tablet, Rfl: 0    ALLERGIES:  No Known Allergies    *-*-*-*-*-*-*-*-*-*-*-*-*-*-*-*-*-*-*-*-*-*-*-*-*-*-*-*-*-*-*-*-*-*-*-*-*-*-*-*-*-*-*-*-*-*-*-*-*-*-*-*-*-*-  The LABORATORY DATA:  I have personally reviewed pertinent labs            Potassium   Date Value Ref Range Status   12/27/2019 3 7 3 6 - 5 0 mmol/L Final   12/17/2019 3 6 3 6 - 5 0 mmol/L Final   11/20/2019 3 8 3 5 - 5 3 mmol/L Final     Chloride   Date Value Ref Range Status   12/27/2019 102 97 - 108 mmol/L Final   12/17/2019 102 97 - 108 mmol/L Final   11/20/2019 106 100 - 108 mmol/L Final     CO2   Date Value Ref Range Status   12/27/2019 28 22 - 30 mmol/L Final   12/17/2019 26 22 - 30 mmol/L Final   11/20/2019 25 21 - 32 mmol/L Final     BUN   Date Value Ref Range Status   12/27/2019 13 5 - 25 mg/dL Final   12/17/2019 10 5 - 25 mg/dL Final   11/20/2019 12 5 - 25 mg/dL Final     Creatinine   Date Value Ref Range Status   12/27/2019 0 74 0 70 - 1 50 mg/dL Final     Comment:     Standardized to IDMS reference method   12/17/2019 0 82 0 70 - 1 50 mg/dL Final     Comment:     Standardized to IDMS reference method   11/20/2019 0 79 0 60 - 1 30 mg/dL Final     Comment:     Standardized to IDMS reference method     eGFR   Date Value Ref Range Status   12/27/2019 95 >60 ml/min/1 73sq m Final   12/17/2019 92 >60 ml/min/1 73sq m Final   11/20/2019 93 ml/min/1 73sq m Final     Calcium   Date Value Ref Range Status   12/27/2019 9 1 8 4 - 10 2 mg/dL Final   12/17/2019 9 2 8 4 - 10 2 mg/dL Final   11/20/2019 9 9 8 3 - 10 1 mg/dL Final     AST   Date Value Ref Range Status   12/27/2019 40 17 - 59 U/L Final     Comment:       Specimen collection should occur prior to Sulfasalazine administration due to the potential for falsely depressed results  12/17/2019 47 17 - 59 U/L Final     Comment:       Specimen collection should occur prior to Sulfasalazine administration due to the potential for falsely depressed results  11/20/2019 51 (H) 5 - 45 U/L Final     Comment:       Specimen collection should occur prior to Sulfasalazine administration due to the potential for falsely depressed results  ALT   Date Value Ref Range Status   12/27/2019 32 9 - 52 U/L Final     Comment:       Specimen collection should occur prior to Sulfasalazine administration due to the potential for falsely depressed results  12/17/2019 45 9 - 52 U/L Final     Comment:       Specimen collection should occur prior to Sulfasalazine administration due to the potential for falsely depressed results  11/20/2019 44 12 - 78 U/L Final     Comment:       Specimen collection should occur prior to Sulfasalazine and/or Sulfapyridine administration due to the potential for falsely depressed results        Alkaline Phosphatase   Date Value Ref Range Status   12/27/2019 96 43 - 122 U/L Final   12/17/2019 89 43 - 122 U/L Final   11/20/2019 69 46 - 116 U/L Final     Magnesium   Date Value Ref Range Status   11/13/2019 1 5 (L) 1 6 - 2 3 mg/dL Final   10/31/2019 1 5 (L) 1 6 - 2 3 mg/dL Final   10/25/2019 1 6 1 6 - 2 3 mg/dL Final     WBC   Date Value Ref Range Status   12/27/2019 7 10 4 50 - 11 00 Thousand/uL Final   12/17/2019 6 90 4 50 - 11 00 Thousand/uL Final   11/20/2019 5 82 4 31 - 10 16 Thousand/uL Final     Hemoglobin   Date Value Ref Range Status   12/27/2019 13 3 (L) 13 5 - 17 5 g/dL Final   12/17/2019 14 2 13 5 - 17 5 g/dL Final   11/20/2019 12 8 12 0 - 17 0 g/dL Final     Platelets   Date Value Ref Range Status   12/27/2019 241 150 - 450 Thousands/uL Final   12/17/2019 233 150 - 450 Thousands/uL Final   11/20/2019 349 149 - 390 Thousands/uL Final     INR   Date Value Ref Range Status   10/28/2019 1 23 (H) 0 91 - 1 09 Final     No results found for: CKMB, DIGOXIN  No results found for: TSH  HDL, Direct   Date Value Ref Range Status   11/13/2019 32 (L) >=40 mg/dL Final     Comment:       HDL Cholesterol:       Low     <41 mg/dL  Specimen collection should occur prior to Metamizole administration due to the potential for falsley depressed results  Triglycerides   Date Value Ref Range Status   11/13/2019 167 (H) <150 mg/dL Final     Comment:       Triglyceride:     Normal          <150 mg/dl     Borderline High 150-199 mg/dl     High            200-499 mg/dl        Very High       >499 mg/dl    Specimen collection should occur prior to N-Acetylcysteine or Metamizole administration due to the potential for falsely depressed results        Hemoglobin A1C   Date Value Ref Range Status   11/20/2019 5 4 4 2 - 6 3 % Final     No results found for: Tolbert Prudencio, GRAMSTAIN, URINECX, WOUNDCULT, BODYFLUIDCUL, MRSACULTURE, INFLUAPCR, INFLUBPCR, RSVPCR, LEGIONELLAUR, CDIFFTOXINB    *-*-*-*-*-*-*-*-*-*-*-*-*-*-*-*-*-*-*-*-*-*-*-*-*-*-*-*-*-*-*-*-*-*-*-*-*-*-*-*-*-*-*-*-*-*-*-*-*-*-*-*-*-*-  PREVIOUS CARDIOLOGY & RADIOLOGY RESULTS:  Results for orders placed in visit on 03/19/18   Echo complete with contrast if indicated    Narrative [id number]  Marisol Gonzáles [address] [place] [address], [id number] [phone]    Cardiovascular Report  _________________________________________________________________         Transthoracic Echocardiogram Report  Elijah Resendez    MRN:                 [id number]  Account :           [de-identified]  Accession :         [id number]  Exam Date:           2018 12:45  Patient Location:    O  Ordering Phys:       GLO George)  Attending Phys:      GLO George)  Technologist:        [facility] R[facility],CCT    Age:  74             :   1950           Gender:   M  Wt:   140 lb         Ht:    66 in  Indication:  CAD  BP:         /       HR:    Rhythm:              sinus  Technical Quality:   fair but adequate      MEASUREMENTS  2D ECHO  Body Surface Area                 1 7 m²  LV Diastolic Diameter PLAX        3 9 cm  LV Systolic Diameter PLAX         2 3 cm  IVS Diastolic Thickness           1 1 cm  LVPW Diastolic Thickness          1 0 cm  LV Relative Wall Thickness        0 5  LVOT Diameter                     2 2 cm  Aortic Root Diameter              4 1 cm  LA Systolic Diameter LX           3 2 cm  LA Area                           7 3 cm²  LV Ejection Fraction MOD 4C       70 7 %  LA Volume AL                      14 0 cm³  LA Volume AL Index                8 1 cm³/m²  RA Area 4C View                   9 2 cm²    DOPPLER  AV Peak Velocity                  122 2 cm/s  AV Peak Gradient                  6 0 mmHg  AV Mean Gradient                  3 4 mmHg  AV Velocity Time Integral         22 2 cm  LVOT Peak Velocity                113 9 cm/s  LVOT Peak Gradient                5 2 mmHg  LVOT Velocity Time Integral       22 4 cm  AV Area Cont Eq vti               4 0 cm²  AV Area Cont Eq pk                3 7 cm²  MV Area PHT                       3 7 cm²  Mitral E Point Velocity           66 5 cm/s  Mitral A Point Velocity           84 6 cm/s  Mitral E to A Ratio               0 8  MV E' Velocity                    5 9 cm/s  Mitral E to MV E' Ratio           11 3  TR Peak Velocity                  146 1 cm/s  TR Peak Gradient                  8 5 mmHg  TAPSE                             2 5 cm  Right Atrial Pressure             5 0 mmHg  Pulmonary Artery Systolic Pressu  32 5 mmHg  Right Ventricular Systolic Press  10 8 mmHg  PV Peak Velocity                  100 4 cm/s  PV Peak Gradient                  4 0 mmHg  Pulmonary Artery Diastolic Press  7 0 mmHg      FINDINGS  Left Ventricle  Normal left ventricular cavity size, mildly increased wall  thickness, normal left ventricular systolic function with  hyperdynamic wall motion  Ejection fraction is greater than 65%  Normal diastolic function  Right Ventricle  Normal right ventricle size and systolic function  Normal  estimated right ventricular systolic pressure, 14 mmHg  Right Atrium  Normal right atrial size  Left Atrium  Normal left atrial size  Aneurysmal interatrial septum with  bulge towards the right atrium  No color flow Doppler evidence  of patent foramen ovale or atrial septal defect    Mitral Valve  Normal appearing mitral valve leaflets  No significant mitral  valve regurgitation  Aortic Valve  Trileaflet aortic valve with mild sclerosis  No aortic valve  stenosis or regurgitation  Tricuspid Valve  Trace, physiologic tricuspid valve regurgitation  Pulmonic Valve  No pulmonic valve regurgitation  Pericardium  No pericardial effusion  Aorta  Mildly dilated aortic root and visualize portion of proximal  ascending aorta  Possible mild thoracic aortic aneurysm  Additional Findings  Inferior vena cava is normal in size and demonstrates normal  pulsatile change with sniff test     CONCLUSIONS  - Mildly increased left ventricular wall thickness, normal left  ventricular systolic function and hyperdynamic wall motion  EF  greater than 65%  - Normal diastolic function   - No significant chamber hypertrophy or enlargement  - Mild aortic valve sclerosis   No aortic valve stenosis or  regurgitation   - No significant mitral valve stenosis or regurgitation   - Trace, physiologic tricuspid valve regurgitation   - No pulmonary hypertension   - No pericardial effusion   - Mildly dilated aortic root and visualize portion of proximal  ascending aorta  Possible mild thoracic aortic aneurysm  Compared to previous echocardiogram from 7/29/2015 there is  overall no significant change  Shlomo Uday  (Electronically Signed)  Final Date:      19 March 2018 14:40  CV Report                   Fareed Moscoso      [id number]  Final                                                     Page 3     No results found for this or any previous visit  No results found for this or any previous visit  No results found for this or any previous visit  FL spine and pain procedure  Indication:  Leg pain  Preoperative diagnosis:  Lumbar radiculitis  Postoperative diagnosis:  Lumbar radiculitis    Procedure: Fluoroscopically-guided right L4-5 and right S1 transforaminal   epidural steroid injection under fluoroscopy    EBL:  none  Specimens:  not applicable    After discussing the risks, benefits, and alternatives to the procedure,   the patient expressed understanding and wished to proceed  The patient   was brought to the fluoroscopy suite and placed in the prone position  A   procedural pause was conducted to verify:  correct patient identity,   procedure to be performed and as applicable, correct side and site,   correct patient position, and availability of implants, special equipment   and special requirements  After identifying the right L4 pedicle   fluoroscopically with an oblique view, the skin was sterilely prepped and   draped in the usual fashion using Chloraprep skin prep  The skin and   subcutaneous tissues were anesthetized with 0 5% lidocaine  A 3 5 in 22   gauge spinal needle was then advanced under fluoroscopic guidance to the   neural foramen    Appropriate foraminal depth was determined with a lateral   fluoroscopic view, and AP visualization confirmed needle positioning at   approximately the 6 o'clock position relative to the pedicle  After   negative aspiration, Omnipaque 300 contrast was injected using live   fluoroscopy confirming appropriate transforaminal spread without evidence   of intravascular or intrathecal uptake  Next, a 1 5 ml solution   consisting of 7 5 mg of dexamethasone in sterile saline was injected   slowly and incrementally into the epidural space  Following the injection   the needle was withdrawn slightly and flushed with lidocaine as it was   fully extracted  The procedure was then repeated in the exact same way at the right S1   pedicle with a 3 5 inch 22 gauge spinal needle  The patient tolerated the procedure well and there were no apparent   complications  The patient did not develop any new neurologic deficits  After appropriate observation, the patient was dismissed from the clinic   in good condition under their own power                       *-*-*-*-*-*-*-*-*-*-*-*-*-*-*-*-*-*-*-*-*-*-*-*-*-*-*-*-*-*-*-*-*-*-*-*-*-*-*-*-*-*-*-*-*-*-*-*-*-*-*-*-*-*-  SIGNATURES:   @FYG@   Shlomo Frye MD     *-*-*-*-*-*-*-*-*-*-*-*-*-*-*-*-*-*-*-*-*-*-*-*-*-*-*-*-*-*-*-*-*-*-*-*-*-*-*-*-*-*-*-*-*-*-*-*-*-*-*-*-*-*-    Social History     Socioeconomic History    Marital status: Single     Spouse name: Not on file    Number of children: Not on file    Years of education: Not on file    Highest education level: Not on file   Occupational History    Occupation: [employer] Needs    Financial resource strain: Not on file    Food insecurity:     Worry: Not on file     Inability: Not on file    Transportation needs:     Medical: Not on file     Non-medical: Not on file   Tobacco Use    Smoking status: Current Every Day Smoker     Packs/day: 0 50     Years: 50 00     Pack years: 25 00     Types: Cigarettes    Smokeless tobacco: Never Used    Tobacco comment: Heavy tobacco smoker; 10 cigarettes per day as per [employer]   Substance and Sexual Activity    Alcohol use: Yes     Frequency: Monthly or less     Drinks per session: 1 or 2     Binge frequency: Never     Comment: rare    Drug use: Never    Sexual activity: Never     Comment: DAVIDSON   Lifestyle    Physical activity:     Days per week: Not on file     Minutes per session: Not on file    Stress: Not on file   Relationships    Social connections:     Talks on phone: Not on file     Gets together: Not on file     Attends Taoist service: Not on file     Active member of club or organization: Not on file     Attends meetings of clubs or organizations: Not on file     Relationship status: Not on file    Intimate partner violence:     Fear of current or ex partner: Not on file     Emotionally abused: Not on file     Physically abused: Not on file     Forced sexual activity: Not on file   Other Topics Concern    Not on file   Social History Narrative    Lives independently      Family History   Family history unknown: Yes     Past Surgical History:   Procedure Laterality Date    [address]

## 2020-02-07 NOTE — PATIENT INSTRUCTIONS
CARDIOLOGY ASSESSMENT & PLAN:  1  Carotid stenosis, asymptomatic, bilateral  ezetimibe-simvastatin (VYTORIN) 10-40 mg per tablet    Echo complete with contrast if indicated    NM myocardial perfusion spect (rx stress and/or rest)    COMPREHENSIVE METABOLIC CXJJJ(61)    NT-BNP PRO    Lipid panel    CBC and differential    Protime-INR   2  Essential hypertension, benign  losartan (COZAAR) 100 MG tablet   3  Thoracic aortic aneurysm without rupture (HCC)  ezetimibe-simvastatin (VYTORIN) 10-40 mg per tablet    Echo complete with contrast if indicated    NM myocardial perfusion spect (rx stress and/or rest)    COMPREHENSIVE METABOLIC RFYVU(59)    NT-BNP PRO    Lipid panel    CBC and differential    Protime-INR   4  Ectopic atrial rhythm  ezetimibe-simvastatin (VYTORIN) 10-40 mg per tablet    Echo complete with contrast if indicated    NM myocardial perfusion spect (rx stress and/or rest)    COMPREHENSIVE METABOLIC SMYGS(79)    NT-BNP PRO    Lipid panel    CBC and differential    Protime-INR   5  Tobacco abuse  ezetimibe-simvastatin (VYTORIN) 10-40 mg per tablet    Echo complete with contrast if indicated    NM myocardial perfusion spect (rx stress and/or rest)    COMPREHENSIVE METABOLIC MCAGH(17)    NT-BNP PRO    Lipid panel    CBC and differential    Protime-INR   6  Mixed hyperlipidemia  ezetimibe-simvastatin (VYTORIN) 10-40 mg per tablet   7  Pre-operative cardiovascular examination       Pre-operative cardiovascular examination  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 with multiple comorbidities who is being considered for left carotid artery endarterectomy surgery, a procedure with inherent intermediate to high risk  His active cardiac conditions include hypertension, ectopic atrial rhythm without symptoms, dyslipidemia and unexplained lower extremity edema    Though he was previously noted to have mild thoracic aortic aneurysm measuring 4 cm his most recent CT scan of chest in October 2019 did not and identify dilated aorta  His exercise tolerance is good  His blood pressure is well controlled  Today his physical examination is significant for bilateral left greater than right bruit in the carotid arteries, clear lungs, regular rate and rhythm, grade 1 to 2+ bilateral right greater than left lower extremity edema  His ECG is similar to his previous ECGs and is consistent with ectopic atrial rhythm with premature atrial contractions  His overall risk for major adverse cardiac event relating to planned surgery is moderate  Given his risk factors and conditions we would like to do stress test and an echocardiogram for further risk stratification   - a Lexiscan nuclear stress test and a transthoracic echocardiogram are being arranged  Etiology of lower extremity edema is unclear  It is likely secondary to amlodipine use  - we are discontinuing amlodipine and increasing the dose of losartan to 100 mg daily  - I would like him to record random blood pressures and get follow-up blood work to reassess renal function and electrolytes with increased dose of losartan  Will also check an NT proBNP level with next blood work  Will give him furosemide 20 mg pills with instruction to take it every other day for next 2 weeks until the edema completely resolved  Increase intake of potassium rich diet is encouraged with use of furosemide  We again reinforced with him the importance of quitting smoking and he understands and will be working on it  DIAGNOSES:  1  Benign essential hypertension  2  Mild ascending thoracic aorta aneurysm  3  History of mild mitral and tricuspid valve regurgitation  4  History of mild inter atrial septal aneurysm without evidence of ASD or PFO without history of TIA/CVA  5  History of hiatal hernia  6  History of colonic polyps  7  History of C diff in the past  8  Carotid artery disease with significant occlusive disease in left carotid artery  9   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 in July 2018, also status post cervical and lumbar spinal fusion surgeries  10  Mild hypothyroidism  11  Pre diabetes  12  History of acute pancreatitis secondary to use of metformin in October 2019    Echocardiogram March 2018 showed mildly increased left ventricular wall thickness, normal left ventricular systolic function and hyperdynamic wall motion, normal diastolic function, EF around 65%  Mild aortic valve sclerosis, trace tricuspid valve regurgitation, no pulmonary hypertension, borderline dilated aortic root  CTA of chest significant for 4 cm ascending thoracic aortic aneurysm with a focal atelectasis or scarring lower lungs without any lung mass or effusion

## 2020-02-14 ENCOUNTER — APPOINTMENT (EMERGENCY)
Dept: CT IMAGING | Facility: HOSPITAL | Age: 70
DRG: 439 | End: 2020-02-14
Payer: COMMERCIAL

## 2020-02-14 ENCOUNTER — HOSPITAL ENCOUNTER (INPATIENT)
Facility: HOSPITAL | Age: 70
LOS: 7 days | Discharge: HOME | DRG: 439 | End: 2020-02-21
Attending: EMERGENCY MEDICINE | Admitting: FAMILY MEDICINE
Payer: COMMERCIAL

## 2020-02-14 DIAGNOSIS — K85.90 ACUTE PANCREATITIS: ICD-10-CM

## 2020-02-14 DIAGNOSIS — M54.16 RADICULOPATHY, LUMBAR REGION: ICD-10-CM

## 2020-02-14 DIAGNOSIS — K85.90 PANCREATITIS: Primary | ICD-10-CM

## 2020-02-14 LAB
ALBUMIN SERPL BCP-MCNC: 4.5 G/DL (ref 3–5.2)
ALP SERPL-CCNC: 104 U/L (ref 43–122)
ALT SERPL W P-5'-P-CCNC: 25 U/L (ref 9–52)
ANION GAP SERPL CALCULATED.3IONS-SCNC: 12 MMOL/L (ref 5–14)
AST SERPL W P-5'-P-CCNC: 33 U/L (ref 17–59)
ATRIAL RATE: 75 BPM
BASOPHILS # BLD AUTO: 0 THOUSANDS/ΜL (ref 0–0.1)
BASOPHILS NFR BLD AUTO: 0 % (ref 0–1)
BILIRUB SERPL-MCNC: 0.5 MG/DL
BUN SERPL-MCNC: 15 MG/DL (ref 5–25)
CALCIUM SERPL-MCNC: 10 MG/DL (ref 8.4–10.2)
CHLORIDE SERPL-SCNC: 101 MMOL/L (ref 97–108)
CO2 SERPL-SCNC: 26 MMOL/L (ref 22–30)
CREAT SERPL-MCNC: 0.77 MG/DL (ref 0.7–1.5)
EOSINOPHIL # BLD AUTO: 0.2 THOUSAND/ΜL (ref 0–0.4)
EOSINOPHIL NFR BLD AUTO: 3 % (ref 0–6)
ERYTHROCYTE [DISTWIDTH] IN BLOOD BY AUTOMATED COUNT: 14.4 %
GFR SERPL CREATININE-BSD FRML MDRD: 94 ML/MIN/1.73SQ M
GLUCOSE SERPL-MCNC: 105 MG/DL (ref 70–99)
HCT VFR BLD AUTO: 43.5 % (ref 41–53)
HGB BLD-MCNC: 14.6 G/DL (ref 13.5–17.5)
LIPASE SERPL-CCNC: 1597 U/L (ref 23–300)
LYMPHOCYTES # BLD AUTO: 1.2 THOUSANDS/ΜL (ref 0.5–4)
LYMPHOCYTES NFR BLD AUTO: 17 % (ref 25–45)
MCH RBC QN AUTO: 32.1 PG (ref 26–34)
MCHC RBC AUTO-ENTMCNC: 33.6 G/DL (ref 31–36)
MCV RBC AUTO: 95 FL (ref 80–100)
MONOCYTES # BLD AUTO: 0.5 THOUSAND/ΜL (ref 0.2–0.9)
MONOCYTES NFR BLD AUTO: 7 % (ref 1–10)
NEUTROPHILS # BLD AUTO: 4.9 THOUSANDS/ΜL (ref 1.8–7.8)
NEUTS SEG NFR BLD AUTO: 73 % (ref 45–65)
PLATELET # BLD AUTO: 233 THOUSANDS/UL (ref 150–450)
PMV BLD AUTO: 9.3 FL (ref 8.9–12.7)
POTASSIUM SERPL-SCNC: 3.6 MMOL/L (ref 3.6–5)
PR INTERVAL: 134 MS
PROT SERPL-MCNC: 8.1 G/DL (ref 5.9–8.4)
QRS AXIS: 34 DEGREES
QRSD INTERVAL: 80 MS
QT INTERVAL: 392 MS
QTC INTERVAL: 437 MS
RBC # BLD AUTO: 4.56 MILLION/UL (ref 4.5–5.9)
SODIUM SERPL-SCNC: 139 MMOL/L (ref 137–147)
T WAVE AXIS: 55 DEGREES
TRIGL SERPL-MCNC: 163 MG/DL
TROPONIN I SERPL-MCNC: <0.01 NG/ML (ref 0–0.03)
VENTRICULAR RATE: 75 BPM
WBC # BLD AUTO: 6.7 THOUSAND/UL (ref 4.5–11)

## 2020-02-14 PROCEDURE — C9113 INJ PANTOPRAZOLE SODIUM, VIA: HCPCS | Performed by: FAMILY MEDICINE

## 2020-02-14 PROCEDURE — 36415 COLL VENOUS BLD VENIPUNCTURE: CPT | Performed by: EMERGENCY MEDICINE

## 2020-02-14 PROCEDURE — 74177 CT ABD & PELVIS W/CONTRAST: CPT

## 2020-02-14 PROCEDURE — 96374 THER/PROPH/DIAG INJ IV PUSH: CPT

## 2020-02-14 PROCEDURE — 99285 EMERGENCY DEPT VISIT HI MDM: CPT

## 2020-02-14 PROCEDURE — 96361 HYDRATE IV INFUSION ADD-ON: CPT

## 2020-02-14 PROCEDURE — 84484 ASSAY OF TROPONIN QUANT: CPT | Performed by: EMERGENCY MEDICINE

## 2020-02-14 PROCEDURE — 99222 1ST HOSP IP/OBS MODERATE 55: CPT | Performed by: FAMILY MEDICINE

## 2020-02-14 PROCEDURE — 93005 ELECTROCARDIOGRAM TRACING: CPT

## 2020-02-14 PROCEDURE — 99285 EMERGENCY DEPT VISIT HI MDM: CPT | Performed by: EMERGENCY MEDICINE

## 2020-02-14 PROCEDURE — 80053 COMPREHEN METABOLIC PANEL: CPT | Performed by: EMERGENCY MEDICINE

## 2020-02-14 PROCEDURE — 96375 TX/PRO/DX INJ NEW DRUG ADDON: CPT

## 2020-02-14 PROCEDURE — 96376 TX/PRO/DX INJ SAME DRUG ADON: CPT

## 2020-02-14 PROCEDURE — 84478 ASSAY OF TRIGLYCERIDES: CPT | Performed by: FAMILY MEDICINE

## 2020-02-14 PROCEDURE — 83690 ASSAY OF LIPASE: CPT | Performed by: EMERGENCY MEDICINE

## 2020-02-14 PROCEDURE — 85025 COMPLETE CBC W/AUTO DIFF WBC: CPT | Performed by: EMERGENCY MEDICINE

## 2020-02-14 PROCEDURE — 93010 ELECTROCARDIOGRAM REPORT: CPT | Performed by: INTERNAL MEDICINE

## 2020-02-14 RX ORDER — LEVOTHYROXINE SODIUM 0.03 MG/1
12.5 TABLET ORAL DAILY
Status: DISCONTINUED | OUTPATIENT
Start: 2020-02-14 | End: 2020-02-21 | Stop reason: HOSPADM

## 2020-02-14 RX ORDER — ZOLPIDEM TARTRATE 5 MG/1
5 TABLET ORAL
Status: DISCONTINUED | OUTPATIENT
Start: 2020-02-14 | End: 2020-02-21 | Stop reason: HOSPADM

## 2020-02-14 RX ORDER — SODIUM CHLORIDE AND POTASSIUM CHLORIDE .9; .15 G/100ML; G/100ML
75 SOLUTION INTRAVENOUS CONTINUOUS
Status: DISCONTINUED | OUTPATIENT
Start: 2020-02-14 | End: 2020-02-15

## 2020-02-14 RX ORDER — HYDROMORPHONE HCL/PF 1 MG/ML
1 SYRINGE (ML) INJECTION EVERY 4 HOURS PRN
Status: DISCONTINUED | OUTPATIENT
Start: 2020-02-14 | End: 2020-02-21 | Stop reason: HOSPADM

## 2020-02-14 RX ORDER — CHLORHEXIDINE GLUCONATE 0.12 MG/ML
15 RINSE ORAL ONCE
Status: DISCONTINUED | OUTPATIENT
Start: 2020-02-14 | End: 2020-02-14

## 2020-02-14 RX ORDER — ONDANSETRON 2 MG/ML
4 INJECTION INTRAMUSCULAR; INTRAVENOUS ONCE
Status: COMPLETED | OUTPATIENT
Start: 2020-02-14 | End: 2020-02-14

## 2020-02-14 RX ORDER — ASPIRIN 81 MG/1
81 TABLET ORAL DAILY
Status: DISCONTINUED | OUTPATIENT
Start: 2020-02-14 | End: 2020-02-21 | Stop reason: HOSPADM

## 2020-02-14 RX ORDER — GABAPENTIN 300 MG/1
300 CAPSULE ORAL ONCE
Status: DISCONTINUED | OUTPATIENT
Start: 2020-02-14 | End: 2020-02-14

## 2020-02-14 RX ORDER — HYDROMORPHONE HCL/PF 1 MG/ML
1 SYRINGE (ML) INJECTION ONCE
Status: COMPLETED | OUTPATIENT
Start: 2020-02-14 | End: 2020-02-14

## 2020-02-14 RX ORDER — LOSARTAN POTASSIUM 50 MG/1
100 TABLET ORAL DAILY
Status: DISCONTINUED | OUTPATIENT
Start: 2020-02-14 | End: 2020-02-16

## 2020-02-14 RX ORDER — ACETAMINOPHEN 325 MG/1
975 TABLET ORAL ONCE
Status: COMPLETED | OUTPATIENT
Start: 2020-02-14 | End: 2020-02-19

## 2020-02-14 RX ORDER — PANTOPRAZOLE SODIUM 40 MG/1
40 INJECTION, POWDER, FOR SOLUTION INTRAVENOUS
Status: DISCONTINUED | OUTPATIENT
Start: 2020-02-14 | End: 2020-02-21 | Stop reason: HOSPADM

## 2020-02-14 RX ORDER — ONDANSETRON 2 MG/ML
4 INJECTION INTRAMUSCULAR; INTRAVENOUS EVERY 6 HOURS PRN
Status: DISCONTINUED | OUTPATIENT
Start: 2020-02-14 | End: 2020-02-21 | Stop reason: HOSPADM

## 2020-02-14 RX ORDER — CEFAZOLIN SODIUM 2 G/50ML
2000 SOLUTION INTRAVENOUS ONCE
Status: DISCONTINUED | OUTPATIENT
Start: 2020-02-14 | End: 2020-02-14

## 2020-02-14 RX ORDER — CEFAZOLIN SODIUM 1 G/50ML
1000 SOLUTION INTRAVENOUS ONCE
Status: DISCONTINUED | OUTPATIENT
Start: 2020-02-14 | End: 2020-02-14

## 2020-02-14 RX ADMIN — LEVOTHYROXINE SODIUM 12.5 MCG: 25 TABLET ORAL at 15:36

## 2020-02-14 RX ADMIN — SODIUM CHLORIDE 1000 ML: 0.9 INJECTION, SOLUTION INTRAVENOUS at 13:27

## 2020-02-14 RX ADMIN — HYDROMORPHONE HYDROCHLORIDE 1 MG: 1 INJECTION, SOLUTION INTRAMUSCULAR; INTRAVENOUS; SUBCUTANEOUS at 18:54

## 2020-02-14 RX ADMIN — LOSARTAN POTASSIUM 100 MG: 50 TABLET, FILM COATED ORAL at 15:36

## 2020-02-14 RX ADMIN — PANTOPRAZOLE SODIUM 40 MG: 40 INJECTION, POWDER, FOR SOLUTION INTRAVENOUS at 15:39

## 2020-02-14 RX ADMIN — SODIUM CHLORIDE 1000 ML: 0.9 INJECTION, SOLUTION INTRAVENOUS at 12:23

## 2020-02-14 RX ADMIN — HYDROMORPHONE HYDROCHLORIDE 1 MG: 1 INJECTION, SOLUTION INTRAMUSCULAR; INTRAVENOUS; SUBCUTANEOUS at 14:17

## 2020-02-14 RX ADMIN — ENOXAPARIN SODIUM 40 MG: 40 INJECTION SUBCUTANEOUS at 15:38

## 2020-02-14 RX ADMIN — SODIUM CHLORIDE AND POTASSIUM CHLORIDE 125 ML/HR: .9; .15 SOLUTION INTRAVENOUS at 15:46

## 2020-02-14 RX ADMIN — MAGNESIUM OXIDE TAB 400 MG (241.3 MG ELEMENTAL MG) 400 MG: 400 (241.3 MG) TAB at 17:49

## 2020-02-14 RX ADMIN — ONDANSETRON 4 MG: 2 INJECTION INTRAMUSCULAR; INTRAVENOUS at 12:25

## 2020-02-14 RX ADMIN — PANCRELIPASE 24000 UNITS: 24000; 76000; 120000 CAPSULE, DELAYED RELEASE PELLETS ORAL at 18:12

## 2020-02-14 RX ADMIN — ASPIRIN 81 MG: 81 TABLET, COATED ORAL at 15:37

## 2020-02-14 RX ADMIN — HYDROMORPHONE HYDROCHLORIDE 1 MG: 1 INJECTION, SOLUTION INTRAMUSCULAR; INTRAVENOUS; SUBCUTANEOUS at 12:23

## 2020-02-14 RX ADMIN — IOHEXOL 100 ML: 350 INJECTION, SOLUTION INTRAVENOUS at 13:58

## 2020-02-14 NOTE — ED PROVIDER NOTES
History  Chief Complaint   Patient presents with    Abdominal Pain     c/o of epigastric pain since monday - nausea - had diarrhea on sunday till tuesday - generalized weakness       History provided by:  Patient  Abdominal Pain   Pain location:  Epigastric  Pain quality: aching, cramping and gnawing    Pain radiates to:  Does not radiate  Pain severity:  Moderate  Onset quality:  Gradual  Timing:  Constant  Progression:  Worsening  Chronicity:  New  Relieved by:  Nothing  Worsened by:  Nothing  Ineffective treatments:  None tried  Associated symptoms: diarrhea, nausea and vomiting    Associated symptoms: no chest pain, no chills, no cough, no dysuria, no fever, no shortness of breath and no sore throat        Prior to Admission Medications   Prescriptions Last Dose Informant Patient Reported? Taking?   aspirin 81 MG tablet  Self No No   Sig: Take 1 tablet (81 mg total) by mouth daily   diclofenac sodium (VOLTAREN) 1 %  Self No No   Sig: Apply 2 g topically 4 (four) times a day Apply to shoulders      ezetimibe-simvastatin (VYTORIN) 10-40 mg per tablet   No No   Sig: Take 1 tablet by mouth daily at bedtime   gabapentin (NEURONTIN) 300 mg capsule  Self No No   Sig: Take 1 capsule (300 mg total) by mouth 3 (three) times a day   levothyroxine 25 mcg tablet  Self No No   Sig: Take 0 5 tablets (12 5 mcg total) by mouth daily   losartan (COZAAR) 100 MG tablet   No No   Sig: Take 1 tablet (100 mg total) by mouth daily   magnesium oxide (MAG-OX) 400 mg  Self No No   Sig: Take 1 tablet (400 mg total) by mouth 2 (two) times a day   methocarbamol (ROBAXIN) 500 mg tablet  Self No No   Sig: Take 1 tablet (500 mg total) by mouth 2 (two) times a day   Patient not taking: Reported on 2/7/2020   oxyCODONE-acetaminophen (PERCOCET) 5-325 mg per tablet  Self No No   Sig: Take 1 tablet by mouth every 6 (six) hours as needed for moderate painMax Daily Amount: 4 tablets   Patient not taking: Reported on 2/7/2020   pancrelipase, Lip-Prot-Amyl, (CREON) 24,000 units  Self No No   Sig: Take 1 capsule (24,000 Units total) by mouth 3 (three) times a day with meals   Patient taking differently: Take 24,000 units of lipase by mouth 3 (three) times a day with meals    pantoprazole (PROTONIX) 40 mg tablet  Self No No   Sig: Take 1 tablet (40 mg total) by mouth daily   ranitidine (ZANTAC) 150 mg tablet  Self No No   Sig: Take 1 tablet (150 mg total) by mouth 2 (two) times a day   zolpidem (AMBIEN) 10 mg tablet  Self Yes No   Sig: Take by mouth daily at bedtime as needed       Facility-Administered Medications: None       Past Medical History:   Diagnosis Date    Avascular necrosis of bone of hip, left (HCC)     CAD (coronary artery disease)     Hiatal hernia     Hyperlipidemia     Hypertension     Pancreatitis     Spinal stenosis        Past Surgical History:   Procedure Laterality Date    BACK SURGERY      CARDIAC CATHETERIZATION      CERVICAL FUSION      JOINT REPLACEMENT      LUMBAR FUSION      NECK SURGERY      ORTHOPEDIC SURGERY      SPINAL FUSION         Family History   Family history unknown: Yes     I have reviewed and agree with the history as documented  Social History     Tobacco Use    Smoking status: Current Every Day Smoker     Packs/day: 0 50     Years: 50 00     Pack years: 25 00     Types: Cigarettes    Smokeless tobacco: Never Used   Substance Use Topics    Alcohol use: Yes     Frequency: Monthly or less     Drinks per session: 1 or 2     Binge frequency: Never    Drug use: Never       Review of Systems   Constitutional: Negative for chills and fever  HENT: Negative for rhinorrhea, sore throat and trouble swallowing  Eyes: Negative for pain  Respiratory: Negative for cough, shortness of breath, wheezing and stridor  Cardiovascular: Negative for chest pain and leg swelling  Gastrointestinal: Positive for abdominal pain, diarrhea, nausea and vomiting  Endocrine: Negative for polyuria  Genitourinary: Negative for dysuria, flank pain and urgency  Musculoskeletal: Negative for joint swelling, myalgias and neck stiffness  Skin: Negative for rash  Allergic/Immunologic: Negative for immunocompromised state  Neurological: Negative for dizziness, syncope, weakness, numbness and headaches  Psychiatric/Behavioral: Negative for confusion and suicidal ideas  All other systems reviewed and are negative  Physical Exam  Physical Exam   Constitutional: He is oriented to person, place, and time  He appears well-developed and well-nourished  HENT:   Head: Normocephalic and atraumatic  Eyes: Pupils are equal, round, and reactive to light  EOM are normal    Neck: Normal range of motion  Neck supple  Cardiovascular: Normal rate and regular rhythm  Exam reveals no friction rub  No murmur heard  Pulmonary/Chest: Breath sounds normal  No respiratory distress  He has no wheezes  He has no rales  Abdominal: Soft  Bowel sounds are normal  He exhibits no distension  There is tenderness in the epigastric area  Musculoskeletal: Normal range of motion  He exhibits no edema or tenderness  Neurological: He is alert and oriented to person, place, and time  Skin: Skin is warm  No rash noted  Psychiatric: He has a normal mood and affect  Nursing note and vitals reviewed        Vital Signs  ED Triage Vitals   Temperature Pulse Respirations Blood Pressure SpO2   02/14/20 1208 02/14/20 1208 02/14/20 1208 02/14/20 1208 02/14/20 1208   (!) 97 4 °F (36 3 °C) 94 16 168/85 98 %      Temp Source Heart Rate Source Patient Position - Orthostatic VS BP Location FiO2 (%)   02/14/20 1208 02/14/20 1208 02/14/20 1208 02/14/20 1208 --   Tympanic Monitor Sitting Left arm       Pain Score       02/14/20 1223       8           Vitals:    02/14/20 1208 02/14/20 1327 02/14/20 1550   BP: 168/85 166/92 147/91   Pulse: 94 69 74   Patient Position - Orthostatic VS: Sitting Lying Lying         Visual Acuity      ED Medications  Medications   aspirin (ECOTRIN LOW STRENGTH) EC tablet 81 mg (81 mg Oral Given 2/14/20 1537)   levothyroxine tablet 12 5 mcg (12 5 mcg Oral Given 2/14/20 1536)   losartan (COZAAR) tablet 100 mg (100 mg Oral Given 2/14/20 1536)   magnesium oxide (MAG-OX) tablet 400 mg (has no administration in time range)   pancrelipase (Lip-Prot-Amyl) (CREON) delayed release capsule 24,000 Units (has no administration in time range)   zolpidem (AMBIEN) tablet 5 mg (has no administration in time range)   nicotine (NICODERM CQ) 7 mg/24hr TD 24 hr patch 1 patch (1 patch Transdermal Not Given 2/14/20 1547)   sodium chloride 0 9 % with KCl 20 mEq/L infusion (premix) (125 mL/hr Intravenous New Bag 2/14/20 1546)   HYDROmorphone (DILAUDID) injection 1 mg (has no administration in time range)   enoxaparin (LOVENOX) subcutaneous injection 40 mg (40 mg Subcutaneous Given 2/14/20 1538)   ondansetron (ZOFRAN) injection 4 mg (has no administration in time range)   pantoprazole (PROTONIX) injection 40 mg (40 mg Intravenous Given 2/14/20 1539)   morphine injection 2 mg (has no administration in time range)   sodium chloride 0 9 % bolus 1,000 mL (0 mL Intravenous Stopped 2/14/20 1327)   HYDROmorphone (DILAUDID) injection 1 mg (1 mg Intravenous Given 2/14/20 1223)   ondansetron (ZOFRAN) injection 4 mg (4 mg Intravenous Given 2/14/20 1225)   sodium chloride 0 9 % bolus 1,000 mL (0 mL Intravenous Stopped 2/14/20 1440)   HYDROmorphone (DILAUDID) injection 1 mg (1 mg Intravenous Given 2/14/20 1417)   iohexol (OMNIPAQUE) 350 MG/ML injection (MULTI-DOSE) 100 mL (100 mL Intravenous Given 2/14/20 1358)       Diagnostic Studies  Results Reviewed     Procedure Component Value Units Date/Time    Triglyceride [[id number]]     Lab Status:  No result Specimen:  Blood     Troponin I [[id number]]  (Normal) Collected:  02/14/20 1220    Lab Status:  Final result Specimen:  Blood from Arm, Right Updated:  02/14/20 1313     Troponin I <0 01 ng/mL     Lipase [[id number]]  (Abnormal) Collected:  02/14/20 1220    Lab Status:  Final result Specimen:  Blood from Arm, Right Updated:  02/14/20 1255     Lipase 1,597 u/L     Comprehensive metabolic panel [[id number]]  (Abnormal) Collected:  02/14/20 1220    Lab Status:  Final result Specimen:  Blood from Arm, Right Updated:  02/14/20 1255     Sodium 139 mmol/L      Potassium 3 6 mmol/L      Chloride 101 mmol/L      CO2 26 mmol/L      ANION GAP 12 mmol/L      BUN 15 mg/dL      Creatinine 0 77 mg/dL      Glucose 105 mg/dL      Calcium 10 0 mg/dL      AST 33 U/L      ALT 25 U/L      Alkaline Phosphatase 104 U/L      Total Protein 8 1 g/dL      Albumin 4 5 g/dL      Total Bilirubin 0 50 mg/dL      eGFR 94 ml/min/1 73sq m     Narrative:       National Kidney Disease Foundation guidelines for Chronic Kidney Disease (CKD):     Stage 1 with normal or high GFR (GFR > 90 mL/min/1 73 square meters)    Stage 2 Mild CKD (GFR = 60-89 mL/min/1 73 square meters)    Stage 3A Moderate CKD (GFR = 45-59 mL/min/1 73 square meters)    Stage 3B Moderate CKD (GFR = 30-44 mL/min/1 73 square meters)    Stage 4 Severe CKD (GFR = 15-29 mL/min/1 73 square meters)    Stage 5 End Stage CKD (GFR <15 mL/min/1 73 square meters)  Note: GFR calculation is accurate only with a steady state creatinine    CBC and differential [[id number]]  (Abnormal) Collected:  02/14/20 1220    Lab Status:  Final result Specimen:  Blood from Arm, Right Updated:  02/14/20 1252     WBC 6 70 Thousand/uL      RBC 4 56 Million/uL      Hemoglobin 14 6 g/dL      Hematocrit 43 5 %      MCV 95 fL      MCH 32 1 pg      MCHC 33 6 g/dL      RDW 14 4 %      MPV 9 3 fL      Platelets 725 Thousands/uL      Neutrophils Relative 73 %      Lymphocytes Relative 17 %      Monocytes Relative 7 %      Eosinophils Relative 3 %      Basophils Relative 0 %      Neutrophils Absolute 4 90 Thousands/µL      Lymphocytes Absolute 1 20 Thousands/µL      Monocytes Absolute 0 50 Thousand/µL      Eosinophils Absolute 0 20 Thousand/µL      Basophils Absolute 0 00 Thousands/µL                  CT abdomen pelvis with contrast   Final Result by Gina Hale MD (02/14 9786)         1  Acute recurrent edematous pancreatitis, now in the region of the pancreatic head and uncinate process  2   2 5 cm hypoattenuating lesion in the pancreatic head, increased from the prior exam, likely representing an enlarging pseudocyst or cystic pancreatic neoplasm  No main pancreatic duct dilation  Continued attention on follow-up is recommended  The study was marked in EPIC for significant notification  Workstation performed: [id number]                    Procedures  CriticalCare Time  Performed by: Amanda Lane DO  Authorized by: Amanda Lane DO     Critical care provider statement:     Critical care time (minutes):  60    Critical care time was exclusive of:  Separately billable procedures and treating other patients and teaching time    Critical care was necessary to treat or prevent imminent or life-threatening deterioration of the following conditions:  Dehydration and metabolic crisis (Patient with acute pancreatitis requiring IV fluid administration and significant pain medicine to prevent worsening deteriorations)    Critical care was time spent personally by me on the following activities:  Blood draw for specimens, obtaining history from patient or surrogate, development of treatment plan with patient or surrogate, evaluation of patient's response to treatment, examination of patient, ordering and performing treatments and interventions, ordering and review of laboratory studies, ordering and review of radiographic studies, re-evaluation of patient's condition and review of old charts  Comments:      Have re-evaluated the patient multiple times vital signs updated the patient multiple times  ED Course  ED Course as of Feb 14 1613   Fri Feb 14, 2020   1315 Noted lipase elevation  MDM  Number of Diagnoses or Management Options  Pancreatitis: new and requires workup  Diagnosis management comments: 53-year-old male presents emergency department with noted epigastric discomfort with a history of pancreatitis presents emergency department with noted acute pancreatitis lipase is elevated CT scan noted with pancreatitis at this point time IV fluids and pain medicine given  Plan inpatient man management       Amount and/or Complexity of Data Reviewed  Clinical lab tests: ordered and reviewed  Tests in the radiology section of CPT®: ordered and reviewed  Decide to obtain previous medical records or to obtain history from someone other than the patient: yes  Review and summarize past medical records: yes  Independent visualization of images, tracings, or specimens: yes          Disposition  Final diagnoses:   Pancreatitis     Time reflects when diagnosis was documented in both MDM as applicable and the Disposition within this note     Time User Action Codes Description Comment    2/14/2020  2:54 PM Burgess Golden Add [K85 90] Pancreatitis       ED Disposition     ED Disposition Condition Date/Time Comment    Admit Stable Fri Feb 14, 2020  2:54 PM         Follow-up Information    None         Current Discharge Medication List      CONTINUE these medications which have NOT CHANGED    Details   aspirin 81 MG tablet Take 1 tablet (81 mg total) by mouth daily    Associated Diagnoses: Coronary artery disease involving native coronary artery of native heart without angina pectoris      diclofenac sodium (VOLTAREN) 1 % Apply 2 g topically 4 (four) times a day Apply to shoulders      Qty: 200 g, Refills: 2    Associated Diagnoses: Chronic pancreatitis, unspecified pancreatitis type (HCC)      ezetimibe-simvastatin (VYTORIN) 10-40 mg per tablet Take 1 tablet by mouth daily at bedtime  Qty: 90 tablet, Refills: 6    Associated Diagnoses: Carotid stenosis, asymptomatic, bilateral; Thoracic aortic aneurysm without rupture ([facility] [address] ); Ectopic atrial rhythm; Tobacco abuse; Mixed hyperlipidemia      gabapentin (NEURONTIN) 300 mg capsule Take 1 capsule (300 mg total) by mouth 3 (three) times a day  Qty: 90 capsule, Refills: 1    Associated Diagnoses: Radiculopathy, lumbar region; Spinal stenosis of lumbar region without neurogenic claudication      levothyroxine 25 mcg tablet Take 0 5 tablets (12 5 mcg total) by mouth daily  Qty: 45 tablet, Refills: 3    Associated Diagnoses: Hypothyroidism (acquired)      losartan (COZAAR) 100 MG tablet Take 1 tablet (100 mg total) by mouth daily  Qty: 90 tablet, Refills: 3    Associated Diagnoses: Essential hypertension, benign      magnesium oxide (MAG-OX) 400 mg Take 1 tablet (400 mg total) by mouth 2 (two) times a day  Qty: 60 tablet, Refills: 1    Associated Diagnoses: Hypomagnesemia      methocarbamol (ROBAXIN) 500 mg tablet Take 1 tablet (500 mg total) by mouth 2 (two) times a day  Qty: 20 tablet, Refills: 0    Associated Diagnoses: Spondylosis;  Radiculopathy of lumbar region; Acute right-sided low back pain      oxyCODONE-acetaminophen (PERCOCET) 5-325 mg per tablet Take 1 tablet by mouth every 6 (six) hours as needed for moderate painMax Daily Amount: 4 tablets  Qty: 6 tablet, Refills: 0    Associated Diagnoses: Radiculopathy, lumbar region      pancrelipase, Lip-Prot-Amyl, (CREON) 24,000 units Take 1 capsule (24,000 Units total) by mouth 3 (three) times a day with meals  Qty: 270 capsule, Refills: 0    Associated Diagnoses: Drug-induced acute pancreatitis without infection or necrosis      pantoprazole (PROTONIX) 40 mg tablet Take 1 tablet (40 mg total) by mouth daily  Qty: 90 tablet, Refills: 0    Associated Diagnoses: Drug-induced acute pancreatitis without infection or necrosis      ranitidine (ZANTAC) 150 mg tablet Take 1 tablet (150 mg total) by mouth 2 (two) times a day  Qty: 180 tablet, Refills: 0    Associated Diagnoses: Drug-induced acute pancreatitis without infection or necrosis      zolpidem (AMBIEN) 10 mg tablet Take by mouth daily at bedtime as needed            No discharge procedures on file      PDMP Review       Value Time User    PDMP Reviewed  Yes 11/20/2019 12:00 PM Esha Byrne MD          ED Provider  Electronically Signed by           Sha Jameson DO  02/14/20 0386

## 2020-02-14 NOTE — ASSESSMENT & PLAN NOTE
· Recurrent  · Patient was admitted with pancreatitis back in October was toe secondary to metformin  But willing has since been discontinued patient does drink alcohol but he states he drinks twice week Sunday and Wednesday when he goes out to dinner and the symptoms started Sunday after he drank  No new medications have recently been added  He had associated diarrhea for the 1st few days but that has stopped he had a normal bowel movement  · CT of the abdomen and pelvis resolved-      1  Acute recurrent edematous pancreatitis, now in the region of the pancreatic head and uncinate process      · 2   2 5 cm hypoattenuating lesion in the pancreatic head, increased from the prior exam, likely representing an enlarging pseudocyst or cystic pancreatic neoplasm  No main pancreatic duct dilation  Continued attention on follow-up is recommended  · Bowel rest okay with meds of little sips of water  IV fluids with potassium  Pain control with Dilaudid and morphine  I will discontinue his did for now  Will ask Gastroenterology to see regarding the pancreatitis recurrent     · Repeat lipase tomorrow  · No cholelithiasis  · Triglyceride level will be added to the lab work

## 2020-02-14 NOTE — NURSING NOTE
Admission Note  Patient arrive on unit A&OX4  Pleasant and cooperative  Stated pain to abdomen at level 5  Oriented to room with call bell and  Telephone within reach  Refused SCD and form signed, also refused Nicotine patch  Is NPO and understanding verbalized  NS & 20 KCL @ 20 cc/hr infusing well  All safety maintained  Will continue to monitor

## 2020-02-14 NOTE — H&P
H&P- Yomaira Velasquez 1950, 81 y o  male MRN: [id number]    Unit/Bed#: ED 10 Encounter: [id number]    Primary Care Provider: Fabiano Pineda MD   Date and time admitted to hospital: 2/14/2020 11:59 AM        Tobacco abuse  Assessment & Plan  · Counseled that his nicotine patch has been provided    Mixed hyperlipidemia  Assessment & Plan  · Secondary to acute recurrent pancreatitis his statin combination will be on hold    Hypothyroidism (acquired)  Assessment & Plan  · Resume Synthroid    Essential hypertension, benign  Assessment & Plan  · Pain control resume Cozaar    * Acute pancreatitis  Assessment & Plan  · Recurrent  · Patient was admitted with pancreatitis back in October was toe secondary to metformin  But willing has since been discontinued patient does drink alcohol but he states he drinks twice week Sunday and Wednesday when he goes out to dinner and the symptoms started Sunday after he drank  No new medications have recently been added  He had associated diarrhea for the 1st few days but that has stopped he had a normal bowel movement  · CT of the abdomen and pelvis resolved-      1  Acute recurrent edematous pancreatitis, now in the region of the pancreatic head and uncinate process      · 2   2 5 cm hypoattenuating lesion in the pancreatic head, increased from the prior exam, likely representing an enlarging pseudocyst or cystic pancreatic neoplasm  No main pancreatic duct dilation  Continued attention on follow-up is recommended  · Bowel rest okay with meds of little sips of water  IV fluids with potassium  Pain control with Dilaudid and morphine  I will discontinue his did for now  Will ask Gastroenterology to see regarding the pancreatitis recurrent     · Repeat lipase tomorrow  · No cholelithiasis  · Triglyceride level will be added to the lab work          VTE Prophylaxis: Enoxaparin (Lovenox)  / sequential compression device   Code Status: full code   POLST: There is no POLST form on file for this patient (pre-hospital)  Discussion with family:  Patient    Anticipated Length of Stay:  Patient will be admitted on an Inpatient basis with an anticipated length of stay of  > 2 midnights  Justification for Hospital Stay:  Acute recurrent pancreatitis    Total Time for Visit, including Counseling / Coordination of Care: 1 hour  Greater than 50% of this total time spent on direct patient counseling and coordination of care  Chief Complaint:   Abdominal pain    History of Present Illness:    Saul Gonsalez is a 81 y o  male who presents with epigastric abdominal pain since Sunday  Patient does drink 2 cocktails with dinner on Wednesday and for Sunday and Sunday that is when it started he had a few episodes of diarrhea couple of days after but has resolved he had a normal bowel movement every time he eats he gets nauseous  He had pancreatitis before which was thought secondary to metformin was discontinued at time in October  Patient states that he has not had any new medications so only that his amlodipine was recently switched to Cozaar  Associated fatigue but he has been eating the pain is constant it is not radiating from the epigastric it is localized to 5 to 6/10  The diarrhea he mentioned he had watery Sunday to Tuesday  No fevers denies any chest pain or shortness of breath  Review of Systems:    Review of Systems   Constitutional: Negative for fatigue and fever  HENT: Negative  Negative for ear pain, rhinorrhea and sore throat  Eyes: Negative  Negative for pain and itching  Respiratory: Negative  Negative for cough, chest tightness, shortness of breath and wheezing  Cardiovascular: Negative  Negative for chest pain, palpitations and leg swelling  Gastrointestinal: Positive for abdominal pain, diarrhea (Now resolved) and nausea  Negative for abdominal distention and vomiting  Endocrine: Negative for polydipsia, polyphagia and polyuria     Genitourinary: Negative for dysuria and flank pain  Musculoskeletal: Negative  Negative for back pain and myalgias  Skin: Negative  Negative for rash and wound  Neurological: Negative  Negative for dizziness, syncope, speech difficulty, weakness, light-headedness, numbness and headaches  Psychiatric/Behavioral: Negative for agitation, confusion and decreased concentration  All other systems reviewed and are negative  Past Medical and Surgical History:     Past Medical History:   Diagnosis Date    Avascular necrosis of bone of hip, left (HCC)     CAD (coronary artery disease)     Hiatal hernia     Hyperlipidemia     Hypertension     Pancreatitis     Spinal stenosis        Past Surgical History:   Procedure Laterality Date    BACK SURGERY      CARDIAC CATHETERIZATION      CERVICAL FUSION      JOINT REPLACEMENT      LUMBAR FUSION      NECK SURGERY      ORTHOPEDIC SURGERY      SPINAL FUSION         Meds/Allergies:    Prior to Admission medications    Medication Sig Start Date End Date Taking? Authorizing Provider   aspirin 81 MG tablet Take 1 tablet (81 mg total) by mouth daily 11/5/19   Osmani Aguilera MD   diclofenac sodium (VOLTAREN) 1 % Apply 2 g topically 4 (four) times a day Apply to shoulders    11/5/19   Osmani Aguilera MD   ezetimibe-simvastatin (VYTORIN) 10-40 mg per tablet Take 1 tablet by mouth daily at bedtime 2/7/20   Shlomo Frye MD   gabapentin (NEURONTIN) 300 mg capsule Take 1 capsule (300 mg total) by mouth 3 (three) times a day 1/2/20 2/7/20  Clarita Gonzáles DO   levothyroxine 25 mcg tablet Take 0 5 tablets (12 5 mcg total) by mouth daily 12/19/19 3/18/20  Osmani Aguilera MD   losartan (COZAAR) 100 MG tablet Take 1 tablet (100 mg total) by mouth daily 2/7/20   Shlomo Frye MD   magnesium oxide (MAG-OX) 400 mg Take 1 tablet (400 mg total) by mouth 2 (two) times a day 11/20/19   Osmani Aguilera MD   methocarbamol (ROBAXIN) 500 mg tablet Take 1 tablet (500 mg total) by mouth 2 (two) times a day  Patient not taking: Reported on 2/7/2020 12/27/19   Dahlia Cervantes PA-C   oxyCODONE-acetaminophen (PERCOCET) 5-325 mg per tablet Take 1 tablet by mouth every 6 (six) hours as needed for moderate painMax Daily Amount: 4 tablets  Patient not taking: Reported on 2/7/2020 12/31/19   Koffi Moreau MD   pancrelipase, Lip-Prot-Amyl, (CREON) 24,000 units Take 1 capsule (24,000 Units total) by mouth 3 (three) times a day with meals  Patient taking differently: Take 24,000 units of lipase by mouth 3 (three) times a day with meals  11/2/19 2/7/20  Cole Barraza MD   pantoprazole (PROTONIX) 40 mg tablet Take 1 tablet (40 mg total) by mouth daily 11/2/19 2/7/20  Cole Barraza MD   ranitidine (ZANTAC) 150 mg tablet Take 1 tablet (150 mg total) by mouth 2 (two) times a day 11/2/19 2/7/20  Cole Barraza MD   zolpidem (AMBIEN) 10 mg tablet Take by mouth daily at bedtime as needed  11/22/19   Historical Provider, MD   amLODIPine (NORVASC) 10 mg tablet Take 1 tablet (10 mg total) by mouth daily 11/2/19 2/7/20  Cole Barraza MD   atorvastatin (LIPITOR) 10 mg tablet Take 1 tablet (10 mg total) by mouth daily 11/5/19 2/7/20  Esha Byrne MD     I have reviewed home medications with a medical source (PCP, Pharmacy, other)  Allergies: Allergies   Allergen Reactions    Amlodipine Swelling     Lower extremity swelling          Social History:     Marital Status: Single   Substance Use History:   Social History     Substance and Sexual Activity   Alcohol Use Yes    Frequency: Monthly or less    Drinks per session: 1 or 2    Binge frequency: Never     Social History     Tobacco Use   Smoking Status Current Every Day Smoker    Packs/day: 0 50    Years: 50 00    Pack years: 25 00    Types: Cigarettes   Smokeless Tobacco Never Used     Social History     Substance and Sexual Activity   Drug Use Never       Family History:    Family History   Family history unknown: Yes       Physical Exam:     Vitals:   Blood Pressure: 166/92 (02/14/20 1327)  Pulse: 69 (02/14/20 1327)  Temperature: (!) 97 4 °F (36 3 °C) (02/14/20 1208)  Temp Source: Tympanic (02/14/20 1208)  Respirations: 15 (02/14/20 1327)  Weight - Scale: 63 kg (138 lb 14 2 oz) (02/14/20 1208)  SpO2: 95 % (02/14/20 1327)    Physical Exam   Constitutional: He is oriented to person, place, and time  He appears well-developed and well-nourished  HENT:   Head: Normocephalic and atraumatic  Eyes: Pupils are equal, round, and reactive to light  EOM are normal    Neck: Normal range of motion  Cardiovascular: Normal rate, regular rhythm and normal heart sounds  Pulmonary/Chest: Effort normal and breath sounds normal  No stridor  He has no wheezes  Abdominal: Soft  Bowel sounds are normal  There is tenderness (Epigastric left lower quadrant left side)  Musculoskeletal: Normal range of motion  He exhibits no edema  Neurological: He is alert and oriented to person, place, and time  He has normal reflexes  cranial nerve 2-12 are normal   Normal neurological exam   Skin: Skin is warm  Psychiatric: He has a normal mood and affect  Additional Data:     Lab Results: I have personally reviewed pertinent films in PACS    Results from last 7 days   Lab Units 02/14/20  1220   WBC Thousand/uL 6 70   HEMOGLOBIN g/dL 14 6   HEMATOCRIT % 43 5   PLATELETS Thousands/uL 233   NEUTROS PCT % 73*   LYMPHS PCT % 17*   MONOS PCT % 7   EOS PCT % 3     Results from last 7 days   Lab Units 02/14/20  1220   SODIUM mmol/L 139   POTASSIUM mmol/L 3 6   CHLORIDE mmol/L 101   CO2 mmol/L 26   BUN mg/dL 15   CREATININE mg/dL 0 77   ANION GAP mmol/L 12   CALCIUM mg/dL 10 0   ALBUMIN g/dL 4 5   TOTAL BILIRUBIN mg/dL 0 50   ALK PHOS U/L 104   ALT U/L 25   AST U/L 33   GLUCOSE RANDOM mg/dL 105*                       Imaging: I have personally reviewed pertinent films in PACS    CT abdomen pelvis with contrast   Final Result by Duncan Drummond MD (02/14 1446)         1    Acute recurrent edematous pancreatitis, now in the region of the pancreatic head and uncinate process  2   2 5 cm hypoattenuating lesion in the pancreatic head, increased from the prior exam, likely representing an enlarging pseudocyst or cystic pancreatic neoplasm  No main pancreatic duct dilation  Continued attention on follow-up is recommended  The study was marked in EPIC for significant notification  Workstation performed: [id number]             EKG, Pathology, and Other Studies Reviewed on Admission:   · EKG:  Reviewed    Allscripts / Epic Records Reviewed: Yes     ** Please Note: This note has been constructed using a voice recognition system   **

## 2020-02-14 NOTE — ED NOTES
Patient transported to CT via stretcher with radiology        [facility]  02/14/20 3483

## 2020-02-14 NOTE — PLAN OF CARE
Problem: Potential for Falls  Goal: Patient will remain free of falls  Description  INTERVENTIONS:  - Assess patient frequently for physical needs  -  Identify cognitive and physical deficits and behaviors that affect risk of falls  -  [place] fall precautions as indicated by assessment   - Educate patient/family on patient safety including physical limitations  - Instruct patient to call for assistance with activity based on assessment  - Modify environment to reduce risk of injury  - Consider OT/PT consult to assist with strengthening/mobility  Outcome: Progressing     Problem: Potential for Falls  Goal: Patient will remain free of falls  Description  INTERVENTIONS:  - Assess patient frequently for physical needs  -  Identify cognitive and physical deficits and behaviors that affect risk of falls    -  [place] fall precautions as indicated by assessment   - Educate patient/family on patient safety including physical limitations  - Instruct patient to call for assistance with activity based on assessment  - Modify environment to reduce risk of injury  - Consider OT/PT consult to assist with strengthening/mobility  Outcome: Progressing

## 2020-02-15 ENCOUNTER — APPOINTMENT (INPATIENT)
Dept: RADIOLOGY | Facility: HOSPITAL | Age: 70
DRG: 439 | End: 2020-02-15
Payer: COMMERCIAL

## 2020-02-15 LAB
ALBUMIN SERPL BCP-MCNC: 2.9 G/DL (ref 3–5.2)
ALP SERPL-CCNC: 72 U/L (ref 43–122)
ALT SERPL W P-5'-P-CCNC: 27 U/L (ref 9–52)
ANION GAP SERPL CALCULATED.3IONS-SCNC: 7 MMOL/L (ref 5–14)
AST SERPL W P-5'-P-CCNC: 23 U/L (ref 17–59)
BASOPHILS # BLD AUTO: 0 THOUSANDS/ΜL (ref 0–0.1)
BASOPHILS NFR BLD AUTO: 0 % (ref 0–1)
BILIRUB SERPL-MCNC: 0.4 MG/DL
BUN SERPL-MCNC: 9 MG/DL (ref 5–25)
CALCIUM SERPL-MCNC: 8.5 MG/DL (ref 8.4–10.2)
CHLORIDE SERPL-SCNC: 110 MMOL/L (ref 97–108)
CO2 SERPL-SCNC: 22 MMOL/L (ref 22–30)
CREAT SERPL-MCNC: 0.67 MG/DL (ref 0.7–1.5)
EOSINOPHIL # BLD AUTO: 0.3 THOUSAND/ΜL (ref 0–0.4)
EOSINOPHIL NFR BLD AUTO: 5 % (ref 0–6)
ERYTHROCYTE [DISTWIDTH] IN BLOOD BY AUTOMATED COUNT: 14.5 %
GFR SERPL CREATININE-BSD FRML MDRD: 99 ML/MIN/1.73SQ M
GLUCOSE SERPL-MCNC: 75 MG/DL (ref 70–99)
HCT VFR BLD AUTO: 33.4 % (ref 41–53)
HGB BLD-MCNC: 11.5 G/DL (ref 13.5–17.5)
LIPASE SERPL-CCNC: 483 U/L (ref 23–300)
LYMPHOCYTES # BLD AUTO: 1.2 THOUSANDS/ΜL (ref 0.5–4)
LYMPHOCYTES NFR BLD AUTO: 23 % (ref 25–45)
MCH RBC QN AUTO: 33.3 PG (ref 26–34)
MCHC RBC AUTO-ENTMCNC: 34.5 G/DL (ref 31–36)
MCV RBC AUTO: 97 FL (ref 80–100)
MONOCYTES # BLD AUTO: 0.5 THOUSAND/ΜL (ref 0.2–0.9)
MONOCYTES NFR BLD AUTO: 10 % (ref 1–10)
NEUTROPHILS # BLD AUTO: 3.2 THOUSANDS/ΜL (ref 1.8–7.8)
NEUTS SEG NFR BLD AUTO: 61 % (ref 45–65)
PLATELET # BLD AUTO: 167 THOUSANDS/UL (ref 150–450)
PMV BLD AUTO: 9.2 FL (ref 8.9–12.7)
POTASSIUM SERPL-SCNC: 3.5 MMOL/L (ref 3.6–5)
PROT SERPL-MCNC: 5.6 G/DL (ref 5.9–8.4)
RBC # BLD AUTO: 3.46 MILLION/UL (ref 4.5–5.9)
SODIUM SERPL-SCNC: 139 MMOL/L (ref 137–147)
WBC # BLD AUTO: 5.2 THOUSAND/UL (ref 4.5–11)

## 2020-02-15 PROCEDURE — 99254 IP/OBS CNSLTJ NEW/EST MOD 60: CPT | Performed by: INTERNAL MEDICINE

## 2020-02-15 PROCEDURE — 85025 COMPLETE CBC W/AUTO DIFF WBC: CPT | Performed by: FAMILY MEDICINE

## 2020-02-15 PROCEDURE — 80053 COMPREHEN METABOLIC PANEL: CPT | Performed by: FAMILY MEDICINE

## 2020-02-15 PROCEDURE — 99232 SBSQ HOSP IP/OBS MODERATE 35: CPT | Performed by: FAMILY MEDICINE

## 2020-02-15 PROCEDURE — C9113 INJ PANTOPRAZOLE SODIUM, VIA: HCPCS | Performed by: FAMILY MEDICINE

## 2020-02-15 PROCEDURE — 83690 ASSAY OF LIPASE: CPT | Performed by: FAMILY MEDICINE

## 2020-02-15 PROCEDURE — 71046 X-RAY EXAM CHEST 2 VIEWS: CPT

## 2020-02-15 RX ORDER — SODIUM CHLORIDE AND POTASSIUM CHLORIDE .9; .15 G/100ML; G/100ML
75 SOLUTION INTRAVENOUS CONTINUOUS
Status: DISCONTINUED | OUTPATIENT
Start: 2020-02-15 | End: 2020-02-16

## 2020-02-15 RX ORDER — POTASSIUM CHLORIDE 14.9 MG/ML
20 INJECTION INTRAVENOUS ONCE
Status: COMPLETED | OUTPATIENT
Start: 2020-02-15 | End: 2020-02-15

## 2020-02-15 RX ADMIN — MAGNESIUM OXIDE TAB 400 MG (241.3 MG ELEMENTAL MG) 400 MG: 400 (241.3 MG) TAB at 17:14

## 2020-02-15 RX ADMIN — PANCRELIPASE 24000 UNITS: 24000; 76000; 120000 CAPSULE, DELAYED RELEASE PELLETS ORAL at 08:59

## 2020-02-15 RX ADMIN — SODIUM CHLORIDE AND POTASSIUM CHLORIDE 125 ML/HR: .9; .15 SOLUTION INTRAVENOUS at 00:07

## 2020-02-15 RX ADMIN — LOSARTAN POTASSIUM 100 MG: 50 TABLET, FILM COATED ORAL at 08:59

## 2020-02-15 RX ADMIN — HYDROMORPHONE HYDROCHLORIDE 1 MG: 1 INJECTION, SOLUTION INTRAMUSCULAR; INTRAVENOUS; SUBCUTANEOUS at 21:26

## 2020-02-15 RX ADMIN — MAGNESIUM OXIDE TAB 400 MG (241.3 MG ELEMENTAL MG) 400 MG: 400 (241.3 MG) TAB at 08:58

## 2020-02-15 RX ADMIN — HYDROMORPHONE HYDROCHLORIDE 1 MG: 1 INJECTION, SOLUTION INTRAMUSCULAR; INTRAVENOUS; SUBCUTANEOUS at 05:06

## 2020-02-15 RX ADMIN — MORPHINE SULFATE 2 MG: 2 INJECTION, SOLUTION INTRAMUSCULAR; INTRAVENOUS at 07:08

## 2020-02-15 RX ADMIN — ENOXAPARIN SODIUM 40 MG: 40 INJECTION SUBCUTANEOUS at 09:01

## 2020-02-15 RX ADMIN — ASPIRIN 81 MG: 81 TABLET, COATED ORAL at 08:58

## 2020-02-15 RX ADMIN — PANTOPRAZOLE SODIUM 40 MG: 40 INJECTION, POWDER, FOR SOLUTION INTRAVENOUS at 08:58

## 2020-02-15 RX ADMIN — HYDROMORPHONE HYDROCHLORIDE 1 MG: 1 INJECTION, SOLUTION INTRAMUSCULAR; INTRAVENOUS; SUBCUTANEOUS at 17:19

## 2020-02-15 RX ADMIN — HYDROMORPHONE HYDROCHLORIDE 1 MG: 1 INJECTION, SOLUTION INTRAMUSCULAR; INTRAVENOUS; SUBCUTANEOUS at 09:37

## 2020-02-15 RX ADMIN — SODIUM CHLORIDE AND POTASSIUM CHLORIDE 125 ML/HR: .9; .15 SOLUTION INTRAVENOUS at 05:06

## 2020-02-15 RX ADMIN — HYDROMORPHONE HYDROCHLORIDE 1 MG: 1 INJECTION, SOLUTION INTRAMUSCULAR; INTRAVENOUS; SUBCUTANEOUS at 13:11

## 2020-02-15 RX ADMIN — SODIUM CHLORIDE AND POTASSIUM CHLORIDE 75 ML/HR: .9; .15 SOLUTION INTRAVENOUS at 21:03

## 2020-02-15 RX ADMIN — MORPHINE SULFATE 2 MG: 2 INJECTION, SOLUTION INTRAMUSCULAR; INTRAVENOUS at 00:11

## 2020-02-15 RX ADMIN — PANCRELIPASE 24000 UNITS: 24000; 76000; 120000 CAPSULE, DELAYED RELEASE PELLETS ORAL at 11:59

## 2020-02-15 RX ADMIN — LEVOTHYROXINE SODIUM 12.5 MCG: 25 TABLET ORAL at 08:59

## 2020-02-15 RX ADMIN — PANCRELIPASE 24000 UNITS: 24000; 76000; 120000 CAPSULE, DELAYED RELEASE PELLETS ORAL at 16:50

## 2020-02-15 RX ADMIN — POTASSIUM CHLORIDE 20 MEQ: 14.9 INJECTION, SOLUTION INTRAVENOUS at 08:59

## 2020-02-15 NOTE — UTILIZATION REVIEW
Initial Clinical Review    Admission: Date/Time/Statement: Admission Orders (From admission, onward)     Ordered        02/14/20 1454  Inpatient Admission (expected length of stay for this patient Order details is greater than two midnights)  Once                   Orders Placed This Encounter   Procedures    Inpatient Admission (expected length of stay for this patient Order details is greater than two midnights)     Standing Status:   Standing     Number of Occurrences:   1     Order Specific Question:   Admitting Physician     Answer:   Vinicio Nielsen [[id number]]     Order Specific Question:   Level of Care     Answer:   Med Surg [[id number]]     Order Specific Question:   Estimated length of stay     Answer:   More than 2 Midnights     Order Specific Question:   Certification     Answer:   I certify that inpatient services are medically necessary for this patient for a duration of greater than two midnights  See H&P and MD Progress Notes for additional information about the patient's course of treatment  ED Arrival Information     Expected Arrival Acuity Means of Arrival Escorted By Service Admission Type    - 2/14/2020 11:55 Emergent Walk-In Self General Medicine Emergency    Arrival Complaint    abd pain        Chief Complaint   Patient presents with    Abdominal Pain     c/o of epigastric pain since monday - nausea - had diarrhea on sunday till tuesday - generalized weakness     Assessment/Plan: 81year old male to the ED from home with complaints of epigastric abdominal pain, nausea and vomiting for 3 days prior to arrival to the ED  Admitted to inpatient for acute pancreatitis  H/o pancreatitis which was associated with metformin use and subsequently discontinued  Has had watery stool for 2 days during this event, now has weakness  CT of A/P shows Acute recurrent edematous pancreatitis, now in the region of the pancreatic head and uncinate process  NPO  REplete potassium with IV supplement    Prn Pain medications  GI consult  REpeat lipase  IF fluids started in the ED     2/15 UPdate  Still has elevated Lipase, mildly improved  COntinue IV fluids  New onset acute respi failure with hypoxia, has pulse ox 83-89% on room air  Oxygen applied  Current smoker  Check CX, decrease iV fluid rate  He continues with epigastric tenderness  ED Triage Vitals   Temperature Pulse Respirations Blood Pressure SpO2   02/14/20 1208 02/14/20 1208 02/14/20 1208 02/14/20 1208 02/14/20 1208   (!) 97 4 °F (36 3 °C) 94 16 168/85 98 %      Temp Source Heart Rate Source Patient Position - Orthostatic VS BP Location FiO2 (%)   02/14/20 1208 02/14/20 1208 02/14/20 1208 02/14/20 1208 --   Tympanic Monitor Sitting Left arm       Pain Score       02/14/20 1223       8        Wt Readings from Last 1 Encounters:   02/14/20 62 8 kg (138 lb 7 2 oz)     Additional Vital Signs:   Date/Time  Temp  Pulse  Resp  BP  SpO2  O2 Device  Patient Position - Orthostatic VS   02/15/20 1100          91 %  Nasal cannula     02/15/20 0736          89 %Abnormal   Nasal cannula     02/15/20 0720  98 8 °F (37 1 °C)  69  18  117/66  83 %Abnormal   None (Room air)  Lying   02/14/20 2219  97 9 °F (36 6 °C)  64  18  122/73  97 %  None (Room air)  Lying   02/14/20 1643  98 6 °F (37 °C)  78  18  173/85Abnormal   96 %  None (Room air)  Sitting   02/14/20 1550    74  16  147/91  95 %  None (Room air)  Lying   02/14/20 1327    69  15  166/92  95 %         Pertinent Labs/Diagnostic Test Results:   2/15 CXR:    New mild patchy in the left lower lobe, atelectasis versus pneumonia    2/14 CT A/P: Acute recurrent edematous pancreatitis, now in the region of the pancreatic head and uncinate process    2 5 cm hypoattenuating lesion in the pancreatic head, increased from the prior exam, likely representing an enlarging pseudocyst or cystic pancreatic neoplasm   No main pancreatic duct dilation     Results from last 7 days   Lab Units 02/15/20  3797 02/14/20  1220   WBC Thousand/uL 5 20 6 70   HEMOGLOBIN g/dL 11 5* 14 6   HEMATOCRIT % 33 4* 43 5   PLATELETS Thousands/uL 167 233   NEUTROS ABS Thousands/µL 3 20 4 90         Results from last 7 days   Lab Units 02/15/20  0503 02/14/20  1220   SODIUM mmol/L 139 139   POTASSIUM mmol/L 3 5* 3 6   CHLORIDE mmol/L 110* 101   CO2 mmol/L 22 26   ANION GAP mmol/L 7 12   BUN mg/dL 9 15   CREATININE mg/dL 0 67* 0 77   EGFR ml/min/1 73sq m 99 94   CALCIUM mg/dL 8 5 10 0     Results from last 7 days   Lab Units 02/15/20  0503 02/14/20  1220   AST U/L 23 33   ALT U/L 27 25   ALK PHOS U/L 72 104   TOTAL PROTEIN g/dL 5 6* 8 1   ALBUMIN g/dL 2 9* 4 5   TOTAL BILIRUBIN mg/dL 0 40 0 50         Results from last 7 days   Lab Units 02/15/20  0503 02/14/20  1220   GLUCOSE RANDOM mg/dL 75 105*     Results from last 7 days   Lab Units 02/14/20  1220   TROPONIN I ng/mL <0 01     Results from last 7 days   Lab Units 02/15/20  0503 02/14/20  1220   LIPASE u/L 483* 1,597*       ED Treatment:   Medication Administration from 02/14/2020 1155 to 02/14/2020 1611       Date/Time Order Dose Route Action     02/14/2020 1223 sodium chloride 0 9 % bolus 1,000 mL 1,000 mL Intravenous New Bag     02/14/2020 1223 HYDROmorphone (DILAUDID) injection 1 mg 1 mg Intravenous Given     02/14/2020 1225 ondansetron (ZOFRAN) injection 4 mg 4 mg Intravenous Given     02/14/2020 1327 sodium chloride 0 9 % bolus 1,000 mL 1,000 mL Intravenous New Bag     02/14/2020 1417 HYDROmorphone (DILAUDID) injection 1 mg 1 mg Intravenous Given     02/14/2020 1537 aspirin (ECOTRIN LOW STRENGTH) EC tablet 81 mg 81 mg Oral Given     02/14/2020 1536 levothyroxine tablet 12 5 mcg 12 5 mcg Oral Given     02/14/2020 1536 losartan (COZAAR) tablet 100 mg 100 mg Oral Given     02/14/2020 1546 sodium chloride 0 9 % with KCl 20 mEq/L infusion (premix) 125 mL/hr Intravenous New Bag     02/14/2020 1538 enoxaparin (LOVENOX) subcutaneous injection 40 mg 40 mg Subcutaneous Given     02/14/2020 1539 pantoprazole (PROTONIX) injection 40 mg 40 mg Intravenous Given        Past Medical History:   Diagnosis Date    Avascular necrosis of bone of hip, left (HCC)     CAD (coronary artery disease)     Hiatal hernia     Hyperlipidemia     Hypertension     Pancreatitis     Spinal stenosis      Admitting Diagnosis: Pancreatitis [K85 90]  Abdominal pain [R10 9]  Age/Sex: 81 y o  male  Admission Orders:  Nasal cannula 2L to keep pulse ox >90%  Bmp, LIpase  NPO  Scheduled Medications:    Medications:  acetaminophen 975 mg Oral Once   aspirin 81 mg Oral Daily   enoxaparin 40 mg Subcutaneous Q24H DAMASO   levothyroxine 12 5 mcg Oral Daily   losartan 100 mg Oral Daily   magnesium oxide 400 mg Oral BID   pancrelipase (Lip-Prot-Amyl) 24,000 Units Oral TID With Meals   pantoprazole 40 mg Intravenous Q24H [address]     Continuous IV Infusions:    sodium chloride 0 9 % with KCl 20 mEq/L 75 mL/hr Intravenous Continuous     PRN Meds:    HYDROmorphone 1 mg Intravenous Q4H PRN x3   morphine injection 2 mg Intravenous Q3H PRN x2   ondansetron 4 mg Intravenous Q6H PRN   zolpidem 5 mg Oral HS PRN       IP CONSULT TO GASTROENTEROLOGY    Network Utilization Review Department  [email or web address]  ATTENTION: Please call with any questions or concerns to [phone] and carefully listen to the prompts so that you are directed to the right person  All voicemails are confidential   Jurgen Hallmark all requests for admission clinical reviews, approved or denied determinations and any other requests to dedicated fax number below belonging to the campus where the patient is receiving treatment   List of dedicated fax numbers for the Facilities:  [address] DENIALS (Administrative/Medical Necessity) [phone]   [address] (Maternity/NICU/Pediatrics) [phone]   [facility] [place] [address] [phone]   [facility] [phone]     [address] [phone]   [facility]  [phone]   [address] [facility]  [phone]   [address] [place] [address] [phone]

## 2020-02-15 NOTE — ASSESSMENT & PLAN NOTE
· Recurrent still has pain but lipase is down to 400s  Will still keep NPO as significant edema of the pancreas  Await GI continue IV fluids  Repeat lipase tomorrow and re-evaluate tomorrow  · Patient was admitted with pancreatitis back in October was toe secondary to metformin  But willing has since been discontinued patient does drink alcohol but he states he drinks twice week Sunday and Wednesday when he goes out to dinner and the symptoms started Sunday after he drank  No new medications have recently been added  He had associated diarrhea for the 1st few days but that has stopped he had a normal bowel movement  · CT of the abdomen and pelvis resolved-      1  Acute recurrent edematous pancreatitis, now in the region of the pancreatic head and uncinate process      · 2   2 5 cm hypoattenuating lesion in the pancreatic head, increased from the prior exam, likely representing an enlarging pseudocyst or cystic pancreatic neoplasm  No main pancreatic duct dilation  Continued attention on follow-up is recommended  · Morphine Dilaudid alternating if needed for pain control  · No cholelithiasis  · Triglycerides level  146

## 2020-02-15 NOTE — ASSESSMENT & PLAN NOTE
· Did happen to him before  Will place him on some oxygen he is on oxygen at home  He does have extensive smoking history no other signs to suggest a PE  Chest x-ray PA and lateral will be obtained  Decreased fluids as he previously had effusions

## 2020-02-15 NOTE — NURSING NOTE
Patient O2 sat 83% on RA, oxygen via NC applied  Patient denies shortness of breath  Dr Reddy Learn aware, CXR ordered

## 2020-02-15 NOTE — NURSING NOTE
Assessment unchanged  PT resting comfortable no distress noted  Will continue to monitor call bell within reach

## 2020-02-15 NOTE — PROGRESS NOTES
Progress Note - Anne Santiago 1950, 81 y o  male MRN: [id number]    Unit/Bed#: 7T [facility] 710-02 Encounter: [id number]    Primary Care Provider: Ruben Hernández MD   Date and time admitted to hospital: 2/14/2020 11:59 AM        Acute respiratory failure with hypoxia [facility])  Assessment & Plan  · Did happen to him before  Will place him on some oxygen he is on oxygen at home  He does have extensive smoking history no other signs to suggest a PE  Chest x-ray PA and lateral will be obtained  Decreased fluids as he previously had effusions  Tobacco abuse  Assessment & Plan  · Counseled that his nicotine patch has been provided he refused nicotine patch    Mixed hyperlipidemia  Assessment & Plan  · Secondary to acute recurrent pancreatitis his statin combination will be on hold    Hypothyroidism (acquired)  Assessment & Plan  · Continue Synthroid    Essential hypertension, benign  Assessment & Plan  · Controlled continue Cozaar    * Acute pancreatitis  Assessment & Plan  · Recurrent still has pain but lipase is down to 400s  Will still keep NPO as significant edema of the pancreas  Await GI continue IV fluids  Repeat lipase tomorrow and re-evaluate tomorrow  · Patient was admitted with pancreatitis back in October was toe secondary to metformin  But willing has since been discontinued patient does drink alcohol but he states he drinks twice week Sunday and Wednesday when he goes out to dinner and the symptoms started Sunday after he drank  No new medications have recently been added  He had associated diarrhea for the 1st few days but that has stopped he had a normal bowel movement  · CT of the abdomen and pelvis resolved-      1  Acute recurrent edematous pancreatitis, now in the region of the pancreatic head and uncinate process      · 2   2 5 cm hypoattenuating lesion in the pancreatic head, increased from the prior exam, likely representing an enlarging pseudocyst or cystic pancreatic neoplasm    No main pancreatic duct dilation  Continued attention on follow-up is recommended  · Morphine Dilaudid alternating if needed for pain control  · No cholelithiasis  · Triglycerides level  146  VTE Pharmacologic Prophylaxis:   Pharmacologic: Enoxaparin (Lovenox)  Mechanical VTE Prophylaxis in Place: No refused    Patient Centered Rounds: I have performed bedside rounds with nursing staff today  Discussions with Specialists or Other Care Team Provider:  Nursing    Education and Discussions with Family / Patient:  Patient    Time Spent for Care: 30 minutes  More than 50% of total time spent on counseling and coordination of care as described above  Current Length of Stay: 1 day(s)    Current Patient Status: Inpatient   Certification Statement: The patient will continue to require additional inpatient hospital stay due to Acute pancreatitis    Discharge Plan:  Pending progress    Code Status: Level 1 - Full Code      Subjective:   Patient seen and examined denies any chest pain or shortness of breath still has epigastric pain and the left-sided pain but no nausea  Objective:     Vitals:   Temp (24hrs), Av 2 °F (36 8 °C), Min:97 4 °F (36 3 °C), Max:98 8 °F (37 1 °C)    Temp:  [97 4 °F (36 3 °C)-98 8 °F (37 1 °C)] 98 8 °F (37 1 °C)  HR:  [64-94] 69  Resp:  [15-18] 18  BP: (117-173)/(66-92) 117/66  SpO2:  [83 %-98 %] 89 %  Body mass index is 24 53 kg/m²  Input and Output Summary (last 24 hours): Intake/Output Summary (Last 24 hours) at 2/15/2020 0930  Last data filed at 2/15/2020 0720  Gross per 24 hour   Intake 2500 ml   Output 450 ml   Net 2050 ml       Physical Exam:     Physical Exam   Constitutional: He is oriented to person, place, and time  He appears well-developed and well-nourished  HENT:   Head: Normocephalic and atraumatic  Eyes: Pupils are equal, round, and reactive to light  EOM are normal    Neck: Normal range of motion     Cardiovascular: Normal rate, regular rhythm and normal heart sounds  Pulmonary/Chest: Effort normal and breath sounds normal  No stridor  No respiratory distress  He has no wheezes  Abdominal: Soft  Bowel sounds are normal  There is tenderness  Musculoskeletal: Normal range of motion  Neurological: He is alert and oriented to person, place, and time  He has normal reflexes  cranial nerve 2-12 are normal   Normal neurological exam   Skin: Skin is warm  Psychiatric: He has a normal mood and affect  Additional Data:     Labs:    Results from last 7 days   Lab Units 02/15/20  0503   WBC Thousand/uL 5 20   HEMOGLOBIN g/dL 11 5*   HEMATOCRIT % 33 4*   PLATELETS Thousands/uL 167   NEUTROS PCT % 61   LYMPHS PCT % 23*   MONOS PCT % 10   EOS PCT % 5     Results from last 7 days   Lab Units 02/15/20  0503   SODIUM mmol/L 139   POTASSIUM mmol/L 3 5*   CHLORIDE mmol/L 110*   CO2 mmol/L 22   BUN mg/dL 9   CREATININE mg/dL 0 67*   ANION GAP mmol/L 7   CALCIUM mg/dL 8 5   ALBUMIN g/dL 2 9*   TOTAL BILIRUBIN mg/dL 0 40   ALK PHOS U/L 72   ALT U/L 27   AST U/L 23   GLUCOSE RANDOM mg/dL 75                           * I Have Reviewed All Lab Data Listed Above  * Additional Pertinent Lab Tests Reviewed:  All Labs Within Last 24 Hours Reviewed    Imaging:    Imaging Reports Reviewed Today Include:  X-ray pending  Imaging Personally Reviewed by Myself Includes:      Recent Cultures (last 7 days):           Last 24 Hours Medication List:     Current Facility-Administered Medications:  acetaminophen 975 mg Oral Once Sweetie Hinton MD    aspirin 81 mg Oral Daily Stan Huggins MD    enoxaparin 40 mg Subcutaneous Q24H [address] Stan Huggins MD    HYDROmorphone 1 mg Intravenous Q4H PRN Stan Huggins MD    levothyroxine 12 5 mcg Oral Daily Stan Huggins MD    losartan 100 mg Oral Daily Stan Huggins MD    magnesium oxide 400 mg Oral BID Stan Huggins MD    morphine injection 2 mg Intravenous Q3H PRN Stan Huggins MD    ondansetron 4 mg Intravenous Q6H PRN Mary Chávez MD    pancrelipase (Lip-Prot-Amyl) 24,000 Units Oral TID With Meals Mary Chávez MD    pantoprazole 40 mg Intravenous Q24H [facility] Mary Chávez MD    potassium chloride 20 mEq Intravenous Once Mary Chávez MD Last Rate: 20 mEq (02/15/20 0859)   sodium chloride 0 9 % with KCl 20 mEq/L 75 mL/hr Intravenous Continuous Mary Chávez MD Last Rate: 125 mL/hr (02/15/20 0506)   zolpidem 5 mg Oral HS PRN Mary Chávez MD         Today, Patient Was Seen By: Mary Chávez MD    ** Please Note: Dictation voice to text software may have been used in the creation of this document   **

## 2020-02-15 NOTE — PLAN OF CARE
Problem: Potential for Falls  Goal: Patient will remain free of falls  Description  INTERVENTIONS:  - Assess patient frequently for physical needs  -  Identify cognitive and physical deficits and behaviors that affect risk of falls    -  [place] fall precautions as indicated by assessment   - Educate patient/family on patient safety including physical limitations  - Instruct patient to call for assistance with activity based on assessment  - Modify environment to reduce risk of injury  - Consider OT/PT consult to assist with strengthening/mobility  Outcome: Progressing     Problem: PAIN - ADULT  Goal: Verbalizes/displays adequate comfort level or baseline comfort level  Description  Interventions:  - Encourage patient to monitor pain and request assistance  - Assess pain using appropriate pain scale  - Administer analgesics based on type and severity of pain and evaluate response  - Implement non-pharmacological measures as appropriate and evaluate response  - Consider cultural and social influences on pain and pain management  - Notify physician/advanced practitioner if interventions unsuccessful or patient reports new pain  Outcome: Progressing     Problem: INFECTION - ADULT  Goal: Absence or prevention of progression during hospitalization  Description  INTERVENTIONS:  - Assess and monitor for signs and symptoms of infection  - Monitor lab/diagnostic results  - Monitor all insertion sites, i e  indwelling lines, tubes, and drains  - Monitor endotracheal if appropriate and nasal secretions for changes in amount and color  - [place] appropriate cooling/warming therapies per order  - Administer medications as ordered  - Instruct and encourage patient and family to use good hand hygiene technique  - Identify and instruct in appropriate isolation precautions for identified infection/condition  Outcome: Progressing  Goal: Absence of fever/infection during neutropenic period  Description  INTERVENTIONS:  - Monitor WBC    Outcome: Progressing     Problem: SAFETY ADULT  Goal: Patient will remain free of falls  Description  INTERVENTIONS:  - Assess patient frequently for physical needs  -  Identify cognitive and physical deficits and behaviors that affect risk of falls  -  [place] fall precautions as indicated by assessment   - Educate patient/family on patient safety including physical limitations  - Instruct patient to call for assistance with activity based on assessment  - Modify environment to reduce risk of injury  - Consider OT/PT consult to assist with strengthening/mobility  Outcome: Progressing  Goal: Maintain or return to baseline ADL function  Description  INTERVENTIONS:  - Assess patient frequently for physical needs  -  Identify cognitive and physical deficits and behaviors that affect risk of falls    -  [place] fall precautions as indicated by assessment   - Educate patient/family on patient safety including physical limitations  - Instruct patient to call for assistance with activity based on assessment  - Modify environment to reduce risk of injury  - Consider OT/PT consult to assist with strengthening/mobility  Outcome: Progressing  Goal: Maintain or return mobility status to optimal level  Description  INTERVENTIONS:  -  Assess patient's ability to carry out ADLs; assess patient's baseline for ADL function and identify physical deficits which impact ability to perform ADLs (bathing, care of mouth/teeth, toileting, grooming, dressing, etc )  - Assess/evaluate cause of self-care deficits   - Assess range of motion  - Assess patient's mobility; develop plan if impaired  - Assess patient's need for assistive devices and provide as appropriate  - Encourage maximum independence but intervene and supervise when necessary  - Involve family in performance of ADLs  - Assess for home care needs following discharge   - Consider OT consult to assist with ADL evaluation and planning for discharge  - Provide patient education as appropriate  Outcome: Progressing     Problem: DISCHARGE PLANNING  Goal: Discharge to home or other facility with appropriate resources  Description  INTERVENTIONS:  - Identify barriers to discharge w/patient and caregiver  - Arrange for needed discharge resources and transportation as appropriate  - Identify discharge learning needs (meds, wound care, etc )  - Arrange for interpretive services to assist at discharge as needed  - Refer to Case Management Department for coordinating discharge planning if the patient needs post-hospital services based on physician/advanced practitioner order or complex needs related to functional status, cognitive ability, or social support system  Outcome: Progressing

## 2020-02-15 NOTE — NURSING NOTE
AOX3 HR regular R Ankle + 1 edema Lungs decreased clear + Bowel Sounds x4 + PP ABD soft  C/O pain Dilaudid was given and was effective  Will continue to monitor call bell within reach

## 2020-02-16 LAB
ANION GAP SERPL CALCULATED.3IONS-SCNC: 8 MMOL/L (ref 5–14)
BUN SERPL-MCNC: 7 MG/DL (ref 5–25)
CALCIUM SERPL-MCNC: 8.9 MG/DL (ref 8.4–10.2)
CHLORIDE SERPL-SCNC: 105 MMOL/L (ref 97–108)
CO2 SERPL-SCNC: 23 MMOL/L (ref 22–30)
CREAT SERPL-MCNC: 0.59 MG/DL (ref 0.7–1.5)
GFR SERPL CREATININE-BSD FRML MDRD: 105 ML/MIN/1.73SQ M
GLUCOSE SERPL-MCNC: 62 MG/DL (ref 70–99)
LIPASE SERPL-CCNC: 263 U/L (ref 23–300)
POTASSIUM SERPL-SCNC: 3.7 MMOL/L (ref 3.6–5)
PROCALCITONIN SERPL-MCNC: <0.05 NG/ML
SODIUM SERPL-SCNC: 136 MMOL/L (ref 137–147)

## 2020-02-16 PROCEDURE — 94664 DEMO&/EVAL PT USE INHALER: CPT

## 2020-02-16 PROCEDURE — C9113 INJ PANTOPRAZOLE SODIUM, VIA: HCPCS | Performed by: FAMILY MEDICINE

## 2020-02-16 PROCEDURE — 80048 BASIC METABOLIC PNL TOTAL CA: CPT | Performed by: FAMILY MEDICINE

## 2020-02-16 PROCEDURE — 99232 SBSQ HOSP IP/OBS MODERATE 35: CPT | Performed by: INTERNAL MEDICINE

## 2020-02-16 PROCEDURE — 94640 AIRWAY INHALATION TREATMENT: CPT

## 2020-02-16 PROCEDURE — 84145 PROCALCITONIN (PCT): CPT | Performed by: FAMILY MEDICINE

## 2020-02-16 PROCEDURE — 94760 N-INVAS EAR/PLS OXIMETRY 1: CPT

## 2020-02-16 PROCEDURE — 99232 SBSQ HOSP IP/OBS MODERATE 35: CPT | Performed by: FAMILY MEDICINE

## 2020-02-16 PROCEDURE — 83690 ASSAY OF LIPASE: CPT | Performed by: FAMILY MEDICINE

## 2020-02-16 RX ORDER — LOSARTAN POTASSIUM 50 MG/1
50 TABLET ORAL DAILY
Status: DISCONTINUED | OUTPATIENT
Start: 2020-02-16 | End: 2020-02-21 | Stop reason: HOSPADM

## 2020-02-16 RX ORDER — DEXTROSE AND SODIUM CHLORIDE 5; .9 G/100ML; G/100ML
75 INJECTION, SOLUTION INTRAVENOUS CONTINUOUS
Status: DISCONTINUED | OUTPATIENT
Start: 2020-02-16 | End: 2020-02-17

## 2020-02-16 RX ORDER — ALBUTEROL SULFATE 2.5 MG/3ML
2.5 SOLUTION RESPIRATORY (INHALATION) 2 TIMES DAILY
Status: DISCONTINUED | OUTPATIENT
Start: 2020-02-16 | End: 2020-02-18

## 2020-02-16 RX ORDER — ALBUTEROL SULFATE 2.5 MG/3ML
2.5 SOLUTION RESPIRATORY (INHALATION) 2 TIMES DAILY
Status: DISCONTINUED | OUTPATIENT
Start: 2020-02-16 | End: 2020-02-16

## 2020-02-16 RX ADMIN — HYDROMORPHONE HYDROCHLORIDE 1 MG: 1 INJECTION, SOLUTION INTRAMUSCULAR; INTRAVENOUS; SUBCUTANEOUS at 13:41

## 2020-02-16 RX ADMIN — PANCRELIPASE 24000 UNITS: 24000; 76000; 120000 CAPSULE, DELAYED RELEASE PELLETS ORAL at 12:10

## 2020-02-16 RX ADMIN — DEXTROSE AND SODIUM CHLORIDE 75 ML/HR: 5; .9 INJECTION, SOLUTION INTRAVENOUS at 08:50

## 2020-02-16 RX ADMIN — ALBUTEROL SULFATE 2.5 MG: 2.5 SOLUTION RESPIRATORY (INHALATION) at 10:01

## 2020-02-16 RX ADMIN — LEVOTHYROXINE SODIUM 12.5 MCG: 25 TABLET ORAL at 08:46

## 2020-02-16 RX ADMIN — ASPIRIN 81 MG: 81 TABLET, COATED ORAL at 08:46

## 2020-02-16 RX ADMIN — ENOXAPARIN SODIUM 40 MG: 40 INJECTION SUBCUTANEOUS at 08:48

## 2020-02-16 RX ADMIN — LOSARTAN POTASSIUM 50 MG: 50 TABLET, FILM COATED ORAL at 08:46

## 2020-02-16 RX ADMIN — MAGNESIUM OXIDE TAB 400 MG (241.3 MG ELEMENTAL MG) 400 MG: 400 (241.3 MG) TAB at 17:01

## 2020-02-16 RX ADMIN — HYDROMORPHONE HYDROCHLORIDE 1 MG: 1 INJECTION, SOLUTION INTRAMUSCULAR; INTRAVENOUS; SUBCUTANEOUS at 17:28

## 2020-02-16 RX ADMIN — HYDROMORPHONE HYDROCHLORIDE 1 MG: 1 INJECTION, SOLUTION INTRAMUSCULAR; INTRAVENOUS; SUBCUTANEOUS at 21:38

## 2020-02-16 RX ADMIN — PANCRELIPASE 24000 UNITS: 24000; 76000; 120000 CAPSULE, DELAYED RELEASE PELLETS ORAL at 08:45

## 2020-02-16 RX ADMIN — HYDROMORPHONE HYDROCHLORIDE 1 MG: 1 INJECTION, SOLUTION INTRAMUSCULAR; INTRAVENOUS; SUBCUTANEOUS at 08:51

## 2020-02-16 RX ADMIN — MAGNESIUM OXIDE TAB 400 MG (241.3 MG ELEMENTAL MG) 400 MG: 400 (241.3 MG) TAB at 08:45

## 2020-02-16 RX ADMIN — ALBUTEROL SULFATE 2.5 MG: 2.5 SOLUTION RESPIRATORY (INHALATION) at 19:38

## 2020-02-16 RX ADMIN — PANTOPRAZOLE SODIUM 40 MG: 40 INJECTION, POWDER, FOR SOLUTION INTRAVENOUS at 08:57

## 2020-02-16 RX ADMIN — PANCRELIPASE 24000 UNITS: 24000; 76000; 120000 CAPSULE, DELAYED RELEASE PELLETS ORAL at 17:01

## 2020-02-16 NOTE — PROGRESS NOTES
Progress Note - Julio Bartholomew 1950, 81 y o  male MRN: [id number]    Unit/Bed#: 7T [facility] 710-02 Encounter: [id number]    Primary Care Provider: Ariella English MD   Date and time admitted to hospital: 2/14/2020 11:59 AM        Acute respiratory failure with hypoxia [facility])  Assessment & Plan  · Did happen to him before  Will place him on some oxygen he is on oxygen at home  He does have extensive smoking history no other signs to suggest a PE  Chest x-ray reviewed suspect more of an atelectasis procalcitonin is pending as he has no signs of of clinical pneumonia  Also provide albuterol t i d  As he is a smoker  Will try to decrease oxygen  Decreased fluids as he previously had effusions  Tobacco abuse  Assessment & Plan  · Counseled that his nicotine patch has been provided he refused nicotine patch    Mixed hyperlipidemia  Assessment & Plan  · Secondary to acute recurrent pancreatitis his statin combination will be on hold    Hypothyroidism (acquired)  Assessment & Plan  · Continue Synthroid    Essential hypertension, benign  Assessment & Plan  · His blood pressure dropped into the systolic 00H decrease Cozaar to 50 mg p o  Daily  * Acute pancreatitis  Assessment & Plan  · Lipase is normal but he still has significant pain in the epigastric and left side evaluated by GI diet was advanced to clears will see how he does  He will need an EUS outpatient  Advance diet very slowly patient seems under reporting of how much alcohol he drinks and he has a pancreatitis is most likely secondary to alcohol use  Discussed with him the importance of alcohol cessation  · Patient was admitted with pancreatitis back in October was toe secondary to metformin  But willing has since been discontinued patient does drink alcohol but he states he drinks twice week Sunday and Wednesday when he goes out to dinner and the symptoms started Sunday after he drank  No new medications have recently been added    He had associated diarrhea for the 1st few days but that has stopped he had a normal bowel movement  · CT of the abdomen and pelvis resolved-      1  Acute recurrent edematous pancreatitis, now in the region of the pancreatic head and uncinate process      · 2   2 5 cm hypoattenuating lesion in the pancreatic head, increased from the prior exam, likely representing an enlarging pseudocyst or cystic pancreatic neoplasm  No main pancreatic duct dilation  Continued attention on follow-up is recommended  · Morphine Dilaudid alternating if needed for pain control  · No cholelithiasis  · Triglycerides level  146  VTE Pharmacologic Prophylaxis:   Pharmacologic: Enoxaparin (Lovenox)  Mechanical VTE Prophylaxis in Place: Yes    Patient Centered Rounds: I have performed bedside rounds with nursing staff today  Discussions with Specialists or Other Care Team Provider: GI    Education and Discussions with Family / Patient: patient     Time Spent for Care: 30 minutes  More than 50% of total time spent on counseling and coordination of care as described above  Current Length of Stay: 2 day(s)    Current Patient Status: Inpatient   Certification Statement: The patient will continue to require additional inpatient hospital stay due to acute pancreatitis     Discharge Plan: pending progress     Code Status: Level 1 - Full Code      Subjective:   Patient seen and examined, no n/v  Still significant pain in epigastrium and left sided     Objective:     Vitals:   Temp (24hrs), Av 1 °F (36 7 °C), Min:97 7 °F (36 5 °C), Max:98 4 °F (36 9 °C)    Temp:  [97 7 °F (36 5 °C)-98 4 °F (36 9 °C)] 98 1 °F (36 7 °C)  HR:  [64-66] 66  Resp:  [18-20] 20  BP: ()/(48-88) 170/88  SpO2:  [91 %-94 %] 94 %  Body mass index is 24 53 kg/m²  Input and Output Summary (last 24 hours):        Intake/Output Summary (Last 24 hours) at 2020 0973  Last data filed at 2020 0851  Gross per 24 hour   Intake 1480 ml   Output 2500 ml Net -1020 ml       Physical Exam:     Physical Exam   Constitutional: He is oriented to person, place, and time  He appears well-developed and well-nourished  HENT:   Head: Normocephalic and atraumatic  Eyes: Pupils are equal, round, and reactive to light  EOM are normal    Neck: Normal range of motion  Cardiovascular: Normal rate, regular rhythm and normal heart sounds  Pulmonary/Chest: Effort normal and breath sounds normal    Abdominal: Soft  Bowel sounds are normal  There is tenderness (epigastrium and lekft upper quadrant)  Musculoskeletal: Normal range of motion  Neurological: He is alert and oriented to person, place, and time  He has normal reflexes  cranial nerve 2-12 are normal   Normal neurological exam   Skin: Skin is warm  Psychiatric: He has a normal mood and affect  Additional Data:     Labs:    Results from last 7 days   Lab Units 02/15/20  0503   WBC Thousand/uL 5 20   HEMOGLOBIN g/dL 11 5*   HEMATOCRIT % 33 4*   PLATELETS Thousands/uL 167   NEUTROS PCT % 61   LYMPHS PCT % 23*   MONOS PCT % 10   EOS PCT % 5     Results from last 7 days   Lab Units 02/16/20  0524 02/15/20  0503   SODIUM mmol/L 136* 139   POTASSIUM mmol/L 3 7 3 5*   CHLORIDE mmol/L 105 110*   CO2 mmol/L 23 22   BUN mg/dL 7 9   CREATININE mg/dL 0 59* 0 67*   ANION GAP mmol/L 8 7   CALCIUM mg/dL 8 9 8 5   ALBUMIN g/dL  --  2 9*   TOTAL BILIRUBIN mg/dL  --  0 40   ALK PHOS U/L  --  72   ALT U/L  --  27   AST U/L  --  23   GLUCOSE RANDOM mg/dL 62* 75                           * I Have Reviewed All Lab Data Listed Above  * Additional Pertinent Lab Tests Reviewed:  All Labs Within Last 24 Hours Reviewed    Imaging:    Imaging Reports Reviewed Today Include: none today   Imaging Personally Reviewed by Myself Includes:      Recent Cultures (last 7 days):           Last 24 Hours Medication List:     Current Facility-Administered Medications:  acetaminophen 975 mg Oral Once Coby Esparza MD    albuterol 2 5 mg Nebulization BID Michael Infante MD    aspirin 81 mg Oral Daily Michael Infante MD    dextrose 5 % and sodium chloride 0 9 % 75 mL/hr Intravenous Continuous Michael Infante MD Last Rate: 75 mL/hr (02/16/20 0850)   enoxaparin 40 mg Subcutaneous Q24H [facility] Michael Infante MD    HYDROmorphone 1 mg Intravenous Q4H PRN Michael Infante MD    levothyroxine 12 5 mcg Oral Daily Michael Infante MD    losartan 50 mg Oral Daily Michael Infante MD    magnesium oxide 400 mg Oral BID Michael Infante MD    morphine injection 2 mg Intravenous Q3H PRN Michael Infante MD    ondansetron 4 mg Intravenous Q6H PRN Michael Infante MD    pancrelipase (Lip-Prot-Amyl) 24,000 Units Oral TID With Meals Michael Infante MD    pantoprazole 40 mg Intravenous Q24H [facility] Michael Infante MD    zolpidem 5 mg Oral HS PRN Michael Infante MD         Today, Patient Was Seen By: Michael Infante MD    ** Please Note: Dictation voice to text software may have been used in the creation of this document   **

## 2020-02-16 NOTE — ASSESSMENT & PLAN NOTE
· Did happen to him before  Will place him on some oxygen he is on oxygen at home  He does have extensive smoking history no other signs to suggest a PE  Chest x-ray reviewed suspect more of an atelectasis procalcitonin is pending as he has no signs of of clinical pneumonia  Also provide albuterol t i d  As he is a smoker  Will try to decrease oxygen  Decreased fluids as he previously had effusions

## 2020-02-16 NOTE — CONSULTS
Consultation - [address] [facility]  Juan M Ingram Karmaty 81 y o  male MRN: [id number]  Unit/Bed#: 7T [facility] 710-02 Encounter: [id number]        Fifi GIBBONS    Reason for Consult / Principal Problem:     1  Acute pancreatitis 2nd episode with 1st episode November of 2019  2  Acute diarrhea  3  History of social alcohol use  4  Pseudocyst        ASSESSMENT AND PLAN:      He has had 2 episodes of acute pancreatitis of unclear etiology  He will need endoscopic ultrasound as an outpatient and follow-up for this  I suspect alcohol may be a cause of his acute pancreatitis  He states he has approximately 4-6 drinks per week unclear if he is under reporting  Have strongly advised him to stop all alcohol use  Previous workup was negative for biliary stone disease  He does have evidence of pseudocyst on his imaging study during his acute episode of pancreatitis  This is likely related to his previous episode of acute pancreatitis in October of 2019  This can be further evaluated during endoscopic ultrasound but you pseudocyst do not need to be followed clinically as they are benign  His acute episode of acute diarrhea may be related to viral gastroenteritis  This may have also precipitated this episode of pancreatitis  -I will advance his diet as tolerated  Clear liquid diet for now   -if he tolerates clear liquids tomorrow his diet can be advanced to low-fiber and low fat  -fiber to can be restarted in 2-3 weeks  -we discussed alcohol cessation  -follow-up as an outpatient for endoscopic ultrasound  ______________________________________________________________________    HPI:      He is a 73-year-old male with 2nd episode of acute pancreatitis of unclear etiology  He had a similar presentation in October of 2019 of last year  He was admitted for approximately 1 week at that time  Patient does drink alcohol intermittently 2 times per week    Denies any family history of colon cancer or history of pancreatitis  CT scan on presentation shows acute recurrent pancreatitis in pancreatic head and uncinate process  2 5 cm hypoattenuation lesion in the pancreatic head likely representing pseudocyst without any pancreatic duct dilation  He started feeling unwell with 2-3 weeks ago with symptoms compatible with gastroenteritis  He has decreased p o  Intake over this period  4 days ago he started having acute diarrhea which was nonbloody which has not resolved  Lipase on 02/14/2020 was 1597 and 483 today  LFTs are within normal limits  His episode in 2019 he had triglyceride levels, calcium levels, IgG 4 levels which were all within normal limits  MRI/MRCP in November of 2019 also demonstrated normal biliary tract without evidence stone disease or duct dilation  Colonoscopy from 2 years ago by Dr Claudell Curie was within normal limits with repeat recommended in 10 years  REVIEW OF SYSTEMS:    CONSTITUTIONAL: Denies any fever, chills, rigors, and weight loss  HEENT: No earache or tinnitus  Denies hearing loss or visual disturbances  CARDIOVASCULAR: No chest pain or palpitations  RESPIRATORY: Denies any cough, hemoptysis, shortness of breath or dyspnea on exertion  GASTROINTESTINAL: As noted in the History of Present Illness  GENITOURINARY: No problems with urination  Denies any hematuria or dysuria  NEUROLOGIC: No dizziness or vertigo, denies headaches  MUSCULOSKELETAL: Denies any muscle or joint pain  SKIN: Denies skin rashes or itching  ENDOCRINE: Denies excessive thirst  Denies intolerance to heat or cold  PSYCHOSOCIAL: Denies depression or anxiety  Denies any recent memory loss         Historical Information   Past Medical History:   Diagnosis Date    Avascular necrosis of bone of hip, left (HCC)     CAD (coronary artery disease)     Hiatal hernia     Hyperlipidemia     Hypertension     Pancreatitis     Spinal stenosis      Past Surgical History:   Procedure Laterality Date    BACK SURGERY      CARDIAC CATHETERIZATION      CERVICAL FUSION      JOINT REPLACEMENT      LUMBAR FUSION      NECK SURGERY      ORTHOPEDIC SURGERY      SPINAL FUSION       Social History   Social History     Substance and Sexual Activity   Alcohol Use Yes    Frequency: Monthly or less    Drinks per session: 1 or 2    Binge frequency: Never     Social History     Substance and Sexual Activity   Drug Use Never     Social History     Tobacco Use   Smoking Status Current Every Day Smoker    Packs/day: 0 50    Years: 50 00    Pack years: 25 00    Types: Cigarettes   Smokeless Tobacco Never Used     Family History   Family history unknown: Yes       Meds/Allergies     Medications Prior to Admission   Medication    aspirin 81 MG tablet    diclofenac sodium (VOLTAREN) 1 %    ezetimibe-simvastatin (VYTORIN) 10-40 mg per tablet    gabapentin (NEURONTIN) 300 mg capsule    levothyroxine 25 mcg tablet    losartan (COZAAR) 100 MG tablet    magnesium oxide (MAG-OX) 400 mg    methocarbamol (ROBAXIN) 500 mg tablet    oxyCODONE-acetaminophen (PERCOCET) 5-325 mg per tablet    pancrelipase, Lip-Prot-Amyl, (CREON) 24,000 units    pantoprazole (PROTONIX) 40 mg tablet    ranitidine (ZANTAC) 150 mg tablet    zolpidem (AMBIEN) 10 mg tablet     Current Facility-Administered Medications   Medication Dose Route Frequency    acetaminophen (TYLENOL) tablet 975 mg  975 mg Oral Once    aspirin (ECOTRIN LOW STRENGTH) EC tablet 81 mg  81 mg Oral Daily    enoxaparin (LOVENOX) subcutaneous injection 40 mg  40 mg Subcutaneous Q24H DAMASO    HYDROmorphone (DILAUDID) injection 1 mg  1 mg Intravenous Q4H PRN    levothyroxine tablet 12 5 mcg  12 5 mcg Oral Daily    losartan (COZAAR) tablet 100 mg  100 mg Oral Daily    magnesium oxide (MAG-OX) tablet 400 mg  400 mg Oral BID    morphine injection 2 mg  2 mg Intravenous Q3H PRN    ondansetron (ZOFRAN) injection 4 mg  4 mg Intravenous Q6H PRN    pancrelipase (Lip-Prot-Amyl) (CREON) delayed release capsule 24,000 Units  24,000 Units Oral TID With Meals    pantoprazole (PROTONIX) injection 40 mg  40 mg Intravenous Q24H DAMASO    sodium chloride 0 9 % with KCl 20 mEq/L infusion (premix)  75 mL/hr Intravenous Continuous    zolpidem (AMBIEN) tablet 5 mg  5 mg Oral HS PRN       Allergies   Allergen Reactions    Amlodipine Swelling     Lower extremity swelling  Objective     Blood pressure 110/62, pulse 64, temperature 97 7 °F (36 5 °C), temperature source Temporal, resp  rate 20, height 5' 3" (1 6 m), weight 62 8 kg (138 lb 7 2 oz), SpO2 91 %  Body mass index is 24 53 kg/m²  Intake/Output Summary (Last 24 hours) at 2/15/2020 7098  Last data filed at 2/15/2020 1801  Gross per 24 hour   Intake 1000 ml   Output 550 ml   Net 450 ml         PHYSICAL EXAM:      General Appearance:   Alert, cooperative, no distress   HEENT:   Normocephalic, atraumatic, anicteric      Neck:  Supple, symmetrical, trachea midline   Lungs:   Clear to auscultation bilaterally; no rales, rhonchi or wheezing; respirations unlabored    Heart[de-identified]   Regular rate and rhythm; no murmur, rub, or gallop     Abdomen:   Soft, mild-tenderness in epigastric region, non-distended; normal bowel sounds; no masses, no organomegaly    Genitalia:   Deferred    Rectal:   Deferred    Extremities:  No cyanosis, clubbing or edema    Pulses:  2+ and symmetric all extremities    Skin:  No jaundice, rashes, or lesions    Lymph nodes:  No palpable cervical lymphadenopathy        Lab Results:   Admission on 02/14/2020   Component Date Value    WBC 02/14/2020 6 70     RBC 02/14/2020 4 56     Hemoglobin 02/14/2020 14 6     Hematocrit 02/14/2020 43 5     MCV 02/14/2020 95     MCH 02/14/2020 32 1     MCHC 02/14/2020 33 6     RDW 02/14/2020 14 4     MPV 02/14/2020 9 3     Platelets 82/67/8104 233     Neutrophils Relative 02/14/2020 73*    Lymphocytes Relative 02/14/2020 17*    Monocytes Relative 02/14/2020 7     Eosinophils Relative 02/14/2020 3     Basophils Relative 02/14/2020 0     Neutrophils Absolute 02/14/2020 4 90     Lymphocytes Absolute 02/14/2020 1 20     Monocytes Absolute 02/14/2020 0 50     Eosinophils Absolute 02/14/2020 0 20     Basophils Absolute 02/14/2020 0 00     Sodium 02/14/2020 139     Potassium 02/14/2020 3 6     Chloride 02/14/2020 101     CO2 02/14/2020 26     ANION GAP 02/14/2020 12     BUN 02/14/2020 15     Creatinine 02/14/2020 0 77     Glucose 02/14/2020 105*    Calcium 02/14/2020 10 0     AST 02/14/2020 33     ALT 02/14/2020 25     Alkaline Phosphatase 02/14/2020 104     Total Protein 02/14/2020 8 1     Albumin 02/14/2020 4 5     Total Bilirubin 02/14/2020 0 50     eGFR 02/14/2020 94     Lipase 02/14/2020 1,597*    Troponin I 02/14/2020 <0 01     Triglycerides 02/14/2020 163*    Ventricular Rate 02/14/2020 75     Atrial Rate 02/14/2020 75     SD Interval 02/14/2020 134     QRSD Interval 02/14/2020 80     QT Interval 02/14/2020 392     QTC Interval 02/14/2020 437     QRS Axis 02/14/2020 34     T Wave Axis 02/14/2020 55     Sodium 02/15/2020 139     Potassium 02/15/2020 3 5*    Chloride 02/15/2020 110*    CO2 02/15/2020 22     ANION GAP 02/15/2020 7     BUN 02/15/2020 9     Creatinine 02/15/2020 0 67*    Glucose 02/15/2020 75     Calcium 02/15/2020 8 5     AST 02/15/2020 23     ALT 02/15/2020 27     Alkaline Phosphatase 02/15/2020 72     Total Protein 02/15/2020 5 6*    Albumin 02/15/2020 2 9*    Total Bilirubin 02/15/2020 0 40     eGFR 02/15/2020 99     Lipase 02/15/2020 483*    WBC 02/15/2020 5 20     RBC 02/15/2020 3 46*    Hemoglobin 02/15/2020 11 5*    Hematocrit 02/15/2020 33 4*    MCV 02/15/2020 97     MCH 02/15/2020 33 3     MCHC 02/15/2020 34 5     RDW 02/15/2020 14 5     MPV 02/15/2020 9 2     Platelets 17/40/8835 167     Neutrophils Relative 02/15/2020 61     Lymphocytes Relative 02/15/2020 23*    Monocytes Relative 02/15/2020 10     Eosinophils Relative 02/15/2020 5     Basophils Relative 02/15/2020 0     Neutrophils Absolute 02/15/2020 3 20     Lymphocytes Absolute 02/15/2020 1 20     Monocytes Absolute 02/15/2020 0 50     Eosinophils Absolute 02/15/2020 0 30     Basophils Absolute 02/15/2020 0 00        Imaging Studies: I have personally reviewed pertinent imaging studies

## 2020-02-16 NOTE — PROGRESS NOTES
[facility]  Progress Note - Jorge Luis Brown 81 y o  male MRN: [id number]    Unit/Bed#: 7T [facility] 710-02 Encounter: [id number]    Assessment/Plan:  Jorge Luis Brown is a 81 y o  male w/ recurrent pancreatitis, likely secondary to alcohol use  Prior work-up for biliary etiology negative  + Pseudocyst likely from prior pancreatitis  Available labs and imaging reviewed  -- Advance diet as tolerated  -- IV fluids  -- Pain meds and anti-nausea medications PRN  -- Outpt EUS  -- Alcohol cessation    Subjective:   Patient seen this AM  Still with some abdominal pain, but improved  No nausea, vomiting  He has not yet tried clears  No other complaints at this time  He does endorse alcohol use at home  Objective:     Vitals: Blood pressure 143/84, pulse 62, temperature 98 8 °F (37 1 °C), temperature source Temporal, resp  rate 20, height 5' 3" (1 6 m), weight 62 8 kg (138 lb 7 2 oz), SpO2 95 %  ,Body mass index is 24 53 kg/m²        Intake/Output Summary (Last 24 hours) at 2/16/2020 1600  Last data filed at 2/16/2020 1536  Gross per 24 hour   Intake 1480 ml   Output 2900 ml   Net -1420 ml       Review of Systems: as per HPI  10 point ROS reviewed and negative, except as above    Physical Exam:     General: Well-appearing, NAD  Eyes: EOMI, no conjunctival icterus or pallor  ENT: No oral lesions appreciated on visualization  CV: RRR, no m/r/g appreciated  Resp: CTAB, normal chest rise  Abdominal: Soft, non-tender, non-distended, +bowel sounds, no masses appreciated  Extremities: Warm, no deformities, no edema  Skin: No rashes  Neuro: CN II-XII grossly intact  Psych: Normal affect      Invasive Devices     Peripheral Intravenous Line            Peripheral IV 02/14/20 Right Antecubital 2 days                        CBC: No results found for: WBC, HGB, HCT, MCV, PLT, ADJUSTEDWBC, MCH, MCHC, RDW, MPV, NRBC,   CMP:   Lab Results   Component Value Date    K 3 7 02/16/2020     02/16/2020    CO2 23 02/16/2020    BUN 7 02/16/2020    CREATININE 0 59 (L) 02/16/2020    CALCIUM 8 9 02/16/2020    EGFR 105 02/16/2020   ,   Lipase:   Lab Results   Component Value Date    LIPASE 263 02/16/2020   ,  PT/INR: No results found for: PT, INR,   Troponin: No results found for: TROPONINI,   Magnesium: No components found for: MAG,   Phosphorous: No results found for: PHOS  Imaging Studies: I have personally reviewed pertinent reports

## 2020-02-16 NOTE — ASSESSMENT & PLAN NOTE
· Lipase is normal but he still has significant pain in the epigastric and left side evaluated by GI diet was advanced to clears will see how he does  He will need an EUS outpatient  Advance diet very slowly patient seems under reporting of how much alcohol he drinks and he has a pancreatitis is most likely secondary to alcohol use  Discussed with him the importance of alcohol cessation  · Patient was admitted with pancreatitis back in October was toe secondary to metformin  But willing has since been discontinued patient does drink alcohol but he states he drinks twice week Sunday and Wednesday when he goes out to dinner and the symptoms started Sunday after he drank  No new medications have recently been added  He had associated diarrhea for the 1st few days but that has stopped he had a normal bowel movement  · CT of the abdomen and pelvis resolved-      1  Acute recurrent edematous pancreatitis, now in the region of the pancreatic head and uncinate process      · 2   2 5 cm hypoattenuating lesion in the pancreatic head, increased from the prior exam, likely representing an enlarging pseudocyst or cystic pancreatic neoplasm  No main pancreatic duct dilation  Continued attention on follow-up is recommended  · Morphine Dilaudid alternating if needed for pain control  · No cholelithiasis  · Triglycerides level  146

## 2020-02-17 ENCOUNTER — APPOINTMENT (INPATIENT)
Dept: RADIOLOGY | Facility: HOSPITAL | Age: 70
DRG: 439 | End: 2020-02-17
Attending: FAMILY MEDICINE
Payer: COMMERCIAL

## 2020-02-17 ENCOUNTER — APPOINTMENT (INPATIENT)
Dept: NON INVASIVE DIAGNOSTICS | Facility: HOSPITAL | Age: 70
DRG: 439 | End: 2020-02-17
Attending: FAMILY MEDICINE
Payer: COMMERCIAL

## 2020-02-17 LAB
CHEST PAIN STATEMENT: NORMAL
MAX DIASTOLIC BP: 81 MMHG
MAX HEART RATE: 100 BPM
MAX PREDICTED HEART RATE: 153 BPM
MAX. SYSTOLIC BP: 175 MMHG
PROTOCOL NAME: NORMAL
REASON FOR TERMINATION: NORMAL
TARGET HR FORMULA: NORMAL
TEST INDICATION: NORMAL
TIME IN EXERCISE PHASE: NORMAL

## 2020-02-17 PROCEDURE — 94760 N-INVAS EAR/PLS OXIMETRY 1: CPT

## 2020-02-17 PROCEDURE — A9502 TC99M TETROFOSMIN: HCPCS

## 2020-02-17 PROCEDURE — 93018 CV STRESS TEST I&R ONLY: CPT | Performed by: INTERNAL MEDICINE

## 2020-02-17 PROCEDURE — 93016 CV STRESS TEST SUPVJ ONLY: CPT | Performed by: INTERNAL MEDICINE

## 2020-02-17 PROCEDURE — C9113 INJ PANTOPRAZOLE SODIUM, VIA: HCPCS | Performed by: FAMILY MEDICINE

## 2020-02-17 PROCEDURE — 94640 AIRWAY INHALATION TREATMENT: CPT

## 2020-02-17 PROCEDURE — 93306 TTE W/DOPPLER COMPLETE: CPT

## 2020-02-17 PROCEDURE — 78452 HT MUSCLE IMAGE SPECT MULT: CPT

## 2020-02-17 PROCEDURE — 78452 HT MUSCLE IMAGE SPECT MULT: CPT | Performed by: INTERNAL MEDICINE

## 2020-02-17 PROCEDURE — 93306 TTE W/DOPPLER COMPLETE: CPT | Performed by: INTERNAL MEDICINE

## 2020-02-17 PROCEDURE — 99232 SBSQ HOSP IP/OBS MODERATE 35: CPT | Performed by: FAMILY MEDICINE

## 2020-02-17 PROCEDURE — 87493 C DIFF AMPLIFIED PROBE: CPT | Performed by: FAMILY MEDICINE

## 2020-02-17 PROCEDURE — 93017 CV STRESS TEST TRACING ONLY: CPT

## 2020-02-17 RX ADMIN — ALBUTEROL SULFATE 2.5 MG: 2.5 SOLUTION RESPIRATORY (INHALATION) at 19:20

## 2020-02-17 RX ADMIN — MAGNESIUM OXIDE TAB 400 MG (241.3 MG ELEMENTAL MG) 400 MG: 400 (241.3 MG) TAB at 08:02

## 2020-02-17 RX ADMIN — MAGNESIUM OXIDE TAB 400 MG (241.3 MG ELEMENTAL MG) 400 MG: 400 (241.3 MG) TAB at 17:01

## 2020-02-17 RX ADMIN — LOSARTAN POTASSIUM 50 MG: 50 TABLET, FILM COATED ORAL at 08:01

## 2020-02-17 RX ADMIN — HYDROMORPHONE HYDROCHLORIDE 1 MG: 1 INJECTION, SOLUTION INTRAMUSCULAR; INTRAVENOUS; SUBCUTANEOUS at 17:26

## 2020-02-17 RX ADMIN — REGADENOSON 0.4 MG: 0.08 INJECTION, SOLUTION INTRAVENOUS at 10:39

## 2020-02-17 RX ADMIN — PANTOPRAZOLE SODIUM 40 MG: 40 INJECTION, POWDER, FOR SOLUTION INTRAVENOUS at 08:01

## 2020-02-17 RX ADMIN — HYDROMORPHONE HYDROCHLORIDE 1 MG: 1 INJECTION, SOLUTION INTRAMUSCULAR; INTRAVENOUS; SUBCUTANEOUS at 21:58

## 2020-02-17 RX ADMIN — PANCRELIPASE 24000 UNITS: 24000; 76000; 120000 CAPSULE, DELAYED RELEASE PELLETS ORAL at 17:01

## 2020-02-17 RX ADMIN — ENOXAPARIN SODIUM 40 MG: 40 INJECTION SUBCUTANEOUS at 08:26

## 2020-02-17 RX ADMIN — HYDROMORPHONE HYDROCHLORIDE 1 MG: 1 INJECTION, SOLUTION INTRAMUSCULAR; INTRAVENOUS; SUBCUTANEOUS at 08:01

## 2020-02-17 RX ADMIN — ALBUTEROL SULFATE 2.5 MG: 2.5 SOLUTION RESPIRATORY (INHALATION) at 07:11

## 2020-02-17 RX ADMIN — ASPIRIN 81 MG: 81 TABLET, COATED ORAL at 08:01

## 2020-02-17 RX ADMIN — HYDROMORPHONE HYDROCHLORIDE 1 MG: 1 INJECTION, SOLUTION INTRAMUSCULAR; INTRAVENOUS; SUBCUTANEOUS at 02:15

## 2020-02-17 RX ADMIN — PANCRELIPASE 24000 UNITS: 24000; 76000; 120000 CAPSULE, DELAYED RELEASE PELLETS ORAL at 08:01

## 2020-02-17 RX ADMIN — LEVOTHYROXINE SODIUM 12.5 MCG: 25 TABLET ORAL at 08:02

## 2020-02-17 NOTE — PROGRESS NOTES
Patient Name: Antonia Urias  Patient MRN: [id number]  Date: 02/17/20  Service: [facility]    Subjective   Overall feeling better - epigastric and left-sided abdominal pain has improved to 3/10  Some bloating reported after eating meals  Denies nausea, vomiting, fever or chills  Prior evaluation from [facility] admission includes:  Negative celiac testing, normal subclass immunoglobulins  EGD gastric biopsy normal      Vitals  Blood pressure 143/67, pulse 62, temperature 98 6 °F (37 °C), temperature source Temporal, resp  rate 18, height 5' 3" (1 6 m), weight 62 8 kg (138 lb 7 2 oz), SpO2 98 %  Physical Exam:     General Appearance:    Awake, alert, oriented x3, no distress, well developed, well    nourished   Head:    Normocephalic without obvious abnormality   Eyes:    PERRL, conjunctiva/corneas clear, EOM's intact        Neck:   Supple, no adenopathy   Throat:   Mucous membranes moist   Lungs:     Clear to auscultation bilaterally, no wheezing or rhonchi   Heart:    Regular rate and rhythm, S1 and S2 normal, no murmur   Abdomen:     Soft, +epigastric/LUQ tenderness, non-distended  bowel sounds active  No  masses, rebound  Extremities:   Extremities without edema   Psych  Derm:   Normal affect    No jaundice  Neurologic:   CNII-XII grossly intact   Speech intact         Laboratory Studies  Results from last 7 days   Lab Units 02/15/20  0503 02/14/20  1220   WBC Thousand/uL 5 20 6 70   HEMOGLOBIN g/dL 11 5* 14 6   HEMATOCRIT % 33 4* 43 5   PLATELETS Thousands/uL 167 233     Results from last 7 days   Lab Units 02/16/20  0524 02/15/20  0503 02/14/20  1220   SODIUM mmol/L 136* 139 139   POTASSIUM mmol/L 3 7 3 5* 3 6   CHLORIDE mmol/L 105 110* 101   CO2 mmol/L 23 22 26   BUN mg/dL 7 9 15   CREATININE mg/dL 0 59* 0 67* 0 77   CALCIUM mg/dL 8 9 8 5 10 0   ALBUMIN g/dL  --  2 9* 4 5   TOTAL BILIRUBIN mg/dL  --  0 40 0 50   ALK PHOS U/L  --  72 104   ALT U/L  --  27 25   AST U/L  --  23 33         Imaging and Other Studies      Inhouse Medications     Current Facility-Administered Medications:     acetaminophen (TYLENOL) tablet 975 mg, 975 mg, Oral, Once    albuterol inhalation solution 2 5 mg, 2 5 mg, Nebulization, BID, 2 5 mg at 02/17/20 0711    aspirin (ECOTRIN LOW STRENGTH) EC tablet 81 mg, 81 mg, Oral, Daily, 81 mg at 02/17/20 0801    dextrose 5 % and sodium chloride 0 9 % infusion, 75 mL/hr, Intravenous, Continuous, 75 mL/hr at 02/16/20 0850    enoxaparin (LOVENOX) subcutaneous injection 40 mg, 40 mg, Subcutaneous, Q24H DAMASO, 40 mg at 02/16/20 0848    HYDROmorphone (DILAUDID) injection 1 mg, 1 mg, Intravenous, Q4H PRN, 1 mg at 02/17/20 0801    levothyroxine tablet 12 5 mcg, 12 5 mcg, Oral, Daily, 12 5 mcg at 02/17/20 0802    losartan (COZAAR) tablet 50 mg, 50 mg, Oral, Daily, 50 mg at 02/17/20 0801    magnesium oxide (MAG-OX) tablet 400 mg, 400 mg, Oral, BID, 400 mg at 02/17/20 0802    morphine injection 2 mg, 2 mg, Intravenous, Q3H PRN, 2 mg at 02/15/20 0708    ondansetron (ZOFRAN) injection 4 mg, 4 mg, Intravenous, Q6H PRN    pancrelipase (Lip-Prot-Amyl) (CREON) delayed release capsule 24,000 Units, 24,000 Units, Oral, TID With Meals, 24,000 Units at 02/17/20 0801    pantoprazole (PROTONIX) injection 40 mg, 40 mg, Intravenous, Q24H DAMASO, 40 mg at 02/17/20 0801    zolpidem (AMBIEN) tablet 5 mg, 5 mg, Oral, HS PRN      Assessment/Plan:    1  Recurrent pancreatitis likely secondary from alcohol  Imaging reveals 2 5 cm hypoattenuating pancreatic head lesion increased from prior exam - pseudocyst vs cystic pancreatic neoplasm  MRCP 11/2019 noted  Continue pain control, IVF, and slow diet advancement  Currently on low fiber full liquid diet - will add lo fat modifier  Noted to have been placed on Creon after October admission - no history of chronic pancreatitis; ?discontinue  Recommend complete alcohol abstinence    Recommend outpatient EUS for further evaluation of pancreatic head lesion  2  Acute diarrhea possibly related to viral gastroenteritis  This may have also contributed to pancreatitis  Continue supportive care  3  Suspicion of underlying cirrhosis with mild surface nodularity of liver on CT  LFTs, platelets, albumin normal   Mild coagulopathy noted  HCV AB negative  Avoid hepatic toxic medications, alcohol  Could consider serologic workup            Trish Godwin PA-C

## 2020-02-17 NOTE — PROGRESS NOTES
Progress Note - Arley Jamil 1950, 81 y o  male MRN: [id number]    Unit/Bed#: 7T [facility] 710-02 Encounter: [id number]    Primary Care Provider: Tatiana Fuentes MD   Date and time admitted to hospital: 2/14/2020 11:59 AM        Acute respiratory failure with hypoxia [facility])  Assessment & Plan  · Did happen to him before  Will place him on some oxygen he is on oxygen at home  He does have extensive smoking history no other signs to suggest a PE  Chest x-ray reviewed suspect more of an atelectasis procalcitonin is pending as he has no signs of of clinical pneumonia  Also provide albuterol t i d  As he is a smoker  Will try to decrease oxygen  Decreased fluids as he previously had effusions  Tobacco abuse  Assessment & Plan  · Counseled that his nicotine patch has been provided he refused nicotine patch    Mixed hyperlipidemia  Assessment & Plan  · Secondary to acute recurrent pancreatitis his statin combination will be on hold    Hypothyroidism (acquired)  Assessment & Plan  · Continue Synthroid    Essential hypertension, benign  Assessment & Plan  · Blood pressure stable no longer systolic in the 46W after drop culture to 50 mg p o  Daily continue current dose right now  * Acute pancreatitis  Assessment & Plan  · Slow improvement diet advanced today to full liquids low-fat GI diet  · Lipase is normal but he still has significant pain in the epigastric and left side evaluated by GI diet was advanced to clears will see how he does  He will need an EUS outpatient  Advance diet very slowly patient seems under reporting of how much alcohol he drinks and he has a pancreatitis is most likely secondary to alcohol use  Discussed with him the importance of alcohol cessation  · Patient was admitted with pancreatitis back in October was toe secondary to metformin    But willing has since been discontinued patient does drink alcohol but he states he drinks twice week Sunday and Wednesday when he goes out to dinner and the symptoms started  after he drank  No new medications have recently been added  He had associated diarrhea for the 1st few days but that has stopped he had a normal bowel movement  · CT of the abdomen and pelvis resolved-      1  Acute recurrent edematous pancreatitis, now in the region of the pancreatic head and uncinate process      · 2   2 5 cm hypoattenuating lesion in the pancreatic head, increased from the prior exam, likely representing an enlarging pseudocyst or cystic pancreatic neoplasm  No main pancreatic duct dilation  Continued attention on follow-up is recommended  · Morphine Dilaudid alternating if needed for pain control  · No cholelithiasis  · Triglycerides level  146  VTE Pharmacologic Prophylaxis:   Pharmacologic: Enoxaparin (Lovenox)  Mechanical VTE Prophylaxis in Place: Yes    Patient Centered Rounds: I have performed bedside rounds with nursing staff today  Discussions with Specialists or Other Care Team Provider:  Gastroenterology    Education and Discussions with Family / Patient:  Patient    Time Spent for Care: 30 minutes  More than 50% of total time spent on counseling and coordination of care as described above  Current Length of Stay: 3 day(s)    Current Patient Status: Inpatient   Certification Statement: The patient will continue to require additional inpatient hospital stay due to Acute pancreatitis    Discharge Plan:  Home when medically cleared    Code Status: Level 1 - Full Code      Subjective:   Patient seen and examined tolerating clear liquids now his diet is being advanced his abdominal pain is not worse from the admission is 10-50% better    No chest pain or shortness of breath no diarrhea no nausea    Objective:     Vitals:   Temp (24hrs), Av 7 °F (37 1 °C), Min:98 6 °F (37 °C), Max:98 8 °F (37 1 °C)    Temp:  [98 6 °F (37 °C)-98 8 °F (37 1 °C)] 98 6 °F (37 °C)  HR:  [61-62] 62  Resp:  [18-20] 18  BP: (143-148)/(67-84) 143/67  SpO2:  [92 %-98 %] 98 %  Body mass index is 24 53 kg/m²  Input and Output Summary (last 24 hours): Intake/Output Summary (Last 24 hours) at 2/17/2020 3218  Last data filed at 2/17/2020 0801  Gross per 24 hour   Intake 900 ml   Output 1350 ml   Net -450 ml       Physical Exam:     Physical Exam   Constitutional: He is oriented to person, place, and time  He appears well-developed and well-nourished  HENT:   Head: Normocephalic and atraumatic  Eyes: Pupils are equal, round, and reactive to light  EOM are normal    Neck: Normal range of motion  Cardiovascular: Normal rate, regular rhythm and normal heart sounds  Pulmonary/Chest: Effort normal    Decreased bilaterally   Abdominal: Soft  Bowel sounds are normal  There is tenderness  Musculoskeletal: Normal range of motion  Neurological: He is alert and oriented to person, place, and time  He has normal reflexes  cranial nerve 2-12 are normal   Normal neurological exam   Skin: Skin is warm  Psychiatric: He has a normal mood and affect  Additional Data:     Labs:    Results from last 7 days   Lab Units 02/15/20  0503   WBC Thousand/uL 5 20   HEMOGLOBIN g/dL 11 5*   HEMATOCRIT % 33 4*   PLATELETS Thousands/uL 167   NEUTROS PCT % 61   LYMPHS PCT % 23*   MONOS PCT % 10   EOS PCT % 5     Results from last 7 days   Lab Units 02/16/20  0524 02/15/20  0503   SODIUM mmol/L 136* 139   POTASSIUM mmol/L 3 7 3 5*   CHLORIDE mmol/L 105 110*   CO2 mmol/L 23 22   BUN mg/dL 7 9   CREATININE mg/dL 0 59* 0 67*   ANION GAP mmol/L 8 7   CALCIUM mg/dL 8 9 8 5   ALBUMIN g/dL  --  2 9*   TOTAL BILIRUBIN mg/dL  --  0 40   ALK PHOS U/L  --  72   ALT U/L  --  27   AST U/L  --  23   GLUCOSE RANDOM mg/dL 62* 75                 Results from last 7 days   Lab Units 02/16/20  0524   PROCALCITONIN ng/ml <0 05           * I Have Reviewed All Lab Data Listed Above  * Additional Pertinent Lab Tests Reviewed:  All Labs Within Last 24 Hours Reviewed    Imaging:    Imaging Reports Reviewed Today Include:  None today  Imaging Personally Reviewed by Myself Includes:      Recent Cultures (last 7 days):           Last 24 Hours Medication List:     Current Facility-Administered Medications:  acetaminophen 975 mg Oral Once Chaz Heard MD   albuterol 2 5 mg Nebulization BID Tamy Raygoza MD   aspirin 81 mg Oral Daily Tamy Raygoza MD   enoxaparin 40 mg Subcutaneous Q24H [facility] Tamy Raygoza MD   HYDROmorphone 1 mg Intravenous Q4H PRN Tamy Raygoza MD   levothyroxine 12 5 mcg Oral Daily Tamy Raygoza MD   losartan 50 mg Oral Daily Tamy Raygoza MD   magnesium oxide 400 mg Oral BID Tamy Raygoza MD   morphine injection 2 mg Intravenous Q3H PRN Tamy Raygoza MD   ondansetron 4 mg Intravenous Q6H PRN Tamy Raygoza MD   pancrelipase (Lip-Prot-Amyl) 24,000 Units Oral TID With Meals Tamy Raygoza MD   pantoprazole 40 mg Intravenous Q24H [facility] Tamy Raygoza MD   regadenoson 0 4 mg Intravenous Once Shlomo Frye MD   zolpidem 5 mg Oral HS PRN Tamy Raygoza MD        Today, Patient Was Seen By: Tamy Raygoza MD    ** Please Note: Dictation voice to text software may have been used in the creation of this document   **

## 2020-02-17 NOTE — ASSESSMENT & PLAN NOTE
· Slow improvement diet advanced today to full liquids low-fat GI diet  · Lipase is normal but he still has significant pain in the epigastric and left side evaluated by GI diet was advanced to clears will see how he does  He will need an EUS outpatient  Advance diet very slowly patient seems under reporting of how much alcohol he drinks and he has a pancreatitis is most likely secondary to alcohol use  Discussed with him the importance of alcohol cessation  · Patient was admitted with pancreatitis back in October was toe secondary to metformin  But willing has since been discontinued patient does drink alcohol but he states he drinks twice week Sunday and Wednesday when he goes out to dinner and the symptoms started Sunday after he drank  No new medications have recently been added  He had associated diarrhea for the 1st few days but that has stopped he had a normal bowel movement  · CT of the abdomen and pelvis resolved-      1  Acute recurrent edematous pancreatitis, now in the region of the pancreatic head and uncinate process      · 2   2 5 cm hypoattenuating lesion in the pancreatic head, increased from the prior exam, likely representing an enlarging pseudocyst or cystic pancreatic neoplasm  No main pancreatic duct dilation  Continued attention on follow-up is recommended  · Morphine Dilaudid alternating if needed for pain control  · No cholelithiasis  · Triglycerides level  146

## 2020-02-17 NOTE — ASSESSMENT & PLAN NOTE
· Blood pressure stable no longer systolic in the 85W after drop culture to 50 mg p o  Daily continue current dose right now

## 2020-02-17 NOTE — UTILIZATION REVIEW
Notification of Inpatient Admission/Inpatient Authorization Request   This is a Notification of Inpatient Admission for [address]  Be advised that this patient was admitted to our facility under Inpatient Status  Contact Lorna Ordonez at [phone] for additional admission information  [address] [facility]   DEPT DEDICATED [facility] [phone]  Patient Name:   Christine Fenton   YOB: 1950       [address]   [address]:   [place] [address]  Tax ID: [id number]  NPI: [id number] Attending Provider/NPI: Trang Briones Md [[id number]]   Place of Service Code: 24     [facility]   Start Date: 2/14/20 1454     Discharge Date & Time: No discharge date for patient encounter  Type of Admission: Inpatient Status Discharge Disposition (if discharged): Home/Self Care   Patient Diagnoses: Pancreatitis [K85 90]  Abdominal pain [R10 9]     Orders: Admission Orders (From admission, onward)     Ordered        02/14/20 1454  Inpatient Admission (expected length of stay for this patient Order details is greater than two midnights)  Once                    Assigned Utilization Review Contact: Lorna Ordonez  Utilization   Network Utilization Review Department  Phone: [phone]; Fax [phone]  Email: [email or web address]   ATTENTION PAYERS: Please call the assigned Utilization  directly with any questions or concerns ALL voicemails in the department are confidential  Send all requests for admission clinical reviews, approved or denied determinations and any other requests to dedicated fax number belonging to the campus where the patient is receiving treatment

## 2020-02-18 LAB — C DIFF TOX GENS STL QL NAA+PROBE: NEGATIVE

## 2020-02-18 PROCEDURE — 99232 SBSQ HOSP IP/OBS MODERATE 35: CPT | Performed by: FAMILY MEDICINE

## 2020-02-18 PROCEDURE — C9113 INJ PANTOPRAZOLE SODIUM, VIA: HCPCS | Performed by: FAMILY MEDICINE

## 2020-02-18 RX ORDER — ALBUTEROL SULFATE 2.5 MG/3ML
2.5 SOLUTION RESPIRATORY (INHALATION) EVERY 4 HOURS PRN
Status: DISCONTINUED | OUTPATIENT
Start: 2020-02-18 | End: 2020-02-21 | Stop reason: HOSPADM

## 2020-02-18 RX ADMIN — MAGNESIUM OXIDE TAB 400 MG (241.3 MG ELEMENTAL MG) 400 MG: 400 (241.3 MG) TAB at 08:15

## 2020-02-18 RX ADMIN — HYDROMORPHONE HYDROCHLORIDE 1 MG: 1 INJECTION, SOLUTION INTRAMUSCULAR; INTRAVENOUS; SUBCUTANEOUS at 08:19

## 2020-02-18 RX ADMIN — LOSARTAN POTASSIUM 50 MG: 50 TABLET, FILM COATED ORAL at 08:15

## 2020-02-18 RX ADMIN — ENOXAPARIN SODIUM 40 MG: 40 INJECTION SUBCUTANEOUS at 08:14

## 2020-02-18 RX ADMIN — PANTOPRAZOLE SODIUM 40 MG: 40 INJECTION, POWDER, FOR SOLUTION INTRAVENOUS at 08:14

## 2020-02-18 RX ADMIN — PANCRELIPASE 24000 UNITS: 24000; 76000; 120000 CAPSULE, DELAYED RELEASE PELLETS ORAL at 15:56

## 2020-02-18 RX ADMIN — ASPIRIN 81 MG: 81 TABLET, COATED ORAL at 08:15

## 2020-02-18 RX ADMIN — MAGNESIUM OXIDE TAB 400 MG (241.3 MG ELEMENTAL MG) 400 MG: 400 (241.3 MG) TAB at 17:20

## 2020-02-18 RX ADMIN — HYDROMORPHONE HYDROCHLORIDE 1 MG: 1 INJECTION, SOLUTION INTRAMUSCULAR; INTRAVENOUS; SUBCUTANEOUS at 14:25

## 2020-02-18 RX ADMIN — HYDROMORPHONE HYDROCHLORIDE 1 MG: 1 INJECTION, SOLUTION INTRAMUSCULAR; INTRAVENOUS; SUBCUTANEOUS at 19:19

## 2020-02-18 RX ADMIN — PANCRELIPASE 24000 UNITS: 24000; 76000; 120000 CAPSULE, DELAYED RELEASE PELLETS ORAL at 11:01

## 2020-02-18 RX ADMIN — PANCRELIPASE 24000 UNITS: 24000; 76000; 120000 CAPSULE, DELAYED RELEASE PELLETS ORAL at 08:18

## 2020-02-18 RX ADMIN — LEVOTHYROXINE SODIUM 12.5 MCG: 25 TABLET ORAL at 08:15

## 2020-02-18 NOTE — PROGRESS NOTES
Pastoral Care Progress Note    2020  Patient: Holley Najjar : 1950  Admission Date & Time: 2020 1159  MRN: [id number] [facility]: [id number]                     Chaplaincy Interventions Utilized:   Empowerment: Encouraged focus on present and Encouraged self-care    Exploration: Explored emotional needs & resources and Explored spiritual needs & resources    Collaboration: Encouraged adherence to treatment plan    Relationship Building: Cultivated a relationship of care and support and Listened empathically    Ritual: Provided Ca[employer]ism resources      Chaplaincy Outcomes Achieved:  Expressed gratitude and Progressed toward focus on present      Spiritual Coping Strategies Utilized:   Spiritual gratitude

## 2020-02-18 NOTE — ASSESSMENT & PLAN NOTE
· currently patient is on full liquid diet, continues to pain for 20 minutes after meal  · Will slowly advance diet, for now continue only full liquid diet  · Lipase is normal but he still has significant pain in the epigastric and left side   · evaluated by GI diet was advanced to clears will see how he does  · Pancreatitis most likely 2/2 to alcohol use  · Patient was admitted with pancreatitis back in October was toe secondary to metformin  · CT of the abdomen and pelvis resolved-    1  Acute recurrent edematous pancreatitis, now in the region of the pancreatic head and uncinate process  · 2   2 5 cm hypoattenuating lesion in the pancreatic head, increased from the prior exam, likely representing an enlarging pseudocyst or cystic pancreatic neoplasm  No main pancreatic duct dilation  Continued attention on follow-up is recommended  · He will need an EUS outpatient  · Morphine Dilaudid alternating if needed for pain control  · No cholelithiasis  · Triglycerides level  146

## 2020-02-18 NOTE — PLAN OF CARE
Problem: Potential for Falls  Goal: Patient will remain free of falls  Description  INTERVENTIONS:  - Assess patient frequently for physical needs  -  Identify cognitive and physical deficits and behaviors that affect risk of falls    -  [place] fall precautions as indicated by assessment   - Educate patient/family on patient safety including physical limitations  - Instruct patient to call for assistance with activity based on assessment  - Modify environment to reduce risk of injury  - Consider OT/PT consult to assist with strengthening/mobility  Outcome: Progressing     Problem: PAIN - ADULT  Goal: Verbalizes/displays adequate comfort level or baseline comfort level  Description  Interventions:  - Encourage patient to monitor pain and request assistance  - Assess pain using appropriate pain scale  - Administer analgesics based on type and severity of pain and evaluate response  - Implement non-pharmacological measures as appropriate and evaluate response  - Consider cultural and social influences on pain and pain management  - Notify physician/advanced practitioner if interventions unsuccessful or patient reports new pain  Outcome: Progressing     Problem: INFECTION - ADULT  Goal: Absence or prevention of progression during hospitalization  Description  INTERVENTIONS:  - Assess and monitor for signs and symptoms of infection  - Monitor lab/diagnostic results  - Monitor all insertion sites, i e  indwelling lines, tubes, and drains  - Monitor endotracheal if appropriate and nasal secretions for changes in amount and color  - [place] appropriate cooling/warming therapies per order  - Administer medications as ordered  - Instruct and encourage patient and family to use good hand hygiene technique  - Identify and instruct in appropriate isolation precautions for identified infection/condition  Outcome: Progressing  Goal: Absence of fever/infection during neutropenic period  Description  INTERVENTIONS:  - Monitor WBC    Outcome: Progressing     Problem: SAFETY ADULT  Goal: Patient will remain free of falls  Description  INTERVENTIONS:  - Assess patient frequently for physical needs  -  Identify cognitive and physical deficits and behaviors that affect risk of falls    -  [place] fall precautions as indicated by assessment   - Educate patient/family on patient safety including physical limitations  - Instruct patient to call for assistance with activity based on assessment  - Modify environment to reduce risk of injury  - Consider OT/PT consult to assist with strengthening/mobility  Outcome: Progressing  Goal: Maintain or return to baseline ADL function  Description  INTERVENTIONS:  -  Assess patient's ability to carry out ADLs; assess patient's baseline for ADL function and identify physical deficits which impact ability to perform ADLs (bathing, care of mouth/teeth, toileting, grooming, dressing, etc )  - Assess/evaluate cause of self-care deficits   - Assess range of motion  - Assess patient's mobility; develop plan if impaired  - Assess patient's need for assistive devices and provide as appropriate  - Encourage maximum independence but intervene and supervise when necessary  - Involve family in performance of ADLs  - Assess for home care needs following discharge   - Consider OT consult to assist with ADL evaluation and planning for discharge  - Provide patient education as appropriate  Outcome: Progressing  Goal: Maintain or return mobility status to optimal level  Description  INTERVENTIONS:  - Assess patient's baseline mobility status (ambulation, transfers, stairs, etc )    - Identify cognitive and physical deficits and behaviors that affect mobility  - Identify mobility aids required to assist with transfers and/or ambulation (gait belt, sit-to-stand, lift, walker, cane, etc )  - [place] fall precautions as indicated by assessment  - Record patient progress and toleration of activity level on Mobility SBAR; progress patient to next Phase/Stage  - Instruct patient to call for assistance with activity based on assessment  - Consider rehabilitation consult to assist with strengthening/weightbearing, etc   Outcome: Progressing     Problem: DISCHARGE PLANNING  Goal: Discharge to home or other facility with appropriate resources  Description  INTERVENTIONS:  - Identify barriers to discharge w/patient and caregiver  - Arrange for needed discharge resources and transportation as appropriate  - Identify discharge learning needs (meds, wound care, etc )  - Arrange for interpretive services to assist at discharge as needed  - Refer to Case Management Department for coordinating discharge planning if the patient needs post-hospital services based on physician/advanced practitioner order or complex needs related to functional status, cognitive ability, or social support system  Outcome: Progressing     Problem: Potential for Falls  Goal: Patient will remain free of falls  Description  INTERVENTIONS:  - Assess patient frequently for physical needs  -  Identify cognitive and physical deficits and behaviors that affect risk of falls    -  [place] fall precautions as indicated by assessment   - Educate patient/family on patient safety including physical limitations  - Instruct patient to call for assistance with activity based on assessment  - Modify environment to reduce risk of injury  - Consider OT/PT consult to assist with strengthening/mobility  Outcome: Progressing     Problem: PAIN - ADULT  Goal: Verbalizes/displays adequate comfort level or baseline comfort level  Description  Interventions:  - Encourage patient to monitor pain and request assistance  - Assess pain using appropriate pain scale  - Administer analgesics based on type and severity of pain and evaluate response  - Implement non-pharmacological measures as appropriate and evaluate response  - Consider cultural and social influences on pain and pain management  - Notify physician/advanced practitioner if interventions unsuccessful or patient reports new pain  Outcome: Progressing     Problem: INFECTION - ADULT  Goal: Absence or prevention of progression during hospitalization  Description  INTERVENTIONS:  - Assess and monitor for signs and symptoms of infection  - Monitor lab/diagnostic results  - Monitor all insertion sites, i e  indwelling lines, tubes, and drains  - Monitor endotracheal if appropriate and nasal secretions for changes in amount and color  - [place] appropriate cooling/warming therapies per order  - Administer medications as ordered  - Instruct and encourage patient and family to use good hand hygiene technique  - Identify and instruct in appropriate isolation precautions for identified infection/condition  Outcome: Progressing  Goal: Absence of fever/infection during neutropenic period  Description  INTERVENTIONS:  - Monitor WBC    Outcome: Progressing     Problem: SAFETY ADULT  Goal: Patient will remain free of falls  Description  INTERVENTIONS:  - Assess patient frequently for physical needs  -  Identify cognitive and physical deficits and behaviors that affect risk of falls    -  [place] fall precautions as indicated by assessment   - Educate patient/family on patient safety including physical limitations  - Instruct patient to call for assistance with activity based on assessment  - Modify environment to reduce risk of injury  - Consider OT/PT consult to assist with strengthening/mobility  Outcome: Progressing  Goal: Maintain or return to baseline ADL function  Description  INTERVENTIONS:  -  Assess patient's ability to carry out ADLs; assess patient's baseline for ADL function and identify physical deficits which impact ability to perform ADLs (bathing, care of mouth/teeth, toileting, grooming, dressing, etc )  - Assess/evaluate cause of self-care deficits   - Assess range of motion  - Assess patient's mobility; develop plan if impaired  - Assess patient's need for assistive devices and provide as appropriate  - Encourage maximum independence but intervene and supervise when necessary  - Involve family in performance of ADLs  - Assess for home care needs following discharge   - Consider OT consult to assist with ADL evaluation and planning for discharge  - Provide patient education as appropriate  Outcome: Progressing  Goal: Maintain or return mobility status to optimal level  Description  INTERVENTIONS:  - Assess patient's baseline mobility status (ambulation, transfers, stairs, etc )    - Identify cognitive and physical deficits and behaviors that affect mobility  - Identify mobility aids required to assist with transfers and/or ambulation (gait belt, sit-to-stand, lift, walker, cane, etc )  - [place] fall precautions as indicated by assessment  - Record patient progress and toleration of activity level on Mobility SBAR; progress patient to next Phase/Stage  - Instruct patient to call for assistance with activity based on assessment  - Consider rehabilitation consult to assist with strengthening/weightbearing, etc   Outcome: Progressing     Problem: DISCHARGE PLANNING  Goal: Discharge to home or other facility with appropriate resources  Description  INTERVENTIONS:  - Identify barriers to discharge w/patient and caregiver  - Arrange for needed discharge resources and transportation as appropriate  - Identify discharge learning needs (meds, wound care, etc )  - Arrange for interpretive services to assist at discharge as needed  - Refer to Case Management Department for coordinating discharge planning if the patient needs post-hospital services based on physician/advanced practitioner order or complex needs related to functional status, cognitive ability, or social support system  Outcome: Progressing

## 2020-02-18 NOTE — PROGRESS NOTES
Patient Name: Junior Keith  Patient MRN: [id number]  Date: 02/18/20  Service: [facility]    Subjective   Patient reports abdominal pain currently 8/10, but is due for pain meds  Narcotics improved pain to 3-4/10  Tolerating diet with some bloating but no nausea/vomiting or exacerbation of pain  Patient remains afebrile  Denies CP, SOB  No diarrhea in the past 24 hours reported per patient  Brown watery smear noted yesterday at 1:42 p m  - negative for C difficile  Vitals  Blood pressure 147/98, pulse 72, temperature 98 °F (36 7 °C), temperature source Temporal, resp  rate 20, height 5' 3" (1 6 m), weight 62 8 kg (138 lb 7 2 oz), SpO2 91 %  Physical Exam:     General Appearance:    Awake, alert, oriented x3, no distress, well developed, well    Nourished  Patient is sitting at bedside, nontoxic-appearing  Head:    Normocephalic without obvious abnormality   Eyes:    PERRL, conjunctiva/corneas clear, EOM's intact        Neck:   Supple, no adenopathy   Throat:   Mucous membranes moist   Lungs:     Clear to auscultation bilaterally, no wheezing or rhonchi   Heart:    Regular rate and rhythm, S1 and S2 normal, no murmur   Abdomen:     Soft, L-sided tenderness, non-distended  bowel sounds active  No masses, rebound  +guarding  Extremities:   Extremities without edema   Psych  Derm:   Normal affect    No jaundice   Neurologic:   CNII-XII grossly intact   Speech intact         Laboratory Studies  Results from last 7 days   Lab Units 02/15/20  0503 02/14/20  1220   WBC Thousand/uL 5 20 6 70   HEMOGLOBIN g/dL 11 5* 14 6   HEMATOCRIT % 33 4* 43 5   PLATELETS Thousands/uL 167 233     Results from last 7 days   Lab Units 02/16/20  0524 02/15/20  0503 02/14/20  1220   SODIUM mmol/L 136* 139 139   POTASSIUM mmol/L 3 7 3 5* 3 6   CHLORIDE mmol/L 105 110* 101   CO2 mmol/L 23 22 26   BUN mg/dL 7 9 15   CREATININE mg/dL 0 59* 0 67* 0 77   CALCIUM mg/dL 8 9 8 5 10 0   ALBUMIN g/dL  --  2 9* 4 5 TOTAL BILIRUBIN mg/dL  --  0 40 0 50   ALK PHOS U/L  --  72 104   ALT U/L  --  27 25   AST U/L  --  23 33         Imaging and Other Studies      Inhouse Medications     Current Facility-Administered Medications:     acetaminophen (TYLENOL) tablet 975 mg, 975 mg, Oral, Once    albuterol inhalation solution 2 5 mg, 2 5 mg, Nebulization, Q4H PRN    aspirin (ECOTRIN LOW STRENGTH) EC tablet 81 mg, 81 mg, Oral, Daily, 81 mg at 02/18/20 0815    enoxaparin (LOVENOX) subcutaneous injection 40 mg, 40 mg, Subcutaneous, Q24H DAMASO, 40 mg at 02/18/20 0814    HYDROmorphone (DILAUDID) injection 1 mg, 1 mg, Intravenous, Q4H PRN, 1 mg at 02/18/20 0819    levothyroxine tablet 12 5 mcg, 12 5 mcg, Oral, Daily, 12 5 mcg at 02/18/20 0815    losartan (COZAAR) tablet 50 mg, 50 mg, Oral, Daily, 50 mg at 02/18/20 0815    magnesium oxide (MAG-OX) tablet 400 mg, 400 mg, Oral, BID, 400 mg at 02/18/20 0815    morphine injection 2 mg, 2 mg, Intravenous, Q3H PRN, 2 mg at 02/15/20 0708    ondansetron (ZOFRAN) injection 4 mg, 4 mg, Intravenous, Q6H PRN    pancrelipase (Lip-Prot-Amyl) (CREON) delayed release capsule 24,000 Units, 24,000 Units, Oral, TID With Meals, 24,000 Units at 02/18/20 0818    pantoprazole (PROTONIX) injection 40 mg, 40 mg, Intravenous, Q24H DAMASO, 40 mg at 02/18/20 0814    zolpidem (AMBIEN) tablet 5 mg, 5 mg, Oral, HS PRN      Assessment/Plan:    1  Recurrent pancreatitis likely secondary from alcohol  Imaging reveals 2 5 cm hypoattenuating pancreatic head lesion increased from prior exam - pseudocyst vs cystic pancreatic neoplasm  MRCP 11/2019 noted  Continue pain control, IVF, and slow diet advancement  Currently on lo fiber/lo fat full liquid diet -  advance once pain improves  Noted to have been placed on Creon after October admission - no history of chronic pancreatitis  Recommend complete alcohol abstinence  Recommend outpatient EUS for further evaluation of pancreatic head lesion    2  Acute diarrhea possibly related to viral gastroenteritis  C difficile PCR negative  Procalcitonin normal   This may have also contributed to pancreatitis  Continue supportive care  3  Suspicion of underlying cirrhosis with mild surface nodularity of liver on CT  LFTs, platelets, albumin normal   Mild coagulopathy noted  HCV AB negative  Avoid hepatic toxic medications, alcohol    Could consider serologic workup          Shannon Artis PA-C

## 2020-02-18 NOTE — PROGRESS NOTES
02/18/20 1100   Clinical Encounter Type   Visited With Patient   Routine Visit Follow-up   Sacramental Encounters   Communion Given Indicator Yes

## 2020-02-18 NOTE — PROGRESS NOTES
Progress Note - Yissel Tinajero 1950, 81 y o  male MRN: [id number]    Unit/Bed#: 7T [facility] 710-02 Encounter: [id number]    Primary Care Provider: Priyank Prater MD   Date and time admitted to hospital: 2/14/2020 11:59 AM        * Acute pancreatitis  Assessment & Plan  · currently patient is on full liquid diet, continues to pain for 20 minutes after meal  · Will slowly advance diet, for now continue only full liquid diet  · Lipase is normal but he still has significant pain in the epigastric and left side   · evaluated by GI diet was advanced to clears will see how he does  · Pancreatitis most likely 2/2 to alcohol use  · Patient was admitted with pancreatitis back in October was toe secondary to metformin  · CT of the abdomen and pelvis resolved-    1  Acute recurrent edematous pancreatitis, now in the region of the pancreatic head and uncinate process  · 2   2 5 cm hypoattenuating lesion in the pancreatic head, increased from the prior exam, likely representing an enlarging pseudocyst or cystic pancreatic neoplasm  No main pancreatic duct dilation  Continued attention on follow-up is recommended  · He will need an EUS outpatient  · Morphine Dilaudid alternating if needed for pain control  · No cholelithiasis  · Triglycerides level  146  Acute respiratory failure with hypoxia (HCC)  Assessment & Plan  · Did happen to him before  Will place him on some oxygen he is on oxygen at home  He does have extensive smoking history no other signs to suggest a PE  Chest x-ray reviewed suspect more of an atelectasis procalcitonin is pending as he has no signs of of clinical pneumonia  Also provide albuterol t i d  As he is a smoker  Will try to decrease oxygen  Decreased fluids as he previously had effusions      Tobacco abuse  Assessment & Plan  · Counseled that his nicotine patch has been provided he refused nicotine patch    Mixed hyperlipidemia  Assessment & Plan  · Secondary to acute recurrent pancreatitis his statin combination will be on hold    Hypothyroidism (acquired)  Assessment & Plan  · Continue Synthroid    Essential hypertension, benign  Assessment & Plan  · Blood pressure stable no longer systolic in the 69A after drop culture to 50 mg p o  Daily continue current dose right now  VTE Pharmacologic Prophylaxis:   Pharmacologic: Enoxaparin (Lovenox)  Mechanical VTE Prophylaxis in Place: Yes    Patient Centered Rounds: I have performed bedside rounds with nursing staff today  Discussions with Specialists or Other Care Team Provider: none    Education and Discussions with Family / Patient: patient    Time Spent for Care: 30 minutes  More than 50% of total time spent on counseling and coordination of care as described above  Current Length of Stay: 4 day(s)    Current Patient Status: Inpatient   Certification Statement: The patient will continue to require additional inpatient hospital stay due to acute pancreatitis    Discharge Plan: home when stable    Code Status: Level 1 - Full Code      Subjective:   Patient this morning continues to have epigastric at liver upper quadrant pain  Patient states pain is more so after having food  Patient is currently on full liquid diet  Denies any nausea or vomiting associated with it  Last bowel movement was yesterday  Objective:     Vitals:   Temp (24hrs), Av 6 °F (36 4 °C), Min:96 °F (35 6 °C), Max:98 8 °F (37 1 °C)    Temp:  [96 °F (35 6 °C)-98 8 °F (37 1 °C)] 98 °F (36 7 °C)  HR:  [72-80] 72  Resp:  [18-20] 20  BP: (140-160)/(71-98) 147/98  SpO2:  [91 %-96 %] 91 %  Body mass index is 24 53 kg/m²  Input and Output Summary (last 24 hours): Intake/Output Summary (Last 24 hours) at 2020 1025  Last data filed at 2020 0544  Gross per 24 hour   Intake 660 ml   Output 700 ml   Net -40 ml       Physical Exam:     Physical Exam   Constitutional: He appears well-developed and well-nourished     HENT:   Head: Normocephalic and atraumatic  Right Ear: External ear normal    Left Ear: External ear normal    Nose: Nose normal    Mouth/Throat: Oropharynx is clear and moist    Eyes: Conjunctivae and EOM are normal    Neck: Normal range of motion  Neck supple  Cardiovascular: Normal rate, regular rhythm and normal heart sounds  Pulmonary/Chest: Effort normal and breath sounds normal    Abdominal: Soft  Bowel sounds are normal  There is tenderness  Patient has significant tenderness of epigastrium and left upper quadrant  Genitourinary:   Genitourinary Comments: deferred   Neurological: He is alert  Cranial nerve 2 -12 are normal    Skin: Skin is warm and dry  Psychiatric: He has a normal mood and affect  Additional Data:     Labs:    Results from last 7 days   Lab Units 02/15/20  0503   WBC Thousand/uL 5 20   HEMOGLOBIN g/dL 11 5*   HEMATOCRIT % 33 4*   PLATELETS Thousands/uL 167   NEUTROS PCT % 61   LYMPHS PCT % 23*   MONOS PCT % 10   EOS PCT % 5     Results from last 7 days   Lab Units 02/16/20  0524 02/15/20  0503   SODIUM mmol/L 136* 139   POTASSIUM mmol/L 3 7 3 5*   CHLORIDE mmol/L 105 110*   CO2 mmol/L 23 22   BUN mg/dL 7 9   CREATININE mg/dL 0 59* 0 67*   ANION GAP mmol/L 8 7   CALCIUM mg/dL 8 9 8 5   ALBUMIN g/dL  --  2 9*   TOTAL BILIRUBIN mg/dL  --  0 40   ALK PHOS U/L  --  72   ALT U/L  --  27   AST U/L  --  23   GLUCOSE RANDOM mg/dL 62* 75                 Results from last 7 days   Lab Units 02/16/20  0524   PROCALCITONIN ng/ml <0 05           * I Have Reviewed All Lab Data Listed Above  * Additional Pertinent Lab Tests Reviewed:  [address] Admission Reviewed         Recent Cultures (last 7 days):     Results from last 7 days   Lab Units 02/17/20  1339   C DIFF TOXIN B  Negative       Last 24 Hours Medication List:     Current Facility-Administered Medications:  acetaminophen 975 mg Oral Once Dennie Gunner, MD   albuterol 2 5 mg Nebulization Q4H PRN Elizabeth White MD aspirin 81 mg Oral Daily Jonathan Silveira MD   enoxaparin 40 mg Subcutaneous Q24H [facility] Jonathan Silveira MD   HYDROmorphone 1 mg Intravenous Q4H PRN Jonathan Silveira MD   levothyroxine 12 5 mcg Oral Daily Jonathan Silveira MD   losartan 50 mg Oral Daily Jonathan Silveira MD   magnesium oxide 400 mg Oral BID Jonathan Silveira MD   morphine injection 2 mg Intravenous Q3H PRN Jonathan Silveira MD   ondansetron 4 mg Intravenous Q6H PRN Jonathan Silveira MD   pancrelipase (Lip-Prot-Amyl) 24,000 Units Oral TID With Meals Jonathan Silveira MD   pantoprazole 40 mg Intravenous Q24H [facility] Jonathan Silveira MD   zolpidem 5 mg Oral HS PRN Jonathan Silveira MD        Today, Patient Was Seen By: Noemí Albarado MD    ** Please Note: Dictation voice to text software may have been used in the creation of this document   **

## 2020-02-18 NOTE — RESPIRATORY THERAPY NOTE
RT Protocol Note  Christine Fenton 81 y o  male MRN: [id number]  Unit/Bed#: 7T [facility] 710-02 Encounter: [id number]    Assessment    Principal Problem:    Acute pancreatitis  Active Problems:    Essential hypertension, benign    Hypothyroidism (acquired)    Mixed hyperlipidemia    Tobacco abuse    Acute respiratory failure with hypoxia ([address] )      Home Pulmonary Medications:  None       Past Medical History:   Diagnosis Date    Avascular necrosis of bone of hip, left (HCC)     CAD (coronary artery disease)     Hiatal hernia     Hyperlipidemia     Hypertension     Pancreatitis     Spinal stenosis      Social History     Socioeconomic History    Marital status: Single     Spouse name: None    Number of children: None    Years of education: None    Highest education level: None   Occupational History    Occupation:     Social Needs    Financial resource strain: None    Food insecurity:     Worry: None     Inability: None    Transportation needs:     Medical: None     Non-medical: None   Tobacco Use    Smoking status: Current Every Day Smoker     Packs/day: 0 50     Years: 50 00     Pack years: 25 00     Types: Cigarettes    Smokeless tobacco: Never Used   Substance and Sexual Activity    Alcohol use: Yes     Frequency: Monthly or less     Drinks per session: 1 or 2     Binge frequency: Never    Drug use: Never    Sexual activity: Never     Comment: DAVIDSON   Lifestyle    Physical activity:     Days per week: None     Minutes per session: None    Stress: None   Relationships    Social connections:     Talks on phone: None     Gets together: None     Attends [employer] service: None     Active member of club or organization: None     Attends meetings of clubs or organizations: None     Relationship status: None    Intimate partner violence:     Fear of current or ex partner: None     Emotionally abused: None     Physically abused: None     Forced sexual activity: None   Other Topics Concern    None   Social History Narrative    Lives independently       Subjective    Pt not sure why he needs these  Objective  No respiratory distress noted  Physical Exam:   Assessment Type: Assess only  General Appearance: Alert, Awake  Respiratory Pattern: Normal  Chest Assessment: Chest expansion symmetrical  Bilateral Breath Sounds: Clear, Diminished    Vitals:  Blood pressure 140/84, pulse 80, temperature (!) 96 °F (35 6 °C), temperature source Temporal, resp  rate 18, height 5' 3" (1 6 m), weight 62 8 kg (138 lb 7 2 oz), SpO2 (P) 92 %  Imaging and other studies: I have personally reviewed pertinent reports  O2 Device: 3L/NC     Plan    Respiratory Plan: No distress/Pulmonary history     Pt with no history pulmonary diease  Pt does have extensive smoking history   Will change to prn pt request

## 2020-02-18 NOTE — NURSING NOTE
AOX3 HR regular Lungs decreased clear + bowel Sounds x4 + PP ABD soft  C/O pain Dilaudid was given and was effective  Will continue to monitor call bell within reach

## 2020-02-18 NOTE — ASSESSMENT & PLAN NOTE
· Blood pressure stable no longer systolic in the 77G after drop culture to 50 mg p o  Daily continue current dose right now

## 2020-02-18 NOTE — NURSING NOTE
Assessment unchanged  C/O pain Dilaudid was given and was effective  Will continue to monitor call bell within reach

## 2020-02-19 ENCOUNTER — TELEPHONE (OUTPATIENT)
Dept: VASCULAR SURGERY | Facility: CLINIC | Age: 70
End: 2020-02-19

## 2020-02-19 ENCOUNTER — TELEPHONE (OUTPATIENT)
Dept: CARDIOLOGY CLINIC | Facility: CLINIC | Age: 70
End: 2020-02-19

## 2020-02-19 LAB
ANION GAP SERPL CALCULATED.3IONS-SCNC: 7 MMOL/L (ref 5–14)
BUN SERPL-MCNC: 5 MG/DL (ref 5–25)
CALCIUM SERPL-MCNC: 9.4 MG/DL (ref 8.4–10.2)
CHLORIDE SERPL-SCNC: 99 MMOL/L (ref 97–108)
CO2 SERPL-SCNC: 29 MMOL/L (ref 22–30)
CREAT SERPL-MCNC: 0.76 MG/DL (ref 0.7–1.5)
ERYTHROCYTE [DISTWIDTH] IN BLOOD BY AUTOMATED COUNT: 14.5 %
GFR SERPL CREATININE-BSD FRML MDRD: 94 ML/MIN/1.73SQ M
GLUCOSE SERPL-MCNC: 89 MG/DL (ref 70–99)
HCT VFR BLD AUTO: 36.8 % (ref 41–53)
HGB BLD-MCNC: 12.4 G/DL (ref 13.5–17.5)
LIPASE SERPL-CCNC: 320 U/L (ref 23–300)
MCH RBC QN AUTO: 32.2 PG (ref 26–34)
MCHC RBC AUTO-ENTMCNC: 33.7 G/DL (ref 31–36)
MCV RBC AUTO: 96 FL (ref 80–100)
PLATELET # BLD AUTO: 212 THOUSANDS/UL (ref 150–450)
PMV BLD AUTO: 9.4 FL (ref 8.9–12.7)
POTASSIUM SERPL-SCNC: 3.7 MMOL/L (ref 3.6–5)
RBC # BLD AUTO: 3.85 MILLION/UL (ref 4.5–5.9)
SODIUM SERPL-SCNC: 135 MMOL/L (ref 137–147)
WBC # BLD AUTO: 5.2 THOUSAND/UL (ref 4.5–11)

## 2020-02-19 PROCEDURE — 85027 COMPLETE CBC AUTOMATED: CPT | Performed by: FAMILY MEDICINE

## 2020-02-19 PROCEDURE — 80048 BASIC METABOLIC PNL TOTAL CA: CPT | Performed by: FAMILY MEDICINE

## 2020-02-19 PROCEDURE — 83690 ASSAY OF LIPASE: CPT | Performed by: PHYSICIAN ASSISTANT

## 2020-02-19 PROCEDURE — 99232 SBSQ HOSP IP/OBS MODERATE 35: CPT | Performed by: FAMILY MEDICINE

## 2020-02-19 PROCEDURE — C9113 INJ PANTOPRAZOLE SODIUM, VIA: HCPCS | Performed by: FAMILY MEDICINE

## 2020-02-19 RX ADMIN — HYDROMORPHONE HYDROCHLORIDE 1 MG: 1 INJECTION, SOLUTION INTRAMUSCULAR; INTRAVENOUS; SUBCUTANEOUS at 00:30

## 2020-02-19 RX ADMIN — ENOXAPARIN SODIUM 40 MG: 40 INJECTION SUBCUTANEOUS at 08:12

## 2020-02-19 RX ADMIN — MAGNESIUM OXIDE TAB 400 MG (241.3 MG ELEMENTAL MG) 400 MG: 400 (241.3 MG) TAB at 08:13

## 2020-02-19 RX ADMIN — MAGNESIUM OXIDE TAB 400 MG (241.3 MG ELEMENTAL MG) 400 MG: 400 (241.3 MG) TAB at 17:00

## 2020-02-19 RX ADMIN — HYDROMORPHONE HYDROCHLORIDE 1 MG: 1 INJECTION, SOLUTION INTRAMUSCULAR; INTRAVENOUS; SUBCUTANEOUS at 19:08

## 2020-02-19 RX ADMIN — LEVOTHYROXINE SODIUM 12.5 MCG: 25 TABLET ORAL at 08:13

## 2020-02-19 RX ADMIN — ZOLPIDEM TARTRATE 5 MG: 5 TABLET, COATED ORAL at 23:01

## 2020-02-19 RX ADMIN — PANCRELIPASE 24000 UNITS: 24000; 76000; 120000 CAPSULE, DELAYED RELEASE PELLETS ORAL at 11:10

## 2020-02-19 RX ADMIN — ASPIRIN 81 MG: 81 TABLET, COATED ORAL at 08:13

## 2020-02-19 RX ADMIN — LOSARTAN POTASSIUM 50 MG: 50 TABLET, FILM COATED ORAL at 08:12

## 2020-02-19 RX ADMIN — PANCRELIPASE 24000 UNITS: 24000; 76000; 120000 CAPSULE, DELAYED RELEASE PELLETS ORAL at 16:35

## 2020-02-19 RX ADMIN — HYDROMORPHONE HYDROCHLORIDE 1 MG: 1 INJECTION, SOLUTION INTRAMUSCULAR; INTRAVENOUS; SUBCUTANEOUS at 09:13

## 2020-02-19 RX ADMIN — ACETAMINOPHEN 975 MG: 325 TABLET ORAL at 16:36

## 2020-02-19 RX ADMIN — PANTOPRAZOLE SODIUM 40 MG: 40 INJECTION, POWDER, FOR SOLUTION INTRAVENOUS at 08:12

## 2020-02-19 RX ADMIN — PANCRELIPASE 24000 UNITS: 24000; 76000; 120000 CAPSULE, DELAYED RELEASE PELLETS ORAL at 08:13

## 2020-02-19 RX ADMIN — HYDROMORPHONE HYDROCHLORIDE 1 MG: 1 INJECTION, SOLUTION INTRAMUSCULAR; INTRAVENOUS; SUBCUTANEOUS at 13:36

## 2020-02-19 NOTE — ASSESSMENT & PLAN NOTE
· Historical  currently breathing comfortably and saturating well in room air  He does have extensive smoking history no other signs to suggest a PE  Chest x-ray reviewed suspect more of an atelectasis   · procalcitonin is normal   Also provide albuterol t i d  As he is a smoker       · Weaned off of nasal canula

## 2020-02-19 NOTE — PLAN OF CARE
Problem: Potential for Falls  Goal: Patient will remain free of falls  Description  INTERVENTIONS:  - Assess patient frequently for physical needs  -  Identify cognitive and physical deficits and behaviors that affect risk of falls    -  [place] fall precautions as indicated by assessment   - Educate patient/family on patient safety including physical limitations  - Instruct patient to call for assistance with activity based on assessment  - Modify environment to reduce risk of injury  - Consider OT/PT consult to assist with strengthening/mobility  Outcome: Progressing     Problem: PAIN - ADULT  Goal: Verbalizes/displays adequate comfort level or baseline comfort level  Description  Interventions:  - Encourage patient to monitor pain and request assistance  - Assess pain using appropriate pain scale  - Administer analgesics based on type and severity of pain and evaluate response  - Implement non-pharmacological measures as appropriate and evaluate response  - Consider cultural and social influences on pain and pain management  - Notify physician/advanced practitioner if interventions unsuccessful or patient reports new pain  Outcome: Progressing     Problem: INFECTION - ADULT  Goal: Absence or prevention of progression during hospitalization  Description  INTERVENTIONS:  - Assess and monitor for signs and symptoms of infection  - Monitor lab/diagnostic results  - Monitor all insertion sites, i e  indwelling lines, tubes, and drains  - Monitor endotracheal if appropriate and nasal secretions for changes in amount and color  - [place] appropriate cooling/warming therapies per order  - Administer medications as ordered  - Instruct and encourage patient and family to use good hand hygiene technique  - Identify and instruct in appropriate isolation precautions for identified infection/condition  Outcome: Progressing  Goal: Absence of fever/infection during neutropenic period  Description  INTERVENTIONS:  - Monitor WBC    Outcome: Progressing     Problem: SAFETY ADULT  Goal: Patient will remain free of falls  Description  INTERVENTIONS:  - Assess patient frequently for physical needs  -  Identify cognitive and physical deficits and behaviors that affect risk of falls    -  [place] fall precautions as indicated by assessment   - Educate patient/family on patient safety including physical limitations  - Instruct patient to call for assistance with activity based on assessment  - Modify environment to reduce risk of injury  - Consider OT/PT consult to assist with strengthening/mobility  Outcome: Progressing  Goal: Maintain or return to baseline ADL function  Description  INTERVENTIONS:  -  Assess patient's ability to carry out ADLs; assess patient's baseline for ADL function and identify physical deficits which impact ability to perform ADLs (bathing, care of mouth/teeth, toileting, grooming, dressing, etc )  - Assess/evaluate cause of self-care deficits   - Assess range of motion  - Assess patient's mobility; develop plan if impaired  - Assess patient's need for assistive devices and provide as appropriate  - Encourage maximum independence but intervene and supervise when necessary  - Involve family in performance of ADLs  - Assess for home care needs following discharge   - Consider OT consult to assist with ADL evaluation and planning for discharge  - Provide patient education as appropriate  Outcome: Progressing  Goal: Maintain or return mobility status to optimal level  Description  INTERVENTIONS:  - Assess patient's baseline mobility status (ambulation, transfers, stairs, etc )    - Identify cognitive and physical deficits and behaviors that affect mobility  - Identify mobility aids required to assist with transfers and/or ambulation (gait belt, sit-to-stand, lift, walker, cane, etc )  - [place] fall precautions as indicated by assessment  - Record patient progress and toleration of activity level on Mobility SBAR; progress patient to next Phase/Stage  - Instruct patient to call for assistance with activity based on assessment  - Consider rehabilitation consult to assist with strengthening/weightbearing, etc   Outcome: Progressing     Problem: DISCHARGE PLANNING  Goal: Discharge to home or other facility with appropriate resources  Description  INTERVENTIONS:  - Identify barriers to discharge w/patient and caregiver  - Arrange for needed discharge resources and transportation as appropriate  - Identify discharge learning needs (meds, wound care, etc )  - Arrange for interpretive services to assist at discharge as needed  - Refer to Case Management Department for coordinating discharge planning if the patient needs post-hospital services based on physician/advanced practitioner order or complex needs related to functional status, cognitive ability, or social support system  Outcome: Progressing

## 2020-02-19 NOTE — PROGRESS NOTES
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/10 L-sided abdominal pain when narcotics wear off  Dilaudid improves pain to 3-4/10  No appetite yesterday evening or this morning  +bloating  He reports a semi-formed bowel movement yesterday afternoon  No nausea or vomiting reported  He has remained afebrile  No shortness of breath  Vitals  Blood pressure 116/77, pulse 69, temperature 98 2 °F (36 8 °C), temperature source Temporal, resp  rate 18, height 5' 3" (1 6 m), weight 62 8 kg (138 lb 7 2 oz), SpO2 96 %  Physical Exam:     General Appearance:    Awake, alert, oriented x3, no distress, well developed, well    nourished   Head:    Normocephalic without obvious abnormality   Eyes:    PERRL, conjunctiva/corneas clear, EOM's intact        Neck:   Supple, no adenopathy   Throat:   Mucous membranes moist   Lungs:     Clear to auscultation bilaterally, no wheezing or rhonchi   Heart:    Regular rate and rhythm, S1 and S2 normal, no murmur   Abdomen:     Soft, L sided tenderness, non-distended  bowel sounds active  No  masses, rebound  + guarding   Extremities:   Extremities without edema   Psych  Derm:   Normal affect    No jaundice   Neurologic:   CNII-XII grossly intact   Speech intact         Laboratory Studies  Results from last 7 days   Lab Units 02/19/20  0454 02/15/20  0503 02/14/20  1220   WBC Thousand/uL 5 20 5 20 6 70   HEMOGLOBIN g/dL 12 4* 11 5* 14 6   HEMATOCRIT % 36 8* 33 4* 43 5   PLATELETS Thousands/uL 212 167 233     Results from last 7 days   Lab Units 02/19/20  0454 02/16/20  0524 02/15/20  0503 02/14/20  1220   SODIUM mmol/L 135* 136* 139 139   POTASSIUM mmol/L 3 7 3 7 3 5* 3 6   CHLORIDE mmol/L 99 105 110* 101   CO2 mmol/L 29 23 22 26   BUN mg/dL 5 7 9 15   CREATININE mg/dL 0 76 0 59* 0 67* 0 77   CALCIUM mg/dL 9 4 8 9 8 5 10 0   ALBUMIN g/dL  --   --  2 9* 4 5   TOTAL BILIRUBIN mg/dL  --   --  0 40 0 50   ALK PHOS U/L  --   --  72 104   ALT U/L  --   --  27 25   AST U/L  --   --  23 33     Lab Results   Component Value Date    LIPASE 263 02/16/2020    LIPASE 483 (H) 02/15/2020    LIPASE 1,597 (H) 02/14/2020       Imaging and Other Studies      Inhouse Medications     Current Facility-Administered Medications:     acetaminophen (TYLENOL) tablet 975 mg, 975 mg, Oral, Once    albuterol inhalation solution 2 5 mg, 2 5 mg, Nebulization, Q4H PRN    aspirin (ECOTRIN LOW STRENGTH) EC tablet 81 mg, 81 mg, Oral, Daily, 81 mg at 02/18/20 0815    enoxaparin (LOVENOX) subcutaneous injection 40 mg, 40 mg, Subcutaneous, Q24H DAMASO, 40 mg at 02/18/20 0814    HYDROmorphone (DILAUDID) injection 1 mg, 1 mg, Intravenous, Q4H PRN, 1 mg at 02/19/20 0030    levothyroxine tablet 12 5 mcg, 12 5 mcg, Oral, Daily, 12 5 mcg at 02/18/20 0815    losartan (COZAAR) tablet 50 mg, 50 mg, Oral, Daily, 50 mg at 02/18/20 0815    magnesium oxide (MAG-OX) tablet 400 mg, 400 mg, Oral, BID, 400 mg at 02/18/20 1720    morphine injection 2 mg, 2 mg, Intravenous, Q3H PRN, 2 mg at 02/15/20 0708    ondansetron (ZOFRAN) injection 4 mg, 4 mg, Intravenous, Q6H PRN    pancrelipase (Lip-Prot-Amyl) (CREON) delayed release capsule 24,000 Units, 24,000 Units, Oral, TID With Meals, 24,000 Units at 02/18/20 1556    pantoprazole (PROTONIX) injection 40 mg, 40 mg, Intravenous, Q24H DAMASO, 40 mg at 02/18/20 0814    zolpidem (AMBIEN) tablet 5 mg, 5 mg, Oral, HS PRN      Assessment/Plan:    1  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   Imaging reveals 2 5 cm hypoattenuating pancreatic head lesion increased from prior exam - pseudocyst vs cystic pancreatic neoplasm  MRCP 11/2019 noted  Continue pain control, bowel rest   Currently on lo fiber/lo fat full liquid diet - would favor down grading diet   Discussed with Dr Marce Kam - will downgrade and restart fluids   Noted to have been placed on Creon after October admission - no history of chronic pancreatitis   Recommend complete alcohol abstinence   Recommend outpatient EUS for further evaluation of pancreatic head lesion  If no significant improvement in pain or worsening clinically, consider repeat CT w/contrast to eval for necrosis, other complications  2  Acute diarrhea possibly related to viral gastroenteritis, resolved   C difficile PCR negative    Procalcitonin normal   This may have also contributed to pancreatitis   Continue supportive care    3  Suspicion of underlying cirrhosis with mild surface nodularity of liver on CT   LFTs, platelets, albumin normal   Mild coagulopathy noted   HCV AB negative   Avoid hepatic toxic medications, alcohol   Could consider serologic workup          Randee Thomas PA-C

## 2020-02-19 NOTE — SOCIAL WORK
Pt was admitted due to acute pancreatitis  SW m/w the pt to complete a general assessment and discuss discharge planning  Pt had a visitor  Pt permitted this SW to complete assessment with visitor in the room  Pt lives alone in a 3 story house with 3 GILDA, 2nd floor set-up  Pt is independent with all ADLs and ambulates without an AD  Pt is employed  Pt drives  Pt denies any history of in home resources or community resources  Pt will have transportation home when discharged  PCP: Gomez Pagan MD  Preferred Pharmacy: [address] [place] [address]    Pt does not anticipate any needs at this time  SW will continue to follow

## 2020-02-19 NOTE — TELEPHONE ENCOUNTER
Patient called to see if we received his clearance and I told him we did not Patient is going to call his doctor to send them over Patient needs to have his surgery done sooner than later because he needs to have another  fill in for him   LLF

## 2020-02-19 NOTE — TELEPHONE ENCOUNTER
Pt called and wanted rslts from his stress test and echo  Please call him  Also in his telephone note to vascular he wants his carotids done earlier  He needs his clearance done   thanks

## 2020-02-19 NOTE — ASSESSMENT & PLAN NOTE
· Patient continues to have significant pain in the epigastric area or full liquid diet  We will downgrade to clear liquid diet again  · Lipase is normal  · Pancreatitis most likely 2/2 to alcohol use  · GI consult appreciated: creon was resumed  · Patient was admitted with pancreatitis back in October was toe secondary to metformin  · CT of the abdomen and pelvis resolved-    1  Acute recurrent edematous pancreatitis, now in the region of the pancreatic head and uncinate process  · 2   2 5 cm hypoattenuating lesion in the pancreatic head, increased from the prior exam, likely representing an enlarging pseudocyst or cystic pancreatic neoplasm  No main pancreatic duct dilation  Continued attention on follow-up is recommended  · He will need an EUS outpatient  · Morphine Dilaudid alternating if needed for pain control  · No cholelithiasis  · Triglycerides level  146

## 2020-02-20 PROBLEM — E87.6 HYPOKALEMIA: Status: ACTIVE | Noted: 2020-02-20

## 2020-02-20 LAB
ANION GAP SERPL CALCULATED.3IONS-SCNC: 8 MMOL/L (ref 5–14)
BUN SERPL-MCNC: 6 MG/DL (ref 5–25)
CALCIUM SERPL-MCNC: 9.6 MG/DL (ref 8.4–10.2)
CHLORIDE SERPL-SCNC: 101 MMOL/L (ref 97–108)
CO2 SERPL-SCNC: 28 MMOL/L (ref 22–30)
CREAT SERPL-MCNC: 0.79 MG/DL (ref 0.7–1.5)
ERYTHROCYTE [DISTWIDTH] IN BLOOD BY AUTOMATED COUNT: 14.4 %
GFR SERPL CREATININE-BSD FRML MDRD: 93 ML/MIN/1.73SQ M
GLUCOSE SERPL-MCNC: 95 MG/DL (ref 70–99)
HCT VFR BLD AUTO: 37.9 % (ref 41–53)
HGB BLD-MCNC: 12.9 G/DL (ref 13.5–17.5)
MCH RBC QN AUTO: 32.9 PG (ref 26–34)
MCHC RBC AUTO-ENTMCNC: 34.1 G/DL (ref 31–36)
MCV RBC AUTO: 97 FL (ref 80–100)
PLATELET # BLD AUTO: 214 THOUSANDS/UL (ref 150–450)
PMV BLD AUTO: 9.3 FL (ref 8.9–12.7)
POTASSIUM SERPL-SCNC: 3.3 MMOL/L (ref 3.6–5)
RBC # BLD AUTO: 3.92 MILLION/UL (ref 4.5–5.9)
SODIUM SERPL-SCNC: 137 MMOL/L (ref 137–147)
WBC # BLD AUTO: 4.3 THOUSAND/UL (ref 4.5–11)

## 2020-02-20 PROCEDURE — C9113 INJ PANTOPRAZOLE SODIUM, VIA: HCPCS | Performed by: FAMILY MEDICINE

## 2020-02-20 PROCEDURE — 80048 BASIC METABOLIC PNL TOTAL CA: CPT | Performed by: FAMILY MEDICINE

## 2020-02-20 PROCEDURE — 85027 COMPLETE CBC AUTOMATED: CPT | Performed by: FAMILY MEDICINE

## 2020-02-20 PROCEDURE — 99232 SBSQ HOSP IP/OBS MODERATE 35: CPT | Performed by: INTERNAL MEDICINE

## 2020-02-20 PROCEDURE — 99232 SBSQ HOSP IP/OBS MODERATE 35: CPT | Performed by: FAMILY MEDICINE

## 2020-02-20 RX ORDER — POTASSIUM CHLORIDE 20 MEQ/1
20 TABLET, EXTENDED RELEASE ORAL ONCE
Status: COMPLETED | OUTPATIENT
Start: 2020-02-20 | End: 2020-02-20

## 2020-02-20 RX ADMIN — ZOLPIDEM TARTRATE 5 MG: 5 TABLET, COATED ORAL at 23:40

## 2020-02-20 RX ADMIN — MAGNESIUM OXIDE TAB 400 MG (241.3 MG ELEMENTAL MG) 400 MG: 400 (241.3 MG) TAB at 17:43

## 2020-02-20 RX ADMIN — PANTOPRAZOLE SODIUM 40 MG: 40 INJECTION, POWDER, FOR SOLUTION INTRAVENOUS at 08:08

## 2020-02-20 RX ADMIN — PANCRELIPASE 24000 UNITS: 24000; 76000; 120000 CAPSULE, DELAYED RELEASE PELLETS ORAL at 11:52

## 2020-02-20 RX ADMIN — MAGNESIUM OXIDE TAB 400 MG (241.3 MG ELEMENTAL MG) 400 MG: 400 (241.3 MG) TAB at 08:08

## 2020-02-20 RX ADMIN — HYDROMORPHONE HYDROCHLORIDE 1 MG: 1 INJECTION, SOLUTION INTRAMUSCULAR; INTRAVENOUS; SUBCUTANEOUS at 13:44

## 2020-02-20 RX ADMIN — LEVOTHYROXINE SODIUM 12.5 MCG: 25 TABLET ORAL at 07:50

## 2020-02-20 RX ADMIN — LOSARTAN POTASSIUM 50 MG: 50 TABLET, FILM COATED ORAL at 08:08

## 2020-02-20 RX ADMIN — HYDROMORPHONE HYDROCHLORIDE 1 MG: 1 INJECTION, SOLUTION INTRAMUSCULAR; INTRAVENOUS; SUBCUTANEOUS at 20:36

## 2020-02-20 RX ADMIN — PANCRELIPASE 24000 UNITS: 24000; 76000; 120000 CAPSULE, DELAYED RELEASE PELLETS ORAL at 16:27

## 2020-02-20 RX ADMIN — HYDROMORPHONE HYDROCHLORIDE 1 MG: 1 INJECTION, SOLUTION INTRAMUSCULAR; INTRAVENOUS; SUBCUTANEOUS at 07:14

## 2020-02-20 RX ADMIN — POTASSIUM CHLORIDE 20 MEQ: 20 TABLET, EXTENDED RELEASE ORAL at 11:52

## 2020-02-20 RX ADMIN — ASPIRIN 81 MG: 81 TABLET, COATED ORAL at 08:08

## 2020-02-20 RX ADMIN — ENOXAPARIN SODIUM 40 MG: 40 INJECTION SUBCUTANEOUS at 08:08

## 2020-02-20 RX ADMIN — PANCRELIPASE 24000 UNITS: 24000; 76000; 120000 CAPSULE, DELAYED RELEASE PELLETS ORAL at 08:09

## 2020-02-20 NOTE — NURSING NOTE
On initial rounds patient in no apparent distress  Skin warm and dry to touch  Pleasant and cooperative  Was medicated for pain now denies pain and only have soreness to upper abdomen  Ambulates well by self  Tolerating diet to this time  Lungs clear but decreased  All safety maintained  Will continue to monitor

## 2020-02-20 NOTE — SOCIAL WORK
Pt reviewed during care coordination rounds  Pt not likely to discharge over the weekend  GI following  Discharge plan is currently home with family support

## 2020-02-20 NOTE — PROGRESS NOTES
Pastoral Care Progress Note    2020  Patient: Junior Keith : 1950  Admission Date & Time: 2020 1159  MRN: [id number] [facility]: [id number]                     Chaplaincy Interventions Utilized:   Empowerment: Encouraged focus on present and Encouraged self-care    Exploration: Explored emotional needs & resources, Explored relational needs & resources and Explored spiritual needs & resources    Collaboration: Encouraged adherence to treatment plan    Relationship Building: Cultivated a relationship of care and support and Listened empathically    Ritual: Provided Holiness resources      Chaplaincy Outcomes Achieved:  Expressed gratitude      Spiritual Coping Strategies Utilized:   Spiritual gratitude

## 2020-02-20 NOTE — PROGRESS NOTES
02/20/20 1000   Clinical Encounter Type   Visited With Patient   Routine Visit Follow-up   Sacramental Encounters   Communion Given Indicator Yes

## 2020-02-20 NOTE — ASSESSMENT & PLAN NOTE
· Patient continues to have significant pain in the epigastric area even on clear liquid diet  · Lipase is normal  · Pancreatitis most likely 2/2 to alcohol use  · GI consult appreciated: creon was resumed  · Patient was admitted with pancreatitis back in October was toe secondary to metformin  · CT of the abdomen and pelvis resolved-    1  Acute recurrent edematous pancreatitis, now in the region of the pancreatic head and uncinate process  · 2   2 5 cm hypoattenuating lesion in the pancreatic head, increased from the prior exam, likely representing an enlarging pseudocyst or cystic pancreatic neoplasm  No main pancreatic duct dilation  Continued attention on follow-up is recommended  · He will need an EUS outpatient  · Morphine Dilaudid alternating if needed for pain control  · No cholelithiasis  · Triglycerides level  146

## 2020-02-20 NOTE — PROGRESS NOTES
Progress Note - Arley Jamil 1950, 81 y o  male MRN: [id number]    Unit/Bed#: 7T [facility] 710-02 Encounter: [id number]    Primary Care Provider: Tatiana Fuentes MD   Date and time admitted to hospital: 2/14/2020 11:59 AM        * Acute pancreatitis  Assessment & Plan  · Patient continues to have significant pain in the epigastric area even on clear liquid diet  · Lipase is normal  · Pancreatitis most likely 2/2 to alcohol use  · GI consult appreciated: creon was resumed  · Patient was admitted with pancreatitis back in October was toe secondary to metformin  · CT of the abdomen and pelvis resolved-    1  Acute recurrent edematous pancreatitis, now in the region of the pancreatic head and uncinate process  · 2   2 5 cm hypoattenuating lesion in the pancreatic head, increased from the prior exam, likely representing an enlarging pseudocyst or cystic pancreatic neoplasm  No main pancreatic duct dilation  Continued attention on follow-up is recommended  · He will need an EUS outpatient  · Morphine Dilaudid alternating if needed for pain control  · No cholelithiasis  · Triglycerides level  146  Hypokalemia  Assessment & Plan  Will give supplement and repeat labs    Acute respiratory failure with hypoxia (HCC)  Assessment & Plan  · Historical  currently breathing comfortably and saturating well in room air  He does have extensive smoking history no other signs to suggest a PE  Chest x-ray reviewed suspect more of an atelectasis   · procalcitonin is normal   Also provide albuterol t i d  As he is a smoker       · Weaned off of nasal canula    Tobacco abuse  Assessment & Plan  · Counseled that his nicotine patch has been provided he refused nicotine patch    Mixed hyperlipidemia  Assessment & Plan  · Secondary to acute recurrent pancreatitis his statin combination will be on hold    Hypothyroidism (acquired)  Assessment & Plan  · Continue Synthroid    Essential hypertension, benign  Assessment & Plan  · Blood pressure stable  · Continue losartan 50 mg      VTE Pharmacologic Prophylaxis:   Pharmacologic: Enoxaparin (Lovenox)  Mechanical VTE Prophylaxis in Place: Yes    Patient Centered Rounds: I have performed bedside rounds with nursing staff today  Discussions with Specialists or Other Care Team Provider: GI    Education and Discussions with Family / Patient: patient    Time Spent for Care: 30 minutes  More than 50% of total time spent on counseling and coordination of care as described above  Current Length of Stay: 6 day(s)    Current Patient Status: Inpatient   Certification Statement: The patient will continue to require additional inpatient hospital stay due to acute pancreatitis    Discharge Plan: home when stable    Code Status: Level 1 - Full Code      Subjective:   Patient continues to complain of epigastric pain that is constantly there  We down graded his diet to clear liquid diet yesterday however it did not help with the pain  Patient denies any nausea or vomiting  Objective:     Vitals:   Temp (24hrs), Av 9 °F (36 6 °C), Min:97 6 °F (36 4 °C), Max:98 2 °F (36 8 °C)    Temp:  [97 6 °F (36 4 °C)-98 2 °F (36 8 °C)] 97 6 °F (36 4 °C)  HR:  [62-83] 83  Resp:  [18-20] 20  BP: (122-154)/(65-90) 154/90  SpO2:  [93 %-96 %] 96 %  Body mass index is 24 53 kg/m²  Input and Output Summary (last 24 hours): Intake/Output Summary (Last 24 hours) at 2020 1116  Last data filed at 2020 0919  Gross per 24 hour   Intake 900 ml   Output    Net 900 ml       Physical Exam:     Physical Exam   Constitutional: He appears well-developed and well-nourished  HENT:   Head: Normocephalic and atraumatic  Right Ear: External ear normal    Left Ear: External ear normal    Nose: Nose normal    Mouth/Throat: Oropharynx is clear and moist    Eyes: Conjunctivae and EOM are normal    Neck: Normal range of motion  Neck supple     Cardiovascular: Normal rate, regular rhythm and normal heart sounds  Pulmonary/Chest: Effort normal and breath sounds normal    Abdominal: Soft  Bowel sounds are normal  There is tenderness  Epigastric tenderness noted   Genitourinary:   Genitourinary Comments: deferred   Neurological: He is alert  Cranial nerve 2 -12 are normal    Skin: Skin is warm and dry  Psychiatric: He has a normal mood and affect  Additional Data:     Labs:    Results from last 7 days   Lab Units 02/20/20  0431  02/15/20  0503   WBC Thousand/uL 4 30*   < > 5 20   HEMOGLOBIN g/dL 12 9*   < > 11 5*   HEMATOCRIT % 37 9*   < > 33 4*   PLATELETS Thousands/uL 214   < > 167   NEUTROS PCT %  --   --  61   LYMPHS PCT %  --   --  23*   MONOS PCT %  --   --  10   EOS PCT %  --   --  5    < > = values in this interval not displayed  Results from last 7 days   Lab Units 02/20/20  0431  02/15/20  0503   SODIUM mmol/L 137   < > 139   POTASSIUM mmol/L 3 3*   < > 3 5*   CHLORIDE mmol/L 101   < > 110*   CO2 mmol/L 28   < > 22   BUN mg/dL 6   < > 9   CREATININE mg/dL 0 79   < > 0 67*   ANION GAP mmol/L 8   < > 7   CALCIUM mg/dL 9 6   < > 8 5   ALBUMIN g/dL  --   --  2 9*   TOTAL BILIRUBIN mg/dL  --   --  0 40   ALK PHOS U/L  --   --  72   ALT U/L  --   --  27   AST U/L  --   --  23   GLUCOSE RANDOM mg/dL 95   < > 75    < > = values in this interval not displayed  Results from last 7 days   Lab Units 02/16/20  0524   PROCALCITONIN ng/ml <0 05           * I Have Reviewed All Lab Data Listed Above  * Additional Pertinent Lab Tests Reviewed:  [facility]on [facility] Admission Reviewed       Recent Cultures (last 7 days):     Results from last 7 days   Lab Units 02/17/20  1339   C DIFF TOXIN B  Negative       Last 24 Hours Medication List:     Current Facility-Administered Medications:  albuterol 2 5 mg Nebulization Q4H PRN Migue Bowman MD   aspirin 81 mg Oral Daily Migue Bowman MD   enoxaparin 40 mg Subcutaneous Q24H [address] Migue Bowman MD   HYDROmorphone 1 mg Intravenous Q4H PRN Tyson Deshpande MD   levothyroxine 12 5 mcg Oral Daily Tyson Deshpande MD   losartan 50 mg Oral Daily Tyson Deshpande MD   magnesium oxide 400 mg Oral BID Tyson Deshpande MD   morphine injection 2 mg Intravenous Q3H PRN Tyson Deshpande MD   ondansetron 4 mg Intravenous Q6H PRN Tyson Deshpande MD   pancrelipase (Lip-Prot-Amyl) 24,000 Units Oral TID With Meals Tyson Deshpande MD   pantoprazole 40 mg Intravenous Q24H [facility] Tyson Deshpande MD   zolpidem 5 mg Oral HS PRN Tyson Deshpande MD        Today, Patient Was Seen By: Nanette Funes MD    ** Please Note: Dictation voice to text software may have been used in the creation of this document   **

## 2020-02-20 NOTE — TELEPHONE ENCOUNTER
Pt did have stress and echo 2/17, note in from cardiology Dr Ju Landeros is going to call pt re: results

## 2020-02-20 NOTE — PROGRESS NOTES
Progress Note- Junior Keith 81 y o  male MRN: [id number]    Unit/Bed#: 7T [facility] 710-02 Encounter: [id number]      Assessment and Plan:    Recurrent alcoholic pancreatitis with probable pseudocyst:  Clinically he continues to have significant epigastric pain so I will keep him on a clear liquid diet for now  He should receive his Creon when his diet is advanced to low fat  He should have an elective endoscopic ultrasound in a few months to further evaluate the cystic lesion or sooner if his pain persists  I spoke to him about the importance of complete alcohol cessation  ______________________________________________________________________    Subjective:     He continues to have epigastric pain and feels it is unchanged from yesterday  He denies any nausea, vomiting, dysphagia, bleeding, or weight loss  He said he does not have much of an appetite and is okay with sticking with a clear liquid diet for now      Medication Administration - last 24 hours from 02/19/2020 1350 to 02/20/2020 1350       Date/Time Order Dose Route Action Action by     02/20/2020 0808 aspirin (ECOTRIN LOW STRENGTH) EC tablet 81 mg 81 mg Oral Given [address]     02/20/2020 0876 levothyroxine tablet 12 5 mcg 12 5 mcg Oral Given [address]     02/20/2020 1170 magnesium oxide (MAG-OX) tablet 400 mg 400 mg Oral Given [facility],      02/19/2020 1700 magnesium oxide (MAG-OX) tablet 400 mg 400 mg Oral Given [address], [address] [place] [address]     02/20/2020 1152 pancrelipase (Lip-Prot-Amyl) (CREON) delayed release capsule 24,000 Units 24,000 Units Oral Given [facility],      02/20/2020 0809 pancrelipase (Lip-Prot-Amyl) (CREON) delayed release capsule 24,000 Units 24,000 Units Oral Given [facility],      02/19/2020 1635 pancrelipase (Lip-Prot-Amyl) (CREON) delayed release capsule 24,000 Units 24,000 Units Oral Given [address] RN     02/19/2020 1348 zolpidem (AMBIEN) tablet 5 mg 5 mg Oral Given Adriano Jensen RN 02/20/2020 1344 HYDROmorphone (DILAUDID) injection 1 mg 1 mg Intravenous Given Fljót, RN     02/20/2020 8157 HYDROmorphone (DILAUDID) injection 1 mg 1 mg Intravenous Given Fljót, RN     02/19/2020 1908 HYDROmorphone (DILAUDID) injection 1 mg 1 mg Intravenous Given [facility], RN     02/20/2020 6138 enoxaparin (LOVENOX) subcutaneous injection 40 mg 40 mg Subcutaneous Given Fljót, RN     02/20/2020 2025 pantoprazole (PROTONIX) injection 40 mg 40 mg Intravenous Given Fljót, RN     02/19/2020 1636 acetaminophen (TYLENOL) tablet 975 mg 975 mg Oral [facility]     02/20/2020 1115 losartan (COZAAR) tablet 50 mg 50 mg Oral Given Fljót, RN     02/20/2020 1152 potassium chloride (K-DUR,KLOR-CON) CR tablet 20 mEq 20 mEq Oral Given Fljót, RN          Objective:     Vitals: Blood pressure 154/90, pulse 83, temperature 97 6 °F (36 4 °C), temperature source Temporal, resp  rate 20, height 5' 3" (1 6 m), weight 62 8 kg (138 lb 7 2 oz), SpO2 96 %  ,Body mass index is 24 53 kg/m²        Intake/Output Summary (Last 24 hours) at 2/20/2020 1350  Last data filed at 2/20/2020 1300  Gross per 24 hour   Intake 1020 ml   Output    Net 1020 ml       Physical Exam:   General Appearance: Awake and alert, in mild distress because of the pain  Abdomen: Soft, epigastric tenderness, non-distended; bowel sounds normal; no masses or no organomegaly    Invasive Devices     Peripheral Intravenous Line            Peripheral IV 02/14/20 Right Antecubital 6 days                Lab Results:  Admission on 02/14/2020   Component Date Value    WBC 02/14/2020 6 70     RBC 02/14/2020 4 56     Hemoglobin 02/14/2020 14 6     Hematocrit 02/14/2020 43 5     MCV 02/14/2020 95     MCH 02/14/2020 32 1     MCHC 02/14/2020 33 6     RDW 02/14/2020 14 4     MPV 02/14/2020 9 3     Platelets 95/90/3071 233     Neutrophils Relative 02/14/2020 73*    Lymphocytes Relative 02/14/2020 17*    Monocytes Relative 02/14/2020 7     Eosinophils Relative 02/14/2020 3     Basophils Relative 02/14/2020 0     Neutrophils Absolute 02/14/2020 4 90     Lymphocytes Absolute 02/14/2020 1 20     Monocytes Absolute 02/14/2020 0 50     Eosinophils Absolute 02/14/2020 0 20     Basophils Absolute 02/14/2020 0 00     Sodium 02/14/2020 139     Potassium 02/14/2020 3 6     Chloride 02/14/2020 101     CO2 02/14/2020 26     ANION GAP 02/14/2020 12     BUN 02/14/2020 15     Creatinine 02/14/2020 0 77     Glucose 02/14/2020 105*    Calcium 02/14/2020 10 0     AST 02/14/2020 33     ALT 02/14/2020 25     Alkaline Phosphatase 02/14/2020 104     Total Protein 02/14/2020 8 1     Albumin 02/14/2020 4 5     Total Bilirubin 02/14/2020 0 50     eGFR 02/14/2020 94     Lipase 02/14/2020 1,597*    Troponin I 02/14/2020 <0 01     Triglycerides 02/14/2020 163*    Ventricular Rate 02/14/2020 75     Atrial Rate 02/14/2020 75     NH Interval 02/14/2020 134     QRSD Interval 02/14/2020 80     QT Interval 02/14/2020 392     QTC Interval 02/14/2020 437     QRS Axis 02/14/2020 34     T Wave Axis 02/14/2020 55     Sodium 02/15/2020 139     Potassium 02/15/2020 3 5*    Chloride 02/15/2020 110*    CO2 02/15/2020 22     ANION GAP 02/15/2020 7     BUN 02/15/2020 9     Creatinine 02/15/2020 0 67*    Glucose 02/15/2020 75     Calcium 02/15/2020 8 5     AST 02/15/2020 23     ALT 02/15/2020 27     Alkaline Phosphatase 02/15/2020 72     Total Protein 02/15/2020 5 6*    Albumin 02/15/2020 2 9*    Total Bilirubin 02/15/2020 0 40     eGFR 02/15/2020 99     Lipase 02/15/2020 483*    WBC 02/15/2020 5 20     RBC 02/15/2020 3 46*    Hemoglobin 02/15/2020 11 5*    Hematocrit 02/15/2020 33 4*    MCV 02/15/2020 97     MCH 02/15/2020 33 3     MCHC 02/15/2020 34 5     RDW 02/15/2020 14 5     MPV 02/15/2020 9 2     Platelets 39/04/0590 167     Neutrophils Relative 02/15/2020 61     Lymphocytes Relative 02/15/2020 23*    Monocytes Relative 02/15/2020 10     Eosinophils Relative 02/15/2020 5     Basophils Relative 02/15/2020 0     Neutrophils Absolute 02/15/2020 3 20     Lymphocytes Absolute 02/15/2020 1 20     Monocytes Absolute 02/15/2020 0 50     Eosinophils Absolute 02/15/2020 0 30     Basophils Absolute 02/15/2020 0 00     Sodium 02/16/2020 136*    Potassium 02/16/2020 3 7     Chloride 02/16/2020 105     CO2 02/16/2020 23     ANION GAP 02/16/2020 8     BUN 02/16/2020 7     Creatinine 02/16/2020 0 59*    Glucose 02/16/2020 62*    Calcium 02/16/2020 8 9     eGFR 02/16/2020 105     Lipase 02/16/2020 263     Procalcitonin 02/16/2020 <0 05     C difficile toxin by PCR 02/17/2020 Negative     Protocol Name 02/17/2020 JEAN SIT     Time In Exercise Phase 02/17/2020 00:06:34     MAX   SYSTOLIC BP 85/46/9318 399     Max Diastolic Bp 63/81/0889 81     Max Heart Rate 02/17/2020 100     Max Predicted Heart Rate 02/17/2020 153     Reason for Termination 02/17/2020 Test Complete     Test Indication 02/17/2020 Pre-Op Evaluation     Target Hr Formular 02/17/2020 (220 - Age)*85%     Chest Pain Statement 02/17/2020 none     WBC 02/19/2020 5 20     RBC 02/19/2020 3 85*    Hemoglobin 02/19/2020 12 4*    Hematocrit 02/19/2020 36 8*    MCV 02/19/2020 96     MCH 02/19/2020 32 2     MCHC 02/19/2020 33 7     RDW 02/19/2020 14 5     Platelets 85/67/0625 212     MPV 02/19/2020 9 4     Sodium 02/19/2020 135*    Potassium 02/19/2020 3 7     Chloride 02/19/2020 99     CO2 02/19/2020 29     ANION GAP 02/19/2020 7     BUN 02/19/2020 5     Creatinine 02/19/2020 0 76     Glucose 02/19/2020 89     Calcium 02/19/2020 9 4     eGFR 02/19/2020 94     Lipase 02/19/2020 320*    WBC 02/20/2020 4 30*    RBC 02/20/2020 3 92*    Hemoglobin 02/20/2020 12 9*    Hematocrit 02/20/2020 37 9*    MCV 02/20/2020 97     MCH 02/20/2020 32 9     MCHC 02/20/2020 34 1     RDW 02/20/2020 14 4     Platelets 02/20/2020 214     MPV 02/20/2020 9 3     Sodium 02/20/2020 137     Potassium 02/20/2020 3 3*    Chloride 02/20/2020 101     CO2 02/20/2020 28     ANION GAP 02/20/2020 8     BUN 02/20/2020 6     Creatinine 02/20/2020 0 79     Glucose 02/20/2020 95     Calcium 02/20/2020 9 6     eGFR 02/20/2020 93        Imaging Studies: I have personally reviewed pertinent imaging studies

## 2020-02-21 VITALS
DIASTOLIC BLOOD PRESSURE: 75 MMHG | RESPIRATION RATE: 18 BRPM | WEIGHT: 138.45 LBS | HEART RATE: 62 BPM | BODY MASS INDEX: 24.53 KG/M2 | SYSTOLIC BLOOD PRESSURE: 146 MMHG | TEMPERATURE: 97.8 F | OXYGEN SATURATION: 96 % | HEIGHT: 63 IN

## 2020-02-21 LAB
ANION GAP SERPL CALCULATED.3IONS-SCNC: 8 MMOL/L (ref 5–14)
BUN SERPL-MCNC: 9 MG/DL (ref 5–25)
CALCIUM SERPL-MCNC: 9.6 MG/DL (ref 8.4–10.2)
CHLORIDE SERPL-SCNC: 103 MMOL/L (ref 97–108)
CO2 SERPL-SCNC: 26 MMOL/L (ref 22–30)
CREAT SERPL-MCNC: 0.8 MG/DL (ref 0.7–1.5)
GFR SERPL CREATININE-BSD FRML MDRD: 92 ML/MIN/1.73SQ M
GLUCOSE SERPL-MCNC: 93 MG/DL (ref 70–99)
POTASSIUM SERPL-SCNC: 3.4 MMOL/L (ref 3.6–5)
SODIUM SERPL-SCNC: 137 MMOL/L (ref 137–147)

## 2020-02-21 PROCEDURE — 99238 HOSP IP/OBS DSCHRG MGMT 30/<: CPT | Performed by: FAMILY MEDICINE

## 2020-02-21 PROCEDURE — 99232 SBSQ HOSP IP/OBS MODERATE 35: CPT | Performed by: FAMILY MEDICINE

## 2020-02-21 PROCEDURE — C9113 INJ PANTOPRAZOLE SODIUM, VIA: HCPCS | Performed by: FAMILY MEDICINE

## 2020-02-21 PROCEDURE — 80048 BASIC METABOLIC PNL TOTAL CA: CPT | Performed by: FAMILY MEDICINE

## 2020-02-21 PROCEDURE — 99232 SBSQ HOSP IP/OBS MODERATE 35: CPT | Performed by: INTERNAL MEDICINE

## 2020-02-21 RX ORDER — POTASSIUM CHLORIDE 20 MEQ/1
40 TABLET, EXTENDED RELEASE ORAL ONCE
Status: COMPLETED | OUTPATIENT
Start: 2020-02-21 | End: 2020-02-21

## 2020-02-21 RX ORDER — OXYCODONE HYDROCHLORIDE AND ACETAMINOPHEN 5; 325 MG/1; MG/1
1 TABLET ORAL EVERY 6 HOURS PRN
Qty: 15 TABLET | Refills: 0 | Status: SHIPPED | OUTPATIENT
Start: 2020-02-21 | End: 2020-03-02

## 2020-02-21 RX ADMIN — LOSARTAN POTASSIUM 50 MG: 50 TABLET, FILM COATED ORAL at 08:35

## 2020-02-21 RX ADMIN — PANCRELIPASE 24000 UNITS: 24000; 76000; 120000 CAPSULE, DELAYED RELEASE PELLETS ORAL at 12:25

## 2020-02-21 RX ADMIN — LEVOTHYROXINE SODIUM 12.5 MCG: 25 TABLET ORAL at 05:30

## 2020-02-21 RX ADMIN — POTASSIUM CHLORIDE 40 MEQ: 20 TABLET, EXTENDED RELEASE ORAL at 08:35

## 2020-02-21 RX ADMIN — PANCRELIPASE 24000 UNITS: 24000; 76000; 120000 CAPSULE, DELAYED RELEASE PELLETS ORAL at 16:13

## 2020-02-21 RX ADMIN — ASPIRIN 81 MG: 81 TABLET, COATED ORAL at 08:36

## 2020-02-21 RX ADMIN — MAGNESIUM OXIDE TAB 400 MG (241.3 MG ELEMENTAL MG) 400 MG: 400 (241.3 MG) TAB at 17:28

## 2020-02-21 RX ADMIN — MAGNESIUM OXIDE TAB 400 MG (241.3 MG ELEMENTAL MG) 400 MG: 400 (241.3 MG) TAB at 08:36

## 2020-02-21 RX ADMIN — HYDROMORPHONE HYDROCHLORIDE 1 MG: 1 INJECTION, SOLUTION INTRAMUSCULAR; INTRAVENOUS; SUBCUTANEOUS at 08:37

## 2020-02-21 RX ADMIN — PANCRELIPASE 24000 UNITS: 24000; 76000; 120000 CAPSULE, DELAYED RELEASE PELLETS ORAL at 08:36

## 2020-02-21 RX ADMIN — MORPHINE SULFATE 2 MG: 2 INJECTION, SOLUTION INTRAMUSCULAR; INTRAVENOUS at 14:43

## 2020-02-21 RX ADMIN — PANTOPRAZOLE SODIUM 40 MG: 40 INJECTION, POWDER, FOR SOLUTION INTRAVENOUS at 08:36

## 2020-02-21 RX ADMIN — ENOXAPARIN SODIUM 40 MG: 40 INJECTION SUBCUTANEOUS at 08:36

## 2020-02-21 NOTE — NURSING NOTE
Patient remain in stable condition  No change in condition from this am  All safety maintained  Will continue to monitor,

## 2020-02-21 NOTE — ASSESSMENT & PLAN NOTE
· Patient continues to have significant pain in the epigastric area even on clear liquid diet, will not advance diet yet  · Lipase is normal  · Pancreatitis most likely 2/2 to alcohol use  · GI consult appreciated: creon was resumed  · Patient was admitted with pancreatitis back in October was toe secondary to metformin  · CT of the abdomen and pelvis resolved-    1  Acute recurrent edematous pancreatitis, now in the region of the pancreatic head and uncinate process  · 2   2 5 cm hypoattenuating lesion in the pancreatic head, increased from the prior exam, likely representing an enlarging pseudocyst or cystic pancreatic neoplasm  No main pancreatic duct dilation  Continued attention on follow-up is recommended  · He will need an EUS outpatient  · Morphine Dilaudid alternating if needed for pain control  · No cholelithiasis  · Triglycerides level  146

## 2020-02-21 NOTE — NURSING NOTE
Discharge Note  Patient leaves for home in stable condition, afebrile with all personal items after verbalizing understanding of discharge instructions  Left walking as requested accompanied off unit

## 2020-02-21 NOTE — PROGRESS NOTES
Progress Note - Saul Gonsalez 1950, 81 y o  male MRN: [id number]    Unit/Bed#: 7T [facility] 710-02 Encounter: [id number]    Primary Care Provider: Robles Lema MD   Date and time admitted to hospital: 2/14/2020 11:59 AM        * Acute pancreatitis  Assessment & Plan  · Patient continues to have significant pain in the epigastric area even on clear liquid diet, will not advance diet yet  · Lipase is normal  · Pancreatitis most likely 2/2 to alcohol use  · GI consult appreciated: creon was resumed  · Patient was admitted with pancreatitis back in October was toe secondary to metformin  · CT of the abdomen and pelvis resolved-    1  Acute recurrent edematous pancreatitis, now in the region of the pancreatic head and uncinate process  · 2   2 5 cm hypoattenuating lesion in the pancreatic head, increased from the prior exam, likely representing an enlarging pseudocyst or cystic pancreatic neoplasm  No main pancreatic duct dilation  Continued attention on follow-up is recommended  · He will need an EUS outpatient  · Morphine Dilaudid alternating if needed for pain control  · No cholelithiasis  · Triglycerides level  146  Hypokalemia  Assessment & Plan  Will give supplement and repeat labs    Acute respiratory failure with hypoxia (HCC)  Assessment & Plan  · Historical  currently breathing comfortably and saturating well in room air  He does have extensive smoking history no other signs to suggest a PE  Chest x-ray reviewed suspect more of an atelectasis   · procalcitonin is normal   Also provide albuterol t i d  As he is a smoker       · Weaned off of nasal canula    Tobacco abuse  Assessment & Plan  · Counseled that his nicotine patch has been provided he refused nicotine patch    Mixed hyperlipidemia  Assessment & Plan  · Secondary to acute recurrent pancreatitis his statin combination will be on hold    Hypothyroidism (acquired)  Assessment & Plan  · Continue Synthroid    Essential hypertension, benign  Assessment & Plan  · Blood pressure stable  · Continue losartan 50 mg       VTE Pharmacologic Prophylaxis:   Pharmacologic: Enoxaparin (Lovenox)  Mechanical VTE Prophylaxis in Place: Yes    Patient Centered Rounds: I have performed bedside rounds with nursing staff today  Discussions with Specialists or Other Care Team Provider: none    Education and Discussions with Family / Patient: patient    Time Spent for Care: 20 minutes  More than 50% of total time spent on counseling and coordination of care as described above  Current Length of Stay: 7 day(s)    Current Patient Status: Inpatient   Certification Statement: The patient will continue to require additional inpatient hospital stay due to acute pancreatitis, continues to have significant pain    Discharge Plan: home when stable    Code Status: Level 1 - Full Code      Subjective:   patient continues to have constant pain in the epigastric area  Patient states he is tolerating clear liquid diet, no nausea, or vomiting, but pain is not better compared to yesterday  Objective:     Vitals:   Temp (24hrs), Av 3 °F (36 3 °C), Min:97 2 °F (36 2 °C), Max:97 4 °F (36 3 °C)    Temp:  [97 2 °F (36 2 °C)-97 4 °F (36 3 °C)] 97 3 °F (36 3 °C)  HR:  [66-73] 67  Resp:  [18] 18  BP: (109-171)/(70-96) 171/96  SpO2:  [91 %-94 %] 91 %  Body mass index is 24 53 kg/m²  Input and Output Summary (last 24 hours): Intake/Output Summary (Last 24 hours) at 2020 7720  Last data filed at 2020 1900  Gross per 24 hour   Intake 1060 ml   Output    Net 1060 ml       Physical Exam:     Physical Exam   Constitutional: He appears well-developed and well-nourished  HENT:   Head: Normocephalic and atraumatic  Right Ear: External ear normal    Left Ear: External ear normal    Nose: Nose normal    Mouth/Throat: Oropharynx is clear and moist    Eyes: Conjunctivae and EOM are normal    Neck: Normal range of motion  Neck supple  Cardiovascular: Normal rate, regular rhythm and normal heart sounds  Pulmonary/Chest: Effort normal and breath sounds normal    Abdominal: There is tenderness  Significant tenderness in epigastric area  Genitourinary:   Genitourinary Comments: deferred   Neurological: He is alert  Cranial nerve 2 -12 are normal    Skin: Skin is warm and dry  Psychiatric: He has a normal mood and affect  Additional Data:     Labs:    Results from last 7 days   Lab Units 02/20/20  0431  02/15/20  0503   WBC Thousand/uL 4 30*   < > 5 20   HEMOGLOBIN g/dL 12 9*   < > 11 5*   HEMATOCRIT % 37 9*   < > 33 4*   PLATELETS Thousands/uL 214   < > 167   NEUTROS PCT %  --   --  61   LYMPHS PCT %  --   --  23*   MONOS PCT %  --   --  10   EOS PCT %  --   --  5    < > = values in this interval not displayed  Results from last 7 days   Lab Units 02/21/20  0430  02/15/20  0503   SODIUM mmol/L 137   < > 139   POTASSIUM mmol/L 3 4*   < > 3 5*   CHLORIDE mmol/L 103   < > 110*   CO2 mmol/L 26   < > 22   BUN mg/dL 9   < > 9   CREATININE mg/dL 0 80   < > 0 67*   ANION GAP mmol/L 8   < > 7   CALCIUM mg/dL 9 6   < > 8 5   ALBUMIN g/dL  --   --  2 9*   TOTAL BILIRUBIN mg/dL  --   --  0 40   ALK PHOS U/L  --   --  72   ALT U/L  --   --  27   AST U/L  --   --  23   GLUCOSE RANDOM mg/dL 93   < > 75    < > = values in this interval not displayed  Results from last 7 days   Lab Units 02/16/20  0524   PROCALCITONIN ng/ml <0 05           * I Have Reviewed All Lab Data Listed Above  * Additional Pertinent Lab Tests Reviewed:  Chon 66 Admission Reviewed       Recent Cultures (last 7 days):     Results from last 7 days   Lab Units 02/17/20  1339   C DIFF TOXIN B  Negative       Last 24 Hours Medication List:     Current Facility-Administered Medications:  albuterol 2 5 mg Nebulization Q4H PRN Stephani Mercedes MD   aspirin 81 mg Oral Daily Stephani Mercedes MD   enoxaparin 40 mg Subcutaneous Q24H [facility] HOME Susie Posey MD Neil   HYDROmorphone 1 mg Intravenous Q4H PRN Coy Corona MD   levothyroxine 12 5 mcg Oral Daily Coy Corona MD   losartan 50 mg Oral Daily Coy Corona MD   magnesium oxide 400 mg Oral BID Coy Corona MD   morphine injection 2 mg Intravenous Q3H PRN Coy Corona MD   ondansetron 4 mg Intravenous Q6H PRN Coy Corona MD   pancrelipase (Lip-Prot-Amyl) 24,000 Units Oral TID With Meals Coy Corona MD   pantoprazole 40 mg Intravenous Q24H [address] Coy Corona MD   potassium chloride 40 mEq Oral Once Julia Gonzalez MD   zolpidem 5 mg Oral HS PRN Coy Corona MD        Today, Patient Was Seen By: Julia Gonzalez MD    ** Please Note: Dictation voice to text software may have been used in the creation of this document   **

## 2020-02-21 NOTE — UTILIZATION REVIEW
Continued Stay Review    Date: 2/21/2020                         Current Patient Class: IP Current Level of Care: Med/Surg    HPI:69 y o  male initially admitted on 2/14 with acute pancreatitis    Assessment/Plan: States he is tolerating clear liquid diet, no N/V, but pain is not better compared to yesterday  Continues to have significant pain in epigastric area     Will not advance diet yet  GI was consulted, Creon was resumed  Morphine/Dilauid alternating prn for pain  Continue po meds for thyroid, BP    Continue to monitor supportive care     Intake/Output Summary (Last 24 hours) at 2/21/2020 0828  Last data filed at 2/20/2020 1900      Gross per 24 hour   Intake 1060 ml   Output    Net 1060 ml       Pertinent Labs/Diagnostic Results:   Results from last 7 days   Lab Units 02/20/20  0431 02/19/20  0454 02/15/20  0503   WBC Thousand/uL 4 30* 5 20 5 20   HEMOGLOBIN g/dL 12 9* 12 4* 11 5*   HEMATOCRIT % 37 9* 36 8* 33 4*   PLATELETS Thousands/uL 214 212 167   NEUTROS ABS Thousands/µL  --   --  3 20     Results from last 7 days   Lab Units 02/21/20  0430 02/20/20  0431 02/19/20  0454 02/16/20  0524 02/15/20  0503   SODIUM mmol/L 137 137 135* 136* 139   POTASSIUM mmol/L 3 4* 3 3* 3 7 3 7 3 5*   CHLORIDE mmol/L 103 101 99 105 110*   CO2 mmol/L 26 28 29 23 22   ANION GAP mmol/L 8 8 7 8 7   BUN mg/dL 9 6 5 7 9   CREATININE mg/dL 0 80 0 79 0 76 0 59* 0 67*   EGFR ml/min/1 73sq m 92 93 94 105 99   CALCIUM mg/dL 9 6 9 6 9 4 8 9 8 5     Results from last 7 days   Lab Units 02/15/20  0503   AST U/L 23   ALT U/L 27   ALK PHOS U/L 72   TOTAL PROTEIN g/dL 5 6*   ALBUMIN g/dL 2 9*   TOTAL BILIRUBIN mg/dL 0 40     Results from last 7 days   Lab Units 02/21/20  0430 02/20/20  0431 02/19/20  0454 02/16/20  0524 02/15/20  0503   GLUCOSE RANDOM mg/dL 93 95 89 62* 75     Results from last 7 days   Lab Units 02/16/20  0524   PROCALCITONIN ng/ml <0 05     Results from last 7 days   Lab Units 02/19/20  0454 02/16/20  0524 02/15/20  0503 LIPASE u/L 320* 263 483*     Results from last 7 days   Lab Units 02/17/20  1339   C DIFF TOXIN B  Negative     Vital Signs:   Date/Time  Temp  Pulse  Resp  BP  SpO2  O2 Device   02/21/20 1301    62    126/71       02/21/20 0744  97 3 °F (36 3 °C)Abnormal   67  18  171/96Abnormal    91 %  None (Room air)   02/20/20 2300  97 2 °F (36 2 °C)Abnormal   66  18  150/76  91 %  None (Room air)   02/20/20 1527  97 4 °F (36 3 °C)Abnormal   73  18  109/70  94 %  None (Room air)   02/20/20 0707  97 6 °F (36 4 °C)  83  20  154/90  96 %  None (Room air)   02/19/20 2300  98 2 °F (36 8 °C)  66  18  122/67  93 %  None (Room air)   02/19/20 1502  97 8 °F (36 6 °C)  62  18  141/65  95 %  None (Room air)   02/19/20 0703  98 2 °F (36 8 °C)  69  18  116/77  96 %  None (Room air)       Medications:   Scheduled Medications:  Medications:  aspirin 81 mg Oral Daily   enoxaparin 40 mg Subcutaneous Q24H [facility]   levothyroxine 12 5 mcg Oral Daily   losartan 50 mg Oral Daily   magnesium oxide 400 mg Oral BID   pancrelipase (Lip-Prot-Amyl) 24,000 Units Oral TID With Meals   pantoprazole 40 mg Intravenous Q24H [facility]        PRN Meds:  albuterol 2 5 mg Nebulization Q4H PRN   HYDROmorphone 1 mg Intravenous Q4H PRN  2/19 x4, 2/20 x3, 2/21 x1   morphine injection 2 mg Intravenous Q3H PRN   ondansetron 4 mg Intravenous Q6H PRN   zolpidem 5 mg Oral HS PRN       Discharge Plan: TBD    Network Utilization Review Department  [email or web address]  ATTENTION: Please call with any questions or concerns to [phone] and carefully listen to the prompts so that you are directed to the right person  All voicemails are confidential   Jurgen Hallmark all requests for admission clinical reviews, approved or denied determinations and any other requests to dedicated fax number below belonging to the campus where the patient is receiving treatment   List of dedicated fax numbers for the Facilities:  FACILITY NAME UR FAX NUMBER   ADMISSION DENIALS (Administrative/Medical Necessity) [address] [facility] (Maternity/NICU/Pediatrics) [place] [phone]   Alejandro Groestela [phone]   Brie Malta [phone]   [address] [phone]   [facility]  [phone]   [facility]  [address] [place] [address] And [address] [phone]

## 2020-02-21 NOTE — PLAN OF CARE
Problem: Potential for Falls  Goal: Patient will remain free of falls  Description  INTERVENTIONS:  - Assess patient frequently for physical needs  -  Identify cognitive and physical deficits and behaviors that affect risk of falls    -  [place] fall precautions as indicated by assessment   - Educate patient/family on patient safety including physical limitations  - Instruct patient to call for assistance with activity based on assessment  - Modify environment to reduce risk of injury  - Consider OT/PT consult to assist with strengthening/mobility  Outcome: Adequate for Discharge

## 2020-02-21 NOTE — DISCHARGE INSTRUCTIONS
Oxycodone, Rapid Release (By mouth)   Oxycodone Hydrochloride (tb-j-WGE-done salome-droe-KLOR-mckenzie)  Treats moderate to severe pain  This medicine is a narcotic pain reliever  Brand Name(s): Oxaydo, Oxy IR, Roxicodone   There may be other brand names for this medicine  When This Medicine Should Not Be Used: This medicine is not right for everyone  Do not use it if you had an allergic reaction to oxycodone, codeine, hydrocodone, dihydrocodeine, or morphine, or you have a stomach or bowel blockage  How to Use This Medicine:   Capsule, Liquid, Tablet  · Take your medicine as directed  Your dose may need to be changed several times to find what works best for you  · An overdose can be dangerous  Follow directions carefully so you do not get too much medicine at one time  · Oral liquid: Measure the oral liquid medicine with a marked measuring spoon, oral syringe, or medicine cup  · Oxaydo® tablet: Swallow it whole with enough water to swallow it completely  Do not break, crush, chew, or dissolve it  Do not wet the tablet before you put it in your mouth  · This medicine should come with a Medication Guide  Ask your pharmacist for a copy if you do not have one  · Missed dose: Take a dose as soon as you remember  If it is almost time for your next dose, wait until then and take a regular dose  Do not take extra medicine to make up for a missed dose  · Store the medicine in a closed container at room temperature, away from heat, moisture, and direct light  Store the medicine in a secure place to prevent others from getting it  Ask your pharmacist about the best way to dispose of medicine you do not use  Drugs and Foods to Avoid:   Ask your doctor or pharmacist before using any other medicine, including over-the-counter medicines, vitamins, and herbal products  · Do not use this medicine if you are using or have used an MAO inhibitor within the past 14 days  · Some medicines can affect how oxycodone works   Tell your doctor if you are using any of the following:  ¨ Amiodarone, carbamazepine, erythromycin, ketoconazole, phenytoin, quinidine, rifampin, ritonavir  ¨ Diuretic (water pill)  ¨ Medicine to treat depression or anxiety  ¨ Medicine to treat migraine headaches  ¨ Phenothiazine medicine  · Tell your doctor if you use anything else that makes you sleepy  Some examples are allergy medicine, narcotic pain medicine, and alcohol  Tell your doctor if you are using buprenorphine, butorphanol, nalbuphine, pentazocine, or a muscle relaxer  · Do not drink alcohol while you are using this medicine  Warnings While Using This Medicine:   · Tell your doctor if you are pregnant or breastfeeding, or if you have kidney disease, liver disease, heart disease, low blood pressure, lung disease or breathing problems (such as asthma, COPD), scoliosis, an enlarged prostate or trouble urinating, an underactive thyroid, Bob disease, gallbladder or pancreas problems, or digestion problems  Tell your doctor if you have a history of head injury, brain tumor, mental health problems, seizures, or alcohol or drug addiction  · This medicine may cause the following problems:  ¨ High risk of overdose, which can lead to death  ¨ Respiratory depression (serious breathing problem that can be life-threatening)  ¨ Serotonin syndrome, when used with certain medicines  · This medicine may make you dizzy, drowsy, or faint  Do not drive or do anything else that could be dangerous until you know how this medicine affects you  Sit or lie down if you feel dizzy  Stand up carefully  · This medicine can be habit-forming  Do not use more than your prescribed dose  Call your doctor if you think your medicine is not working  · Do not stop using this medicine suddenly  Your doctor will need to slowly decrease your dose before you stop it completely  · This medicine may cause constipation, especially with long-term use   Ask your doctor if you should use a laxative to prevent and treat constipation  Drink plenty of liquids to help avoid constipation  · This medicine could cause infertility  Talk with your doctor before using this medicine if you plan to have children  · Keep all medicine out of the reach of children  Never share your medicine with anyone  Possible Side Effects While Using This Medicine:   Call your doctor right away if you notice any of these side effects:  · Allergic reaction: Itching or hives, swelling in your face or hands, swelling or tingling in your mouth or throat, chest tightness, trouble breathing  · Anxiety, restlessness, fast heartbeat, fever, sweating, muscle spasms, twitching, nausea, vomiting, diarrhea, seeing or hearing things that are not there  · Blue lips, fingernails, or skin, trouble breathing  · Extreme dizziness or weakness, shallow breathing, slow heartbeat, sweating, cold or clammy skin, seizures  · Lightheadedness, dizziness, fainting  · Severe constipation, stomach pain  If you notice these less serious side effects, talk with your doctor:   · Mild constipation  · Sleepiness, tiredness  If you notice other side effects that you think are caused by this medicine, tell your doctor  Call your doctor for medical advice about side effects  You may report side effects to FDA at 6-656-FDA-8537  © 2017 [employer] Information is for End User's use only and may not be sold, redistributed or otherwise used for commercial purposes  The above information is an  only  It is not intended as medical advice for individual conditions or treatments  Talk to your doctor, nurse or pharmacist before following any medical regimen to see if it is safe and effective for you

## 2020-02-21 NOTE — NURSING NOTE
On initial rounds patient in no apparent distress  Skin warm and dry touch  Pleasant and cooperative  Woke patient up to do assessment and stated he had pain at level 8, was medicated  Voids adequate amount of urine  Tolerating diet at this time  Ambulates well by self but encourage to call for assistance if needed  All safety maintained  Will continue to monitor

## 2020-02-21 NOTE — DISCHARGE SUMMARY
Discharge- [facility] 1950, 81 y o  male MRN: [id number]    Unit/Bed#: 7T [facility] 710-02 Encounter: [id number]    Primary Care Provider: Kristel Shane MD   Date and time admitted to hospital: 2/14/2020 11:59 AM        * Acute pancreatitis  Assessment & Plan  · Patient's pain is significantly better in afternoon today  Patient is walking around the floor without any pain or difficulty  · Lipase is normal  · Pancreatitis most likely 2/2 to alcohol use  · GI consult appreciated: creon was resumed  · Patient was admitted with pancreatitis back in October was toe secondary to metformin  · CT of the abdomen and pelvis resolved-    1  Acute recurrent edematous pancreatitis, now in the region of the pancreatic head and uncinate process  · 2   2 5 cm hypoattenuating lesion in the pancreatic head, increased from the prior exam, likely representing an enlarging pseudocyst or cystic pancreatic neoplasm  No main pancreatic duct dilation  Continued attention on follow-up is recommended  · He will need an EUS outpatient  · Morphine Dilaudid alternating if needed for pain control  · No cholelithiasis  · Triglycerides level  146  · Will discharge patient with pain medication  Discharging Physician / Practitioner: Fantasma Badillo MD  PCP: Kristel Shane MD  Admission Date:   Admission Orders (From admission, onward)     Ordered        02/14/20 1454  Inpatient Admission (expected length of stay for this patient Order details is greater than two midnights)  Once                   Discharge Date: 02/21/20    Resolved Problems  Date Reviewed: 2/20/2020    None          Consultations During Hospital Stay:  · Gastroenterology    Procedures Performed:   · none    Significant Findings / Test Results:   · Lipase 1597 on admission  · CT scan: IMPRESSION: 1  Acute recurrent edematous pancreatitis, now in the region of the pancreatic head and uncinate process     2 5 cm hypoattenuating lesion in the pancreatic head, increased from the prior exam, likely representing an enlarging pseudocyst or cystic pancreatic neoplasm  No main pancreatic duct dilation  Continued attention on follow-up is recommended  Incidental Findings:   · none     Test Results Pending at Discharge (will require follow up):   · none     Outpatient Tests Requested:  · Endoscopic ultrasound is needed for pancreatic mass  Complications:  none    Reason for Admission: acute pancreatitis  Hospital Course:     Kt Preston is a 81 y o  male patient who originally presented to the hospital on 2/14/2020 due to acute pancreatitis  Lipase was elevated out of patient  Patient was placed on NPO and IV fluid  Over the next few days, lipase continue to improve  Gastroenterology was consulted  Added Creon with meals  Patient's diet was advanced to clear liquid diet, he tolerated well  Today pain is significantly better  Patient will be discharged home  Please see above list of diagnoses and related plan for additional information  Condition at Discharge: good     Discharge Day Visit / Exam:     * Please refer to separate progress note for these details *    Discussion with Family: no    Discharge instructions/Information to patient and family:   See after visit summary for information provided to patient and family  Provisions for Follow-Up Care:  See after visit summary for information related to follow-up care and any pertinent home health orders  Disposition:     Home    ·      Planned Readmission: no     Discharge Statement:  I spent 30 minutes discharging the patient  This time was spent on the day of discharge  I had direct contact with the patient on the day of discharge  Greater than 50% of the total time was spent examining patient, answering all patient questions, arranging and discussing plan of care with patient as well as directly providing post-discharge instructions    Additional time then spent on discharge activities  Discharge Medications:  See after visit summary for reconciled discharge medications provided to patient and family        ** Please Note: This note has been constructed using a voice recognition system **

## 2020-02-21 NOTE — ASSESSMENT & PLAN NOTE
· Patient's pain is significantly better in afternoon today  Patient is walking around the floor without any pain or difficulty  · Lipase is normal  · Pancreatitis most likely 2/2 to alcohol use  · GI consult appreciated: creon was resumed  · Patient was admitted with pancreatitis back in October was toe secondary to metformin  · CT of the abdomen and pelvis resolved-    1  Acute recurrent edematous pancreatitis, now in the region of the pancreatic head and uncinate process  · 2   2 5 cm hypoattenuating lesion in the pancreatic head, increased from the prior exam, likely representing an enlarging pseudocyst or cystic pancreatic neoplasm  No main pancreatic duct dilation  Continued attention on follow-up is recommended  · He will need an EUS outpatient  · Morphine Dilaudid alternating if needed for pain control  · No cholelithiasis  · Triglycerides level  146  · Will discharge patient with pain medication

## 2020-02-22 NOTE — PROGRESS NOTES
Progress Note - Remington Garcia 81 y o  male MRN: [id number]    Unit/Bed#: 7T [facility] 710-02 Encounter: [id number]        Subjective:     Doing better today  Tolerating clears  Ambulating  Passing urine  Still complaining of abdominal pain in the epigastric region  Objective:     Vitals: Blood pressure 146/75, pulse 62, temperature 97 8 °F (36 6 °C), temperature source Temporal, resp  rate 18, height 5' 3" (1 6 m), weight 62 8 kg (138 lb 7 2 oz), SpO2 96 %  ,Body mass index is 24 53 kg/m²  Intake/Output Summary (Last 24 hours) at 2/21/2020 1928  Last data filed at 2/21/2020 1250  Gross per 24 hour   Intake 360 ml   Output    Net 360 ml       Physical Exam:     General Appearance: Alert, appears stated age and cooperative  Lungs: Clear to auscultation bilaterally, no rales or rhonchi, no labored breathing/accessory muscle use  Heart: Regular rate and rhythm, S1, S2 normal, no murmur, click, rub or gallop  Abdomen: Soft, mild-tender, non-distended; bowel sounds normal; no masses or no organomegaly  Extremities: No cyanosis, edema    Invasive Devices     None                 Lab Results:    Results from last 7 days   Lab Units 02/20/20  0431  02/15/20  0503   WBC Thousand/uL 4 30*   < > 5 20   HEMOGLOBIN g/dL 12 9*   < > 11 5*   HEMATOCRIT % 37 9*   < > 33 4*   PLATELETS Thousands/uL 214   < > 167   NEUTROS PCT %  --   --  61   LYMPHS PCT %  --   --  23*   MONOS PCT %  --   --  10   EOS PCT %  --   --  5    < > = values in this interval not displayed  Results from last 7 days   Lab Units 02/21/20  0430  02/15/20  0503   POTASSIUM mmol/L 3 4*   < > 3 5*   CHLORIDE mmol/L 103   < > 110*   CO2 mmol/L 26   < > 22   BUN mg/dL 9   < > 9   CREATININE mg/dL 0 80   < > 0 67*   CALCIUM mg/dL 9 6   < > 8 5   ALK PHOS U/L  --   --  72   ALT U/L  --   --  27   AST U/L  --   --  23    < > = values in this interval not displayed           Results from last 7 days   Lab Units 02/19/20  0454   LIPASE u/L 320*       Imaging Studies: I have personally reviewed pertinent imaging studies  Xr Chest Pa & Lateral    Result Date: 2/15/2020  Impression: New mild patchy in the left lower lobe, atelectasis versus pneumonia  Workstation performed: [id number]     Ct Abdomen Pelvis With Contrast    Addendum Date: 2/14/2020    ADDENDUM: As described in the "body" of the report, 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 without subchondral collapse  Result Date: 2/14/2020  Impression: 1  Acute recurrent edematous pancreatitis, now in the region of the pancreatic head and uncinate process  2   2 5 cm hypoattenuating lesion in the pancreatic head, increased from the prior exam, likely representing an enlarging pseudocyst or cystic pancreatic neoplasm  No main pancreatic duct dilation  Continued attention on follow-up is recommended  The study was marked in EPIC for significant notification  Workstation performed: [id number]       ASSESMENT/ PLAN:     1  Acute pancreatitis  Possibly alcohol related but have not ruled out biliary pathology  CT scan did not show any stones in the gallbladder however would recommend doing an ultrasound which can be done as an outpatient to evaluate for non calcific stones  At this time he is tolerating clear liquid diet and certainly we can advance his diet  If he tolerates that he can certainly be discharged home as well  He would need outpatient follow-up  If any stones are seen he will need outpatient referral for surgery for cholecystectomy  2  Pancreatic cyst   Possibly enlarging pseudocyst vs IPMN  Needs further evaluation by EUS  This will be done as an outpatient  At that time we will also evaluate his gallbladder for stones    Discussed with the primary team

## 2020-02-23 ENCOUNTER — TELEPHONE (OUTPATIENT)
Dept: NON INVASIVE DIAGNOSTICS | Facility: HOSPITAL | Age: 70
End: 2020-02-23

## 2020-02-24 ENCOUNTER — TELEPHONE (OUTPATIENT)
Dept: GASTROENTEROLOGY | Facility: MEDICAL CENTER | Age: 70
End: 2020-02-24

## 2020-02-24 ENCOUNTER — PREP FOR PROCEDURE (OUTPATIENT)
Dept: VASCULAR SURGERY | Facility: CLINIC | Age: 70
End: 2020-02-24

## 2020-02-24 DIAGNOSIS — Z71.89 COMPLEX CARE COORDINATION: Primary | ICD-10-CM

## 2020-02-24 DIAGNOSIS — I65.23 ASYMPTOMATIC CAROTID ARTERY STENOSIS, BILATERAL: Primary | ICD-10-CM

## 2020-02-24 NOTE — TELEPHONE ENCOUNTER
Spoke to the patient to schedule his surgery for 3-5-20 at [facility] Patient was told nothing to eat or drink after midnight on 3-4-20 Patient was told to have blood work done before the surgery Patient confirmed and understood the instructions   LLF

## 2020-02-24 NOTE — TELEPHONE ENCOUNTER
----- Message from Juan M Caicedo MD sent at 2/21/2020  2:22 PM EST -----  Please set up for office appointment in 2 - 3 weeks  Thanks

## 2020-02-25 ENCOUNTER — PATIENT OUTREACH (OUTPATIENT)
Dept: CASE MANAGEMENT | Facility: OTHER | Age: 70
End: 2020-02-25

## 2020-02-27 ENCOUNTER — ANESTHESIA EVENT (OUTPATIENT)
Dept: PERIOP | Facility: HOSPITAL | Age: 70
DRG: 039 | End: 2020-02-27
Payer: COMMERCIAL

## 2020-02-27 RX ORDER — SODIUM CHLORIDE 9 MG/ML
125 INJECTION, SOLUTION INTRAVENOUS CONTINUOUS
Status: CANCELLED | OUTPATIENT
Start: 2020-03-05

## 2020-02-27 NOTE — UTILIZATION REVIEW
Discharge- Luis M Pruitt 1950, 81 y o  male MRN: [id number]    Unit/Bed#: 7T [facility] 710-02 Encounter: [id number]    Primary Care Provider: Veena Wilks MD   Date and time admitted to hospital: 2/14/2020 11:59 AM        Assessment & plan notes cannot be loaded without a specified hospital service  Discharging Physician / Practitioner: Saul Stringer  PCP: Veena Wilks MD  Admission Date:   Admission Orders (From admission, onward)     Ordered        02/14/20 1454  Inpatient Admission (expected length of stay for this patient Order details is greater than two midnights)  Once                   Discharge Date: 02/27/20    Resolved Problems  Date Reviewed: 2/20/2020    None          Consultations During Hospital Stay:  · Gastroenterology    Procedures Performed:   · none    Significant Findings / Test Results:   · Lipase 1597 on admission  · CT scan: IMPRESSION: 1  Acute recurrent edematous pancreatitis, now in the region of the pancreatic head and uncinate process  2 5 cm hypoattenuating lesion in the pancreatic head, increased from the prior exam, likely representing an enlarging pseudocyst or cystic pancreatic neoplasm  No main pancreatic duct dilation  Continued attention on follow-up is recommended  Incidental Findings:   · none     Test Results Pending at Discharge (will require follow up):   · none     Outpatient Tests Requested:  · Endoscopic ultrasound is needed for pancreatic mass  Complications:  none    Reason for Admission: acute pancreatitis  Hospital Course:     Luis M Pruitt is a 81 y o  male patient who originally presented to the hospital on 2/14/2020 due to acute pancreatitis  Lipase was elevated out of patient  Patient was placed on NPO and IV fluid  Over the next few days, lipase continue to improve  Gastroenterology was consulted  Added Creon with meals  Patient's diet was advanced to clear liquid diet, he tolerated well    Today pain is significantly better  Patient will be discharged home  Please see above list of diagnoses and related plan for additional information  Condition at Discharge: good     Discharge Day Visit / Exam:     * Please refer to separate progress note for these details *    Discussion with Family: no    Discharge instructions/Information to patient and family:   See after visit summary for information provided to patient and family  Provisions for Follow-Up Care:  See after visit summary for information related to follow-up care and any pertinent home health orders  Disposition:     Home    ·      Planned Readmission: no     Discharge Statement:  I spent 30 minutes discharging the patient  This time was spent on the day of discharge  I had direct contact with the patient on the day of discharge  Greater than 50% of the total time was spent examining patient, answering all patient questions, arranging and discussing plan of care with patient as well as directly providing post-discharge instructions  Additional time then spent on discharge activities  Discharge Medications:  See after visit summary for reconciled discharge medications provided to patient and family        ** Please Note: This note has been constructed using a voice recognition system **

## 2020-02-28 ENCOUNTER — APPOINTMENT (OUTPATIENT)
Dept: LAB | Facility: HOSPITAL | Age: 70
DRG: 039 | End: 2020-02-28
Attending: SURGERY
Payer: COMMERCIAL

## 2020-02-28 ENCOUNTER — TELEPHONE (OUTPATIENT)
Dept: OBGYN CLINIC | Facility: HOSPITAL | Age: 70
End: 2020-02-28

## 2020-02-28 ENCOUNTER — APPOINTMENT (OUTPATIENT)
Dept: PREADMISSION TESTING | Facility: HOSPITAL | Age: 70
DRG: 039 | End: 2020-02-28
Payer: COMMERCIAL

## 2020-02-28 LAB
ABO GROUP BLD: NORMAL
ANION GAP SERPL CALCULATED.3IONS-SCNC: 10 MMOL/L (ref 4–13)
BLD GP AB SCN SERPL QL: NEGATIVE
BUN SERPL-MCNC: 14 MG/DL (ref 5–25)
CALCIUM SERPL-MCNC: 9.9 MG/DL (ref 8.3–10.1)
CHLORIDE SERPL-SCNC: 103 MMOL/L (ref 100–108)
CO2 SERPL-SCNC: 27 MMOL/L (ref 21–32)
CREAT SERPL-MCNC: 0.93 MG/DL (ref 0.6–1.3)
ERYTHROCYTE [DISTWIDTH] IN BLOOD BY AUTOMATED COUNT: 13.2 % (ref 11.6–15.1)
EST. AVERAGE GLUCOSE BLD GHB EST-MCNC: 111 MG/DL
GFR SERPL CREATININE-BSD FRML MDRD: 85 ML/MIN/1.73SQ M
GLUCOSE P FAST SERPL-MCNC: 86 MG/DL (ref 65–99)
GLUCOSE SERPL-MCNC: 86 MG/DL (ref 65–140)
HBA1C MFR BLD: 5.5 %
HCT VFR BLD AUTO: 43.4 % (ref 36.5–49.3)
HGB BLD-MCNC: 14.4 G/DL (ref 12–17)
INR PPP: 0.99 (ref 0.84–1.19)
MCH RBC QN AUTO: 32.4 PG (ref 26.8–34.3)
MCHC RBC AUTO-ENTMCNC: 33.2 G/DL (ref 31.4–37.4)
MCV RBC AUTO: 98 FL (ref 82–98)
PLATELET # BLD AUTO: 288 THOUSANDS/UL (ref 149–390)
PMV BLD AUTO: 11.2 FL (ref 8.9–12.7)
POTASSIUM SERPL-SCNC: 3.5 MMOL/L (ref 3.5–5.3)
PROTHROMBIN TIME: 13.2 SECONDS (ref 11.6–14.5)
RBC # BLD AUTO: 4.44 MILLION/UL (ref 3.88–5.62)
RH BLD: NEGATIVE
SODIUM SERPL-SCNC: 140 MMOL/L (ref 136–145)
SPECIMEN EXPIRATION DATE: NORMAL
WBC # BLD AUTO: 5.95 THOUSAND/UL (ref 4.31–10.16)

## 2020-02-28 PROCEDURE — 83036 HEMOGLOBIN GLYCOSYLATED A1C: CPT | Performed by: SURGERY

## 2020-02-28 PROCEDURE — 85610 PROTHROMBIN TIME: CPT | Performed by: SURGERY

## 2020-02-28 PROCEDURE — 86900 BLOOD TYPING SEROLOGIC ABO: CPT | Performed by: SURGERY

## 2020-02-28 PROCEDURE — 86850 RBC ANTIBODY SCREEN: CPT | Performed by: SURGERY

## 2020-02-28 PROCEDURE — 80048 BASIC METABOLIC PNL TOTAL CA: CPT | Performed by: SURGERY

## 2020-02-28 PROCEDURE — 85027 COMPLETE CBC AUTOMATED: CPT | Performed by: SURGERY

## 2020-02-28 PROCEDURE — 86901 BLOOD TYPING SEROLOGIC RH(D): CPT | Performed by: SURGERY

## 2020-02-28 NOTE — PRE-PROCEDURE INSTRUCTIONS
Pre-Surgery Instructions:   Medication Instructions    aspirin 81 MG tablet Patient was instructed by Physician and understands   diclofenac sodium (VOLTAREN) 1 % Patient was instructed by Physician and understands   ezetimibe-simvastatin (VYTORIN) 10-40 mg per tablet Patient was instructed by Physician and understands   gabapentin (NEURONTIN) 300 mg capsule Patient was instructed by Physician and understands   levothyroxine 25 mcg tablet Patient was instructed by Physician and understands   losartan (COZAAR) 100 MG tablet Patient was instructed by Physician and understands   magnesium oxide (MAG-OX) 400 mg Patient was instructed by Physician and understands   methocarbamol (ROBAXIN) 500 mg tablet Patient was instructed by Physician and understands   nicotine polacrilex (NICORETTE) 4 mg gum Patient was instructed by Physician and understands   oxyCODONE-acetaminophen (PERCOCET) 5-325 mg per tablet Patient was instructed by Physician and understands   pancrelipase, Lip-Prot-Amyl, (CREON) 24,000 units Patient was instructed by Physician and understands   ranitidine (ZANTAC) 150 mg tablet Patient was instructed by Physician and understands   zolpidem (AMBIEN) 10 mg tablet Patient was instructed by Physician and understands  Pt instructed to take levothyroxine, ranitidine, and gabapentin the morning of surgery with a small sip of water  [facility] preop instructions given to pt and reviewed  Pt given Chlorhexidine

## 2020-02-28 NOTE — TELEPHONE ENCOUNTER
Patient called back, Brandyn Esteves was on another call  I gave the patient her # again, he will try again in a few minutes

## 2020-02-28 NOTE — ANESTHESIA PREPROCEDURE EVALUATION
Review of Systems/Medical History  Patient summary reviewed  Chart reviewed  No history of anesthetic complications     Cardiovascular  Hyperlipidemia, Hypertension controlled, CAD ,   Comment: Bilateral carotid stenosis,  Pulmonary  Smoker ex-smoker  ,   Comment: Quit recently  1 PPD previously     GI/Hepatic    GERD well controlled,  Hiatal hernia, Pancreatic problem (Hx drug-induced pancreatitis),   Comment: H/o pancreatitis jeunitis and duodenitis      Kidney stones,        Endo/Other  Diabetes type 2 Oral agent, History of thyroid disease , hypothyroidism,      GYN       Hematology  Negative hematology ROS      Musculoskeletal  Back pain , lumbar pain and spinal stenosis, Sciatica,   Arthritis     Neurology    TIA,    Psychology   Negative psychology ROS              Physical Exam    Airway    Mallampati score: III  TM Distance: >3 FB  Neck ROM: full     Dental   Comment: Multiple crowns,     Cardiovascular  Rhythm: regular, Rate: normal, Cardiovascular exam normal    Pulmonary  Pulmonary exam normal Breath sounds clear to auscultation,     Other Findings        Anesthesia Plan  ASA Score- 3     Anesthesia Type- general with ASA Monitors  Additional Monitors: arterial line  Airway Plan: ETT  Plan Factors-Patient not instructed to abstain from smoking on day of procedure  Patient did not smoke on day of surgery  Induction- intravenous  Postoperative Plan-     Informed Consent- Anesthetic plan and risks discussed with patient                Lab Results   Component Value Date    HGBA1C 5 4 11/20/2019       Lab Results   Component Value Date    K 3 4 (L) 02/21/2020     02/21/2020    CO2 26 02/21/2020    BUN 9 02/21/2020    CREATININE 0 80 02/21/2020    GLUF 98 12/17/2019    CALCIUM 9 6 02/21/2020    CORRECTEDCA 10 4 (H) 11/13/2019    AST 23 02/15/2020    ALT 27 02/15/2020    ALKPHOS 72 02/15/2020    EGFR 92 02/21/2020       Lab Results   Component Value Date    WBC 4 30 (L) 02/20/2020 HGB 12 9 (L) 02/20/2020    HCT 37 9 (L) 02/20/2020    MCV 97 02/20/2020     02/20/2020     Normal sinus rhythm  Normal ECG  When compared with ECG of 27-OCT-2019 09:41, (unconfirmed)  No significant change was found  Confirmed by Cisco Grace ([id number]) on 10/28/2019 7:55:06 AM    ECHO march 2018   CONCLUSIONS  - Mildly increased left ventricular wall thickness, normal left  ventricular systolic function and hyperdynamic wall motion  EF  greater than 65%  - Normal diastolic function   - No significant chamber hypertrophy or enlargement  - Mild aortic valve sclerosis  No aortic valve stenosis or  regurgitation   - No significant mitral valve stenosis or regurgitation   - Trace, physiologic tricuspid valve regurgitation   - No pulmonary hypertension   - No pericardial effusion   - Mildly dilated aortic root and visualize portion of proximal  ascending aorta   Possible mild thoracic aortic aneurysm      Compared to previous echocardiogram from 7/29/2015 there is  overall no significant change      Shlomo Frye  (Electronically Signed)  Final Date:      19 March 2018 14:40

## 2020-02-28 NOTE — TELEPHONE ENCOUNTER
I called and left a voicemail for patient regarding his upcoming surgery on 03/10/2020  I did ask for a return call to my direct phone number  I will wait for patient to return my call   Thank you

## 2020-03-02 ENCOUNTER — TELEPHONE (OUTPATIENT)
Dept: OBGYN CLINIC | Facility: HOSPITAL | Age: 70
End: 2020-03-02

## 2020-03-02 ENCOUNTER — TELEPHONE (OUTPATIENT)
Dept: NEUROSURGERY | Facility: CLINIC | Age: 70
End: 2020-03-02

## 2020-03-02 ENCOUNTER — PATIENT OUTREACH (OUTPATIENT)
Dept: CASE MANAGEMENT | Facility: OTHER | Age: 70
End: 2020-03-02

## 2020-03-02 DIAGNOSIS — M54.16 RADICULOPATHY, LUMBAR REGION: Primary | ICD-10-CM

## 2020-03-02 NOTE — TELEPHONE ENCOUNTER
Received a call from [phone]  She reports pt has a lumbar surgery scheduled with Ortho, but pt would like a second opinion  Discussed with Sebastian Carson who reports pt could be seen by [facility] but should go thru call center   Please contact pt  Thank You

## 2020-03-02 NOTE — TELEPHONE ENCOUNTER
Patient sees Dr Nena Lovell the nurse  for [employer] is calling in wanting to know if the Dr can give the patient a referral to neurosurgery for a second opinion  She is asking if this can be placed into epic for Dr Alek Hawley through [employer]  She is asking if this can be done as soon as possible        Call back if needed# [phone]

## 2020-03-02 NOTE — PROGRESS NOTES
Outpatient Care Management Note:    Re: Spoke with pt, provided my name and contact information  Denies need for complex care management at this time  Reports he is scheduled for a Carotid Endarterectomy on 3/5/20  He states he is scheduled for spinal surgery at the end of the month, is interested in 2nd opinion with neurosurgery  Requested assistance with obtaining more information  Care manager left a voicemail for neurosurgery nurse triage  Will await recommendation for second opinion  Pt aware of same  Will follow up with pt after surgery if further complex needs post op

## 2020-03-05 ENCOUNTER — HOSPITAL ENCOUNTER (OUTPATIENT)
Dept: RADIOLOGY | Facility: HOSPITAL | Age: 70
Discharge: HOME | DRG: 039 | End: 2020-03-05
Attending: SURGERY
Payer: COMMERCIAL

## 2020-03-05 ENCOUNTER — ANESTHESIA (OUTPATIENT)
Dept: PERIOP | Facility: HOSPITAL | Age: 70
DRG: 039 | End: 2020-03-05
Payer: COMMERCIAL

## 2020-03-05 ENCOUNTER — HOSPITAL ENCOUNTER (OUTPATIENT)
Dept: NON INVASIVE DIAGNOSTICS | Facility: HOSPITAL | Age: 70
Discharge: HOME | End: 2020-03-05
Payer: COMMERCIAL

## 2020-03-05 ENCOUNTER — HOSPITAL ENCOUNTER (INPATIENT)
Facility: HOSPITAL | Age: 70
LOS: 1 days | Discharge: HOME | DRG: 039 | End: 2020-03-06
Attending: SURGERY | Admitting: SURGERY
Payer: COMMERCIAL

## 2020-03-05 DIAGNOSIS — E03.9 HYPOTHYROIDISM (ACQUIRED): Primary | ICD-10-CM

## 2020-03-05 DIAGNOSIS — I65.23 ASYMPTOMATIC CAROTID ARTERY STENOSIS, BILATERAL: ICD-10-CM

## 2020-03-05 DIAGNOSIS — K21.9 GASTROESOPHAGEAL REFLUX DISEASE WITHOUT ESOPHAGITIS: ICD-10-CM

## 2020-03-05 DIAGNOSIS — I65.22 LEFT CAROTID ARTERY STENOSIS: ICD-10-CM

## 2020-03-05 DIAGNOSIS — E78.2 MIXED HYPERLIPIDEMIA: ICD-10-CM

## 2020-03-05 DIAGNOSIS — I65.23 BILATERAL CAROTID ARTERY OCCLUSION: ICD-10-CM

## 2020-03-05 DIAGNOSIS — I10 ESSENTIAL HYPERTENSION, BENIGN: ICD-10-CM

## 2020-03-05 DIAGNOSIS — I65.22 CAROTID STENOSIS, LEFT: ICD-10-CM

## 2020-03-05 PROBLEM — R00.1 BRADYCARDIA: Status: ACTIVE | Noted: 2020-03-05

## 2020-03-05 LAB
ABO GROUP BLD: NORMAL
RH BLD: NEGATIVE

## 2020-03-05 PROCEDURE — 93882 EXTRACRANIAL UNI/LTD STUDY: CPT

## 2020-03-05 PROCEDURE — 03CL0ZZ EXTIRPATION OF MATTER FROM LEFT INTERNAL CAROTID ARTERY, OPEN APPROACH: ICD-10-PCS | Performed by: SURGERY

## 2020-03-05 PROCEDURE — 35301 RECHANNELING OF ARTERY: CPT | Performed by: SURGERY

## 2020-03-05 PROCEDURE — 03UL0KZ SUPPLEMENT LEFT INTERNAL CAROTID ARTERY WITH NONAUTOLOGOUS TISSUE SUBSTITUTE, OPEN APPROACH: ICD-10-PCS | Performed by: SURGERY

## 2020-03-05 PROCEDURE — 99291 CRITICAL CARE FIRST HOUR: CPT | Performed by: NURSE PRACTITIONER

## 2020-03-05 PROCEDURE — 85347 COAGULATION TIME ACTIVATED: CPT

## 2020-03-05 PROCEDURE — C1781 MESH (IMPLANTABLE): HCPCS | Performed by: SURGERY

## 2020-03-05 DEVICE — XENOSURE BIOLOGIC PATCH, 0.8CM X 8CM, EIFU
Type: IMPLANTABLE DEVICE | Site: CAROTID | Status: FUNCTIONAL
Brand: XENOSURE BIOLOGIC PATCH

## 2020-03-05 RX ORDER — HYDROMORPHONE HCL/PF 1 MG/ML
SYRINGE (ML) INJECTION AS NEEDED
Status: DISCONTINUED | OUTPATIENT
Start: 2020-03-05 | End: 2020-03-05 | Stop reason: SURG

## 2020-03-05 RX ORDER — METHOCARBAMOL 500 MG/1
500 TABLET, FILM COATED ORAL 2 TIMES DAILY
Status: DISCONTINUED | OUTPATIENT
Start: 2020-03-06 | End: 2020-03-06 | Stop reason: HOSPADM

## 2020-03-05 RX ORDER — GLYCOPYRROLATE 0.2 MG/ML
INJECTION INTRAMUSCULAR; INTRAVENOUS AS NEEDED
Status: DISCONTINUED | OUTPATIENT
Start: 2020-03-05 | End: 2020-03-05 | Stop reason: SURG

## 2020-03-05 RX ORDER — ONDANSETRON 2 MG/ML
INJECTION INTRAMUSCULAR; INTRAVENOUS AS NEEDED
Status: DISCONTINUED | OUTPATIENT
Start: 2020-03-05 | End: 2020-03-05 | Stop reason: SURG

## 2020-03-05 RX ORDER — GABAPENTIN 300 MG/1
300 CAPSULE ORAL 3 TIMES DAILY
Status: DISCONTINUED | OUTPATIENT
Start: 2020-03-05 | End: 2020-03-06 | Stop reason: HOSPADM

## 2020-03-05 RX ORDER — LOSARTAN POTASSIUM 50 MG/1
100 TABLET ORAL DAILY
Status: DISCONTINUED | OUTPATIENT
Start: 2020-03-06 | End: 2020-03-06

## 2020-03-05 RX ORDER — PRAVASTATIN SODIUM 80 MG/1
80 TABLET ORAL
Status: DISCONTINUED | OUTPATIENT
Start: 2020-03-06 | End: 2020-03-06 | Stop reason: HOSPADM

## 2020-03-05 RX ORDER — ROCURONIUM BROMIDE 10 MG/ML
INJECTION, SOLUTION INTRAVENOUS AS NEEDED
Status: DISCONTINUED | OUTPATIENT
Start: 2020-03-05 | End: 2020-03-05 | Stop reason: SURG

## 2020-03-05 RX ORDER — HEPARIN SODIUM 5000 [USP'U]/ML
5000 INJECTION, SOLUTION INTRAVENOUS; SUBCUTANEOUS EVERY 8 HOURS SCHEDULED
Status: DISCONTINUED | OUTPATIENT
Start: 2020-03-05 | End: 2020-03-06 | Stop reason: HOSPADM

## 2020-03-05 RX ORDER — FENTANYL CITRATE/PF 50 MCG/ML
25 SYRINGE (ML) INJECTION
Status: COMPLETED | OUTPATIENT
Start: 2020-03-05 | End: 2020-03-05

## 2020-03-05 RX ORDER — PROTAMINE SULFATE 10 MG/ML
INJECTION, SOLUTION INTRAVENOUS AS NEEDED
Status: DISCONTINUED | OUTPATIENT
Start: 2020-03-05 | End: 2020-03-05 | Stop reason: SURG

## 2020-03-05 RX ORDER — HYDRALAZINE HYDROCHLORIDE 20 MG/ML
15 INJECTION INTRAMUSCULAR; INTRAVENOUS
Status: DISCONTINUED | OUTPATIENT
Start: 2020-03-05 | End: 2020-03-06 | Stop reason: HOSPADM

## 2020-03-05 RX ORDER — ONDANSETRON 2 MG/ML
4 INJECTION INTRAMUSCULAR; INTRAVENOUS ONCE AS NEEDED
Status: DISCONTINUED | OUTPATIENT
Start: 2020-03-05 | End: 2020-03-05 | Stop reason: HOSPADM

## 2020-03-05 RX ORDER — SODIUM CHLORIDE, SODIUM LACTATE, POTASSIUM CHLORIDE, CALCIUM CHLORIDE 600; 310; 30; 20 MG/100ML; MG/100ML; MG/100ML; MG/100ML
100 INJECTION, SOLUTION INTRAVENOUS CONTINUOUS
Status: DISCONTINUED | OUTPATIENT
Start: 2020-03-05 | End: 2020-03-06 | Stop reason: HOSPADM

## 2020-03-05 RX ORDER — LIDOCAINE HYDROCHLORIDE 10 MG/ML
INJECTION, SOLUTION EPIDURAL; INFILTRATION; INTRACAUDAL; PERINEURAL AS NEEDED
Status: DISCONTINUED | OUTPATIENT
Start: 2020-03-05 | End: 2020-03-05 | Stop reason: HOSPADM

## 2020-03-05 RX ORDER — ASPIRIN 81 MG/1
81 TABLET ORAL DAILY
Status: DISCONTINUED | OUTPATIENT
Start: 2020-03-06 | End: 2020-03-06 | Stop reason: HOSPADM

## 2020-03-05 RX ORDER — PROPOFOL 10 MG/ML
INJECTION, EMULSION INTRAVENOUS AS NEEDED
Status: DISCONTINUED | OUTPATIENT
Start: 2020-03-05 | End: 2020-03-05 | Stop reason: SURG

## 2020-03-05 RX ORDER — CEFAZOLIN SODIUM 1 G/50ML
SOLUTION INTRAVENOUS AS NEEDED
Status: DISCONTINUED | OUTPATIENT
Start: 2020-03-05 | End: 2020-03-05 | Stop reason: SURG

## 2020-03-05 RX ORDER — ONDANSETRON 2 MG/ML
4 INJECTION INTRAMUSCULAR; INTRAVENOUS EVERY 6 HOURS PRN
Status: DISCONTINUED | OUTPATIENT
Start: 2020-03-05 | End: 2020-03-06 | Stop reason: HOSPADM

## 2020-03-05 RX ORDER — HEPARIN SODIUM 1000 [USP'U]/ML
INJECTION, SOLUTION INTRAVENOUS; SUBCUTANEOUS AS NEEDED
Status: DISCONTINUED | OUTPATIENT
Start: 2020-03-05 | End: 2020-03-05 | Stop reason: SURG

## 2020-03-05 RX ORDER — HYDROMORPHONE HCL/PF 1 MG/ML
0.5 SYRINGE (ML) INJECTION EVERY 4 HOURS PRN
Status: DISCONTINUED | OUTPATIENT
Start: 2020-03-05 | End: 2020-03-06 | Stop reason: HOSPADM

## 2020-03-05 RX ORDER — LABETALOL 20 MG/4 ML (5 MG/ML) INTRAVENOUS SYRINGE
5
Status: DISCONTINUED | OUTPATIENT
Start: 2020-03-05 | End: 2020-03-06 | Stop reason: HOSPADM

## 2020-03-05 RX ORDER — FENTANYL CITRATE 50 UG/ML
INJECTION, SOLUTION INTRAMUSCULAR; INTRAVENOUS AS NEEDED
Status: DISCONTINUED | OUTPATIENT
Start: 2020-03-05 | End: 2020-03-05 | Stop reason: SURG

## 2020-03-05 RX ORDER — EZETIMIBE 10 MG/1
10 TABLET ORAL
Status: DISCONTINUED | OUTPATIENT
Start: 2020-03-05 | End: 2020-03-06 | Stop reason: HOSPADM

## 2020-03-05 RX ORDER — ZOLPIDEM TARTRATE 5 MG/1
5 TABLET ORAL ONCE
Status: COMPLETED | OUTPATIENT
Start: 2020-03-05 | End: 2020-03-05

## 2020-03-05 RX ORDER — LEVOTHYROXINE SODIUM 0.03 MG/1
12.5 TABLET ORAL
Status: DISCONTINUED | OUTPATIENT
Start: 2020-03-06 | End: 2020-03-06 | Stop reason: HOSPADM

## 2020-03-05 RX ORDER — BUPIVACAINE HYDROCHLORIDE AND EPINEPHRINE 5; 5 MG/ML; UG/ML
INJECTION, SOLUTION PERINEURAL AS NEEDED
Status: DISCONTINUED | OUTPATIENT
Start: 2020-03-05 | End: 2020-03-05 | Stop reason: HOSPADM

## 2020-03-05 RX ORDER — MIDAZOLAM HYDROCHLORIDE 2 MG/2ML
INJECTION, SOLUTION INTRAMUSCULAR; INTRAVENOUS AS NEEDED
Status: DISCONTINUED | OUTPATIENT
Start: 2020-03-05 | End: 2020-03-05 | Stop reason: SURG

## 2020-03-05 RX ORDER — CLOPIDOGREL BISULFATE 75 MG/1
75 TABLET ORAL DAILY
Status: DISCONTINUED | OUTPATIENT
Start: 2020-03-05 | End: 2020-03-06 | Stop reason: HOSPADM

## 2020-03-05 RX ORDER — SODIUM CHLORIDE 9 MG/ML
125 INJECTION, SOLUTION INTRAVENOUS CONTINUOUS
Status: DISCONTINUED | OUTPATIENT
Start: 2020-03-05 | End: 2020-03-05

## 2020-03-05 RX ORDER — HYDROCODONE BITARTRATE AND ACETAMINOPHEN 5; 325 MG/1; MG/1
1 TABLET ORAL EVERY 6 HOURS PRN
Status: DISCONTINUED | OUTPATIENT
Start: 2020-03-05 | End: 2020-03-06 | Stop reason: HOSPADM

## 2020-03-05 RX ORDER — EPHEDRINE SULFATE 50 MG/ML
INJECTION INTRAVENOUS AS NEEDED
Status: DISCONTINUED | OUTPATIENT
Start: 2020-03-05 | End: 2020-03-05 | Stop reason: SURG

## 2020-03-05 RX ORDER — NEOSTIGMINE METHYLSULFATE 1 MG/ML
INJECTION INTRAVENOUS AS NEEDED
Status: DISCONTINUED | OUTPATIENT
Start: 2020-03-05 | End: 2020-03-05 | Stop reason: SURG

## 2020-03-05 RX ADMIN — ROCURONIUM BROMIDE 50 MG: 50 INJECTION, SOLUTION INTRAVENOUS at 15:21

## 2020-03-05 RX ADMIN — NEOSTIGMINE METHYLSULFATE 3 MG: 1 INJECTION, SOLUTION INTRAVENOUS at 17:29

## 2020-03-05 RX ADMIN — HYDROMORPHONE HYDROCHLORIDE 0.5 MG: 1 INJECTION, SOLUTION INTRAMUSCULAR; INTRAVENOUS; SUBCUTANEOUS at 16:48

## 2020-03-05 RX ADMIN — EPHEDRINE SULFATE 5 MG: 50 INJECTION, SOLUTION INTRAVENOUS at 15:37

## 2020-03-05 RX ADMIN — HYDROCODONE BITARTRATE AND ACETAMINOPHEN 1 TABLET: 5; 325 TABLET ORAL at 21:04

## 2020-03-05 RX ADMIN — FENTANYL CITRATE 25 MCG: 50 INJECTION INTRAMUSCULAR; INTRAVENOUS at 18:03

## 2020-03-05 RX ADMIN — PROTAMINE SULFATE 40 MG: 10 INJECTION, SOLUTION INTRAVENOUS at 17:13

## 2020-03-05 RX ADMIN — FENTANYL CITRATE 25 MCG: 50 INJECTION INTRAMUSCULAR; INTRAVENOUS at 19:07

## 2020-03-05 RX ADMIN — ONDANSETRON 4 MG: 2 INJECTION INTRAMUSCULAR; INTRAVENOUS at 16:52

## 2020-03-05 RX ADMIN — PHENYLEPHRINE HYDROCHLORIDE 100 MCG: 10 INJECTION INTRAVENOUS at 15:33

## 2020-03-05 RX ADMIN — EZETIMIBE 10 MG: 10 TABLET ORAL at 21:05

## 2020-03-05 RX ADMIN — MAGNESIUM GLUCONATE 500 MG ORAL TABLET 400 MG: 500 TABLET ORAL at 21:03

## 2020-03-05 RX ADMIN — GABAPENTIN 300 MG: 300 CAPSULE ORAL at 21:03

## 2020-03-05 RX ADMIN — HEPARIN SODIUM 7000 UNITS: 1000 INJECTION INTRAVENOUS; SUBCUTANEOUS at 15:52

## 2020-03-05 RX ADMIN — PHENYLEPHRINE HYDROCHLORIDE 25 MCG/MIN: 10 INJECTION INTRAVENOUS at 18:42

## 2020-03-05 RX ADMIN — ZOLPIDEM TARTRATE 5 MG: 5 TABLET, COATED ORAL at 23:42

## 2020-03-05 RX ADMIN — FENTANYL CITRATE 100 MCG: 50 INJECTION, SOLUTION INTRAMUSCULAR; INTRAVENOUS at 15:20

## 2020-03-05 RX ADMIN — HEPARIN SODIUM 5000 UNITS: 5000 INJECTION INTRAVENOUS; SUBCUTANEOUS at 21:03

## 2020-03-05 RX ADMIN — FENTANYL CITRATE 25 MCG: 50 INJECTION INTRAMUSCULAR; INTRAVENOUS at 19:00

## 2020-03-05 RX ADMIN — SODIUM CHLORIDE 500 ML: 0.9 INJECTION, SOLUTION INTRAVENOUS at 18:25

## 2020-03-05 RX ADMIN — SODIUM CHLORIDE 125 ML/HR: 0.9 INJECTION, SOLUTION INTRAVENOUS at 12:38

## 2020-03-05 RX ADMIN — CEFAZOLIN SODIUM 1000 MG: 1 SOLUTION INTRAVENOUS at 15:07

## 2020-03-05 RX ADMIN — PROPOFOL 150 MG: 10 INJECTION, EMULSION INTRAVENOUS at 15:20

## 2020-03-05 RX ADMIN — FENTANYL CITRATE 25 MCG: 50 INJECTION INTRAMUSCULAR; INTRAVENOUS at 18:10

## 2020-03-05 RX ADMIN — SODIUM CHLORIDE, SODIUM LACTATE, POTASSIUM CHLORIDE, AND CALCIUM CHLORIDE 100 ML/HR: .6; .31; .03; .02 INJECTION, SOLUTION INTRAVENOUS at 19:11

## 2020-03-05 RX ADMIN — PHENYLEPHRINE HYDROCHLORIDE 30 MCG/MIN: 10 INJECTION INTRAVENOUS at 16:11

## 2020-03-05 RX ADMIN — CLOPIDOGREL BISULFATE 75 MG: 75 TABLET ORAL at 19:05

## 2020-03-05 RX ADMIN — MIDAZOLAM 2 MG: 1 INJECTION INTRAMUSCULAR; INTRAVENOUS at 15:06

## 2020-03-05 RX ADMIN — EPHEDRINE SULFATE 5 MG: 50 INJECTION, SOLUTION INTRAVENOUS at 18:56

## 2020-03-05 RX ADMIN — GLYCOPYRROLATE 0.4 MG: 0.2 INJECTION INTRAMUSCULAR; INTRAVENOUS at 17:29

## 2020-03-05 NOTE — ANESTHESIA PROCEDURE NOTES
Arterial Line Insertion  Performed by: Larisa Palma CRNA  Authorized by: tK Maya DO   Consent: Written consent obtained  Risks and benefits: risks, benefits and alternatives were discussed  Consent given by: patient  Patient identity confirmed: verbally with patient, arm band and provided demographic data  Time out: Immediately prior to procedure a "time out" was called to verify the correct patient, procedure, equipment, support staff and site/side marked as required  Preparation: Patient was prepped and draped in the usual sterile fashion    Indications: multiple ABGs and hemodynamic monitoring  Orientation:  Right  Location: radial artery  Sedation:  Patient sedated: yes  Analgesia: see MAR for details    Procedure Details:  Danny's test normal: yes  Needle gauge: 20  Seldinger technique: Seldinger technique used  Number of attempts: 1    Post-procedure:  Post-procedure CNS: unchanged  Patient tolerance: Patient tolerated the procedure well with no immediate complications

## 2020-03-05 NOTE — INTERVAL H&P NOTE
H&P reviewed  After examining the patient I find no changes in the patients condition since the H&P had been written      Vitals:    03/05/20 1239   BP: 144/76   Pulse: 70   Resp: 16   Temp: 98 °F (36 7 °C)   SpO2: 96%     Plan left CEA

## 2020-03-06 VITALS
HEART RATE: 80 BPM | OXYGEN SATURATION: 88 % | HEIGHT: 63 IN | RESPIRATION RATE: 19 BRPM | TEMPERATURE: 98 F | SYSTOLIC BLOOD PRESSURE: 136 MMHG | WEIGHT: 144.4 LBS | DIASTOLIC BLOOD PRESSURE: 65 MMHG | BODY MASS INDEX: 25.59 KG/M2

## 2020-03-06 LAB
ANION GAP SERPL CALCULATED.3IONS-SCNC: 10 MMOL/L (ref 4–13)
APTT PPP: 40 SECONDS (ref 23–37)
BUN SERPL-MCNC: 11 MG/DL (ref 5–25)
CALCIUM SERPL-MCNC: 8.3 MG/DL (ref 8.3–10.1)
CHLORIDE SERPL-SCNC: 108 MMOL/L (ref 100–108)
CO2 SERPL-SCNC: 21 MMOL/L (ref 21–32)
CREAT SERPL-MCNC: 0.8 MG/DL (ref 0.6–1.3)
ERYTHROCYTE [DISTWIDTH] IN BLOOD BY AUTOMATED COUNT: 13.9 % (ref 11.6–15.1)
GFR SERPL CREATININE-BSD FRML MDRD: 92 ML/MIN/1.73SQ M
GLUCOSE SERPL-MCNC: 82 MG/DL (ref 65–140)
HCT VFR BLD AUTO: 32.7 % (ref 36.5–49.3)
HGB BLD-MCNC: 10.5 G/DL (ref 12–17)
INR PPP: 1.08 (ref 0.84–1.19)
MCH RBC QN AUTO: 32.1 PG (ref 26.8–34.3)
MCHC RBC AUTO-ENTMCNC: 32.1 G/DL (ref 31.4–37.4)
MCV RBC AUTO: 100 FL (ref 82–98)
PLATELET # BLD AUTO: 211 THOUSANDS/UL (ref 149–390)
PMV BLD AUTO: 10.9 FL (ref 8.9–12.7)
POTASSIUM SERPL-SCNC: 3.9 MMOL/L (ref 3.5–5.3)
PROTHROMBIN TIME: 14.1 SECONDS (ref 11.6–14.5)
RBC # BLD AUTO: 3.27 MILLION/UL (ref 3.88–5.62)
SODIUM SERPL-SCNC: 139 MMOL/L (ref 136–145)
WBC # BLD AUTO: 6.8 THOUSAND/UL (ref 4.31–10.16)

## 2020-03-06 PROCEDURE — 99024 POSTOP FOLLOW-UP VISIT: CPT | Performed by: NURSE PRACTITIONER

## 2020-03-06 PROCEDURE — 85730 THROMBOPLASTIN TIME PARTIAL: CPT | Performed by: PHYSICIAN ASSISTANT

## 2020-03-06 PROCEDURE — NC001 PR NO CHARGE: Performed by: SURGERY

## 2020-03-06 PROCEDURE — 85027 COMPLETE CBC AUTOMATED: CPT | Performed by: PHYSICIAN ASSISTANT

## 2020-03-06 PROCEDURE — 99232 SBSQ HOSP IP/OBS MODERATE 35: CPT | Performed by: INTERNAL MEDICINE

## 2020-03-06 PROCEDURE — 80048 BASIC METABOLIC PNL TOTAL CA: CPT | Performed by: PHYSICIAN ASSISTANT

## 2020-03-06 PROCEDURE — 85610 PROTHROMBIN TIME: CPT | Performed by: PHYSICIAN ASSISTANT

## 2020-03-06 RX ORDER — CLOPIDOGREL BISULFATE 75 MG/1
75 TABLET ORAL DAILY
Qty: 60 TABLET | Refills: 0 | Status: SHIPPED | OUTPATIENT
Start: 2020-03-07 | End: 2020-05-05 | Stop reason: ALTCHOICE

## 2020-03-06 RX ORDER — HYDROCODONE BITARTRATE AND ACETAMINOPHEN 5; 325 MG/1; MG/1
1 TABLET ORAL EVERY 6 HOURS PRN
Qty: 20 TABLET | Refills: 0 | Status: SHIPPED | OUTPATIENT
Start: 2020-03-06 | End: 2020-03-10

## 2020-03-06 RX ORDER — LOSARTAN POTASSIUM 50 MG/1
100 TABLET ORAL DAILY
Status: DISCONTINUED | OUTPATIENT
Start: 2020-03-06 | End: 2020-03-06

## 2020-03-06 RX ORDER — LOSARTAN POTASSIUM 50 MG/1
100 TABLET ORAL DAILY
Status: DISCONTINUED | OUTPATIENT
Start: 2020-03-06 | End: 2020-03-06 | Stop reason: HOSPADM

## 2020-03-06 RX ORDER — OMEPRAZOLE 20 MG/1
20 CAPSULE, DELAYED RELEASE ORAL DAILY
Qty: 90 CAPSULE | Refills: 0 | Status: SHIPPED | OUTPATIENT
Start: 2020-03-06 | End: 2020-03-10

## 2020-03-06 RX ORDER — LOSARTAN POTASSIUM 100 MG/1
100 TABLET ORAL DAILY
Qty: 90 TABLET | Refills: 1 | Status: ON HOLD | OUTPATIENT
Start: 2020-03-06 | End: 2020-10-30 | Stop reason: SDUPTHER

## 2020-03-06 RX ORDER — CLOPIDOGREL BISULFATE 75 MG/1
75 TABLET ORAL DAILY
Refills: 0 | Status: CANCELLED | OUTPATIENT
Start: 2020-03-07

## 2020-03-06 RX ADMIN — SODIUM CHLORIDE, SODIUM LACTATE, POTASSIUM CHLORIDE, AND CALCIUM CHLORIDE 100 ML/HR: .6; .31; .03; .02 INJECTION, SOLUTION INTRAVENOUS at 03:37

## 2020-03-06 RX ADMIN — PANCRELIPASE 24000 UNITS: 24000; 76000; 120000 CAPSULE, DELAYED RELEASE PELLETS ORAL at 08:16

## 2020-03-06 RX ADMIN — MAGNESIUM GLUCONATE 500 MG ORAL TABLET 400 MG: 500 TABLET ORAL at 08:16

## 2020-03-06 RX ADMIN — ASPIRIN 81 MG: 81 TABLET, COATED ORAL at 08:16

## 2020-03-06 RX ADMIN — HYDROCODONE BITARTRATE AND ACETAMINOPHEN 1 TABLET: 5; 325 TABLET ORAL at 04:41

## 2020-03-06 RX ADMIN — PANCRELIPASE 24000 UNITS: 24000; 76000; 120000 CAPSULE, DELAYED RELEASE PELLETS ORAL at 12:02

## 2020-03-06 RX ADMIN — GABAPENTIN 300 MG: 300 CAPSULE ORAL at 08:16

## 2020-03-06 RX ADMIN — CLOPIDOGREL BISULFATE 75 MG: 75 TABLET ORAL at 08:16

## 2020-03-06 RX ADMIN — METHOCARBAMOL 500 MG: 500 TABLET, FILM COATED ORAL at 08:16

## 2020-03-06 RX ADMIN — HEPARIN SODIUM 5000 UNITS: 5000 INJECTION INTRAVENOUS; SUBCUTANEOUS at 06:18

## 2020-03-06 RX ADMIN — LEVOTHYROXINE SODIUM 12.5 MCG: 25 TABLET ORAL at 06:18

## 2020-03-06 NOTE — CONSULTS
Consult- Lo Winston 1950, 81 y o  male MRN: [id number]    Unit/Bed#: ICU 14 Encounter: [id number]    Primary Care Provider: Angel Aquino MD   Date and time admitted to hospital: 3/5/2020 11:43 AM      Inpatient consult to Zee Hutson performed by: ALEX Martinez  Consult ordered by: Rajesh Jones PA-C          * Left carotid artery stenosis s/p cartoid endarterectomy  Assessment & Plan  · S/p left carotid endarterectomy   · No neurological deficits noted  · Having episodes of bradycardia and hypotension- on low dose neosynephrine  · Vascular following as primary service    Bradycardia  Assessment & Plan  · Likely due to carotid endarterectomy and will improve  · Remains asymptomatic     Hypothyroidism (acquired)  Assessment & Plan  · Resume on home synthroid    Tobacco abuse  Assessment & Plan  · Smoking cessation  · Nicotine patch      Mixed hyperlipidemia  Assessment & Plan  · Resume on home Zetia and Simvastatin    Thoracic aortic aneurysm without rupture [facility])  Assessment & Plan  · Follows with cardiology as an outpatient   · Most recently was 4 cm    Essential hypertension, benign  Assessment & Plan  · Is hypotensive right now and on Neosynephrine  Hold on Losartan until blood pressure improves          -------------------------------------------------------------------------------------------------------------  Chief Complaint: s/p left carotid endarterectomy     History of Present Illness     Kt Preston is a 81 y o  male who presents with a past medical history significant for hypertension, hyperlipidemia, pancreatitis, hypothyroidism, and thoracic aortic aneurysm  He has been following with vascular as an outpatient due to left carotid stenosis and found to have >75% stenosis of the left carotid artery  Due to this he presented today for elective left carotid endarterectomy   Post procedure he developed bradycardia and mild hypotension and was placed on Ravinder-synephrine and transferred to the ICU  He remains asymptomatic despite periods of bradycardia  History obtained from the patient   -------------------------------------------------------------------------------------------------------------  Dispo: Admit to Critical Care     Code Status: Prior  --------------------------------------------------------------------------------------------------------------  Review of Systems   Constitutional: Negative for chills and fever  HENT:        Left sided facial pain and numbness     Respiratory: Negative for shortness of breath  Cardiovascular: Negative  Negative for chest pain  Gastrointestinal: Negative  Negative for abdominal pain, diarrhea, nausea and vomiting  Endocrine: Negative  Genitourinary: Negative  Negative for dysuria and urgency  Musculoskeletal: Negative  Neurological: Negative  Hematological: Negative  Psychiatric/Behavioral: Negative  All other systems reviewed and are negative  A 12-point, complete review of systems was reviewed and negative except as stated above     Physical Exam   Constitutional: He is oriented to person, place, and time  He appears well-developed and well-nourished  No distress  HENT:   Head: Normocephalic and atraumatic  Nose: Nose normal    Mouth/Throat: Oropharynx is clear and moist  No oropharyngeal exudate  Eyes: Pupils are equal, round, and reactive to light  Conjunctivae and EOM are normal  No scleral icterus  Neck: Normal range of motion  Neck supple  No JVD present  Cardiovascular:   Sinus bradycardia     Pulmonary/Chest: Effort normal and breath sounds normal  No respiratory distress  Abdominal: Soft  Bowel sounds are normal  He exhibits no distension  There is no tenderness  Musculoskeletal: Normal range of motion  He exhibits no edema  Lymphadenopathy:     He has no cervical adenopathy  Neurological: He is alert and oriented to person, place, and time   No cranial nerve deficit  Skin: Skin is warm and dry  Left neck incision, c/d/i no hematoma     Vitals reviewed  --------------------------------------------------------------------------------------------------------------  Vitals:   Vitals:    03/05/20 1859 03/05/20 1908 03/05/20 1915 03/05/20 1935   BP:  117/58 117/58 (!) 82/44   BP Location:    Left arm   Pulse: (!) 40 61 (!) 53 (!) 44   Resp: 13 19 18 18   Temp:  97 8 °F (36 6 °C)  97 9 °F (36 6 °C)   TempSrc:    Oral   SpO2: 95% 96% 94% 94%   Weight:    65 5 kg (144 lb 6 4 oz)   Height:    5' 3" (1 6 m)     Temp  Min: 97 3 °F (36 3 °C)  Max: 98 °F (36 7 °C)  IBW: 56 9 kg  Height: 5' 3" (160 cm)  Body mass index is 25 58 kg/m²      Laboratory and Diagnostics:  Results from last 7 days   Lab Units 02/28/20  1437   WBC Thousand/uL 5 95   HEMOGLOBIN g/dL 14 4   HEMATOCRIT % 43 4   PLATELETS Thousands/uL 288     Results from last 7 days   Lab Units 02/28/20  1437   SODIUM mmol/L 140   POTASSIUM mmol/L 3 5   CHLORIDE mmol/L 103   CO2 mmol/L 27   ANION GAP mmol/L 10   BUN mg/dL 14   CREATININE mg/dL 0 93   CALCIUM mg/dL 9 9   GLUCOSE RANDOM mg/dL 86          Results from last 7 days   Lab Units 02/28/20  1437   INR  0 99              ABG:    VBG:          Micro:        EKG: Sinus bradycardia  Imaging: No new imaging      Historical Information   Past Medical History:   Diagnosis Date    A-fib ([facility] [address] )     Arthritis     Avascular necrosis of bone of hip, left (HCC)     replaced    Back pain     CAD (coronary artery disease)     Carotid artery narrowing     left side -for endarterectomy today 3/5/2020    Disease of thyroid gland     hypo    GERD (gastroesophageal reflux disease)     History of Clostridioides difficile infection     Hyperlipidemia     Hypertension     Irregular heart beat     Kidney stone     Neck pain     Pancreatitis     Spinal stenosis     TIA (transient ischemic attack)     pt denies    Wears glasses      Past Surgical History:   Procedure Laterality Date    BACK SURGERY      CARDIAC CATHETERIZATION      cardiac cath 2010  adjusted meds    CATARACT EXTRACTION Bilateral     CERVICAL FUSION      COLONOSCOPY      JOINT REPLACEMENT Left     hip    LUMBAR FUSION      NECK SURGERY      ORTHOPEDIC SURGERY Left     L THR    SPINAL FUSION       Social History   Social History     Substance and Sexual Activity   Alcohol Use Not Currently    Binge frequency: Never     Social History     Substance and Sexual Activity   Drug Use Never     Social History     Tobacco Use   Smoking Status Former Smoker    Packs/day: 0 50    Years: 50 00    Pack years: 25 00    Types: Cigarettes    Last attempt to quit: 2020    Years since quittin 1   Smokeless Tobacco Never Used        Family History:   Family History   Family history unknown: Yes     I have reviewed this patient's family history and commented on sigificant items within the HPI      Medications:  Current Facility-Administered Medications   Medication Dose Route Frequency    [START ON 3/6/2020] aspirin tablet 81 mg  81 mg Oral Daily    clopidogrel (PLAVIX) tablet 75 mg  75 mg Oral Daily    ezetimibe (ZETIA) tablet 10 mg  10 mg Oral HS    gabapentin (NEURONTIN) capsule 300 mg  300 mg Oral TID    heparin (porcine) subcutaneous injection 5,000 Units  5,000 Units Subcutaneous Q8H [facility]    Labetalol HCl (NORMODYNE) injection 5 mg  5 mg Intravenous Q15 Min PRN    And    hydrALAZINE (APRESOLINE) injection 15 mg  15 mg Intravenous Q15 Min PRN    And    niCARdipine (CARDENE) 25 mg (STANDARD CONCENTRATION) in sodium chloride 0 9% 250 mL  1-15 mg/hr Intravenous Continuous PRN    HYDROcodone-acetaminophen (NORCO) 5-325 mg per tablet 1 tablet  1 tablet Oral Q6H PRN    HYDROmorphone (DILAUDID) injection 0 5 mg  0 5 mg Intravenous Q4H PRN    lactated ringers infusion  100 mL/hr Intravenous Continuous    [START ON 3/6/2020] levothyroxine tablet 12 5 mcg  12 5 mcg Oral Daily    [START ON 3/6/2020] losartan (COZAAR) tablet 100 mg  100 mg Oral Daily    magnesium oxide (MAG-OX) tablet 400 mg  400 mg Oral BID    [START ON 3/6/2020] methocarbamol (ROBAXIN) tablet 500 mg  500 mg Oral BID    ondansetron (ZOFRAN) injection 4 mg  4 mg Intravenous Q6H PRN    [START ON 3/6/2020] pancrelipase (Lip-Prot-Amyl) (CREON) delayed release capsule 24,000 Units  24,000 Units Oral TID With Meals    phenylephrine (PRINCESS-SYNEPHRINE) 50 mg (STANDARD CONCENTRATION) in sodium chloride 0 9% 250 mL   mcg/min Intravenous Titrated    [START ON 3/6/2020] pravastatin (PRAVACHOL) tablet 80 mg  80 mg Oral Daily With Dinner     Home medications:  Prior to Admission Medications   Prescriptions Last Dose Informant Patient Reported? Taking?   aspirin 81 MG tablet 3/4/2020 at 0900 Self No Yes   Sig: Take 1 tablet (81 mg total) by mouth daily   diclofenac sodium (VOLTAREN) 1 % 2/26/2020 Self No Yes   Sig: Apply 2 g topically 4 (four) times a day Apply to shoulders      ezetimibe-simvastatin (VYTORIN) 10-40 mg per tablet 3/4/2020 at 1700  No Yes   Sig: Take 1 tablet by mouth daily at bedtime   gabapentin (NEURONTIN) 300 mg capsule 3/4/2020 at 0900 Self No Yes   Sig: Take 1 capsule (300 mg total) by mouth 3 (three) times a day   levothyroxine 25 mcg tablet 3/4/2020 at 0900 Self No Yes   Sig: Take 0 5 tablets (12 5 mcg total) by mouth daily   losartan (COZAAR) 100 MG tablet 3/4/2020 at 0900  No Yes   Sig: Take 1 tablet (100 mg total) by mouth daily   magnesium oxide (MAG-OX) 400 mg 3/4/2020 at 1800 Self No Yes   Sig: Take 1 tablet (400 mg total) by mouth 2 (two) times a day   methocarbamol (ROBAXIN) 500 mg tablet More than a month at Unknown time Self No No   Sig: Take 1 tablet (500 mg total) by mouth 2 (two) times a day   nicotine polacrilex (NICORETTE) 4 mg gum 3/4/2020 at 2200 Self Yes Yes   Sig: Chew 4 mg as needed for smoking cessation   pancrelipase, Lip-Prot-Amyl, (CREON) 24,000 units 3/4/2020 at 1800 Self No Yes   Sig: Take 1 capsule (24,000 Units total) by mouth 3 (three) times a day with meals   Patient taking differently: Take 24,000 units of lipase by mouth 3 (three) times a day with meals    ranitidine (ZANTAC) 150 mg tablet 3/4/2020 at 2300 Self No Yes   Sig: Take 1 tablet (150 mg total) by mouth 2 (two) times a day   zolpidem (AMBIEN) 10 mg tablet 3/4/2020 at 2330 Self Yes Yes   Sig: Take by mouth daily at bedtime as needed       Facility-Administered Medications: None     Allergies: Allergies   Allergen Reactions    Amlodipine Swelling     Lower extremity swelling       ------------------------------------------------------------------------------------------------------------  Advance Directive and Living Will:      Power of :    POLST:    ------------------------------------------------------------------------------------------------------------  Care Time Delivered:   Upon my evaluation, this patient had a high probability of imminent or life-threatening deterioration due to left carotid endarectomy, which required my direct attention, intervention, and personal management  I have personally provided 30 minutes (1930 to 2000) of critical care time, exclusive of procedures, teaching, family meetings, and any prior time recorded by providers other than myself  Sunday ALEX Dennis        Portions of the record may have been created with voice recognition software  Occasional wrong word or "sound a like" substitutions may have occurred due to the inherent limitations of voice recognition software    Read the chart carefully and recognize, using context, where substitutions have occurred

## 2020-03-06 NOTE — PROGRESS NOTES
Progress Note - Junior Keith 1950, 81 y o  male MRN: [id number]    Unit/Bed#: ICU 14 Encounter: [id number]    Primary Care Provider: Osmani Aguilera MD   Date and time admitted to hospital: 3/5/2020 11:43 AM        * Left carotid artery stenosis s/p cartoid endarterectomy  Assessment & Plan  · S/p left carotid endarterectomy   · No neurological deficits noted  · Had episodes of bradycardia and hypotension overnight, was on Ravinder-synephrine and has been weaned off  · He is asymptomatic  · Vascular following as primary service    Bradycardia  Assessment & Plan  · Likely due to carotid endarterectomy, HR in the 40's-50's  · Remains asymptomatic     Hypothyroidism (acquired)  Assessment & Plan  · On home Synthroid    Tobacco abuse  Assessment & Plan  · Smoking cessation  · Quit in February -declining patch      Mixed hyperlipidemia  Assessment & Plan  · On home Zetia and Simvastatin    Thoracic aortic aneurysm without rupture ([facility] [address] )  Assessment & Plan  · Follows with cardiology as an outpatient   · Most recently was 4 cm    Essential hypertension, benign  Assessment & Plan  · Was on Ravinder-synephrine  last night and then weaned off  · Blood pressure now stable  · Hold Losartan this morning      ----------------------------------------------------------------------------------------  HPI/24hr events: After transfer to the unit had episodes of bradycardia and hypotension requiring small dose of Ravinder-synephrine  This has improved and was weaned off this morning  Disposition: Continue Critical Care   Code Status: Prior  ---------------------------------------------------------------------------------------  SUBJECTIVE  Complaining of mild left sided facial pain    Review of Systems   Constitutional: Negative  HENT:        Mild left sided facial pain     Eyes: Negative  Respiratory: Negative  Cardiovascular: Negative  Gastrointestinal: Negative  Endocrine: Negative  Genitourinary: Negative  Musculoskeletal: Negative  Allergic/Immunologic: Negative  Neurological: Negative  Hematological: Negative  All other systems reviewed and are negative  Review of systems was reviewed and negative unless stated above in HPI/24-hour events   ---------------------------------------------------------------------------------------  OBJECTIVE    Vitals   Vitals:    20 0200 20 0300 20 0400 20 0500   BP:       BP Location:       Pulse: (!) 42 (!) 50 (!) 44 (!) 48   Resp: 14 13 17 (!) 11   Temp:       TempSrc:       SpO2: 91% 92% 91% 91%   Weight:       Height:         Temp (24hrs), Av 9 °F (36 6 °C), Min:97 7 °F (36 5 °C), Max:98 °F (36 7 °C)  Current: Temperature: 97 9 °F (36 6 °C)  Arterial Line BP: 118/42  Arterial Line MAP (mmHg): 68 mmHg  SpO2: SpO2: 91 %      Physical Exam   Constitutional: He is oriented to person, place, and time  He appears well-developed and well-nourished  No distress  HENT:   Head: Normocephalic and atraumatic  Right Ear: External ear normal    Nose: Nose normal    Mouth/Throat: Oropharynx is clear and moist  No oropharyngeal exudate  Eyes: Pupils are equal, round, and reactive to light  Conjunctivae and EOM are normal  No scleral icterus  Neck: Normal range of motion  Neck supple  No JVD present  Cardiovascular: Normal rate, regular rhythm and normal heart sounds  Exam reveals no friction rub  No murmur heard  Pulmonary/Chest: Effort normal and breath sounds normal    Abdominal: Soft  Bowel sounds are normal  He exhibits no distension  There is no tenderness  Musculoskeletal: Normal range of motion  He exhibits no edema or deformity  Lymphadenopathy:     He has no cervical adenopathy  Neurological: He is alert and oriented to person, place, and time  No cranial nerve deficit  Skin: Skin is warm and dry  Left incision on neck c/d/i   Vitals reviewed        Invasive/non-invasive ventilation settings   Respiratory    Lab Data (Last 4 hours)    None         O2/Vent Data (Last 4 hours)    None                Laboratory and Diagnostics:  Results from last 7 days   Lab Units 03/06/20  0434 02/28/20  1437   WBC Thousand/uL 6 80 5 95   HEMOGLOBIN g/dL 10 5* 14 4   HEMATOCRIT % 32 7* 43 4   PLATELETS Thousands/uL 211 288     Results from last 7 days   Lab Units 03/06/20  0434 02/28/20  1437   SODIUM mmol/L 139 140   POTASSIUM mmol/L 3 9 3 5   CHLORIDE mmol/L 108 103   CO2 mmol/L 21 27   ANION GAP mmol/L 10 10   BUN mg/dL 11 14   CREATININE mg/dL 0 80 0 93   CALCIUM mg/dL 8 3 9 9   GLUCOSE RANDOM mg/dL 82 86          Results from last 7 days   Lab Units 03/06/20  0434 02/28/20  1437   INR  1 08 0 99   PTT seconds 40*  --               ABG:    VBG:          Micro        EKG: Sinus bradycardia  Imaging: No new imaging    Intake and Output  I/O       03/04 0701 - 03/05 0700 03/05 0701 - 03/06 0700    I V  (mL/kg)  3700 (56 5)    IV Piggyback  500    Total Intake(mL/kg)  4200 (64 1)    Net  +4200                Height and Weights   Height: 5' 3" (160 cm)  IBW: 56 9 kg  Body mass index is 25 58 kg/m²    Weight (last 2 days)     Date/Time   Weight    03/05/20 1935   65 5 (144 4)                Nutrition        Active Medications  Scheduled Meds:    Current Facility-Administered Medications:  aspirin 81 mg Oral Daily Kwabena Lobo PA-C    clopidogrel 75 mg Oral Daily Kwabena Lobo PA-C    ezetimibe 10 mg Oral HS Krystal K ALEX Baez    gabapentin 300 mg Oral TID ALEX Castro    heparin (porcine) 5,000 Units Subcutaneous [facility] Kwabena Lobo PA-C    Labetalol HCl 5 mg Intravenous Q15 Min PRN Kwabena Lobo PA-C    And        hydrALAZINE 15 mg Intravenous Q15 Min PRN Kwabena Lobo PA-C    And        niCARdipine 1-15 mg/hr Intravenous Continuous PRN Kwabena Lobo PA-C    HYDROcodone-acetaminophen 1 tablet Oral Q6H PRN Kwabena Lobo PA-C    HYDROmorphone 0 5 mg Intravenous Q4H PRN Kwabena Lobo PA-C    lactated ringers 100 mL/hr Intravenous Continuous Mitzi Nett, PA-C Last Rate: 100 mL/hr (03/06/20 8333)   levothyroxine 12 5 mcg Oral Early Morning Mitzi Nett, PA-C    losartan 100 mg Oral Daily Mitzi Nett, PA-C    magnesium oxide 400 mg Oral BID Jaycee Sukhjinder Baez, SAPNANP    methocarbamol 500 mg Oral BID Mitzi Nett, PA-C    ondansetron 4 mg Intravenous Q6H PRN Udall Nett, PA-C    pancrelipase (Lip-Prot-Amyl) 24,000 Units Oral TID With Meals Mitzi Nett, PA-C    phenylephine  mcg/min Intravenous Titrated Mitzi Nett, PA-C Last Rate: Stopped (03/06/20 0400)   pravastatin 80 mg Oral Daily With BellMaría Elenauth ALEX Baez      Continuous Infusions:    lactated ringers 100 mL/hr Last Rate: 100 mL/hr (03/06/20 0337)   niCARdipine 1-15 mg/hr    phenylephine  mcg/min Last Rate: Stopped (03/06/20 0400)     PRN Meds:     Labetalol HCl 5 mg Q15 Min PRN   And     hydrALAZINE 15 mg Q15 Min PRN   And     niCARdipine 1-15 mg/hr Continuous PRN   HYDROcodone-acetaminophen 1 tablet Q6H PRN   HYDROmorphone 0 5 mg Q4H PRN   ondansetron 4 mg Q6H PRN       Invasive Devices Review  Invasive Devices     Peripheral Intravenous Line            Peripheral IV 03/05/20 Left Hand less than 1 day    Peripheral IV 03/05/20 Right Forearm less than 1 day          Arterial Line            Arterial Line 03/05/20 Right Radial less than 1 day                Rationale for remaining devices: arterial line- will be discontinued today  ---------------------------------------------------------------------------------------  Advance Directive and Living Will:      Power of :    POLST:    ---------------------------------------------------------------------------------------      ALEX Perez      Portions of the record may have been created with voice recognition software  Occasional wrong word or "sound a like" substitutions may have occurred due to the inherent limitations of voice recognition software    Read the chart carefully and recognize, using context, where substitutions have occurred

## 2020-03-06 NOTE — PLAN OF CARE
Problem: PAIN - ADULT  Goal: Verbalizes/displays adequate comfort level or baseline comfort level  Description  Interventions:  - Encourage patient to monitor pain and request assistance  - Assess pain using appropriate pain scale  - Administer analgesics based on type and severity of pain and evaluate response  - Implement non-pharmacological measures as appropriate and evaluate response  - Consider cultural and social influences on pain and pain management  - Notify physician/advanced practitioner if interventions unsuccessful or patient reports new pain  Outcome: Progressing     Problem: INFECTION - ADULT  Goal: Absence or prevention of progression during hospitalization  Description  INTERVENTIONS:  - Assess and monitor for signs and symptoms of infection  - Monitor lab/diagnostic results  - Monitor all insertion sites, i e  indwelling lines, tubes, and drains  - Monitor endotracheal if appropriate and nasal secretions for changes in amount and color  - [place] appropriate cooling/warming therapies per order  - Administer medications as ordered  - Instruct and encourage patient and family to use good hand hygiene technique  - Identify and instruct in appropriate isolation precautions for identified infection/condition  Outcome: Progressing  Goal: Absence of fever/infection during neutropenic period  Description  INTERVENTIONS:  - Monitor WBC    Outcome: Progressing     Problem: SAFETY ADULT  Goal: Patient will remain free of falls  Description  INTERVENTIONS:  - Assess patient frequently for physical needs  -  Identify cognitive and physical deficits and behaviors that affect risk of falls    -  [place] fall precautions as indicated by assessment   - Educate patient/family on patient safety including physical limitations  - Instruct patient to call for assistance with activity based on assessment  - Modify environment to reduce risk of injury  - Consider OT/PT consult to assist with strengthening/mobility  Outcome: Progressing  Goal: Maintain or return to baseline ADL function  Description  INTERVENTIONS:  -  Assess patient's ability to carry out ADLs; assess patient's baseline for ADL function and identify physical deficits which impact ability to perform ADLs (bathing, care of mouth/teeth, toileting, grooming, dressing, etc )  - Assess/evaluate cause of self-care deficits   - Assess range of motion  - Assess patient's mobility; develop plan if impaired  - Assess patient's need for assistive devices and provide as appropriate  - Encourage maximum independence but intervene and supervise when necessary  - Involve family in performance of ADLs  - Assess for home care needs following discharge   - Consider OT consult to assist with ADL evaluation and planning for discharge  - Provide patient education as appropriate  Outcome: Progressing  Goal: Maintain or return mobility status to optimal level  Description  INTERVENTIONS:  - Assess patient's baseline mobility status (ambulation, transfers, stairs, etc )    - Identify cognitive and physical deficits and behaviors that affect mobility  - Identify mobility aids required to assist with transfers and/or ambulation (gait belt, sit-to-stand, lift, walker, cane, etc )  - [place] fall precautions as indicated by assessment  - Record patient progress and toleration of activity level on Mobility SBAR; progress patient to next Phase/Stage  - Instruct patient to call for assistance with activity based on assessment  - Consider rehabilitation consult to assist with strengthening/weightbearing, etc   Outcome: Progressing     Problem: Knowledge Deficit  Goal: Patient/family/caregiver demonstrates understanding of disease process, treatment plan, medications, and discharge instructions  Description  Complete learning assessment and assess knowledge base    Interventions:  - Provide teaching at level of understanding  - Provide teaching via preferred learning methods  Outcome: Progressing

## 2020-03-06 NOTE — ANESTHESIA POSTPROCEDURE EVALUATION
Post-Op Assessment Note    CV Status:  Stable  Pain Score: 0    Pain management: adequate     Mental Status:  Alert and awake   Hydration Status:  Euvolemic   PONV Controlled:  Controlled   Airway Patency:  Patent   Post Op Vitals Reviewed: Yes      Comments: post op phenylephrine infusion started, gave a does of ephedrine for low blood pressure and heart rate - sinus shannan but now improved BP           BP     Temp      Pulse    Resp 13 (03/05/20 6639)    SpO2

## 2020-03-06 NOTE — ASSESSMENT & PLAN NOTE
· Was on Ravinder-synephrine  last night and then weaned off  · Blood pressure now stable  · Hold Losartan this morning

## 2020-03-06 NOTE — UTILIZATION REVIEW
Initial Clinical Review    Elective    IP    surgical procedure    Age/Sex: 81 y o  male     Surgery Date:    3/5    Procedure: ENDARTERECTOMY ARTERY CAROTID WITH BOVINE PATCH ANGIOPLASTY AND INTRAOP DUPLEX       Anesthesia:    general    Operative Findings: Focal web-like critical stenosis of the proximal internal carotid artery  Following endarterectomy intraoperative duplex shows wide patency of the common, internal and external carotid arteries without evidence of residual disease        POD#1 Progress Note:   3/6   Episodes  Of     Bradycardia and hypotension after transfer  To ICU requiring small doses  Of  Ravinder synephrine  Symptoms  Did improved and  D/c   Early  3/6  Continue post  Op care  Admission Orders: Date/Time/Statement: Admission Orders (From admission, onward)     Ordered        03/05/20 1826  Inpatient Admission  Once                   Orders Placed This Encounter   Procedures    Inpatient Admission     Standing Status:   Standing     Number of Occurrences:   1     Order Specific Question:   Admitting Physician     Answer:   Darryle Sis     Order Specific Question:   Level of Care     Answer:   Level 1 Stepdown [13]     Order Specific Question:   Estimated length of stay     Answer:   More than 2 Midnights     Order Specific Question:   Certification     Answer:   I certify that inpatient services are medically necessary for this patient for a duration of greater than two midnights  See H&P and MD Progress Notes for additional information about the patient's course of treatment       Vital Signs: /79   Pulse 74   Temp 97 5 °F (36 4 °C) (Temporal)   Resp 22   Ht 5' 3" (1 6 m)   Wt 65 5 kg (144 lb 6 4 oz)   SpO2 (!) 88%   BMI 25 58 kg/m²      Diet:    regular    Mobility:   bedrest    DVT Prophylaxis:    SCD's    Medications/Pain Control:   Scheduled Medications:    Medications:  aspirin 81 mg Oral Daily   clopidogrel 75 mg Oral Daily   ezetimibe 10 mg Oral HS gabapentin 300 mg Oral TID   heparin (porcine) 5,000 Units Subcutaneous Q8H [facility]   levothyroxine 12 5 mcg Oral Early Morning   magnesium oxide 400 mg Oral BID   methocarbamol 500 mg Oral BID   pancrelipase (Lip-Prot-Amyl) 24,000 Units Oral TID With Meals   pravastatin 80 mg Oral Daily With Dinner     Continuous IV Infusions:    lactated ringers 100 mL/hr Intravenous Continuous   niCARdipine 1-15 mg/hr Intravenous Continuous PRN   phenylephine  mcg/min Intravenous Titrated     PRN Meds:    Labetalol HCl 5 mg Intravenous Q15 Min PRN   And      hydrALAZINE 15 mg Intravenous Q15 Min PRN   And      niCARdipine 1-15 mg/hr Intravenous Continuous PRN   HYDROcodone-acetaminophen 1 tablet Oral Q6H PRN   HYDROmorphone 0 5 mg Intravenous Q4H PRN   ondansetron 4 mg Intravenous Q6H PRN     Neuro checks Q 1 5 min  X 4,  Q 30 min  X 2,  Then  Q 1 hr    Network Utilization Review Department  [email or web address]  ATTENTION: Please call with any questions or concerns to [phone] and carefully listen to the prompts so that you are directed to the right person  All voicemails are confidential   Brianna Griffith all requests for admission clinical reviews, approved or denied determinations and any other requests to dedicated fax number below belonging to the campus where the patient is receiving treatment   List of dedicated fax numbers for the Facilities:  FACILITY NAME UR FAX NUMBER   ADMISSION DENIALS (Administrative/Medical Necessity) [phone]   [address] (Maternity/NICU/Pediatrics) [phone]   Bobbi Delgado [phone]   Casandra Ruffin [phone]   [place] [phone]   [address] [phone]   [facility] [address] [facility] [phone]     [address] [facility] [phone]

## 2020-03-06 NOTE — ASSESSMENT & PLAN NOTE
· S/p left carotid endarterectomy   · No neurological deficits noted  · Had episodes of bradycardia and hypotension overnight, was on Ravinder-synephrine and has been weaned off  · He is asymptomatic  · Vascular following as primary service

## 2020-03-06 NOTE — NURSING NOTE
All discharge instructions reviewed with patient  No further questions at this time  All belongings sent with patient at this time  Pt discharged via walking accompanied by his nephew

## 2020-03-06 NOTE — OP NOTE
OPERATIVE REPORT  PATIENT NAME: Dennys Nayak    :  1950  MRN: [id number]  Pt Location: [place] [address]    SURGERY DATE: 3/5/2020    Surgeon(s) and Role:     * Amos Zuluaga MD - Primary     * Kate Goncalves PA-C - Assisting    Carotid stenosis, asymptomatic, bilateral [I65 23]    Post-Op Diagnosis Codes:     * Carotid stenosis, asymptomatic, bilateral [I65 23]      Procedure(s):  ENDARTERECTOMY ARTERY CAROTID WITH BOVINE PATCH ANGIOPLASTY AND INTRAOP DUPLEX    Specimen(s):  * No specimens in log *    Estimated Blood Loss:   Minimal    Drains:  none    Anesthesia Type:   General    Operative Indications:  Carotid stenosis, asymptomatic, bilateral []  53-year-old with asymptomatic high-grade left internal carotid artery stenosis  Operative Findings:  Focal web-like critical stenosis of the proximal internal carotid artery  Following endarterectomy intraoperative duplex shows wide patency of the common, internal and external carotid arteries without evidence of residual disease  Complications:   None    Procedure and Technique:    A qualified surgical resident was no available for this case  An assistant was required to aid in dissection, retraction and our true reconstruction  The patient was brought to the operating room and placed on the operating table in the supine position  The patient was identified by verbal confirmation and armband identification  An ultrasound was performed to identify the carotid bifurcation which was marked  The patient was prepped and draped in usual sterile fashion with chlorhexidine  An [place] drape was placed       A transverse incision was made in a skin fold overlying the previously marked left carotid bifurcation  Dissection was carried through the subcutaneous tissue and platysma to the anterior border the sternocleidomastoid muscle  The [place]itle[place]er retractor was inserted exposing the facial vein   The vein was doubly ligated and divided exposing the carotid artery  IV heparin was administered and an ACT obtained  Further heparin was administered as need to obtain a therapeutic ACT  With careful mobilization the internal carotid artery beyond the plaque, common carotid artery proximal to the plaque, external carotid artery and superior thyroidal arteries were dissected and encircled with Vesseloops  During this dissection the vagus and hypoglossal nerves were visualized and protected  Traction was placed on the vessel loops and an anterolateral arteriotomy was created in the common carotid artery extending it into the internal carotid artery with the [place] scissors  The 8 [place] shunt was easily inserted first into the distal internal carotid artery with good backbleeding noted  The proximal end was placed in the common carotid artery with restoration of shunted flow  Standard endarterectomy was then performed  Plaque was removed from the common external and internal carotid artery to a good endpoint  The arteriotomy was then closed with a bovine pericardial patch secured with a 6-0 Prolene suture  When the suture line was nearly completed the shunt was removed the vessels were flushed vigorously in both directions and the suture line was secured  Flow was restored first into the external and then the internal carotid artery  No significant bleeding points were encounter  Duplex ultrasound was then brought to the field and insonation of the external/internal and common carotid arteries showed excellent waveforms and no technical defects  Fibrillar coagulant was placed in the operative field for added hemostasis  Forty mg  protamine was administered  Once hemostasis was obtained the platysma was closed in a running fashion with 3 0 Monocryl suture and the  wound edges were infiltrated with 0 5% Marcaine with epinephrine mixed with 1% lidocaine    A 4-0 Monocryl subcuticular skin closure was performed with placement of a Histoacryl bandage  The patient awoke moving all 4 extremities and following commands       I was present for the entire procedure    Patient Disposition:  PACU     [facility] - Carotid Endarterectomy     Urgency:  Elective    Anesthesia: General Type: Conventional    Side: left    Patch Type: Bovine Pericardial     If Prosthetic, Patch : Coco    Shunt: Yes- Routine    Skin Prep: Chlorhexidine    Drain: no  Heparin: yes    Protamine: yes      Dextran: no     Re-explore artery after closure: no    Total Procedure time: 114min    Monitoring:   EEG: no   Stump Pressure: no   Other: no    Completion Study:   Doppler: no   Duplex: yes   Arteriogram: no    Concomitant Procedure:   Proximal Endovascular: no   Distal Endovascular: no   CABG: no   Other Arterial Op: no      SIGNATURE: Tonia Thomas MD  DATE: March 6, 2020  TIME: 7:20 AM

## 2020-03-06 NOTE — PROGRESS NOTES
Progress Note - Yomaira Velasquez 1950, 81 y o  male MRN: [id number]    Unit/Bed#: ICU 14 Encounter: [id number]    Primary Care Provider: Fabiano Pineda MD   Date and time admitted to hospital: 3/5/2020 11:43 AM        * Left carotid artery stenosis s/p cartoid endarterectomy  Assessment & Plan  82 y/o M with HTN, HLD, CAD, GERD, spinal stenosis, and bilateral carotid stenosis high grade on the left and moderate on the right  He was electively admitted for left carotid endarterectomy for treatment of asymptomatic disease  Plan:  POD #1 Left carotid endarterectomy (Dr Geovanni Leigh)  -Doing well  Neurologically intact with no focal deficits  Ambulating with a steady gait, tolerating a regular diet without dysphagia  -Vital signs stable  Required Neosynephrine gtt overnight for hypotension post CEA, off since 4am with BP normalized  -Initiated on Plavix  Continue Plavix x60 days and ASA 81mg indefinitely  -Continue statin therapy  -Patient describing diplopia effecting both eyes, cleared when glasses removed and with blinking, but then recurring  Discussed with critical care team, non-specific symptom not consistent with stroke  Will discuss with Dr Geovanni Leigh        Subjective:  Patient seen in bed  He states that he is feeling well  He is observed to be neurologically intact with no focal deficits  During exam he is noted to be winking; when questioned he describes double vision only when glasses are on, cleared with blinking, states it is effecting both eyes  Hemodynamically stable  Vitals:  /79   Pulse 62   Temp 98 °F (36 7 °C) (Temporal)   Resp 16   Ht 5' 3" (1 6 m)   Wt 65 5 kg (144 lb 6 4 oz)   SpO2 (!) 88%   BMI 25 58 kg/m²     I/Os:  I/O last 3 completed shifts:   In: 5301 3 [I V :4801 3; IV Piggyback:500]  Out: 225 [Urine:225]  I/O this shift:  In: 200 [I V :200]  Out: 400 [Urine:400]    Lab Results and Cultures:   Lab Results   Component Value Date    WBC 6 80 03/06/2020    HGB 10 5 (L) 03/06/2020    HCT 32 7 (L) 03/06/2020     (H) 03/06/2020     03/06/2020     Lab Results   Component Value Date    CALCIUM 8 3 03/06/2020    K 3 9 03/06/2020    CO2 21 03/06/2020     03/06/2020    BUN 11 03/06/2020    CREATININE 0 80 03/06/2020     Lab Results   Component Value Date    INR 1 08 03/06/2020    INR 0 99 02/28/2020    INR 1 23 (H) 10/28/2019    PROTIME 14 1 03/06/2020    PROTIME 13 2 02/28/2020    PROTIME 13 6 (H) 10/28/2019        Blood Culture: No results found for: BLOODCX,   Urinalysis:   Lab Results   Component Value Date    COLORU Yellow 11/13/2019    CLARITYU Clear 11/13/2019    SPECGRAV 1 020 11/13/2019    PHUR 6 0 11/13/2019    LEUKOCYTESUR Negative 11/13/2019    NITRITE Negative 11/13/2019    GLUCOSEU Negative 11/13/2019    KETONESU Negative 11/13/2019    BILIRUBINUR Negative 11/13/2019    BLOODU Negative 11/13/2019   ,   Urine Culture: No results found for: URINECX,   Wound Culure: No results found for: WOUNDCULT    Medications:  Current Facility-Administered Medications   Medication Dose Route Frequency    aspirin (ECOTRIN LOW STRENGTH) EC tablet 81 mg  81 mg Oral Daily    clopidogrel (PLAVIX) tablet 75 mg  75 mg Oral Daily    ezetimibe (ZETIA) tablet 10 mg  10 mg Oral HS    gabapentin (NEURONTIN) capsule 300 mg  300 mg Oral TID    heparin (porcine) subcutaneous injection 5,000 Units  5,000 Units Subcutaneous Q8H [facility]    Labetalol HCl (NORMODYNE) injection 5 mg  5 mg Intravenous Q15 Min PRN    And    hydrALAZINE (APRESOLINE) injection 15 mg  15 mg Intravenous Q15 Min PRN    And    niCARdipine (CARDENE) 25 mg (STANDARD CONCENTRATION) in sodium chloride 0 9% 250 mL  1-15 mg/hr Intravenous Continuous PRN    HYDROcodone-acetaminophen (NORCO) 5-325 mg per tablet 1 tablet  1 tablet Oral Q6H PRN    HYDROmorphone (DILAUDID) injection 0 5 mg  0 5 mg Intravenous Q4H PRN    lactated ringers infusion  100 mL/hr Intravenous Continuous    levothyroxine tablet 12 5 mcg 12 5 mcg Oral Early Morning    losartan (COZAAR) tablet 100 mg  100 mg Oral Daily    magnesium oxide (MAG-OX) tablet 400 mg  400 mg Oral BID    methocarbamol (ROBAXIN) tablet 500 mg  500 mg Oral BID    ondansetron (ZOFRAN) injection 4 mg  4 mg Intravenous Q6H PRN    pancrelipase (Lip-Prot-Amyl) (CREON) delayed release capsule 24,000 Units  24,000 Units Oral TID With Meals    phenylephrine (PRINCESS-SYNEPHRINE) 50 mg (STANDARD CONCENTRATION) in sodium chloride 0 9% 250 mL   mcg/min Intravenous Titrated    pravastatin (PRAVACHOL) tablet 80 mg  80 mg Oral Daily With Dinner       Physical Exam:    General appearance: alert and oriented, in no acute distress  Skin: Skin color, texture, turgor normal  No rashes or lesions  Neurologic: Grossly normal, ambulating with a steady gait, moves all extremities equal and strong 5/5 muscle strength, speech clear, tongue midline  Head: Normocephalic, without obvious abnormality, atraumatic  Eyes: EOMI, PERRL, pt describes intermittent diplopia to both eyes that clears with blinking and removing glasses  Neck: supple, symmetrical, trachea midline and left neck incision with minimal swelling, well approximated, no evidence hematoma  Lungs: clear to auscultation bilaterally  Heart: regular rate and rhythm, S1, S2 normal, no murmur, click, rub or gallop  Abdomen: soft, non-tender; bowel sounds normal; no masses,  no organomegaly  Extremities: extremities normal, warm and well-perfused; no cyanosis, clubbing, or edema    Wound/Incision:  Left neck incision with minimal swelling, well approximated, no evidence hematoma     ALEX Jeronimo  3/6/2020  [facility]  [phone]

## 2020-03-06 NOTE — DISCHARGE SUMMARY
Discharge Summary   Lucy Winston 81 y o  male MRN: [id number]  Unit/Bed#: ICU 14 Encounter: [id number]    Admission Date: 3/5/2020     Discharge Date:03/06/20    Attending:Zane Padgett MD     Consultants: Critical care    Admitting Diagnosis: Carotid stenosis, asymptomatic, bilateral [I65 [id number]]    Principle/ Secondary Diagnosis:  Past Medical History:   Diagnosis Date    A-fib [facility])     Arthritis     Avascular necrosis of bone of hip, left ([address] )     replaced    Back pain     CAD (coronary artery disease)     Carotid artery narrowing     left side -for endarterectomy today 3/5/2020    Disease of thyroid gland     hypo    GERD (gastroesophageal reflux disease)     History of Clostridioides difficile infection     Hyperlipidemia     Hypertension     Irregular heart beat     Kidney stone     Neck pain     Pancreatitis     Spinal stenosis     TIA (transient ischemic attack)     pt denies    Wears glasses      Past Surgical History:   Procedure Laterality Date    BACK SURGERY      CARDIAC CATHETERIZATION      cardiac cath 5/2010  adjusted meds    CATARACT EXTRACTION Bilateral     CERVICAL FUSION      COLONOSCOPY      JOINT REPLACEMENT Left     hip    LUMBAR FUSION      NECK SURGERY      ORTHOPEDIC SURGERY Left     L THR    ID THROMBOENDARTECTMY Guadalupe Paige INCIS Left 3/5/2020    Procedure: ENDARTERECTOMY ARTERY CAROTID WITH BOVINE PATCH ANGIOPLASTY AND INTRAOP DUPLEX;  Surgeon: Kayy Neal MD;  Location: [facility];  Service: Vascular    SPINAL FUSION         Procedures Performed: No orders of the defined types were placed in this encounter      ID THROMBOENDARTECTMY NECK,NECK INCIS [[id number]] (ENDARTERECTOMY ARTERY CAROTID)  ENDARTERECTOMY ARTERY CAROTID WITH BOVINE PATCH ANGIOPLASTY AND INTRAOP DUPLEX (Left Neck)  Procedure(s):  ENDARTERECTOMY ARTERY CAROTID WITH BOVINE PATCH ANGIOPLASTY AND INTRAOP DUPLEX    Laboratory data at discharge:   Results from last 7 days   Lab Units 03/06/20  0434 02/28/20  1437   WBC Thousand/uL 6 80 5 95   HEMOGLOBIN g/dL 10 5* 14 4   HEMATOCRIT % 32 7* 43 4   PLATELETS Thousands/uL 211 288     Results from last 7 days   Lab Units 03/06/20  0434 02/28/20  1437   POTASSIUM mmol/L 3 9 3 5   CHLORIDE mmol/L 108 103   CO2 mmol/L 21 27   BUN mg/dL 11 14   CREATININE mg/dL 0 80 0 93   CALCIUM mg/dL 8 3 9 9     Results from last 7 days   Lab Units 03/06/20  0434 02/28/20  1437   INR  1 08 0 99   PTT seconds 40*  --            Discharge instructions/Information to patient and family:   See after visit summary for information provided to patient and family  Discharge Medications:  See after visit summary for reconciled discharge medications provided to patient and family  Hospital Course:   82 y/o M with HTN, HLD, CAD, GERD, spinal stenosis, and bilateral carotid stenosis high grade on the left and moderate on the right  He was electively admitted for left carotid endarterectomy for treatment of asymptomatic disease on 3/5  He underwent L CEA without complication on day of admission by Dr Ehsan Ellsworth  Postop he was transferred to the critical care unit where he convalesced overnight  Overnight he was bradycardic and hypotensive requiring support with neosynephrine; this was d/c'ed early AM on POD #1  His BP/HR normalized  He was feeling well and was neurologically intact  He was OOB with steady gait and tolerating a regular diet without dysphagia  Left neck incision was well approximated without evidence hematoma  He was initiated on Plavix postop and will continue x60 days postop  Continue aspirin and statin  On POD #1 he was experiencing diplopia to both eyes that was intermittent, coming and going when he would place his glasses on/off and cleared with blinking  This was discussed with Dr Ehsan Ellsworth, non-specific symptom not consistent with acute neurologic symptoms and possibly related to use of eye gel/tape during CEA  Discharge instructions reviewed  He is aware of outpt f/u on 3/16 and will notify office prior with any concerns  Hospital course was complicated by the following:      Prior to discharge, the patient was given instructions for outpatient care and follow-up  The patient has been instructed to call w/ any questions, changes, or concerns for the medical condition  For further details of the hospitalization, please refer to the medical record  Condition at Discharge: good     Provisions for Follow-Up Care:  See after visit summary for information related to follow-up care and any pertinent home health orders  Disposition: Home    Planned Readmission: No    Discharge Statement   I spent 30 minutes discharging the patient  This time was spent on the day of discharge  I had direct contact with the patient on the day of discharge  Additional documentation is required if more than 30 minutes were spent on discharge  ALEX Hughes  3/6/2020      This text is generated with voice recognition software  There may be translation, syntax,  or grammatical errors  If you have any questions, please contact the dictating provider

## 2020-03-06 NOTE — ASSESSMENT & PLAN NOTE
80 y/o M with HTN, HLD, CAD, GERD, spinal stenosis, and bilateral carotid stenosis high grade on the left and moderate on the right  He was electively admitted for left carotid endarterectomy for treatment of asymptomatic disease  Plan:  POD #1 Left carotid endarterectomy (Dr Rachael Watters)  -Doing well  Neurologically intact with no focal deficits  Ambulating with a steady gait, tolerating a regular diet without dysphagia  -Vital signs stable  Required Neosynephrine gtt overnight for hypotension post CEA, off since 4am with BP normalized  -Initiated on Plavix  Continue Plavix x60 days and ASA 81mg indefinitely  -Continue statin therapy  -Patient describing diplopia effecting both eyes, cleared when glasses removed and with blinking, but then recurring  Discussed with critical care team, non-specific symptom not consistent with stroke    Will discuss with Dr Rachael Wtaters

## 2020-03-06 NOTE — ASSESSMENT & PLAN NOTE
· S/p left carotid endarterectomy   · No neurological deficits noted  · Having episodes of bradycardia and hypotension- on low dose neosynephrine  · Vascular following as primary service

## 2020-03-09 ENCOUNTER — TRANSITIONAL CARE MANAGEMENT (OUTPATIENT)
Dept: FAMILY MEDICINE CLINIC | Facility: CLINIC | Age: 70
End: 2020-03-09

## 2020-03-09 ENCOUNTER — OFFICE VISIT (OUTPATIENT)
Dept: GASTROENTEROLOGY | Facility: CLINIC | Age: 70
End: 2020-03-09
Payer: COMMERCIAL

## 2020-03-09 VITALS
WEIGHT: 133 LBS | DIASTOLIC BLOOD PRESSURE: 83 MMHG | HEIGHT: 63 IN | BODY MASS INDEX: 23.57 KG/M2 | SYSTOLIC BLOOD PRESSURE: 149 MMHG | HEART RATE: 92 BPM | TEMPERATURE: 97.4 F

## 2020-03-09 DIAGNOSIS — K85.90 ACUTE PANCREATITIS, UNSPECIFIED COMPLICATION STATUS, UNSPECIFIED PANCREATITIS TYPE: Primary | ICD-10-CM

## 2020-03-09 DIAGNOSIS — K86.2 PANCREATIC CYST: ICD-10-CM

## 2020-03-09 PROCEDURE — 1036F TOBACCO NON-USER: CPT | Performed by: PHYSICIAN ASSISTANT

## 2020-03-09 PROCEDURE — 3008F BODY MASS INDEX DOCD: CPT | Performed by: PHYSICIAN ASSISTANT

## 2020-03-09 PROCEDURE — 3079F DIAST BP 80-89 MM HG: CPT | Performed by: PHYSICIAN ASSISTANT

## 2020-03-09 PROCEDURE — 99214 OFFICE O/P EST MOD 30 MIN: CPT | Performed by: PHYSICIAN ASSISTANT

## 2020-03-09 PROCEDURE — 1160F RVW MEDS BY RX/DR IN RCRD: CPT | Performed by: PHYSICIAN ASSISTANT

## 2020-03-09 PROCEDURE — 1111F DSCHRG MED/CURRENT MED MERGE: CPT | Performed by: PHYSICIAN ASSISTANT

## 2020-03-09 PROCEDURE — 3077F SYST BP >= 140 MM HG: CPT | Performed by: PHYSICIAN ASSISTANT

## 2020-03-09 NOTE — PROGRESS NOTES
[facility] - Outpatient Follow-up Note  Cosme Dickey 81 y o  male MRN: [id number]  Encounter: [id number]          ASSESSMENT AND PLAN:      1  Acute pancreatitis, unspecified complication status, unspecified pancreatitis type:  Unknown etiology, this was his 2nd episode  Previous biliary workup was negative  Also consider alcohol is he drinks 4-6 alcoholic beverages on a weekly basis  Lipid panel and IgG 4 were negative  Will start with right upper quadrant ultrasound  We can further evaluate his biliary system with EUS when he is able to hold Plavix  - US right upper quadrant; Future    2  Pancreatic cyst:  2 5 cm lesion in the pancreatic head seen on CT scan, this increased since last exam   This could be enlarging pseudocyst or cystic pancreatic neoplasm  There was no main pancreatic duct dilation  Endoscopic ultrasound was recommended for further evaluation  We will need to schedule this 60 days out, when he will be able to hold Plavix as he was just started on this for carotid endarterectomy  - Endoscopic ultrasonography, GI (Upper); Future  -risks and benefits of EUS were discussed including but not limited to bleeding, infection, perforation  He understands and agrees to proceed with procedure    He will need vascular surgery clearance, will need schedule his endoscopic ultrasound for 60 days in the future when he will be able to hold his Plavix  If he is still on Plavix at that time we will need clearance from vascular surgery    ______________________________________________________________________    SUBJECTIVE:  Fran George is a 81year old male with a past medical history of atrial fibrillation, CAD, recent left-sided carotid end arterectomy on Plavix since 03/05/2020 who is here for hospital follow-up for his 2nd episode of acute pancreatitis  Etiology is not known    Concern for biliary versus alcohol induced as he drinks 4-6 alcoholic beverages on a weekly basis  Previous workup for biliary stone disease was negative  He had a normal lipid panel and negative IgG 4 level treated as an inpatient  He did have a CT scan that showed the acute recurrent pancreatitis in the region the pancreatic head and incident process, there was also a 2 5 cm lesion in the pancreatic head increased from prior exam likely representing a enlarging pseudocyst or cystic pancreatic neoplasm  There was no main pancreatic duct dilatation  He denies any nausea, vomiting, abdominal pain, diarrhea, constipation melena or hematochezia  His last colonoscopy was about 3 years ago, which she states was normal was told to repeat in 10 years  He had an EGD in October of 2019 with mild duodenitis, nonobstructing Schatzki's ring, small duodenal take versus peptic ulcer disease  REVIEW OF SYSTEMS IS OTHERWISE NEGATIVE        Historical Information   Past Medical History:   Diagnosis Date    A-fib ([address] )     Arthritis     Avascular necrosis of bone of hip, left ([facility])     replaced    Back pain     CAD (coronary artery disease)     Carotid artery narrowing     left side -for endarterectomy today 3/5/2020    Disease of thyroid gland     hypo    GERD (gastroesophageal reflux disease)     History of Clostridioides difficile infection     Hyperlipidemia     Hypertension     Irregular heart beat     Kidney stone     Neck pain     Pancreatitis     Spinal stenosis     TIA (transient ischemic attack)     pt denies    Wears glasses      Past Surgical History:   Procedure Laterality Date    BACK SURGERY      CARDIAC CATHETERIZATION      cardiac cath 5/2010  adjusted meds    CATARACT EXTRACTION Bilateral     CERVICAL FUSION      COLONOSCOPY      JOINT REPLACEMENT Left     hip    LUMBAR FUSION      NECK SURGERY      ORTHOPEDIC SURGERY Left     L THR    MD THROMBOENDARTECTMY NECK,NECK INCIS Left 3/5/2020    Procedure: ENDARTERECTOMY ARTERY CAROTID WITH BOVINE PATCH ANGIOPLASTY AND INTRAOP DUPLEX;  Surgeon: Conor Guerra MD;  [facility];  Service: Vascular    SPINAL FUSION       Social History   Social History     Substance and Sexual Activity   Alcohol Use Not Currently    Binge frequency: Never     Social History     Substance and Sexual Activity   Drug Use Never     Social History     Tobacco Use   Smoking Status Former Smoker    Packs/day: 0 50    Years: 50 00    Pack years: 25 00    Types: Cigarettes    Last attempt to quit: 2020    Years since quittin 1   Smokeless Tobacco Never Used     Family History   Family history unknown: Yes       Meds/Allergies       Current Outpatient Medications:     aspirin 81 MG tablet    clopidogrel (Plavix) 75 mg tablet    diclofenac sodium (VOLTAREN) 1 %    ezetimibe-simvastatin (VYTORIN) 10-40 mg per tablet    levothyroxine 25 mcg tablet    losartan (COZAAR) 100 MG tablet    magnesium oxide (MAG-OX) 400 mg    nicotine polacrilex (NICORETTE) 4 mg gum    omeprazole (PriLOSEC) 20 mg delayed release capsule    zolpidem (AMBIEN) 10 mg tablet    gabapentin (NEURONTIN) 300 mg capsule    HYDROcodone-acetaminophen (NORCO) 5-325 mg per tablet    methocarbamol (ROBAXIN) 500 mg tablet    pancrelipase, Lip-Prot-Amyl, (CREON) 24,000 units    Allergies   Allergen Reactions    Amlodipine Swelling     Lower extremity swelling  Objective     Blood pressure 149/83, pulse 92, temperature (!) 97 4 °F (36 3 °C), temperature source Tympanic, height 5' 3" (1 6 m), weight 60 3 kg (133 lb)  Body mass index is 23 56 kg/m²  PHYSICAL EXAM:      General Appearance:   Alert, cooperative, no distress   HEENT:   Normocephalic, atraumatic, anicteric  Right eye exhibits no discharge  Left eye exhibits no discharge   No scleral icterus    Neck:  Supple, symmetrical, trachea midline, no stridor    Lungs:   Clear to auscultation bilaterally; no rales, rhonchi or wheezing; respirations unlabored    Heart[de-identified]   Regular rate and rhythm; no murmur, rub, or gallop  Abdomen:   Soft, non-tender, non-distended; normal bowel sounds; no masses, no organomegaly    Genitalia:   Deferred    Rectal:   Deferred    Extremities:  No cyanosis, clubbing or edema        Skin:  No jaundice, rashes, or lesions          Lab Results:   No visits with results within 1 Day(s) from this visit     Latest known visit with results is:   Admission on 03/05/2020, Discharged on 03/06/2020   Component Date Value    Sodium 02/28/2020 140     Potassium 02/28/2020 3 5     Chloride 02/28/2020 103     CO2 02/28/2020 27     ANION GAP 02/28/2020 10     BUN 02/28/2020 14     Creatinine 02/28/2020 0 93     Glucose 02/28/2020 86     Glucose, Fasting 02/28/2020 86     Calcium 02/28/2020 9 9     eGFR 02/28/2020 85     Hemoglobin A1C 02/28/2020 5 5     EAG 02/28/2020 111     WBC 02/28/2020 5 95     RBC 02/28/2020 4 44     Hemoglobin 02/28/2020 14 4     Hematocrit 02/28/2020 43 4     MCV 02/28/2020 98     MCH 02/28/2020 32 4     MCHC 02/28/2020 33 2     RDW 02/28/2020 13 2     Platelets 39/68/9465 288     MPV 02/28/2020 11 2     Protime 02/28/2020 13 2     INR 02/28/2020 0 99     ABO Grouping 02/28/2020 A     Rh Factor 02/28/2020 Negative     Antibody Screen 02/28/2020 Negative     Specimen Expiration Date 02/28/2020 [id number]     ABO Grouping 03/05/2020 A     Rh Factor 03/05/2020 Negative     Sodium 03/06/2020 139     Potassium 03/06/2020 3 9     Chloride 03/06/2020 108     CO2 03/06/2020 21     ANION GAP 03/06/2020 10     BUN 03/06/2020 11     Creatinine 03/06/2020 0 80     Glucose 03/06/2020 82     Calcium 03/06/2020 8 3     eGFR 03/06/2020 92     WBC 03/06/2020 6 80     RBC 03/06/2020 3 27*    Hemoglobin 03/06/2020 10 5*    Hematocrit 03/06/2020 32 7*    MCV 03/06/2020 100*    MCH 03/06/2020 32 1     MCHC 03/06/2020 32 1     RDW 03/06/2020 13 9     Platelets 88/[birth year]/1929 211     MPV 03/06/2020 10 9     PTT 03/06/2020 40*    Protime 03/06/2020 14 1     INR 03/06/2020 1 08          Radiology Results:   Xr Chest Pa & Lateral    Result Date: 2/15/2020  Narrative: CHEST INDICATION:   hypoxia  COMPARISON:  Chest radiograph from 12/27/2019  Lower chest from an abdomen CT from 2/14/2020  EXAM PERFORMED/VIEWS:  XR CHEST PA & LATERAL FINDINGS: Cardiomediastinal silhouette appears unremarkable  New patchy consolidation in the left lower lobe  No effusion or pneumothorax  Osseous structures appear within normal limits for patient age  Cervicothoracic fusion  Impression: New mild patchy in the left lower lobe, atelectasis versus pneumonia  Vas Carotid Limited Study    Result Date: 3/6/2020  Narrative:  [facility] REPORT CLINICAL: Indications:  Intra-operative ultrasound for Left carotid artery endarterectomy performed by Dr Davis Standing  Operative History: 2020-03-05 Left Standard (ICA, ECA and CCA) endarterectomy Cardiac Catheterization Risk Factors The patient has history of HTN, HLD, CAD, NAA and previous smoking (quit <1 yr ago)  FINDINGS:  Left         PSV  EDV (cm/s)  Prox  ICA     72          30  Dist CCA     103          16  Ext Carotid   56           2     CONCLUSION:  Impression Intra-op evaluation shows no evidence of mural thrombus, intimal flap or significant residual disease in the endarterectomized carotid arteries  Ct Abdomen Pelvis With Contrast    Addendum Date: 2/14/2020 Addendum:   ADDENDUM: As described in the "body" of the report, 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 without subchondral collapse  Result Date: 2/14/2020  Narrative: CT ABDOMEN AND PELVIS WITH IV CONTRAST INDICATION:   Nausea/vomiting Epigastric pain Abdominal pain, acute, nonlocalized  COMPARISON:  CT abdomen and pelvis dated October 25, 2019, MRI abdomen dated November 12, 2019  TECHNIQUE:  CT examination of the abdomen and pelvis was performed   Axial, sagittal, and coronal 2D reformatted images were created from the source data and submitted for interpretation  Radiation dose length product (DLP) for this visit:  424 mGy-cm   This examination, like all CT scans performed in the [facility], was performed utilizing techniques to minimize radiation dose exposure, including the use of iterative reconstruction and automated exposure control  IV Contrast:  100 mL of iohexol (OMNIPAQUE) Enteric Contrast:  Enteric contrast was not administered  FINDINGS: ABDOMEN LOWER CHEST:  Mild bibasilar atelectasis  No pleural effusion  LIVER/BILIARY TREE:  Questionable mild surface nodularity of the liver  No suspicious mass  GALLBLADDER:  No calcified gallstones  No pericholecystic inflammatory change  SPLEEN:  Unremarkable  PANCREAS:  Previously noted peripancreatic edema adjacent to the pancreatic body has resolved  However, there is new peripancreatic edema and stranding adjacent to the pancreatic head and uncinate process, in keeping with acute recurrent edematous pancreatitis  There has been interval increase in the size of the hypoattenuating lesion within the pancreatic head, currently measuring 2 5 x 1 3 x 1 4 cm (series 2 image 26, series 3 image 89), previously 1 4 x 0 7 cm  This may represent an enlarging  pseudocyst or cystic pancreatic neoplasm  No main pancreatic ductal dilation    ADRENAL GLANDS:  Unremarkable  KIDNEYS/URETERS:  Stable 1 4 cm complex cyst with calcifications in the left mid kidney  Additional scattered simple renal cysts bilaterally, unchanged  STOMACH AND BOWEL: Small periampullary duodenal diverticulum  There is colonic diverticulosis without evidence of acute diverticulitis  No bowel obstruction  APPENDIX:  No findings to suggest appendicitis  ABDOMINOPELVIC CAVITY:  No ascites or free intraperitoneal air  No lymphadenopathy  Previously noted inflammatory fluid collection adjacent to the 3rd portion of duodenum has resolved  VESSELS:  Atherosclerotic changes are present  No evidence of aneurysm  PELVIS REPRODUCTIVE ORGANS:  Unremarkable for patient's age  URINARY BLADDER:  Unremarkable  ABDOMINAL WALL/INGUINAL REGIONS:  Unremarkable  OSSEOUS STRUCTURES:  No acute fracture or destructive osseous lesion  Status post total left hip arthroplasty  Serpiginous sclerosis in the right femoral head, in keeping with avascular necrosis  No subchondral collapse  Degenerative changes of the spine  Impression: 1  Acute recurrent edematous pancreatitis, now in the region of the pancreatic head and uncinate process  2   2 5 cm hypoattenuating lesion in the pancreatic head, increased from the prior exam, likely representing an enlarging pseudocyst or cystic pancreatic neoplasm  No main pancreatic duct dilation  Continued attention on follow-up is recommended

## 2020-03-10 ENCOUNTER — OFFICE VISIT (OUTPATIENT)
Dept: FAMILY MEDICINE CLINIC | Facility: CLINIC | Age: 70
End: 2020-03-10
Payer: COMMERCIAL

## 2020-03-10 ENCOUNTER — TELEPHONE (OUTPATIENT)
Dept: OBGYN CLINIC | Facility: HOSPITAL | Age: 70
End: 2020-03-10

## 2020-03-10 VITALS
DIASTOLIC BLOOD PRESSURE: 90 MMHG | OXYGEN SATURATION: 97 % | HEART RATE: 92 BPM | BODY MASS INDEX: 23.96 KG/M2 | HEIGHT: 63 IN | SYSTOLIC BLOOD PRESSURE: 152 MMHG | TEMPERATURE: 97.6 F | RESPIRATION RATE: 16 BRPM | WEIGHT: 135.2 LBS

## 2020-03-10 DIAGNOSIS — R10.9 ABDOMINAL PAIN, UNSPECIFIED ABDOMINAL LOCATION: ICD-10-CM

## 2020-03-10 DIAGNOSIS — K21.9 GASTROESOPHAGEAL REFLUX DISEASE WITHOUT ESOPHAGITIS: ICD-10-CM

## 2020-03-10 DIAGNOSIS — I65.22 LEFT CAROTID STENOSIS: ICD-10-CM

## 2020-03-10 DIAGNOSIS — D64.9 ANEMIA, UNSPECIFIED TYPE: ICD-10-CM

## 2020-03-10 DIAGNOSIS — K86.1 CHRONIC PANCREATITIS, UNSPECIFIED PANCREATITIS TYPE (HCC): ICD-10-CM

## 2020-03-10 DIAGNOSIS — I10 ESSENTIAL HYPERTENSION: Primary | ICD-10-CM

## 2020-03-10 PROCEDURE — 99496 TRANSJ CARE MGMT HIGH F2F 7D: CPT | Performed by: INTERNAL MEDICINE

## 2020-03-10 RX ORDER — HYDROCODONE BITARTRATE AND ACETAMINOPHEN 5; 325 MG/1; MG/1
1 TABLET ORAL 2 TIMES DAILY WITH MEALS
Qty: 30 TABLET | Refills: 0 | Status: SHIPPED | OUTPATIENT
Start: 2020-03-10 | End: 2020-09-10

## 2020-03-10 RX ORDER — METOPROLOL SUCCINATE 25 MG/1
25 TABLET, EXTENDED RELEASE ORAL DAILY
Qty: 90 TABLET | Refills: 3 | Status: SHIPPED | OUTPATIENT
Start: 2020-03-10 | End: 2020-09-10 | Stop reason: SDUPTHER

## 2020-03-10 RX ORDER — DEXLANSOPRAZOLE 60 MG/1
60 CAPSULE, DELAYED RELEASE ORAL DAILY
Qty: 90 CAPSULE | Refills: 3 | Status: SHIPPED | OUTPATIENT
Start: 2020-03-10 | End: 2020-05-05 | Stop reason: ALTCHOICE

## 2020-03-10 NOTE — PATIENT INSTRUCTIONS
EUS scheduled with Dr Gordon in [place] on 5/4/20  Will gave patient verbal instructions/paper work mailed  Medication clearance/procedure clearance faxed to [address] [place] [address]

## 2020-03-10 NOTE — PROGRESS NOTES
Assessment/Plan:         Diagnoses and all orders for this visit:    Essential hypertension; not well controlled  Continue losartan  Add :  -     metoprolol succinate (TOPROL-XL) 25 mg 24 hr tablet; Take 1 tablet (25 mg total) by mouth daily  RTC in 1 mo w :  -     Comprehensive metabolic panel; Future  -     CBC and differential; Future  -     Magnesium; Future  -     UA (URINE) with reflex to Scope    Chronic pancreatitis, unspecified pancreatitis type ([facility] [address] ); FU w GI  Continue Creon   Renew :  -     HYDROcodone-acetaminophen (NORCO) 5-325 mg per tablet; Take 1 tablet by mouth 2 (two) times a day with meals As needed OnlyMax Daily Amount: 2 tablets, PDMP was reviewed  RTC in 1 mo w :  -     Lipase; Future  -     Ferritin; Future  -     Lipid panel; Future  -     Folate; Future  -     Vitamin B1 (Thiamine), Serum/Plasma, LC/MS/MS; Future    Abdominal pain, unspecified abdominal location; as above     -     HYDROcodone-acetaminophen (NORCO) 5-325 mg per tablet; Take 1 tablet by mouth 2 (two) times a day with meals As needed OnlyMax Daily Amount: 2 tablets    Left carotid stenosis; SP surgery  FU w vascular surgery    Gastroesophageal reflux disease without esophagitis  -     dexlansoprazole (DEXILANT) 60 MG capsule; Take 1 capsule (60 mg total) by mouth daily Please stop Omeprazole    Anemia, unspecified type;  -     CBC and differential; Future  -     Ferritin; Future  -     Folate level  FU w Hematology        Subjective:      Patient ID: Ramiro Flores is a 81 y o  male  81 Y O Man is here for Post hospital Visit,  D/C Summary and med list reviewed w pt in Detail, He feels better, No New symptoms,  The following portions of the patient's history were reviewed and updated as appropriate: allergies, current medications, past family history, past social history, past surgical history and problem list     Review of Systems   Constitutional: Negative for chills, fatigue and fever     HENT: Negative for congestion, facial swelling, sore throat, trouble swallowing and voice change  Eyes: Negative for pain, discharge and visual disturbance  Respiratory: Negative for cough, shortness of breath and wheezing  Cardiovascular: Negative for chest pain, palpitations and leg swelling  Gastrointestinal: Positive for abdominal pain  Negative for blood in stool, constipation, diarrhea and nausea  Endocrine: Negative for polydipsia, polyphagia and polyuria  Genitourinary: Negative for difficulty urinating, hematuria and urgency  Musculoskeletal: Negative for arthralgias and myalgias  Skin: Negative for rash  Neurological: Negative for dizziness, tremors, weakness and headaches  Hematological: Negative for adenopathy  Does not bruise/bleed easily  Psychiatric/Behavioral: Negative for dysphoric mood, sleep disturbance and suicidal ideas  Objective:      /90 (BP Location: Left arm, Patient Position: Sitting, Cuff Size: Standard)   Pulse 92   Temp 97 6 °F (36 4 °C) (Tympanic)   Resp 16   Ht 5' 3" (1 6 m)   Wt 61 3 kg (135 lb 3 2 oz)   SpO2 97%   BMI 23 95 kg/m²          Physical Exam   Constitutional: He is oriented to person, place, and time  He appears well-nourished  No distress  HENT:   Head: Normocephalic  Mouth/Throat: Oropharynx is clear and moist  No oropharyngeal exudate  Eyes: Pupils are equal, round, and reactive to light  Conjunctivae are normal  No scleral icterus  Neck: Neck supple  No thyromegaly present  Surgical scar healing up on left neck   Cardiovascular: Normal rate and regular rhythm  Murmur heard  Pulmonary/Chest: Effort normal and breath sounds normal  No respiratory distress  He has no wheezes  He has no rales  Abdominal: Soft  Bowel sounds are normal  He exhibits no distension  There is tenderness  There is no rebound and no guarding  Musculoskeletal: He exhibits no edema or tenderness  Lymphadenopathy:     He has no cervical adenopathy  Neurological: He is alert and oriented to person, place, and time  No cranial nerve deficit  Coordination normal    Skin: No erythema  No pallor  Psychiatric: He has a normal mood and affect

## 2020-03-10 NOTE — TELEPHONE ENCOUNTER
I attempted to call patient to let him know that the surgery with Dr Marixa Back would have to be moved from 03/31  due to scheduling conflict  The phone was picked up and then disconnected

## 2020-03-11 ENCOUNTER — TELEPHONE (OUTPATIENT)
Dept: VASCULAR SURGERY | Facility: CLINIC | Age: 70
End: 2020-03-11

## 2020-03-11 NOTE — TELEPHONE ENCOUNTER
Received a request from  to hold plavix 5 days before endoscopy on 5/4/2020  That will be almost 60 days following carotid surgery  Per Get Ma NP, she signed the the sheet to  and I called the patient  He is allowed to hold 5 days before and he does not have to restart after

## 2020-03-12 ENCOUNTER — PATIENT OUTREACH (OUTPATIENT)
Dept: CASE MANAGEMENT | Facility: OTHER | Age: 70
End: 2020-03-12

## 2020-03-12 NOTE — PROGRESS NOTES
Outpatient Care Management Note:    Re: Follow up call, reports he is doing well but continues to experience pain  He has appointment tomorrow with Neurosurgery, Dr Naranjo Situ  Compliant with medications  Declines ongoing participation with care management  Has care managers name and contact number if assistance needed in the future  Closed to outpatient care management at this time, pt agrees to same

## 2020-03-13 ENCOUNTER — OFFICE VISIT (OUTPATIENT)
Dept: NEUROSURGERY | Facility: CLINIC | Age: 70
End: 2020-03-13
Payer: COMMERCIAL

## 2020-03-13 VITALS
HEIGHT: 63 IN | DIASTOLIC BLOOD PRESSURE: 96 MMHG | TEMPERATURE: 97.6 F | RESPIRATION RATE: 16 BRPM | SYSTOLIC BLOOD PRESSURE: 171 MMHG | BODY MASS INDEX: 23.57 KG/M2 | WEIGHT: 133 LBS | HEART RATE: 87 BPM

## 2020-03-13 DIAGNOSIS — M54.16 RADICULOPATHY, LUMBAR REGION: Primary | ICD-10-CM

## 2020-03-13 DIAGNOSIS — M51.36 DEGENERATIVE LUMBAR DISC: ICD-10-CM

## 2020-03-13 DIAGNOSIS — M48.061 SPINAL STENOSIS OF LUMBAR REGION, UNSPECIFIED WHETHER NEUROGENIC CLAUDICATION PRESENT: ICD-10-CM

## 2020-03-13 DIAGNOSIS — M48.061 SPINAL STENOSIS OF LUMBAR REGION WITHOUT NEUROGENIC CLAUDICATION: ICD-10-CM

## 2020-03-13 PROCEDURE — 3077F SYST BP >= 140 MM HG: CPT | Performed by: NEUROLOGICAL SURGERY

## 2020-03-13 PROCEDURE — 99204 OFFICE O/P NEW MOD 45 MIN: CPT | Performed by: NEUROLOGICAL SURGERY

## 2020-03-13 PROCEDURE — 1036F TOBACCO NON-USER: CPT | Performed by: NEUROLOGICAL SURGERY

## 2020-03-13 PROCEDURE — 3080F DIAST BP >= 90 MM HG: CPT | Performed by: NEUROLOGICAL SURGERY

## 2020-03-13 PROCEDURE — 1111F DSCHRG MED/CURRENT MED MERGE: CPT | Performed by: NEUROLOGICAL SURGERY

## 2020-03-13 PROCEDURE — 1160F RVW MEDS BY RX/DR IN RCRD: CPT | Performed by: NEUROLOGICAL SURGERY

## 2020-03-13 PROCEDURE — 3008F BODY MASS INDEX DOCD: CPT | Performed by: NEUROLOGICAL SURGERY

## 2020-03-13 NOTE — LETTER
March 13, 2020     Carleen Beat, MD [place]    Patient: Jordyn Coley   YOB: 1950   Date of Visit: 3/13/2020       Dear Dr Serafin Alexander: Thank you for referring Mariano Head to me for evaluation  Below are my notes for this consultation  If you have questions, please do not hesitate to call me  I look forward to following your patient along with you  Sincerely,        Rajeev Rodriguez MD        CC: MD Rajeev Serna MD  3/13/2020  3:46 PM  Sign at close encounter  Neurosurgery Office Note  Jordyn Coley 81 y o  male MRN: [id number]      Assessment/Plan      Diagnoses and all orders for this visit:    Radiculopathy, lumbar region  -     Ambulatory referral to Neurosurgery  -     XR spine lumbar complete w bending minimum 6 views; Future    Degenerative lumbar disc  -     XR spine lumbar complete w bending minimum 6 views; Future    Spinal stenosis of lumbar region without neurogenic claudication    Spinal stenosis of lumbar region, unspecified whether neurogenic claudication present          Discussion:     68-year-old male referred by Dr Serafin Alexander for 2nd opinion regarding his lumbar spine issues  Patient states   He has had neck and back  Issues over the years, specifically undergoing 2 surgeries on his cervical spine, 2 surgeries on his lumbar spine with Dr Ramiro Mares (we do not have these op notes, but [facility] imaging suggests it involved L4-5, L5-S1)  More recently, he has had lumbar back pain for several months  He states his back pain is 4/10 in severity, located in his low back  It radiates into his right leg, involving the buttock, posterior thigh, posterior calf and into the entire foot  He has pins and needles in this distribution, and numbness in his large toe  He states he does have some weakness in the right leg, he compensates with a limp    He presently is taking hydrocodone for pain from his recent left carotid endarterectomy, and he notes this is improved his pain considerably  It does limit him typically to walking 100 yards, and he does have improvement using a shopping cart  On exam he does have some dorsiflexion weakness perhaps 4+/ 5 on the right, plantar flexion appears to be symmetric and full strength  MRI scan of the lumbar spine demonstrates multilevel degenerative disease, particularly loss of height and endplate changes specifically L4-5, L5-S1, but also disc disease causing stenosis as well at L3-4  Specifically there is disc bulge and and plate osteophytic ridging on the right L5-S1 compressing the right S1 nerve root  I reviewed the patient options for management  Symptom wise, I would state he is having issues  Most consistent with right S1 distribution  However, we do not have upright or bending films which I would want prior to any surgical planning  He also has the central stenosis at L3-4, L4-5 which may be playing a role in his symptoms  Should these be involved, and wide decompression be necessary, fixation /fusion may be required  To better understand and plan for this, I have recommended he have flexion-extension x-rays  He did have a prior CT scan of the lumbar spine in 12/2019  the patient was recently treated surgically for his carotid stenosis, and will be on Plavix for at least the next 6 weeks  As such, I have suggested he come back towards the end of that  Time, with flexion-extension lumbar spine films, to delineate his plan of care        Metrics:  OD I 51 percent, DQ 5 D 5 L:  2, 1, 1, 3, 1, VA S 79        CHIEF COMPLAINT    Chief Complaint   Patient presents with    Consult     2 nd opinion, L-spine evaluation       HISTORY    History of Present Illness     81y o  year old male     HPI    See Discussion    REVIEW OF SYSTEMS    Review of Systems   Constitutional: Positive for appetite change (decreased), fatigue (less energy than usual) and unexpected weight change (ost 15 lbs over past 4 months)  HENT: Negative  Eyes: Positive for visual disturbance (double vision post carotid surgery that lasted 1 day, seems to have resolved)  Respiratory: Negative  Cardiovascular: Negative  Gastrointestinal: Positive for constipation (secondary to pain meds)  Endocrine: Negative  Genitourinary: Negative  Musculoskeletal: Positive for back pain, gait problem (right leg pain), neck pain and neck stiffness  Skin: Positive for wound (surgical incision neck, carotid, healing)  Allergic/Immunologic: Negative  Neurological: Positive for dizziness (occasional), weakness (right leg), numbness (right foot, great toe worst, some numbness in other toes) and headaches (top of head, not every day, past few weeks)  Right low back pain, buttocks, posterior leg, all the way to the bottom of the foot, N/T in right foot   Hematological: Negative  Psychiatric/Behavioral: Negative  Meds/Allergies     Current Outpatient Medications   Medication Sig Dispense Refill    aspirin 81 MG tablet Take 1 tablet (81 mg total) by mouth daily      clopidogrel (Plavix) 75 mg tablet Take 1 tablet (75 mg total) by mouth daily 60 tablet 0    diclofenac sodium (VOLTAREN) 1 % Apply 2 g topically 4 (four) times a day Apply to shoulders     200 g 2    ezetimibe-simvastatin (VYTORIN) 10-40 mg per tablet Take 1 tablet by mouth daily at bedtime 90 tablet 6    HYDROcodone-acetaminophen (NORCO) 5-325 mg per tablet Take 1 tablet by mouth 2 (two) times a day with meals As needed OnlyMax Daily Amount: 2 tablets 30 tablet 0    levothyroxine 25 mcg tablet Take 0 5 tablets (12 5 mcg total) by mouth daily 45 tablet 3    losartan (COZAAR) 100 MG tablet Take 1 tablet (100 mg total) by mouth daily 90 tablet 1    magnesium oxide (MAG-OX) 400 mg Take 1 tablet (400 mg total) by mouth 2 (two) times a day (Patient taking differently: Take 400 mg by mouth daily ) 60 tablet 1    metoprolol succinate (TOPROL-XL) 25 mg 24 hr tablet Take 1 tablet (25 mg total) by mouth daily 90 tablet 3    nicotine polacrilex (NICORETTE) 4 mg gum Chew 4 mg as needed for smoking cessation      pancrelipase, Lip-Prot-Amyl, (CREON) 24,000 units Take 1 capsule (24,000 Units total) by mouth 3 (three) times a day with meals 270 capsule 0    zolpidem (AMBIEN) 10 mg tablet Take by mouth daily at bedtime as needed       dexlansoprazole (DEXILANT) 60 MG capsule Take 1 capsule (60 mg total) by mouth daily Please stop Omeprazole (Patient not taking: Reported on 3/13/2020) 90 capsule 3     No current facility-administered medications for this visit  Allergies   Allergen Reactions    Amlodipine Swelling     Lower extremity swelling          PAST HISTORY    Past Medical History:   Diagnosis Date    A-fib [facility])     Arthritis     Avascular necrosis of bone of hip, left (HCC)     replaced    Back pain     CAD (coronary artery disease)     Carotid artery narrowing     left side -for endarterectomy today 3/5/2020    Disease of thyroid gland     hypo    GERD (gastroesophageal reflux disease)     History of Clostridioides difficile infection     Hyperlipidemia     Hypertension     Irregular heart beat     Kidney stone     Neck pain     Pancreatitis     Spinal stenosis     TIA (transient ischemic attack)     pt denies    Wears glasses        Past Surgical History:   Procedure Laterality Date    BACK SURGERY      CARDIAC CATHETERIZATION      cardiac cath 5/2010  adjusted meds    CATARACT EXTRACTION Bilateral     CERVICAL FUSION      COLONOSCOPY      JOINT REPLACEMENT Left     hip    LUMBAR FUSION      NECK SURGERY      ORTHOPEDIC SURGERY Left     L THR    PA THROMBOENDARTECTMY Arley [facility]lo INCIS Left 3/5/2020    Procedure: ENDARTERECTOMY ARTERY CAROTID WITH BOVINE PATCH ANGIOPLASTY AND INTRAOP DUPLEX;  Surgeon: Arnold Lagunas MD;  Location: [facility];  Service: Vascular    SPINAL FUSION         Social History     Tobacco Use    Smoking status: Former Smoker     Packs/day: 0 50     Years: 50 00     Pack years: 25 00     Types: Cigarettes     Last attempt to quit: 2020     Years since quittin 1    Smokeless tobacco: Never Used   Substance Use Topics    Alcohol use: Not Currently     Binge frequency: Never    Drug use: Never       Family History   Family history unknown: Yes         The following portions of the patient's history were reviewed in this encounter and updated as appropriate: Past medical, surgical, family, and social history, as well as medications, allergies, and review of systems  EXAM    Vitals:Blood pressure (!) 171/96, pulse 87, temperature 97 6 °F (36 4 °C), temperature source Tympanic, resp  rate 16, height 5' 3" (1 6 m), weight 60 3 kg (133 lb)  ,Body mass index is 23 56 kg/m²  Physical Exam   Constitutional: He is oriented to person, place, and time  He appears well-developed  Cigarette pack in shirt pocket, despite having 'quit smoking '   HENT:   Head: Normocephalic and atraumatic  Eyes: No scleral icterus  Neck: Neck supple  Cardiovascular: Normal rate  Pulmonary/Chest: Effort normal    Abdominal: Normal appearance  Neurological: He is alert and oriented to person, place, and time  No sensory deficit  Skin: Skin is warm and dry  Psychiatric: He has a normal mood and affect  His speech is normal and behavior is normal    Vitals reviewed  Neurologic Exam     Mental Status   Oriented to person, place, and time  Attention: normal    Speech: speech is normal   Level of consciousness: alert    Cranial Nerves     CN VII   Facial expression full, symmetric       Motor Exam   Muscle bulk: normal  Overall muscle tone: normal    Strength   Right anterior tibial: 4/5  Left anterior tibial: 5/5  Right gastroc: 5/5  Left gastroc: 5/5  Moves all extremities, grossly normal     Sensory Exam   Right leg light touch:   Decreased right calf posteriorly  Gait, Coordination, and Reflexes     Tremor   Resting tremor: absent  Intention tremor: absent  Action tremor: absent  No aids  MEDICAL DECISION MAKING    Imaging Studies:     MRI [facility] 12/31/19: IMPRESSION:     Mild S-shaped scoliosis with straightening of the lumbar spine and multilevel spondylosis with prominence of the dorsal epidural fat resulting in severe spinal stenosis at L3-L4 and L4-L5      Variable degrees of foraminal stenosis, most notable at L5-S1 where there is marked endplate osteophytic ridging and facet spurring resulting in severe right and moderate foraminal stenosis      Postoperative changes of right L5 laminectomy      No pathologic enhancement to suggest arachnoiditis  The right S1 nerve root sleeve appears slightly thicker possibly due to chronic inflammatory change  CT [facility] 12/27/19: IMPRESSION:     Marked degenerative spondylosis      There is severe canal narrowing at L4-L5 which is multifactorial   More moderate to severe narrowing is seen at L3-L4 which is also multifactorial      Vacuum phenomenon protrudes into the right L5-S1 neural foramen and could cause radiculopathy and potential right L5 root compression  This was not evident on the prior abdomen CT      There is progressive loss of disc height at L4-5 and L5-S1 loss of vacuum phenomenon  This could be mechanical with loss of disc height but unusual in 2 successive vertebral segments in a fairly short period of time  There is no rivera endplate   destruction seen although small cystic areas have developed and could represent small erosions      Early discitis is difficult to exclude and should be correlated with erythrocyte sedimentation rate  MRI follow-up may be indicated  I have personally reviewed pertinent reports     and I have personally reviewed pertinent films in PACS

## 2020-03-13 NOTE — PROGRESS NOTES
Neurosurgery Office Note  Collette Shad 81 y o  male MRN: [id number]      Assessment/Plan      Diagnoses and all orders for this visit:    Radiculopathy, lumbar region  -     Ambulatory referral to Neurosurgery  -     XR spine lumbar complete w bending minimum 6 views; Future    Degenerative lumbar disc  -     XR spine lumbar complete w bending minimum 6 views; Future    Spinal stenosis of lumbar region without neurogenic claudication    Spinal stenosis of lumbar region, unspecified whether neurogenic claudication present          Discussion:     73-year-old male referred by Dr Deion Damico for 2nd opinion regarding his lumbar spine issues  Patient states   He has had neck and back  Issues over the years, specifically undergoing 2 surgeries on his cervical spine, 2 surgeries on his lumbar spine with Dr Mariel Wharton (we do not have these op notes, but [facility] imaging suggests it involved L4-5, L5-S1)  More recently, he has had lumbar back pain for several months  He states his back pain is 4/10 in severity, located in his low back  It radiates into his right leg, involving the buttock, posterior thigh, posterior calf and into the entire foot  He has pins and needles in this distribution, and numbness in his large toe  He states he does have some weakness in the right leg, he compensates with a limp  He presently is taking hydrocodone for pain from his recent left carotid endarterectomy, and he notes this is improved his pain considerably  It does limit him typically to walking 100 yards, and he does have improvement using a shopping cart  On exam he does have some dorsiflexion weakness perhaps 4+/ 5 on the right, plantar flexion appears to be symmetric and full strength  MRI scan of the lumbar spine demonstrates multilevel degenerative disease, particularly loss of height and endplate changes specifically L4-5, L5-S1, but also disc disease causing stenosis as well at L3-4    Specifically there is disc bulge and and plate osteophytic ridging on the right L5-S1 compressing the right S1 nerve root  I reviewed the patient options for management  Symptom wise, I would state he is having issues  Most consistent with right S1 distribution  However, we do not have upright or bending films which I would want prior to any surgical planning  He also has the central stenosis at L3-4, L4-5 which may be playing a role in his symptoms  Should these be involved, and wide decompression be necessary, fixation /fusion may be required  To better understand and plan for this, I have recommended he have flexion-extension x-rays  He did have a prior CT scan of the lumbar spine in 12/2019  the patient was recently treated surgically for his carotid stenosis, and will be on Plavix for at least the next 6 weeks  As such, I have suggested he come back towards the end of that  Time, with flexion-extension lumbar spine films, to delineate his plan of care  Metrics:  OD I 51 percent, DQ 5 D 5 L:  2, 1, 1, 3, 1, VA S 79        CHIEF COMPLAINT    Chief Complaint   Patient presents with    Consult     2 nd opinion, L-spine evaluation       HISTORY    History of Present Illness     81y o  year old male     HPI    See Discussion    REVIEW OF SYSTEMS    Review of Systems   Constitutional: Positive for appetite change (decreased), fatigue (less energy than usual) and unexpected weight change (ost 15 lbs over past 4 months)  HENT: Negative  Eyes: Positive for visual disturbance (double vision post carotid surgery that lasted 1 day, seems to have resolved)  Respiratory: Negative  Cardiovascular: Negative  Gastrointestinal: Positive for constipation (secondary to pain meds)  Endocrine: Negative  Genitourinary: Negative  Musculoskeletal: Positive for back pain, gait problem (right leg pain), neck pain and neck stiffness  Skin: Positive for wound (surgical incision neck, carotid, healing)  Allergic/Immunologic: Negative  Neurological: Positive for dizziness (occasional), weakness (right leg), numbness (right foot, great toe worst, some numbness in other toes) and headaches (top of head, not every day, past few weeks)  Right low back pain, buttocks, posterior leg, all the way to the bottom of the foot, N/T in right foot   Hematological: Negative  Psychiatric/Behavioral: Negative  Meds/Allergies     Current Outpatient Medications   Medication Sig Dispense Refill    aspirin 81 MG tablet Take 1 tablet (81 mg total) by mouth daily      clopidogrel (Plavix) 75 mg tablet Take 1 tablet (75 mg total) by mouth daily 60 tablet 0    diclofenac sodium (VOLTAREN) 1 % Apply 2 g topically 4 (four) times a day Apply to shoulders     200 g 2    ezetimibe-simvastatin (VYTORIN) 10-40 mg per tablet Take 1 tablet by mouth daily at bedtime 90 tablet 6    HYDROcodone-acetaminophen (NORCO) 5-325 mg per tablet Take 1 tablet by mouth 2 (two) times a day with meals As needed OnlyMax Daily Amount: 2 tablets 30 tablet 0    levothyroxine 25 mcg tablet Take 0 5 tablets (12 5 mcg total) by mouth daily 45 tablet 3    losartan (COZAAR) 100 MG tablet Take 1 tablet (100 mg total) by mouth daily 90 tablet 1    magnesium oxide (MAG-OX) 400 mg Take 1 tablet (400 mg total) by mouth 2 (two) times a day (Patient taking differently: Take 400 mg by mouth daily ) 60 tablet 1    metoprolol succinate (TOPROL-XL) 25 mg 24 hr tablet Take 1 tablet (25 mg total) by mouth daily 90 tablet 3    nicotine polacrilex (NICORETTE) 4 mg gum Chew 4 mg as needed for smoking cessation      pancrelipase, Lip-Prot-Amyl, (CREON) 24,000 units Take 1 capsule (24,000 Units total) by mouth 3 (three) times a day with meals 270 capsule 0    zolpidem (AMBIEN) 10 mg tablet Take by mouth daily at bedtime as needed       dexlansoprazole (DEXILANT) 60 MG capsule Take 1 capsule (60 mg total) by mouth daily Please stop Omeprazole (Patient not taking: Reported on 3/13/2020) 90 capsule 3     No current facility-administered medications for this visit  Allergies   Allergen Reactions    Amlodipine Swelling     Lower extremity swelling          PAST HISTORY    Past Medical History:   Diagnosis Date    [facility])     Arthritis     Avascular necrosis of bone of hip, left (HCC)     replaced    Back pain     CAD (coronary artery disease)     Carotid artery narrowing     left side -for endarterectomy today 3/5/2020    Disease of thyroid gland     hypo    GERD (gastroesophageal reflux disease)     History of Clostridioides difficile infection     Hyperlipidemia     Hypertension     Irregular heart beat     Kidney stone     Neck pain     Pancreatitis     Spinal stenosis     TIA (transient ischemic attack)     pt denies    Wears glasses        Past Surgical History:   Procedure Laterality Date    BACK SURGERY      CARDIAC CATHETERIZATION      cardiac cath 2010  adjusted meds    CATARACT EXTRACTION Bilateral     CERVICAL FUSION      COLONOSCOPY      JOINT REPLACEMENT Left     hip    LUMBAR FUSION      NECK SURGERY      ORTHOPEDIC SURGERY Left     L THR    IN THROMBOENDARTECTMY NECK,NECK INCIS Left 3/5/2020    Procedure: ENDARTERECTOMY ARTERY CAROTID WITH BOVINE PATCH ANGIOPLASTY AND INTRAOP DUPLEX;  Surgeon: Sudarshan Mckeon MD;  Location: [facility] OR;  Service: Vascular    SPINAL FUSION         Social History     Tobacco Use    Smoking status: Former Smoker     Packs/day: 0 50     Years: 50 00     Pack years: 25 00     Types: Cigarettes     Last attempt to quit: 2020     Years since quittin 1    Smokeless tobacco: Never Used   Substance Use Topics    Alcohol use: Not Currently     Binge frequency: Never    Drug use: Never       Family History   Family history unknown: Yes         The following portions of the patient's history were reviewed in this encounter and updated as appropriate: Past medical, surgical, family, and social history, as well as medications, allergies, and review of systems  EXAM    Vitals:Blood pressure (!) 171/96, pulse 87, temperature 97 6 °F (36 4 °C), temperature source Tympanic, resp  rate 16, height 5' 3" (1 6 m), weight 60 3 kg (133 lb)  ,Body mass index is 23 56 kg/m²  Physical Exam   Constitutional: He is oriented to person, place, and time  He appears well-developed  Cigarette pack in shirt pocket, despite having 'quit smoking '   HENT:   Head: Normocephalic and atraumatic  Eyes: No scleral icterus  Neck: Neck supple  Cardiovascular: Normal rate  Pulmonary/Chest: Effort normal    Abdominal: Normal appearance  Neurological: He is alert and oriented to person, place, and time  No sensory deficit  Skin: Skin is warm and dry  Psychiatric: He has a normal mood and affect  His speech is normal and behavior is normal    Vitals reviewed  Neurologic Exam     Mental Status   Oriented to person, place, and time  Attention: normal    Speech: speech is normal   Level of consciousness: alert    Cranial Nerves     CN VII   Facial expression full, symmetric  Motor Exam   Muscle bulk: normal  Overall muscle tone: normal    Strength   Right anterior tibial: 4/5  Left anterior tibial: 5/5  Right gastroc: 5/5  Left gastroc: 5/5  Moves all extremities, grossly normal     Sensory Exam   Right leg light touch:   Decreased right calf posteriorly  Gait, Coordination, and Reflexes     Tremor   Resting tremor: absent  Intention tremor: absent  Action tremor: absent  No aids  MEDICAL DECISION MAKING    Imaging Studies:     MRI [facility] 12/31/19:      IMPRESSION:     Mild S-shaped scoliosis with straightening of the lumbar spine and multilevel spondylosis with prominence of the dorsal epidural fat resulting in severe spinal stenosis at L3-L4 and L4-L5      Variable degrees of foraminal stenosis, most notable at L5-S1 where there is marked endplate osteophytic ridging and facet spurring resulting in severe right and moderate foraminal stenosis      Postoperative changes of right L5 laminectomy      No pathologic enhancement to suggest arachnoiditis  The right S1 nerve root sleeve appears slightly thicker possibly due to chronic inflammatory change  CT [facility] 12/27/19: IMPRESSION:     Marked degenerative spondylosis      There is severe canal narrowing at L4-L5 which is multifactorial   More moderate to severe narrowing is seen at L3-L4 which is also multifactorial      Vacuum phenomenon protrudes into the right L5-S1 neural foramen and could cause radiculopathy and potential right L5 root compression  This was not evident on the prior abdomen CT      There is progressive loss of disc height at L4-5 and L5-S1 loss of vacuum phenomenon  This could be mechanical with loss of disc height but unusual in 2 successive vertebral segments in a fairly short period of time  There is no rivera endplate   destruction seen although small cystic areas have developed and could represent small erosions      Early discitis is difficult to exclude and should be correlated with erythrocyte sedimentation rate  MRI follow-up may be indicated  I have personally reviewed pertinent reports     and I have personally reviewed pertinent films in PACS

## 2020-03-16 ENCOUNTER — OFFICE VISIT (OUTPATIENT)
Dept: VASCULAR SURGERY | Facility: CLINIC | Age: 70
End: 2020-03-16

## 2020-03-16 VITALS
WEIGHT: 132.8 LBS | HEIGHT: 63 IN | BODY MASS INDEX: 23.53 KG/M2 | DIASTOLIC BLOOD PRESSURE: 84 MMHG | TEMPERATURE: 99.5 F | SYSTOLIC BLOOD PRESSURE: 152 MMHG | HEART RATE: 73 BPM

## 2020-03-16 DIAGNOSIS — I65.23 ASYMPTOMATIC CAROTID ARTERY STENOSIS, BILATERAL: Primary | ICD-10-CM

## 2020-03-16 DIAGNOSIS — I65.23 CAROTID ARTERY STENOSIS, ASYMPTOMATIC, BILATERAL: Chronic | ICD-10-CM

## 2020-03-16 PROCEDURE — 1111F DSCHRG MED/CURRENT MED MERGE: CPT | Performed by: SURGERY

## 2020-03-16 PROCEDURE — 3077F SYST BP >= 140 MM HG: CPT | Performed by: SURGERY

## 2020-03-16 PROCEDURE — 3079F DIAST BP 80-89 MM HG: CPT | Performed by: SURGERY

## 2020-03-16 PROCEDURE — 3008F BODY MASS INDEX DOCD: CPT | Performed by: SURGERY

## 2020-03-16 PROCEDURE — 1160F RVW MEDS BY RX/DR IN RCRD: CPT | Performed by: SURGERY

## 2020-03-16 PROCEDURE — 99024 POSTOP FOLLOW-UP VISIT: CPT | Performed by: SURGERY

## 2020-03-16 NOTE — LETTER
March 16, 2020     Liam Mam, [address] [place] [address] [address]    Patient: Collette Shad   YOB: 1950   Date of Visit: 3/16/2020       Dear Dr Yoseph Coello: Thank you for referring Roberto Murillo to me for evaluation  Below are the relevant portions of my assessment and plan of care  1  Asymptomatic high-grade left internal carotid artery stenosis now status post left carotid endarterectomy  He has done well in this regard  There are no restrictions at this point  He will continue his Plavix therapy for a total of 2-3 months  Will plan standard follow-up with carotid duplex imaging in 3 months  2  Asymptomatic moderate right internal carotid artery stenosis  At this point no further intervention is necessary  We will plan standard follow-up with duplex imaging as noted above  He is already on appropriate medical therapy with antiplatelet and statin  If you have questions, please do not hesitate to call me  I look forward to following Jessica Nielsen along with you           Sincerely,        Himanshu Vogt MD        CC: No Recipients

## 2020-03-16 NOTE — PROGRESS NOTES
Assessment/Plan:    1  Asymptomatic high-grade left internal carotid artery stenosis now status post left carotid endarterectomy  He has done well in this regard  There are no restrictions at this point  He will continue his Plavix therapy for a total of 2-3 months  Will plan standard follow-up with carotid duplex imaging in 3 months  2  Asymptomatic moderate right internal carotid artery stenosis  At this point no further intervention is necessary  We will plan standard follow-up with duplex imaging as noted above  He is already on appropriate medical therapy with antiplatelet and statin  Subjective:      Patient ID: Pam Johns is a 81 y o  male     Pt is here s/p Left CEA done 3/5/2020  Pt c/o numbness from the middle of his chin, to the left side of neck around the incision and along the left lower jaw line  He says that there is no numbness on the inside of the mouth  Incision is clean and dry with moderate edema  He also c/o headache everyday when he wakes up that is lasting about 2-3 hours  He denies any dizziness, slurred speech, trouble swallowing, any sudden loss of vision, TIA or Stroke symptoms  Pt is taking Plavix, ASA 81 mg and Vytorin 10-40 mg     73-year-old former smoker was found to have a high-grade left internal carotid artery stenosis  He underwent carotid endarterectomy on 03/05/2020  On evaluation today he complains of some mild sara-incisional numbness and periodic headaches which resolve spontaneously  On examination his incision is healed well  Neurologic exam is grossly intact  The following portions of the patient's history were reviewed and updated as appropriate: allergies, current medications, past family history, past medical history, past social history, past surgical history and problem list     I have reviewed and made appropriate changes to the review of systems input by the medical assistant      Vitals:    03/16/20 1543   BP: 152/84   BP Location: Left arm   Patient Position: Sitting   Cuff Size: Standard   Pulse: 73   Temp: 99 5 °F (37 5 °C)   TempSrc: Tympanic   Weight: 60 2 kg (132 lb 12 8 oz)   Height: 5' 3" (1 6 m)       Patient Active Problem List   Diagnosis    Essential hypertension, benign    Left carotid artery stenosis s/p cartoid endarterectomy    Thoracic aortic aneurysm without rupture (HCC)    Ectopic atrial rhythm    Prediabetes    Hypothyroidism (acquired)    Mixed hyperlipidemia    Drug-induced acute pancreatitis without infection or necrosis    Tobacco abuse    Acute respiratory failure with hypoxia (HCC)    Jejunitis    Radiculopathy, lumbar region    Spinal stenosis of lumbar region without neurogenic claudication    Right sided sciatica    Spinal stenosis of lumbar region    Pre-operative cardiovascular examination    Acute pancreatitis    Hypokalemia    Bradycardia       Past Surgical History:   Procedure Laterality Date    BACK SURGERY      CARDIAC CATHETERIZATION      cardiac cath 5/2010  adjusted meds    CATARACT EXTRACTION Bilateral     CERVICAL FUSION      COLONOSCOPY      JOINT REPLACEMENT Left     hip    LUMBAR FUSION      NECK SURGERY      ORTHOPEDIC SURGERY Left     L THR    MT THROMBOENDARTECTMY NECK,NECK INCIS Left 3/5/2020    Procedure: ENDARTERECTOMY ARTERY CAROTID WITH BOVINE PATCH ANGIOPLASTY AND INTRAOP DUPLEX;  Surgeon: Marylu Sorto MD;  Location: [facility];  Service: Vascular    SPINAL FUSION         Family History   Problem Relation Age of Onset    Heart disease Mother     Parkinsonism Mother     Heart disease Father        Social History     Socioeconomic History    Marital status: Single     Spouse name: Not on file    Number of children: 0    Years of education: Not on file    Highest education level: Not on file   Occupational History    Occupation: [employer]   Social Needs    Financial resource strain: Not on file    Food insecurity:     Worry: Not on file     Inability: Not on file    Transportation needs:     Medical: Not on file     Non-medical: Not on file   Tobacco Use    Smoking status: Former Smoker     Packs/day: 0 50     Years: 50 00     Pack years: 25 00     Types: Cigarettes     Last attempt to quit: 2020     Years since quittin 1    Smokeless tobacco: Never Used   Substance and Sexual Activity    Alcohol use: Not Currently     Binge frequency: Never    Drug use: Never    Sexual activity: Not Currently     Comment: DAVIDSON   Lifestyle    Physical activity:     Days per week: Not on file     Minutes per session: Not on file    Stress: Not on file   Relationships    Social connections:     Talks on phone: Not on file     Gets together: Not on file     Attends [employer] witness service: Not on file     Active member of club or organization: Not on file     Attends meetings of clubs or organizations: Not on file     Relationship status: Not on file    Intimate partner violence:     Fear of current or ex partner: Not on file     Emotionally abused: Not on file     Physically abused: Not on file     Forced sexual activity: Not on file   Other Topics Concern    Not on file   Social History Narrative    Lives independently       Allergies   Allergen Reactions    Amlodipine Swelling     Lower extremity swelling  Current Outpatient Medications:     aspirin 81 MG tablet, Take 1 tablet (81 mg total) by mouth daily, Disp: , Rfl:     clopidogrel (Plavix) 75 mg tablet, Take 1 tablet (75 mg total) by mouth daily, Disp: 60 tablet, Rfl: 0    diclofenac sodium (VOLTAREN) 1 %, Apply 2 g topically 4 (four) times a day Apply to shoulders   , Disp: 200 g, Rfl: 2    ezetimibe-simvastatin (VYTORIN) 10-40 mg per tablet, Take 1 tablet by mouth daily at bedtime, Disp: 90 tablet, Rfl: 6    HYDROcodone-acetaminophen (NORCO) 5-325 mg per tablet, Take 1 tablet by mouth 2 (two) times a day with meals As needed OnlyMax Daily Amount: 2 tablets, Disp: 30 tablet, Rfl: 0   levothyroxine 25 mcg tablet, Take 0 5 tablets (12 5 mcg total) by mouth daily, Disp: 45 tablet, Rfl: 3    losartan (COZAAR) 100 MG tablet, Take 1 tablet (100 mg total) by mouth daily, Disp: 90 tablet, Rfl: 1    magnesium oxide (MAG-OX) 400 mg, Take 1 tablet (400 mg total) by mouth 2 (two) times a day (Patient taking differently: Take 400 mg by mouth daily ), Disp: 60 tablet, Rfl: 1    metoprolol succinate (TOPROL-XL) 25 mg 24 hr tablet, Take 1 tablet (25 mg total) by mouth daily, Disp: 90 tablet, Rfl: 3    nicotine polacrilex (NICORETTE) 4 mg gum, Chew 4 mg as needed for smoking cessation, Disp: , Rfl:     pancrelipase, Lip-Prot-Amyl, (CREON) 24,000 units, Take 1 capsule (24,000 Units total) by mouth 3 (three) times a day with meals, Disp: 270 capsule, Rfl: 0    zolpidem (AMBIEN) 10 mg tablet, Take by mouth daily at bedtime as needed , Disp: , Rfl:     dexlansoprazole (DEXILANT) 60 MG capsule, Take 1 capsule (60 mg total) by mouth daily Please stop Omeprazole (Patient not taking: Reported on 3/13/2020), Disp: 90 capsule, Rfl: 3    Review of Systems   Constitutional: Negative  HENT: Negative  Eyes: Negative  Respiratory: Negative  Cardiovascular: Negative  Gastrointestinal: Negative  Endocrine: Negative  Genitourinary: Negative  Musculoskeletal: Positive for arthralgias and back pain  Skin: Negative  Allergic/Immunologic: Negative  Neurological: Positive for numbness and headaches  Hematological: Negative  Psychiatric/Behavioral: Negative  Objective: There were no vitals taken for this visit           Physical Exam

## 2020-03-16 NOTE — ASSESSMENT & PLAN NOTE
PLAN:  -Continue aspirin 81mg daily  -Continue statin therapy daily  -Continue Plavix 75mg daily for 2 months - you may hold prior to GI procedure 5/4/2020 as previously discussed  -if you have any signs or symptoms of TIA/CVA including vision changes, unilateral extremity weaknesses, speech difficulties, please call 911 and go to the ER for evaluation  -Carotid Duplex to be done at 3 month and 9 month post-op  -F/u with Vascular Surgeon 3 months

## 2020-03-16 NOTE — PATIENT INSTRUCTIONS
PLAN:  -Continue aspirin 81mg daily  -Continue statin therapy daily  -Continue Plavix 75mg daily for 2 months - you may hold prior to GI procedure 5/4/2020 as previously discussed  -if you have any signs or symptoms of TIA/CVA including vision changes, unilateral extremity weaknesses, speech difficulties, please call 911 and go to the ER for evaluation  -Carotid Duplex to be done at 3 month and 9 month post-op  -F/u with Vascular Surgeon 3 months    1  Asymptomatic high-grade left internal carotid artery stenosis now status post left carotid endarterectomy  He has done well in this regard  There are no restrictions at this point  He will continue his Plavix therapy for a total of 2-3 months  Will plan standard follow-up with carotid duplex imaging in 3 months  2  Asymptomatic moderate right internal carotid artery stenosis  At this point no further intervention is necessary  We will plan standard follow-up with duplex imaging as noted above  He is already on appropriate medical therapy with antiplatelet and statin  Carotid Artery Disease   AMBULATORY CARE:   Carotid artery disease  is a condition that causes narrow or blocked carotid arteries  Your carotid arteries are the blood vessels that supply your brain with most of the blood it needs to work  You have 2 carotid arteries, one on each side of your neck          Call 911 for any of the following:   · You have any of the following signs of a stroke:      ¨ Numbness or drooping on one side of your face     ¨ Weakness in an arm or leg    ¨ Confusion or difficulty speaking    ¨ Dizziness, a severe headache, or vision loss    · You have any of the following signs of a heart attack:      ¨ Squeezing, pressure, or pain in your chest that lasts longer than 5 minutes or returns    ¨ Discomfort or pain in your back, neck, jaw, stomach, or arm     ¨ Trouble breathing    ¨ Nausea or vomiting    ¨ Lightheadedness or a sudden cold sweat, especially with chest pain or trouble breathing  Contact your healthcare provider if:   · You have questions or concerns about your condition or care  Signs and symptoms of carotid artery disease: You may have no signs or symptoms  Most commonly, carotid artery disease causes transient ischemic attacks (TIAs), or mini-strokes  You may have numbness, weakness, lack of movement, or vision or speech problems  A TIA goes away quickly and does not cause permanent damage  A TIA may be a warning sign that you are about to have a stroke  If you have any symptoms of a TIA or stroke, seek care immediately  Warning signs of a stroke: The word F A S T  can help you remember and recognize warning signs of a stroke  · F = Face:  One side of the face droops  · A = Arms:  One arm starts to drop when both arms are raised  · S = Speech:  Speech is slurred or sounds different than usual     · T = Time:  A person who is having a stroke needs to be seen immediately  A stroke is a medical emergency that needs immediate treatment  Some medicines and treatments work best if given within a few hours of a stroke  Treatment  for carotid artery disease depends on how narrow your arteries have become, your symptoms, and your general health  The goal of treatment is to lower your risk for a stroke  You may need any of the following:  · Take aspirin if directed  Your healthcare provider may suggest that you take an aspirin a day to prevent blood clots from forming in the carotid arteries  If your healthcare provider wants you to take aspirin daily, do not take acetaminophen or ibuprofen instead  · Control risk factors  High blood pressure, high cholesterol, heart disease, diabetes, and being overweight increase your risk for atherosclerosis  · Procedures can help open blocked arteries  A carotid endarterectomy is used to cut plaque out of the artery  An angioplasty is used to push the plaque against the artery wall with a balloon device  Sometimes a stent is placed during an angioplasty  A stent is a metal mesh tube that is placed in the artery to keep it open  Manage carotid artery disease:   · Eat a variety of healthy foods  Healthy foods include fruit, vegetables, whole-grain breads, low-fat dairy products, lean meat, and fish  Choose fish that are high in omega-3 fatty acids, such as salmon and fresh tuna  Ask your healthcare provider for more information on a heart healthy diet and the DASH eating plan  · Limit sodium (salt)  Sodium may increase your blood pressure  Add less table salt to your foods  Read food labels and choose foods that are low in sodium  Your healthcare provider may suggest you follow a low sodium diet  · Reach or maintain a healthy weight  Extra weight makes your heart work harder  Ask your healthcare provider how much you should weight  He can help you create a safe weight loss plan  Even a weight loss of 10% of your body weight can help your heart function better  · Exercise as directed  Exercise helps improve heart function and can help you manage your weight  Exercise can also help lower your cholesterol and blood sugar levels  Try to get at least 30 minutes of exercise at least 5 times each week  Try to be active every day  Your healthcare provider can help you create an exercise plan that works best for you  · Limit alcohol  Alcohol can increase your blood pressure and triglyceride levels  Men should limit alcohol to 2 drinks per day  Women should limit alcohol to 1 drink per day  A drink of alcohol is 12 ounces of beer, 5 ounces of wine, or 1½ ounces of liquor  · Do not smoke  Nicotine and other chemicals in cigarettes and cigars can cause heart and lung damage  Ask your healthcare provider for information if you currently smoke and need help to quit  E-cigarettes or smokeless tobacco still contain nicotine  Talk to your healthcare provider before you use these products    Follow up with your healthcare provider as directed:  Write down your questions so you remember to ask them during your visits  © 2017 [employer] Information is for End User's use only and may not be sold, redistributed or otherwise used for commercial purposes  All illustrations and images included in CareNotes® are the copyrighted property of [employer]  or Shailesh Casanova  The above information is an  only  It is not intended as medical advice for individual conditions or treatments  Talk to your doctor, nurse or pharmacist before following any medical regimen to see if it is safe and effective for you

## 2020-03-16 NOTE — ASSESSMENT & PLAN NOTE
1  Asymptomatic high-grade left internal carotid artery stenosis now status post left carotid endarterectomy  He has done well in this regard  There are no restrictions at this point  He will continue his Plavix therapy for a total of 2-3 months  Will plan standard follow-up with carotid duplex imaging in 3 months  2  Asymptomatic moderate right internal carotid artery stenosis  At this point no further intervention is necessary  We will plan standard follow-up with duplex imaging as noted above  He is already on appropriate medical therapy with antiplatelet and statin

## 2020-03-19 ENCOUNTER — HOSPITAL ENCOUNTER (OUTPATIENT)
Dept: ULTRASOUND IMAGING | Facility: MEDICAL CENTER | Age: 70
Discharge: HOME | End: 2020-03-19
Payer: COMMERCIAL

## 2020-03-19 DIAGNOSIS — K85.90 ACUTE PANCREATITIS, UNSPECIFIED COMPLICATION STATUS, UNSPECIFIED PANCREATITIS TYPE: ICD-10-CM

## 2020-03-19 PROCEDURE — 76705 ECHO EXAM OF ABDOMEN: CPT

## 2020-03-19 NOTE — TELEPHONE ENCOUNTER
I contacted patient today to postpone surgery  Pt going to see another provider for further treatment  Surgery and post op appt cancelled  Neuromonitoring has also been cancelled

## 2020-03-20 ENCOUNTER — TELEPHONE (OUTPATIENT)
Dept: GASTROENTEROLOGY | Facility: CLINIC | Age: 70
End: 2020-03-20

## 2020-03-20 NOTE — TELEPHONE ENCOUNTER
----- Message from Iam Cuba PA-C sent at 3/20/2020  8:24 AM EDT -----  Please call and let the patient know his u/s was negative for gallbladder stones   Thank you

## 2020-04-01 LAB
KCT BLD-ACNC: 206 SEC (ref 89–137)
KCT BLD-ACNC: 316 SEC (ref 89–137)
SPECIMEN SOURCE: ABNORMAL
SPECIMEN SOURCE: ABNORMAL

## 2020-04-27 ENCOUNTER — TELEPHONE (OUTPATIENT)
Dept: GASTROENTEROLOGY | Facility: CLINIC | Age: 70
End: 2020-04-27

## 2020-04-27 ENCOUNTER — TELEPHONE (OUTPATIENT)
Dept: VASCULAR SURGERY | Facility: CLINIC | Age: 70
End: 2020-04-27

## 2020-04-29 ENCOUNTER — APPOINTMENT (OUTPATIENT)
Dept: RADIOLOGY | Facility: MEDICAL CENTER | Age: 70
End: 2020-04-29
Payer: COMMERCIAL

## 2020-04-29 DIAGNOSIS — M54.16 RADICULOPATHY, LUMBAR REGION: ICD-10-CM

## 2020-04-29 DIAGNOSIS — M51.36 DEGENERATIVE LUMBAR DISC: ICD-10-CM

## 2020-04-29 PROCEDURE — 72114 X-RAY EXAM L-S SPINE BENDING: CPT

## 2020-05-01 ENCOUNTER — TELEMEDICINE (OUTPATIENT)
Dept: NEUROSURGERY | Facility: CLINIC | Age: 70
End: 2020-05-01
Payer: COMMERCIAL

## 2020-05-01 DIAGNOSIS — G89.29 CHRONIC LOW BACK PAIN WITHOUT SCIATICA, UNSPECIFIED BACK PAIN LATERALITY: ICD-10-CM

## 2020-05-01 DIAGNOSIS — I65.22 LEFT CAROTID ARTERY STENOSIS: ICD-10-CM

## 2020-05-01 DIAGNOSIS — M48.061 SPINAL STENOSIS OF LUMBAR REGION WITHOUT NEUROGENIC CLAUDICATION: Primary | ICD-10-CM

## 2020-05-01 DIAGNOSIS — M54.50 CHRONIC LOW BACK PAIN WITHOUT SCIATICA, UNSPECIFIED BACK PAIN LATERALITY: ICD-10-CM

## 2020-05-01 PROCEDURE — 99214 OFFICE O/P EST MOD 30 MIN: CPT | Performed by: NEUROLOGICAL SURGERY

## 2020-05-01 RX ORDER — CLOPIDOGREL BISULFATE 75 MG/1
TABLET ORAL
Qty: 30 TABLET | Refills: 1 | OUTPATIENT
Start: 2020-05-01

## 2020-05-04 ENCOUNTER — ANESTHESIA EVENT (OUTPATIENT)
Dept: GASTROENTEROLOGY | Facility: HOSPITAL | Age: 70
End: 2020-05-04

## 2020-05-05 ENCOUNTER — ANESTHESIA (OUTPATIENT)
Dept: GASTROENTEROLOGY | Facility: HOSPITAL | Age: 70
End: 2020-05-05

## 2020-05-05 ENCOUNTER — HOSPITAL ENCOUNTER (OUTPATIENT)
Dept: GASTROENTEROLOGY | Facility: HOSPITAL | Age: 70
Setting detail: OUTPATIENT SURGERY
Discharge: HOME | End: 2020-05-05
Attending: INTERNAL MEDICINE | Admitting: INTERNAL MEDICINE
Payer: COMMERCIAL

## 2020-05-05 VITALS
DIASTOLIC BLOOD PRESSURE: 81 MMHG | HEART RATE: 76 BPM | WEIGHT: 132 LBS | BODY MASS INDEX: 23.39 KG/M2 | SYSTOLIC BLOOD PRESSURE: 152 MMHG | RESPIRATION RATE: 16 BRPM | OXYGEN SATURATION: 97 % | TEMPERATURE: 97.3 F | HEIGHT: 63 IN

## 2020-05-05 DIAGNOSIS — K85.90 ACUTE PANCREATITIS, UNSPECIFIED COMPLICATION STATUS, UNSPECIFIED PANCREATITIS TYPE: ICD-10-CM

## 2020-05-05 PROCEDURE — 43239 EGD BIOPSY SINGLE/MULTIPLE: CPT | Performed by: INTERNAL MEDICINE

## 2020-05-05 PROCEDURE — 88305 TISSUE EXAM BY PATHOLOGIST: CPT | Performed by: PATHOLOGY

## 2020-05-05 PROCEDURE — 43237 ENDOSCOPIC US EXAM ESOPH: CPT | Performed by: INTERNAL MEDICINE

## 2020-05-05 RX ORDER — SODIUM CHLORIDE 9 MG/ML
125 INJECTION, SOLUTION INTRAVENOUS CONTINUOUS
Status: DISCONTINUED | OUTPATIENT
Start: 2020-05-05 | End: 2020-05-09 | Stop reason: HOSPADM

## 2020-05-05 RX ORDER — PROPOFOL 10 MG/ML
INJECTION, EMULSION INTRAVENOUS CONTINUOUS PRN
Status: DISCONTINUED | OUTPATIENT
Start: 2020-05-05 | End: 2020-05-05 | Stop reason: SURG

## 2020-05-05 RX ORDER — SODIUM CHLORIDE 9 MG/ML
INJECTION, SOLUTION INTRAVENOUS CONTINUOUS PRN
Status: DISCONTINUED | OUTPATIENT
Start: 2020-05-05 | End: 2020-05-05 | Stop reason: SURG

## 2020-05-05 RX ORDER — GLYCOPYRROLATE 0.2 MG/ML
INJECTION INTRAMUSCULAR; INTRAVENOUS AS NEEDED
Status: DISCONTINUED | OUTPATIENT
Start: 2020-05-05 | End: 2020-05-05 | Stop reason: SURG

## 2020-05-05 RX ORDER — FENTANYL CITRATE 50 UG/ML
INJECTION, SOLUTION INTRAMUSCULAR; INTRAVENOUS AS NEEDED
Status: DISCONTINUED | OUTPATIENT
Start: 2020-05-05 | End: 2020-05-05 | Stop reason: SURG

## 2020-05-05 RX ORDER — PROPOFOL 10 MG/ML
INJECTION, EMULSION INTRAVENOUS AS NEEDED
Status: DISCONTINUED | OUTPATIENT
Start: 2020-05-05 | End: 2020-05-05 | Stop reason: SURG

## 2020-05-05 RX ADMIN — FENTANYL CITRATE 25 MCG: 50 INJECTION, SOLUTION INTRAMUSCULAR; INTRAVENOUS at 10:42

## 2020-05-05 RX ADMIN — LIDOCAINE HYDROCHLORIDE 50 MG: 20 INJECTION, SOLUTION INTRAVENOUS at 10:21

## 2020-05-05 RX ADMIN — PROPOFOL 100 MG: 10 INJECTION, EMULSION INTRAVENOUS at 10:21

## 2020-05-05 RX ADMIN — PROPOFOL 130 MCG/KG/MIN: 10 INJECTION, EMULSION INTRAVENOUS at 10:21

## 2020-05-05 RX ADMIN — FENTANYL CITRATE 12.5 MCG: 50 INJECTION, SOLUTION INTRAMUSCULAR; INTRAVENOUS at 10:33

## 2020-05-05 RX ADMIN — LIDOCAINE HYDROCHLORIDE 50 MG: 20 INJECTION, SOLUTION INTRAVENOUS at 10:17

## 2020-05-05 RX ADMIN — FENTANYL CITRATE 12.5 MCG: 50 INJECTION, SOLUTION INTRAMUSCULAR; INTRAVENOUS at 10:31

## 2020-05-05 RX ADMIN — GLYCOPYRROLATE 0.1 MG: 0.2 INJECTION, SOLUTION INTRAMUSCULAR; INTRAVENOUS at 10:17

## 2020-05-05 RX ADMIN — SODIUM CHLORIDE: 0.9 INJECTION, SOLUTION INTRAVENOUS at 10:13

## 2020-05-07 ENCOUNTER — TELEPHONE (OUTPATIENT)
Dept: GASTROENTEROLOGY | Facility: MEDICAL CENTER | Age: 70
End: 2020-05-07

## 2020-06-02 ENCOUNTER — TELEMEDICINE (OUTPATIENT)
Dept: GASTROENTEROLOGY | Facility: MEDICAL CENTER | Age: 70
End: 2020-06-02
Payer: COMMERCIAL

## 2020-06-02 DIAGNOSIS — K85.00 IDIOPATHIC ACUTE PANCREATITIS WITHOUT INFECTION OR NECROSIS: Primary | ICD-10-CM

## 2020-06-02 PROCEDURE — 99213 OFFICE O/P EST LOW 20 MIN: CPT | Performed by: INTERNAL MEDICINE

## 2020-06-09 ENCOUNTER — HOSPITAL ENCOUNTER (OUTPATIENT)
Dept: NON INVASIVE DIAGNOSTICS | Facility: HOSPITAL | Age: 70
Discharge: HOME | End: 2020-06-09
Attending: SURGERY
Payer: COMMERCIAL

## 2020-06-09 DIAGNOSIS — I65.23 ASYMPTOMATIC CAROTID ARTERY STENOSIS, BILATERAL: ICD-10-CM

## 2020-06-09 PROCEDURE — 93880 EXTRACRANIAL BILAT STUDY: CPT | Performed by: SURGERY

## 2020-06-09 PROCEDURE — 93880 EXTRACRANIAL BILAT STUDY: CPT

## 2020-09-10 ENCOUNTER — OFFICE VISIT (OUTPATIENT)
Dept: CARDIOLOGY CLINIC | Facility: CLINIC | Age: 70
End: 2020-09-10
Payer: COMMERCIAL

## 2020-09-10 VITALS
SYSTOLIC BLOOD PRESSURE: 148 MMHG | DIASTOLIC BLOOD PRESSURE: 84 MMHG | WEIGHT: 144.6 LBS | HEART RATE: 84 BPM | RESPIRATION RATE: 16 BRPM | TEMPERATURE: 98.4 F | BODY MASS INDEX: 25.62 KG/M2 | HEIGHT: 63 IN

## 2020-09-10 DIAGNOSIS — E78.2 MIXED HYPERLIPIDEMIA: ICD-10-CM

## 2020-09-10 DIAGNOSIS — Z72.0 TOBACCO ABUSE: ICD-10-CM

## 2020-09-10 DIAGNOSIS — I49.1 ECTOPIC ATRIAL RHYTHM: ICD-10-CM

## 2020-09-10 DIAGNOSIS — I71.2 THORACIC AORTIC ANEURYSM WITHOUT RUPTURE (HCC): ICD-10-CM

## 2020-09-10 DIAGNOSIS — I10 ESSENTIAL HYPERTENSION, BENIGN: Primary | ICD-10-CM

## 2020-09-10 DIAGNOSIS — I10 ESSENTIAL HYPERTENSION: ICD-10-CM

## 2020-09-10 DIAGNOSIS — I65.22 LEFT CAROTID ARTERY STENOSIS: ICD-10-CM

## 2020-09-10 PROBLEM — R00.1 BRADYCARDIA: Status: RESOLVED | Noted: 2020-03-05 | Resolved: 2020-09-10

## 2020-09-10 PROCEDURE — 99214 OFFICE O/P EST MOD 30 MIN: CPT | Performed by: INTERNAL MEDICINE

## 2020-09-10 RX ORDER — METOPROLOL SUCCINATE 50 MG/1
25 TABLET, EXTENDED RELEASE ORAL DAILY
Qty: 90 TABLET | Refills: 4 | Status: SHIPPED | OUTPATIENT
Start: 2020-09-10 | End: 2020-11-09 | Stop reason: SDUPTHER

## 2020-09-10 RX ORDER — SPIRONOLACTONE 25 MG/1
25 TABLET ORAL DAILY
Qty: 90 TABLET | Refills: 3 | Status: SHIPPED | OUTPATIENT
Start: 2020-09-10 | End: 2021-04-08 | Stop reason: SDUPTHER

## 2020-09-10 NOTE — PROGRESS NOTES
[facility]  [address] [place] [address]  Phone#  [phone]  Fax#  [phone]  *-*-*-*-*-*-*-*-*-*-*-*-*-*-*-*-*-*-*-*-*-*-*-*-*-*-*-*-*-*-*-*-*-*-*-*-*-*-*-*-*-*-*-*-*-*-*-*-*-*-*-*-*-*  Gilda Frantz DATE: 09/10/20 1:31 PM  PATIENT NAME: Ezekiel BARAKAT [facility]   1950    [id number]  Age: 78 y o  Sex: male  AUTHOR: Shlomo Frye MD  PRIMARYCARE PHYSICIAN: Servando Valentine MD    DIAGNOSES:  1  Benign essential hypertension  2  Mild ascending thoracic aorta aneurysm  3  History of mild mitral and tricuspid valve regurgitation  4  History of mild inter atrial septal aneurysm without evidence of ASD or PFO without history of TIA/CVA  5  History of hiatal hernia  6  History of colonic polyps  7  History of C diff in the past  8  Carotid artery disease, s/p status post left carotid endarterectomy March 2020  9  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 in July 2018, also status post cervical and lumbar spinal fusion surgeries  10  Mild hypothyroidism  11  Pre diabetes  12  History of acute pancreatitis secondary to use of metformin in October 2019    [place] nuclear stress test February 2020:  1  Negative pharmacologic stress test with regadenoson for symptoms of angina pectoris and negative for ECG evidence of ischemia  2  Tomographic perfusion series consistent with normal perfusion without definite evidence of ischemia or prior infarction  3  Normal left ventricular cavity size with normal left ventricular systolic function and wall motion  EF determined as 65%  4  Elevated resting blood pressure with appropriate blood pressure changes noted during the stress test   5  No chest pain was reported during the stress test   6  Nonspecific resting ECG abnormalities with no significant changes and some nonspecific supraventricular ectopy noted during stress test     Diagnostic sensitivity was limited by submaximal stress  Echocardiogram March 2018 showed mildly increased left ventricular wall thickness, normal left ventricular systolic function and hyperdynamic wall motion, normal diastolic function, EF around 65%  Mild aortic valve sclerosis, trace tricuspid valve regurgitation, no pulmonary hypertension, borderline dilated aortic root  CTA of chest significant for 4 cm ascending thoracic aortic aneurysm with a focal atelectasis or scarring lower lungs without any lung mass or effusion  CURRENT ECG:  No results found for this visit on 09/10/20  CARDIOLOGY ASSESSMENT & PLAN:  1  Essential hypertension, benign  spironolactone (ALDACTONE) 25 mg tablet   2  Essential hypertension  metoprolol succinate (TOPROL-XL) 50 mg 24 hr tablet    Basic metabolic panel   3  Left carotid artery stenosis s/p cartoid endarterectomy     4  Thoracic aortic aneurysm without rupture (HCC)     5  Ectopic atrial rhythm     6  Tobacco abuse     7  Mixed hyperlipidemia       Essential hypertension, benign  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 overall stable from cardiac perspective but he is noted to have persistently elevated blood pressures  He is on good dose of losartan and small dose of metoprolol succinate  He was previously on amlodipine but did not tolerate it due to lower extremity edema  His renal function and potassium are normal   He has cut down his smoking significantly  He has concurrent history of ascending thoracic aortic aneurysm and interatrial septal aneurysm  He has had no history of TIA or CVA  Physical examination does not identify signs of heart failure or significant valvular heart disease  -- at this time we adding RAAS antagonist with spironolactone 25 mg once daily in the morning to his regimen  Further we are increasing the dose of metoprolol succinate to 50 mg once daily  We will continue the current dose of losartan at 100 mg daily    -- about 1-2 weeks after initiating spironolactone therapy we will get a basic metabolic panel to reassess his renal function and electrolytes  -- I have encouraged him to completely quit smoking   -- is advised to continue current statin therapy with Vytorin and aspirin therapy  -- Dietary and medical compliance are reinforced  -- Advised  to report any concerning symptoms such as chest pain, shortness of breath, decline in exercise tolerance or presyncope/syncope  INTERVAL HISTORY & HISTORY OF PRESENT ILLNESS:  Laura Garsia is here for follow-up regarding his cardiac comorbidities which include:  Ascending thoracic aortic aneurysm, essential hypertension, interatrial septal aneurysm and carotid artery disease  He has been overall doing well with no recent subjective symptoms  In March 2020 he underwent left carotid endarterectomy  He tolerated the procedure well and had good surgical results  There have been no recent active symptoms of chest discomfort or exertional angina  There is no dyspnea with usual activities or exertion  No orthopnea, PND or pedal edema  No palpitations, lightheadedness, presyncope, or syncope  Denies any recent hospitalizations or other illnesses  Reports being compliant with medications  He does mention that his blood pressure has been elevated at home with systolic blood pressures ranging 150s to 454Q and diastolic in high 80I  Functional capacity status:  Good   (Excellent- >10 METs; Good: (7-10 METs); Moderate (4-7 METs); Poor (<= 4 METs)    Any chronic stressors: None   (feeling of poor health, financial problems, and social isolation etc)  Tobacco or alcohol dependence:  He is using nicotine gum  He has cut down significantly her number of cigarettes and now mildly about 1-2 cigarettes  REVIEW OF SYMPTOMS:    Positive for:  Denies significant symptoms  Negative for: All remaining as reviewed below and in HPI      SYSTEM SYMPTOMS REVIEWED:  General--weight change, fever, night sweats  Respiratory--cough, wheezing, shortness of breath, sputum production  Cardiovascular--chest pain, syncope, dyspnea on exertion, edema, decline in exercise tolerance, claudication   Gastrointestinal--persistent vomiting, diarrhea, abdominal distention, blood in stool   Muscular or skeletal--joint pain or swelling   Neurologic--headaches, syncope, abnormal movement  Hematologic--history of easy bruising and bleeding   Endocrine--thyroid enlargement, heat or cold intolerance, polyuria   Psychiatric--anxiety, depression     *-*-*-*-*-*-*-*-*-*-*-*-*-*-*-*-*-*-*-*-*-*-*-*-*-*-*-*-*-*-*-*-*-*-*-*-*-*-*-*-*-*-*-*-*-*-*-*-*-*-*-*-*-*-  VITAL SIGNS:  Vitals:    09/10/20 1254   BP: 148/84   Pulse: 84   Resp: 16   Temp: 98 4 °F (36 9 °C)   Weight: 65 6 kg (144 lb 9 6 oz)   Height: 5' 3" (1 6 m)     Weight (last 2 days)     Date/Time   Weight    09/10/20 1254   65 6 (144 6)           ,   Wt Readings from Last 3 Encounters:   09/10/20 65 6 kg (144 lb 9 6 oz)   05/05/20 59 9 kg (132 lb)   03/16/20 60 2 kg (132 lb 12 8 oz)    , Body mass index is 25 61 kg/m²  *-*-*-*-*-*-*-*-*-*-*-*-*-*-*-*-*-*-*-*-*-*-*-*-*-*-*-*-*-*-*-*-*-*-*-*-*-*-*-*-*-*-*-*-*-*-*-*-*-*-*-*-*-*-  PHYSICAL EXAM:  General Appearance:    Alert, cooperative, no distress, appears stated age   Head, Eyes, ENT:    No obvious abnormality, moist mucous mebranes  Neck:   Supple, no carotid bruit or JVD   Back:     Symmetric, no curvature  Lungs:     Respirations unlabored  Clear to auscultation bilaterally,    Chest wall:    No tenderness or deformity   Heart:    Regular rate and rhythm, S1 and S2 normal, no murmur, rub  or gallop  Abdomen:     Soft, non-tender, No obvious masses, or organomegaly    There is no abdominal bruit   Extremities:   Extremities normal, no cyanosis or edema    Skin:   Skin color, texture, turgor normal, no rashes or lesions     *-*-*-*-*-*-*-*-*-*-*-*-*-*-*-*-*-*-*-*-*-*-*-*-*-*-*-*-*-*-*-*-*-*-*-*-*-*-*-*-*-*-*-*-*-*-*-*-*-*-*-*-*-*-  CURRENT MEDICATION LIST:    Current Outpatient Medications:     aspirin 81 MG tablet, Take 1 tablet (81 mg total) by mouth daily, Disp: , Rfl:     diclofenac sodium (VOLTAREN) 1 %, Apply 2 g topically 4 (four) times a day Apply to shoulders   , Disp: 200 g, Rfl: 2    ezetimibe-simvastatin (VYTORIN) 10-40 mg per tablet, Take 1 tablet by mouth daily at bedtime, Disp: 90 tablet, Rfl: 6    losartan (COZAAR) 100 MG tablet, Take 1 tablet (100 mg total) by mouth daily, Disp: 90 tablet, Rfl: 1    magnesium oxide (MAG-OX) 400 mg, Take 1 tablet (400 mg total) by mouth 2 (two) times a day (Patient taking differently: Take 400 mg by mouth daily ), Disp: 60 tablet, Rfl: 1    metoprolol succinate (TOPROL-XL) 50 mg 24 hr tablet, Take 0 5 tablets (25 mg total) by mouth daily, Disp: 90 tablet, Rfl: 4    nicotine polacrilex (NICORETTE) 4 mg gum, Chew 4 mg as needed for smoking cessation, Disp: , Rfl:     zolpidem (AMBIEN) 10 mg tablet, Take by mouth daily at bedtime as needed , Disp: , Rfl:     levothyroxine 25 mcg tablet, Take 0 5 tablets (12 5 mcg total) by mouth daily, Disp: 45 tablet, Rfl: 3    spironolactone (ALDACTONE) 25 mg tablet, Take 1 tablet (25 mg total) by mouth daily, Disp: 90 tablet, Rfl: 3    ALLERGIES:  Allergies   Allergen Reactions    Amlodipine Swelling     Lower extremity swelling  *-*-*-*-*-*-*-*-*-*-*-*-*-*-*-*-*-*-*-*-*-*-*-*-*-*-*-*-*-*-*-*-*-*-*-*-*-*-*-*-*-*-*-*-*-*-*-*-*-*-*-*-*-*-  The LABORATORY DATA:  I have personally reviewed pertinent labs      No results found for: NA  Potassium   Date Value Ref Range Status   03/06/2020 3 9 3 5 - 5 3 mmol/L Final   02/28/2020 3 5 3 5 - 5 3 mmol/L Final   02/21/2020 3 4 (L) 3 6 - 5 0 mmol/L Final     Chloride   Date Value Ref Range Status   03/06/2020 108 100 - 108 mmol/L Final   02/28/2020 103 100 - 108 mmol/L Final   02/21/2020 103 97 - 108 mmol/L Final     CO2   Date Value Ref Range Status   03/06/2020 21 21 - 32 mmol/L Final   02/28/2020 27 21 - 32 mmol/L Final 02/21/2020 26 22 - 30 mmol/L Final     BUN   Date Value Ref Range Status   03/06/2020 11 5 - 25 mg/dL Final   02/28/2020 14 5 - 25 mg/dL Final   02/21/2020 9 5 - 25 mg/dL Final     Creatinine   Date Value Ref Range Status   03/06/2020 0 80 0 60 - 1 30 mg/dL Final     Comment:     Standardized to IDMS reference method   02/28/2020 0 93 0 60 - 1 30 mg/dL Final     Comment:     Standardized to IDMS reference method   02/21/2020 0 80 0 70 - 1 50 mg/dL Final     Comment:     Standardized to IDMS reference method     eGFR   Date Value Ref Range Status   03/06/2020 92 ml/min/1 73sq m Final   02/28/2020 85 ml/min/1 73sq m Final   02/21/2020 92 >60 ml/min/1 73sq m Final     Calcium   Date Value Ref Range Status   03/06/2020 8 3 8 3 - 10 1 mg/dL Final   02/28/2020 9 9 8 3 - 10 1 mg/dL Final   02/21/2020 9 6 8 4 - 10 2 mg/dL Final     AST   Date Value Ref Range Status   02/15/2020 23 17 - 59 U/L Final     Comment:       Specimen collection should occur prior to Sulfasalazine administration due to the potential for falsely depressed results  02/14/2020 33 17 - 59 U/L Final     Comment:       Specimen collection should occur prior to Sulfasalazine administration due to the potential for falsely depressed results  12/27/2019 40 17 - 59 U/L Final     Comment:       Specimen collection should occur prior to Sulfasalazine administration due to the potential for falsely depressed results  ALT   Date Value Ref Range Status   02/15/2020 27 9 - 52 U/L Final     Comment:       Specimen collection should occur prior to Sulfasalazine administration due to the potential for falsely depressed results  02/14/2020 25 9 - 52 U/L Final     Comment:       Specimen collection should occur prior to Sulfasalazine administration due to the potential for falsely depressed results      12/27/2019 32 9 - 52 U/L Final     Comment:       Specimen collection should occur prior to Sulfasalazine administration due to the potential for falsely depressed results  Alkaline Phosphatase   Date Value Ref Range Status   02/15/2020 72 43 - 122 U/L Final   02/14/2020 104 43 - 122 U/L Final   12/27/2019 96 43 - 122 U/L Final     Magnesium   Date Value Ref Range Status   11/13/2019 1 5 (L) 1 6 - 2 3 mg/dL Final   10/31/2019 1 5 (L) 1 6 - 2 3 mg/dL Final   10/25/2019 1 6 1 6 - 2 3 mg/dL Final     WBC   Date Value Ref Range Status   03/06/2020 6 80 4 31 - 10 16 Thousand/uL Final   02/28/2020 5 95 4 31 - 10 16 Thousand/uL Final   02/20/2020 4 30 (L) 4 50 - 11 00 Thousand/uL Final     Hemoglobin   Date Value Ref Range Status   03/06/2020 10 5 (L) 12 0 - 17 0 g/dL Final   02/28/2020 14 4 12 0 - 17 0 g/dL Final   02/20/2020 12 9 (L) 13 5 - 17 5 g/dL Final     Platelets   Date Value Ref Range Status   03/06/2020 211 149 - 390 Thousands/uL Final   02/28/2020 288 149 - 390 Thousands/uL Final   02/20/2020 214 150 - 450 Thousands/uL Final     PTT   Date Value Ref Range Status   03/06/2020 40 (H) 23 - 37 seconds Final     Comment:     Therapeutic Heparin Range =  60-90 seconds     INR   Date Value Ref Range Status   03/06/2020 1 08 0 84 - 1 19 Final   02/28/2020 0 99 0 84 - 1 19 Final     No results found for: CKMB, DIGOXIN  No results found for: TSH  No results found for: CHOL   Hemoglobin A1C   Date Value Ref Range Status   02/28/2020 5 5 Normal 3 8-5 6%; PreDiabetic 5 7-6 4%; Diabetic >=6 5%; Glycemic control for adults with diabetes <7 0% % Final     C difficile toxin by PCR   Date Value Ref Range Status   02/17/2020 Negative Negative Final     Comment:     No evidence for C  difficile colonization or infection  No special contact precautions required         *-*-*-*-*-*-*-*-*-*-*-*-*-*-*-*-*-*-*-*-*-*-*-*-*-*-*-*-*-*-*-*-*-*-*-*-*-*-*-*-*-*-*-*-*-*-*-*-*-*-*-*-*-*-  PREVIOUS CARDIOLOGY & RADIOLOGY RESULTS:  Results for orders placed during the hospital encounter of 02/14/20   Echo complete with contrast if indicated    Narrative [address] [facility] [place], [place] [address]    Transthoracic Echocardiogram  2D, M-mode, Doppler, and Color Doppler    Study date:  2020    Patient: Prakash Proctor  MR number: [id number]  Account number: [de-identified]  : 1950  Age: 81 years  Gender: Male  Status: Inpatient  Location: [facility]  Height: 63 in  Weight: 137 7 lb  BP: 166/ 92 mmHg    Indications: Cardiac Heart Failure    Diagnoses: I50 9 - Heart failure, unspecified    Sonographer:  Guero Jara RDCS  Primary Physician:  Leonidas Mendez MD  Referring Physician:  Shahrzad Rooney MD  Group:  [place] [id number] [facility]  Interpreting Physician:  Jared Miramontes MD    SUMMARY    LEFT VENTRICLE:  Normal left ventricular cavity size, normal wall thickness, normal left ventricular systolic function, no regional wall motion abnormality noted  Ejection fraction is determined as around 63%  Doppler evaluation is consistent with normal  diastolic left ventricular function  RIGHT VENTRICLE:  Normal right ventricular size and systolic function  Estimated right ventricular systolic pressure is mildly increased, 41 mmHg  Possible mild pulmonary hypertension  LEFT ATRIUM:  Normal left atrial cavity size  Intact interatrial septum  RIGHT ATRIUM:  Normal right atrial cavity size  MITRAL VALVE:  Mild mitral valve leaflet sclerosis, adequate leaflet mobility  Trace mitral valve regurgitation  AORTIC VALVE:  Tricuspid aortic valve with mild sclerosis  No aortic valve stenosis or regurgitation  TRICUSPID VALVE:  Trace tricuspid valve regurgitation  PULMONIC VALVE:  No significant pulmonic valve regurgitation  AORTA:  Mildly dilated aortic root and proximal ascending aorta measuring up to 3 9 cm     IVC, HEPATIC VEINS:  Inferior vena cava is normal in size and demonstrates appropriate respiratory phasic changes in diameter  PERICARDIUM:  No pericardial effusion      COMPARISONS:  Technical quality: Good  Cardiac rhythm: Normal sinus    1  Normal left ventricular size and systolic and diastolic function, EF around 63%  2  No significant chamber hypertrophy or enlargement  3  Aortic valve sclerosis, no aortic valve stenosis or regurgitation  4  Mild mitral valve sclerosis, trace mitral valve regurgitation  5  Trace tricuspid valve regurgitation  6  Possible mild pulmonary hypertension  Estimated RVSP/PASP is 41 mmHg  7  No pericardial effusion  Compared to report of previous echocardiogram from March 19, 2018 possible mild pulmonary hypertension is new otherwise no significant change  HISTORY: PRIOR HISTORY: Risk factors: hypertension, hypercholesterolemia, and a history of current cigarette use (within the last month)  PROCEDURE: The study was performed in the [place] [address]  This was a routine study  The transthoracic approach was used  The study included complete 2D imaging, M-mode, complete spectral Doppler, and color Doppler  The heart rate was 71  bpm, at the start of the study  Image quality was adequate  LEFT VENTRICLE: Normal left ventricular cavity size, normal wall thickness, normal left ventricular systolic function, no regional wall motion abnormality noted  Ejection fraction is determined as around 63%  Doppler evaluation is  consistent with normal diastolic left ventricular function  RIGHT VENTRICLE: Normal right ventricular size and systolic function  Estimated right ventricular systolic pressure is mildly increased, 41 mmHg  Possible mild pulmonary hypertension  LEFT ATRIUM: Normal left atrial cavity size  Intact interatrial septum  RIGHT ATRIUM: Normal right atrial cavity size  MITRAL VALVE: Mild mitral valve leaflet sclerosis, adequate leaflet mobility  Trace mitral valve regurgitation  AORTIC VALVE: Tricuspid aortic valve with mild sclerosis  No aortic valve stenosis or regurgitation  TRICUSPID VALVE: Trace tricuspid valve regurgitation      PULMONIC VALVE: No significant pulmonic valve regurgitation  PERICARDIUM: No pericardial effusion  AORTA: Mildly dilated aortic root and proximal ascending aorta measuring up to 3 9 cm  SYSTEMIC VEINS: IVC: Inferior vena cava is normal in size and demonstrates appropriate respiratory phasic changes in diameter  SYSTEM MEASUREMENT TABLES    2D  %FS: 32 19 %  Ao Diam: 3 85 cm  EDV(Teich): 83 18 ml  EF(Teich): 60 7 %  ESV(Teich): 32 69 ml  IVSd: 0 92 cm  LA Area: 12 53 cm2  LA Diam: 3 51 cm  LVEDV MOD A4C: 81 65 ml  LVEF MOD A4C: 62 91 %  LVESV MOD A4C: 30 28 ml  LVIDd: 4 3 cm  LVIDs: 2 92 cm  LVLd A4C: 8 87 cm  LVLs A4C: 7 35 cm  LVPWd: 0 9 cm  RA Area: 13 63 cm2  RVIDd: 2 63 cm  SV MOD A4C: 51 37 ml  SV(Teich): 50 49 ml    CW  TR Vmax: 2 8 m/s  TR maxP 34 mmHg    MM  TAPSE: 2 54 cm    PW  E': 0 09 m/s  E/E': 11 67  MV A Lam: 0 8 m/s  MV Dec [place]: 4 67 m/s2  MV DecT: 230 85 ms  MV E Lam: 1 08 m/s  MV E/A Ratio: 1 35  MV PHT: 66 95 ms  MVA By PHT: 3 29 cm2    [employer] Accredited Echocardiography Laboratory    Prepared and electronically signed by    Lin Pinto MD  Signed 2020 11:53:29       No results found for this or any previous visit  No results found for this or any previous visit  No results found for this or any previous visit  VAS carotid complete study     [facility] REPORT  CLINICAL:  Indications:  Patient presents for follow up of known carotid artery occlusive  disease s/p CEA  Patient is currently asymptomatic at this time  Operative History:  2020 Left Standard (ICA, ECA and CCA) endarterectomy  Cardiac Catheterization  Risk Factors  The patient has history of HTN and HLD  Clinical  Right Pressure:  135/80 mm Hg,     FINDINGS:     Segment      Rig                     Left                        PSV  EDV (cm/s)  Ratio  PSV  EDV (cm/s)  Ratio    Dist  ICA     92          21   0 86  134          42   1 19    Mid   ICA     165          30   1 54  126          38 1 12    Prox  ICA    155          25   1 45   64          10   0 57    Dist CCA      69          14          99          23           Mid CCA      107          11   0 83  113          21   0 66    Prox CCA     129          20         170          29           Ext Carotid  251               2 35  108          14   0 96    Prox Vert     48          13          29          11           Subclavian    97                      96                                CONCLUSION:     Impression  RIGHT:  There is 50-69% stenosis noted in the internal carotid artery  Plaque is  heterogenous  Vertebral artery flow is antegrade  There is no significant subclavian artery  disease  LEFT:  Widely patent internal carotid artery and endarterectomy site  Vertebral artery flow is antegrade  There is no significant subclavian artery  disease  Internal carotid artery stenosis determination by consensus criteria from:  Taya Mckenna et al  Carotid Artery Stenosis: Gray-Scale and Doppler US Diagnosis  - Society of Radiologists in [facility] [address], Radiology 2003;  [phone]       SIGNATURE:  Electronically Signed by: Deisi Vail on 2020-06-09 05:54:28 PM        *-*-*-*-*-*-*-*-*-*-*-*-*-*-*-*-*-*-*-*-*-*-*-*-*-*-*-*-*-*-*-*-*-*-*-*-*-*-*-*-*-*-*-*-*-*-*-*-*-*-*-*-*-*-  SIGNATURES:   @JLJ@   Shlomo Frye MD     *-*-*-*-*-*-*-*-*-*-*-*-*-*-*-*-*-*-*-*-*-*-*-*-*-*-*-*-*-*-*-*-*-*-*-*-*-*-*-*-*-*-*-*-*-*-*-*-*-*-*-*-*-*-    PAST MEDICAL HISTORY:  Past Medical History:   Diagnosis Date    A-fib ([address] )     Arthritis     Avascular necrosis of bone of hip, left (HCC)     replaced    Back pain     CAD (coronary artery disease)     Carotid artery narrowing     left side -for endarterectomy today 3/5/2020    Disease of thyroid gland     hypo    GERD (gastroesophageal reflux disease)     History of Clostridioides difficile infection     Hyperlipidemia     Hypertension     Irregular heart beat     Kidney stone  Neck pain     Pancreatitis     Spinal stenosis     Wears glasses     PAST SURGICAL HISTORY:   Past Surgical History:   Procedure Laterality Date    BACK SURGERY      CARDIAC CATHETERIZATION      cardiac cath 2010  adjusted meds    CATARACT EXTRACTION Bilateral     CERVICAL FUSION      COLONOSCOPY      JOINT REPLACEMENT Left     hip    LUMBAR FUSION      NECK SURGERY      ORTHOPEDIC SURGERY Left     L THR    OK THROMBOENDARTECTMY NECK,NECK INCIS Left 3/5/2020    Procedure: ENDARTERECTOMY ARTERY CAROTID WITH BOVINE PATCH ANGIOPLASTY AND INTRAOP DUPLEX;  Surgeon: Trae Vazquez MD;  Location: [facility];  Service: Vascular    SPINAL FUSION           FAMILY HISTORY:  Family History   Problem Relation Age of Onset    Heart disease Mother     Parkinsonism Mother     Heart disease Father     SOCIAL HISTORY:  Social History     Tobacco Use   Smoking Status Former Smoker    Years: 50 00    Types: Cigarettes    Last attempt to quit: 2020    Years since quittin 6   Smokeless Tobacco Never Used   Tobacco Comment    2-3 cigarrettes      Social History     Substance and Sexual Activity   Alcohol Use Not Currently    Binge frequency: Never     Social History     Substance and Sexual Activity   Drug Use Never    [id number]     *-*-*-*-*-*-*-*-*-*-*-*-*-*-*-*-*-*-*-*-*-*-*-*-*-*-*-*-*-*-*-*-*-*-*-*-*-*-*-*-*-*-*-*-*-*-*-*-*-*-*-*-*-*  ALLERGIES:  Allergies   Allergen Reactions    Amlodipine Swelling     Lower extremity swelling  CURRENT SCHEDULED MEDICATIONS:    Current Outpatient Medications:     aspirin 81 MG tablet, Take 1 tablet (81 mg total) by mouth daily, Disp: , Rfl:     diclofenac sodium (VOLTAREN) 1 %, Apply 2 g topically 4 (four) times a day Apply to shoulders   , Disp: 200 g, Rfl: 2    ezetimibe-simvastatin (VYTORIN) 10-40 mg per tablet, Take 1 tablet by mouth daily at bedtime, Disp: 90 tablet, Rfl: 6    losartan (COZAAR) 100 MG tablet, Take 1 tablet (100 mg total) by mouth daily, Disp: 90 tablet, Rfl: 1    magnesium oxide (MAG-OX) 400 mg, Take 1 tablet (400 mg total) by mouth 2 (two) times a day (Patient taking differently: Take 400 mg by mouth daily ), Disp: 60 tablet, Rfl: 1    metoprolol succinate (TOPROL-XL) 50 mg 24 hr tablet, Take 0 5 tablets (25 mg total) by mouth daily, Disp: 90 tablet, Rfl: 4    nicotine polacrilex (NICORETTE) 4 mg gum, Chew 4 mg as needed for smoking cessation, Disp: , Rfl:     zolpidem (AMBIEN) 10 mg tablet, Take by mouth daily at bedtime as needed , Disp: , Rfl:     levothyroxine 25 mcg tablet, Take 0 5 tablets (12 5 mcg total) by mouth daily, Disp: 45 tablet, Rfl: 3    spironolactone (ALDACTONE) 25 mg tablet, Take 1 tablet (25 mg total) by mouth daily, Disp: 90 tablet, Rfl: 3     *-*-*-*-*-*-*-*-*-*-*-*-*-*-*-*-*-*-*-*-*-*-*-*-*-*-*-*-*-*-*-*-*-*-*-*-*-*-*-*-*-*-*-*-*-*-*-*-*-*-*-*-*-*

## 2020-09-10 NOTE — PATIENT INSTRUCTIONS
CARDIOLOGY ASSESSMENT & PLAN:  1  Essential hypertension, benign  spironolactone (ALDACTONE) 25 mg tablet   2  Essential hypertension  metoprolol succinate (TOPROL-XL) 50 mg 24 hr tablet    Basic metabolic panel   3  Left carotid artery stenosis s/p cartoid endarterectomy     4  Thoracic aortic aneurysm without rupture (HCC)     5  Ectopic atrial rhythm     6  Tobacco abuse     7  Mixed hyperlipidemia       Essential hypertension, benign  Father Bailey Perry is overall stable from cardiac perspective but he is noted to have persistently elevated blood pressures  He is on good dose of losartan and small dose of metoprolol succinate  He was previously on amlodipine but did not tolerate it due to lower extremity edema  His renal function and potassium are normal   He has cut down his smoking significantly  He has concurrent history of ascending thoracic aortic aneurysm and interatrial septal aneurysm  He has had no history of TIA or CVA  Physical examination does not identify signs of heart failure or significant valvular heart disease  -- at this time we adding RAAS antagonist with spironolactone 25 mg once daily in the morning to his regimen  Further we are increasing the dose of metoprolol succinate to 50 mg once daily  We will continue the current dose of losartan at 100 mg daily  -- about 1-2 weeks after initiating spironolactone therapy we will get a basic metabolic panel to reassess his renal function and electrolytes  -- I have encouraged him to completely quit smoking   -- is advised to continue current statin therapy with Vytorin and aspirin therapy  -- Dietary and medical compliance are reinforced  -- Advised  to report any concerning symptoms such as chest pain, shortness of breath, decline in exercise tolerance or presyncope/syncope

## 2020-10-06 ENCOUNTER — LAB (OUTPATIENT)
Dept: LAB | Facility: HOSPITAL | Age: 70
End: 2020-10-06
Payer: COMMERCIAL

## 2020-10-06 DIAGNOSIS — I10 ESSENTIAL HYPERTENSION: ICD-10-CM

## 2020-10-06 DIAGNOSIS — K86.1 CHRONIC PANCREATITIS, UNSPECIFIED PANCREATITIS TYPE (HCC): ICD-10-CM

## 2020-10-06 DIAGNOSIS — D64.9 ANEMIA, UNSPECIFIED TYPE: ICD-10-CM

## 2020-10-06 LAB
ALBUMIN SERPL BCP-MCNC: 4.2 G/DL (ref 3–5.2)
ALP SERPL-CCNC: 81 U/L (ref 43–122)
ALT SERPL W P-5'-P-CCNC: 62 U/L (ref 9–52)
ANION GAP SERPL CALCULATED.3IONS-SCNC: 9 MMOL/L (ref 5–14)
AST SERPL W P-5'-P-CCNC: 70 U/L (ref 17–59)
BACTERIA UR QL AUTO: ABNORMAL /HPF
BASOPHILS # BLD AUTO: 0 THOUSANDS/ΜL (ref 0–0.1)
BASOPHILS NFR BLD AUTO: 1 % (ref 0–1)
BILIRUB SERPL-MCNC: 0.5 MG/DL
BILIRUB UR QL STRIP: NEGATIVE
BUN SERPL-MCNC: 13 MG/DL (ref 5–25)
CALCIUM SERPL-MCNC: 10.3 MG/DL (ref 8.4–10.2)
CHLORIDE SERPL-SCNC: 102 MMOL/L (ref 97–108)
CHOLEST SERPL-MCNC: 114 MG/DL
CLARITY UR: CLEAR
CO2 SERPL-SCNC: 29 MMOL/L (ref 22–30)
COLOR UR: ABNORMAL
CREAT SERPL-MCNC: 0.85 MG/DL (ref 0.7–1.5)
EOSINOPHIL # BLD AUTO: 0.3 THOUSAND/ΜL (ref 0–0.4)
EOSINOPHIL NFR BLD AUTO: 7 % (ref 0–6)
ERYTHROCYTE [DISTWIDTH] IN BLOOD BY AUTOMATED COUNT: 14.5 %
FERRITIN SERPL-MCNC: 180 NG/ML (ref 8–388)
FOLATE SERPL-MCNC: 4.9 NG/ML (ref 3.1–17.5)
GFR SERPL CREATININE-BSD FRML MDRD: 90 ML/MIN/1.73SQ M
GLUCOSE P FAST SERPL-MCNC: 91 MG/DL (ref 70–99)
GLUCOSE UR STRIP-MCNC: NEGATIVE MG/DL
HCT VFR BLD AUTO: 41.7 % (ref 41–53)
HDLC SERPL-MCNC: 27 MG/DL
HGB BLD-MCNC: 14.1 G/DL (ref 13.5–17.5)
HGB UR QL STRIP.AUTO: 10
HYALINE CASTS #/AREA URNS LPF: ABNORMAL /LPF
KETONES UR STRIP-MCNC: NEGATIVE MG/DL
LDLC SERPL CALC-MCNC: 60 MG/DL
LEUKOCYTE ESTERASE UR QL STRIP: NEGATIVE
LIPASE SERPL-CCNC: 1392 U/L (ref 23–300)
LYMPHOCYTES # BLD AUTO: 1.3 THOUSANDS/ΜL (ref 0.5–4)
LYMPHOCYTES NFR BLD AUTO: 27 % (ref 25–45)
MAGNESIUM SERPL-MCNC: 1.7 MG/DL (ref 1.6–2.3)
MCH RBC QN AUTO: 32.6 PG (ref 26–34)
MCHC RBC AUTO-ENTMCNC: 33.9 G/DL (ref 31–36)
MCV RBC AUTO: 96 FL (ref 80–100)
MONOCYTES # BLD AUTO: 0.4 THOUSAND/ΜL (ref 0.2–0.9)
MONOCYTES NFR BLD AUTO: 8 % (ref 1–10)
MUCOUS THREADS UR QL AUTO: ABNORMAL
NEUTROPHILS # BLD AUTO: 2.8 THOUSANDS/ΜL (ref 1.8–7.8)
NEUTS SEG NFR BLD AUTO: 58 % (ref 45–65)
NITRITE UR QL STRIP: NEGATIVE
NON-SQ EPI CELLS URNS QL MICRO: ABNORMAL /HPF
NONHDLC SERPL-MCNC: 87 MG/DL
PH UR STRIP.AUTO: 6 [PH]
PLATELET # BLD AUTO: 281 THOUSANDS/UL (ref 150–450)
PMV BLD AUTO: 9.6 FL (ref 8.9–12.7)
POTASSIUM SERPL-SCNC: 4.4 MMOL/L (ref 3.6–5)
PROT SERPL-MCNC: 7.5 G/DL (ref 5.9–8.4)
PROT UR STRIP-MCNC: ABNORMAL MG/DL
RBC # BLD AUTO: 4.34 MILLION/UL (ref 4.5–5.9)
RBC #/AREA URNS AUTO: ABNORMAL /HPF
SODIUM SERPL-SCNC: 140 MMOL/L (ref 137–147)
SP GR UR STRIP.AUTO: 1.01 (ref 1–1.04)
TRIGL SERPL-MCNC: 135 MG/DL
UROBILINOGEN UA: NEGATIVE MG/DL
WBC # BLD AUTO: 4.9 THOUSAND/UL (ref 4.5–11)
WBC #/AREA URNS AUTO: ABNORMAL /HPF

## 2020-10-06 PROCEDURE — 85025 COMPLETE CBC W/AUTO DIFF WBC: CPT

## 2020-10-06 PROCEDURE — 83690 ASSAY OF LIPASE: CPT

## 2020-10-06 PROCEDURE — 82728 ASSAY OF FERRITIN: CPT

## 2020-10-06 PROCEDURE — 36415 COLL VENOUS BLD VENIPUNCTURE: CPT

## 2020-10-06 PROCEDURE — 80053 COMPREHEN METABOLIC PANEL: CPT

## 2020-10-06 PROCEDURE — 80061 LIPID PANEL: CPT

## 2020-10-06 PROCEDURE — 84425 ASSAY OF VITAMIN B-1: CPT

## 2020-10-06 PROCEDURE — 83735 ASSAY OF MAGNESIUM: CPT

## 2020-10-06 PROCEDURE — 82746 ASSAY OF FOLIC ACID SERUM: CPT

## 2020-10-06 PROCEDURE — 81001 URINALYSIS AUTO W/SCOPE: CPT | Performed by: INTERNAL MEDICINE

## 2020-10-09 ENCOUNTER — APPOINTMENT (EMERGENCY)
Dept: CT IMAGING | Facility: HOSPITAL | Age: 70
DRG: 438 | End: 2020-10-09
Payer: COMMERCIAL

## 2020-10-09 ENCOUNTER — HOSPITAL ENCOUNTER (INPATIENT)
Facility: HOSPITAL | Age: 70
LOS: 13 days | DRG: 438 | End: 2020-10-22
Attending: EMERGENCY MEDICINE | Admitting: INTERNAL MEDICINE
Payer: COMMERCIAL

## 2020-10-09 ENCOUNTER — APPOINTMENT (INPATIENT)
Dept: RADIOLOGY | Facility: HOSPITAL | Age: 70
DRG: 438 | End: 2020-10-09
Payer: COMMERCIAL

## 2020-10-09 DIAGNOSIS — K85.90 PANCREATITIS: Primary | ICD-10-CM

## 2020-10-09 DIAGNOSIS — R10.9 ABDOMINAL PAIN: ICD-10-CM

## 2020-10-09 DIAGNOSIS — K85.90 ACUTE PANCREATITIS: ICD-10-CM

## 2020-10-09 DIAGNOSIS — E43 SEVERE PROTEIN-CALORIE MALNUTRITION (HCC): ICD-10-CM

## 2020-10-09 LAB
ALBUMIN SERPL BCP-MCNC: 4.3 G/DL (ref 3–5.2)
ALP SERPL-CCNC: 64 U/L (ref 43–122)
ALT SERPL W P-5'-P-CCNC: 22 U/L (ref 9–52)
ANION GAP SERPL CALCULATED.3IONS-SCNC: 13 MMOL/L (ref 5–14)
APTT PPP: 37 SECONDS (ref 23–37)
AST SERPL W P-5'-P-CCNC: 39 U/L (ref 17–59)
ATRIAL RATE: 103 BPM
BACTERIA UR QL AUTO: ABNORMAL /HPF
BASOPHILS # BLD AUTO: 0 THOUSANDS/ΜL (ref 0–0.1)
BASOPHILS NFR BLD AUTO: 0 % (ref 0–1)
BILIRUB SERPL-MCNC: 0.7 MG/DL
BILIRUB UR QL STRIP: NEGATIVE
BUN SERPL-MCNC: 14 MG/DL (ref 5–25)
CALCIUM SERPL-MCNC: 9.9 MG/DL (ref 8.4–10.2)
CHLORIDE SERPL-SCNC: 99 MMOL/L (ref 97–108)
CLARITY UR: CLEAR
CO2 SERPL-SCNC: 26 MMOL/L (ref 22–30)
COLOR UR: ABNORMAL
CREAT SERPL-MCNC: 0.94 MG/DL (ref 0.7–1.5)
EOSINOPHIL # BLD AUTO: 0.2 THOUSAND/ΜL (ref 0–0.4)
EOSINOPHIL NFR BLD AUTO: 3 % (ref 0–6)
ERYTHROCYTE [DISTWIDTH] IN BLOOD BY AUTOMATED COUNT: 14.5 %
GFR SERPL CREATININE-BSD FRML MDRD: 83 ML/MIN/1.73SQ M
GLUCOSE SERPL-MCNC: 129 MG/DL (ref 70–99)
GLUCOSE UR STRIP-MCNC: NEGATIVE MG/DL
HCT VFR BLD AUTO: 43.5 % (ref 41–53)
HGB BLD-MCNC: 14.8 G/DL (ref 13.5–17.5)
HGB UR QL STRIP.AUTO: 10
HYALINE CASTS #/AREA URNS LPF: ABNORMAL /LPF
INR PPP: 1.01 (ref 0.84–1.19)
KETONES UR STRIP-MCNC: NEGATIVE MG/DL
LEUKOCYTE ESTERASE UR QL STRIP: NEGATIVE
LIPASE SERPL-CCNC: 2048 U/L (ref 23–300)
LYMPHOCYTES # BLD AUTO: 1.4 THOUSANDS/ΜL (ref 0.5–4)
LYMPHOCYTES NFR BLD AUTO: 17 % (ref 25–45)
MCH RBC QN AUTO: 32.5 PG (ref 26–34)
MCHC RBC AUTO-ENTMCNC: 33.9 G/DL (ref 31–36)
MCV RBC AUTO: 96 FL (ref 80–100)
MONOCYTES # BLD AUTO: 0.5 THOUSAND/ΜL (ref 0.2–0.9)
MONOCYTES NFR BLD AUTO: 6 % (ref 1–10)
MUCOUS THREADS UR QL AUTO: ABNORMAL
NEUTROPHILS # BLD AUTO: 6 THOUSANDS/ΜL (ref 1.8–7.8)
NEUTS SEG NFR BLD AUTO: 74 % (ref 45–65)
NITRITE UR QL STRIP: NEGATIVE
NON-SQ EPI CELLS URNS QL MICRO: ABNORMAL /HPF
PH UR STRIP.AUTO: 6.5 [PH]
PLATELET # BLD AUTO: 309 THOUSANDS/UL (ref 150–450)
PMV BLD AUTO: 10 FL (ref 8.9–12.7)
POTASSIUM SERPL-SCNC: 3.5 MMOL/L (ref 3.6–5)
PR INTERVAL: 140 MS
PROT SERPL-MCNC: 7.5 G/DL (ref 5.9–8.4)
PROT UR STRIP-MCNC: ABNORMAL MG/DL
PROTHROMBIN TIME: 13.4 SECONDS (ref 11.6–14.5)
QRS AXIS: 56 DEGREES
QRSD INTERVAL: 74 MS
QT INTERVAL: 336 MS
QTC INTERVAL: 440 MS
RBC # BLD AUTO: 4.54 MILLION/UL (ref 4.5–5.9)
RBC #/AREA URNS AUTO: ABNORMAL /HPF
SARS-COV-2 RNA RESP QL NAA+PROBE: NEGATIVE
SODIUM SERPL-SCNC: 138 MMOL/L (ref 137–147)
SP GR UR STRIP.AUTO: 1.01 (ref 1–1.04)
T WAVE AXIS: 23 DEGREES
TROPONIN I SERPL-MCNC: 0.01 NG/ML (ref 0–0.03)
UROBILINOGEN UA: NEGATIVE MG/DL
VENTRICULAR RATE: 103 BPM
WBC # BLD AUTO: 8.2 THOUSAND/UL (ref 4.5–11)
WBC #/AREA URNS AUTO: ABNORMAL /HPF

## 2020-10-09 PROCEDURE — 96376 TX/PRO/DX INJ SAME DRUG ADON: CPT

## 2020-10-09 PROCEDURE — 85610 PROTHROMBIN TIME: CPT | Performed by: EMERGENCY MEDICINE

## 2020-10-09 PROCEDURE — 93005 ELECTROCARDIOGRAM TRACING: CPT

## 2020-10-09 PROCEDURE — 85025 COMPLETE CBC W/AUTO DIFF WBC: CPT | Performed by: EMERGENCY MEDICINE

## 2020-10-09 PROCEDURE — 99223 1ST HOSP IP/OBS HIGH 75: CPT | Performed by: INTERNAL MEDICINE

## 2020-10-09 PROCEDURE — 96375 TX/PRO/DX INJ NEW DRUG ADDON: CPT

## 2020-10-09 PROCEDURE — 93010 ELECTROCARDIOGRAM REPORT: CPT | Performed by: INTERNAL MEDICINE

## 2020-10-09 PROCEDURE — 90662 IIV NO PRSV INCREASED AG IM: CPT | Performed by: INTERNAL MEDICINE

## 2020-10-09 PROCEDURE — 85730 THROMBOPLASTIN TIME PARTIAL: CPT | Performed by: EMERGENCY MEDICINE

## 2020-10-09 PROCEDURE — 74177 CT ABD & PELVIS W/CONTRAST: CPT

## 2020-10-09 PROCEDURE — 84484 ASSAY OF TROPONIN QUANT: CPT | Performed by: EMERGENCY MEDICINE

## 2020-10-09 PROCEDURE — 99285 EMERGENCY DEPT VISIT HI MDM: CPT | Performed by: EMERGENCY MEDICINE

## 2020-10-09 PROCEDURE — 81001 URINALYSIS AUTO W/SCOPE: CPT | Performed by: EMERGENCY MEDICINE

## 2020-10-09 PROCEDURE — 99285 EMERGENCY DEPT VISIT HI MDM: CPT

## 2020-10-09 PROCEDURE — 36415 COLL VENOUS BLD VENIPUNCTURE: CPT | Performed by: EMERGENCY MEDICINE

## 2020-10-09 PROCEDURE — 96361 HYDRATE IV INFUSION ADD-ON: CPT

## 2020-10-09 PROCEDURE — 90471 IMMUNIZATION ADMIN: CPT | Performed by: INTERNAL MEDICINE

## 2020-10-09 PROCEDURE — 96374 THER/PROPH/DIAG INJ IV PUSH: CPT

## 2020-10-09 PROCEDURE — 83690 ASSAY OF LIPASE: CPT | Performed by: EMERGENCY MEDICINE

## 2020-10-09 PROCEDURE — G1004 CDSM NDSC: HCPCS

## 2020-10-09 PROCEDURE — 87635 SARS-COV-2 COVID-19 AMP PRB: CPT | Performed by: PHYSICIAN ASSISTANT

## 2020-10-09 PROCEDURE — 71046 X-RAY EXAM CHEST 2 VIEWS: CPT

## 2020-10-09 PROCEDURE — 80053 COMPREHEN METABOLIC PANEL: CPT | Performed by: EMERGENCY MEDICINE

## 2020-10-09 RX ORDER — HYDROMORPHONE HCL/PF 1 MG/ML
1 SYRINGE (ML) INJECTION EVERY 6 HOURS PRN
Status: DISCONTINUED | OUTPATIENT
Start: 2020-10-09 | End: 2020-10-22 | Stop reason: HOSPADM

## 2020-10-09 RX ORDER — SODIUM CHLORIDE, SODIUM LACTATE, POTASSIUM CHLORIDE, CALCIUM CHLORIDE 600; 310; 30; 20 MG/100ML; MG/100ML; MG/100ML; MG/100ML
75 INJECTION, SOLUTION INTRAVENOUS CONTINUOUS
Status: DISCONTINUED | OUTPATIENT
Start: 2020-10-09 | End: 2020-10-18

## 2020-10-09 RX ORDER — LEVOTHYROXINE SODIUM 0.03 MG/1
12.5 TABLET ORAL DAILY
Status: DISCONTINUED | OUTPATIENT
Start: 2020-10-10 | End: 2020-10-09

## 2020-10-09 RX ORDER — PRAVASTATIN SODIUM 40 MG
80 TABLET ORAL
Status: DISCONTINUED | OUTPATIENT
Start: 2020-10-09 | End: 2020-10-22 | Stop reason: HOSPADM

## 2020-10-09 RX ORDER — HYDROMORPHONE HCL/PF 1 MG/ML
1 SYRINGE (ML) INJECTION ONCE
Status: COMPLETED | OUTPATIENT
Start: 2020-10-09 | End: 2020-10-09

## 2020-10-09 RX ORDER — HYDROMORPHONE HCL/PF 1 MG/ML
0.5 SYRINGE (ML) INJECTION EVERY 4 HOURS PRN
Status: DISCONTINUED | OUTPATIENT
Start: 2020-10-09 | End: 2020-10-22 | Stop reason: HOSPADM

## 2020-10-09 RX ORDER — DOCUSATE SODIUM 100 MG/1
100 CAPSULE, LIQUID FILLED ORAL 2 TIMES DAILY
Status: DISCONTINUED | OUTPATIENT
Start: 2020-10-09 | End: 2020-10-16

## 2020-10-09 RX ORDER — ONDANSETRON 2 MG/ML
4 INJECTION INTRAMUSCULAR; INTRAVENOUS EVERY 6 HOURS PRN
Status: DISCONTINUED | OUTPATIENT
Start: 2020-10-09 | End: 2020-10-22 | Stop reason: HOSPADM

## 2020-10-09 RX ORDER — LEVOTHYROXINE SODIUM 0.03 MG/1
25 TABLET ORAL DAILY
Status: DISCONTINUED | OUTPATIENT
Start: 2020-10-10 | End: 2020-10-22 | Stop reason: HOSPADM

## 2020-10-09 RX ORDER — SPIRONOLACTONE 25 MG/1
25 TABLET ORAL DAILY
Status: DISCONTINUED | OUTPATIENT
Start: 2020-10-10 | End: 2020-10-22 | Stop reason: HOSPADM

## 2020-10-09 RX ORDER — METOPROLOL SUCCINATE 25 MG/1
25 TABLET, EXTENDED RELEASE ORAL DAILY
Status: DISCONTINUED | OUTPATIENT
Start: 2020-10-10 | End: 2020-10-10

## 2020-10-09 RX ORDER — EZETIMIBE 10 MG/1
10 TABLET ORAL
Status: DISCONTINUED | OUTPATIENT
Start: 2020-10-09 | End: 2020-10-22 | Stop reason: HOSPADM

## 2020-10-09 RX ORDER — ONDANSETRON 2 MG/ML
4 INJECTION INTRAMUSCULAR; INTRAVENOUS ONCE
Status: COMPLETED | OUTPATIENT
Start: 2020-10-09 | End: 2020-10-09

## 2020-10-09 RX ORDER — HYDROMORPHONE HCL/PF 1 MG/ML
0.5 SYRINGE (ML) INJECTION ONCE
Status: COMPLETED | OUTPATIENT
Start: 2020-10-09 | End: 2020-10-09

## 2020-10-09 RX ORDER — SODIUM CHLORIDE, SODIUM LACTATE, POTASSIUM CHLORIDE, CALCIUM CHLORIDE 600; 310; 30; 20 MG/100ML; MG/100ML; MG/100ML; MG/100ML
125 INJECTION, SOLUTION INTRAVENOUS CONTINUOUS
Status: DISCONTINUED | OUTPATIENT
Start: 2020-10-09 | End: 2020-10-09

## 2020-10-09 RX ORDER — ASPIRIN 81 MG/1
81 TABLET ORAL DAILY
Status: DISCONTINUED | OUTPATIENT
Start: 2020-10-10 | End: 2020-10-22 | Stop reason: HOSPADM

## 2020-10-09 RX ORDER — LOSARTAN POTASSIUM 50 MG/1
100 TABLET ORAL DAILY
Status: DISCONTINUED | OUTPATIENT
Start: 2020-10-10 | End: 2020-10-10

## 2020-10-09 RX ADMIN — INFLUENZA A VIRUS A/MICHIGAN/45/2015 X-275 (H1N1) ANTIGEN (FORMALDEHYDE INACTIVATED), INFLUENZA A VIRUS A/SINGAPORE/INFIMH-16-0019/2016 IVR-186 (H3N2) ANTIGEN (FORMALDEHYDE INACTIVATED), INFLUENZA B VIRUS B/PHUKET/3073/2013 ANTIGEN (FORMALDEHYDE INACTIVATED), AND INFLUENZA B VIRUS B/MARYLAND/15/2016 BX-69A ANTIGEN (FORMALDEHYDE INACTIVATED) 0.7 ML: 60; 60; 60; 60 INJECTION, SUSPENSION INTRAMUSCULAR at 20:24

## 2020-10-09 RX ADMIN — HYDROMORPHONE HYDROCHLORIDE 1 MG: 1 INJECTION, SOLUTION INTRAMUSCULAR; INTRAVENOUS; SUBCUTANEOUS at 17:18

## 2020-10-09 RX ADMIN — SODIUM CHLORIDE, SODIUM LACTATE, POTASSIUM CHLORIDE, AND CALCIUM CHLORIDE 150 ML/HR: .6; .31; .03; .02 INJECTION, SOLUTION INTRAVENOUS at 22:39

## 2020-10-09 RX ADMIN — EZETIMIBE 10 MG: 10 TABLET ORAL at 21:21

## 2020-10-09 RX ADMIN — HYDROMORPHONE HYDROCHLORIDE 0.5 MG: 1 INJECTION, SOLUTION INTRAMUSCULAR; INTRAVENOUS; SUBCUTANEOUS at 21:24

## 2020-10-09 RX ADMIN — PRAVASTATIN SODIUM 80 MG: 40 TABLET ORAL at 17:10

## 2020-10-09 RX ADMIN — SODIUM CHLORIDE 1000 ML: 0.9 INJECTION, SOLUTION INTRAVENOUS at 12:31

## 2020-10-09 RX ADMIN — IOHEXOL 100 ML: 350 INJECTION, SOLUTION INTRAVENOUS at 14:48

## 2020-10-09 RX ADMIN — HYDROMORPHONE HYDROCHLORIDE 0.5 MG: 1 INJECTION, SOLUTION INTRAMUSCULAR; INTRAVENOUS; SUBCUTANEOUS at 12:34

## 2020-10-09 RX ADMIN — HYDROMORPHONE HYDROCHLORIDE 1 MG: 1 INJECTION, SOLUTION INTRAMUSCULAR; INTRAVENOUS; SUBCUTANEOUS at 13:24

## 2020-10-09 RX ADMIN — ONDANSETRON 4 MG: 2 INJECTION INTRAMUSCULAR; INTRAVENOUS at 12:31

## 2020-10-09 RX ADMIN — SODIUM CHLORIDE, SODIUM LACTATE, POTASSIUM CHLORIDE, AND CALCIUM CHLORIDE 125 ML/HR: .6; .31; .03; .02 INJECTION, SOLUTION INTRAVENOUS at 15:03

## 2020-10-09 RX ADMIN — DOCUSATE SODIUM 100 MG: 100 CAPSULE, LIQUID FILLED ORAL at 17:10

## 2020-10-10 LAB
ALBUMIN SERPL BCP-MCNC: 3.1 G/DL (ref 3–5.2)
ALP SERPL-CCNC: 52 U/L (ref 43–122)
ALT SERPL W P-5'-P-CCNC: 29 U/L (ref 9–52)
ANION GAP SERPL CALCULATED.3IONS-SCNC: 6 MMOL/L (ref 5–14)
AST SERPL W P-5'-P-CCNC: 31 U/L (ref 17–59)
BILIRUB SERPL-MCNC: 0.6 MG/DL
BUN SERPL-MCNC: 11 MG/DL (ref 5–25)
CALCIUM ALBUM COR SERPL-MCNC: 9.6 MG/DL (ref 8.3–10.1)
CALCIUM SERPL-MCNC: 8.9 MG/DL (ref 8.4–10.2)
CHLORIDE SERPL-SCNC: 104 MMOL/L (ref 97–108)
CO2 SERPL-SCNC: 26 MMOL/L (ref 22–30)
CREAT SERPL-MCNC: 0.69 MG/DL (ref 0.7–1.5)
ERYTHROCYTE [DISTWIDTH] IN BLOOD BY AUTOMATED COUNT: 14.3 %
GFR SERPL CREATININE-BSD FRML MDRD: 98 ML/MIN/1.73SQ M
GLUCOSE SERPL-MCNC: 72 MG/DL (ref 70–99)
HCT VFR BLD AUTO: 35.4 % (ref 41–53)
HGB BLD-MCNC: 12.1 G/DL (ref 13.5–17.5)
LIPASE SERPL-CCNC: 550 U/L (ref 23–300)
MCH RBC QN AUTO: 32.7 PG (ref 26–34)
MCHC RBC AUTO-ENTMCNC: 34.1 G/DL (ref 31–36)
MCV RBC AUTO: 96 FL (ref 80–100)
PLATELET # BLD AUTO: 228 THOUSANDS/UL (ref 150–450)
PMV BLD AUTO: 9.9 FL (ref 8.9–12.7)
POTASSIUM SERPL-SCNC: 3.4 MMOL/L (ref 3.6–5)
PROT SERPL-MCNC: 5.8 G/DL (ref 5.9–8.4)
RBC # BLD AUTO: 3.69 MILLION/UL (ref 4.5–5.9)
SODIUM SERPL-SCNC: 136 MMOL/L (ref 137–147)
WBC # BLD AUTO: 7.4 THOUSAND/UL (ref 4.5–11)

## 2020-10-10 PROCEDURE — 83690 ASSAY OF LIPASE: CPT | Performed by: PHYSICIAN ASSISTANT

## 2020-10-10 PROCEDURE — C9113 INJ PANTOPRAZOLE SODIUM, VIA: HCPCS | Performed by: INTERNAL MEDICINE

## 2020-10-10 PROCEDURE — 99255 IP/OBS CONSLTJ NEW/EST HI 80: CPT | Performed by: INTERNAL MEDICINE

## 2020-10-10 PROCEDURE — 80053 COMPREHEN METABOLIC PANEL: CPT | Performed by: PHYSICIAN ASSISTANT

## 2020-10-10 PROCEDURE — 99232 SBSQ HOSP IP/OBS MODERATE 35: CPT | Performed by: INTERNAL MEDICINE

## 2020-10-10 PROCEDURE — 85027 COMPLETE CBC AUTOMATED: CPT | Performed by: PHYSICIAN ASSISTANT

## 2020-10-10 RX ORDER — METOPROLOL SUCCINATE 50 MG/1
50 TABLET, EXTENDED RELEASE ORAL DAILY
Status: DISCONTINUED | OUTPATIENT
Start: 2020-10-10 | End: 2020-10-22 | Stop reason: HOSPADM

## 2020-10-10 RX ORDER — HYDRALAZINE HYDROCHLORIDE 20 MG/ML
5 INJECTION INTRAMUSCULAR; INTRAVENOUS EVERY 6 HOURS PRN
Status: DISCONTINUED | OUTPATIENT
Start: 2020-10-10 | End: 2020-10-10

## 2020-10-10 RX ORDER — ZOLPIDEM TARTRATE 5 MG/1
10 TABLET ORAL
Status: DISCONTINUED | OUTPATIENT
Start: 2020-10-10 | End: 2020-10-22 | Stop reason: HOSPADM

## 2020-10-10 RX ORDER — HYDRALAZINE HYDROCHLORIDE 20 MG/ML
10 INJECTION INTRAMUSCULAR; INTRAVENOUS EVERY 6 HOURS PRN
Status: DISCONTINUED | OUTPATIENT
Start: 2020-10-10 | End: 2020-10-22 | Stop reason: HOSPADM

## 2020-10-10 RX ORDER — LORAZEPAM 0.5 MG/1
0.5 TABLET ORAL EVERY 8 HOURS PRN
Status: DISCONTINUED | OUTPATIENT
Start: 2020-10-10 | End: 2020-10-22 | Stop reason: HOSPADM

## 2020-10-10 RX ORDER — METOPROLOL SUCCINATE 50 MG/1
50 TABLET, EXTENDED RELEASE ORAL DAILY
Status: DISCONTINUED | OUTPATIENT
Start: 2020-10-11 | End: 2020-10-10

## 2020-10-10 RX ORDER — POTASSIUM CHLORIDE 20 MEQ/1
40 TABLET, EXTENDED RELEASE ORAL ONCE
Status: COMPLETED | OUTPATIENT
Start: 2020-10-10 | End: 2020-10-10

## 2020-10-10 RX ORDER — LOSARTAN POTASSIUM 50 MG/1
100 TABLET ORAL DAILY
Status: DISCONTINUED | OUTPATIENT
Start: 2020-10-10 | End: 2020-10-22 | Stop reason: HOSPADM

## 2020-10-10 RX ORDER — PANTOPRAZOLE SODIUM 40 MG/1
40 INJECTION, POWDER, FOR SOLUTION INTRAVENOUS EVERY 12 HOURS SCHEDULED
Status: DISCONTINUED | OUTPATIENT
Start: 2020-10-10 | End: 2020-10-20

## 2020-10-10 RX ORDER — THIAMINE MONONITRATE (VIT B1) 100 MG
100 TABLET ORAL DAILY
Status: DISCONTINUED | OUTPATIENT
Start: 2020-10-10 | End: 2020-10-22 | Stop reason: HOSPADM

## 2020-10-10 RX ORDER — OMEPRAZOLE 20 MG/1
20 CAPSULE, DELAYED RELEASE ORAL DAILY
COMMUNITY
End: 2020-11-09

## 2020-10-10 RX ADMIN — THIAMINE HCL TAB 100 MG 100 MG: 100 TAB at 13:38

## 2020-10-10 RX ADMIN — SODIUM CHLORIDE, SODIUM LACTATE, POTASSIUM CHLORIDE, AND CALCIUM CHLORIDE 150 ML/HR: .6; .31; .03; .02 INJECTION, SOLUTION INTRAVENOUS at 05:21

## 2020-10-10 RX ADMIN — EZETIMIBE 10 MG: 10 TABLET ORAL at 21:26

## 2020-10-10 RX ADMIN — ZOLPIDEM TARTRATE 10 MG: 5 TABLET, COATED ORAL at 21:47

## 2020-10-10 RX ADMIN — HYDROMORPHONE HYDROCHLORIDE 0.5 MG: 1 INJECTION, SOLUTION INTRAMUSCULAR; INTRAVENOUS; SUBCUTANEOUS at 17:29

## 2020-10-10 RX ADMIN — HYDROMORPHONE HYDROCHLORIDE 0.5 MG: 1 INJECTION, SOLUTION INTRAMUSCULAR; INTRAVENOUS; SUBCUTANEOUS at 21:49

## 2020-10-10 RX ADMIN — LEVOTHYROXINE SODIUM 25 MCG: 25 TABLET ORAL at 05:21

## 2020-10-10 RX ADMIN — SPIRONOLACTONE 25 MG: 25 TABLET ORAL at 09:08

## 2020-10-10 RX ADMIN — SODIUM CHLORIDE, SODIUM LACTATE, POTASSIUM CHLORIDE, AND CALCIUM CHLORIDE 100 ML/HR: .6; .31; .03; .02 INJECTION, SOLUTION INTRAVENOUS at 22:49

## 2020-10-10 RX ADMIN — HYDROMORPHONE HYDROCHLORIDE 0.5 MG: 1 INJECTION, SOLUTION INTRAMUSCULAR; INTRAVENOUS; SUBCUTANEOUS at 09:06

## 2020-10-10 RX ADMIN — HYDROMORPHONE HYDROCHLORIDE 1 MG: 1 INJECTION, SOLUTION INTRAMUSCULAR; INTRAVENOUS; SUBCUTANEOUS at 12:58

## 2020-10-10 RX ADMIN — PANTOPRAZOLE SODIUM 40 MG: 40 INJECTION, POWDER, LYOPHILIZED, FOR SOLUTION INTRAVENOUS at 21:26

## 2020-10-10 RX ADMIN — POTASSIUM CHLORIDE 40 MEQ: 1500 TABLET, EXTENDED RELEASE ORAL at 09:13

## 2020-10-10 RX ADMIN — ASPIRIN 81 MG: 81 TABLET, COATED ORAL at 09:07

## 2020-10-10 RX ADMIN — LORAZEPAM 0.5 MG: 0.5 TABLET ORAL at 14:20

## 2020-10-10 RX ADMIN — PRAVASTATIN SODIUM 80 MG: 40 TABLET ORAL at 16:20

## 2020-10-10 RX ADMIN — LOSARTAN POTASSIUM 100 MG: 50 TABLET, FILM COATED ORAL at 04:37

## 2020-10-10 RX ADMIN — HYDROMORPHONE HYDROCHLORIDE 1 MG: 1 INJECTION, SOLUTION INTRAMUSCULAR; INTRAVENOUS; SUBCUTANEOUS at 02:41

## 2020-10-10 RX ADMIN — METOPROLOL SUCCINATE 50 MG: 50 TABLET, EXTENDED RELEASE ORAL at 09:13

## 2020-10-10 RX ADMIN — HYDRALAZINE HYDROCHLORIDE 10 MG: 20 INJECTION INTRAMUSCULAR; INTRAVENOUS at 13:41

## 2020-10-10 RX ADMIN — PANTOPRAZOLE SODIUM 40 MG: 40 INJECTION, POWDER, LYOPHILIZED, FOR SOLUTION INTRAVENOUS at 14:20

## 2020-10-10 RX ADMIN — SODIUM CHLORIDE, SODIUM LACTATE, POTASSIUM CHLORIDE, AND CALCIUM CHLORIDE 100 ML/HR: .6; .31; .03; .02 INJECTION, SOLUTION INTRAVENOUS at 13:39

## 2020-10-10 RX ADMIN — ENOXAPARIN SODIUM 40 MG: 100 INJECTION SUBCUTANEOUS at 09:07

## 2020-10-11 LAB
ANION GAP SERPL CALCULATED.3IONS-SCNC: 9 MMOL/L (ref 5–14)
BUN SERPL-MCNC: 6 MG/DL (ref 5–25)
CALCIUM SERPL-MCNC: 9.3 MG/DL (ref 8.4–10.2)
CHLORIDE SERPL-SCNC: 100 MMOL/L (ref 97–108)
CO2 SERPL-SCNC: 25 MMOL/L (ref 22–30)
CREAT SERPL-MCNC: 0.63 MG/DL (ref 0.7–1.5)
GFR SERPL CREATININE-BSD FRML MDRD: 101 ML/MIN/1.73SQ M
GLUCOSE SERPL-MCNC: 68 MG/DL (ref 70–99)
LIPASE SERPL-CCNC: 5183 U/L (ref 23–300)
MAGNESIUM SERPL-MCNC: 1.3 MG/DL (ref 1.6–2.3)
POTASSIUM SERPL-SCNC: 3.3 MMOL/L (ref 3.6–5)
SODIUM SERPL-SCNC: 134 MMOL/L (ref 137–147)

## 2020-10-11 PROCEDURE — 83690 ASSAY OF LIPASE: CPT | Performed by: INTERNAL MEDICINE

## 2020-10-11 PROCEDURE — C9113 INJ PANTOPRAZOLE SODIUM, VIA: HCPCS | Performed by: INTERNAL MEDICINE

## 2020-10-11 PROCEDURE — 83735 ASSAY OF MAGNESIUM: CPT | Performed by: INTERNAL MEDICINE

## 2020-10-11 PROCEDURE — 80048 BASIC METABOLIC PNL TOTAL CA: CPT | Performed by: INTERNAL MEDICINE

## 2020-10-11 PROCEDURE — 99232 SBSQ HOSP IP/OBS MODERATE 35: CPT | Performed by: INTERNAL MEDICINE

## 2020-10-11 RX ORDER — POTASSIUM CHLORIDE 20 MEQ/1
40 TABLET, EXTENDED RELEASE ORAL ONCE
Status: COMPLETED | OUTPATIENT
Start: 2020-10-11 | End: 2020-10-11

## 2020-10-11 RX ORDER — MAGNESIUM SULFATE HEPTAHYDRATE 40 MG/ML
2 INJECTION, SOLUTION INTRAVENOUS ONCE
Status: COMPLETED | OUTPATIENT
Start: 2020-10-11 | End: 2020-10-11

## 2020-10-11 RX ORDER — POTASSIUM CHLORIDE 14.9 MG/ML
20 INJECTION INTRAVENOUS
Status: COMPLETED | OUTPATIENT
Start: 2020-10-11 | End: 2020-10-11

## 2020-10-11 RX ORDER — AMLODIPINE BESYLATE 5 MG/1
5 TABLET ORAL DAILY
Status: DISCONTINUED | OUTPATIENT
Start: 2020-10-11 | End: 2020-10-12

## 2020-10-11 RX ADMIN — SPIRONOLACTONE 25 MG: 25 TABLET ORAL at 09:21

## 2020-10-11 RX ADMIN — HYDRALAZINE HYDROCHLORIDE 10 MG: 20 INJECTION INTRAMUSCULAR; INTRAVENOUS at 01:17

## 2020-10-11 RX ADMIN — THIAMINE HCL TAB 100 MG 100 MG: 100 TAB at 09:21

## 2020-10-11 RX ADMIN — DOCUSATE SODIUM 100 MG: 100 CAPSULE, LIQUID FILLED ORAL at 09:21

## 2020-10-11 RX ADMIN — HYDROMORPHONE HYDROCHLORIDE 0.5 MG: 1 INJECTION, SOLUTION INTRAMUSCULAR; INTRAVENOUS; SUBCUTANEOUS at 05:37

## 2020-10-11 RX ADMIN — LOSARTAN POTASSIUM 100 MG: 50 TABLET, FILM COATED ORAL at 09:21

## 2020-10-11 RX ADMIN — MAGNESIUM SULFATE IN WATER 2 G: 40 INJECTION, SOLUTION INTRAVENOUS at 09:31

## 2020-10-11 RX ADMIN — POTASSIUM CHLORIDE 40 MEQ: 20 TABLET, EXTENDED RELEASE ORAL at 09:21

## 2020-10-11 RX ADMIN — METOPROLOL SUCCINATE 50 MG: 50 TABLET, EXTENDED RELEASE ORAL at 09:21

## 2020-10-11 RX ADMIN — HYDRALAZINE HYDROCHLORIDE 10 MG: 20 INJECTION INTRAMUSCULAR; INTRAVENOUS at 21:44

## 2020-10-11 RX ADMIN — DOCUSATE SODIUM 100 MG: 100 CAPSULE, LIQUID FILLED ORAL at 17:39

## 2020-10-11 RX ADMIN — POTASSIUM CHLORIDE 20 MEQ: 14.9 INJECTION, SOLUTION INTRAVENOUS at 11:51

## 2020-10-11 RX ADMIN — PRAVASTATIN SODIUM 80 MG: 40 TABLET ORAL at 17:40

## 2020-10-11 RX ADMIN — POTASSIUM CHLORIDE 20 MEQ: 14.9 INJECTION, SOLUTION INTRAVENOUS at 14:57

## 2020-10-11 RX ADMIN — AMLODIPINE BESYLATE 5 MG: 5 TABLET ORAL at 09:21

## 2020-10-11 RX ADMIN — HYDROMORPHONE HYDROCHLORIDE 0.5 MG: 1 INJECTION, SOLUTION INTRAMUSCULAR; INTRAVENOUS; SUBCUTANEOUS at 21:44

## 2020-10-11 RX ADMIN — EZETIMIBE 10 MG: 10 TABLET ORAL at 21:44

## 2020-10-11 RX ADMIN — ASPIRIN 81 MG: 81 TABLET, COATED ORAL at 09:21

## 2020-10-11 RX ADMIN — ZOLPIDEM TARTRATE 10 MG: 5 TABLET, COATED ORAL at 21:44

## 2020-10-11 RX ADMIN — LORAZEPAM 0.5 MG: 0.5 TABLET ORAL at 05:37

## 2020-10-11 RX ADMIN — PANTOPRAZOLE SODIUM 40 MG: 40 INJECTION, POWDER, LYOPHILIZED, FOR SOLUTION INTRAVENOUS at 21:44

## 2020-10-11 RX ADMIN — SODIUM CHLORIDE, SODIUM LACTATE, POTASSIUM CHLORIDE, AND CALCIUM CHLORIDE 125 ML/HR: .6; .31; .03; .02 INJECTION, SOLUTION INTRAVENOUS at 09:23

## 2020-10-11 RX ADMIN — SODIUM CHLORIDE, SODIUM LACTATE, POTASSIUM CHLORIDE, AND CALCIUM CHLORIDE 125 ML/HR: .6; .31; .03; .02 INJECTION, SOLUTION INTRAVENOUS at 17:39

## 2020-10-11 RX ADMIN — ENOXAPARIN SODIUM 40 MG: 100 INJECTION SUBCUTANEOUS at 09:21

## 2020-10-11 RX ADMIN — HYDROMORPHONE HYDROCHLORIDE 1 MG: 1 INJECTION, SOLUTION INTRAMUSCULAR; INTRAVENOUS; SUBCUTANEOUS at 09:20

## 2020-10-11 RX ADMIN — HYDROMORPHONE HYDROCHLORIDE 1 MG: 1 INJECTION, SOLUTION INTRAMUSCULAR; INTRAVENOUS; SUBCUTANEOUS at 14:56

## 2020-10-11 RX ADMIN — PANTOPRAZOLE SODIUM 40 MG: 40 INJECTION, POWDER, LYOPHILIZED, FOR SOLUTION INTRAVENOUS at 09:21

## 2020-10-11 RX ADMIN — LEVOTHYROXINE SODIUM 25 MCG: 25 TABLET ORAL at 05:26

## 2020-10-12 ENCOUNTER — APPOINTMENT (INPATIENT)
Dept: MRI IMAGING | Facility: HOSPITAL | Age: 70
DRG: 438 | End: 2020-10-12
Payer: COMMERCIAL

## 2020-10-12 LAB
ALBUMIN SERPL BCP-MCNC: 3.5 G/DL (ref 3–5.2)
ALP SERPL-CCNC: 62 U/L (ref 43–122)
ALT SERPL W P-5'-P-CCNC: 31 U/L (ref 9–52)
ANION GAP SERPL CALCULATED.3IONS-SCNC: 8 MMOL/L (ref 5–14)
AST SERPL W P-5'-P-CCNC: 41 U/L (ref 17–59)
BASOPHILS # BLD AUTO: 0 THOUSANDS/ΜL (ref 0–0.1)
BASOPHILS NFR BLD AUTO: 0 % (ref 0–1)
BILIRUB SERPL-MCNC: 0.6 MG/DL
BUN SERPL-MCNC: 5 MG/DL (ref 5–25)
CALCIUM SERPL-MCNC: 9.7 MG/DL (ref 8.4–10.2)
CHLORIDE SERPL-SCNC: 103 MMOL/L (ref 97–108)
CO2 SERPL-SCNC: 23 MMOL/L (ref 22–30)
CREAT SERPL-MCNC: 0.69 MG/DL (ref 0.7–1.5)
EOSINOPHIL # BLD AUTO: 0.6 THOUSAND/ΜL (ref 0–0.4)
EOSINOPHIL NFR BLD AUTO: 9 % (ref 0–6)
ERYTHROCYTE [DISTWIDTH] IN BLOOD BY AUTOMATED COUNT: 14.3 %
GFR SERPL CREATININE-BSD FRML MDRD: 98 ML/MIN/1.73SQ M
GLUCOSE SERPL-MCNC: 83 MG/DL (ref 70–99)
HCT VFR BLD AUTO: 38.3 % (ref 41–53)
HGB BLD-MCNC: 13.2 G/DL (ref 13.5–17.5)
LIPASE SERPL-CCNC: 2775 U/L (ref 23–300)
LYMPHOCYTES # BLD AUTO: 1 THOUSANDS/ΜL (ref 0.5–4)
LYMPHOCYTES NFR BLD AUTO: 15 % (ref 25–45)
MAGNESIUM SERPL-MCNC: 1.5 MG/DL (ref 1.6–2.3)
MCH RBC QN AUTO: 33.3 PG (ref 26–34)
MCHC RBC AUTO-ENTMCNC: 34.5 G/DL (ref 31–36)
MCV RBC AUTO: 96 FL (ref 80–100)
MONOCYTES # BLD AUTO: 0.5 THOUSAND/ΜL (ref 0.2–0.9)
MONOCYTES NFR BLD AUTO: 9 % (ref 1–10)
NEUTROPHILS # BLD AUTO: 4.2 THOUSANDS/ΜL (ref 1.8–7.8)
NEUTS SEG NFR BLD AUTO: 67 % (ref 45–65)
PLATELET # BLD AUTO: 254 THOUSANDS/UL (ref 150–450)
PMV BLD AUTO: 9.8 FL (ref 8.9–12.7)
POTASSIUM SERPL-SCNC: 3.8 MMOL/L (ref 3.6–5)
PROT SERPL-MCNC: 6.5 G/DL (ref 5.9–8.4)
RBC # BLD AUTO: 3.97 MILLION/UL (ref 4.5–5.9)
SODIUM SERPL-SCNC: 134 MMOL/L (ref 137–147)
TSH SERPL DL<=0.05 MIU/L-ACNC: 4.1 UIU/ML (ref 0.47–4.68)
VIT B1 BLD-SCNC: 106.4 NMOL/L (ref 66.5–200)
WBC # BLD AUTO: 6.3 THOUSAND/UL (ref 4.5–11)

## 2020-10-12 PROCEDURE — 83690 ASSAY OF LIPASE: CPT | Performed by: INTERNAL MEDICINE

## 2020-10-12 PROCEDURE — 83735 ASSAY OF MAGNESIUM: CPT | Performed by: INTERNAL MEDICINE

## 2020-10-12 PROCEDURE — 84443 ASSAY THYROID STIM HORMONE: CPT | Performed by: INTERNAL MEDICINE

## 2020-10-12 PROCEDURE — G1004 CDSM NDSC: HCPCS

## 2020-10-12 PROCEDURE — 74183 MRI ABD W/O CNTR FLWD CNTR: CPT

## 2020-10-12 PROCEDURE — 76376 3D RENDER W/INTRP POSTPROCES: CPT

## 2020-10-12 PROCEDURE — C9113 INJ PANTOPRAZOLE SODIUM, VIA: HCPCS | Performed by: INTERNAL MEDICINE

## 2020-10-12 PROCEDURE — 85025 COMPLETE CBC W/AUTO DIFF WBC: CPT | Performed by: INTERNAL MEDICINE

## 2020-10-12 PROCEDURE — 99232 SBSQ HOSP IP/OBS MODERATE 35: CPT | Performed by: PHYSICIAN ASSISTANT

## 2020-10-12 PROCEDURE — 80053 COMPREHEN METABOLIC PANEL: CPT | Performed by: INTERNAL MEDICINE

## 2020-10-12 PROCEDURE — 99232 SBSQ HOSP IP/OBS MODERATE 35: CPT | Performed by: INTERNAL MEDICINE

## 2020-10-12 PROCEDURE — A9585 GADOBUTROL INJECTION: HCPCS | Performed by: PHYSICIAN ASSISTANT

## 2020-10-12 RX ORDER — AMLODIPINE BESYLATE 10 MG/1
10 TABLET ORAL DAILY
Status: DISCONTINUED | OUTPATIENT
Start: 2020-10-12 | End: 2020-10-22 | Stop reason: HOSPADM

## 2020-10-12 RX ORDER — POTASSIUM CHLORIDE 20 MEQ/1
40 TABLET, EXTENDED RELEASE ORAL ONCE
Status: COMPLETED | OUTPATIENT
Start: 2020-10-12 | End: 2020-10-12

## 2020-10-12 RX ADMIN — LEVOTHYROXINE SODIUM 25 MCG: 25 TABLET ORAL at 05:51

## 2020-10-12 RX ADMIN — Medication 400 MG: at 17:22

## 2020-10-12 RX ADMIN — EZETIMIBE 10 MG: 10 TABLET ORAL at 22:11

## 2020-10-12 RX ADMIN — HYDROMORPHONE HYDROCHLORIDE 1 MG: 1 INJECTION, SOLUTION INTRAMUSCULAR; INTRAVENOUS; SUBCUTANEOUS at 09:50

## 2020-10-12 RX ADMIN — THIAMINE HCL TAB 100 MG 100 MG: 100 TAB at 09:51

## 2020-10-12 RX ADMIN — PANTOPRAZOLE SODIUM 40 MG: 40 INJECTION, POWDER, LYOPHILIZED, FOR SOLUTION INTRAVENOUS at 22:11

## 2020-10-12 RX ADMIN — DOCUSATE SODIUM 100 MG: 100 CAPSULE, LIQUID FILLED ORAL at 09:51

## 2020-10-12 RX ADMIN — LOSARTAN POTASSIUM 100 MG: 50 TABLET, FILM COATED ORAL at 09:51

## 2020-10-12 RX ADMIN — PANTOPRAZOLE SODIUM 40 MG: 40 INJECTION, POWDER, LYOPHILIZED, FOR SOLUTION INTRAVENOUS at 09:52

## 2020-10-12 RX ADMIN — SODIUM CHLORIDE, SODIUM LACTATE, POTASSIUM CHLORIDE, AND CALCIUM CHLORIDE 100 ML/HR: .6; .31; .03; .02 INJECTION, SOLUTION INTRAVENOUS at 23:44

## 2020-10-12 RX ADMIN — GADOBUTROL 7.5 ML: 604.72 INJECTION INTRAVENOUS at 13:38

## 2020-10-12 RX ADMIN — SPIRONOLACTONE 25 MG: 25 TABLET ORAL at 09:51

## 2020-10-12 RX ADMIN — HYDROMORPHONE HYDROCHLORIDE 1 MG: 1 INJECTION, SOLUTION INTRAMUSCULAR; INTRAVENOUS; SUBCUTANEOUS at 17:24

## 2020-10-12 RX ADMIN — METOPROLOL SUCCINATE 50 MG: 50 TABLET, EXTENDED RELEASE ORAL at 09:51

## 2020-10-12 RX ADMIN — POTASSIUM CHLORIDE 40 MEQ: 20 TABLET, EXTENDED RELEASE ORAL at 09:51

## 2020-10-12 RX ADMIN — HYDROMORPHONE HYDROCHLORIDE 1 MG: 1 INJECTION, SOLUTION INTRAMUSCULAR; INTRAVENOUS; SUBCUTANEOUS at 23:46

## 2020-10-12 RX ADMIN — PRAVASTATIN SODIUM 80 MG: 40 TABLET ORAL at 17:22

## 2020-10-12 RX ADMIN — Medication 400 MG: at 09:51

## 2020-10-12 RX ADMIN — ZOLPIDEM TARTRATE 10 MG: 5 TABLET, COATED ORAL at 22:11

## 2020-10-12 RX ADMIN — ENOXAPARIN SODIUM 40 MG: 100 INJECTION SUBCUTANEOUS at 09:52

## 2020-10-12 RX ADMIN — SODIUM CHLORIDE, SODIUM LACTATE, POTASSIUM CHLORIDE, AND CALCIUM CHLORIDE 125 ML/HR: .6; .31; .03; .02 INJECTION, SOLUTION INTRAVENOUS at 01:45

## 2020-10-12 RX ADMIN — AMLODIPINE BESYLATE 10 MG: 10 TABLET ORAL at 09:52

## 2020-10-12 RX ADMIN — ASPIRIN 81 MG: 81 TABLET, COATED ORAL at 09:51

## 2020-10-13 ENCOUNTER — TELEPHONE (OUTPATIENT)
Dept: GASTROENTEROLOGY | Facility: AMBULARY SURGERY CENTER | Age: 70
End: 2020-10-13

## 2020-10-13 ENCOUNTER — TELEPHONE (OUTPATIENT)
Dept: GASTROENTEROLOGY | Facility: CLINIC | Age: 70
End: 2020-10-13

## 2020-10-13 LAB
ANION GAP SERPL CALCULATED.3IONS-SCNC: 7 MMOL/L (ref 5–14)
BUN SERPL-MCNC: 8 MG/DL (ref 5–25)
CALCIUM SERPL-MCNC: 9.3 MG/DL (ref 8.4–10.2)
CHLORIDE SERPL-SCNC: 103 MMOL/L (ref 97–108)
CO2 SERPL-SCNC: 23 MMOL/L (ref 22–30)
CREAT SERPL-MCNC: 0.71 MG/DL (ref 0.7–1.5)
GFR SERPL CREATININE-BSD FRML MDRD: 96 ML/MIN/1.73SQ M
GLUCOSE SERPL-MCNC: 73 MG/DL (ref 70–99)
LIPASE SERPL-CCNC: 370 U/L (ref 23–300)
MAGNESIUM SERPL-MCNC: 1.5 MG/DL (ref 1.6–2.3)
POTASSIUM SERPL-SCNC: 4 MMOL/L (ref 3.6–5)
SODIUM SERPL-SCNC: 133 MMOL/L (ref 137–147)

## 2020-10-13 PROCEDURE — 83735 ASSAY OF MAGNESIUM: CPT | Performed by: INTERNAL MEDICINE

## 2020-10-13 PROCEDURE — 99232 SBSQ HOSP IP/OBS MODERATE 35: CPT | Performed by: PHYSICIAN ASSISTANT

## 2020-10-13 PROCEDURE — C9113 INJ PANTOPRAZOLE SODIUM, VIA: HCPCS | Performed by: INTERNAL MEDICINE

## 2020-10-13 PROCEDURE — 83690 ASSAY OF LIPASE: CPT | Performed by: INTERNAL MEDICINE

## 2020-10-13 PROCEDURE — 80048 BASIC METABOLIC PNL TOTAL CA: CPT | Performed by: INTERNAL MEDICINE

## 2020-10-13 PROCEDURE — 99232 SBSQ HOSP IP/OBS MODERATE 35: CPT | Performed by: INTERNAL MEDICINE

## 2020-10-13 PROCEDURE — 86301 IMMUNOASSAY TUMOR CA 19-9: CPT | Performed by: PHYSICIAN ASSISTANT

## 2020-10-13 RX ORDER — MAGNESIUM SULFATE 1 G/100ML
1 INJECTION INTRAVENOUS ONCE
Status: COMPLETED | OUTPATIENT
Start: 2020-10-13 | End: 2020-10-14

## 2020-10-13 RX ORDER — HEPARIN SODIUM 5000 [USP'U]/ML
5000 INJECTION, SOLUTION INTRAVENOUS; SUBCUTANEOUS EVERY 8 HOURS SCHEDULED
Status: DISCONTINUED | OUTPATIENT
Start: 2020-10-13 | End: 2020-10-13

## 2020-10-13 RX ADMIN — HYDROMORPHONE HYDROCHLORIDE 0.5 MG: 1 INJECTION, SOLUTION INTRAMUSCULAR; INTRAVENOUS; SUBCUTANEOUS at 07:52

## 2020-10-13 RX ADMIN — SODIUM CHLORIDE, SODIUM LACTATE, POTASSIUM CHLORIDE, AND CALCIUM CHLORIDE 100 ML/HR: .6; .31; .03; .02 INJECTION, SOLUTION INTRAVENOUS at 07:56

## 2020-10-13 RX ADMIN — ENOXAPARIN SODIUM 40 MG: 100 INJECTION SUBCUTANEOUS at 08:00

## 2020-10-13 RX ADMIN — METOPROLOL SUCCINATE 50 MG: 50 TABLET, EXTENDED RELEASE ORAL at 08:00

## 2020-10-13 RX ADMIN — HYDROMORPHONE HYDROCHLORIDE 0.5 MG: 1 INJECTION, SOLUTION INTRAMUSCULAR; INTRAVENOUS; SUBCUTANEOUS at 16:59

## 2020-10-13 RX ADMIN — HYDROMORPHONE HYDROCHLORIDE 1 MG: 1 INJECTION, SOLUTION INTRAMUSCULAR; INTRAVENOUS; SUBCUTANEOUS at 21:15

## 2020-10-13 RX ADMIN — SPIRONOLACTONE 25 MG: 25 TABLET ORAL at 08:00

## 2020-10-13 RX ADMIN — MAGNESIUM SULFATE HEPTAHYDRATE 1 G: 1 INJECTION, SOLUTION INTRAVENOUS at 10:29

## 2020-10-13 RX ADMIN — ASPIRIN 81 MG: 81 TABLET, COATED ORAL at 08:00

## 2020-10-13 RX ADMIN — HYDROMORPHONE HYDROCHLORIDE 1 MG: 1 INJECTION, SOLUTION INTRAMUSCULAR; INTRAVENOUS; SUBCUTANEOUS at 10:31

## 2020-10-13 RX ADMIN — SODIUM CHLORIDE, SODIUM LACTATE, POTASSIUM CHLORIDE, AND CALCIUM CHLORIDE 75 ML/HR: .6; .31; .03; .02 INJECTION, SOLUTION INTRAVENOUS at 21:19

## 2020-10-13 RX ADMIN — Medication 400 MG: at 08:00

## 2020-10-13 RX ADMIN — THIAMINE HCL TAB 100 MG 100 MG: 100 TAB at 08:00

## 2020-10-13 RX ADMIN — EZETIMIBE 10 MG: 10 TABLET ORAL at 21:15

## 2020-10-13 RX ADMIN — PANTOPRAZOLE SODIUM 40 MG: 40 INJECTION, POWDER, LYOPHILIZED, FOR SOLUTION INTRAVENOUS at 08:00

## 2020-10-13 RX ADMIN — PRAVASTATIN SODIUM 80 MG: 40 TABLET ORAL at 16:59

## 2020-10-13 RX ADMIN — ZOLPIDEM TARTRATE 10 MG: 5 TABLET, COATED ORAL at 21:15

## 2020-10-13 RX ADMIN — PANTOPRAZOLE SODIUM 40 MG: 40 INJECTION, POWDER, LYOPHILIZED, FOR SOLUTION INTRAVENOUS at 21:15

## 2020-10-13 RX ADMIN — AMLODIPINE BESYLATE 10 MG: 10 TABLET ORAL at 08:00

## 2020-10-13 RX ADMIN — LOSARTAN POTASSIUM 100 MG: 50 TABLET, FILM COATED ORAL at 08:00

## 2020-10-14 PROBLEM — E43 SEVERE PROTEIN-CALORIE MALNUTRITION (HCC): Status: ACTIVE | Noted: 2020-10-14

## 2020-10-14 LAB
ANION GAP SERPL CALCULATED.3IONS-SCNC: 5 MMOL/L (ref 5–14)
BUN SERPL-MCNC: 8 MG/DL (ref 5–25)
CALCIUM SERPL-MCNC: 9 MG/DL (ref 8.4–10.2)
CANCER AG19-9 SERPL-ACNC: 63 U/ML (ref 0–35)
CHLORIDE SERPL-SCNC: 103 MMOL/L (ref 97–108)
CO2 SERPL-SCNC: 26 MMOL/L (ref 22–30)
CREAT SERPL-MCNC: 0.82 MG/DL (ref 0.7–1.5)
GFR SERPL CREATININE-BSD FRML MDRD: 91 ML/MIN/1.73SQ M
GLUCOSE SERPL-MCNC: 89 MG/DL (ref 70–99)
LIPASE SERPL-CCNC: 344 U/L (ref 23–300)
POTASSIUM SERPL-SCNC: 3.6 MMOL/L (ref 3.6–5)
SODIUM SERPL-SCNC: 134 MMOL/L (ref 137–147)

## 2020-10-14 PROCEDURE — 99232 SBSQ HOSP IP/OBS MODERATE 35: CPT | Performed by: INTERNAL MEDICINE

## 2020-10-14 PROCEDURE — 90471 IMMUNIZATION ADMIN: CPT | Performed by: INTERNAL MEDICINE

## 2020-10-14 PROCEDURE — C9113 INJ PANTOPRAZOLE SODIUM, VIA: HCPCS | Performed by: INTERNAL MEDICINE

## 2020-10-14 PROCEDURE — 99232 SBSQ HOSP IP/OBS MODERATE 35: CPT | Performed by: PHYSICIAN ASSISTANT

## 2020-10-14 PROCEDURE — 90670 PCV13 VACCINE IM: CPT | Performed by: INTERNAL MEDICINE

## 2020-10-14 PROCEDURE — 80048 BASIC METABOLIC PNL TOTAL CA: CPT | Performed by: INTERNAL MEDICINE

## 2020-10-14 PROCEDURE — 83690 ASSAY OF LIPASE: CPT | Performed by: INTERNAL MEDICINE

## 2020-10-14 RX ADMIN — PANTOPRAZOLE SODIUM 40 MG: 40 INJECTION, POWDER, LYOPHILIZED, FOR SOLUTION INTRAVENOUS at 08:50

## 2020-10-14 RX ADMIN — LEVOTHYROXINE SODIUM 25 MCG: 25 TABLET ORAL at 06:04

## 2020-10-14 RX ADMIN — PRAVASTATIN SODIUM 80 MG: 40 TABLET ORAL at 17:06

## 2020-10-14 RX ADMIN — PANTOPRAZOLE SODIUM 40 MG: 40 INJECTION, POWDER, LYOPHILIZED, FOR SOLUTION INTRAVENOUS at 21:11

## 2020-10-14 RX ADMIN — ASPIRIN 81 MG: 81 TABLET, COATED ORAL at 08:52

## 2020-10-14 RX ADMIN — AMLODIPINE BESYLATE 10 MG: 10 TABLET ORAL at 08:52

## 2020-10-14 RX ADMIN — LOSARTAN POTASSIUM 100 MG: 50 TABLET, FILM COATED ORAL at 08:52

## 2020-10-14 RX ADMIN — EZETIMIBE 10 MG: 10 TABLET ORAL at 21:11

## 2020-10-14 RX ADMIN — METOPROLOL SUCCINATE 50 MG: 50 TABLET, EXTENDED RELEASE ORAL at 08:52

## 2020-10-14 RX ADMIN — SODIUM CHLORIDE, SODIUM LACTATE, POTASSIUM CHLORIDE, AND CALCIUM CHLORIDE 75 ML/HR: .6; .31; .03; .02 INJECTION, SOLUTION INTRAVENOUS at 21:19

## 2020-10-14 RX ADMIN — HYDROMORPHONE HYDROCHLORIDE 1 MG: 1 INJECTION, SOLUTION INTRAMUSCULAR; INTRAVENOUS; SUBCUTANEOUS at 08:48

## 2020-10-14 RX ADMIN — THIAMINE HCL TAB 100 MG 100 MG: 100 TAB at 08:52

## 2020-10-14 RX ADMIN — ZOLPIDEM TARTRATE 10 MG: 5 TABLET, COATED ORAL at 21:20

## 2020-10-14 RX ADMIN — ENOXAPARIN SODIUM 40 MG: 100 INJECTION SUBCUTANEOUS at 08:51

## 2020-10-14 RX ADMIN — SODIUM CHLORIDE, SODIUM LACTATE, POTASSIUM CHLORIDE, AND CALCIUM CHLORIDE 75 ML/HR: .6; .31; .03; .02 INJECTION, SOLUTION INTRAVENOUS at 09:00

## 2020-10-14 RX ADMIN — SPIRONOLACTONE 25 MG: 25 TABLET ORAL at 08:52

## 2020-10-14 RX ADMIN — HYDROMORPHONE HYDROCHLORIDE 0.5 MG: 1 INJECTION, SOLUTION INTRAMUSCULAR; INTRAVENOUS; SUBCUTANEOUS at 21:20

## 2020-10-14 RX ADMIN — PNEUMOCOCCAL 13-VALENT CONJUGATE VACCINE 0.5 ML: 2.2; 2.2; 2.2; 2.2; 2.2; 4.4; 2.2; 2.2; 2.2; 2.2; 2.2; 2.2; 2.2 INJECTION, SUSPENSION INTRAMUSCULAR at 21:24

## 2020-10-14 RX ADMIN — HYDROMORPHONE HYDROCHLORIDE 0.5 MG: 1 INJECTION, SOLUTION INTRAMUSCULAR; INTRAVENOUS; SUBCUTANEOUS at 15:36

## 2020-10-15 PROBLEM — E83.42 HYPOMAGNESEMIA: Status: ACTIVE | Noted: 2020-10-15

## 2020-10-15 LAB
ANION GAP SERPL CALCULATED.3IONS-SCNC: 6 MMOL/L (ref 5–14)
BUN SERPL-MCNC: 6 MG/DL (ref 5–25)
CALCIUM SERPL-MCNC: 9.1 MG/DL (ref 8.4–10.2)
CHLORIDE SERPL-SCNC: 104 MMOL/L (ref 97–108)
CO2 SERPL-SCNC: 25 MMOL/L (ref 22–30)
CREAT SERPL-MCNC: 0.7 MG/DL (ref 0.7–1.5)
GFR SERPL CREATININE-BSD FRML MDRD: 97 ML/MIN/1.73SQ M
GLUCOSE SERPL-MCNC: 96 MG/DL (ref 70–99)
MAGNESIUM SERPL-MCNC: 1.4 MG/DL (ref 1.6–2.3)
POTASSIUM SERPL-SCNC: 3.4 MMOL/L (ref 3.6–5)
SODIUM SERPL-SCNC: 135 MMOL/L (ref 137–147)

## 2020-10-15 PROCEDURE — 99232 SBSQ HOSP IP/OBS MODERATE 35: CPT | Performed by: PHYSICIAN ASSISTANT

## 2020-10-15 PROCEDURE — C9113 INJ PANTOPRAZOLE SODIUM, VIA: HCPCS | Performed by: INTERNAL MEDICINE

## 2020-10-15 PROCEDURE — 83735 ASSAY OF MAGNESIUM: CPT | Performed by: INTERNAL MEDICINE

## 2020-10-15 PROCEDURE — 80048 BASIC METABOLIC PNL TOTAL CA: CPT | Performed by: INTERNAL MEDICINE

## 2020-10-15 RX ORDER — MAGNESIUM SULFATE HEPTAHYDRATE 40 MG/ML
2 INJECTION, SOLUTION INTRAVENOUS ONCE
Status: COMPLETED | OUTPATIENT
Start: 2020-10-15 | End: 2020-10-16

## 2020-10-15 RX ORDER — POTASSIUM CHLORIDE 14.9 MG/ML
20 INJECTION INTRAVENOUS ONCE
Status: COMPLETED | OUTPATIENT
Start: 2020-10-15 | End: 2020-10-15

## 2020-10-15 RX ADMIN — EZETIMIBE 10 MG: 10 TABLET ORAL at 21:34

## 2020-10-15 RX ADMIN — POTASSIUM CHLORIDE 20 MEQ: 14.9 INJECTION, SOLUTION INTRAVENOUS at 14:02

## 2020-10-15 RX ADMIN — LOSARTAN POTASSIUM 100 MG: 50 TABLET, FILM COATED ORAL at 08:39

## 2020-10-15 RX ADMIN — HYDROMORPHONE HYDROCHLORIDE 1 MG: 1 INJECTION, SOLUTION INTRAMUSCULAR; INTRAVENOUS; SUBCUTANEOUS at 11:33

## 2020-10-15 RX ADMIN — AMLODIPINE BESYLATE 10 MG: 10 TABLET ORAL at 08:39

## 2020-10-15 RX ADMIN — SPIRONOLACTONE 25 MG: 25 TABLET ORAL at 08:39

## 2020-10-15 RX ADMIN — ZOLPIDEM TARTRATE 10 MG: 5 TABLET, COATED ORAL at 21:33

## 2020-10-15 RX ADMIN — PRAVASTATIN SODIUM 80 MG: 40 TABLET ORAL at 16:47

## 2020-10-15 RX ADMIN — PANTOPRAZOLE SODIUM 40 MG: 40 INJECTION, POWDER, LYOPHILIZED, FOR SOLUTION INTRAVENOUS at 21:33

## 2020-10-15 RX ADMIN — HYDROMORPHONE HYDROCHLORIDE 0.5 MG: 1 INJECTION, SOLUTION INTRAMUSCULAR; INTRAVENOUS; SUBCUTANEOUS at 08:51

## 2020-10-15 RX ADMIN — MAGNESIUM SULFATE IN WATER 2 G: 40 INJECTION, SOLUTION INTRAVENOUS at 11:31

## 2020-10-15 RX ADMIN — METOPROLOL SUCCINATE 50 MG: 50 TABLET, EXTENDED RELEASE ORAL at 08:39

## 2020-10-15 RX ADMIN — LEVOTHYROXINE SODIUM 25 MCG: 25 TABLET ORAL at 05:29

## 2020-10-15 RX ADMIN — ASPIRIN 81 MG: 81 TABLET, COATED ORAL at 08:39

## 2020-10-15 RX ADMIN — SODIUM CHLORIDE, SODIUM LACTATE, POTASSIUM CHLORIDE, AND CALCIUM CHLORIDE 75 ML/HR: .6; .31; .03; .02 INJECTION, SOLUTION INTRAVENOUS at 10:24

## 2020-10-15 RX ADMIN — THIAMINE HCL TAB 100 MG 100 MG: 100 TAB at 08:39

## 2020-10-15 RX ADMIN — HYDROMORPHONE HYDROCHLORIDE 1 MG: 1 INJECTION, SOLUTION INTRAMUSCULAR; INTRAVENOUS; SUBCUTANEOUS at 16:49

## 2020-10-15 RX ADMIN — PANTOPRAZOLE SODIUM 40 MG: 40 INJECTION, POWDER, LYOPHILIZED, FOR SOLUTION INTRAVENOUS at 08:39

## 2020-10-15 RX ADMIN — ENOXAPARIN SODIUM 40 MG: 100 INJECTION SUBCUTANEOUS at 08:39

## 2020-10-15 RX ADMIN — HYDROMORPHONE HYDROCHLORIDE 0.5 MG: 1 INJECTION, SOLUTION INTRAMUSCULAR; INTRAVENOUS; SUBCUTANEOUS at 21:33

## 2020-10-16 ENCOUNTER — APPOINTMENT (INPATIENT)
Dept: RADIOLOGY | Facility: HOSPITAL | Age: 70
DRG: 438 | End: 2020-10-16
Payer: COMMERCIAL

## 2020-10-16 PROBLEM — E83.42 HYPOMAGNESEMIA: Status: RESOLVED | Noted: 2020-10-15 | Resolved: 2020-10-16

## 2020-10-16 LAB
ANION GAP SERPL CALCULATED.3IONS-SCNC: 4 MMOL/L (ref 5–14)
BASOPHILS # BLD AUTO: 0.05 THOUSAND/UL (ref 0–0.1)
BASOPHILS NFR MAR MANUAL: 1 % (ref 0–1)
BUN SERPL-MCNC: 8 MG/DL (ref 5–25)
CALCIUM SERPL-MCNC: 8.9 MG/DL (ref 8.4–10.2)
CHLORIDE SERPL-SCNC: 105 MMOL/L (ref 97–108)
CO2 SERPL-SCNC: 25 MMOL/L (ref 22–30)
CREAT SERPL-MCNC: 0.69 MG/DL (ref 0.7–1.5)
EOSINOPHIL # BLD AUTO: 0.49 THOUSAND/UL (ref 0–0.4)
EOSINOPHIL NFR BLD MANUAL: 10 % (ref 0–6)
ERYTHROCYTE [DISTWIDTH] IN BLOOD BY AUTOMATED COUNT: 13.7 %
GFR SERPL CREATININE-BSD FRML MDRD: 98 ML/MIN/1.73SQ M
GLUCOSE SERPL-MCNC: 86 MG/DL (ref 70–99)
HCT VFR BLD AUTO: 35.6 % (ref 41–53)
HGB BLD-MCNC: 12.2 G/DL (ref 13.5–17.5)
LYMPHOCYTES # BLD AUTO: 1.47 THOUSAND/UL (ref 0.5–4)
LYMPHOCYTES # BLD AUTO: 30 % (ref 25–45)
MAGNESIUM SERPL-MCNC: 1.6 MG/DL (ref 1.6–2.3)
MCH RBC QN AUTO: 33 PG (ref 26–34)
MCHC RBC AUTO-ENTMCNC: 34.2 G/DL (ref 31–36)
MCV RBC AUTO: 97 FL (ref 80–100)
MONOCYTES # BLD AUTO: 0.49 THOUSAND/UL (ref 0.2–0.9)
MONOCYTES NFR BLD AUTO: 10 % (ref 1–10)
NEUTS SEG # BLD: 2.4 THOUSAND/UL (ref 1.8–7.8)
NEUTS SEG NFR BLD AUTO: 49 %
PLATELET # BLD AUTO: 222 THOUSANDS/UL (ref 150–450)
PLATELET BLD QL SMEAR: ADEQUATE
PMV BLD AUTO: 9.9 FL (ref 8.9–12.7)
POTASSIUM SERPL-SCNC: 3.4 MMOL/L (ref 3.6–5)
RBC # BLD AUTO: 3.69 MILLION/UL (ref 4.5–5.9)
RBC MORPH BLD: NORMAL
SODIUM SERPL-SCNC: 134 MMOL/L (ref 137–147)
TOTAL CELLS COUNTED SPEC: 100
WBC # BLD AUTO: 4.9 THOUSAND/UL (ref 4.5–11)

## 2020-10-16 PROCEDURE — C9113 INJ PANTOPRAZOLE SODIUM, VIA: HCPCS | Performed by: INTERNAL MEDICINE

## 2020-10-16 PROCEDURE — 71045 X-RAY EXAM CHEST 1 VIEW: CPT

## 2020-10-16 PROCEDURE — 80048 BASIC METABOLIC PNL TOTAL CA: CPT | Performed by: PHYSICIAN ASSISTANT

## 2020-10-16 PROCEDURE — 83735 ASSAY OF MAGNESIUM: CPT | Performed by: PHYSICIAN ASSISTANT

## 2020-10-16 PROCEDURE — 85027 COMPLETE CBC AUTOMATED: CPT | Performed by: PHYSICIAN ASSISTANT

## 2020-10-16 PROCEDURE — 36569 INSJ PICC 5 YR+ W/O IMAGING: CPT

## 2020-10-16 PROCEDURE — 02HV33Z INSERTION OF INFUSION DEVICE INTO SUPERIOR VENA CAVA, PERCUTANEOUS APPROACH: ICD-10-PCS | Performed by: INTERNAL MEDICINE

## 2020-10-16 PROCEDURE — 85007 BL SMEAR W/DIFF WBC COUNT: CPT | Performed by: PHYSICIAN ASSISTANT

## 2020-10-16 PROCEDURE — 99232 SBSQ HOSP IP/OBS MODERATE 35: CPT | Performed by: PHYSICIAN ASSISTANT

## 2020-10-16 RX ORDER — FENTANYL 25 UG/H
25 PATCH TRANSDERMAL
Status: DISCONTINUED | OUTPATIENT
Start: 2020-10-16 | End: 2020-10-20

## 2020-10-16 RX ADMIN — LEVOTHYROXINE SODIUM 25 MCG: 25 TABLET ORAL at 05:05

## 2020-10-16 RX ADMIN — HYDROMORPHONE HYDROCHLORIDE 1 MG: 1 INJECTION, SOLUTION INTRAMUSCULAR; INTRAVENOUS; SUBCUTANEOUS at 18:21

## 2020-10-16 RX ADMIN — SODIUM CHLORIDE, SODIUM LACTATE, POTASSIUM CHLORIDE, AND CALCIUM CHLORIDE 75 ML/HR: .6; .31; .03; .02 INJECTION, SOLUTION INTRAVENOUS at 01:29

## 2020-10-16 RX ADMIN — AMLODIPINE BESYLATE 10 MG: 10 TABLET ORAL at 08:46

## 2020-10-16 RX ADMIN — DOCUSATE SODIUM 100 MG: 100 CAPSULE, LIQUID FILLED ORAL at 08:46

## 2020-10-16 RX ADMIN — PANTOPRAZOLE SODIUM 40 MG: 40 INJECTION, POWDER, LYOPHILIZED, FOR SOLUTION INTRAVENOUS at 08:46

## 2020-10-16 RX ADMIN — ASPIRIN 81 MG: 81 TABLET, COATED ORAL at 08:46

## 2020-10-16 RX ADMIN — PANTOPRAZOLE SODIUM 40 MG: 40 INJECTION, POWDER, LYOPHILIZED, FOR SOLUTION INTRAVENOUS at 21:00

## 2020-10-16 RX ADMIN — Medication: at 21:11

## 2020-10-16 RX ADMIN — THIAMINE HCL TAB 100 MG 100 MG: 100 TAB at 08:46

## 2020-10-16 RX ADMIN — SPIRONOLACTONE 25 MG: 25 TABLET ORAL at 08:46

## 2020-10-16 RX ADMIN — EZETIMIBE 10 MG: 10 TABLET ORAL at 21:00

## 2020-10-16 RX ADMIN — LOSARTAN POTASSIUM 100 MG: 50 TABLET, FILM COATED ORAL at 08:46

## 2020-10-16 RX ADMIN — METOPROLOL SUCCINATE 50 MG: 50 TABLET, EXTENDED RELEASE ORAL at 08:46

## 2020-10-16 RX ADMIN — ZOLPIDEM TARTRATE 10 MG: 5 TABLET, COATED ORAL at 20:59

## 2020-10-16 RX ADMIN — PRAVASTATIN SODIUM 80 MG: 40 TABLET ORAL at 15:42

## 2020-10-16 RX ADMIN — HYDROMORPHONE HYDROCHLORIDE 1 MG: 1 INJECTION, SOLUTION INTRAMUSCULAR; INTRAVENOUS; SUBCUTANEOUS at 08:47

## 2020-10-16 RX ADMIN — HYDROMORPHONE HYDROCHLORIDE 0.5 MG: 1 INJECTION, SOLUTION INTRAMUSCULAR; INTRAVENOUS; SUBCUTANEOUS at 12:38

## 2020-10-16 RX ADMIN — ENOXAPARIN SODIUM 40 MG: 100 INJECTION SUBCUTANEOUS at 08:46

## 2020-10-16 RX ADMIN — FENTANYL 25 MCG: 25 PATCH TRANSDERMAL at 12:33

## 2020-10-16 RX ADMIN — HYDROMORPHONE HYDROCHLORIDE 0.5 MG: 1 INJECTION, SOLUTION INTRAMUSCULAR; INTRAVENOUS; SUBCUTANEOUS at 05:12

## 2020-10-17 PROBLEM — E87.6 HYPOKALEMIA: Status: RESOLVED | Noted: 2020-02-20 | Resolved: 2020-10-17

## 2020-10-17 LAB
ANION GAP SERPL CALCULATED.3IONS-SCNC: 3 MMOL/L (ref 5–14)
BUN SERPL-MCNC: 10 MG/DL (ref 5–25)
CALCIUM SERPL-MCNC: 8.8 MG/DL (ref 8.4–10.2)
CHLORIDE SERPL-SCNC: 104 MMOL/L (ref 97–108)
CO2 SERPL-SCNC: 28 MMOL/L (ref 22–30)
CREAT SERPL-MCNC: 0.63 MG/DL (ref 0.7–1.5)
GFR SERPL CREATININE-BSD FRML MDRD: 101 ML/MIN/1.73SQ M
GLUCOSE SERPL-MCNC: 101 MG/DL (ref 70–99)
LIPASE SERPL-CCNC: 3553 U/L (ref 23–300)
MAGNESIUM SERPL-MCNC: 1.9 MG/DL (ref 1.6–2.3)
POTASSIUM SERPL-SCNC: 3.7 MMOL/L (ref 3.6–5)
SODIUM SERPL-SCNC: 135 MMOL/L (ref 137–147)

## 2020-10-17 PROCEDURE — 83690 ASSAY OF LIPASE: CPT | Performed by: PHYSICIAN ASSISTANT

## 2020-10-17 PROCEDURE — 83735 ASSAY OF MAGNESIUM: CPT | Performed by: PHYSICIAN ASSISTANT

## 2020-10-17 PROCEDURE — C9113 INJ PANTOPRAZOLE SODIUM, VIA: HCPCS | Performed by: INTERNAL MEDICINE

## 2020-10-17 PROCEDURE — 99232 SBSQ HOSP IP/OBS MODERATE 35: CPT | Performed by: PHYSICIAN ASSISTANT

## 2020-10-17 PROCEDURE — 80048 BASIC METABOLIC PNL TOTAL CA: CPT | Performed by: PHYSICIAN ASSISTANT

## 2020-10-17 RX ADMIN — AMLODIPINE BESYLATE 10 MG: 10 TABLET ORAL at 09:04

## 2020-10-17 RX ADMIN — METOPROLOL SUCCINATE 50 MG: 50 TABLET, EXTENDED RELEASE ORAL at 09:04

## 2020-10-17 RX ADMIN — PANTOPRAZOLE SODIUM 40 MG: 40 INJECTION, POWDER, LYOPHILIZED, FOR SOLUTION INTRAVENOUS at 09:03

## 2020-10-17 RX ADMIN — ASPIRIN 81 MG: 81 TABLET, COATED ORAL at 09:04

## 2020-10-17 RX ADMIN — HYDROMORPHONE HYDROCHLORIDE 1 MG: 1 INJECTION, SOLUTION INTRAMUSCULAR; INTRAVENOUS; SUBCUTANEOUS at 00:46

## 2020-10-17 RX ADMIN — THIAMINE HCL TAB 100 MG 100 MG: 100 TAB at 09:00

## 2020-10-17 RX ADMIN — HYDROMORPHONE HYDROCHLORIDE 1 MG: 1 INJECTION, SOLUTION INTRAMUSCULAR; INTRAVENOUS; SUBCUTANEOUS at 17:10

## 2020-10-17 RX ADMIN — SPIRONOLACTONE 25 MG: 25 TABLET ORAL at 09:00

## 2020-10-17 RX ADMIN — ZOLPIDEM TARTRATE 10 MG: 5 TABLET, COATED ORAL at 22:08

## 2020-10-17 RX ADMIN — HYDROMORPHONE HYDROCHLORIDE 0.5 MG: 1 INJECTION, SOLUTION INTRAMUSCULAR; INTRAVENOUS; SUBCUTANEOUS at 14:11

## 2020-10-17 RX ADMIN — Medication: at 20:56

## 2020-10-17 RX ADMIN — HYDROMORPHONE HYDROCHLORIDE 0.5 MG: 1 INJECTION, SOLUTION INTRAMUSCULAR; INTRAVENOUS; SUBCUTANEOUS at 19:48

## 2020-10-17 RX ADMIN — SODIUM CHLORIDE, SODIUM LACTATE, POTASSIUM CHLORIDE, AND CALCIUM CHLORIDE 75 ML/HR: .6; .31; .03; .02 INJECTION, SOLUTION INTRAVENOUS at 03:02

## 2020-10-17 RX ADMIN — HYDROMORPHONE HYDROCHLORIDE 0.5 MG: 1 INJECTION, SOLUTION INTRAMUSCULAR; INTRAVENOUS; SUBCUTANEOUS at 09:04

## 2020-10-17 RX ADMIN — HYDROMORPHONE HYDROCHLORIDE 1 MG: 1 INJECTION, SOLUTION INTRAMUSCULAR; INTRAVENOUS; SUBCUTANEOUS at 22:08

## 2020-10-17 RX ADMIN — EZETIMIBE 10 MG: 10 TABLET ORAL at 21:03

## 2020-10-17 RX ADMIN — SODIUM CHLORIDE, SODIUM LACTATE, POTASSIUM CHLORIDE, AND CALCIUM CHLORIDE 75 ML/HR: .6; .31; .03; .02 INJECTION, SOLUTION INTRAVENOUS at 16:29

## 2020-10-17 RX ADMIN — LOSARTAN POTASSIUM 100 MG: 50 TABLET, FILM COATED ORAL at 09:04

## 2020-10-17 RX ADMIN — HYDROMORPHONE HYDROCHLORIDE 1 MG: 1 INJECTION, SOLUTION INTRAMUSCULAR; INTRAVENOUS; SUBCUTANEOUS at 12:17

## 2020-10-17 RX ADMIN — HYDROMORPHONE HYDROCHLORIDE 1 MG: 1 INJECTION, SOLUTION INTRAMUSCULAR; INTRAVENOUS; SUBCUTANEOUS at 06:31

## 2020-10-17 RX ADMIN — ENOXAPARIN SODIUM 40 MG: 100 INJECTION SUBCUTANEOUS at 09:05

## 2020-10-17 RX ADMIN — LEVOTHYROXINE SODIUM 25 MCG: 25 TABLET ORAL at 05:30

## 2020-10-17 RX ADMIN — PRAVASTATIN SODIUM 80 MG: 40 TABLET ORAL at 16:19

## 2020-10-17 RX ADMIN — PANTOPRAZOLE SODIUM 40 MG: 40 INJECTION, POWDER, LYOPHILIZED, FOR SOLUTION INTRAVENOUS at 20:49

## 2020-10-18 LAB
ALBUMIN SERPL BCP-MCNC: 3.2 G/DL (ref 3–5.2)
ALP SERPL-CCNC: 50 U/L (ref 43–122)
ALT SERPL W P-5'-P-CCNC: 35 U/L (ref 9–52)
ANION GAP SERPL CALCULATED.3IONS-SCNC: 4 MMOL/L (ref 5–14)
AST SERPL W P-5'-P-CCNC: 44 U/L (ref 17–59)
BILIRUB SERPL-MCNC: 0.4 MG/DL
BUN SERPL-MCNC: 11 MG/DL (ref 5–25)
CALCIUM ALBUM COR SERPL-MCNC: 9.9 MG/DL (ref 8.3–10.1)
CALCIUM SERPL-MCNC: 9.3 MG/DL (ref 8.4–10.2)
CHLORIDE SERPL-SCNC: 103 MMOL/L (ref 97–108)
CHOLEST SERPL-MCNC: 96 MG/DL
CO2 SERPL-SCNC: 30 MMOL/L (ref 22–30)
CREAT SERPL-MCNC: 0.7 MG/DL (ref 0.7–1.5)
GFR SERPL CREATININE-BSD FRML MDRD: 97 ML/MIN/1.73SQ M
GLUCOSE SERPL-MCNC: 103 MG/DL (ref 70–99)
HDLC SERPL-MCNC: 26 MG/DL
LDLC SERPL CALC-MCNC: 32 MG/DL
MAGNESIUM SERPL-MCNC: 1.4 MG/DL (ref 1.6–2.3)
NONHDLC SERPL-MCNC: 70 MG/DL
POTASSIUM SERPL-SCNC: 3.6 MMOL/L (ref 3.6–5)
PROT SERPL-MCNC: 6 G/DL (ref 5.9–8.4)
SODIUM SERPL-SCNC: 137 MMOL/L (ref 137–147)
TRIGL SERPL-MCNC: 192 MG/DL

## 2020-10-18 PROCEDURE — 99232 SBSQ HOSP IP/OBS MODERATE 35: CPT | Performed by: PHYSICIAN ASSISTANT

## 2020-10-18 PROCEDURE — 80061 LIPID PANEL: CPT | Performed by: PHYSICIAN ASSISTANT

## 2020-10-18 PROCEDURE — 80053 COMPREHEN METABOLIC PANEL: CPT | Performed by: PHYSICIAN ASSISTANT

## 2020-10-18 PROCEDURE — C9113 INJ PANTOPRAZOLE SODIUM, VIA: HCPCS | Performed by: INTERNAL MEDICINE

## 2020-10-18 PROCEDURE — 83735 ASSAY OF MAGNESIUM: CPT | Performed by: PHYSICIAN ASSISTANT

## 2020-10-18 RX ORDER — MAGNESIUM SULFATE HEPTAHYDRATE 40 MG/ML
2 INJECTION, SOLUTION INTRAVENOUS
Status: COMPLETED | OUTPATIENT
Start: 2020-10-18 | End: 2020-10-18

## 2020-10-18 RX ADMIN — HYDROMORPHONE HYDROCHLORIDE 1 MG: 1 INJECTION, SOLUTION INTRAMUSCULAR; INTRAVENOUS; SUBCUTANEOUS at 21:24

## 2020-10-18 RX ADMIN — THIAMINE HCL TAB 100 MG 100 MG: 100 TAB at 09:01

## 2020-10-18 RX ADMIN — HYDROMORPHONE HYDROCHLORIDE 1 MG: 1 INJECTION, SOLUTION INTRAMUSCULAR; INTRAVENOUS; SUBCUTANEOUS at 09:01

## 2020-10-18 RX ADMIN — METOPROLOL SUCCINATE 50 MG: 50 TABLET, EXTENDED RELEASE ORAL at 09:01

## 2020-10-18 RX ADMIN — MAGNESIUM SULFATE IN WATER 2 G: 40 INJECTION, SOLUTION INTRAVENOUS at 10:55

## 2020-10-18 RX ADMIN — ASPIRIN 81 MG: 81 TABLET, COATED ORAL at 09:01

## 2020-10-18 RX ADMIN — HYDROMORPHONE HYDROCHLORIDE 1 MG: 1 INJECTION, SOLUTION INTRAMUSCULAR; INTRAVENOUS; SUBCUTANEOUS at 15:24

## 2020-10-18 RX ADMIN — PANTOPRAZOLE SODIUM 40 MG: 40 INJECTION, POWDER, LYOPHILIZED, FOR SOLUTION INTRAVENOUS at 09:01

## 2020-10-18 RX ADMIN — ZOLPIDEM TARTRATE 10 MG: 5 TABLET, COATED ORAL at 21:21

## 2020-10-18 RX ADMIN — PANTOPRAZOLE SODIUM 40 MG: 40 INJECTION, POWDER, LYOPHILIZED, FOR SOLUTION INTRAVENOUS at 21:21

## 2020-10-18 RX ADMIN — ENOXAPARIN SODIUM 40 MG: 100 INJECTION SUBCUTANEOUS at 09:00

## 2020-10-18 RX ADMIN — LEVOTHYROXINE SODIUM 25 MCG: 25 TABLET ORAL at 05:43

## 2020-10-18 RX ADMIN — HYDROMORPHONE HYDROCHLORIDE 0.5 MG: 1 INJECTION, SOLUTION INTRAMUSCULAR; INTRAVENOUS; SUBCUTANEOUS at 17:19

## 2020-10-18 RX ADMIN — MAGNESIUM SULFATE IN WATER 2 G: 40 INJECTION, SOLUTION INTRAVENOUS at 09:58

## 2020-10-18 RX ADMIN — HYDROMORPHONE HYDROCHLORIDE 0.5 MG: 1 INJECTION, SOLUTION INTRAMUSCULAR; INTRAVENOUS; SUBCUTANEOUS at 12:20

## 2020-10-18 RX ADMIN — AMLODIPINE BESYLATE 10 MG: 10 TABLET ORAL at 09:01

## 2020-10-18 RX ADMIN — EZETIMIBE 10 MG: 10 TABLET ORAL at 21:21

## 2020-10-18 RX ADMIN — Medication: at 21:21

## 2020-10-18 RX ADMIN — SODIUM CHLORIDE, SODIUM LACTATE, POTASSIUM CHLORIDE, AND CALCIUM CHLORIDE 75 ML/HR: .6; .31; .03; .02 INJECTION, SOLUTION INTRAVENOUS at 05:52

## 2020-10-18 RX ADMIN — PRAVASTATIN SODIUM 80 MG: 40 TABLET ORAL at 17:16

## 2020-10-18 RX ADMIN — SPIRONOLACTONE 25 MG: 25 TABLET ORAL at 09:01

## 2020-10-18 RX ADMIN — LOSARTAN POTASSIUM 100 MG: 50 TABLET, FILM COATED ORAL at 09:01

## 2020-10-19 ENCOUNTER — APPOINTMENT (INPATIENT)
Dept: CT IMAGING | Facility: HOSPITAL | Age: 70
DRG: 438 | End: 2020-10-19
Payer: COMMERCIAL

## 2020-10-19 LAB
ANION GAP SERPL CALCULATED.3IONS-SCNC: 5 MMOL/L (ref 5–14)
BUN SERPL-MCNC: 16 MG/DL (ref 5–25)
CALCIUM SERPL-MCNC: 9.2 MG/DL (ref 8.4–10.2)
CHLORIDE SERPL-SCNC: 102 MMOL/L (ref 97–108)
CO2 SERPL-SCNC: 29 MMOL/L (ref 22–30)
CREAT SERPL-MCNC: 0.8 MG/DL (ref 0.7–1.5)
GFR SERPL CREATININE-BSD FRML MDRD: 92 ML/MIN/1.73SQ M
GLUCOSE SERPL-MCNC: 99 MG/DL (ref 70–99)
MAGNESIUM SERPL-MCNC: 1.7 MG/DL (ref 1.6–2.3)
POTASSIUM SERPL-SCNC: 3.9 MMOL/L (ref 3.6–5)
SODIUM SERPL-SCNC: 136 MMOL/L (ref 137–147)

## 2020-10-19 PROCEDURE — 80048 BASIC METABOLIC PNL TOTAL CA: CPT | Performed by: PHYSICIAN ASSISTANT

## 2020-10-19 PROCEDURE — C9113 INJ PANTOPRAZOLE SODIUM, VIA: HCPCS | Performed by: INTERNAL MEDICINE

## 2020-10-19 PROCEDURE — 99232 SBSQ HOSP IP/OBS MODERATE 35: CPT | Performed by: PHYSICIAN ASSISTANT

## 2020-10-19 PROCEDURE — 83735 ASSAY OF MAGNESIUM: CPT | Performed by: PHYSICIAN ASSISTANT

## 2020-10-19 PROCEDURE — 99232 SBSQ HOSP IP/OBS MODERATE 35: CPT | Performed by: INTERNAL MEDICINE

## 2020-10-19 PROCEDURE — 74170 CT ABD WO CNTRST FLWD CNTRST: CPT

## 2020-10-19 PROCEDURE — G1004 CDSM NDSC: HCPCS

## 2020-10-19 RX ADMIN — ASPIRIN 81 MG: 81 TABLET, COATED ORAL at 09:20

## 2020-10-19 RX ADMIN — FENTANYL 25 MCG: 25 PATCH TRANSDERMAL at 11:50

## 2020-10-19 RX ADMIN — METOPROLOL SUCCINATE 50 MG: 50 TABLET, EXTENDED RELEASE ORAL at 09:20

## 2020-10-19 RX ADMIN — THIAMINE HCL TAB 100 MG 100 MG: 100 TAB at 09:20

## 2020-10-19 RX ADMIN — LEVOTHYROXINE SODIUM 25 MCG: 25 TABLET ORAL at 05:34

## 2020-10-19 RX ADMIN — HYDROMORPHONE HYDROCHLORIDE 1 MG: 1 INJECTION, SOLUTION INTRAMUSCULAR; INTRAVENOUS; SUBCUTANEOUS at 11:49

## 2020-10-19 RX ADMIN — ENOXAPARIN SODIUM 40 MG: 100 INJECTION SUBCUTANEOUS at 09:22

## 2020-10-19 RX ADMIN — ZOLPIDEM TARTRATE 10 MG: 5 TABLET, COATED ORAL at 21:58

## 2020-10-19 RX ADMIN — HYDROMORPHONE HYDROCHLORIDE 0.5 MG: 1 INJECTION, SOLUTION INTRAMUSCULAR; INTRAVENOUS; SUBCUTANEOUS at 21:55

## 2020-10-19 RX ADMIN — EZETIMIBE 10 MG: 10 TABLET ORAL at 21:55

## 2020-10-19 RX ADMIN — LOSARTAN POTASSIUM 100 MG: 50 TABLET, FILM COATED ORAL at 09:19

## 2020-10-19 RX ADMIN — Medication: at 21:08

## 2020-10-19 RX ADMIN — SPIRONOLACTONE 25 MG: 25 TABLET ORAL at 09:19

## 2020-10-19 RX ADMIN — PRAVASTATIN SODIUM 80 MG: 40 TABLET ORAL at 17:06

## 2020-10-19 RX ADMIN — HYDROMORPHONE HYDROCHLORIDE 0.5 MG: 1 INJECTION, SOLUTION INTRAMUSCULAR; INTRAVENOUS; SUBCUTANEOUS at 15:19

## 2020-10-19 RX ADMIN — HYDROMORPHONE HYDROCHLORIDE 1 MG: 1 INJECTION, SOLUTION INTRAMUSCULAR; INTRAVENOUS; SUBCUTANEOUS at 18:27

## 2020-10-19 RX ADMIN — IOHEXOL 100 ML: 350 INJECTION, SOLUTION INTRAVENOUS at 13:52

## 2020-10-19 RX ADMIN — PANTOPRAZOLE SODIUM 40 MG: 40 INJECTION, POWDER, LYOPHILIZED, FOR SOLUTION INTRAVENOUS at 21:55

## 2020-10-19 RX ADMIN — HYDROMORPHONE HYDROCHLORIDE 0.5 MG: 1 INJECTION, SOLUTION INTRAMUSCULAR; INTRAVENOUS; SUBCUTANEOUS at 09:45

## 2020-10-19 RX ADMIN — AMLODIPINE BESYLATE 10 MG: 10 TABLET ORAL at 09:20

## 2020-10-19 RX ADMIN — PANTOPRAZOLE SODIUM 40 MG: 40 INJECTION, POWDER, LYOPHILIZED, FOR SOLUTION INTRAVENOUS at 09:22

## 2020-10-19 RX ADMIN — HYDROMORPHONE HYDROCHLORIDE 1 MG: 1 INJECTION, SOLUTION INTRAMUSCULAR; INTRAVENOUS; SUBCUTANEOUS at 05:37

## 2020-10-20 PROCEDURE — 99232 SBSQ HOSP IP/OBS MODERATE 35: CPT | Performed by: INTERNAL MEDICINE

## 2020-10-20 PROCEDURE — C9113 INJ PANTOPRAZOLE SODIUM, VIA: HCPCS | Performed by: INTERNAL MEDICINE

## 2020-10-20 RX ORDER — FENTANYL 50 UG/H
50 PATCH TRANSDERMAL
Status: DISCONTINUED | OUTPATIENT
Start: 2020-10-22 | End: 2020-10-20

## 2020-10-20 RX ORDER — PANTOPRAZOLE SODIUM 40 MG/1
40 TABLET, DELAYED RELEASE ORAL
Status: DISCONTINUED | OUTPATIENT
Start: 2020-10-21 | End: 2020-10-22 | Stop reason: HOSPADM

## 2020-10-20 RX ORDER — PANTOPRAZOLE SODIUM 40 MG/1
40 INJECTION, POWDER, FOR SOLUTION INTRAVENOUS
Status: DISCONTINUED | OUTPATIENT
Start: 2020-10-21 | End: 2020-10-20

## 2020-10-20 RX ORDER — FENTANYL 50 UG/H
50 PATCH TRANSDERMAL
Status: DISCONTINUED | OUTPATIENT
Start: 2020-10-20 | End: 2020-10-22 | Stop reason: HOSPADM

## 2020-10-20 RX ADMIN — HYDROMORPHONE HYDROCHLORIDE 0.5 MG: 1 INJECTION, SOLUTION INTRAMUSCULAR; INTRAVENOUS; SUBCUTANEOUS at 15:20

## 2020-10-20 RX ADMIN — HYDROMORPHONE HYDROCHLORIDE 0.5 MG: 1 INJECTION, SOLUTION INTRAMUSCULAR; INTRAVENOUS; SUBCUTANEOUS at 09:40

## 2020-10-20 RX ADMIN — ZOLPIDEM TARTRATE 10 MG: 5 TABLET, COATED ORAL at 21:43

## 2020-10-20 RX ADMIN — AMLODIPINE BESYLATE 10 MG: 10 TABLET ORAL at 08:29

## 2020-10-20 RX ADMIN — ENOXAPARIN SODIUM 40 MG: 100 INJECTION SUBCUTANEOUS at 08:29

## 2020-10-20 RX ADMIN — EZETIMIBE 10 MG: 10 TABLET ORAL at 21:43

## 2020-10-20 RX ADMIN — PANTOPRAZOLE SODIUM 40 MG: 40 INJECTION, POWDER, LYOPHILIZED, FOR SOLUTION INTRAVENOUS at 08:25

## 2020-10-20 RX ADMIN — SPIRONOLACTONE 25 MG: 25 TABLET ORAL at 08:29

## 2020-10-20 RX ADMIN — LOSARTAN POTASSIUM 100 MG: 50 TABLET, FILM COATED ORAL at 08:29

## 2020-10-20 RX ADMIN — THIAMINE HCL TAB 100 MG 100 MG: 100 TAB at 08:29

## 2020-10-20 RX ADMIN — Medication: at 21:43

## 2020-10-20 RX ADMIN — HYDROMORPHONE HYDROCHLORIDE 1 MG: 1 INJECTION, SOLUTION INTRAMUSCULAR; INTRAVENOUS; SUBCUTANEOUS at 05:16

## 2020-10-20 RX ADMIN — FENTANYL 50 MCG: 50 PATCH TRANSDERMAL at 10:01

## 2020-10-20 RX ADMIN — LEVOTHYROXINE SODIUM 25 MCG: 25 TABLET ORAL at 05:10

## 2020-10-20 RX ADMIN — PRAVASTATIN SODIUM 80 MG: 40 TABLET ORAL at 17:23

## 2020-10-20 RX ADMIN — METOPROLOL SUCCINATE 50 MG: 50 TABLET, EXTENDED RELEASE ORAL at 08:29

## 2020-10-20 RX ADMIN — ASPIRIN 81 MG: 81 TABLET, COATED ORAL at 08:29

## 2020-10-20 RX ADMIN — HYDROMORPHONE HYDROCHLORIDE 1 MG: 1 INJECTION, SOLUTION INTRAMUSCULAR; INTRAVENOUS; SUBCUTANEOUS at 19:44

## 2020-10-21 LAB
ANION GAP SERPL CALCULATED.3IONS-SCNC: 7 MMOL/L (ref 5–14)
BUN SERPL-MCNC: 21 MG/DL (ref 5–25)
CALCIUM SERPL-MCNC: 9.2 MG/DL (ref 8.4–10.2)
CHLORIDE SERPL-SCNC: 103 MMOL/L (ref 97–108)
CO2 SERPL-SCNC: 25 MMOL/L (ref 22–30)
CREAT SERPL-MCNC: 0.8 MG/DL (ref 0.7–1.5)
GFR SERPL CREATININE-BSD FRML MDRD: 92 ML/MIN/1.73SQ M
GLUCOSE SERPL-MCNC: 100 MG/DL (ref 70–99)
MAGNESIUM SERPL-MCNC: 1.6 MG/DL (ref 1.6–2.3)
PHOSPHATE SERPL-MCNC: 4.5 MG/DL (ref 2.5–4.8)
POTASSIUM SERPL-SCNC: 4 MMOL/L (ref 3.6–5)
SARS-COV-2 RNA RESP QL NAA+PROBE: NEGATIVE
SODIUM SERPL-SCNC: 135 MMOL/L (ref 137–147)

## 2020-10-21 PROCEDURE — 99232 SBSQ HOSP IP/OBS MODERATE 35: CPT | Performed by: PHYSICIAN ASSISTANT

## 2020-10-21 PROCEDURE — 87635 SARS-COV-2 COVID-19 AMP PRB: CPT | Performed by: PHYSICIAN ASSISTANT

## 2020-10-21 PROCEDURE — 84100 ASSAY OF PHOSPHORUS: CPT | Performed by: INTERNAL MEDICINE

## 2020-10-21 PROCEDURE — 80048 BASIC METABOLIC PNL TOTAL CA: CPT | Performed by: INTERNAL MEDICINE

## 2020-10-21 PROCEDURE — 83735 ASSAY OF MAGNESIUM: CPT | Performed by: INTERNAL MEDICINE

## 2020-10-21 PROCEDURE — 99232 SBSQ HOSP IP/OBS MODERATE 35: CPT | Performed by: INTERNAL MEDICINE

## 2020-10-21 RX ORDER — MAGNESIUM HYDROXIDE/ALUMINUM HYDROXICE/SIMETHICONE 120; 1200; 1200 MG/30ML; MG/30ML; MG/30ML
30 SUSPENSION ORAL EVERY 4 HOURS PRN
Status: DISCONTINUED | OUTPATIENT
Start: 2020-10-21 | End: 2020-10-22 | Stop reason: HOSPADM

## 2020-10-21 RX ADMIN — THIAMINE HCL TAB 100 MG 100 MG: 100 TAB at 08:31

## 2020-10-21 RX ADMIN — METOPROLOL SUCCINATE 50 MG: 50 TABLET, EXTENDED RELEASE ORAL at 08:30

## 2020-10-21 RX ADMIN — ASPIRIN 81 MG: 81 TABLET, COATED ORAL at 08:30

## 2020-10-21 RX ADMIN — HYDROMORPHONE HYDROCHLORIDE 1 MG: 1 INJECTION, SOLUTION INTRAMUSCULAR; INTRAVENOUS; SUBCUTANEOUS at 21:58

## 2020-10-21 RX ADMIN — PRAVASTATIN SODIUM 80 MG: 40 TABLET ORAL at 16:14

## 2020-10-21 RX ADMIN — EZETIMIBE 10 MG: 10 TABLET ORAL at 21:58

## 2020-10-21 RX ADMIN — HYDROMORPHONE HYDROCHLORIDE 0.5 MG: 1 INJECTION, SOLUTION INTRAMUSCULAR; INTRAVENOUS; SUBCUTANEOUS at 05:15

## 2020-10-21 RX ADMIN — Medication: at 22:05

## 2020-10-21 RX ADMIN — LOSARTAN POTASSIUM 100 MG: 50 TABLET, FILM COATED ORAL at 08:31

## 2020-10-21 RX ADMIN — AMLODIPINE BESYLATE 10 MG: 10 TABLET ORAL at 08:30

## 2020-10-21 RX ADMIN — HYDROMORPHONE HYDROCHLORIDE 1 MG: 1 INJECTION, SOLUTION INTRAMUSCULAR; INTRAVENOUS; SUBCUTANEOUS at 08:30

## 2020-10-21 RX ADMIN — ENOXAPARIN SODIUM 40 MG: 100 INJECTION SUBCUTANEOUS at 08:29

## 2020-10-21 RX ADMIN — ZOLPIDEM TARTRATE 10 MG: 5 TABLET, COATED ORAL at 21:58

## 2020-10-21 RX ADMIN — PANTOPRAZOLE SODIUM 40 MG: 40 TABLET, DELAYED RELEASE ORAL at 05:15

## 2020-10-21 RX ADMIN — SPIRONOLACTONE 25 MG: 25 TABLET ORAL at 08:31

## 2020-10-21 RX ADMIN — HYDROMORPHONE HYDROCHLORIDE 1 MG: 1 INJECTION, SOLUTION INTRAMUSCULAR; INTRAVENOUS; SUBCUTANEOUS at 16:15

## 2020-10-21 RX ADMIN — HYDROMORPHONE HYDROCHLORIDE 0.5 MG: 1 INJECTION, SOLUTION INTRAMUSCULAR; INTRAVENOUS; SUBCUTANEOUS at 12:55

## 2020-10-21 RX ADMIN — LEVOTHYROXINE SODIUM 25 MCG: 25 TABLET ORAL at 05:15

## 2020-10-21 RX ADMIN — ONDANSETRON 4 MG: 2 INJECTION INTRAMUSCULAR; INTRAVENOUS at 21:48

## 2020-10-22 ENCOUNTER — HOSPITAL ENCOUNTER (INPATIENT)
Facility: HOSPITAL | Age: 70
LOS: 8 days | Discharge: HOME | DRG: 438 | End: 2020-10-30
Attending: INTERNAL MEDICINE | Admitting: INTERNAL MEDICINE
Payer: COMMERCIAL

## 2020-10-22 VITALS
HEIGHT: 63 IN | SYSTOLIC BLOOD PRESSURE: 116 MMHG | OXYGEN SATURATION: 94 % | BODY MASS INDEX: 23.79 KG/M2 | HEART RATE: 72 BPM | DIASTOLIC BLOOD PRESSURE: 75 MMHG | WEIGHT: 134.26 LBS | TEMPERATURE: 99.3 F | RESPIRATION RATE: 18 BRPM

## 2020-10-22 DIAGNOSIS — K85.90 ACUTE PANCREATITIS: ICD-10-CM

## 2020-10-22 DIAGNOSIS — K86.1 IDIOPATHIC CHRONIC PANCREATITIS (HCC): ICD-10-CM

## 2020-10-22 DIAGNOSIS — I10 ESSENTIAL HYPERTENSION, BENIGN: ICD-10-CM

## 2020-10-22 DIAGNOSIS — E43 SEVERE PROTEIN-CALORIE MALNUTRITION (HCC): ICD-10-CM

## 2020-10-22 DIAGNOSIS — K86.9 PANCREATIC LESION: ICD-10-CM

## 2020-10-22 DIAGNOSIS — K86.89 PANCREATIC MASS: Primary | ICD-10-CM

## 2020-10-22 PROBLEM — E83.42 HYPOMAGNESEMIA: Status: RESOLVED | Noted: 2020-10-15 | Resolved: 2020-10-22

## 2020-10-22 LAB
ANION GAP SERPL CALCULATED.3IONS-SCNC: 3 MMOL/L (ref 5–14)
BUN SERPL-MCNC: 24 MG/DL (ref 5–25)
CALCIUM SERPL-MCNC: 9.2 MG/DL (ref 8.4–10.2)
CHLORIDE SERPL-SCNC: 104 MMOL/L (ref 97–108)
CHOLEST SERPL-MCNC: 90 MG/DL
CO2 SERPL-SCNC: 27 MMOL/L (ref 22–30)
CREAT SERPL-MCNC: 0.94 MG/DL (ref 0.7–1.5)
ERYTHROCYTE [DISTWIDTH] IN BLOOD BY AUTOMATED COUNT: 13.8 %
GFR SERPL CREATININE-BSD FRML MDRD: 83 ML/MIN/1.73SQ M
GLUCOSE SERPL-MCNC: 112 MG/DL (ref 70–99)
HCT VFR BLD AUTO: 33.6 % (ref 41–53)
HDLC SERPL-MCNC: 21 MG/DL
HGB BLD-MCNC: 11.3 G/DL (ref 13.5–17.5)
LDLC SERPL CALC-MCNC: 36 MG/DL
MAGNESIUM SERPL-MCNC: 1.6 MG/DL (ref 1.6–2.3)
MCH RBC QN AUTO: 32 PG (ref 26–34)
MCHC RBC AUTO-ENTMCNC: 33.6 G/DL (ref 31–36)
MCV RBC AUTO: 95 FL (ref 80–100)
NONHDLC SERPL-MCNC: 69 MG/DL
PHOSPHATE SERPL-MCNC: 4.8 MG/DL (ref 2.5–4.8)
PLATELET # BLD AUTO: 223 THOUSANDS/UL (ref 150–450)
PMV BLD AUTO: 10.3 FL (ref 8.9–12.7)
POTASSIUM SERPL-SCNC: 3.9 MMOL/L (ref 3.6–5)
RBC # BLD AUTO: 3.53 MILLION/UL (ref 4.5–5.9)
SODIUM SERPL-SCNC: 134 MMOL/L (ref 137–147)
TRIGL SERPL-MCNC: 163 MG/DL
WBC # BLD AUTO: 5.5 THOUSAND/UL (ref 4.5–11)

## 2020-10-22 PROCEDURE — 80048 BASIC METABOLIC PNL TOTAL CA: CPT | Performed by: PHYSICIAN ASSISTANT

## 2020-10-22 PROCEDURE — 99239 HOSP IP/OBS DSCHRG MGMT >30: CPT | Performed by: PHYSICIAN ASSISTANT

## 2020-10-22 PROCEDURE — 83735 ASSAY OF MAGNESIUM: CPT | Performed by: PHYSICIAN ASSISTANT

## 2020-10-22 PROCEDURE — 99223 1ST HOSP IP/OBS HIGH 75: CPT | Performed by: INTERNAL MEDICINE

## 2020-10-22 PROCEDURE — NC001 PR NO CHARGE: Performed by: INTERNAL MEDICINE

## 2020-10-22 PROCEDURE — 80061 LIPID PANEL: CPT | Performed by: PHYSICIAN ASSISTANT

## 2020-10-22 PROCEDURE — 99232 SBSQ HOSP IP/OBS MODERATE 35: CPT | Performed by: PHYSICIAN ASSISTANT

## 2020-10-22 PROCEDURE — 85027 COMPLETE CBC AUTOMATED: CPT | Performed by: PHYSICIAN ASSISTANT

## 2020-10-22 PROCEDURE — 84100 ASSAY OF PHOSPHORUS: CPT | Performed by: PHYSICIAN ASSISTANT

## 2020-10-22 RX ORDER — FENTANYL 50 UG/H
50 PATCH TRANSDERMAL
Status: DISCONTINUED | OUTPATIENT
Start: 2020-10-23 | End: 2020-10-25

## 2020-10-22 RX ORDER — HYDROMORPHONE HCL/PF 1 MG/ML
1 SYRINGE (ML) INJECTION EVERY 6 HOURS PRN
Status: DISCONTINUED | OUTPATIENT
Start: 2020-10-22 | End: 2020-10-30 | Stop reason: HOSPADM

## 2020-10-22 RX ORDER — LOSARTAN POTASSIUM 50 MG/1
100 TABLET ORAL DAILY
Status: DISCONTINUED | OUTPATIENT
Start: 2020-10-23 | End: 2020-10-28

## 2020-10-22 RX ORDER — PANTOPRAZOLE SODIUM 40 MG/1
40 TABLET, DELAYED RELEASE ORAL
Status: CANCELLED | OUTPATIENT
Start: 2020-10-23

## 2020-10-22 RX ORDER — SPIRONOLACTONE 25 MG/1
25 TABLET ORAL DAILY
Status: DISCONTINUED | OUTPATIENT
Start: 2020-10-23 | End: 2020-10-30 | Stop reason: HOSPADM

## 2020-10-22 RX ORDER — ASPIRIN 81 MG/1
81 TABLET ORAL DAILY
Status: DISCONTINUED | OUTPATIENT
Start: 2020-10-23 | End: 2020-10-30 | Stop reason: HOSPADM

## 2020-10-22 RX ORDER — AMLODIPINE BESYLATE 10 MG/1
10 TABLET ORAL DAILY
Status: DISCONTINUED | OUTPATIENT
Start: 2020-10-23 | End: 2020-10-30 | Stop reason: HOSPADM

## 2020-10-22 RX ORDER — ASPIRIN 81 MG/1
81 TABLET ORAL DAILY
Status: CANCELLED | OUTPATIENT
Start: 2020-10-23

## 2020-10-22 RX ORDER — THIAMINE MONONITRATE (VIT B1) 100 MG
100 TABLET ORAL DAILY
Status: DISCONTINUED | OUTPATIENT
Start: 2020-10-23 | End: 2020-10-30 | Stop reason: HOSPADM

## 2020-10-22 RX ORDER — SPIRONOLACTONE 25 MG/1
25 TABLET ORAL DAILY
Status: CANCELLED | OUTPATIENT
Start: 2020-10-23

## 2020-10-22 RX ORDER — LEVOTHYROXINE SODIUM 0.03 MG/1
25 TABLET ORAL DAILY
Status: CANCELLED | OUTPATIENT
Start: 2020-10-23

## 2020-10-22 RX ORDER — EZETIMIBE 10 MG/1
10 TABLET ORAL
Status: CANCELLED | OUTPATIENT
Start: 2020-10-22

## 2020-10-22 RX ORDER — LEVOTHYROXINE SODIUM 0.03 MG/1
25 TABLET ORAL DAILY
Status: DISCONTINUED | OUTPATIENT
Start: 2020-10-23 | End: 2020-10-30 | Stop reason: HOSPADM

## 2020-10-22 RX ORDER — HYDROMORPHONE HCL/PF 1 MG/ML
0.5 SYRINGE (ML) INJECTION EVERY 4 HOURS PRN
Status: CANCELLED | OUTPATIENT
Start: 2020-10-22

## 2020-10-22 RX ORDER — ZOLPIDEM TARTRATE 5 MG/1
10 TABLET ORAL
Status: DISCONTINUED | OUTPATIENT
Start: 2020-10-22 | End: 2020-10-30 | Stop reason: HOSPADM

## 2020-10-22 RX ORDER — MAGNESIUM HYDROXIDE/ALUMINUM HYDROXICE/SIMETHICONE 120; 1200; 1200 MG/30ML; MG/30ML; MG/30ML
30 SUSPENSION ORAL EVERY 4 HOURS PRN
Status: DISCONTINUED | OUTPATIENT
Start: 2020-10-22 | End: 2020-10-30 | Stop reason: HOSPADM

## 2020-10-22 RX ORDER — PANTOPRAZOLE SODIUM 40 MG/1
40 TABLET, DELAYED RELEASE ORAL
Status: DISCONTINUED | OUTPATIENT
Start: 2020-10-23 | End: 2020-10-30 | Stop reason: HOSPADM

## 2020-10-22 RX ORDER — HYDRALAZINE HYDROCHLORIDE 20 MG/ML
10 INJECTION INTRAMUSCULAR; INTRAVENOUS EVERY 6 HOURS PRN
Status: DISCONTINUED | OUTPATIENT
Start: 2020-10-22 | End: 2020-10-30 | Stop reason: HOSPADM

## 2020-10-22 RX ORDER — THIAMINE MONONITRATE (VIT B1) 100 MG
100 TABLET ORAL DAILY
Status: CANCELLED | OUTPATIENT
Start: 2020-10-23

## 2020-10-22 RX ORDER — LOSARTAN POTASSIUM 50 MG/1
100 TABLET ORAL DAILY
Status: CANCELLED | OUTPATIENT
Start: 2020-10-23

## 2020-10-22 RX ORDER — HYDROMORPHONE HCL/PF 1 MG/ML
1 SYRINGE (ML) INJECTION EVERY 6 HOURS PRN
Status: CANCELLED | OUTPATIENT
Start: 2020-10-22

## 2020-10-22 RX ORDER — PRAVASTATIN SODIUM 40 MG
80 TABLET ORAL
Status: CANCELLED | OUTPATIENT
Start: 2020-10-22

## 2020-10-22 RX ORDER — EZETIMIBE 10 MG/1
10 TABLET ORAL
Status: DISCONTINUED | OUTPATIENT
Start: 2020-10-22 | End: 2020-10-30 | Stop reason: HOSPADM

## 2020-10-22 RX ORDER — METOPROLOL SUCCINATE 50 MG/1
50 TABLET, EXTENDED RELEASE ORAL DAILY
Status: DISCONTINUED | OUTPATIENT
Start: 2020-10-23 | End: 2020-10-30 | Stop reason: HOSPADM

## 2020-10-22 RX ORDER — FENTANYL 50 UG/H
50 PATCH TRANSDERMAL
Status: CANCELLED | OUTPATIENT
Start: 2020-10-23

## 2020-10-22 RX ORDER — ONDANSETRON 2 MG/ML
4 INJECTION INTRAMUSCULAR; INTRAVENOUS EVERY 6 HOURS PRN
Status: DISCONTINUED | OUTPATIENT
Start: 2020-10-22 | End: 2020-10-30 | Stop reason: HOSPADM

## 2020-10-22 RX ORDER — PRAVASTATIN SODIUM 80 MG/1
80 TABLET ORAL
Status: DISCONTINUED | OUTPATIENT
Start: 2020-10-22 | End: 2020-10-30 | Stop reason: HOSPADM

## 2020-10-22 RX ORDER — METOPROLOL SUCCINATE 50 MG/1
50 TABLET, EXTENDED RELEASE ORAL DAILY
Status: CANCELLED | OUTPATIENT
Start: 2020-10-23

## 2020-10-22 RX ORDER — MAGNESIUM HYDROXIDE/ALUMINUM HYDROXICE/SIMETHICONE 120; 1200; 1200 MG/30ML; MG/30ML; MG/30ML
30 SUSPENSION ORAL EVERY 4 HOURS PRN
Status: CANCELLED | OUTPATIENT
Start: 2020-10-22

## 2020-10-22 RX ORDER — ZOLPIDEM TARTRATE 5 MG/1
10 TABLET ORAL
Status: CANCELLED | OUTPATIENT
Start: 2020-10-22

## 2020-10-22 RX ORDER — AMLODIPINE BESYLATE 10 MG/1
10 TABLET ORAL DAILY
Status: CANCELLED | OUTPATIENT
Start: 2020-10-23

## 2020-10-22 RX ORDER — HYDROMORPHONE HCL/PF 1 MG/ML
0.5 SYRINGE (ML) INJECTION EVERY 4 HOURS PRN
Status: DISCONTINUED | OUTPATIENT
Start: 2020-10-22 | End: 2020-10-25

## 2020-10-22 RX ORDER — LORAZEPAM 0.5 MG/1
0.5 TABLET ORAL EVERY 8 HOURS PRN
Status: CANCELLED | OUTPATIENT
Start: 2020-10-22

## 2020-10-22 RX ORDER — ONDANSETRON 2 MG/ML
4 INJECTION INTRAMUSCULAR; INTRAVENOUS EVERY 6 HOURS PRN
Status: CANCELLED | OUTPATIENT
Start: 2020-10-22

## 2020-10-22 RX ORDER — LORAZEPAM 0.5 MG/1
0.5 TABLET ORAL EVERY 8 HOURS PRN
Status: DISCONTINUED | OUTPATIENT
Start: 2020-10-22 | End: 2020-10-30 | Stop reason: HOSPADM

## 2020-10-22 RX ORDER — HYDRALAZINE HYDROCHLORIDE 20 MG/ML
10 INJECTION INTRAMUSCULAR; INTRAVENOUS EVERY 6 HOURS PRN
Status: CANCELLED | OUTPATIENT
Start: 2020-10-22

## 2020-10-22 RX ADMIN — ENOXAPARIN SODIUM 40 MG: 100 INJECTION SUBCUTANEOUS at 09:30

## 2020-10-22 RX ADMIN — LEVOTHYROXINE SODIUM 25 MCG: 25 TABLET ORAL at 06:15

## 2020-10-22 RX ADMIN — THIAMINE HCL TAB 100 MG 100 MG: 100 TAB at 09:41

## 2020-10-22 RX ADMIN — HYDROMORPHONE HYDROCHLORIDE 0.5 MG: 1 INJECTION, SOLUTION INTRAMUSCULAR; INTRAVENOUS; SUBCUTANEOUS at 09:42

## 2020-10-22 RX ADMIN — PRAVASTATIN SODIUM 80 MG: 80 TABLET ORAL at 17:34

## 2020-10-22 RX ADMIN — EZETIMIBE 10 MG: 10 TABLET ORAL at 21:20

## 2020-10-22 RX ADMIN — AMLODIPINE BESYLATE 10 MG: 10 TABLET ORAL at 09:41

## 2020-10-22 RX ADMIN — METOPROLOL SUCCINATE 50 MG: 50 TABLET, EXTENDED RELEASE ORAL at 09:42

## 2020-10-22 RX ADMIN — Medication: at 21:20

## 2020-10-22 RX ADMIN — HYDROMORPHONE HYDROCHLORIDE 1 MG: 1 INJECTION, SOLUTION INTRAMUSCULAR; INTRAVENOUS; SUBCUTANEOUS at 14:00

## 2020-10-22 RX ADMIN — HYDROMORPHONE HYDROCHLORIDE 1 MG: 1 INJECTION, SOLUTION INTRAMUSCULAR; INTRAVENOUS; SUBCUTANEOUS at 06:22

## 2020-10-22 RX ADMIN — HYDROMORPHONE HYDROCHLORIDE 0.5 MG: 1 INJECTION, SOLUTION INTRAMUSCULAR; INTRAVENOUS; SUBCUTANEOUS at 17:35

## 2020-10-22 RX ADMIN — PANTOPRAZOLE SODIUM 40 MG: 40 TABLET, DELAYED RELEASE ORAL at 06:15

## 2020-10-22 RX ADMIN — SPIRONOLACTONE 25 MG: 25 TABLET ORAL at 09:41

## 2020-10-22 RX ADMIN — ASPIRIN 81 MG: 81 TABLET, COATED ORAL at 09:41

## 2020-10-22 RX ADMIN — ZOLPIDEM TARTRATE 10 MG: 5 TABLET ORAL at 21:31

## 2020-10-22 RX ADMIN — LOSARTAN POTASSIUM 100 MG: 50 TABLET, FILM COATED ORAL at 09:41

## 2020-10-23 ENCOUNTER — ANESTHESIA (INPATIENT)
Dept: GASTROENTEROLOGY | Facility: HOSPITAL | Age: 70
DRG: 438 | End: 2020-10-23
Payer: COMMERCIAL

## 2020-10-23 ENCOUNTER — ANESTHESIA EVENT (INPATIENT)
Dept: GASTROENTEROLOGY | Facility: HOSPITAL | Age: 70
DRG: 438 | End: 2020-10-23
Payer: COMMERCIAL

## 2020-10-23 ENCOUNTER — APPOINTMENT (INPATIENT)
Dept: GASTROENTEROLOGY | Facility: HOSPITAL | Age: 70
DRG: 438 | End: 2020-10-23
Payer: COMMERCIAL

## 2020-10-23 VITALS — HEART RATE: 57 BPM

## 2020-10-23 PROBLEM — IMO0001 SMOKING: Status: ACTIVE | Noted: 2019-10-24

## 2020-10-23 PROBLEM — F17.200 SMOKING: Status: ACTIVE | Noted: 2019-10-24

## 2020-10-23 PROBLEM — K85.90 ACUTE PANCREATITIS: Status: RESOLVED | Noted: 2020-02-14 | Resolved: 2020-10-23

## 2020-10-23 LAB
AFP-TM SERPL-MCNC: 3.5 NG/ML (ref 0.5–8)
ANION GAP SERPL CALCULATED.3IONS-SCNC: 5 MMOL/L (ref 4–13)
BUN SERPL-MCNC: 17 MG/DL (ref 5–25)
CALCIUM SERPL-MCNC: 9.3 MG/DL (ref 8.3–10.1)
CHLORIDE SERPL-SCNC: 105 MMOL/L (ref 100–108)
CO2 SERPL-SCNC: 29 MMOL/L (ref 21–32)
CREAT SERPL-MCNC: 0.86 MG/DL (ref 0.6–1.3)
GFR SERPL CREATININE-BSD FRML MDRD: 89 ML/MIN/1.73SQ M
GLUCOSE SERPL-MCNC: 98 MG/DL (ref 65–140)
POTASSIUM SERPL-SCNC: 3.7 MMOL/L (ref 3.5–5.3)
SODIUM SERPL-SCNC: 139 MMOL/L (ref 136–145)

## 2020-10-23 PROCEDURE — 80048 BASIC METABOLIC PNL TOTAL CA: CPT | Performed by: INTERNAL MEDICINE

## 2020-10-23 PROCEDURE — 99233 SBSQ HOSP IP/OBS HIGH 50: CPT | Performed by: GENERAL PRACTICE

## 2020-10-23 PROCEDURE — 82105 ALPHA-FETOPROTEIN SERUM: CPT | Performed by: INTERNAL MEDICINE

## 2020-10-23 PROCEDURE — 88173 CYTOPATH EVAL FNA REPORT: CPT | Performed by: PATHOLOGY

## 2020-10-23 PROCEDURE — 0FBG8ZX EXCISION OF PANCREAS, VIA NATURAL OR ARTIFICIAL OPENING ENDOSCOPIC, DIAGNOSTIC: ICD-10-PCS | Performed by: INTERNAL MEDICINE

## 2020-10-23 PROCEDURE — 88305 TISSUE EXAM BY PATHOLOGIST: CPT | Performed by: PATHOLOGY

## 2020-10-23 PROCEDURE — 43238 EGD US FINE NEEDLE BX/ASPIR: CPT | Performed by: INTERNAL MEDICINE

## 2020-10-23 PROCEDURE — 88172 CYTP DX EVAL FNA 1ST EA SITE: CPT | Performed by: PATHOLOGY

## 2020-10-23 PROCEDURE — 43239 EGD BIOPSY SINGLE/MULTIPLE: CPT | Performed by: INTERNAL MEDICINE

## 2020-10-23 RX ORDER — FENTANYL CITRATE 50 UG/ML
INJECTION, SOLUTION INTRAMUSCULAR; INTRAVENOUS AS NEEDED
Status: DISCONTINUED | OUTPATIENT
Start: 2020-10-23 | End: 2020-10-23

## 2020-10-23 RX ORDER — PROPOFOL 10 MG/ML
INJECTION, EMULSION INTRAVENOUS AS NEEDED
Status: DISCONTINUED | OUTPATIENT
Start: 2020-10-23 | End: 2020-10-23

## 2020-10-23 RX ORDER — LIDOCAINE HYDROCHLORIDE 10 MG/ML
INJECTION, SOLUTION EPIDURAL; INFILTRATION; INTRACAUDAL; PERINEURAL AS NEEDED
Status: DISCONTINUED | OUTPATIENT
Start: 2020-10-23 | End: 2020-10-23

## 2020-10-23 RX ORDER — MIDAZOLAM HYDROCHLORIDE 2 MG/2ML
INJECTION, SOLUTION INTRAMUSCULAR; INTRAVENOUS AS NEEDED
Status: DISCONTINUED | OUTPATIENT
Start: 2020-10-23 | End: 2020-10-23

## 2020-10-23 RX ORDER — SODIUM CHLORIDE 9 MG/ML
INJECTION, SOLUTION INTRAVENOUS CONTINUOUS PRN
Status: DISCONTINUED | OUTPATIENT
Start: 2020-10-23 | End: 2020-10-23

## 2020-10-23 RX ORDER — PROPOFOL 10 MG/ML
INJECTION, EMULSION INTRAVENOUS CONTINUOUS PRN
Status: DISCONTINUED | OUTPATIENT
Start: 2020-10-23 | End: 2020-10-23

## 2020-10-23 RX ADMIN — METOPROLOL SUCCINATE 50 MG: 50 TABLET, EXTENDED RELEASE ORAL at 09:32

## 2020-10-23 RX ADMIN — FENTANYL CITRATE 25 MCG: 50 INJECTION, SOLUTION INTRAMUSCULAR; INTRAVENOUS at 11:51

## 2020-10-23 RX ADMIN — LEVOTHYROXINE SODIUM 25 MCG: 25 TABLET ORAL at 05:27

## 2020-10-23 RX ADMIN — MIDAZOLAM 1 MG: 1 INJECTION INTRAMUSCULAR; INTRAVENOUS at 11:43

## 2020-10-23 RX ADMIN — HYDROMORPHONE HYDROCHLORIDE 0.5 MG: 1 INJECTION, SOLUTION INTRAMUSCULAR; INTRAVENOUS; SUBCUTANEOUS at 04:27

## 2020-10-23 RX ADMIN — ALTEPLASE 2 MG: 2.2 INJECTION, POWDER, LYOPHILIZED, FOR SOLUTION INTRAVENOUS at 05:33

## 2020-10-23 RX ADMIN — LIDOCAINE HYDROCHLORIDE 50 MG: 10 INJECTION, SOLUTION EPIDURAL; INFILTRATION; INTRACAUDAL; PERINEURAL at 11:51

## 2020-10-23 RX ADMIN — PROPOFOL 50 MG: 10 INJECTION, EMULSION INTRAVENOUS at 11:52

## 2020-10-23 RX ADMIN — LOSARTAN POTASSIUM 100 MG: 50 TABLET, FILM COATED ORAL at 09:31

## 2020-10-23 RX ADMIN — Medication: at 21:09

## 2020-10-23 RX ADMIN — PANTOPRAZOLE SODIUM 40 MG: 40 TABLET, DELAYED RELEASE ORAL at 05:27

## 2020-10-23 RX ADMIN — PROPOFOL 200 MCG/KG/MIN: 10 INJECTION, EMULSION INTRAVENOUS at 11:51

## 2020-10-23 RX ADMIN — AMLODIPINE BESYLATE 10 MG: 10 TABLET ORAL at 09:32

## 2020-10-23 RX ADMIN — ZOLPIDEM TARTRATE 10 MG: 5 TABLET ORAL at 21:17

## 2020-10-23 RX ADMIN — FENTANYL CITRATE 25 MCG: 50 INJECTION, SOLUTION INTRAMUSCULAR; INTRAVENOUS at 11:55

## 2020-10-23 RX ADMIN — EZETIMIBE 10 MG: 10 TABLET ORAL at 21:17

## 2020-10-23 RX ADMIN — FENTANYL 50 MCG: 50 PATCH TRANSDERMAL at 09:32

## 2020-10-23 RX ADMIN — THIAMINE HCL TAB 100 MG 100 MG: 100 TAB at 09:31

## 2020-10-23 RX ADMIN — SODIUM CHLORIDE: 9 INJECTION, SOLUTION INTRAVENOUS at 11:43

## 2020-10-23 RX ADMIN — SPIRONOLACTONE 25 MG: 25 TABLET ORAL at 09:32

## 2020-10-23 RX ADMIN — PRAVASTATIN SODIUM 80 MG: 80 TABLET ORAL at 17:36

## 2020-10-23 RX ADMIN — HYDROMORPHONE HYDROCHLORIDE 1 MG: 1 INJECTION, SOLUTION INTRAMUSCULAR; INTRAVENOUS; SUBCUTANEOUS at 18:54

## 2020-10-23 RX ADMIN — HYDROMORPHONE HYDROCHLORIDE 0.5 MG: 1 INJECTION, SOLUTION INTRAMUSCULAR; INTRAVENOUS; SUBCUTANEOUS at 17:33

## 2020-10-23 RX ADMIN — MIDAZOLAM 1 MG: 1 INJECTION INTRAMUSCULAR; INTRAVENOUS at 11:49

## 2020-10-24 PROBLEM — K86.9 PANCREATIC LESION: Status: ACTIVE | Noted: 2020-10-24

## 2020-10-24 LAB
ALBUMIN SERPL BCP-MCNC: 2.8 G/DL (ref 3.5–5)
ALP SERPL-CCNC: 69 U/L (ref 46–116)
ALT SERPL W P-5'-P-CCNC: 34 U/L (ref 12–78)
ANION GAP SERPL CALCULATED.3IONS-SCNC: 7 MMOL/L (ref 4–13)
AST SERPL W P-5'-P-CCNC: 29 U/L (ref 5–45)
BILIRUB SERPL-MCNC: 0.46 MG/DL (ref 0.2–1)
BUN SERPL-MCNC: 20 MG/DL (ref 5–25)
CALCIUM ALBUM COR SERPL-MCNC: 9.8 MG/DL (ref 8.3–10.1)
CALCIUM SERPL-MCNC: 8.8 MG/DL (ref 8.3–10.1)
CHLORIDE SERPL-SCNC: 105 MMOL/L (ref 100–108)
CO2 SERPL-SCNC: 26 MMOL/L (ref 21–32)
CREAT SERPL-MCNC: 0.76 MG/DL (ref 0.6–1.3)
ERYTHROCYTE [DISTWIDTH] IN BLOOD BY AUTOMATED COUNT: 13.2 % (ref 11.6–15.1)
GFR SERPL CREATININE-BSD FRML MDRD: 94 ML/MIN/1.73SQ M
GLUCOSE SERPL-MCNC: 118 MG/DL (ref 65–140)
HCT VFR BLD AUTO: 35.1 % (ref 36.5–49.3)
HGB BLD-MCNC: 11.4 G/DL (ref 12–17)
LIPASE SERPL-CCNC: 386 U/L (ref 73–393)
MAGNESIUM SERPL-MCNC: 1.5 MG/DL (ref 1.6–2.6)
MCH RBC QN AUTO: 32.3 PG (ref 26.8–34.3)
MCHC RBC AUTO-ENTMCNC: 32.5 G/DL (ref 31.4–37.4)
MCV RBC AUTO: 99 FL (ref 82–98)
PHOSPHATE SERPL-MCNC: 3.6 MG/DL (ref 2.3–4.1)
PLATELET # BLD AUTO: 230 THOUSANDS/UL (ref 149–390)
PMV BLD AUTO: 12.5 FL (ref 8.9–12.7)
POTASSIUM SERPL-SCNC: 3.7 MMOL/L (ref 3.5–5.3)
PROT SERPL-MCNC: 6.5 G/DL (ref 6.4–8.2)
RBC # BLD AUTO: 3.53 MILLION/UL (ref 3.88–5.62)
SODIUM SERPL-SCNC: 138 MMOL/L (ref 136–145)
WBC # BLD AUTO: 8.12 THOUSAND/UL (ref 4.31–10.16)

## 2020-10-24 PROCEDURE — 84100 ASSAY OF PHOSPHORUS: CPT | Performed by: GENERAL PRACTICE

## 2020-10-24 PROCEDURE — 99232 SBSQ HOSP IP/OBS MODERATE 35: CPT | Performed by: GENERAL PRACTICE

## 2020-10-24 PROCEDURE — 80053 COMPREHEN METABOLIC PANEL: CPT | Performed by: GENERAL PRACTICE

## 2020-10-24 PROCEDURE — 99232 SBSQ HOSP IP/OBS MODERATE 35: CPT | Performed by: INTERNAL MEDICINE

## 2020-10-24 PROCEDURE — 83690 ASSAY OF LIPASE: CPT | Performed by: GENERAL PRACTICE

## 2020-10-24 PROCEDURE — 83735 ASSAY OF MAGNESIUM: CPT | Performed by: GENERAL PRACTICE

## 2020-10-24 PROCEDURE — 85027 COMPLETE CBC AUTOMATED: CPT | Performed by: GENERAL PRACTICE

## 2020-10-24 RX ADMIN — LOSARTAN POTASSIUM 100 MG: 50 TABLET, FILM COATED ORAL at 08:41

## 2020-10-24 RX ADMIN — EZETIMIBE 10 MG: 10 TABLET ORAL at 21:54

## 2020-10-24 RX ADMIN — PANTOPRAZOLE SODIUM 40 MG: 40 TABLET, DELAYED RELEASE ORAL at 05:26

## 2020-10-24 RX ADMIN — ZOLPIDEM TARTRATE 10 MG: 5 TABLET ORAL at 21:57

## 2020-10-24 RX ADMIN — AMLODIPINE BESYLATE 10 MG: 10 TABLET ORAL at 08:41

## 2020-10-24 RX ADMIN — METOPROLOL SUCCINATE 50 MG: 50 TABLET, EXTENDED RELEASE ORAL at 08:41

## 2020-10-24 RX ADMIN — SPIRONOLACTONE 25 MG: 25 TABLET ORAL at 08:41

## 2020-10-24 RX ADMIN — HYDROMORPHONE HYDROCHLORIDE 0.5 MG: 1 INJECTION, SOLUTION INTRAMUSCULAR; INTRAVENOUS; SUBCUTANEOUS at 14:24

## 2020-10-24 RX ADMIN — HYDROMORPHONE HYDROCHLORIDE 0.5 MG: 1 INJECTION, SOLUTION INTRAMUSCULAR; INTRAVENOUS; SUBCUTANEOUS at 08:44

## 2020-10-24 RX ADMIN — HYDROMORPHONE HYDROCHLORIDE 1 MG: 1 INJECTION, SOLUTION INTRAMUSCULAR; INTRAVENOUS; SUBCUTANEOUS at 21:58

## 2020-10-24 RX ADMIN — Medication: at 21:54

## 2020-10-24 RX ADMIN — LEVOTHYROXINE SODIUM 25 MCG: 25 TABLET ORAL at 05:26

## 2020-10-24 RX ADMIN — THIAMINE HCL TAB 100 MG 100 MG: 100 TAB at 08:41

## 2020-10-24 RX ADMIN — ENOXAPARIN SODIUM 40 MG: 40 INJECTION SUBCUTANEOUS at 08:39

## 2020-10-24 RX ADMIN — ASPIRIN 81 MG: 81 TABLET ORAL at 08:41

## 2020-10-24 RX ADMIN — PRAVASTATIN SODIUM 80 MG: 80 TABLET ORAL at 16:10

## 2020-10-25 LAB
ANION GAP SERPL CALCULATED.3IONS-SCNC: 8 MMOL/L (ref 4–13)
BUN SERPL-MCNC: 18 MG/DL (ref 5–25)
CALCIUM SERPL-MCNC: 9.6 MG/DL (ref 8.3–10.1)
CHLORIDE SERPL-SCNC: 107 MMOL/L (ref 100–108)
CO2 SERPL-SCNC: 25 MMOL/L (ref 21–32)
CREAT SERPL-MCNC: 0.7 MG/DL (ref 0.6–1.3)
ERYTHROCYTE [DISTWIDTH] IN BLOOD BY AUTOMATED COUNT: 13.2 % (ref 11.6–15.1)
GFR SERPL CREATININE-BSD FRML MDRD: 97 ML/MIN/1.73SQ M
GLUCOSE SERPL-MCNC: 106 MG/DL (ref 65–140)
HCT VFR BLD AUTO: 36.9 % (ref 36.5–49.3)
HGB BLD-MCNC: 11.9 G/DL (ref 12–17)
MAGNESIUM SERPL-MCNC: 1.9 MG/DL (ref 1.6–2.6)
MCH RBC QN AUTO: 31.7 PG (ref 26.8–34.3)
MCHC RBC AUTO-ENTMCNC: 32.2 G/DL (ref 31.4–37.4)
MCV RBC AUTO: 98 FL (ref 82–98)
PHOSPHATE SERPL-MCNC: 3.7 MG/DL (ref 2.3–4.1)
PLATELET # BLD AUTO: 221 THOUSANDS/UL (ref 149–390)
PMV BLD AUTO: 12.4 FL (ref 8.9–12.7)
POTASSIUM SERPL-SCNC: 3.7 MMOL/L (ref 3.5–5.3)
RBC # BLD AUTO: 3.75 MILLION/UL (ref 3.88–5.62)
SODIUM SERPL-SCNC: 140 MMOL/L (ref 136–145)
WBC # BLD AUTO: 10.6 THOUSAND/UL (ref 4.31–10.16)

## 2020-10-25 PROCEDURE — 99232 SBSQ HOSP IP/OBS MODERATE 35: CPT | Performed by: INTERNAL MEDICINE

## 2020-10-25 PROCEDURE — 80048 BASIC METABOLIC PNL TOTAL CA: CPT | Performed by: GENERAL PRACTICE

## 2020-10-25 PROCEDURE — 84100 ASSAY OF PHOSPHORUS: CPT | Performed by: GENERAL PRACTICE

## 2020-10-25 PROCEDURE — 85027 COMPLETE CBC AUTOMATED: CPT | Performed by: GENERAL PRACTICE

## 2020-10-25 PROCEDURE — 83735 ASSAY OF MAGNESIUM: CPT | Performed by: GENERAL PRACTICE

## 2020-10-25 PROCEDURE — 99232 SBSQ HOSP IP/OBS MODERATE 35: CPT | Performed by: GENERAL PRACTICE

## 2020-10-25 RX ORDER — OXYCODONE HYDROCHLORIDE 10 MG/1
10 TABLET ORAL EVERY 4 HOURS PRN
Status: DISCONTINUED | OUTPATIENT
Start: 2020-10-25 | End: 2020-10-30 | Stop reason: HOSPADM

## 2020-10-25 RX ORDER — OXYCODONE HYDROCHLORIDE 5 MG/1
5 TABLET ORAL EVERY 4 HOURS PRN
Status: DISCONTINUED | OUTPATIENT
Start: 2020-10-25 | End: 2020-10-30 | Stop reason: HOSPADM

## 2020-10-25 RX ADMIN — ENOXAPARIN SODIUM 40 MG: 40 INJECTION SUBCUTANEOUS at 08:17

## 2020-10-25 RX ADMIN — LEVOTHYROXINE SODIUM 25 MCG: 25 TABLET ORAL at 05:48

## 2020-10-25 RX ADMIN — OXYCODONE HYDROCHLORIDE 10 MG: 10 TABLET ORAL at 21:44

## 2020-10-25 RX ADMIN — PANTOPRAZOLE SODIUM 40 MG: 40 TABLET, DELAYED RELEASE ORAL at 05:48

## 2020-10-25 RX ADMIN — ALTEPLASE 2 MG: 2.2 INJECTION, POWDER, LYOPHILIZED, FOR SOLUTION INTRAVENOUS at 16:58

## 2020-10-25 RX ADMIN — HYDROMORPHONE HYDROCHLORIDE 0.5 MG: 1 INJECTION, SOLUTION INTRAMUSCULAR; INTRAVENOUS; SUBCUTANEOUS at 06:11

## 2020-10-25 RX ADMIN — OXYCODONE HYDROCHLORIDE 5 MG: 5 TABLET ORAL at 16:54

## 2020-10-25 RX ADMIN — AMLODIPINE BESYLATE 10 MG: 10 TABLET ORAL at 08:17

## 2020-10-25 RX ADMIN — METOPROLOL SUCCINATE 50 MG: 50 TABLET, EXTENDED RELEASE ORAL at 08:17

## 2020-10-25 RX ADMIN — PRAVASTATIN SODIUM 80 MG: 80 TABLET ORAL at 16:54

## 2020-10-25 RX ADMIN — ZOLPIDEM TARTRATE 10 MG: 5 TABLET ORAL at 21:41

## 2020-10-25 RX ADMIN — SPIRONOLACTONE 25 MG: 25 TABLET ORAL at 08:17

## 2020-10-25 RX ADMIN — EZETIMIBE 10 MG: 10 TABLET ORAL at 21:40

## 2020-10-25 RX ADMIN — HYDROMORPHONE HYDROCHLORIDE 1 MG: 1 INJECTION, SOLUTION INTRAMUSCULAR; INTRAVENOUS; SUBCUTANEOUS at 09:43

## 2020-10-25 RX ADMIN — LOSARTAN POTASSIUM 100 MG: 50 TABLET, FILM COATED ORAL at 08:17

## 2020-10-25 RX ADMIN — Medication: at 21:41

## 2020-10-25 RX ADMIN — ASPIRIN 81 MG: 81 TABLET ORAL at 08:17

## 2020-10-25 RX ADMIN — THIAMINE HCL TAB 100 MG 100 MG: 100 TAB at 08:17

## 2020-10-26 LAB
ANION GAP SERPL CALCULATED.3IONS-SCNC: 5 MMOL/L (ref 4–13)
BASOPHILS # BLD AUTO: 0.03 THOUSANDS/ΜL (ref 0–0.1)
BASOPHILS NFR BLD AUTO: 0 % (ref 0–1)
BUN SERPL-MCNC: 19 MG/DL (ref 5–25)
CALCIUM SERPL-MCNC: 8.8 MG/DL (ref 8.3–10.1)
CHLORIDE SERPL-SCNC: 107 MMOL/L (ref 100–108)
CO2 SERPL-SCNC: 26 MMOL/L (ref 21–32)
CREAT SERPL-MCNC: 0.63 MG/DL (ref 0.6–1.3)
EOSINOPHIL # BLD AUTO: 0.86 THOUSAND/ΜL (ref 0–0.61)
EOSINOPHIL NFR BLD AUTO: 8 % (ref 0–6)
ERYTHROCYTE [DISTWIDTH] IN BLOOD BY AUTOMATED COUNT: 13 % (ref 11.6–15.1)
GFR SERPL CREATININE-BSD FRML MDRD: 101 ML/MIN/1.73SQ M
GLUCOSE SERPL-MCNC: 107 MG/DL (ref 65–140)
HCT VFR BLD AUTO: 36.5 % (ref 36.5–49.3)
HGB BLD-MCNC: 11.6 G/DL (ref 12–17)
IMM GRANULOCYTES # BLD AUTO: 0.05 THOUSAND/UL (ref 0–0.2)
IMM GRANULOCYTES NFR BLD AUTO: 1 % (ref 0–2)
LYMPHOCYTES # BLD AUTO: 1.54 THOUSANDS/ΜL (ref 0.6–4.47)
LYMPHOCYTES NFR BLD AUTO: 15 % (ref 14–44)
MAGNESIUM SERPL-MCNC: 2.3 MG/DL (ref 1.6–2.6)
MCH RBC QN AUTO: 31.4 PG (ref 26.8–34.3)
MCHC RBC AUTO-ENTMCNC: 31.8 G/DL (ref 31.4–37.4)
MCV RBC AUTO: 99 FL (ref 82–98)
MONOCYTES # BLD AUTO: 1.2 THOUSAND/ΜL (ref 0.17–1.22)
MONOCYTES NFR BLD AUTO: 12 % (ref 4–12)
NEUTROPHILS # BLD AUTO: 6.78 THOUSANDS/ΜL (ref 1.85–7.62)
NEUTS SEG NFR BLD AUTO: 64 % (ref 43–75)
NRBC BLD AUTO-RTO: 0 /100 WBCS
PHOSPHATE SERPL-MCNC: 3.7 MG/DL (ref 2.3–4.1)
PLATELET # BLD AUTO: 256 THOUSANDS/UL (ref 149–390)
PMV BLD AUTO: 12.5 FL (ref 8.9–12.7)
POTASSIUM SERPL-SCNC: 3.6 MMOL/L (ref 3.5–5.3)
RBC # BLD AUTO: 3.7 MILLION/UL (ref 3.88–5.62)
SODIUM SERPL-SCNC: 138 MMOL/L (ref 136–145)
WBC # BLD AUTO: 10.46 THOUSAND/UL (ref 4.31–10.16)

## 2020-10-26 PROCEDURE — 84100 ASSAY OF PHOSPHORUS: CPT | Performed by: GENERAL PRACTICE

## 2020-10-26 PROCEDURE — 80048 BASIC METABOLIC PNL TOTAL CA: CPT | Performed by: INTERNAL MEDICINE

## 2020-10-26 PROCEDURE — 85025 COMPLETE CBC W/AUTO DIFF WBC: CPT | Performed by: INTERNAL MEDICINE

## 2020-10-26 PROCEDURE — 99232 SBSQ HOSP IP/OBS MODERATE 35: CPT | Performed by: INTERNAL MEDICINE

## 2020-10-26 PROCEDURE — 99232 SBSQ HOSP IP/OBS MODERATE 35: CPT | Performed by: GENERAL PRACTICE

## 2020-10-26 PROCEDURE — 83735 ASSAY OF MAGNESIUM: CPT | Performed by: GENERAL PRACTICE

## 2020-10-26 RX ORDER — ACETAMINOPHEN 325 MG/1
650 TABLET ORAL EVERY 6 HOURS PRN
Status: DISCONTINUED | OUTPATIENT
Start: 2020-10-26 | End: 2020-10-30 | Stop reason: HOSPADM

## 2020-10-26 RX ADMIN — ENOXAPARIN SODIUM 40 MG: 40 INJECTION SUBCUTANEOUS at 08:27

## 2020-10-26 RX ADMIN — OXYCODONE HYDROCHLORIDE 10 MG: 10 TABLET ORAL at 21:26

## 2020-10-26 RX ADMIN — EZETIMIBE 10 MG: 10 TABLET ORAL at 21:26

## 2020-10-26 RX ADMIN — THIAMINE HCL TAB 100 MG 100 MG: 100 TAB at 08:27

## 2020-10-26 RX ADMIN — PANTOPRAZOLE SODIUM 40 MG: 40 TABLET, DELAYED RELEASE ORAL at 05:08

## 2020-10-26 RX ADMIN — OXYCODONE HYDROCHLORIDE 10 MG: 10 TABLET ORAL at 14:20

## 2020-10-26 RX ADMIN — ASPIRIN 81 MG: 81 TABLET ORAL at 08:27

## 2020-10-26 RX ADMIN — OXYCODONE HYDROCHLORIDE 5 MG: 5 TABLET ORAL at 05:08

## 2020-10-26 RX ADMIN — PRAVASTATIN SODIUM 80 MG: 80 TABLET ORAL at 16:13

## 2020-10-26 RX ADMIN — LEVOTHYROXINE SODIUM 25 MCG: 25 TABLET ORAL at 05:08

## 2020-10-26 RX ADMIN — ZOLPIDEM TARTRATE 10 MG: 5 TABLET ORAL at 21:26

## 2020-10-27 LAB
ALBUMIN SERPL BCP-MCNC: 3 G/DL (ref 3.5–5)
ALP SERPL-CCNC: 73 U/L (ref 46–116)
ALT SERPL W P-5'-P-CCNC: 36 U/L (ref 12–78)
ANION GAP SERPL CALCULATED.3IONS-SCNC: 5 MMOL/L (ref 4–13)
AST SERPL W P-5'-P-CCNC: 35 U/L (ref 5–45)
BILIRUB DIRECT SERPL-MCNC: 0.1 MG/DL (ref 0–0.2)
BILIRUB SERPL-MCNC: 0.2 MG/DL (ref 0.2–1)
BUN SERPL-MCNC: 22 MG/DL (ref 5–25)
CALCIUM SERPL-MCNC: 9.2 MG/DL (ref 8.3–10.1)
CHLORIDE SERPL-SCNC: 109 MMOL/L (ref 100–108)
CO2 SERPL-SCNC: 25 MMOL/L (ref 21–32)
CREAT SERPL-MCNC: 0.92 MG/DL (ref 0.6–1.3)
GFR SERPL CREATININE-BSD FRML MDRD: 85 ML/MIN/1.73SQ M
GLUCOSE SERPL-MCNC: 83 MG/DL (ref 65–140)
MAGNESIUM SERPL-MCNC: 1.9 MG/DL (ref 1.6–2.6)
PHOSPHATE SERPL-MCNC: 4.3 MG/DL (ref 2.3–4.1)
POTASSIUM SERPL-SCNC: 4.1 MMOL/L (ref 3.5–5.3)
PROT SERPL-MCNC: 6.7 G/DL (ref 6.4–8.2)
SODIUM SERPL-SCNC: 139 MMOL/L (ref 136–145)

## 2020-10-27 PROCEDURE — 83735 ASSAY OF MAGNESIUM: CPT | Performed by: GENERAL PRACTICE

## 2020-10-27 PROCEDURE — 99232 SBSQ HOSP IP/OBS MODERATE 35: CPT | Performed by: FAMILY MEDICINE

## 2020-10-27 PROCEDURE — 80076 HEPATIC FUNCTION PANEL: CPT | Performed by: FAMILY MEDICINE

## 2020-10-27 PROCEDURE — 80048 BASIC METABOLIC PNL TOTAL CA: CPT | Performed by: GENERAL PRACTICE

## 2020-10-27 PROCEDURE — 84100 ASSAY OF PHOSPHORUS: CPT | Performed by: GENERAL PRACTICE

## 2020-10-27 RX ADMIN — AMLODIPINE BESYLATE 10 MG: 10 TABLET ORAL at 09:52

## 2020-10-27 RX ADMIN — OXYCODONE HYDROCHLORIDE 10 MG: 10 TABLET ORAL at 21:40

## 2020-10-27 RX ADMIN — METOPROLOL SUCCINATE 50 MG: 50 TABLET, EXTENDED RELEASE ORAL at 09:51

## 2020-10-27 RX ADMIN — ENOXAPARIN SODIUM 40 MG: 40 INJECTION SUBCUTANEOUS at 09:53

## 2020-10-27 RX ADMIN — SPIRONOLACTONE 25 MG: 25 TABLET ORAL at 09:52

## 2020-10-27 RX ADMIN — ASPIRIN 81 MG: 81 TABLET ORAL at 09:52

## 2020-10-27 RX ADMIN — PANTOPRAZOLE SODIUM 40 MG: 40 TABLET, DELAYED RELEASE ORAL at 06:06

## 2020-10-27 RX ADMIN — LOSARTAN POTASSIUM 100 MG: 50 TABLET, FILM COATED ORAL at 09:52

## 2020-10-27 RX ADMIN — THIAMINE HCL TAB 100 MG 100 MG: 100 TAB at 09:51

## 2020-10-27 RX ADMIN — OXYCODONE HYDROCHLORIDE 10 MG: 10 TABLET ORAL at 16:23

## 2020-10-27 RX ADMIN — PRAVASTATIN SODIUM 80 MG: 80 TABLET ORAL at 17:44

## 2020-10-27 RX ADMIN — ZOLPIDEM TARTRATE 10 MG: 5 TABLET ORAL at 21:40

## 2020-10-27 RX ADMIN — OXYCODONE HYDROCHLORIDE 10 MG: 10 TABLET ORAL at 09:50

## 2020-10-27 RX ADMIN — EZETIMIBE 10 MG: 10 TABLET ORAL at 21:40

## 2020-10-27 RX ADMIN — LEVOTHYROXINE SODIUM 25 MCG: 25 TABLET ORAL at 06:06

## 2020-10-28 PROBLEM — N17.9 AKI (ACUTE KIDNEY INJURY) (HCC): Status: ACTIVE | Noted: 2020-10-28

## 2020-10-28 LAB
ALBUMIN SERPL BCP-MCNC: 3.1 G/DL (ref 3.5–5)
ALP SERPL-CCNC: 75 U/L (ref 46–116)
ALT SERPL W P-5'-P-CCNC: 45 U/L (ref 12–78)
ANION GAP SERPL CALCULATED.3IONS-SCNC: 6 MMOL/L (ref 4–13)
AST SERPL W P-5'-P-CCNC: 43 U/L (ref 5–45)
BACTERIA UR QL AUTO: NORMAL /HPF
BILIRUB SERPL-MCNC: 0.23 MG/DL (ref 0.2–1)
BILIRUB UR QL STRIP: NEGATIVE
BUN SERPL-MCNC: 32 MG/DL (ref 5–25)
CALCIUM ALBUM COR SERPL-MCNC: 10.3 MG/DL (ref 8.3–10.1)
CALCIUM SERPL-MCNC: 9.6 MG/DL (ref 8.3–10.1)
CHLORIDE SERPL-SCNC: 111 MMOL/L (ref 100–108)
CLARITY UR: CLEAR
CO2 SERPL-SCNC: 25 MMOL/L (ref 21–32)
COLOR UR: YELLOW
CREAT SERPL-MCNC: 1.07 MG/DL (ref 0.6–1.3)
GFR SERPL CREATININE-BSD FRML MDRD: 71 ML/MIN/1.73SQ M
GLUCOSE SERPL-MCNC: 81 MG/DL (ref 65–140)
GLUCOSE UR STRIP-MCNC: NEGATIVE MG/DL
HGB UR QL STRIP.AUTO: NEGATIVE
HYALINE CASTS #/AREA URNS LPF: NORMAL /LPF
KETONES UR STRIP-MCNC: NEGATIVE MG/DL
LEUKOCYTE ESTERASE UR QL STRIP: NEGATIVE
NITRITE UR QL STRIP: NEGATIVE
NON-SQ EPI CELLS URNS QL MICRO: NORMAL /HPF
PH UR STRIP.AUTO: 6 [PH]
POTASSIUM SERPL-SCNC: 4.1 MMOL/L (ref 3.5–5.3)
PROT SERPL-MCNC: 6.6 G/DL (ref 6.4–8.2)
PROT UR STRIP-MCNC: NEGATIVE MG/DL
RBC #/AREA URNS AUTO: NORMAL /HPF
SODIUM SERPL-SCNC: 142 MMOL/L (ref 136–145)
SP GR UR STRIP.AUTO: 1.02 (ref 1–1.03)
UROBILINOGEN UR QL STRIP.AUTO: 0.2 E.U./DL
WBC #/AREA URNS AUTO: NORMAL /HPF

## 2020-10-28 PROCEDURE — 99232 SBSQ HOSP IP/OBS MODERATE 35: CPT | Performed by: INTERNAL MEDICINE

## 2020-10-28 PROCEDURE — 80053 COMPREHEN METABOLIC PANEL: CPT | Performed by: STUDENT IN AN ORGANIZED HEALTH CARE EDUCATION/TRAINING PROGRAM

## 2020-10-28 PROCEDURE — 81001 URINALYSIS AUTO W/SCOPE: CPT | Performed by: FAMILY MEDICINE

## 2020-10-28 PROCEDURE — 99232 SBSQ HOSP IP/OBS MODERATE 35: CPT | Performed by: FAMILY MEDICINE

## 2020-10-28 RX ORDER — SODIUM CHLORIDE, SODIUM LACTATE, POTASSIUM CHLORIDE, CALCIUM CHLORIDE 600; 310; 30; 20 MG/100ML; MG/100ML; MG/100ML; MG/100ML
125 INJECTION, SOLUTION INTRAVENOUS CONTINUOUS
Status: DISCONTINUED | OUTPATIENT
Start: 2020-10-28 | End: 2020-10-29

## 2020-10-28 RX ADMIN — EZETIMIBE 10 MG: 10 TABLET ORAL at 21:21

## 2020-10-28 RX ADMIN — ASPIRIN 81 MG: 81 TABLET ORAL at 09:19

## 2020-10-28 RX ADMIN — OXYCODONE HYDROCHLORIDE 10 MG: 10 TABLET ORAL at 21:21

## 2020-10-28 RX ADMIN — THIAMINE HCL TAB 100 MG 100 MG: 100 TAB at 09:20

## 2020-10-28 RX ADMIN — LEVOTHYROXINE SODIUM 25 MCG: 25 TABLET ORAL at 06:04

## 2020-10-28 RX ADMIN — PRAVASTATIN SODIUM 80 MG: 80 TABLET ORAL at 18:05

## 2020-10-28 RX ADMIN — SPIRONOLACTONE 25 MG: 25 TABLET ORAL at 09:19

## 2020-10-28 RX ADMIN — METOPROLOL SUCCINATE 50 MG: 50 TABLET, EXTENDED RELEASE ORAL at 09:19

## 2020-10-28 RX ADMIN — SODIUM CHLORIDE, SODIUM LACTATE, POTASSIUM CHLORIDE, AND CALCIUM CHLORIDE 125 ML/HR: .6; .31; .03; .02 INJECTION, SOLUTION INTRAVENOUS at 17:07

## 2020-10-28 RX ADMIN — OXYCODONE HYDROCHLORIDE 10 MG: 10 TABLET ORAL at 09:19

## 2020-10-28 RX ADMIN — ENOXAPARIN SODIUM 40 MG: 40 INJECTION SUBCUTANEOUS at 09:19

## 2020-10-28 RX ADMIN — ZOLPIDEM TARTRATE 10 MG: 5 TABLET ORAL at 21:21

## 2020-10-28 RX ADMIN — PANTOPRAZOLE SODIUM 40 MG: 40 TABLET, DELAYED RELEASE ORAL at 06:04

## 2020-10-28 RX ADMIN — SODIUM CHLORIDE, SODIUM LACTATE, POTASSIUM CHLORIDE, AND CALCIUM CHLORIDE 125 ML/HR: .6; .31; .03; .02 INJECTION, SOLUTION INTRAVENOUS at 09:14

## 2020-10-28 RX ADMIN — OXYCODONE HYDROCHLORIDE 10 MG: 10 TABLET ORAL at 16:28

## 2020-10-28 RX ADMIN — AMLODIPINE BESYLATE 10 MG: 10 TABLET ORAL at 09:19

## 2020-10-29 LAB
ANION GAP SERPL CALCULATED.3IONS-SCNC: 5 MMOL/L (ref 4–13)
ANION GAP SERPL CALCULATED.3IONS-SCNC: 7 MMOL/L (ref 4–13)
BUN SERPL-MCNC: 17 MG/DL (ref 5–25)
BUN SERPL-MCNC: 20 MG/DL (ref 5–25)
CALCIUM SERPL-MCNC: 9 MG/DL (ref 8.3–10.1)
CALCIUM SERPL-MCNC: 9.1 MG/DL (ref 8.3–10.1)
CHLORIDE SERPL-SCNC: 106 MMOL/L (ref 100–108)
CHLORIDE SERPL-SCNC: 108 MMOL/L (ref 100–108)
CO2 SERPL-SCNC: 26 MMOL/L (ref 21–32)
CO2 SERPL-SCNC: 26 MMOL/L (ref 21–32)
CREAT SERPL-MCNC: 0.72 MG/DL (ref 0.6–1.3)
CREAT SERPL-MCNC: 0.82 MG/DL (ref 0.6–1.3)
GFR SERPL CREATININE-BSD FRML MDRD: 91 ML/MIN/1.73SQ M
GFR SERPL CREATININE-BSD FRML MDRD: 96 ML/MIN/1.73SQ M
GLUCOSE SERPL-MCNC: 105 MG/DL (ref 65–140)
GLUCOSE SERPL-MCNC: 87 MG/DL (ref 65–140)
POTASSIUM SERPL-SCNC: 3.5 MMOL/L (ref 3.5–5.3)
POTASSIUM SERPL-SCNC: 3.8 MMOL/L (ref 3.5–5.3)
SODIUM SERPL-SCNC: 137 MMOL/L (ref 136–145)
SODIUM SERPL-SCNC: 141 MMOL/L (ref 136–145)

## 2020-10-29 PROCEDURE — 86038 ANTINUCLEAR ANTIBODIES: CPT | Performed by: STUDENT IN AN ORGANIZED HEALTH CARE EDUCATION/TRAINING PROGRAM

## 2020-10-29 PROCEDURE — 80048 BASIC METABOLIC PNL TOTAL CA: CPT | Performed by: FAMILY MEDICINE

## 2020-10-29 PROCEDURE — 99232 SBSQ HOSP IP/OBS MODERATE 35: CPT | Performed by: FAMILY MEDICINE

## 2020-10-29 PROCEDURE — 80048 BASIC METABOLIC PNL TOTAL CA: CPT | Performed by: STUDENT IN AN ORGANIZED HEALTH CARE EDUCATION/TRAINING PROGRAM

## 2020-10-29 RX ADMIN — ZOLPIDEM TARTRATE 10 MG: 5 TABLET ORAL at 22:06

## 2020-10-29 RX ADMIN — SODIUM CHLORIDE, SODIUM LACTATE, POTASSIUM CHLORIDE, AND CALCIUM CHLORIDE 125 ML/HR: .6; .31; .03; .02 INJECTION, SOLUTION INTRAVENOUS at 08:46

## 2020-10-29 RX ADMIN — PANTOPRAZOLE SODIUM 40 MG: 40 TABLET, DELAYED RELEASE ORAL at 05:44

## 2020-10-29 RX ADMIN — ASPIRIN 81 MG: 81 TABLET ORAL at 08:51

## 2020-10-29 RX ADMIN — SPIRONOLACTONE 25 MG: 25 TABLET ORAL at 08:51

## 2020-10-29 RX ADMIN — METOPROLOL SUCCINATE 50 MG: 50 TABLET, EXTENDED RELEASE ORAL at 08:51

## 2020-10-29 RX ADMIN — OXYCODONE HYDROCHLORIDE 5 MG: 5 TABLET ORAL at 08:50

## 2020-10-29 RX ADMIN — AMLODIPINE BESYLATE 10 MG: 10 TABLET ORAL at 08:50

## 2020-10-29 RX ADMIN — LEVOTHYROXINE SODIUM 25 MCG: 25 TABLET ORAL at 05:44

## 2020-10-29 RX ADMIN — THIAMINE HCL TAB 100 MG 100 MG: 100 TAB at 08:51

## 2020-10-29 RX ADMIN — ENOXAPARIN SODIUM 40 MG: 40 INJECTION SUBCUTANEOUS at 08:51

## 2020-10-29 RX ADMIN — EZETIMIBE 10 MG: 10 TABLET ORAL at 22:06

## 2020-10-29 RX ADMIN — OXYCODONE HYDROCHLORIDE 10 MG: 10 TABLET ORAL at 17:41

## 2020-10-29 RX ADMIN — SODIUM CHLORIDE, SODIUM LACTATE, POTASSIUM CHLORIDE, AND CALCIUM CHLORIDE 125 ML/HR: .6; .31; .03; .02 INJECTION, SOLUTION INTRAVENOUS at 00:02

## 2020-10-29 RX ADMIN — PRAVASTATIN SODIUM 80 MG: 80 TABLET ORAL at 17:36

## 2020-10-29 NOTE — PROGRESS NOTES
Progress Note - Kris Abdullahi 1950, 81 y o  male MRN: [id number]    Unit/Bed#: 7T [facility] 710-02 Encounter: [id number]    Primary Care Provider: Sandra Walker MD   Date and time admitted to hospital: 2/14/2020 11:59 AM        * Acute pancreatitis  Assessment & Plan  · Patient continues to have significant pain in the epigastric area or full liquid diet  We will downgrade to clear liquid diet again  · Lipase is normal  · Pancreatitis most likely 2/2 to alcohol use  · GI consult appreciated: creon was resumed  · Patient was admitted with pancreatitis back in October was toe secondary to metformin  · CT of the abdomen and pelvis resolved-    1  Acute recurrent edematous pancreatitis, now in the region of the pancreatic head and uncinate process  · 2   2 5 cm hypoattenuating lesion in the pancreatic head, increased from the prior exam, likely representing an enlarging pseudocyst or cystic pancreatic neoplasm  No main pancreatic duct dilation  Continued attention on follow-up is recommended  · He will need an EUS outpatient  · Morphine Dilaudid alternating if needed for pain control  · No cholelithiasis  · Triglycerides level  146  Acute respiratory failure with hypoxia (HCC)  Assessment & Plan  · Historical  currently breathing comfortably and saturating well in room air  He does have extensive smoking history no other signs to suggest a PE  Chest x-ray reviewed suspect more of an atelectasis   · procalcitonin is normal   Also provide albuterol t i d  As he is a smoker       · Weaned off of nasal canula    Tobacco abuse  Assessment & Plan  · Counseled that his nicotine patch has been provided he refused nicotine patch    Mixed hyperlipidemia  Assessment & Plan  · Secondary to acute recurrent pancreatitis his statin combination will be on hold    Hypothyroidism (acquired)  Assessment & Plan  · Continue Synthroid    Essential hypertension, benign  Assessment & Plan  · Blood pressure Recent admission, Pt awake and alert in bed, awaitng Dr Mendez and AM MRI   stable  · Continue losartan 50 mg         VTE Pharmacologic Prophylaxis:   Pharmacologic: Enoxaparin (Lovenox)  Mechanical VTE Prophylaxis in Place: Yes    Patient Centered Rounds: I have performed bedside rounds with nursing staff today  Discussions with Specialists or Other Care Team Provider: GI    Education and Discussions with Family / Patient: patient    Time Spent for Care: 30 minutes  More than 50% of total time spent on counseling and coordination of care as described above  Current Length of Stay: 5 day(s)    Current Patient Status: Inpatient   Certification Statement: The patient will continue to require additional inpatient hospital stay due to acute pancreatitis    Discharge Plan: home when stable    Code Status: Level 1 - Full Code      Subjective:   Patient continues to complain of epigastric pain, worse with food intake  Patient had bowel movement yesterday  Denies any nausea or vomiting  Has significant tenderness the abdomen  Objective:     Vitals:   Temp (24hrs), Av 7 °F (36 5 °C), Min:97 4 °F (36 3 °C), Max:98 2 °F (36 8 °C)    Temp:  [97 4 °F (36 3 °C)-98 2 °F (36 8 °C)] 98 2 °F (36 8 °C)  HR:  [62-71] 69  Resp:  [17-18] 18  BP: (116-137)/(69-84) 116/77  SpO2:  [95 %-96 %] 96 %  Body mass index is 24 53 kg/m²  Input and Output Summary (last 24 hours): Intake/Output Summary (Last 24 hours) at 2020 0910  Last data filed at 2020 0841  Gross per 24 hour   Intake 740 ml   Output    Net 740 ml       Physical Exam:     Physical Exam   Constitutional: He appears well-developed and well-nourished  HENT:   Head: Normocephalic and atraumatic  Right Ear: External ear normal    Left Ear: External ear normal    Nose: Nose normal    Mouth/Throat: Oropharynx is clear and moist    Eyes: Conjunctivae and EOM are normal    Neck: Normal range of motion  Neck supple  Cardiovascular: Normal rate, regular rhythm and normal heart sounds     Pulmonary/Chest: Effort normal and breath sounds normal    Abdominal: Soft  Bowel sounds are normal  He exhibits no distension  There is tenderness  There is no guarding  Epigastric tenderness   Genitourinary:   Genitourinary Comments: deferred   Neurological: He is alert  Cranial nerve 2 -12 are normal    Skin: Skin is warm and dry  Psychiatric: He has a normal mood and affect  Additional Data:     Labs:    Results from last 7 days   Lab Units 02/19/20  0454 02/15/20  0503   WBC Thousand/uL 5 20 5 20   HEMOGLOBIN g/dL 12 4* 11 5*   HEMATOCRIT % 36 8* 33 4*   PLATELETS Thousands/uL 212 167   NEUTROS PCT %  --  61   LYMPHS PCT %  --  23*   MONOS PCT %  --  10   EOS PCT %  --  5     Results from last 7 days   Lab Units 02/19/20  0454  02/15/20  0503   SODIUM mmol/L 135*   < > 139   POTASSIUM mmol/L 3 7   < > 3 5*   CHLORIDE mmol/L 99   < > 110*   CO2 mmol/L 29   < > 22   BUN mg/dL 5   < > 9   CREATININE mg/dL 0 76   < > 0 67*   ANION GAP mmol/L 7   < > 7   CALCIUM mg/dL 9 4   < > 8 5   ALBUMIN g/dL  --   --  2 9*   TOTAL BILIRUBIN mg/dL  --   --  0 40   ALK PHOS U/L  --   --  72   ALT U/L  --   --  27   AST U/L  --   --  23   GLUCOSE RANDOM mg/dL 89   < > 75    < > = values in this interval not displayed  Results from last 7 days   Lab Units 02/16/20  0524   PROCALCITONIN ng/ml <0 05           * I Have Reviewed All Lab Data Listed Above  * Additional Pertinent Lab Tests Reviewed:  [facility]     Recent Cultures (last 7 days):     Results from last 7 days   Lab Units 02/17/20  1339   C DIFF TOXIN B  Negative       Last 24 Hours Medication List:     Current Facility-Administered Medications:  acetaminophen 975 mg Oral Once Coby Esparza MD   albuterol 2 5 mg Nebulization Q4H PRN Magno Thayer MD   aspirin 81 mg Oral Daily Magno Thayer MD   enoxaparin 40 mg Subcutaneous Q24H [facility] Magno Thayer MD   HYDROmorphone 1 mg Intravenous Q4H PRN Magno Thayer MD   levothyroxine 12 5 mcg Oral Daily Tejas Ramos, MD   losartan 50 mg Oral Daily Tejas Ramos, MD   magnesium oxide 400 mg Oral BID Tejas Ramos MD   morphine injection 2 mg Intravenous Q3H PRN Tejas Ramos MD   ondansetron 4 mg Intravenous Q6H PRN Tejas Ramos, MD   pancrelipase (Lip-Prot-Amyl) 24,000 Units Oral TID With Meals Tejas Ramos MD   pantoprazole 40 mg Intravenous Q24H [address] Tejas Ramos, MD   zolpidem 5 mg Oral HS PRN Tejas Ramos MD        Today, Patient Was Seen By: Fantasma Badillo MD    ** Please Note: Dictation voice to text software may have been used in the creation of this document   **

## 2020-10-30 VITALS
TEMPERATURE: 98.6 F | HEART RATE: 68 BPM | RESPIRATION RATE: 16 BRPM | HEIGHT: 63 IN | BODY MASS INDEX: 23.28 KG/M2 | WEIGHT: 131.39 LBS | SYSTOLIC BLOOD PRESSURE: 121 MMHG | DIASTOLIC BLOOD PRESSURE: 61 MMHG | OXYGEN SATURATION: 91 %

## 2020-10-30 PROBLEM — K86.1 CHRONIC PANCREATITIS (HCC): Status: ACTIVE | Noted: 2020-02-14

## 2020-10-30 PROBLEM — N17.9 AKI (ACUTE KIDNEY INJURY) (HCC): Status: RESOLVED | Noted: 2020-10-28 | Resolved: 2020-10-30

## 2020-10-30 LAB — RYE IGE QN: NEGATIVE

## 2020-10-30 PROCEDURE — 99232 SBSQ HOSP IP/OBS MODERATE 35: CPT | Performed by: INTERNAL MEDICINE

## 2020-10-30 PROCEDURE — 99239 HOSP IP/OBS DSCHRG MGMT >30: CPT | Performed by: FAMILY MEDICINE

## 2020-10-30 RX ORDER — PREDNISONE 20 MG/1
20 TABLET ORAL DAILY
Qty: 30 TABLET | Refills: 0 | Status: SHIPPED | OUTPATIENT
Start: 2020-11-20 | End: 2020-12-10

## 2020-10-30 RX ORDER — OXYCODONE HYDROCHLORIDE 5 MG/1
5 TABLET ORAL EVERY 4 HOURS PRN
Qty: 15 TABLET | Refills: 0 | Status: SHIPPED | OUTPATIENT
Start: 2020-10-30 | End: 2020-11-09

## 2020-10-30 RX ORDER — PREDNISONE 20 MG/1
20 TABLET ORAL DAILY
Status: DISCONTINUED | OUTPATIENT
Start: 2020-11-20 | End: 2020-10-30 | Stop reason: HOSPADM

## 2020-10-30 RX ORDER — LANOLIN ALCOHOL/MO/W.PET/CERES
100 CREAM (GRAM) TOPICAL DAILY
Qty: 30 TABLET | Refills: 0 | Status: SHIPPED | OUTPATIENT
Start: 2020-10-31 | End: 2020-11-09 | Stop reason: SDUPTHER

## 2020-10-30 RX ORDER — METOPROLOL SUCCINATE 50 MG/1
50 TABLET, EXTENDED RELEASE ORAL DAILY
Qty: 30 TABLET | Refills: 0 | Status: SHIPPED | OUTPATIENT
Start: 2020-10-31 | End: 2020-11-09

## 2020-10-30 RX ORDER — AMLODIPINE BESYLATE 10 MG/1
10 TABLET ORAL DAILY
Qty: 30 TABLET | Refills: 0 | Status: SHIPPED | OUTPATIENT
Start: 2020-10-31 | End: 2020-11-09 | Stop reason: SDUPTHER

## 2020-10-30 RX ORDER — PREDNISONE 10 MG/1
30 TABLET ORAL DAILY
Qty: 21 TABLET | Refills: 0 | Status: SHIPPED | OUTPATIENT
Start: 2020-11-13 | End: 2020-11-20

## 2020-10-30 RX ORDER — LOSARTAN POTASSIUM 50 MG/1
50 TABLET ORAL DAILY
Qty: 30 TABLET | Refills: 0 | Status: SHIPPED | OUTPATIENT
Start: 2020-10-30 | End: 2020-11-09 | Stop reason: SDUPTHER

## 2020-10-30 RX ORDER — PREDNISONE 20 MG/1
40 TABLET ORAL DAILY
Qty: 28 TABLET | Refills: 0 | Status: SHIPPED | OUTPATIENT
Start: 2020-10-30 | End: 2020-11-13

## 2020-10-30 RX ORDER — PREDNISONE 20 MG/1
40 TABLET ORAL DAILY
Status: DISCONTINUED | OUTPATIENT
Start: 2020-10-30 | End: 2020-10-30 | Stop reason: HOSPADM

## 2020-10-30 RX ADMIN — SPIRONOLACTONE 25 MG: 25 TABLET ORAL at 10:03

## 2020-10-30 RX ADMIN — THIAMINE HCL TAB 100 MG 100 MG: 100 TAB at 10:02

## 2020-10-30 RX ADMIN — ASPIRIN 81 MG: 81 TABLET ORAL at 10:02

## 2020-10-30 RX ADMIN — PREDNISONE 40 MG: 20 TABLET ORAL at 11:46

## 2020-10-30 RX ADMIN — AMLODIPINE BESYLATE 10 MG: 10 TABLET ORAL at 10:02

## 2020-10-30 RX ADMIN — PANTOPRAZOLE SODIUM 40 MG: 40 TABLET, DELAYED RELEASE ORAL at 05:09

## 2020-10-30 RX ADMIN — METOPROLOL SUCCINATE 50 MG: 50 TABLET, EXTENDED RELEASE ORAL at 10:03

## 2020-10-30 RX ADMIN — LEVOTHYROXINE SODIUM 25 MCG: 25 TABLET ORAL at 05:09

## 2020-10-30 RX ADMIN — ENOXAPARIN SODIUM 40 MG: 40 INJECTION SUBCUTANEOUS at 10:03

## 2020-10-30 RX ADMIN — OXYCODONE HYDROCHLORIDE 10 MG: 10 TABLET ORAL at 07:30

## 2020-11-02 ENCOUNTER — TRANSITIONAL CARE MANAGEMENT (OUTPATIENT)
Dept: FAMILY MEDICINE CLINIC | Facility: CLINIC | Age: 70
End: 2020-11-02

## 2020-11-02 ENCOUNTER — PATIENT OUTREACH (OUTPATIENT)
Dept: FAMILY MEDICINE CLINIC | Facility: CLINIC | Age: 70
End: 2020-11-02

## 2020-11-02 DIAGNOSIS — Z71.89 COMPLEX CARE COORDINATION: Primary | ICD-10-CM

## 2020-11-09 ENCOUNTER — TELEMEDICINE (OUTPATIENT)
Dept: FAMILY MEDICINE CLINIC | Facility: CLINIC | Age: 70
End: 2020-11-09
Payer: COMMERCIAL

## 2020-11-09 DIAGNOSIS — K85.90 ACUTE PANCREATITIS: ICD-10-CM

## 2020-11-09 DIAGNOSIS — I10 ESSENTIAL HYPERTENSION: ICD-10-CM

## 2020-11-09 DIAGNOSIS — Z72.0 TOBACCO ABUSE: ICD-10-CM

## 2020-11-09 DIAGNOSIS — K21.9 GASTROESOPHAGEAL REFLUX DISEASE WITHOUT ESOPHAGITIS: ICD-10-CM

## 2020-11-09 DIAGNOSIS — I49.1 ECTOPIC ATRIAL RHYTHM: ICD-10-CM

## 2020-11-09 DIAGNOSIS — E78.2 MIXED HYPERLIPIDEMIA: ICD-10-CM

## 2020-11-09 DIAGNOSIS — E03.9 HYPOTHYROIDISM (ACQUIRED): ICD-10-CM

## 2020-11-09 DIAGNOSIS — I65.23 CAROTID STENOSIS, ASYMPTOMATIC, BILATERAL: Chronic | ICD-10-CM

## 2020-11-09 DIAGNOSIS — I71.2 THORACIC AORTIC ANEURYSM WITHOUT RUPTURE (HCC): ICD-10-CM

## 2020-11-09 DIAGNOSIS — R53.83 FATIGUE, UNSPECIFIED TYPE: Primary | ICD-10-CM

## 2020-11-09 DIAGNOSIS — I10 ESSENTIAL HYPERTENSION, BENIGN: ICD-10-CM

## 2020-11-09 PROCEDURE — 99495 TRANSJ CARE MGMT MOD F2F 14D: CPT | Performed by: INTERNAL MEDICINE

## 2020-11-09 RX ORDER — METOPROLOL SUCCINATE 50 MG/1
25 TABLET, EXTENDED RELEASE ORAL DAILY
Qty: 90 TABLET | Refills: 4 | Status: SHIPPED | OUTPATIENT
Start: 2020-11-09 | End: 2021-02-22 | Stop reason: SDUPTHER

## 2020-11-09 RX ORDER — LOSARTAN POTASSIUM 50 MG/1
50 TABLET ORAL DAILY
Qty: 30 TABLET | Refills: 0 | Status: SHIPPED | OUTPATIENT
Start: 2020-11-09 | End: 2020-12-10 | Stop reason: SDUPTHER

## 2020-11-09 RX ORDER — OMEPRAZOLE 40 MG/1
40 CAPSULE, DELAYED RELEASE ORAL DAILY
Qty: 90 CAPSULE | Refills: 3 | Status: SHIPPED | OUTPATIENT
Start: 2020-11-09 | End: 2021-04-08 | Stop reason: SDUPTHER

## 2020-11-09 RX ORDER — EZETIMIBE AND SIMVASTATIN 10; 40 MG/1; MG/1
1 TABLET ORAL
Qty: 90 TABLET | Refills: 6 | Status: SHIPPED | OUTPATIENT
Start: 2020-11-09 | End: 2021-02-22 | Stop reason: SDUPTHER

## 2020-11-09 RX ORDER — AMLODIPINE BESYLATE 10 MG/1
10 TABLET ORAL DAILY
Qty: 30 TABLET | Refills: 0 | Status: SHIPPED | OUTPATIENT
Start: 2020-11-09 | End: 2020-12-01 | Stop reason: SDUPTHER

## 2020-11-09 RX ORDER — LEVOTHYROXINE SODIUM 0.03 MG/1
12.5 TABLET ORAL DAILY
Qty: 45 TABLET | Refills: 3 | Status: SHIPPED | OUTPATIENT
Start: 2020-11-09 | End: 2020-12-10 | Stop reason: SDUPTHER

## 2020-11-09 RX ORDER — LANOLIN ALCOHOL/MO/W.PET/CERES
100 CREAM (GRAM) TOPICAL DAILY
Qty: 30 TABLET | Refills: 0 | Status: SHIPPED | OUTPATIENT
Start: 2020-11-09 | End: 2020-12-01 | Stop reason: SDUPTHER

## 2020-11-19 ENCOUNTER — TELEPHONE (OUTPATIENT)
Dept: GASTROENTEROLOGY | Facility: CLINIC | Age: 70
End: 2020-11-19

## 2020-11-19 DIAGNOSIS — K85.90 ACUTE PANCREATITIS: ICD-10-CM

## 2020-11-19 RX ORDER — PREDNISONE 20 MG/1
20 TABLET ORAL DAILY
Qty: 30 TABLET | Refills: 0 | OUTPATIENT
Start: 2020-11-20

## 2020-11-30 ENCOUNTER — HOSPITAL ENCOUNTER (OUTPATIENT)
Dept: CT IMAGING | Facility: HOSPITAL | Age: 70
Discharge: HOME | End: 2020-11-30
Payer: COMMERCIAL

## 2020-11-30 DIAGNOSIS — K86.89 PANCREATIC MASS: ICD-10-CM

## 2020-11-30 DIAGNOSIS — K85.90 ACUTE PANCREATITIS: ICD-10-CM

## 2020-11-30 DIAGNOSIS — K86.9 PANCREATIC LESION: ICD-10-CM

## 2020-11-30 PROCEDURE — 74177 CT ABD & PELVIS W/CONTRAST: CPT

## 2020-11-30 RX ADMIN — IOHEXOL 100 ML: 350 INJECTION, SOLUTION INTRAVENOUS at 17:05

## 2020-12-01 ENCOUNTER — TELEPHONE (OUTPATIENT)
Dept: GASTROENTEROLOGY | Facility: MEDICAL CENTER | Age: 70
End: 2020-12-01

## 2020-12-01 DIAGNOSIS — K85.00 IDIOPATHIC ACUTE PANCREATITIS WITHOUT INFECTION OR NECROSIS: Primary | ICD-10-CM

## 2020-12-01 DIAGNOSIS — I10 ESSENTIAL HYPERTENSION, BENIGN: ICD-10-CM

## 2020-12-01 DIAGNOSIS — K85.90 ACUTE PANCREATITIS: ICD-10-CM

## 2020-12-01 RX ORDER — LANOLIN ALCOHOL/MO/W.PET/CERES
100 CREAM (GRAM) TOPICAL DAILY
Qty: 90 TABLET | Refills: 3 | Status: SHIPPED | OUTPATIENT
Start: 2020-12-01 | End: 2022-04-19 | Stop reason: SDUPTHER

## 2020-12-01 RX ORDER — AMLODIPINE BESYLATE 10 MG/1
10 TABLET ORAL DAILY
Qty: 90 TABLET | Refills: 3 | Status: SHIPPED | OUTPATIENT
Start: 2020-12-01 | End: 2020-12-10 | Stop reason: SDUPTHER

## 2020-12-10 ENCOUNTER — OFFICE VISIT (OUTPATIENT)
Dept: FAMILY MEDICINE CLINIC | Facility: CLINIC | Age: 70
End: 2020-12-10
Payer: COMMERCIAL

## 2020-12-10 VITALS
TEMPERATURE: 97.9 F | WEIGHT: 136 LBS | SYSTOLIC BLOOD PRESSURE: 126 MMHG | RESPIRATION RATE: 14 BRPM | BODY MASS INDEX: 24.1 KG/M2 | OXYGEN SATURATION: 98 % | HEIGHT: 63 IN | HEART RATE: 90 BPM | DIASTOLIC BLOOD PRESSURE: 68 MMHG

## 2020-12-10 DIAGNOSIS — I10 ESSENTIAL HYPERTENSION, BENIGN: ICD-10-CM

## 2020-12-10 DIAGNOSIS — Z12.11 COLON CANCER SCREENING: ICD-10-CM

## 2020-12-10 DIAGNOSIS — E03.9 HYPOTHYROIDISM (ACQUIRED): ICD-10-CM

## 2020-12-10 DIAGNOSIS — Z00.01 ENCOUNTER FOR GENERAL ADULT MEDICAL EXAMINATION WITH ABNORMAL FINDINGS: Primary | ICD-10-CM

## 2020-12-10 PROCEDURE — 99214 OFFICE O/P EST MOD 30 MIN: CPT | Performed by: INTERNAL MEDICINE

## 2020-12-10 PROCEDURE — 99397 PER PM REEVAL EST PAT 65+ YR: CPT | Performed by: INTERNAL MEDICINE

## 2020-12-10 RX ORDER — LOSARTAN POTASSIUM 50 MG/1
50 TABLET ORAL DAILY
Qty: 90 TABLET | Refills: 3 | Status: SHIPPED | OUTPATIENT
Start: 2020-12-10 | End: 2020-12-30

## 2020-12-10 RX ORDER — AMLODIPINE BESYLATE 10 MG/1
10 TABLET ORAL DAILY
Qty: 90 TABLET | Refills: 3 | Status: SHIPPED | OUTPATIENT
Start: 2020-12-10 | End: 2021-04-08 | Stop reason: SDUPTHER

## 2020-12-10 RX ORDER — LEVOTHYROXINE SODIUM 0.03 MG/1
12.5 TABLET ORAL DAILY
Qty: 45 TABLET | Refills: 3 | Status: SHIPPED | OUTPATIENT
Start: 2020-12-10 | End: 2021-04-08 | Stop reason: SDUPTHER

## 2020-12-19 ENCOUNTER — IMMUNIZATIONS (OUTPATIENT)
Dept: FAMILY MEDICINE CLINIC | Facility: HOSPITAL | Age: 70
End: 2020-12-19
Payer: COMMERCIAL

## 2020-12-19 DIAGNOSIS — Z23 ENCOUNTER FOR IMMUNIZATION: ICD-10-CM

## 2020-12-19 PROCEDURE — 91300 SARS-COV-2 / COVID-19 MRNA VACCINE (PFIZER-BIONTECH) 30 MCG: CPT

## 2020-12-19 PROCEDURE — 0001A SARS-COV-2 / COVID-19 MRNA VACCINE (PFIZER-BIONTECH) 30 MCG: CPT

## 2020-12-30 DIAGNOSIS — I10 ESSENTIAL HYPERTENSION, BENIGN: ICD-10-CM

## 2020-12-30 RX ORDER — LOSARTAN POTASSIUM 50 MG/1
TABLET ORAL
Qty: 30 TABLET | Refills: 0 | Status: SHIPPED | OUTPATIENT
Start: 2020-12-30 | End: 2021-04-08 | Stop reason: SDUPTHER

## 2021-01-09 ENCOUNTER — IMMUNIZATIONS (OUTPATIENT)
Dept: FAMILY MEDICINE CLINIC | Facility: HOSPITAL | Age: 71
End: 2021-01-09

## 2021-01-09 DIAGNOSIS — Z23 ENCOUNTER FOR IMMUNIZATION: ICD-10-CM

## 2021-01-09 PROCEDURE — 91300 SARS-COV-2 / COVID-19 MRNA VACCINE (PFIZER-BIONTECH) 30 MCG: CPT

## 2021-01-09 PROCEDURE — 0002A SARS-COV-2 / COVID-19 MRNA VACCINE (PFIZER-BIONTECH) 30 MCG: CPT

## 2021-01-26 ENCOUNTER — LAB (OUTPATIENT)
Dept: LAB | Facility: HOSPITAL | Age: 71
End: 2021-01-26
Payer: COMMERCIAL

## 2021-01-26 DIAGNOSIS — K85.90 ACUTE PANCREATITIS: ICD-10-CM

## 2021-01-26 DIAGNOSIS — E78.2 MIXED HYPERLIPIDEMIA: ICD-10-CM

## 2021-01-26 DIAGNOSIS — I10 ESSENTIAL HYPERTENSION: ICD-10-CM

## 2021-01-26 DIAGNOSIS — K21.9 GASTROESOPHAGEAL REFLUX DISEASE WITHOUT ESOPHAGITIS: ICD-10-CM

## 2021-01-26 DIAGNOSIS — R53.83 FATIGUE, UNSPECIFIED TYPE: ICD-10-CM

## 2021-01-26 DIAGNOSIS — E03.9 HYPOTHYROIDISM (ACQUIRED): ICD-10-CM

## 2021-01-26 LAB
25(OH)D3 SERPL-MCNC: 45.8 NG/ML (ref 30–100)
ALBUMIN SERPL BCP-MCNC: 4.2 G/DL (ref 3–5.2)
ALP SERPL-CCNC: 58 U/L (ref 43–122)
ALT SERPL W P-5'-P-CCNC: 29 U/L (ref 9–52)
ANION GAP SERPL CALCULATED.3IONS-SCNC: 8 MMOL/L (ref 5–14)
AST SERPL W P-5'-P-CCNC: 45 U/L (ref 17–59)
BACTERIA UR QL AUTO: NORMAL /HPF
BASOPHILS # BLD AUTO: 0 THOUSANDS/ΜL (ref 0–0.1)
BASOPHILS NFR BLD AUTO: 1 % (ref 0–1)
BILIRUB SERPL-MCNC: 0.5 MG/DL
BILIRUB UR QL STRIP: NEGATIVE
BUN SERPL-MCNC: 18 MG/DL (ref 5–25)
CALCIUM SERPL-MCNC: 10.3 MG/DL (ref 8.4–10.2)
CHLORIDE SERPL-SCNC: 103 MMOL/L (ref 97–108)
CHOLEST SERPL-MCNC: 108 MG/DL
CK SERPL-CCNC: 62 U/L (ref 55–170)
CLARITY UR: CLEAR
CO2 SERPL-SCNC: 30 MMOL/L (ref 22–30)
COLOR UR: ABNORMAL
CREAT SERPL-MCNC: 1.06 MG/DL (ref 0.7–1.5)
EOSINOPHIL # BLD AUTO: 0.4 THOUSAND/ΜL (ref 0–0.4)
EOSINOPHIL NFR BLD AUTO: 6 % (ref 0–6)
ERYTHROCYTE [DISTWIDTH] IN BLOOD BY AUTOMATED COUNT: 15.7 %
FERRITIN SERPL-MCNC: 111 NG/ML (ref 8–388)
GFR SERPL CREATININE-BSD FRML MDRD: 72 ML/MIN/1.73SQ M
GLUCOSE P FAST SERPL-MCNC: 93 MG/DL (ref 70–99)
GLUCOSE UR STRIP-MCNC: NEGATIVE MG/DL
HCT VFR BLD AUTO: 39.5 % (ref 41–53)
HDLC SERPL-MCNC: 38 MG/DL
HGB BLD-MCNC: 13 G/DL (ref 13.5–17.5)
HGB UR QL STRIP.AUTO: NEGATIVE
KETONES UR STRIP-MCNC: NEGATIVE MG/DL
LDLC SERPL CALC-MCNC: 46 MG/DL
LEUKOCYTE ESTERASE UR QL STRIP: 25
LYMPHOCYTES # BLD AUTO: 1.1 THOUSANDS/ΜL (ref 0.5–4)
LYMPHOCYTES NFR BLD AUTO: 18 % (ref 25–45)
MCH RBC QN AUTO: 31.8 PG (ref 26–34)
MCHC RBC AUTO-ENTMCNC: 32.9 G/DL (ref 31–36)
MCV RBC AUTO: 97 FL (ref 80–100)
MONOCYTES # BLD AUTO: 0.5 THOUSAND/ΜL (ref 0.2–0.9)
MONOCYTES NFR BLD AUTO: 9 % (ref 1–10)
NEUTROPHILS # BLD AUTO: 4.2 THOUSANDS/ΜL (ref 1.8–7.8)
NEUTS SEG NFR BLD AUTO: 67 % (ref 45–65)
NITRITE UR QL STRIP: NEGATIVE
NON-SQ EPI CELLS URNS QL MICRO: NORMAL /HPF
NONHDLC SERPL-MCNC: 70 MG/DL
PH UR STRIP.AUTO: 5 [PH]
PLATELET # BLD AUTO: 329 THOUSANDS/UL (ref 150–450)
PMV BLD AUTO: 9.7 FL (ref 8.9–12.7)
POTASSIUM SERPL-SCNC: 4.5 MMOL/L (ref 3.6–5)
PROT SERPL-MCNC: 7.5 G/DL (ref 5.9–8.4)
PROT UR STRIP-MCNC: NEGATIVE MG/DL
RBC # BLD AUTO: 4.09 MILLION/UL (ref 4.5–5.9)
RBC #/AREA URNS AUTO: NORMAL /HPF
SODIUM SERPL-SCNC: 141 MMOL/L (ref 137–147)
SP GR UR STRIP.AUTO: 1.02 (ref 1–1.04)
T4 FREE SERPL-MCNC: 1.47 NG/DL (ref 0.76–1.46)
TRIGL SERPL-MCNC: 120 MG/DL
TSH SERPL DL<=0.05 MIU/L-ACNC: 1.48 UIU/ML (ref 0.47–4.68)
UROBILINOGEN UA: NEGATIVE MG/DL
VIT B12 SERPL-MCNC: 1909 PG/ML (ref 100–900)
WBC # BLD AUTO: 6.3 THOUSAND/UL (ref 4.5–11)
WBC #/AREA URNS AUTO: NORMAL /HPF

## 2021-01-26 PROCEDURE — 82550 ASSAY OF CK (CPK): CPT

## 2021-01-26 PROCEDURE — 36415 COLL VENOUS BLD VENIPUNCTURE: CPT

## 2021-01-26 PROCEDURE — 86800 THYROGLOBULIN ANTIBODY: CPT

## 2021-01-26 PROCEDURE — 80053 COMPREHEN METABOLIC PANEL: CPT

## 2021-01-26 PROCEDURE — 82607 VITAMIN B-12: CPT

## 2021-01-26 PROCEDURE — 85025 COMPLETE CBC W/AUTO DIFF WBC: CPT

## 2021-01-26 PROCEDURE — 86376 MICROSOMAL ANTIBODY EACH: CPT

## 2021-01-26 PROCEDURE — 82728 ASSAY OF FERRITIN: CPT

## 2021-01-26 PROCEDURE — 82306 VITAMIN D 25 HYDROXY: CPT

## 2021-01-26 PROCEDURE — 84439 ASSAY OF FREE THYROXINE: CPT

## 2021-01-26 PROCEDURE — 84443 ASSAY THYROID STIM HORMONE: CPT

## 2021-01-26 PROCEDURE — 84425 ASSAY OF VITAMIN B-1: CPT

## 2021-01-26 PROCEDURE — 81001 URINALYSIS AUTO W/SCOPE: CPT | Performed by: INTERNAL MEDICINE

## 2021-01-26 PROCEDURE — 80061 LIPID PANEL: CPT

## 2021-01-27 LAB
THYROGLOB AB SERPL-ACNC: <1 IU/ML (ref 0–0.9)
THYROPEROXIDASE AB SERPL-ACNC: <9 IU/ML (ref 0–34)

## 2021-01-28 ENCOUNTER — OFFICE VISIT (OUTPATIENT)
Dept: FAMILY MEDICINE CLINIC | Facility: CLINIC | Age: 71
End: 2021-01-28
Payer: COMMERCIAL

## 2021-01-28 VITALS
HEART RATE: 73 BPM | SYSTOLIC BLOOD PRESSURE: 124 MMHG | TEMPERATURE: 96.7 F | HEIGHT: 63 IN | WEIGHT: 136.8 LBS | DIASTOLIC BLOOD PRESSURE: 70 MMHG | RESPIRATION RATE: 16 BRPM | OXYGEN SATURATION: 98 % | BODY MASS INDEX: 24.24 KG/M2

## 2021-01-28 DIAGNOSIS — F17.200 SMOKER: Primary | ICD-10-CM

## 2021-01-28 DIAGNOSIS — R19.7 DIARRHEA, UNSPECIFIED TYPE: ICD-10-CM

## 2021-01-28 DIAGNOSIS — K86.1 OTHER CHRONIC PANCREATITIS (HCC): ICD-10-CM

## 2021-01-28 DIAGNOSIS — E43 UNSPECIFIED SEVERE PROTEIN-CALORIE MALNUTRITION (HCC): ICD-10-CM

## 2021-01-28 DIAGNOSIS — I71.2 THORACIC AORTIC ANEURYSM, WITHOUT RUPTURE (HCC): ICD-10-CM

## 2021-01-28 PROCEDURE — 99214 OFFICE O/P EST MOD 30 MIN: CPT | Performed by: INTERNAL MEDICINE

## 2021-01-28 RX ORDER — METRONIDAZOLE 250 MG/1
250 TABLET ORAL EVERY 8 HOURS SCHEDULED
Qty: 21 TABLET | Refills: 0 | Status: SHIPPED | OUTPATIENT
Start: 2021-01-28 | End: 2021-02-04

## 2021-01-29 ENCOUNTER — LAB (OUTPATIENT)
Dept: LAB | Facility: HOSPITAL | Age: 71
End: 2021-01-29
Payer: COMMERCIAL

## 2021-01-29 DIAGNOSIS — R19.7 DIARRHEA, UNSPECIFIED TYPE: ICD-10-CM

## 2021-01-29 PROCEDURE — 87329 GIARDIA AG IA: CPT

## 2021-01-29 PROCEDURE — 87505 NFCT AGENT DETECTION GI: CPT

## 2021-01-29 PROCEDURE — 87205 SMEAR GRAM STAIN: CPT

## 2021-01-29 PROCEDURE — 87493 C DIFF AMPLIFIED PROBE: CPT

## 2021-01-29 NOTE — PROGRESS NOTES
Assessment/Plan:         Diagnoses and all orders for this visit:    Smoker; advised to quit  -     CT lung screening program; Future    Unspecified severe protein-calorie malnutrition ([facility] [address] ); improving nicely    Thoracic aortic aneurysm, without rupture ([facility] [address] ); stable  Yearly CTA Chest    Other [facility] );  -     pancrelipase, Lip-Prot-Amyl, (CREON) 12,000 units capsule; Take 12,000 units of lipase by mouth 3 (three) times a day with meals    Diarrhea, unspecified type; R/O C-diff Colitis  -     metroNIDAZOLE (FLAGYL) 250 mg tablet; Take 1 tablet (250 mg total) by mouth every 8 (eight) hours for 7 days  -     Stool culture; Future  -     Giardia antigen; Future  -     White Blood Cells, Stool by Gram Stain; Future  -     Clostridium difficile toxin by PCR; Future  -     pancrelipase, Lip-Prot-Amyl, (CREON) 12,000 units capsule; Take 12,000 units of lipase by mouth 3 (three) times a day with meals    RTC in 2 weeks    Subjective:      Patient ID: Kris Abdullahi is a 78 y o  male  78 Y O man is here for Regular check up, and Increasing diarrhea lately, mod to severe, recent Blood work and med list reviewed  W pt    The following portions of the patient's history were reviewed and updated as appropriate: allergies, current medications, past family history, past social history, past surgical history and problem list     Review of Systems   Constitutional: Negative for chills, fatigue and fever  HENT: Negative for congestion, facial swelling, sore throat, trouble swallowing and voice change  Eyes: Negative for pain, discharge and visual disturbance  Respiratory: Negative for cough, shortness of breath and wheezing  Cardiovascular: Negative for chest pain, palpitations and leg swelling  Gastrointestinal: Positive for diarrhea  Negative for abdominal pain, blood in stool, constipation and nausea  Endocrine: Negative for polydipsia, polyphagia and polyuria     Genitourinary: Negative for difficulty urinating, hematuria and urgency  Musculoskeletal: Negative for arthralgias and myalgias  Skin: Negative for rash  Neurological: Negative for dizziness, tremors, weakness and headaches  Hematological: Negative for adenopathy  Does not bruise/bleed easily  Psychiatric/Behavioral: Negative for dysphoric mood, sleep disturbance and suicidal ideas  Objective:      /70 (BP Location: Left arm, Patient Position: Sitting, Cuff Size: Standard)   Pulse 73   Temp (!) 96 7 °F (35 9 °C) (Tympanic)   Resp 16   Ht 5' 3" (1 6 m)   Wt 62 1 kg (136 lb 12 8 oz)   SpO2 98%   BMI 24 23 kg/m²          Physical Exam  Constitutional:       General: He is not in acute distress  HENT:      Head: Normocephalic  Mouth/Throat:      Pharynx: No oropharyngeal exudate  Eyes:      General: No scleral icterus  Conjunctiva/sclera: Conjunctivae normal       Pupils: Pupils are equal, round, and reactive to light  Neck:      Musculoskeletal: Neck supple  Thyroid: No thyromegaly  Cardiovascular:      Rate and Rhythm: Normal rate and regular rhythm  Heart sounds: Normal heart sounds  No murmur  Pulmonary:      Effort: Pulmonary effort is normal  No respiratory distress  Breath sounds: Normal breath sounds  No wheezing or rales  Abdominal:      General: Bowel sounds are normal  There is no distension  Palpations: Abdomen is soft  Tenderness: There is no abdominal tenderness  There is no guarding or rebound  Musculoskeletal:         General: No tenderness  Lymphadenopathy:      Cervical: No cervical adenopathy  Skin:     Coloration: Skin is not pale  Neurological:      Mental Status: He is alert and oriented to person, place, and time  Sensory: No sensory deficit  Motor: No weakness

## 2021-01-30 LAB
C DIFF TOX B TCDB STL QL NAA+PROBE: NEGATIVE
CAMPYLOBACTER DNA SPEC NAA+PROBE: NORMAL
SALMONELLA DNA SPEC QL NAA+PROBE: NORMAL
SHIGA TOXIN STX GENE SPEC NAA+PROBE: NORMAL
SHIGELLA DNA SPEC QL NAA+PROBE: NORMAL
WBC STL QL MICRO: NORMAL

## 2021-01-31 LAB — VIT B1 BLD-SCNC: 143 NMOL/L (ref 66.5–200)

## 2021-02-03 LAB — G LAMBLIA AG STL QL IA: NEGATIVE

## 2021-02-10 ENCOUNTER — OFFICE VISIT (OUTPATIENT)
Dept: FAMILY MEDICINE CLINIC | Facility: CLINIC | Age: 71
End: 2021-02-10
Payer: COMMERCIAL

## 2021-02-10 VITALS
DIASTOLIC BLOOD PRESSURE: 64 MMHG | HEIGHT: 63 IN | WEIGHT: 136 LBS | OXYGEN SATURATION: 96 % | TEMPERATURE: 98.3 F | SYSTOLIC BLOOD PRESSURE: 116 MMHG | HEART RATE: 97 BPM | BODY MASS INDEX: 24.1 KG/M2 | RESPIRATION RATE: 14 BRPM

## 2021-02-10 DIAGNOSIS — F17.210 CIGARETTE SMOKER: ICD-10-CM

## 2021-02-10 DIAGNOSIS — R19.7 DIARRHEA, UNSPECIFIED TYPE: Primary | ICD-10-CM

## 2021-02-10 DIAGNOSIS — E03.8 HYPOTHYROIDISM DUE TO HASHIMOTO'S THYROIDITIS: ICD-10-CM

## 2021-02-10 DIAGNOSIS — E06.3 HYPOTHYROIDISM DUE TO HASHIMOTO'S THYROIDITIS: ICD-10-CM

## 2021-02-10 PROCEDURE — 99213 OFFICE O/P EST LOW 20 MIN: CPT | Performed by: INTERNAL MEDICINE

## 2021-02-11 NOTE — PROGRESS NOTES
Assessment/Plan:         Diagnoses and all orders for this visit:    Diarrhea, unspecified type; Mostlikely is due to Pancreas Insuf  Is improving on Creon  RTC in 2-3 mos w Blood work    Cigarette smoker; advised to stop  Screen low dose ct lungs    Hypothyroidism due to Hashimoto's thyroiditis; continue synthroid  RTC in 2-3 mos w Blood work        Subjective:      Patient ID: Christine Fenton is a 78 y o  male  78 Y O Man is here for Regular check up, and Re Check from last visit, he feels Better, recent blood work and med list reviewed    The following portions of the patient's history were reviewed and updated as appropriate: allergies, current medications, past family history, past social history, past surgical history and problem list     Review of Systems   Constitutional: Negative for chills, fatigue and fever  HENT: Negative for congestion, facial swelling, sore throat, trouble swallowing and voice change  Eyes: Negative for pain, discharge and visual disturbance  Respiratory: Negative for cough, shortness of breath and wheezing  Cardiovascular: Negative for chest pain, palpitations and leg swelling  Gastrointestinal: Negative for abdominal pain, blood in stool, constipation, diarrhea and nausea  Endocrine: Negative for polydipsia, polyphagia and polyuria  Genitourinary: Negative for difficulty urinating, hematuria and urgency  Musculoskeletal: Negative for arthralgias and myalgias  Skin: Negative for rash  Neurological: Negative for dizziness, tremors, weakness and headaches  Hematological: Negative for adenopathy  Does not bruise/bleed easily  Psychiatric/Behavioral: Negative for dysphoric mood, sleep disturbance and suicidal ideas           Objective:      /64 (BP Location: Left arm, Patient Position: Sitting, Cuff Size: Standard)   Pulse 97   Temp 98 3 °F (36 8 °C) (Tympanic)   Resp 14   Ht 5' 3" (1 6 m)   Wt 61 7 kg (136 lb)   SpO2 96%   BMI 24 09 kg/m² Physical Exam  Constitutional:       General: He is not in acute distress  HENT:      Head: Normocephalic  Mouth/Throat:      Pharynx: No oropharyngeal exudate  Eyes:      General: No scleral icterus  Conjunctiva/sclera: Conjunctivae normal       Pupils: Pupils are equal, round, and reactive to light  Neck:      Musculoskeletal: Neck supple  Thyroid: No thyromegaly  Cardiovascular:      Rate and Rhythm: Normal rate and regular rhythm  Heart sounds: Normal heart sounds  No murmur  Pulmonary:      Effort: Pulmonary effort is normal  No respiratory distress  Breath sounds: Normal breath sounds  No wheezing or rales  Abdominal:      General: Bowel sounds are normal  There is no distension  Palpations: Abdomen is soft  Tenderness: There is no abdominal tenderness  There is no guarding or rebound  Musculoskeletal:         General: No tenderness  Lymphadenopathy:      Cervical: No cervical adenopathy  Skin:     Coloration: Skin is not pale  Neurological:      Mental Status: He is alert and oriented to person, place, and time  Sensory: No sensory deficit  Motor: No weakness

## 2021-02-12 ENCOUNTER — HOSPITAL ENCOUNTER (OUTPATIENT)
Dept: CT IMAGING | Facility: HOSPITAL | Age: 71
Discharge: HOME | End: 2021-02-12
Payer: COMMERCIAL

## 2021-02-12 DIAGNOSIS — F17.200 SMOKER: ICD-10-CM

## 2021-02-12 PROCEDURE — 71271 CT THORAX LUNG CANCER SCR C-: CPT

## 2021-02-18 DIAGNOSIS — I71.2 THORACIC AORTIC ANEURYSM WITHOUT RUPTURE (HCC): Primary | ICD-10-CM

## 2021-02-19 DIAGNOSIS — E83.42 HYPOMAGNESEMIA: ICD-10-CM

## 2021-02-21 DIAGNOSIS — I65.23 CAROTID STENOSIS, ASYMPTOMATIC, BILATERAL: Chronic | ICD-10-CM

## 2021-02-21 DIAGNOSIS — E78.2 MIXED HYPERLIPIDEMIA: ICD-10-CM

## 2021-02-21 DIAGNOSIS — I49.1 ECTOPIC ATRIAL RHYTHM: ICD-10-CM

## 2021-02-21 DIAGNOSIS — I10 ESSENTIAL HYPERTENSION: ICD-10-CM

## 2021-02-21 DIAGNOSIS — Z72.0 TOBACCO ABUSE: ICD-10-CM

## 2021-02-21 DIAGNOSIS — I71.2 THORACIC AORTIC ANEURYSM WITHOUT RUPTURE (HCC): ICD-10-CM

## 2021-02-21 RX ORDER — EZETIMIBE AND SIMVASTATIN 10; 40 MG/1; MG/1
1 TABLET ORAL
Qty: 90 TABLET | Refills: 0 | Status: CANCELLED | OUTPATIENT
Start: 2021-02-21

## 2021-02-21 RX ORDER — METOPROLOL SUCCINATE 50 MG/1
25 TABLET, EXTENDED RELEASE ORAL DAILY
Qty: 90 TABLET | Refills: 0 | Status: CANCELLED | OUTPATIENT
Start: 2021-02-21

## 2021-02-22 DIAGNOSIS — I65.23 CAROTID STENOSIS, ASYMPTOMATIC, BILATERAL: Chronic | ICD-10-CM

## 2021-02-22 DIAGNOSIS — I49.1 ECTOPIC ATRIAL RHYTHM: ICD-10-CM

## 2021-02-22 DIAGNOSIS — I71.2 THORACIC AORTIC ANEURYSM WITHOUT RUPTURE (HCC): ICD-10-CM

## 2021-02-22 DIAGNOSIS — E83.42 HYPOMAGNESEMIA: ICD-10-CM

## 2021-02-22 DIAGNOSIS — E78.2 MIXED HYPERLIPIDEMIA: ICD-10-CM

## 2021-02-22 DIAGNOSIS — I10 ESSENTIAL HYPERTENSION: ICD-10-CM

## 2021-02-22 DIAGNOSIS — Z72.0 TOBACCO ABUSE: ICD-10-CM

## 2021-02-22 RX ORDER — EZETIMIBE AND SIMVASTATIN 10; 40 MG/1; MG/1
1 TABLET ORAL
Qty: 90 TABLET | Refills: 1 | Status: SHIPPED | OUTPATIENT
Start: 2021-02-22 | End: 2021-04-08 | Stop reason: SDUPTHER

## 2021-02-22 RX ORDER — METOPROLOL SUCCINATE 50 MG/1
25 TABLET, EXTENDED RELEASE ORAL DAILY
Qty: 90 TABLET | Refills: 1 | Status: SHIPPED | OUTPATIENT
Start: 2021-02-22 | End: 2021-04-08 | Stop reason: SDUPTHER

## 2021-02-26 ENCOUNTER — HOSPITAL ENCOUNTER (OUTPATIENT)
Dept: CT IMAGING | Facility: HOSPITAL | Age: 71
Discharge: HOME | End: 2021-02-26
Payer: COMMERCIAL

## 2021-02-26 DIAGNOSIS — I71.2 THORACIC AORTIC ANEURYSM WITHOUT RUPTURE (HCC): ICD-10-CM

## 2021-02-26 PROCEDURE — 71275 CT ANGIOGRAPHY CHEST: CPT

## 2021-02-26 PROCEDURE — G1004 CDSM NDSC: HCPCS

## 2021-02-26 RX ADMIN — IOHEXOL 100 ML: 350 INJECTION, SOLUTION INTRAVENOUS at 13:08

## 2021-02-28 ENCOUNTER — HOSPITAL ENCOUNTER (INPATIENT)
Facility: HOSPITAL | Age: 71
LOS: 8 days | Discharge: HOME | DRG: 419 | End: 2021-03-08
Attending: EMERGENCY MEDICINE | Admitting: INTERNAL MEDICINE
Payer: COMMERCIAL

## 2021-02-28 DIAGNOSIS — K80.20 GALLSTONES: Primary | ICD-10-CM

## 2021-02-28 DIAGNOSIS — K85.90 PANCREATITIS: ICD-10-CM

## 2021-02-28 PROBLEM — K85.00 IDIOPATHIC ACUTE PANCREATITIS WITHOUT INFECTION OR NECROSIS: Status: ACTIVE | Noted: 2021-02-28

## 2021-02-28 PROBLEM — R74.01 TRANSAMINITIS: Status: ACTIVE | Noted: 2021-02-28

## 2021-02-28 LAB
ALBUMIN SERPL BCP-MCNC: 4.7 G/DL (ref 3–5.2)
ALP SERPL-CCNC: 58 U/L (ref 43–122)
ALT SERPL W P-5'-P-CCNC: 67 U/L (ref 9–52)
ANION GAP SERPL CALCULATED.3IONS-SCNC: 12 MMOL/L (ref 5–14)
AST SERPL W P-5'-P-CCNC: 220 U/L (ref 17–59)
ATRIAL RATE: 75 BPM
BASOPHILS # BLD AUTO: 0.1 THOUSANDS/ΜL (ref 0–0.1)
BASOPHILS NFR BLD AUTO: 1 % (ref 0–1)
BILIRUB SERPL-MCNC: 1.2 MG/DL
BUN SERPL-MCNC: 20 MG/DL (ref 5–25)
CALCIUM SERPL-MCNC: 10 MG/DL (ref 8.4–10.2)
CHLORIDE SERPL-SCNC: 102 MMOL/L (ref 97–108)
CO2 SERPL-SCNC: 26 MMOL/L (ref 22–30)
CREAT SERPL-MCNC: 1.09 MG/DL (ref 0.7–1.5)
EOSINOPHIL # BLD AUTO: 0.3 THOUSAND/ΜL (ref 0–0.4)
EOSINOPHIL NFR BLD AUTO: 3 % (ref 0–6)
ERYTHROCYTE [DISTWIDTH] IN BLOOD BY AUTOMATED COUNT: 14.1 %
GFR SERPL CREATININE-BSD FRML MDRD: 69 ML/MIN/1.73SQ M
GLUCOSE SERPL-MCNC: 117 MG/DL (ref 70–99)
HCT VFR BLD AUTO: 42.4 % (ref 41–53)
HGB BLD-MCNC: 14 G/DL (ref 13.5–17.5)
LIPASE SERPL-CCNC: ABNORMAL U/L (ref 23–300)
LYMPHOCYTES # BLD AUTO: 1.1 THOUSANDS/ΜL (ref 0.5–4)
LYMPHOCYTES NFR BLD AUTO: 11 % (ref 25–45)
MCH RBC QN AUTO: 31.5 PG (ref 26–34)
MCHC RBC AUTO-ENTMCNC: 32.9 G/DL (ref 31–36)
MCV RBC AUTO: 96 FL (ref 80–100)
MONOCYTES # BLD AUTO: 0.5 THOUSAND/ΜL (ref 0.2–0.9)
MONOCYTES NFR BLD AUTO: 5 % (ref 1–10)
NEUTROPHILS # BLD AUTO: 8.1 THOUSANDS/ΜL (ref 1.8–7.8)
NEUTS SEG NFR BLD AUTO: 81 % (ref 45–65)
P AXIS: 41 DEGREES
PLATELET # BLD AUTO: 296 THOUSANDS/UL (ref 150–450)
PMV BLD AUTO: 9.6 FL (ref 8.9–12.7)
POTASSIUM SERPL-SCNC: 4.9 MMOL/L (ref 3.6–5)
PR INTERVAL: 182 MS
PROT SERPL-MCNC: 8.6 G/DL (ref 5.9–8.4)
QRS AXIS: 50 DEGREES
QRSD INTERVAL: 84 MS
QT INTERVAL: 380 MS
QTC INTERVAL: 424 MS
RBC # BLD AUTO: 4.43 MILLION/UL (ref 4.5–5.9)
SODIUM SERPL-SCNC: 140 MMOL/L (ref 137–147)
T WAVE AXIS: 67 DEGREES
TROPONIN I SERPL-MCNC: <0.01 NG/ML (ref 0–0.03)
VENTRICULAR RATE: 75 BPM
WBC # BLD AUTO: 10.1 THOUSAND/UL (ref 4.5–11)

## 2021-02-28 PROCEDURE — 85025 COMPLETE CBC W/AUTO DIFF WBC: CPT | Performed by: EMERGENCY MEDICINE

## 2021-02-28 PROCEDURE — 93010 ELECTROCARDIOGRAM REPORT: CPT

## 2021-02-28 PROCEDURE — 36415 COLL VENOUS BLD VENIPUNCTURE: CPT | Performed by: EMERGENCY MEDICINE

## 2021-02-28 PROCEDURE — 96374 THER/PROPH/DIAG INJ IV PUSH: CPT

## 2021-02-28 PROCEDURE — 80053 COMPREHEN METABOLIC PANEL: CPT | Performed by: EMERGENCY MEDICINE

## 2021-02-28 PROCEDURE — 99285 EMERGENCY DEPT VISIT HI MDM: CPT

## 2021-02-28 PROCEDURE — 99285 EMERGENCY DEPT VISIT HI MDM: CPT | Performed by: EMERGENCY MEDICINE

## 2021-02-28 PROCEDURE — 93005 ELECTROCARDIOGRAM TRACING: CPT

## 2021-02-28 PROCEDURE — 84484 ASSAY OF TROPONIN QUANT: CPT | Performed by: EMERGENCY MEDICINE

## 2021-02-28 PROCEDURE — 96375 TX/PRO/DX INJ NEW DRUG ADDON: CPT

## 2021-02-28 PROCEDURE — 83690 ASSAY OF LIPASE: CPT | Performed by: EMERGENCY MEDICINE

## 2021-02-28 PROCEDURE — 99223 1ST HOSP IP/OBS HIGH 75: CPT | Performed by: PHYSICIAN ASSISTANT

## 2021-02-28 RX ORDER — HYDROCODONE BITARTRATE AND ACETAMINOPHEN 5; 325 MG/1; MG/1
1 TABLET ORAL EVERY 6 HOURS PRN
Status: DISCONTINUED | OUTPATIENT
Start: 2021-02-28 | End: 2021-03-05

## 2021-02-28 RX ORDER — ACETAMINOPHEN 325 MG/1
650 TABLET ORAL EVERY 6 HOURS PRN
Status: DISCONTINUED | OUTPATIENT
Start: 2021-02-28 | End: 2021-03-05

## 2021-02-28 RX ORDER — HEPARIN SODIUM 5000 [USP'U]/ML
5000 INJECTION, SOLUTION INTRAVENOUS; SUBCUTANEOUS EVERY 12 HOURS SCHEDULED
Status: DISCONTINUED | OUTPATIENT
Start: 2021-02-28 | End: 2021-03-08 | Stop reason: HOSPADM

## 2021-02-28 RX ORDER — AMLODIPINE BESYLATE 10 MG/1
10 TABLET ORAL DAILY
Status: DISCONTINUED | OUTPATIENT
Start: 2021-03-01 | End: 2021-03-08 | Stop reason: HOSPADM

## 2021-02-28 RX ORDER — LEVOTHYROXINE SODIUM 0.03 MG/1
12.5 TABLET ORAL DAILY
Status: DISCONTINUED | OUTPATIENT
Start: 2021-03-01 | End: 2021-03-08 | Stop reason: HOSPADM

## 2021-02-28 RX ORDER — LOSARTAN POTASSIUM 50 MG/1
50 TABLET ORAL DAILY
Status: DISCONTINUED | OUTPATIENT
Start: 2021-03-01 | End: 2021-03-08 | Stop reason: HOSPADM

## 2021-02-28 RX ORDER — ZOLPIDEM TARTRATE 5 MG/1
10 TABLET ORAL
Status: DISCONTINUED | OUTPATIENT
Start: 2021-02-28 | End: 2021-03-05

## 2021-02-28 RX ORDER — ONDANSETRON 2 MG/ML
4 INJECTION INTRAMUSCULAR; INTRAVENOUS EVERY 6 HOURS PRN
Status: DISCONTINUED | OUTPATIENT
Start: 2021-02-28 | End: 2021-03-08 | Stop reason: HOSPADM

## 2021-02-28 RX ORDER — MORPHINE SULFATE 4 MG/ML
4 INJECTION, SOLUTION INTRAMUSCULAR; INTRAVENOUS ONCE
Status: COMPLETED | OUTPATIENT
Start: 2021-02-28 | End: 2021-02-28

## 2021-02-28 RX ORDER — METOPROLOL SUCCINATE 25 MG/1
25 TABLET, EXTENDED RELEASE ORAL DAILY
Status: DISCONTINUED | OUTPATIENT
Start: 2021-03-01 | End: 2021-03-08 | Stop reason: HOSPADM

## 2021-02-28 RX ORDER — ONDANSETRON 2 MG/ML
4 INJECTION INTRAMUSCULAR; INTRAVENOUS ONCE
Status: COMPLETED | OUTPATIENT
Start: 2021-02-28 | End: 2021-02-28

## 2021-02-28 RX ORDER — SPIRONOLACTONE 25 MG/1
25 TABLET ORAL DAILY
Status: DISCONTINUED | OUTPATIENT
Start: 2021-03-01 | End: 2021-03-08 | Stop reason: HOSPADM

## 2021-02-28 RX ORDER — PANTOPRAZOLE SODIUM 40 MG/1
40 TABLET, DELAYED RELEASE ORAL
Status: DISCONTINUED | OUTPATIENT
Start: 2021-03-01 | End: 2021-03-08 | Stop reason: HOSPADM

## 2021-02-28 RX ORDER — SODIUM CHLORIDE 9 MG/ML
125 INJECTION, SOLUTION INTRAVENOUS CONTINUOUS
Status: DISCONTINUED | OUTPATIENT
Start: 2021-02-28 | End: 2021-03-01

## 2021-02-28 RX ORDER — LANOLIN ALCOHOL/MO/W.PET/CERES
100 CREAM (GRAM) TOPICAL DAILY
Status: DISCONTINUED | OUTPATIENT
Start: 2021-03-01 | End: 2021-03-05

## 2021-02-28 RX ADMIN — MORPHINE SULFATE 4 MG: 4 INJECTION INTRAVENOUS at 18:48

## 2021-02-28 RX ADMIN — HEPARIN SODIUM 5000 UNITS: 5000 INJECTION INTRAVENOUS; SUBCUTANEOUS at 21:05

## 2021-02-28 RX ADMIN — SODIUM CHLORIDE 125 ML/HR: 0.9 INJECTION, SOLUTION INTRAVENOUS at 21:03

## 2021-02-28 RX ADMIN — MAGNESIUM OXIDE TAB 400 MG (241.3 MG ELEMENTAL MG) 400 MG: 400 (241.3 MG) TAB at 21:05

## 2021-02-28 RX ADMIN — ONDANSETRON 4 MG: 2 INJECTION INTRAMUSCULAR; INTRAVENOUS at 18:48

## 2021-02-28 RX ADMIN — HYDROCODONE BITARTRATE AND ACETAMINOPHEN 1 TABLET: 5; 325 TABLET ORAL at 23:00

## 2021-02-28 NOTE — ED PROVIDER NOTES
History  Chief Complaint   Patient presents with    Abdominal Pain     RUQ abdominal pain that began yesterday  Pt reports nausea and diarrhea  Pt reports ongoing issues with diarrhea  Patient is a 29-year-old male with a history of pancreatitis who presents with a 2 day history progressively worsening pain initially started as a dull ache yesterday at the epigastric without any radiation intensified around 2:00 p m  Today  Constant sharp patient does endorse some nausea but no vomiting  Also states that he has had some soft stools since November  States this is similar to his previous pancreatitis flares  Not taking medication for it  No history abdominal surgery  No fevers sweats or chills          Prior to Admission Medications   Prescriptions Last Dose Informant Patient Reported? Taking?    amLODIPine (NORVASC) 10 mg tablet   No No   Sig: Take 1 tablet (10 mg total) by mouth daily   ezetimibe-simvastatin (VYTORIN) 10-40 mg per tablet   No No   Sig: Take 1 tablet by mouth daily at bedtime   levothyroxine 25 mcg tablet   No No   Sig: Take 0 5 tablets (12 5 mcg total) by mouth daily   losartan (COZAAR) 50 mg tablet   No No   Sig: TAKE 1 TABLET BY MOUTH EVERY DAY   magnesium oxide (MAG-OX) 400 mg   No No   Sig: Take 1 tablet (400 mg total) by mouth 2 (two) times a day   metoprolol succinate (TOPROL-XL) 50 mg 24 hr tablet   No No   Sig: Take 0 5 tablets (25 mg total) by mouth daily   nicotine polacrilex (NICORETTE) 4 mg gum  Self Yes No   Sig: Chew 4 mg as needed for smoking cessation   omeprazole (PriLOSEC) 40 MG capsule   No No   Sig: Take 1 capsule (40 mg total) by mouth daily Please stop 20 mg   pancrelipase, Lip-Prot-Amyl, (CREON) 12,000 units capsule   No No   Sig: Take 12,000 units of lipase by mouth 3 (three) times a day with meals   spironolactone (ALDACTONE) 25 mg tablet   No No   Sig: Take 1 tablet (25 mg total) by mouth daily   thiamine 100 MG tablet   No No   Sig: Take 1 tablet (100 mg total) by mouth daily   zolpidem (AMBIEN) 10 mg tablet  Self Yes No   Sig: Take by mouth daily at bedtime as needed       Facility-Administered Medications: None       Past Medical History:   Diagnosis Date    A-fib ([address] )     Arthritis     Avascular necrosis of bone of hip, left (HCC)     replaced    Back pain     CAD (coronary artery disease)     Carotid artery narrowing     left side -for endarterectomy today 3/5/2020    Disease of thyroid gland     hypo    GERD (gastroesophageal reflux disease)     History of Clostridioides difficile infection     Hyperlipidemia     Hypertension     Irregular heart beat     Kidney stone     Neck pain     Pancreatitis     Spinal stenosis     Wears glasses        Past Surgical History:   Procedure Laterality Date    BACK SURGERY      CARDIAC CATHETERIZATION      cardiac cath 2010  adjusted meds    CATARACT EXTRACTION Bilateral     CERVICAL FUSION      COLONOSCOPY      JOINT REPLACEMENT Left     hip    LUMBAR FUSION      NECK SURGERY      ORTHOPEDIC SURGERY Left     L THR    NE THROMBOENDARTECTMY NECK,NECK INCIS Left 3/5/2020    Procedure: ENDARTERECTOMY ARTERY CAROTID WITH BOVINE PATCH ANGIOPLASTY AND INTRAOP DUPLEX;  Surgeon: Danielle Shen MD;  Location: [facility] OR;  Service: Vascular    SPINAL FUSION         Family History   Problem Relation Age of Onset    Heart disease Mother     Parkinsonism Mother     Heart disease Father      I have reviewed and agree with the history as documented      E-Cigarette/Vaping    E-Cigarette Use Never User      E-Cigarette/Vaping Substances    Nicotine No     THC No     CBD No     Flavoring No     Other No     Unknown No      Social History     Tobacco Use    Smoking status: Former Smoker     Years: 50 00     Types: Cigarettes     Quit date: 2020     Years since quittin 1    Smokeless tobacco: Never Used    Tobacco comment: 3-4 cigarrettes   Substance Use Topics    Alcohol use: Yes     Frequency: 2-3 times a week     Binge frequency: Never     Comment: 3 oz of wine with communion multiple times/day/week    Drug use: Never       Review of Systems   Constitutional: Negative  HENT: Negative  Eyes: Negative  Respiratory: Negative  Cardiovascular: Negative  Gastrointestinal: Positive for abdominal pain and nausea  Endocrine: Negative  Genitourinary: Negative  Musculoskeletal: Negative  Skin: Negative  Allergic/Immunologic: Negative  Neurological: Negative  Hematological: Negative  Psychiatric/Behavioral: Negative  All other systems reviewed and are negative  Physical Exam  Physical Exam  Vitals signs and nursing note reviewed  Constitutional:       Appearance: He is well-developed and normal weight  HENT:      Head: Normocephalic and atraumatic  Mouth/Throat:      Mouth: Mucous membranes are moist       Pharynx: Oropharynx is clear  Eyes:      Extraocular Movements: Extraocular movements intact  Cardiovascular:      Rate and Rhythm: Normal rate and regular rhythm  Heart sounds: Normal heart sounds  Pulmonary:      Effort: Pulmonary effort is normal       Breath sounds: Normal breath sounds  Abdominal:      Palpations: Abdomen is soft  Tenderness: There is abdominal tenderness in the epigastric area  There is no right CVA tenderness, left CVA tenderness, guarding or rebound  Negative signs include Uribe's sign  Skin:     General: Skin is warm and dry  Capillary Refill: Capillary refill takes less than 2 seconds  Neurological:      General: No focal deficit present  Mental Status: He is alert and oriented to person, place, and time     Psychiatric:         Mood and Affect: Mood normal          Behavior: Behavior normal          Vital Signs  ED Triage Vitals [02/28/21 1823]   Temp Pulse Respirations Blood Pressure SpO2   -- 77 (!) 76 149/80 98 %      Temp src Heart Rate Source Patient Position - Orthostatic VS BP Location FiO2 (%) -- Monitor Sitting Right arm --      Pain Score       Worst Possible Pain           Vitals:    02/28/21 1823 02/28/21 1918   BP: 149/80 147/80   Pulse: 77 75   Patient Position - Orthostatic VS: Sitting Lying         Visual Acuity      ED Medications  Medications   ondansetron (ZOFRAN) injection 4 mg (4 mg Intravenous Given 2/28/21 1848)   morphine (PF) 4 mg/mL injection 4 mg (4 mg Intravenous Given 2/28/21 1848)       Diagnostic Studies  Results Reviewed     Procedure Component Value Units Date/Time    Lipase [[id number]]  (Abnormal) Collected: 02/28/21 1842    Lab Status: Final result Specimen: Blood from Arm, Right Updated: 02/28/21 1919     Lipase 12,279 u/L     Troponin I [[id number]]  (Normal) Collected: 02/28/21 1842    Lab Status: Final result Specimen: Blood from Arm, Right Updated: 02/28/21 1916     Troponin I <0 01 ng/mL     Narrative:      Hemolysis    Comprehensive metabolic panel [[id number]]  (Abnormal) Collected: 02/28/21 1842    Lab Status: Final result Specimen: Blood from Arm, Right Updated: 02/28/21 1905     Sodium 140 mmol/L      Potassium 4 9 mmol/L      Chloride 102 mmol/L      CO2 26 mmol/L      ANION GAP 12 mmol/L      BUN 20 mg/dL      Creatinine 1 09 mg/dL      Glucose 117 mg/dL      Calcium 10 0 mg/dL       U/L      ALT 67 U/L      Alkaline Phosphatase 58 U/L      Total Protein 8 6 g/dL      Albumin 4 7 g/dL      Total Bilirubin 1 20 mg/dL      eGFR 69 ml/min/1 73sq m     Narrative:      Hemolysis  National Kidney Disease Foundation guidelines for Chronic Kidney Disease (CKD):     Stage 1 with normal or high GFR (GFR > 90 mL/min/1 73 square meters)    Stage 2 Mild CKD (GFR = 60-89 mL/min/1 73 square meters)    Stage 3A Moderate CKD (GFR = 45-59 mL/min/1 73 square meters)    Stage 3B Moderate CKD (GFR = 30-44 mL/min/1 73 square meters)    Stage 4 Severe CKD (GFR = 15-29 mL/min/1 73 square meters)    Stage 5 End Stage CKD (GFR <15 mL/min/1 73 square meters)  Note: GFR calculation is accurate only with a steady state creatinine    CBC and differential [[id number]]  (Abnormal) Collected: 02/28/21 1842    Lab Status: Final result Specimen: Blood from Arm, Right Updated: 02/28/21 1856     WBC 10 10 Thousand/uL      RBC 4 43 Million/uL      Hemoglobin 14 0 g/dL      Hematocrit 42 4 %      MCV 96 fL      MCH 31 5 pg      MCHC 32 9 g/dL      RDW 14 1 %      MPV 9 6 fL      Platelets 107 Thousands/uL      Neutrophils Relative 81 %      Lymphocytes Relative 11 %      Monocytes Relative 5 %      Eosinophils Relative 3 %      Basophils Relative 1 %      Neutrophils Absolute 8 10 Thousands/µL      Lymphocytes Absolute 1 10 Thousands/µL      Monocytes Absolute 0 50 Thousand/µL      Eosinophils Absolute 0 30 Thousand/µL      Basophils Absolute 0 10 Thousands/µL                  No orders to display              Procedures  Procedures         ED Course                             SBIRT 22yo+      Most Recent Value   SBIRT (22 yo +)   In order to provide better care to our patients, we are screening all of our patients for alcohol and drug use  Would it be okay to ask you these screening questions? Yes Filed at: 02/28/2021 1920   Initial Alcohol Screen: US AUDIT-C    1  How often do you have a drink containing alcohol?  0 Filed at: 02/28/2021 1920   2  How many drinks containing alcohol do you have on a typical day you are drinking? 0 Filed at: 02/28/2021 1920   3a  Male UNDER 65: How often do you have five or more drinks on one occasion? 0 Filed at: 02/28/2021 1920   3b  FEMALE Any Age, or MALE 65+: How often do you have 4 or more drinks on one occassion? 0 Filed at: 02/28/2021 1920   Audit-C Score  0 Filed at: 02/28/2021 1920   LAKEISHA: How many times in the past year have you    Used an illegal drug or used a prescription medication for non-medical reasons?   Never Filed at: 02/28/2021 1920                    MDM  Number of Diagnoses or Management Options  Pancreatitis:      Amount and/or Complexity of Data Reviewed  Clinical lab tests: ordered and reviewed  Tests in the medicine section of CPT®: ordered and reviewed  Review and summarize past medical records: yes  Discuss the patient with other providers: yes  Independent visualization of images, tracings, or specimens: yes        Disposition  Final diagnoses:   Pancreatitis     Time reflects when diagnosis was documented in both MDM as applicable and the Disposition within this note     Time User Action Codes Description Comment    2/28/2021  7:33 PM Willard Griggs Add [Q70 99] Pancreatitis       ED Disposition     ED Disposition Condition Date/Time Comment    Admit Stable Sun Feb 28, 2021  7:33 PM Case was discussed with Ty Rodriguez [facility] and the patient's admission status was agreed to be Admission Status: inpatient status to the service of Dr Reymundo Leigh   Follow-up Information    None         Patient's Medications   Discharge Prescriptions    No medications on file     No discharge procedures on file      PDMP Review       Value Time User    PDMP Reviewed  Yes 10/30/2020 11:59 AM Tera Lagunas MD          ED Provider  Electronically Signed by           Samira Lozoya MD  02/28/21 0823

## 2021-02-28 NOTE — ED PROCEDURE NOTE
PROCEDURE  ECG 12 Lead Documentation Only    Date/Time: 2/28/2021 6:44 PM  Performed by: Josiah Britton MD  Authorized by: Josiah Britton MD     Indications / Diagnosis:  Epigastric abd pain  Patient location:  ED  Interpretation:     Interpretation: normal    Rate:     ECG rate:  75    ECG rate assessment: normal    Rhythm:     Rhythm: sinus rhythm    Ectopy:     Ectopy: none    QRS:     QRS axis:  Normal    QRS intervals:  Normal  Conduction:     Conduction: normal    ST segments:     ST segments:  Normal  T waves:     T waves: normal           Josiah Britton MD  02/28/21 1844

## 2021-03-01 ENCOUNTER — APPOINTMENT (INPATIENT)
Dept: ULTRASOUND IMAGING | Facility: HOSPITAL | Age: 71
DRG: 419 | End: 2021-03-01
Payer: COMMERCIAL

## 2021-03-01 LAB
ALBUMIN SERPL BCP-MCNC: 3.4 G/DL (ref 3–5.2)
ALP SERPL-CCNC: 67 U/L (ref 43–122)
ALT SERPL W P-5'-P-CCNC: 83 U/L (ref 9–52)
ANION GAP SERPL CALCULATED.3IONS-SCNC: 5 MMOL/L (ref 5–14)
AST SERPL W P-5'-P-CCNC: 123 U/L (ref 17–59)
BILIRUB SERPL-MCNC: 0.4 MG/DL
BUN SERPL-MCNC: 19 MG/DL (ref 5–25)
CALCIUM ALBUM COR SERPL-MCNC: 9.2 MG/DL (ref 8.3–10.1)
CALCIUM SERPL-MCNC: 8.7 MG/DL (ref 8.4–10.2)
CHLORIDE SERPL-SCNC: 107 MMOL/L (ref 97–108)
CO2 SERPL-SCNC: 24 MMOL/L (ref 22–30)
CREAT SERPL-MCNC: 1.12 MG/DL (ref 0.7–1.5)
ERYTHROCYTE [DISTWIDTH] IN BLOOD BY AUTOMATED COUNT: 14.4 %
GFR SERPL CREATININE-BSD FRML MDRD: 67 ML/MIN/1.73SQ M
GLUCOSE SERPL-MCNC: 82 MG/DL (ref 70–99)
HCT VFR BLD AUTO: 34.8 % (ref 41–53)
HGB BLD-MCNC: 11.6 G/DL (ref 13.5–17.5)
LIPASE SERPL-CCNC: 2018 U/L (ref 23–300)
MCH RBC QN AUTO: 32 PG (ref 26–34)
MCHC RBC AUTO-ENTMCNC: 33.3 G/DL (ref 31–36)
MCV RBC AUTO: 96 FL (ref 80–100)
PLATELET # BLD AUTO: 226 THOUSANDS/UL (ref 150–450)
PMV BLD AUTO: 9.8 FL (ref 8.9–12.7)
POTASSIUM SERPL-SCNC: 4.3 MMOL/L (ref 3.6–5)
PROT SERPL-MCNC: 6 G/DL (ref 5.9–8.4)
RBC # BLD AUTO: 3.62 MILLION/UL (ref 4.5–5.9)
SODIUM SERPL-SCNC: 136 MMOL/L (ref 137–147)
WBC # BLD AUTO: 6.4 THOUSAND/UL (ref 4.5–11)

## 2021-03-01 PROCEDURE — 80053 COMPREHEN METABOLIC PANEL: CPT | Performed by: PHYSICIAN ASSISTANT

## 2021-03-01 PROCEDURE — 99252 IP/OBS CONSLTJ NEW/EST SF 35: CPT | Performed by: PHYSICIAN ASSISTANT

## 2021-03-01 PROCEDURE — 99232 SBSQ HOSP IP/OBS MODERATE 35: CPT | Performed by: INTERNAL MEDICINE

## 2021-03-01 PROCEDURE — 76705 ECHO EXAM OF ABDOMEN: CPT

## 2021-03-01 PROCEDURE — 85027 COMPLETE CBC AUTOMATED: CPT | Performed by: PHYSICIAN ASSISTANT

## 2021-03-01 PROCEDURE — 83690 ASSAY OF LIPASE: CPT | Performed by: PHYSICIAN ASSISTANT

## 2021-03-01 RX ORDER — SODIUM CHLORIDE, SODIUM LACTATE, POTASSIUM CHLORIDE, CALCIUM CHLORIDE 600; 310; 30; 20 MG/100ML; MG/100ML; MG/100ML; MG/100ML
100 INJECTION, SOLUTION INTRAVENOUS CONTINUOUS
Status: DISCONTINUED | OUTPATIENT
Start: 2021-03-01 | End: 2021-03-06

## 2021-03-01 RX ADMIN — MORPHINE SULFATE 2 MG: 2 INJECTION, SOLUTION INTRAMUSCULAR; INTRAVENOUS at 09:30

## 2021-03-01 RX ADMIN — MORPHINE SULFATE 2 MG: 2 INJECTION, SOLUTION INTRAMUSCULAR; INTRAVENOUS at 00:07

## 2021-03-01 RX ADMIN — MORPHINE SULFATE 2 MG: 2 INJECTION, SOLUTION INTRAMUSCULAR; INTRAVENOUS at 21:47

## 2021-03-01 RX ADMIN — SODIUM CHLORIDE, SODIUM LACTATE, POTASSIUM CHLORIDE, AND CALCIUM CHLORIDE 125 ML/HR: .6; .31; .03; .02 INJECTION, SOLUTION INTRAVENOUS at 15:43

## 2021-03-01 RX ADMIN — MORPHINE SULFATE 2 MG: 2 INJECTION, SOLUTION INTRAMUSCULAR; INTRAVENOUS at 12:42

## 2021-03-01 RX ADMIN — PANTOPRAZOLE SODIUM 40 MG: 40 TABLET, DELAYED RELEASE ORAL at 05:00

## 2021-03-01 RX ADMIN — MAGNESIUM OXIDE TAB 400 MG (241.3 MG ELEMENTAL MG) 400 MG: 400 (241.3 MG) TAB at 17:51

## 2021-03-01 RX ADMIN — MORPHINE SULFATE 2 MG: 2 INJECTION, SOLUTION INTRAMUSCULAR; INTRAVENOUS at 15:44

## 2021-03-01 RX ADMIN — LOSARTAN POTASSIUM 50 MG: 50 TABLET, FILM COATED ORAL at 09:28

## 2021-03-01 RX ADMIN — SODIUM CHLORIDE 125 ML/HR: 0.9 INJECTION, SOLUTION INTRAVENOUS at 04:54

## 2021-03-01 RX ADMIN — THIAMINE HCL TAB 100 MG 100 MG: 100 TAB at 09:30

## 2021-03-01 RX ADMIN — HEPARIN SODIUM 5000 UNITS: 5000 INJECTION INTRAVENOUS; SUBCUTANEOUS at 21:47

## 2021-03-01 RX ADMIN — HEPARIN SODIUM 5000 UNITS: 5000 INJECTION INTRAVENOUS; SUBCUTANEOUS at 09:28

## 2021-03-01 RX ADMIN — SODIUM CHLORIDE, SODIUM LACTATE, POTASSIUM CHLORIDE, AND CALCIUM CHLORIDE 125 ML/HR: .6; .31; .03; .02 INJECTION, SOLUTION INTRAVENOUS at 07:53

## 2021-03-01 RX ADMIN — LEVOTHYROXINE SODIUM 12.5 MCG: 25 TABLET ORAL at 05:00

## 2021-03-01 RX ADMIN — MAGNESIUM OXIDE TAB 400 MG (241.3 MG ELEMENTAL MG) 400 MG: 400 (241.3 MG) TAB at 09:29

## 2021-03-01 RX ADMIN — MORPHINE SULFATE 2 MG: 2 INJECTION, SOLUTION INTRAMUSCULAR; INTRAVENOUS at 05:00

## 2021-03-01 RX ADMIN — MORPHINE SULFATE 2 MG: 2 INJECTION, SOLUTION INTRAMUSCULAR; INTRAVENOUS at 18:44

## 2021-03-01 NOTE — UTILIZATION REVIEW
Notification of Inpatient Admission/Inpatient Authorization Request   This is a Notification of Inpatient Admission for [address]  Be advised that this patient was admitted to our facility under Inpatient Status  Contact Sonali Villanueva at [phone] for additional admission information  [address]   DEPT DEDICATED [facility] [phone]  Patient Name:   Kt Preston   YOB: 1950       [address]   [address]:   [address]  Tax ID: [id number]  NPI: [id number] Attending Provider/NPI:  Phone:  Address: Samir Tenorio [[id number]]  [phone]  Same as Facility   Place of Service Code: 24 Place of Service Name: [facility]   Start Date: 2/28/21 1934 Discharge Date & Time: No discharge date for patient encounter  Type of Admission: Inpatient Status Discharge Disposition (if discharged): Home/Self Care   Patient Diagnoses: Pancreatitis [K85 90]  Abdominal pain [R10 9]   Orders: Admission Orders (From admission, onward)     Ordered        02/28/21 1934  Inpatient Admission  Once                    Assigned Utilization Review Contact: Sonali Villanueva  Utilization   Network Utilization Review Department  Phone: [phone]; Fax [phone]  Email: [email or web address]   ATTENTION PAYERS: Please call the assigned Utilization  directly with any questions or concerns ALL voicemails in the department are confidential  Send all requests for admission clinical reviews, approved or denied determinations and any other requests to dedicated fax number belonging to the campus where the patient is receiving treatment

## 2021-03-01 NOTE — PROGRESS NOTES
Progress Note - Kris Abdullahi 1950, 78 y o  male MRN: [id number]    Unit/Bed#: 7T [facility] 702-01 Encounter: [id number]    Primary Care Provider: Sandra Walker MD   Date and time admitted to hospital: 2/28/2021  6:26 PM        * Idiopathic acute pancreatitis without infection or necrosis  Assessment & Plan  · Recurrent pancreatitis thought related to alcohol which he now declined using since last hospitalization in October 2020  Reported pattern of acute pancreatitis in February and October  · Severe epigastric pain radiated to the right shoulder and right midsternal for the last 4 days  · Lipase more than 12,000 surrounding down to 2000  ·  Keep NPO  · Liver ultrasound pending  ·  will hold pre-hospital Creon  · Zofran and pain control morphine p r n  · Consulted GI appreciate recommendation  ·  will hold pre-hospital Vytorin  · Switch IV fluid to lactated Ringer    Transaminitis  Assessment & Plan  · AST ALT ratio 120-80 consistent with alcohol use  ·  Ultrasound liver pending  · Noted GI consult recommendations    Smoking  Assessment & Plan   Continue pre-hospital nicotine gum    Mixed hyperlipidemia  Assessment & Plan   Vytorin currently on hold secondary to acute pancreatitis and elevated liver functions    Hypothyroidism (acquired)  Assessment & Plan   Continue pre-hospital levothyroxine 12 5 mg p o  daily    Thoracic aortic aneurysm without rupture [facility])  Assessment & Plan  Recent CTA 02/26/2021 showed   Ascending aortic aneurysm measures 4 cm   Moderate stenosis at the left renal artery origin  Outpatient surveillance by PCP      Essential hypertension, benign  Assessment & Plan   Continue pre-hospital spironolactone 25 mg p o  daily, Toprol XL 25 mg p o  daily, Cozaar 50 mg p o  daily and Norvasc 10 mg p o  daily        VTE Pharmacologic Prophylaxis:   Pharmacologic: Heparin  Mechanical VTE Prophylaxis in Place: No    Patient Centered Rounds: I have performed bedside rounds with nursing staff today     Discussions with Specialists or Other Care Team Provider:  Discussed with case management  Education and Discussions with Family / Patient:  Discussed with patient at bedside  Time Spent for Care: 20 minutes  More than 50% of total time spent on counseling and coordination of care as described above  Current Length of Stay: 1 day(s)    Current Patient Status: Inpatient   Certification Statement: The patient will continue to require additional inpatient hospital stay due to Acute pancreatitis    Discharge Plan:  Anticipate discharge home in 2-3 days  Code Status: Level 1 - Full Code      Subjective:   Patient seen examined at bedside  Still has epigastric pain  Objective:     Vitals:   Temp (24hrs), Av 1 °F (36 2 °C), Min:96 8 °F (36 °C), Max:97 3 °F (36 3 °C)    Temp:  [96 8 °F (36 °C)-97 3 °F (36 3 °C)] 96 8 °F (36 °C)  HR:  [68-78] 78  Resp:  [16-76] 16  BP: (105-154)/(66-80) 105/67  SpO2:  [88 %-98 %] 88 %  Body mass index is 23 86 kg/m²  Input and Output Summary (last 24 hours): Intake/Output Summary (Last 24 hours) at 3/1/2021 1214  Last data filed at 3/1/2021 0615  Gross per 24 hour   Intake 1240 ml   Output 500 ml   Net 740 ml       Physical Exam:     Physical Exam  Vitals signs reviewed  Constitutional:       General: He is not in acute distress  Appearance: He is not ill-appearing  HENT:      Head: Normocephalic and atraumatic  Nose: No rhinorrhea  Eyes:      General:         Right eye: No discharge  Left eye: No discharge  Cardiovascular:      Rate and Rhythm: Normal rate and regular rhythm  Heart sounds: Normal heart sounds  No murmur  Pulmonary:      Effort: Pulmonary effort is normal  No respiratory distress  Breath sounds: Normal breath sounds  No wheezing or rales  Abdominal:      General: Bowel sounds are normal       Palpations: Abdomen is soft  Tenderness: There is abdominal tenderness (Generalized)  Neurological:      Mental Status: He is alert and oriented to person, place, and time  Psychiatric:         Behavior: Behavior normal            Additional Data:     Labs:    Results from last 7 days   Lab Units 03/01/21  0439 02/28/21  1842   WBC Thousand/uL 6 40 10 10   HEMOGLOBIN g/dL 11 6* 14 0   HEMATOCRIT % 34 8* 42 4   PLATELETS Thousands/uL 226 296   NEUTROS PCT %  --  81*   LYMPHS PCT %  --  11*   MONOS PCT %  --  5   EOS PCT %  --  3     Results from last 7 days   Lab Units 03/01/21  0439   SODIUM mmol/L 136*   POTASSIUM mmol/L 4 3   CHLORIDE mmol/L 107   CO2 mmol/L 24   BUN mg/dL 19   CREATININE mg/dL 1 12   ANION GAP mmol/L 5   CALCIUM mg/dL 8 7   ALBUMIN g/dL 3 4   TOTAL BILIRUBIN mg/dL 0 40   ALK PHOS U/L 67   ALT U/L 83*   AST U/L 123*   GLUCOSE RANDOM mg/dL 82                           * I Have Reviewed All Lab Data Listed Above  * Additional Pertinent Lab Tests Reviewed:  All Labs Within Last 24 Hours Reviewed    Imaging:    Imaging Reports Reviewed Today Include:  CTA chest   Imaging Personally Reviewed by Myself Includes:  None    Recent Cultures (last 7 days):           Last 24 Hours Medication List:   Current Facility-Administered Medications   Medication Dose Route Frequency Provider Last Rate    acetaminophen  650 mg Oral Q6H PRN Sindy Spry, PA-C      amLODIPine  10 mg Oral Daily Sindy Spry, PA-C      heparin (porcine)  5,000 Units Subcutaneous Q12H [address] Sindy Mangum, PA-C      HYDROcodone-acetaminophen  1 tablet Oral Q6H PRN Sindy Spry, PA-C      lactated ringers  125 mL/hr Intravenous Continuous Janet Blood  mL/hr (03/01/21 0753)    levothyroxine  12 5 mcg Oral Daily Sindy Spry, PA-C      losartan  50 mg Oral Daily Sindy Spry, PA-C      magnesium oxide  400 mg Oral BID Sindy Spry, PA-C      metoprolol succinate  25 mg Oral Daily Sindy Spry, PA-C      morphine injection  2 mg Intravenous Q3H PRN Sindy Spry, PA-C      nicotine polacrilex  4 mg Oral Q2H PRN Michelle Case PA-C      ondansetron  4 mg Intravenous Q6H PRN Michelle Case PA-C      pantoprazole  40 mg Oral Early Morning Michelle Case PA-C      spironolactone  25 mg Oral Daily Michelle Case PA-C      thiamine  100 mg Oral Daily Michelle Case PA-C      zolpidem  10 mg Oral HS PRN Michelle Case PA-C          Today, Patient Was Seen By: Shari Richter MD    ** Please Note: Dictation voice to text software may have been used in the creation of this document   **

## 2021-03-01 NOTE — PLAN OF CARE
Problem: Potential for Falls  Goal: Patient will remain free of falls  Description: INTERVENTIONS:  - Assess patient frequently for physical needs  -  Identify cognitive and physical deficits and behaviors that affect risk of falls    -  [place] fall precautions as indicated by assessment   - Educate patient/family on patient safety including physical limitations  - Instruct patient to call for assistance with activity based on assessment  - Modify environment to reduce risk of injury  - Consider OT/PT consult to assist with strengthening/mobility  Outcome: Progressing     Problem: PAIN - ADULT  Goal: Verbalizes/displays adequate comfort level or baseline comfort level  Description: Interventions:  - Encourage patient to monitor pain and request assistance  - Assess pain using appropriate pain scale  - Administer analgesics based on type and severity of pain and evaluate response  - Implement non-pharmacological measures as appropriate and evaluate response  - Consider cultural and social influences on pain and pain management  - Notify physician/advanced practitioner if interventions unsuccessful or patient reports new pain  Outcome: Progressing     Problem: INFECTION - ADULT  Goal: Absence or prevention of progression during hospitalization  Description: INTERVENTIONS:  - Assess and monitor for signs and symptoms of infection  - Monitor lab/diagnostic results  - Monitor all insertion sites, i e  indwelling lines, tubes, and drains  - Monitor endotracheal if appropriate and nasal secretions for changes in amount and color  - [place] appropriate cooling/warming therapies per order  - Administer medications as ordered  - Instruct and encourage patient and family to use good hand hygiene technique  - Identify and instruct in appropriate isolation precautions for identified infection/condition  Outcome: Progressing     Problem: SAFETY ADULT  Goal: Patient will remain free of falls  Description: INTERVENTIONS:  - Assess patient frequently for physical needs  -  Identify cognitive and physical deficits and behaviors that affect risk of falls    -  [place] fall precautions as indicated by assessment   - Educate patient/family on patient safety including physical limitations  - Instruct patient to call for assistance with activity based on assessment  - Modify environment to reduce risk of injury  - Consider OT/PT consult to assist with strengthening/mobility  Outcome: Progressing  Goal: Maintain or return to baseline ADL function  Description: INTERVENTIONS:  -  Assess patient's ability to carry out ADLs; assess patient's baseline for ADL function and identify physical deficits which impact ability to perform ADLs (bathing, care of mouth/teeth, toileting, grooming, dressing, etc )  - Assess/evaluate cause of self-care deficits   - Assess range of motion  - Assess patient's mobility; develop plan if impaired  - Assess patient's need for assistive devices and provide as appropriate  - Encourage maximum independence but intervene and supervise when necessary  - Involve family in performance of ADLs  - Assess for home care needs following discharge   - Consider OT consult to assist with ADL evaluation and planning for discharge  - Provide patient education as appropriate  Outcome: Progressing  Goal: Maintain or return mobility status to optimal level  Description: INTERVENTIONS:  - Assess patient's baseline mobility status (ambulation, transfers, stairs, etc )    - Identify cognitive and physical deficits and behaviors that affect mobility  - Identify mobility aids required to assist with transfers and/or ambulation (gait belt, sit-to-stand, lift, walker, cane, etc )  - [place] fall precautions as indicated by assessment  - Record patient progress and toleration of activity level on Mobility SBAR; progress patient to next Phase/Stage  - Instruct patient to call for assistance with activity based on assessment  - Consider rehabilitation consult to assist with strengthening/weightbearing, etc   Outcome: Progressing     Problem: DISCHARGE PLANNING  Goal: Discharge to home or other facility with appropriate resources  Description: INTERVENTIONS:  - Identify barriers to discharge w/patient and caregiver  - Arrange for needed discharge resources and transportation as appropriate  - Identify discharge learning needs (meds, wound care, etc )  - Arrange for interpretive services to assist at discharge as needed  - Refer to Case Management Department for coordinating discharge planning if the patient needs post-hospital services based on physician/advanced practitioner order or complex needs related to functional status, cognitive ability, or social support system  Outcome: Progressing     Problem: GASTROINTESTINAL - ADULT  Goal: Minimal or absence of nausea and/or vomiting  Description: INTERVENTIONS:  - Administer IV fluids if ordered to ensure adequate hydration  - Maintain NPO status until nausea and vomiting are resolved  - Nasogastric tube if ordered  - Administer ordered antiemetic medications as needed  - Provide nonpharmacologic comfort measures as appropriate  - Advance diet as tolerated, if ordered  - Consider nutrition services referral to assist patient with adequate nutrition and appropriate food choices  Outcome: Progressing  Goal: Maintains or returns to baseline bowel function  Description: INTERVENTIONS:  - Assess bowel function  - Encourage oral fluids to ensure adequate hydration  - Administer IV fluids if ordered to ensure adequate hydration  - Administer ordered medications as needed  - Encourage mobilization and activity  - Consider nutritional services referral to assist patient with adequate nutrition and appropriate food choices  Outcome: Progressing  Goal: Maintains adequate nutritional intake  Description: INTERVENTIONS:  - Monitor percentage of each meal consumed  - Identify factors contributing to decreased intake, treat as appropriate  - Assist with meals as needed  - Monitor I&O, weight, and lab values if indicated  - Obtain nutrition services referral as needed  Outcome: Progressing     Problem: Nutrition/Hydration-ADULT  Goal: Nutrient/Hydration intake appropriate for improving, restoring or maintaining nutritional needs  Description: Monitor and assess patient's nutrition/hydration status for malnutrition  Collaborate with interdisciplinary team and initiate plan and interventions as ordered  Monitor patient's weight and dietary intake as ordered or per policy  Utilize nutrition screening tool and intervene as necessary  Determine patient's food preferences and provide high-protein, high-caloric foods as appropriate       INTERVENTIONS:  - Monitor oral intake, urinary output, labs, and treatment plans  - Assess nutrition and hydration status and recommend course of action  - Evaluate amount of meals eaten  - Assist patient with eating if necessary   - Allow adequate time for meals  - Recommend/ encourage appropriate diets, oral nutritional supplements, and vitamin/mineral supplements  - Order, calculate, and assess calorie counts as needed  - Recommend, monitor, and adjust tube feedings and TPN/PPN based on assessed needs  - Assess need for intravenous fluids  - Provide specific nutrition/hydration education as appropriate  - Include patient/family/caregiver in decisions related to nutrition  Outcome: Progressing

## 2021-03-01 NOTE — ASSESSMENT & PLAN NOTE
·  Patient has had significant increase in ALT, AST as well as bilirubin from 1 month ago  ·  will obtain liver ultrasound in a m    ·  await further input from GI

## 2021-03-01 NOTE — ASSESSMENT & PLAN NOTE
Recent CTA 02/26/2021 showed   Ascending aortic aneurysm measures 4 cm   Moderate stenosis at the left renal artery origin  Outpatient surveillance by PCP

## 2021-03-01 NOTE — ASSESSMENT & PLAN NOTE
·  Admit to Medicine  ·  make patient NPO  ·  will hold pre-hospital Creon  ·  will give Zofran and pain control p r n   ·  repeat lipase in a m    ·  will hold pre-hospital Vytorin  ·  consult GI

## 2021-03-01 NOTE — H&P
H&P- Yoli Dixon 1950, 78 y o  male MRN: [id number]    Unit/Bed#: [facility] 702-01 Encounter: [id number]    Primary Care Provider: Kike Cool MD   Date and time admitted to hospital: 2/28/2021  6:26 PM        * Idiopathic acute pancreatitis without infection or necrosis  Assessment & Plan  ·  Admit to Medicine  ·  make patient NPO  ·  will hold pre-hospital Creon  ·  will give Zofran and pain control p r n   ·  repeat lipase in a m  ·  will hold pre-hospital Vytorin  ·  consult GI    Transaminitis  Assessment & Plan  ·  Patient has had significant increase in ALT, AST as well as bilirubin from 1 month ago  ·  will obtain liver ultrasound in a m  ·  await further input from GI    Smoking  Assessment & Plan   Continue pre-hospital nicotine gum    Mixed hyperlipidemia  Assessment & Plan   Vytorin currently on hold secondary to acute pancreatitis and elevated liver functions    Hypothyroidism (acquired)  Assessment & Plan   Continue pre-hospital levothyroxine 12 5 mg p o  daily    Essential hypertension, benign  Assessment & Plan   Continue pre-hospital spironolactone 25 mg p o  daily, Toprol XL 25 mg p o  daily, Cozaar 50 mg p o  daily and Norvasc 10 mg p o  daily    VTE Prophylaxis: Heparin  Code Status:  Level 1  Discussion with family:  None present at bedside at time of exam    Anticipated Length of Stay:  Patient will be admitted on an Inpatient basis with an anticipated length of stay of  > 2 midnights  Justification for Hospital Stay:  Acute on chronic pancreatitis requiring IV fluid resuscitation, pain control and further GI evaluation    Total Time for Visit, including Counseling / Coordination of Care: 1 hour  Greater than 50% of this total time spent on direct patient counseling and coordination of care  Chief Complaint:    Epigastric abdominal pain x2 days    History of Present Illness:    Yoli Dixon is a 78 y o  male who presents with  Epigastric abdominal pain x2 days   Patient has had multiple episodes of acute on chronic pancreatitis with imaging and GI workup in the recent past who presents with a 2 day history of epigastric abdominal pain which she describes as 10/10 and stabbing in nature with radiation into his shoulder  Patient describes the pain as being constant and sharp and similar to when he has had pancreatitis flares in the past   Patient denies any recent alcohol use and no stone visualized on multiple previous imaging studies  Patient denies any fever chills, no bright red blood per rectum or dark stools  Patient does complain of some nausea but no vomiting  Review of Systems:    Review of Systems   Constitutional: Negative for chills and fever  Respiratory: Negative for cough, shortness of breath and wheezing  Cardiovascular: Negative for chest pain and palpitations  Gastrointestinal: Positive for abdominal pain, diarrhea and nausea  Negative for vomiting  Genitourinary: Negative for dysuria, frequency, hematuria and urgency  Neurological: Negative for weakness, light-headedness and headaches  All other systems reviewed and are negative        Past Medical and Surgical History:     Past Medical History:   Diagnosis Date    A-fib ([address] )     Arthritis     Avascular necrosis of bone of hip, left (HCC)     replaced    Back pain     CAD (coronary artery disease)     Carotid artery narrowing     left side -for endarterectomy today 3/5/2020    Disease of thyroid gland     hypo    GERD (gastroesophageal reflux disease)     History of Clostridioides difficile infection     Hyperlipidemia     Hypertension     Irregular heart beat     Kidney stone     Neck pain     Pancreatitis     Spinal stenosis     Wears glasses        Past Surgical History:   Procedure Laterality Date    BACK SURGERY      CARDIAC CATHETERIZATION      cardiac cath 5/2010  adjusted meds    CATARACT EXTRACTION Bilateral     CERVICAL FUSION      COLONOSCOPY      JOINT REPLACEMENT Left     hip    LUMBAR FUSION      NECK SURGERY      ORTHOPEDIC SURGERY Left     L THR    ND THROMBOENDARTECTMY Irma Burkitt INCIS Left 3/5/2020    Procedure: ENDARTERECTOMY ARTERY CAROTID WITH BOVINE PATCH ANGIOPLASTY AND INTRAOP DUPLEX;  Surgeon: Lisa Holman MD;  Location: [facility];  Service: Vascular    SPINAL FUSION         Meds/Allergies:    Prior to Admission medications    Medication Sig Start Date End Date Taking?  Authorizing Provider   amLODIPine (NORVASC) 10 mg tablet Take 1 tablet (10 mg total) by mouth daily 12/10/20  Yes Rj Seen, MD   ezetimibe-simvastatin (VYTORIN) 10-40 mg per tablet Take 1 tablet by mouth daily at bedtime 2/22/21   Rj Seen, MD   levothyroxine 25 mcg tablet Take 0 5 tablets (12 5 mcg total) by mouth daily 12/10/20 3/10/21  Rj Seen, MD   losartan (COZAAR) 50 mg tablet TAKE 1 TABLET BY MOUTH EVERY DAY 12/30/20   Rj Seen, MD   magnesium oxide (MAG-OX) 400 mg Take 1 tablet (400 mg total) by mouth 2 (two) times a day 2/22/21   Rj Seen, MD   metoprolol succinate (TOPROL-XL) 50 mg 24 hr tablet Take 0 5 tablets (25 mg total) by mouth daily 2/22/21   Rj Seen, MD   nicotine polacrilex (NICORETTE) 4 mg gum Chew 4 mg as needed for smoking cessation    Historical Provider, MD   omeprazole (PriLOSEC) 40 MG capsule Take 1 capsule (40 mg total) by mouth daily Please stop 20 mg 11/9/20   Rj Seen, MD   pancrelipase, Lip-Prot-Amyl, (CREON) 12,000 units capsule Take 12,000 units of lipase by mouth 3 (three) times a day with meals 1/28/21   Rj Seen, MD   spironolactone (ALDACTONE) 25 mg tablet Take 1 tablet (25 mg total) by mouth daily 9/10/20   Ather MD Uday   thiamine 100 MG tablet Take 1 tablet (100 mg total) by mouth daily 12/1/20   Rj Seen, MD   zolpidem (AMBIEN) 10 mg tablet Take by mouth daily at bedtime as needed  11/22/19   Historical Provider, MD   atorvastatin (LIPITOR) 10 mg tablet Take 1 tablet (10 mg total) by mouth daily 11/5/19 2/7/20  Aamir Tobar MD     all medications and allergies reviewed    Allergies: No Known Allergies    Social History:     Marital Status: Single   Occupation:    Patient Pre-hospital Living Situation:  Resides at home  Patient Pre-hospital Level of Mobility:  Full without assist  Patient Pre-hospital Diet Restrictions:  none  Substance Use History:   Social History     Substance and Sexual Activity   Alcohol Use Not Currently    Binge frequency: Never    Comment: 3 oz of wine with communion multiple times/day/week     Social History     Tobacco Use   Smoking Status Current Every Day Smoker    Packs/day: 0 25    Years: 50 00    Pack years: 12 50    Types: Cigarettes   Smokeless Tobacco Never Used   Tobacco Comment    3-4 cigarrettes     Social History     Substance and Sexual Activity   Drug Use Never       Family History:  I have reviewed the patient's family history    Physical Exam:     Vitals:   Blood Pressure: 154/79 (02/28/21 2255)  Pulse: 69 (02/28/21 2255)  Temperature: (!) 97 3 °F (36 3 °C) (02/28/21 2255)  Temp Source: Temporal (02/28/21 2255)  Respirations: 19 (02/28/21 2255)  Height: 5' 3" (160 cm) (02/28/21 2030)  Weight - Scale: 61 1 kg (134 lb 11 2 oz) (02/28/21 2030)  SpO2: 90 % (02/28/21 2255)    Physical Exam  Vitals signs and nursing note reviewed  Constitutional:       General: He is not in acute distress  Appearance: Normal appearance  HENT:      Head: Normocephalic and atraumatic  Nose: Nose normal       Comments: As per nursing exam given remote location     Mouth/Throat:      Mouth: Mucous membranes are moist       Pharynx: No oropharyngeal exudate or posterior oropharyngeal erythema  Comments: As per nursing exam given remote location  Eyes:      Extraocular Movements: Extraocular movements intact  Pupils: Pupils are equal, round, and reactive to light        Comments: As per nursing exam given remotelocation   Neck:      Musculoskeletal: Normal range of motion and neck supple  Comments: As per nursing exam given remote location  Cardiovascular:      Rate and Rhythm: Normal rate and regular rhythm  Pulses: Normal pulses  Heart sounds: Normal heart sounds  Comments: As per nursing exam given remote location  Pulmonary:      Effort: Pulmonary effort is normal  No respiratory distress  Breath sounds: Normal breath sounds  Comments: As per nursing exam given remote location  Abdominal:      General: Abdomen is flat  Bowel sounds are normal       Palpations: Abdomen is soft  Tenderness: There is abdominal tenderness in the epigastric area  Musculoskeletal: Normal range of motion  Right lower leg: No edema  Left lower leg: No edema  Comments: As per nursing exam given remote location   Skin:     General: Skin is warm and dry  Capillary Refill: Capillary refill takes less than 2 seconds  Comments: As per nursing exam given remote location   Neurological:      General: No focal deficit present  Mental Status: He is alert and oriented to person, place, and time  Additional Data:     Lab Results: I have personally reviewed pertinent reports  Results from last 7 days   Lab Units 02/28/21  1842   WBC Thousand/uL 10 10   HEMOGLOBIN g/dL 14 0   HEMATOCRIT % 42 4   PLATELETS Thousands/uL 296   NEUTROS PCT % 81*   LYMPHS PCT % 11*   MONOS PCT % 5   EOS PCT % 3     Results from last 7 days   Lab Units 02/28/21  1842   SODIUM mmol/L 140   POTASSIUM mmol/L 4 9   CHLORIDE mmol/L 102   CO2 mmol/L 26   BUN mg/dL 20   CREATININE mg/dL 1 09   ANION GAP mmol/L 12   CALCIUM mg/dL 10 0   ALBUMIN g/dL 4 7   TOTAL BILIRUBIN mg/dL 1 20   ALK PHOS U/L 58   ALT U/L 67*   AST U/L 220*   GLUCOSE RANDOM mg/dL 117*                       Imaging: I have personally reviewed pertinent reports      No orders to display         EKG, Pathology, and Other Studies Reviewed on Admission:   · EKG:  Normal sinus rhythm at 75 without ischemia    Epic / Care Everywhere Records Reviewed: Yes    ** Please Note: This note has been constructed using a voice recognition system   **

## 2021-03-01 NOTE — ASSESSMENT & PLAN NOTE
· AST ALT ratio 120-80 consistent with alcohol use  ·  Ultrasound liver pending  · Noted GI consult recommendations

## 2021-03-01 NOTE — CONSULTS
Patient MRN: [id number]  Date of Service: 3/1/2021  Referring Provider: Oliver Armstrong PA-C  Provider Creating Note: Isaias Rushing PA-C  PCP: Kristel Shane    Reason for Consult: pancreatitis    HISTORY OF PRESENT ILLNESS:  Inder Joyner is a 78 y o  male with PMH of AFib, CAD, Cdiff hx and recurrent pancreatitis possibly related to alcohol vs microlithiasis followed by Dr Jesenia Jensen, [facility]  He was admitted with epigastric pain yesterday similar to prior pancreatitis episodes, last admission 10/22-30/2020  His pain was progressively worsened over the past 2 days  He was found to have elevated lipase  He endorses a history of soft stools and has been on Creon with meals for suspected pancreatic insufficiency  He denies fever, chills, hematochezia, melena, vomiting  He has had some nausea with epigastric pain  Patient has had extensive workup in past for pancreatitis  No evidence of gallstones or autoimmune pancreatitis  IgG 4 normal  Patient denies heavy alcohol history but does drink minimal wine when celebrating mass  Notably, this admission AST>ALT  Past imaging including CT and MRI showed pancreatic mass/cyst - he underwent EUSx2 (May 2020 and October 2020)  Biopsy showed signs of chronic pancreatitis and findings consistent with PSC cat III atypical cells  This was nondiagnostic but he was treated with 1 month tapered PO steroids  CT with pancreatic protocol 11/2020 showed improvement in pancreatic inflammation, therefore felt benign  Repeat CT planned for April 2021  Last EGD performed 10/2019 showed mild duodenitis, nonobstructing Schatzki's ring  Endoscopic evaluation during EUS 10/23/2020 showed nonobstructing Schatzki's ring at GE junction and small hiatal hernia  Gastric and duodenal biopsies negative for H pylori, duodenal bx negative    Last colonoscopy reportedly 3-4 years ago by Dr Myrtice Leventhal, normal  Repeat recommended in 10 year interval   He denies any family history of GI malignancy including pancreatic cancer  He is a smoker, smokes 5-6 cigarettes daily, but was a heavier smoke previously  Review of Systems:    General:   No fever or chills; No significant weight loss or gain  EENT:   No ear pain, facial swelling; No sneezing, sore throat  Skin:   No rashes, color changes  Respiratory:     No shortness of breath, cough, wheezing, stridor  Cardiovascular:     No chest pain, palpitations  Gastrointestinal:    As per HPI  Musculoskeletal:     No arthralgias, myalgias, swelling  Neurologic:   No dizziness, numbness, weakness  No speech difficulties  Psych:   No agitation, suicidal ideations    Otherwise, All other twelve-point review of systems normal      Past Medical History:   Diagnosis Date    A-fib ([address] )     Arthritis     Avascular necrosis of bone of hip, left (HCC)     replaced    Back pain     CAD (coronary artery disease)     Carotid artery narrowing     left side -for endarterectomy today 3/5/2020    Disease of thyroid gland     hypo    GERD (gastroesophageal reflux disease)     History of Clostridioides difficile infection     Hyperlipidemia     Hypertension     Irregular heart beat     Kidney stone     Neck pain     Pancreatitis     Spinal stenosis     Wears glasses      Past Surgical History:   Procedure Laterality Date    BACK SURGERY      CARDIAC CATHETERIZATION      cardiac cath 5/2010  adjusted meds    CATARACT EXTRACTION Bilateral     CERVICAL FUSION      COLONOSCOPY      JOINT REPLACEMENT Left     hip    LUMBAR FUSION      NECK SURGERY      ORTHOPEDIC SURGERY Left     L THR    OR THROMBOENDARTECTMY Katarina Deepti INCIS Left 3/5/2020    Procedure: ENDARTERECTOMY ARTERY CAROTID WITH BOVINE PATCH ANGIOPLASTY AND INTRAOP DUPLEX;  Surgeon: Kadeem Sutherland MD;  Location: [facility];  Service: Vascular    SPINAL FUSION       No Known Allergies    Medications:  Home Medications  Prior to Admission medications    Medication Sig Start Date End Date Taking? Authorizing Provider   amLODIPine (NORVASC) 10 mg tablet Take 1 tablet (10 mg total) by mouth daily 12/10/20  Yes Ariella English MD   ezetimibe-simvastatin (VYTORIN) 10-40 mg per tablet Take 1 tablet by mouth daily at bedtime 2/22/21   Ariella English MD   levothyroxine 25 mcg tablet Take 0 5 tablets (12 5 mcg total) by mouth daily 12/10/20 3/10/21  Ariella English MD   losartan (COZAAR) 50 mg tablet TAKE 1 TABLET BY MOUTH EVERY DAY 12/30/20   Ariella English MD   magnesium oxide (MAG-OX) 400 mg Take 1 tablet (400 mg total) by mouth 2 (two) times a day 2/22/21   Ariella English MD   metoprolol succinate (TOPROL-XL) 50 mg 24 hr tablet Take 0 5 tablets (25 mg total) by mouth daily 2/22/21   Ariella English MD   nicotine polacrilex (NICORETTE) 4 mg gum Chew 4 mg as needed for smoking cessation    Historical Provider, MD   omeprazole (PriLOSEC) 40 MG capsule Take 1 capsule (40 mg total) by mouth daily Please stop 20 mg 11/9/20   Ariella English MD   pancrelipase, Lip-Prot-Amyl, (CREON) 12,000 units capsule Take 12,000 units of lipase by mouth 3 (three) times a day with meals 1/28/21   Ariella English MD   spironolactone (ALDACTONE) 25 mg tablet Take 1 tablet (25 mg total) by mouth daily 9/10/20   Shlomo Frye MD   thiamine 100 MG tablet Take 1 tablet (100 mg total) by mouth daily 12/1/20   Ariella English MD   zolpidem (AMBIEN) 10 mg tablet Take by mouth daily at bedtime as needed  11/22/19   Historical Provider, MD   atorvastatin (LIPITOR) 10 mg tablet Take 1 tablet (10 mg total) by mouth daily 11/5/19 2/7/20  Ariella English MD       Inhouse Medications    Current Facility-Administered Medications:     acetaminophen (TYLENOL) tablet 650 mg, 650 mg, Oral, Q6H PRN    amLODIPine (NORVASC) tablet 10 mg, 10 mg, Oral, Daily    heparin (porcine) subcutaneous injection 5,000 Units, 5,000 Units, Subcutaneous, Q12H [facility], 5,000 Units at 02/28/21 2105    HYDROcodone-acetaminophen (NORCO) 5-325 mg per tablet 1 tablet, 1 tablet, Oral, Q6H PRN, 1 tablet at 02/28/21 2300    lactated ringers infusion, 125 mL/hr, Intravenous, Continuous, 125 mL/hr at 03/01/21 0753    levothyroxine tablet 12 5 mcg, 12 5 mcg, Oral, Daily, 12 5 mcg at 03/01/21 0500    losartan (COZAAR) tablet 50 mg, 50 mg, Oral, Daily    magnesium oxide (MAG-OX) tablet 400 mg, 400 mg, Oral, BID, 400 mg at 02/28/21 2105    metoprolol succinate (TOPROL-XL) 24 hr tablet 25 mg, 25 mg, Oral, Daily    morphine injection 2 mg, 2 mg, Intravenous, Q3H PRN, 2 mg at 03/01/21 0500    nicotine polacrilex (NICORETTE) gum 4 mg, 4 mg, Oral, Q2H PRN    ondansetron (ZOFRAN) injection 4 mg, 4 mg, Intravenous, Q6H PRN    pantoprazole (PROTONIX) EC tablet 40 mg, 40 mg, Oral, Early Morning, 40 mg at 03/01/21 0500    spironolactone (ALDACTONE) tablet 25 mg, 25 mg, Oral, Daily    thiamine tablet 100 mg, 100 mg, Oral, Daily    zolpidem (AMBIEN) tablet 10 mg, 10 mg, Oral, HS PRN      Social History   reports that he has been smoking cigarettes  He has a 12 50 pack-year smoking history  He has never used smokeless tobacco  He reports previous alcohol use  He reports that he does not use drugs  Family History  Family History   Problem Relation Age of Onset    Heart disease Mother     Parkinsonism Mother     Heart disease Father          OBJECTIVE:    /67 (BP Location: Right arm)   Pulse 78   Temp (!) 96 8 °F (36 °C) (Temporal)   Resp 16   Ht 5' 3" (1 6 m)   Wt 61 1 kg (134 lb 11 2 oz)   SpO2 (!) 88%   BMI 23 86 kg/m²   Physical Exam:     General Appearance:    Awake, alert, oriented x3, no distress, well developed, well    Nourished   Sleeping comfortably upon my arrival, woken easily   Head:    Normocephalic without obvious abnormality   Eyes:    PERRL, conjunctiva/corneas clear, EOM's intact        Neck:   Supple, no adenopathy   Throat:   Mucous membranes moist   Lungs:     Clear to auscultation bilaterally, no wheezing or rhonchi   Heart:    Regular rate and rhythm, S1 and S2 normal, no murmur Abdomen:     Soft, epigastric tenderness, non-distended  bowel sounds   normoactive  No masses, rebound  +guarding  Extremities:   Extremities normal  No clubbing, cyanosis or edema   Psych  Derm:   Normal affect    No jaundice   Neurologic:   CNII-XII grossly intact  Speech intact         Laboratory Studies:  Results from last 7 days   Lab Units 03/01/21  0439 02/28/21  1842   WBC Thousand/uL 6 40 10 10   HEMOGLOBIN g/dL 11 6* 14 0   HEMATOCRIT % 34 8* 42 4   MCV fL 96 96   PLATELETS Thousands/uL 226 296     Results from last 7 days   Lab Units 03/01/21  0439 02/28/21  1842   SODIUM mmol/L 136* 140   POTASSIUM mmol/L 4 3 4 9   CHLORIDE mmol/L 107 102   CO2 mmol/L 24 26   BUN mg/dL 19 20   CREATININE mg/dL 1 12 1 09   CALCIUM mg/dL 8 7 10 0   ALBUMIN g/dL 3 4 4 7   TOTAL BILIRUBIN mg/dL 0 40 1 20   ALK PHOS U/L 67 58   ALT U/L 83* 67*   AST U/L 123* 220*     Lab Results   Component Value Date    LIPASE 2,018 (H) 03/01/2021    LIPASE 12,279 (H) 02/28/2021    LIPASE 386 10/24/2020         Imaging and Other Studies:  Ct Lung Screening Program    Result Date: 2/18/2021  Narrative: CT CHEST LUNG CANCER SCREENING WITHOUT IV CONTRAST INDICATION:   F17 200: Nicotine dependence, unspecified, uncomplicated  COMPARISON: Chest CT 10/25/19 TECHNIQUE:  Unenhanced CT examination of the chest was performed utilizing a low dose protocol  Reformatted images were created in axial, sagittal, and coronal planes  Radiation dose length product (DLP) for this visit:  54 7 mGy-cm   This examination, like all CT scans performed in the [facility], was performed utilizing techniques to minimize radiation dose exposure, including the use of iterative reconstruction and automated exposure control  FINDINGS: LUNGS:  Lungs are clear  There is no tracheal or endobronchial lesion  PLEURA:  Unremarkable   HEART/GREAT VESSELS:  Ascending aortic aneurysm measures 4 cm which measured about 3 8 cm in 2019 MEDIASTINUM AND TAMMIE: Unremarkable  CHEST WALL AND LOWER NECK:   Unremarkable  VISUALIZED STRUCTURES IN THE UPPER ABDOMEN:  Unremarkable  OSSEOUS STRUCTURES:  No acute fracture or destructive osseous lesion  Impression: 1  Lung-RADS1, negative  Continued annual screening with LDCT in 12 months  2   4 cm ascending aortic aneurysm, mildly increased in size Workstation performed: [id number]         ASSESSMENT AND PLAN:  1  Recurrent acute pancreatitis requiring prior prolonged hospitalizations with TPN  Suspect related to alcohol vs microlithiasis but etiology remains largely unclear  Lipase elevated on admission, trending down  · Continue bowel rest, pain control, IVF  · Liver US pending  · Resume Creon with meals once tolerating PO diet  · Close clinical monitoring for deterioration of clinical status  · Advised complete alcohol abstinence  We discussed Mustum altar wine for celebrating mass which is minimally fermented but he states this is not an option  · Advised the importance of tobacco cessation  2  Pancreatic head mass lesion felt benign with interval improvement noted on CT imaging  Status post EUS x2 last year  [facility] which was felt nondiagnostic  He was treated with 1 month coarse of PO steroids with taper  CA 19-9 elevated at 63 10/2020  Followed by Dr Reji Chou  Patient has follow up with Dr Alejandra Carrasco this Friday, 3/5/2021 at 11:40am   3  Chronic soft unformed stool felt 2/2 pancreatitic insufficiency started on Creon in January  Workup with stool studies including C diff, thyroid function in January as outpatient unrevealing  Restart Creon when taking PO diet  4  Mildly nodular appearance of liver on CT imaging indicative of some degree of fibrosis, likely related to past alcohol consumption  LFTs show AST>ALT  Synthetic liver function appears to be within normal limits  Hepatitis C Ab negative  Avoid hepatotoxins           Hunter Cuba PA-C

## 2021-03-01 NOTE — ASSESSMENT & PLAN NOTE
Continue pre-hospital spironolactone 25 mg p o  daily, Toprol XL 25 mg p o  daily, Cozaar 50 mg p o  daily and Norvasc 10 mg p o  daily

## 2021-03-01 NOTE — UTILIZATION REVIEW
Initial Clinical Review    Admission: Date/Time/Statement:   Admission Orders (From admission, onward)     Ordered        02/28/21 1934  Inpatient Admission  Once                   Orders Placed This Encounter   Procedures    Inpatient Admission     Standing Status:   Standing     Number of Occurrences:   1     Order Specific Question:   Level of Care     Answer:   Med Surg [16]     Order Specific Question:   Estimated length of stay     Answer:   More than 2 Midnights     Order Specific Question:   Certification     Answer:   I certify that inpatient services are medically necessary for this patient for a duration of greater than two midnights  See H&P and MD Progress Notes for additional information about the patient's course of treatment  ED Arrival Information     Expected Arrival Acuity Means of Arrival Escorted By Service Admission Type    - 2/28/2021 18:18 Urgent Walk-In Self General Medicine Urgent    Arrival Complaint    abdominal pain        Chief Complaint   Patient presents with    Abdominal Pain     RUQ abdominal pain that began yesterday  Pt reports nausea and diarrhea  Pt reports ongoing issues with diarrhea  Assessment/Plan: 77 yo male with hx of pancreatitis presents to ED with 2 day hx of progressively worsening epigastric pain, constant, sharp with nausea - similar to prior episodes of pancreatitis  Denies any recent alcohol use and no stone visualized on multiple previous imaging studies  Findings: + tenderness in epigastric area, Lipase 12,279, elevated alt & ast   Admitted to Inpatient M/S with Idiopathic acute pancreatitis and Transaminitis and Plan is for NPO, IVF, prn pain control & zofran, GI consult,  US of liver    3/1 Lipase 2,018  AST, ALT remain elevated  Continues with pain 8/10 this am    Per GI: Recurrent acute pancreatitis requiring prior prolonged hospitalizations with TPN  Suspect related to alcohol vs microlithiasis but etiology remains largely unclear   Lipase elevated on admission, trending down  Continue bowel rest, pain control, IVF, Liver US pending, Resume Creon with meals once tolerating PO diet  Close clinical monitoring for deterioration of clinical status  Advised complete alcohol abstinence  We discussed Mustum altar wine for celebrating mass which is minimally fermented but he states this is not an option  Pancreatic head mass lesion felt benign with interval improvement noted on CT imaging       ED Triage Vitals   Temperature Pulse Respirations Blood Pressure SpO2   02/28/21 2030 02/28/21 1823 02/28/21 1823 02/28/21 1823 02/28/21 1823   (!) 97 1 °F (36 2 °C) 77 (!) 76 149/80 98 %      Temp Source Heart Rate Source Patient Position - Orthostatic VS BP Location FiO2 (%)   02/28/21 2030 02/28/21 1823 02/28/21 1823 02/28/21 1823 --   Temporal Monitor Sitting Right arm       Pain Score       02/28/21 1823       Worst Possible Pain          Wt Readings from Last 1 Encounters:   02/28/21 61 1 kg (134 lb 11 2 oz)     Additional Vital Signs:   03/01/21 0730  96 8 °F (36 °C)   78  16  105/67  88 %Abnormal   None (Room air) Lying   02/28/21 2255  97 3 °F (36 3 °C)   69  19  154/79  90 %  None (Room air) Lying   02/28/21 2030  97 1 °F (36 2 °C   68  20  127/66  92 %  None (Room air) Lying   02/28/21 2000  --  77  20  116/76  93 %  None (Room air) Lying   02/28/21 1930  --  75  20  136/73  93 %  None (Room air) Lying   02/28/21 1919  --  --  --  --  --  None (Room air) --   02/28/21 1918  --  75  20  147/80  94 %  None (Room air) Lying       Pertinent Labs/Diagnostic Test Results:     US of Liver:    Results from last 7 days   Lab Units 03/01/21  0439 02/28/21  1842   WBC Thousand/uL 6 40 10 10   HEMOGLOBIN g/dL 11 6* 14 0   HEMATOCRIT % 34 8* 42 4   PLATELETS Thousands/uL 226 296   NEUTROS ABS Thousands/µL  --  8 10*     Results from last 7 days   Lab Units 03/01/21 0439 02/28/21  1842   SODIUM mmol/L 136* 140   POTASSIUM mmol/L 4 3 4 9   CHLORIDE mmol/L 107 102   CO2 mmol/L 24 26   ANION GAP mmol/L 5 12   BUN mg/dL 19 20   CREATININE mg/dL 1 12 1 09   EGFR ml/min/1 73sq m 67 69   CALCIUM mg/dL 8 7 10 0     Results from last 7 days   Lab Units 03/01/21  0439 02/28/21  1842   AST U/L 123* 220*   ALT U/L 83* 67*   ALK PHOS U/L 67 58   TOTAL PROTEIN g/dL 6 0 8 6*   ALBUMIN g/dL 3 4 4 7   TOTAL BILIRUBIN mg/dL 0 40 1 20     Results from last 7 days   Lab Units 03/01/21  0439 02/28/21  1842   GLUCOSE RANDOM mg/dL 82 117*     Results from last 7 days   Lab Units 02/28/21  1842   TROPONIN I ng/mL <0 01     Results from last 7 days   Lab Units 03/01/21  0439 02/28/21  1842   LIPASE u/L 2,018* 12,279*     2/28 EKG: ECG rate:  75      Rhythm: sinus rhythm      Ectopy: none      QRS axis:  Normal    QRS intervals:  Normal    Conduction: normal      ST segments:  Normal    T waves: normal       ED Treatment:   Medication Administration from 02/28/2021 1818 to 02/28/2021 2025       Date/Time Order Dose Route Action     02/28/2021 1848 ondansetron (ZOFRAN) injection 4 mg 4 mg Intravenous Given     02/28/2021 1848 morphine (PF) 4 mg/mL injection 4 mg 4 mg Intravenous Given        Past Medical History:   Diagnosis Date    A-fib [address] [address] )     Arthritis     Avascular necrosis of bone of hip, left (HCC)     replaced    Back pain     CAD (coronary artery disease)     Carotid artery narrowing     left side -for endarterectomy today 3/5/2020    Disease of thyroid gland     hypo    GERD (gastroesophageal reflux disease)     History of Clostridioides difficile infection     Hyperlipidemia     Hypertension     Irregular heart beat     Kidney stone     Neck pain     Pancreatitis     Spinal stenosis     Wears glasses      Present on Admission:   Idiopathic acute pancreatitis without infection or necrosis   Smoking   Mixed hyperlipidemia   Hypothyroidism (acquired)   Essential hypertension, benign   Transaminitis    Admitting Diagnosis: Pancreatitis [K85 90]  Abdominal pain [R10 9]  Age/Sex: 78 y o  male  Admission Orders:  Scheduled Medications:  amLODIPine, 10 mg, Oral, Daily  heparin (porcine), 5,000 Units, Subcutaneous, Q12H [facility]  levothyroxine, 12 5 mcg, Oral, Daily  losartan, 50 mg, Oral, Daily  magnesium oxide, 400 mg, Oral, BID  metoprolol succinate, 25 mg, Oral, Daily  pantoprazole, 40 mg, Oral, Early Morning  spironolactone, 25 mg, Oral, Daily  thiamine, 100 mg, Oral, Daily    Continuous IV Infusions:  lactated ringers, 125 mL/hr, Intravenous, Continuous    PRN Meds:  acetaminophen, 650 mg, Oral, Q6H PRN  HYDROcodone-acetaminophen, 1 tablet, Oral, Q6H PRN x 1 2/28  morphine injection, 2 mg, Intravenous, Q3H PRN   X   3 3/1  nicotine polacrilex, 4 mg, Oral, Q2H PRN  ondansetron, 4 mg, Intravenous, Q6H PRN  zolpidem, 10 mg, Oral, HS PRN    NPO  SCDs  IP CONSULT TO GASTROENTEROLOGY    Network Utilization Review Department  ATTENTION: Please call with any questions or concerns to [phone] and carefully listen to the prompts so that you are directed to the right person  All voicemails are confidential   [place] all requests for admission clinical reviews, approved or denied determinations and any other requests to dedicated fax number below belonging to the campus where the patient is receiving treatment   List of dedicated fax numbers for the Facilities:  [address] DENIALS (Administrative/Medical Necessity) [phone]   [address] (Maternity/NICU/Pediatrics) [phone]   [address] [id number] [facility] Jade Martinez [address] (  [address]  Stephanie Chagn [facility]enni" [phone] [facility] [address] [place] [address] [address] [phone]   [place] [address] [phone]

## 2021-03-01 NOTE — ASSESSMENT & PLAN NOTE
· Recurrent pancreatitis thought related to alcohol which he now declined using since last hospitalization in October 2020  Reported pattern of acute pancreatitis in February and October  · Severe epigastric pain radiated to the right shoulder and right midsternal for the last 4 days  · Lipase more than 12,000 surrounding down to 2000  ·  Keep NPO  · Liver ultrasound pending  ·  will hold pre-hospital Creon  · Zofran and pain control morphine p r n    · Consulted GI appreciate recommendation  ·  will hold pre-hospital Vytorin  · Switch IV fluid to lactated Ringer

## 2021-03-02 LAB
ALBUMIN SERPL BCP-MCNC: 3.4 G/DL (ref 3–5.2)
ALP SERPL-CCNC: 59 U/L (ref 43–122)
ALT SERPL W P-5'-P-CCNC: 59 U/L (ref 9–52)
ANION GAP SERPL CALCULATED.3IONS-SCNC: 5 MMOL/L (ref 5–14)
AST SERPL W P-5'-P-CCNC: 77 U/L (ref 17–59)
BASOPHILS # BLD AUTO: 0 THOUSANDS/ΜL (ref 0–0.1)
BASOPHILS NFR BLD AUTO: 0 % (ref 0–1)
BILIRUB SERPL-MCNC: 0.5 MG/DL
BUN SERPL-MCNC: 14 MG/DL (ref 5–25)
CALCIUM ALBUM COR SERPL-MCNC: 9.4 MG/DL (ref 8.3–10.1)
CALCIUM SERPL-MCNC: 8.9 MG/DL (ref 8.4–10.2)
CHLORIDE SERPL-SCNC: 104 MMOL/L (ref 97–108)
CO2 SERPL-SCNC: 26 MMOL/L (ref 22–30)
CREAT SERPL-MCNC: 0.96 MG/DL (ref 0.7–1.5)
EOSINOPHIL # BLD AUTO: 0.3 THOUSAND/ΜL (ref 0–0.4)
EOSINOPHIL NFR BLD AUTO: 6 % (ref 0–6)
ERYTHROCYTE [DISTWIDTH] IN BLOOD BY AUTOMATED COUNT: 14.4 %
GFR SERPL CREATININE-BSD FRML MDRD: 81 ML/MIN/1.73SQ M
GLUCOSE SERPL-MCNC: 77 MG/DL (ref 70–99)
HCT VFR BLD AUTO: 34.7 % (ref 41–53)
HGB BLD-MCNC: 11.5 G/DL (ref 13.5–17.5)
LIPASE SERPL-CCNC: 380 U/L (ref 23–300)
LYMPHOCYTES # BLD AUTO: 1.5 THOUSANDS/ΜL (ref 0.5–4)
LYMPHOCYTES NFR BLD AUTO: 26 % (ref 25–45)
MCH RBC QN AUTO: 31.8 PG (ref 26–34)
MCHC RBC AUTO-ENTMCNC: 33.3 G/DL (ref 31–36)
MCV RBC AUTO: 95 FL (ref 80–100)
MONOCYTES # BLD AUTO: 0.5 THOUSAND/ΜL (ref 0.2–0.9)
MONOCYTES NFR BLD AUTO: 9 % (ref 1–10)
NEUTROPHILS # BLD AUTO: 3.3 THOUSANDS/ΜL (ref 1.8–7.8)
NEUTS SEG NFR BLD AUTO: 58 % (ref 45–65)
PLATELET # BLD AUTO: 225 THOUSANDS/UL (ref 150–450)
PMV BLD AUTO: 9.9 FL (ref 8.9–12.7)
POTASSIUM SERPL-SCNC: 4 MMOL/L (ref 3.6–5)
PROT SERPL-MCNC: 6.1 G/DL (ref 5.9–8.4)
RBC # BLD AUTO: 3.64 MILLION/UL (ref 4.5–5.9)
SODIUM SERPL-SCNC: 135 MMOL/L (ref 137–147)
WBC # BLD AUTO: 5.6 THOUSAND/UL (ref 4.5–11)

## 2021-03-02 PROCEDURE — 99232 SBSQ HOSP IP/OBS MODERATE 35: CPT | Performed by: INTERNAL MEDICINE

## 2021-03-02 PROCEDURE — 80053 COMPREHEN METABOLIC PANEL: CPT | Performed by: INTERNAL MEDICINE

## 2021-03-02 PROCEDURE — 83690 ASSAY OF LIPASE: CPT | Performed by: INTERNAL MEDICINE

## 2021-03-02 PROCEDURE — 85025 COMPLETE CBC W/AUTO DIFF WBC: CPT | Performed by: INTERNAL MEDICINE

## 2021-03-02 PROCEDURE — 99232 SBSQ HOSP IP/OBS MODERATE 35: CPT | Performed by: PHYSICIAN ASSISTANT

## 2021-03-02 RX ADMIN — METOPROLOL SUCCINATE 25 MG: 25 TABLET, FILM COATED, EXTENDED RELEASE ORAL at 08:52

## 2021-03-02 RX ADMIN — SODIUM CHLORIDE, SODIUM LACTATE, POTASSIUM CHLORIDE, AND CALCIUM CHLORIDE 125 ML/HR: .6; .31; .03; .02 INJECTION, SOLUTION INTRAVENOUS at 08:53

## 2021-03-02 RX ADMIN — SODIUM CHLORIDE, SODIUM LACTATE, POTASSIUM CHLORIDE, AND CALCIUM CHLORIDE 125 ML/HR: .6; .31; .03; .02 INJECTION, SOLUTION INTRAVENOUS at 00:32

## 2021-03-02 RX ADMIN — THIAMINE HCL TAB 100 MG 100 MG: 100 TAB at 08:52

## 2021-03-02 RX ADMIN — LOSARTAN POTASSIUM 50 MG: 50 TABLET, FILM COATED ORAL at 08:51

## 2021-03-02 RX ADMIN — MORPHINE SULFATE 2 MG: 2 INJECTION, SOLUTION INTRAMUSCULAR; INTRAVENOUS at 08:52

## 2021-03-02 RX ADMIN — MAGNESIUM OXIDE TAB 400 MG (241.3 MG ELEMENTAL MG) 400 MG: 400 (241.3 MG) TAB at 18:25

## 2021-03-02 RX ADMIN — MORPHINE SULFATE 2 MG: 2 INJECTION, SOLUTION INTRAMUSCULAR; INTRAVENOUS at 05:43

## 2021-03-02 RX ADMIN — SODIUM CHLORIDE, SODIUM LACTATE, POTASSIUM CHLORIDE, AND CALCIUM CHLORIDE 125 ML/HR: .6; .31; .03; .02 INJECTION, SOLUTION INTRAVENOUS at 16:30

## 2021-03-02 RX ADMIN — MORPHINE SULFATE 2 MG: 2 INJECTION, SOLUTION INTRAMUSCULAR; INTRAVENOUS at 12:14

## 2021-03-02 RX ADMIN — MORPHINE SULFATE 2 MG: 2 INJECTION, SOLUTION INTRAMUSCULAR; INTRAVENOUS at 18:25

## 2021-03-02 RX ADMIN — ZOLPIDEM TARTRATE 10 MG: 5 TABLET, FILM COATED ORAL at 00:32

## 2021-03-02 RX ADMIN — HEPARIN SODIUM 5000 UNITS: 5000 INJECTION INTRAVENOUS; SUBCUTANEOUS at 21:45

## 2021-03-02 RX ADMIN — LEVOTHYROXINE SODIUM 12.5 MCG: 25 TABLET ORAL at 05:42

## 2021-03-02 RX ADMIN — AMLODIPINE BESYLATE 10 MG: 10 TABLET ORAL at 08:51

## 2021-03-02 RX ADMIN — MAGNESIUM OXIDE TAB 400 MG (241.3 MG ELEMENTAL MG) 400 MG: 400 (241.3 MG) TAB at 08:52

## 2021-03-02 RX ADMIN — HEPARIN SODIUM 5000 UNITS: 5000 INJECTION INTRAVENOUS; SUBCUTANEOUS at 08:52

## 2021-03-02 RX ADMIN — MORPHINE SULFATE 2 MG: 2 INJECTION, SOLUTION INTRAMUSCULAR; INTRAVENOUS at 15:16

## 2021-03-02 RX ADMIN — SPIRONOLACTONE 25 MG: 25 TABLET ORAL at 08:52

## 2021-03-02 RX ADMIN — MORPHINE SULFATE 2 MG: 2 INJECTION, SOLUTION INTRAMUSCULAR; INTRAVENOUS at 21:45

## 2021-03-02 RX ADMIN — PANTOPRAZOLE SODIUM 40 MG: 40 TABLET, DELAYED RELEASE ORAL at 05:43

## 2021-03-02 NOTE — PROGRESS NOTES
Progress Note - Anne Santiago 1950, 78 y o  male MRN: [id number]    Unit/Bed#: 7T [facility] 702-01 Encounter: [id number]    Primary Care Provider: Ruben Hernández MD   Date and time admitted to hospital: 2/28/2021  6:26 PM        Transaminitis  Assessment & Plan  · AST ALT ratio 120-80 consistent with alcohol use  · Ultrasound liver showed-" Pancreatic tail partially obscured by overlying bowel gas  Cholelithiasis without sonographic evidence of cholecystitis  Unremarkable liver and biliary ducts " per radiology read  · Noted GI consult recommendations    Smoking  Assessment & Plan   Continue pre-hospital nicotine gum    Mixed hyperlipidemia  Assessment & Plan   Vytorin currently on hold secondary to acute pancreatitis and elevated liver functions    Hypothyroidism (acquired)  Assessment & Plan   Continue pre-hospital levothyroxine 12 5 mg p o  daily    Thoracic aortic aneurysm without rupture [facility])  Assessment & Plan  Recent CTA 02/26/2021 showed   Ascending aortic aneurysm measures 4 cm   Moderate stenosis at the left renal artery origin  Outpatient surveillance by PCP  Essential hypertension, benign  Assessment & Plan   Continue pre-hospital spironolactone 25 mg p o  daily, Toprol XL 25 mg p o  daily, Cozaar 50 mg p o  daily and Norvasc 10 mg p o  daily    * Idiopathic acute pancreatitis without infection or necrosis  Assessment & Plan  · Recurrent pancreatitis thought related to alcohol which he now declined using since last hospitalization in October 2020  Reported pattern of acute pancreatitis in February and October  · Severe epigastric pain radiated to the right shoulder and right midsternal for the last 4 days  · Lipase more than 12,000 and currently trending down  · GI consulted appreciate the following recommendations:   · Liver ultrasound- showed" Pancreatic tail partially obscured by overlying bowel gas  Cholelithiasis without sonographic evidence of cholecystitis   Unremarkable liver and biliary ducts "  · Continue to hold pre-hospital Creon since patient is not eating  · Continue Zofran and pain control morphine p r n  · Continue to hold pre-hospital Vytorin  · Continue Lactated Ringer      VTE Pharmacologic Prophylaxis:   Pharmacologic: Enoxaparin (Lovenox)  Mechanical VTE Prophylaxis in Place: Yes    Patient Centered Rounds: I have performed bedside rounds with nursing staff today  Discussions with Specialists or Other Care Team Provider: GI    Education and Discussions with Family / Patient: discussed with patient     Time Spent for Care: 20 minutes  More than 50% of total time spent on counseling and coordination of care as described above  Current Length of Stay: 2 day(s)    Current Patient Status: Inpatient   Certification Statement: The patient will continue to require additional inpatient hospital stay due to continue inpatient management for pancreatitis     Discharge Plan: Continue inpatient care for pancreatitis  Pending GI workup    Code Status: Level 1 - Full Code      Subjective:   Patient seen and examined, currently complaining of pain, he is not nauseas however with pain with eating  Last bm was yesterday    Objective:     Vitals:   Temp (24hrs), Av 3 °F (36 8 °C), Min:97 9 °F (36 6 °C), Max:98 8 °F (37 1 °C)    Temp:  [97 9 °F (36 6 °C)-98 8 °F (37 1 °C)] 98 8 °F (37 1 °C)  HR:  [70-73] 70  Resp:  [16-18] 18  BP: (109-134)/(60-69) 134/69  SpO2:  [92 %-96 %] 92 %  Body mass index is 23 86 kg/m²  Input and Output Summary (last 24 hours): Intake/Output Summary (Last 24 hours) at 3/2/2021 1433  Last data filed at 3/2/2021 1100  Gross per 24 hour   Intake 2339 17 ml   Output 200 ml   Net 2139 17 ml       Physical Exam:     Physical Exam  Constitutional:       General: He is not in acute distress  Appearance: Normal appearance  HENT:      Head: Normocephalic and atraumatic  Eyes:      General:         Right eye: No discharge  Left eye: No discharge  Cardiovascular:      Rate and Rhythm: Normal rate and regular rhythm  Pulses: Normal pulses  Heart sounds: No murmur  Pulmonary:      Effort: Pulmonary effort is normal  No respiratory distress  Breath sounds: Normal breath sounds  No wheezing  Abdominal:      General: There is no distension  Palpations: Abdomen is soft  Tenderness: There is no abdominal tenderness (epigastric)  Musculoskeletal:      Right lower leg: No edema  Left lower leg: No edema  Skin:     General: Skin is warm and dry  Neurological:      General: No focal deficit present  Mental Status: He is alert and oriented to person, place, and time  Mental status is at baseline  Psychiatric:         Mood and Affect: Mood normal          Additional Data:     Labs:    Results from last 7 days   Lab Units 03/02/21  0443   WBC Thousand/uL 5 60   HEMOGLOBIN g/dL 11 5*   HEMATOCRIT % 34 7*   PLATELETS Thousands/uL 225   NEUTROS PCT % 58   LYMPHS PCT % 26   MONOS PCT % 9   EOS PCT % 6     Results from last 7 days   Lab Units 03/02/21  0443   SODIUM mmol/L 135*   POTASSIUM mmol/L 4 0   CHLORIDE mmol/L 104   CO2 mmol/L 26   BUN mg/dL 14   CREATININE mg/dL 0 96   ANION GAP mmol/L 5   CALCIUM mg/dL 8 9   ALBUMIN g/dL 3 4   TOTAL BILIRUBIN mg/dL 0 50   ALK PHOS U/L 59   ALT U/L 59*   AST U/L 77*   GLUCOSE RANDOM mg/dL 77                           * I Have Reviewed All Lab Data Listed Above  * Additional Pertinent Lab Tests Reviewed:  All Labs Within Last 24 Hours Reviewed    Imaging:    Imaging Reports Reviewed Today Include: U/S    Recent Cultures (last 7 days):           Last 24 Hours Medication List:   Current Facility-Administered Medications   Medication Dose Route Frequency Provider Last Rate    acetaminophen  650 mg Oral Q6H PRN India Mireles PA-C      amLODIPine  10 mg Oral Daily India Mireles PA-C      heparin (porcine)  5,000 Units Subcutaneous Q12H [facility] India Mireles PA-C      HYDROcodone-acetaminophen 1 tablet Oral Q6H PRN Kenyon Angel, PA-C      lactated ringers  125 mL/hr Intravenous Continuous Sly Ferguson  mL/hr (03/02/21 9890)    levothyroxine  12 5 mcg Oral Daily Kenyon Angel, PA-C      losartan  50 mg Oral Daily Kenyon Angel, PA-C      magnesium oxide  400 mg Oral BID Kenyon Angel, PA-C      metoprolol succinate  25 mg Oral Daily Kenyon Angel, PA-C      morphine injection  2 mg Intravenous Q3H PRN Kenyon Angel, PA-C      nicotine polacrilex  4 mg Oral Q2H PRN Kenyon Angel, PA-C      ondansetron  4 mg Intravenous Q6H PRN Kenyon Angel, PA-C      pantoprazole  40 mg Oral Early Morning Kenyon Angel, PA-C      spironolactone  25 mg Oral Daily Kenyon Angel, PA-C      thiamine  100 mg Oral Daily Kenyon Angel, PA-C      zolpidem  10 mg Oral HS PRN Kenyon Angel, PA-C          Today, Patient Was Seen By: Anali Fonseca MD    ** Please Note: Dictation voice to text software may have been used in the creation of this document   **

## 2021-03-02 NOTE — PROGRESS NOTES
Patient Name: Yoli Dixon  Patient MRN: [id number]  Date: 03/02/21  Service: [facility]    Subjective   Still NPO this morning  VS stable  Still complaining of 8/10 abdominal pain this morning -  Norco given  Norco administered approximatly Q3 hours yesterday  Was able to sleep without pain meds ~7 hours  Per RN, was asking for diet advancement last night  States he feels dry this morning  Abdominal pain currently 5/10  Vitals  Blood pressure 109/62, pulse 71, temperature 98 2 °F (36 8 °C), temperature source Temporal, resp  rate 18, height 5' 3" (1 6 m), weight 61 1 kg (134 lb 11 2 oz), SpO2 95 %  Physical Exam:     General Appearance:    Awake, alert, oriented x3, no distress, sleeping comfortably upon arrival   Head:    Normocephalic without obvious abnormality   Eyes:    PERRL, conjunctiva/corneas clear, EOM's intact        Neck:   Supple, no adenopathy   Throat:   Mucous membranes moist   Lungs:     Clear to auscultation bilaterally, no wheezing or rhonchi   Heart:    Regular rate and rhythm, S1 and S2 normal, no murmur   Abdomen:     Soft, diffuse tenderness >epigastric region, non-distended  bowel sounds active  No  masses, rebound or guarding  Extremities:   Extremities without edema   Psych  Derm:   Normal affect    No jaundice   Neurologic:   CNII-XII grossly intact   Speech intact         Laboratory Studies  Results from last 7 days   Lab Units 03/01/21  0439 02/28/21  1842   WBC Thousand/uL 6 40 10 10   HEMOGLOBIN g/dL 11 6* 14 0   HEMATOCRIT % 34 8* 42 4   PLATELETS Thousands/uL 226 296     Results from last 7 days   Lab Units 03/01/21  0439 02/28/21  1842   SODIUM mmol/L 136* 140   POTASSIUM mmol/L 4 3 4 9   CHLORIDE mmol/L 107 102   CO2 mmol/L 24 26   BUN mg/dL 19 20   CREATININE mg/dL 1 12 1 09   CALCIUM mg/dL 8 7 10 0   ALBUMIN g/dL 3 4 4 7   TOTAL BILIRUBIN mg/dL 0 40 1 20   ALK PHOS U/L 67 58   ALT U/L 83* 67*   AST U/L 123* 220*     Lab Results   Component Value Date LIPASE 380 (H) 03/02/2021    LIPASE 2,018 (H) 03/01/2021    LIPASE 12,279 (H) 02/28/2021       Imaging and Other Studies      Inhouse Medications     Current Facility-Administered Medications:     acetaminophen (TYLENOL) tablet 650 mg, 650 mg, Oral, Q6H PRN    amLODIPine (NORVASC) tablet 10 mg, 10 mg, Oral, Daily    heparin (porcine) subcutaneous injection 5,000 Units, 5,000 Units, Subcutaneous, Q12H [address], 5,000 Units at 03/01/21 2147    HYDROcodone-acetaminophen (NORCO) 5-325 mg per tablet 1 tablet, 1 tablet, Oral, Q6H PRN, 1 tablet at 02/28/21 2300    lactated ringers infusion, 125 mL/hr, Intravenous, Continuous, 125 mL/hr at 03/02/21 0032    levothyroxine tablet 12 5 mcg, 12 5 mcg, Oral, Daily, 12 5 mcg at 03/02/21 0542    losartan (COZAAR) tablet 50 mg, 50 mg, Oral, Daily, 50 mg at 03/01/21 0928    magnesium oxide (MAG-OX) tablet 400 mg, 400 mg, Oral, BID, 400 mg at 03/01/21 1751    metoprolol succinate (TOPROL-XL) 24 hr tablet 25 mg, 25 mg, Oral, Daily    morphine injection 2 mg, 2 mg, Intravenous, Q3H PRN, 2 mg at 03/02/21 0543    nicotine polacrilex (NICORETTE) gum 4 mg, 4 mg, Oral, Q2H PRN    ondansetron (ZOFRAN) injection 4 mg, 4 mg, Intravenous, Q6H PRN    pantoprazole (PROTONIX) EC tablet 40 mg, 40 mg, Oral, Early Morning, 40 mg at 03/02/21 0543    spironolactone (ALDACTONE) tablet 25 mg, 25 mg, Oral, Daily    thiamine tablet 100 mg, 100 mg, Oral, Daily, 100 mg at 03/01/21 0930    zolpidem (AMBIEN) tablet 10 mg, 10 mg, Oral, HS PRN, 10 mg at 03/02/21 0032      Assessment/Plan:    1  Recurrent acute pancreatitis requiring prior prolonged hospitalizations with TPN  Suspect related to alcohol vs microlithiasis but etiology remains largely unclear  Patient continues to drink minimal amount of wine daily when celebrating mass but has otherwise abstained  Lipase elevated on admission, trending down  § Continue bowel rest, pain control, IVF  Monitor narcotic use   Will trial clear liquids this morning  § Liver US shows small gallstone, normal liver and bile ducts  § Resume Creon with meals once tolerating PO diet  § Close clinical monitoring for deterioration of clinical status  § Advised complete alcohol abstinence  We discussed [place] altar wine for celebrating mass which is minimally fermented but he states this is not an option  § Advised the importance of tobacco cessation  2  Pancreatic head mass lesion felt benign with interval improvement noted on CT imaging  Status post EUS x2 last year  Bx showed [facility] category III atypical cells which was felt nondiagnostic  He was treated with 1 month coarse of PO steroids with taper  CA 19-9 elevated at 63 10/2020  Followed by Dr Maria Esther Chaves  Patient has follow up with Dr Hailey Masters this Friday, 3/5/2021 at 11:40am   3  Chronic soft unformed stool felt 2/2 pancreatitic insufficiency started on Creon in January  Workup with stool studies including C diff, thyroid function in January as outpatient unrevealing  Restart Creon when taking PO diet  4  Mildly nodular appearance of liver on CT imaging indicative of some degree of fibrosis, likely related to past alcohol consumption  LFTs show AST>ALT  Synthetic liver function appears to be within normal limits  Hepatitis C Ab negative   Avoid hepatotoxins          Cecil Adams PA-C Statement Selected

## 2021-03-02 NOTE — ASSESSMENT & PLAN NOTE
F/u appt 3/26/18.  Monitoring labs fine except for slightly elevated creatinine.  Chronic, stable compared to previous. Inflammatory markers WNL.   · Recurrent pancreatitis thought related to alcohol which he now declined using since last hospitalization in October 2020  Reported pattern of acute pancreatitis in February and October  · Severe epigastric pain radiated to the right shoulder and right midsternal for the last 4 days  · Lipase more than 12,000 and currently trending down  · GI consulted appreciate the following recommendations:   · Liver ultrasound- showed" Pancreatic tail partially obscured by overlying bowel gas  Cholelithiasis without sonographic evidence of cholecystitis  Unremarkable liver and biliary ducts "  · Continue to hold pre-hospital Creon since patient is not eating  · Continue Zofran and pain control morphine p r n    · Continue to hold pre-hospital Vytorin  · Continue Lactated Ringer

## 2021-03-02 NOTE — ASSESSMENT & PLAN NOTE
· AST ALT ratio 120-80 consistent with alcohol use  · Ultrasound liver showed-" Pancreatic tail partially obscured by overlying bowel gas  Cholelithiasis without sonographic evidence of cholecystitis   Unremarkable liver and biliary ducts " per radiology read  · Noted GI consult recommendations

## 2021-03-02 NOTE — PLAN OF CARE
Problem: Potential for Falls  Goal: Patient will remain free of falls  Description: INTERVENTIONS:  - Assess patient frequently for physical needs  -  Identify cognitive and physical deficits and behaviors that affect risk of falls    -  [place] fall precautions as indicated by assessment   - Educate patient/family on patient safety including physical limitations  - Instruct patient to call for assistance with activity based on assessment  - Modify environment to reduce risk of injury  - Consider OT/PT consult to assist with strengthening/mobility  Outcome: Progressing     Problem: PAIN - ADULT  Goal: Verbalizes/displays adequate comfort level or baseline comfort level  Description: Interventions:  - Encourage patient to monitor pain and request assistance  - Assess pain using appropriate pain scale  - Administer analgesics based on type and severity of pain and evaluate response  - Implement non-pharmacological measures as appropriate and evaluate response  - Consider cultural and social influences on pain and pain management  - Notify physician/advanced practitioner if interventions unsuccessful or patient reports new pain  Outcome: Progressing     Problem: INFECTION - ADULT  Goal: Absence or prevention of progression during hospitalization  Description: INTERVENTIONS:  - Assess and monitor for signs and symptoms of infection  - Monitor lab/diagnostic results  - Monitor all insertion sites, i e  indwelling lines, tubes, and drains  - Monitor endotracheal if appropriate and nasal secretions for changes in amount and color  - [place] appropriate cooling/warming therapies per order  - Administer medications as ordered  - Instruct and encourage patient and family to use good hand hygiene technique  - Identify and instruct in appropriate isolation precautions for identified infection/condition  Outcome: Progressing     Problem: SAFETY ADULT  Goal: Patient will remain free of falls  Description: INTERVENTIONS:  - Assess patient frequently for physical needs  -  Identify cognitive and physical deficits and behaviors that affect risk of falls    -  [place] fall precautions as indicated by assessment   - Educate patient/family on patient safety including physical limitations  - Instruct patient to call for assistance with activity based on assessment  - Modify environment to reduce risk of injury  - Consider OT/PT consult to assist with strengthening/mobility  Outcome: Progressing  Goal: Maintain or return to baseline ADL function  Description: INTERVENTIONS:  -  Assess patient's ability to carry out ADLs; assess patient's baseline for ADL function and identify physical deficits which impact ability to perform ADLs (bathing, care of mouth/teeth, toileting, grooming, dressing, etc )  - Assess/evaluate cause of self-care deficits   - Assess range of motion  - Assess patient's mobility; develop plan if impaired  - Assess patient's need for assistive devices and provide as appropriate  - Encourage maximum independence but intervene and supervise when necessary  - Involve family in performance of ADLs  - Assess for home care needs following discharge   - Consider OT consult to assist with ADL evaluation and planning for discharge  - Provide patient education as appropriate  Outcome: Progressing  Goal: Maintain or return mobility status to optimal level  Description: INTERVENTIONS:  - Assess patient's baseline mobility status (ambulation, transfers, stairs, etc )    - Identify cognitive and physical deficits and behaviors that affect mobility  - Identify mobility aids required to assist with transfers and/or ambulation (gait belt, sit-to-stand, lift, walker, cane, etc )  - [place] fall precautions as indicated by assessment  - Record patient progress and toleration of activity level on Mobility SBAR; progress patient to next Phase/Stage  - Instruct patient to call for assistance with activity based on assessment  - Consider rehabilitation consult to assist with strengthening/weightbearing, etc   Outcome: Progressing     Problem: DISCHARGE PLANNING  Goal: Discharge to home or other facility with appropriate resources  Description: INTERVENTIONS:  - Identify barriers to discharge w/patient and caregiver  - Arrange for needed discharge resources and transportation as appropriate  - Identify discharge learning needs (meds, wound care, etc )  - Arrange for interpretive services to assist at discharge as needed  - Refer to Case Management Department for coordinating discharge planning if the patient needs post-hospital services based on physician/advanced practitioner order or complex needs related to functional status, cognitive ability, or social support system  Outcome: Progressing     Problem: GASTROINTESTINAL - ADULT  Goal: Minimal or absence of nausea and/or vomiting  Description: INTERVENTIONS:  - Administer IV fluids if ordered to ensure adequate hydration  - Maintain NPO status until nausea and vomiting are resolved  - Nasogastric tube if ordered  - Administer ordered antiemetic medications as needed  - Provide nonpharmacologic comfort measures as appropriate  - Advance diet as tolerated, if ordered  - Consider nutrition services referral to assist patient with adequate nutrition and appropriate food choices  Outcome: Progressing  Goal: Maintains or returns to baseline bowel function  Description: INTERVENTIONS:  - Assess bowel function  - Encourage oral fluids to ensure adequate hydration  - Administer IV fluids if ordered to ensure adequate hydration  - Administer ordered medications as needed  - Encourage mobilization and activity  - Consider nutritional services referral to assist patient with adequate nutrition and appropriate food choices  Outcome: Progressing  Goal: Maintains adequate nutritional intake  Description: INTERVENTIONS:  - Monitor percentage of each meal consumed  - Identify factors contributing to decreased intake, treat as appropriate  - Assist with meals as needed  - Monitor I&O, weight, and lab values if indicated  - Obtain nutrition services referral as needed  Outcome: Progressing     Problem: Nutrition/Hydration-ADULT  Goal: Nutrient/Hydration intake appropriate for improving, restoring or maintaining nutritional needs  Description: Monitor and assess patient's nutrition/hydration status for malnutrition  Collaborate with interdisciplinary team and initiate plan and interventions as ordered  Monitor patient's weight and dietary intake as ordered or per policy  Utilize nutrition screening tool and intervene as necessary  Determine patient's food preferences and provide high-protein, high-caloric foods as appropriate       INTERVENTIONS:  - Monitor oral intake, urinary output, labs, and treatment plans  - Assess nutrition and hydration status and recommend course of action  - Evaluate amount of meals eaten  - Assist patient with eating if necessary   - Allow adequate time for meals  - Recommend/ encourage appropriate diets, oral nutritional supplements, and vitamin/mineral supplements  - Order, calculate, and assess calorie counts as needed  - Recommend, monitor, and adjust tube feedings and TPN/PPN based on assessed needs  - Assess need for intravenous fluids  - Provide specific nutrition/hydration education as appropriate  - Include patient/family/caregiver in decisions related to nutrition  Outcome: Progressing

## 2021-03-03 ENCOUNTER — ANESTHESIA EVENT (INPATIENT)
Dept: PERIOP | Facility: HOSPITAL | Age: 71
DRG: 419 | End: 2021-03-03
Payer: COMMERCIAL

## 2021-03-03 PROBLEM — Z01.818 PREOPERATIVE CLEARANCE: Status: ACTIVE | Noted: 2021-03-03

## 2021-03-03 LAB
ALBUMIN SERPL BCP-MCNC: 3.4 G/DL (ref 3–5.2)
ALP SERPL-CCNC: 61 U/L (ref 43–122)
ALT SERPL W P-5'-P-CCNC: 51 U/L (ref 9–52)
ANION GAP SERPL CALCULATED.3IONS-SCNC: 6 MMOL/L (ref 5–14)
AST SERPL W P-5'-P-CCNC: 65 U/L (ref 17–59)
BASOPHILS # BLD AUTO: 0 THOUSANDS/ΜL (ref 0–0.1)
BASOPHILS NFR BLD AUTO: 1 % (ref 0–1)
BILIRUB SERPL-MCNC: 0.6 MG/DL
BUN SERPL-MCNC: 9 MG/DL (ref 5–25)
CALCIUM ALBUM COR SERPL-MCNC: 9.6 MG/DL (ref 8.3–10.1)
CALCIUM SERPL-MCNC: 9.1 MG/DL (ref 8.4–10.2)
CHLORIDE SERPL-SCNC: 102 MMOL/L (ref 97–108)
CO2 SERPL-SCNC: 28 MMOL/L (ref 22–30)
CREAT SERPL-MCNC: 0.94 MG/DL (ref 0.7–1.5)
EOSINOPHIL # BLD AUTO: 0.3 THOUSAND/ΜL (ref 0–0.4)
EOSINOPHIL NFR BLD AUTO: 6 % (ref 0–6)
ERYTHROCYTE [DISTWIDTH] IN BLOOD BY AUTOMATED COUNT: 14.2 %
GFR SERPL CREATININE-BSD FRML MDRD: 83 ML/MIN/1.73SQ M
GLUCOSE SERPL-MCNC: 69 MG/DL (ref 70–99)
HCT VFR BLD AUTO: 36.1 % (ref 41–53)
HGB BLD-MCNC: 11.9 G/DL (ref 13.5–17.5)
INR PPP: 1.05 (ref 0.84–1.19)
LYMPHOCYTES # BLD AUTO: 1.5 THOUSANDS/ΜL (ref 0.5–4)
LYMPHOCYTES NFR BLD AUTO: 26 % (ref 25–45)
MCH RBC QN AUTO: 31.8 PG (ref 26–34)
MCHC RBC AUTO-ENTMCNC: 33 G/DL (ref 31–36)
MCV RBC AUTO: 96 FL (ref 80–100)
MONOCYTES # BLD AUTO: 0.6 THOUSAND/ΜL (ref 0.2–0.9)
MONOCYTES NFR BLD AUTO: 10 % (ref 1–10)
NEUTROPHILS # BLD AUTO: 3.3 THOUSANDS/ΜL (ref 1.8–7.8)
NEUTS SEG NFR BLD AUTO: 58 % (ref 45–65)
NT-PROBNP SERPL-MCNC: 525 PG/ML (ref 0–299)
PLATELET # BLD AUTO: 237 THOUSANDS/UL (ref 150–450)
PMV BLD AUTO: 9.9 FL (ref 8.9–12.7)
POTASSIUM SERPL-SCNC: 4 MMOL/L (ref 3.6–5)
PROT SERPL-MCNC: 6.1 G/DL (ref 5.9–8.4)
PROTHROMBIN TIME: 13.8 SECONDS (ref 11.6–14.5)
RBC # BLD AUTO: 3.75 MILLION/UL (ref 4.5–5.9)
SODIUM SERPL-SCNC: 136 MMOL/L (ref 137–147)
WBC # BLD AUTO: 5.7 THOUSAND/UL (ref 4.5–11)

## 2021-03-03 PROCEDURE — 83880 ASSAY OF NATRIURETIC PEPTIDE: CPT | Performed by: INTERNAL MEDICINE

## 2021-03-03 PROCEDURE — 99232 SBSQ HOSP IP/OBS MODERATE 35: CPT | Performed by: INTERNAL MEDICINE

## 2021-03-03 PROCEDURE — NC001 PR NO CHARGE: Performed by: SPECIALIST

## 2021-03-03 PROCEDURE — 85025 COMPLETE CBC W/AUTO DIFF WBC: CPT | Performed by: INTERNAL MEDICINE

## 2021-03-03 PROCEDURE — 85610 PROTHROMBIN TIME: CPT | Performed by: INTERNAL MEDICINE

## 2021-03-03 PROCEDURE — 80053 COMPREHEN METABOLIC PANEL: CPT | Performed by: INTERNAL MEDICINE

## 2021-03-03 PROCEDURE — 99232 SBSQ HOSP IP/OBS MODERATE 35: CPT | Performed by: PHYSICIAN ASSISTANT

## 2021-03-03 RX ORDER — SODIUM CHLORIDE, SODIUM LACTATE, POTASSIUM CHLORIDE, CALCIUM CHLORIDE 600; 310; 30; 20 MG/100ML; MG/100ML; MG/100ML; MG/100ML
75 INJECTION, SOLUTION INTRAVENOUS CONTINUOUS
Status: DISCONTINUED | OUTPATIENT
Start: 2021-03-03 | End: 2021-03-04

## 2021-03-03 RX ADMIN — MORPHINE SULFATE 2 MG: 2 INJECTION, SOLUTION INTRAMUSCULAR; INTRAVENOUS at 21:38

## 2021-03-03 RX ADMIN — MORPHINE SULFATE 2 MG: 2 INJECTION, SOLUTION INTRAMUSCULAR; INTRAVENOUS at 08:28

## 2021-03-03 RX ADMIN — LOSARTAN POTASSIUM 50 MG: 50 TABLET, FILM COATED ORAL at 08:29

## 2021-03-03 RX ADMIN — MORPHINE SULFATE 2 MG: 2 INJECTION, SOLUTION INTRAMUSCULAR; INTRAVENOUS at 14:18

## 2021-03-03 RX ADMIN — METOPROLOL SUCCINATE 25 MG: 25 TABLET, FILM COATED, EXTENDED RELEASE ORAL at 08:29

## 2021-03-03 RX ADMIN — MAGNESIUM OXIDE TAB 400 MG (241.3 MG ELEMENTAL MG) 400 MG: 400 (241.3 MG) TAB at 08:29

## 2021-03-03 RX ADMIN — ZOLPIDEM TARTRATE 10 MG: 5 TABLET, FILM COATED ORAL at 21:56

## 2021-03-03 RX ADMIN — MORPHINE SULFATE 2 MG: 2 INJECTION, SOLUTION INTRAMUSCULAR; INTRAVENOUS at 05:02

## 2021-03-03 RX ADMIN — HEPARIN SODIUM 5000 UNITS: 5000 INJECTION INTRAVENOUS; SUBCUTANEOUS at 21:38

## 2021-03-03 RX ADMIN — LEVOTHYROXINE SODIUM 12.5 MCG: 25 TABLET ORAL at 05:02

## 2021-03-03 RX ADMIN — HEPARIN SODIUM 5000 UNITS: 5000 INJECTION INTRAVENOUS; SUBCUTANEOUS at 08:29

## 2021-03-03 RX ADMIN — MAGNESIUM OXIDE TAB 400 MG (241.3 MG ELEMENTAL MG) 400 MG: 400 (241.3 MG) TAB at 18:17

## 2021-03-03 RX ADMIN — THIAMINE HCL TAB 100 MG 100 MG: 100 TAB at 08:29

## 2021-03-03 RX ADMIN — SPIRONOLACTONE 25 MG: 25 TABLET ORAL at 08:28

## 2021-03-03 RX ADMIN — MORPHINE SULFATE 2 MG: 2 INJECTION, SOLUTION INTRAMUSCULAR; INTRAVENOUS at 18:40

## 2021-03-03 RX ADMIN — SODIUM CHLORIDE, SODIUM LACTATE, POTASSIUM CHLORIDE, AND CALCIUM CHLORIDE 125 ML/HR: .6; .31; .03; .02 INJECTION, SOLUTION INTRAVENOUS at 02:11

## 2021-03-03 RX ADMIN — AMLODIPINE BESYLATE 10 MG: 10 TABLET ORAL at 08:29

## 2021-03-03 RX ADMIN — SODIUM CHLORIDE, SODIUM LACTATE, POTASSIUM CHLORIDE, AND CALCIUM CHLORIDE 125 ML/HR: .6; .31; .03; .02 INJECTION, SOLUTION INTRAVENOUS at 18:16

## 2021-03-03 RX ADMIN — PANTOPRAZOLE SODIUM 40 MG: 40 TABLET, DELAYED RELEASE ORAL at 05:02

## 2021-03-03 NOTE — PROGRESS NOTES
Patient well known to me from the past   Admitted for recurrent pancreatitis  Etiology? Denies alcohol  No medication change  No obvious precipitating agents  History of gallstones which could be the etiology  ?     GI recommends cholecystectomy  Discussed with patient  A reasonable approach  Informed consent obtained for laparoscopic cholecystectomy with intraoperative cholangiogram   Plan in so Kim

## 2021-03-03 NOTE — NURSING NOTE
Patient states pain of 9/10  States he had chicken for dinner, "I couldn't resist, but I didn't have toast for lunch  Now I am paying for it " Morphine IV given  Will continue to monitor

## 2021-03-03 NOTE — ASSESSMENT & PLAN NOTE
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, HTN, Pre-Diabetes, Recurrent Pancreatitis       Assessment of Cardiac Risk:  · Denies unstable or severe angina or MI in the last 6 weeks or history of stent placement in the last year   · Denies decompensated heart failure (e g  New onset heart failure, NYHA functional class IV heart failure, or worsening existing heart failure)  · Denies significant arrhythmias such as high grade AV block, symptomatic ventricular arrhythmia, newly recognized ventricular tachycardia, supraventricular tachycardia with resting heart rate >100, or symptomatic bradycardia  · Denies severe heart valve disease including aortic stenosis or symptomatic mitral stenosis     Exercise Capacity:  · Able to walk 4 blocks without symptoms?: Yes  · Able to walk 2 flights without symptoms?: Yes     Prior Anesthesia Reactions: No     Personal history of venous thromboembolic disease? No    History of steroid use for >2 weeks within last year? Yes     · Patient has revised cardiac risk index of 1 with 6% major cardiac event in 30 days  · Can proceed with cholecystectomy if in agreement of these risks     · Hold Spironolactone and Losartan morning of surgery  · Continue Metoprolol and amlodipine morning of surgery

## 2021-03-03 NOTE — PLAN OF CARE
Problem: Potential for Falls  Goal: Patient will remain free of falls  Description: INTERVENTIONS:  - Assess patient frequently for physical needs  -  Identify cognitive and physical deficits and behaviors that affect risk of falls    -  [place] fall precautions as indicated by assessment   - Educate patient/family on patient safety including physical limitations  - Instruct patient to call for assistance with activity based on assessment  - Modify environment to reduce risk of injury  - Consider OT/PT consult to assist with strengthening/mobility  Outcome: Progressing     Problem: PAIN - ADULT  Goal: Verbalizes/displays adequate comfort level or baseline comfort level  Description: Interventions:  - Encourage patient to monitor pain and request assistance  - Assess pain using appropriate pain scale  - Administer analgesics based on type and severity of pain and evaluate response  - Implement non-pharmacological measures as appropriate and evaluate response  - Consider cultural and social influences on pain and pain management  - Notify physician/advanced practitioner if interventions unsuccessful or patient reports new pain  Outcome: Progressing     Problem: INFECTION - ADULT  Goal: Absence or prevention of progression during hospitalization  Description: INTERVENTIONS:  - Assess and monitor for signs and symptoms of infection  - Monitor lab/diagnostic results  - Monitor all insertion sites, i e  indwelling lines, tubes, and drains  - Monitor endotracheal if appropriate and nasal secretions for changes in amount and color  - [place] appropriate cooling/warming therapies per order  - Administer medications as ordered  - Instruct and encourage patient and family to use good hand hygiene technique  - Identify and instruct in appropriate isolation precautions for identified infection/condition  Outcome: Progressing     Problem: SAFETY ADULT  Goal: Patient will remain free of falls  Description: INTERVENTIONS:  - Assess patient frequently for physical needs  -  Identify cognitive and physical deficits and behaviors that affect risk of falls    -  [place] fall precautions as indicated by assessment   - Educate patient/family on patient safety including physical limitations  - Instruct patient to call for assistance with activity based on assessment  - Modify environment to reduce risk of injury  - Consider OT/PT consult to assist with strengthening/mobility  Outcome: Progressing  Goal: Maintain or return to baseline ADL function  Description: INTERVENTIONS:  -  Assess patient's ability to carry out ADLs; assess patient's baseline for ADL function and identify physical deficits which impact ability to perform ADLs (bathing, care of mouth/teeth, toileting, grooming, dressing, etc )  - Assess/evaluate cause of self-care deficits   - Assess range of motion  - Assess patient's mobility; develop plan if impaired  - Assess patient's need for assistive devices and provide as appropriate  - Encourage maximum independence but intervene and supervise when necessary  - Involve family in performance of ADLs  - Assess for home care needs following discharge   - Consider OT consult to assist with ADL evaluation and planning for discharge  - Provide patient education as appropriate  Outcome: Progressing  Goal: Maintain or return mobility status to optimal level  Description: INTERVENTIONS:  - Assess patient's baseline mobility status (ambulation, transfers, stairs, etc )    - Identify cognitive and physical deficits and behaviors that affect mobility  - Identify mobility aids required to assist with transfers and/or ambulation (gait belt, sit-to-stand, lift, walker, cane, etc )  - [place] fall precautions as indicated by assessment  - Record patient progress and toleration of activity level on Mobility SBAR; progress patient to next Phase/Stage  - Instruct patient to call for assistance with activity based on assessment  - Consider rehabilitation consult to assist with strengthening/weightbearing, etc   Outcome: Progressing     Problem: DISCHARGE PLANNING  Goal: Discharge to home or other facility with appropriate resources  Description: INTERVENTIONS:  - Identify barriers to discharge w/patient and caregiver  - Arrange for needed discharge resources and transportation as appropriate  - Identify discharge learning needs (meds, wound care, etc )  - Arrange for interpretive services to assist at discharge as needed  - Refer to Case Management Department for coordinating discharge planning if the patient needs post-hospital services based on physician/advanced practitioner order or complex needs related to functional status, cognitive ability, or social support system  Outcome: Progressing     Problem: GASTROINTESTINAL - ADULT  Goal: Minimal or absence of nausea and/or vomiting  Description: INTERVENTIONS:  - Administer IV fluids if ordered to ensure adequate hydration  - Maintain NPO status until nausea and vomiting are resolved  - Nasogastric tube if ordered  - Administer ordered antiemetic medications as needed  - Provide nonpharmacologic comfort measures as appropriate  - Advance diet as tolerated, if ordered  - Consider nutrition services referral to assist patient with adequate nutrition and appropriate food choices  Outcome: Progressing  Goal: Maintains or returns to baseline bowel function  Description: INTERVENTIONS:  - Assess bowel function  - Encourage oral fluids to ensure adequate hydration  - Administer IV fluids if ordered to ensure adequate hydration  - Administer ordered medications as needed  - Encourage mobilization and activity  - Consider nutritional services referral to assist patient with adequate nutrition and appropriate food choices  Outcome: Progressing  Goal: Maintains adequate nutritional intake  Description: INTERVENTIONS:  - Monitor percentage of each meal consumed  - Identify factors contributing to decreased intake, treat as appropriate  - Assist with meals as needed  - Monitor I&O, weight, and lab values if indicated  - Obtain nutrition services referral as needed  Outcome: Progressing     Problem: Nutrition/Hydration-ADULT  Goal: Nutrient/Hydration intake appropriate for improving, restoring or maintaining nutritional needs  Description: Monitor and assess patient's nutrition/hydration status for malnutrition  Collaborate with interdisciplinary team and initiate plan and interventions as ordered  Monitor patient's weight and dietary intake as ordered or per policy  Utilize nutrition screening tool and intervene as necessary  Determine patient's food preferences and provide high-protein, high-caloric foods as appropriate       INTERVENTIONS:  - Monitor oral intake, urinary output, labs, and treatment plans  - Assess nutrition and hydration status and recommend course of action  - Evaluate amount of meals eaten  - Assist patient with eating if necessary   - Allow adequate time for meals  - Recommend/ encourage appropriate diets, oral nutritional supplements, and vitamin/mineral supplements  - Order, calculate, and assess calorie counts as needed  - Recommend, monitor, and adjust tube feedings and TPN/PPN based on assessed needs  - Assess need for intravenous fluids  - Provide specific nutrition/hydration education as appropriate  - Include patient/family/caregiver in decisions related to nutrition  Outcome: Progressing

## 2021-03-03 NOTE — PROGRESS NOTES
Patient Name: Marty Puckett  Patient MRN: [id number]  Date: 03/03/21  Service: [facility]    Subjective   Vitals remain stable  Still requiring IV morphine Q3 hours during waking hours (not Norco as stated in note yesterday)  Is able to sleep 7 hours at night without narcotics  Patient indicates that RN bringing morphine regardless of asking  On clear liquid diet  AM labs noted  He is much more comfortable this morning stating his pain is controlled with morphine currently 5/10  Does have minimal nausea with pain but no vomiting  He had a bowel movement yesterday  He is interested in and slowly advancing his diet today  He is agreeable to extending interval between narcotic doses  Vitals  Blood pressure 121/66, pulse 61, temperature 97 6 °F (36 4 °C), temperature source Temporal, resp  rate 18, height 5' 3" (1 6 m), weight 61 1 kg (134 lb 11 2 oz), SpO2 94 %  Physical Exam:     General Appearance:    Awake, alert, oriented x3, no distress, well developed, well    nourished   Head:    Normocephalic without obvious abnormality   Eyes:    PERRL, conjunctiva/corneas clear, EOM's intact        Neck:   Supple, no adenopathy   Throat:   Mucous membranes moist   Lungs:     Clear to auscultation bilaterally, no wheezing or rhonchi   Heart:    Regular rate and rhythm, S1 and S2 normal, no murmur   Abdomen:     Soft, less epigastric tenderness, non-distended  bowel sounds active  No masses, rebound or guarding  Extremities:   Extremities without edema   Psych  Derm:   Normal affect    No jaundice   Neurologic:   CNII-XII grossly intact   Speech intact         Laboratory Studies  Results from last 7 days   Lab Units 03/03/21  0439 03/02/21  0443 03/01/21  0439 02/28/21  1842   WBC Thousand/uL  --  5 60 6 40 10 10   HEMOGLOBIN g/dL  --  11 5* 11 6* 14 0   HEMATOCRIT %  --  34 7* 34 8* 42 4   PLATELETS Thousands/uL  --  225 226 296   INR  1 05  --   --   --      Results from last 7 days   Lab Units 03/02/21  0443 03/01/21  0439 02/28/21  1842   SODIUM mmol/L 135* 136* 140   POTASSIUM mmol/L 4 0 4 3 4 9   CHLORIDE mmol/L 104 107 102   CO2 mmol/L 26 24 26   BUN mg/dL 14 19 20   CREATININE mg/dL 0 96 1 12 1 09   CALCIUM mg/dL 8 9 8 7 10 0   ALBUMIN g/dL 3 4 3 4 4 7   TOTAL BILIRUBIN mg/dL 0 50 0 40 1 20   ALK PHOS U/L 59 67 58   ALT U/L 59* 83* 67*   AST U/L 77* 123* 220*     Lab Results   Component Value Date    LIPASE 380 (H) 03/02/2021    LIPASE 2,018 (H) 03/01/2021    LIPASE 12,279 (H) 02/28/2021       Imaging and Other Studies      Inhouse Medications     Current Facility-Administered Medications:     acetaminophen (TYLENOL) tablet 650 mg, 650 mg, Oral, Q6H PRN    amLODIPine (NORVASC) tablet 10 mg, 10 mg, Oral, Daily, 10 mg at 03/02/21 0851    heparin (porcine) subcutaneous injection 5,000 Units, 5,000 Units, Subcutaneous, Q12H [facility], 5,000 Units at 03/02/21 2145    HYDROcodone-acetaminophen (NORCO) 5-325 mg per tablet 1 tablet, 1 tablet, Oral, Q6H PRN, 1 tablet at 02/28/21 2300    lactated ringers infusion, 125 mL/hr, Intravenous, Continuous, 125 mL/hr at 03/03/21 0211    levothyroxine tablet 12 5 mcg, 12 5 mcg, Oral, Daily, 12 5 mcg at 03/03/21 0502    losartan (COZAAR) tablet 50 mg, 50 mg, Oral, Daily, 50 mg at 03/02/21 0851    magnesium oxide (MAG-OX) tablet 400 mg, 400 mg, Oral, BID, 400 mg at 03/02/21 1825    metoprolol succinate (TOPROL-XL) 24 hr tablet 25 mg, 25 mg, Oral, Daily, 25 mg at 03/02/21 0700    morphine injection 2 mg, 2 mg, Intravenous, Q3H PRN, 2 mg at 03/03/21 0502    nicotine polacrilex (NICORETTE) gum 4 mg, 4 mg, Oral, Q2H PRN    ondansetron (ZOFRAN) injection 4 mg, 4 mg, Intravenous, Q6H PRN    pantoprazole (PROTONIX) EC tablet 40 mg, 40 mg, Oral, Early Morning, 40 mg at 03/03/21 0502    spironolactone (ALDACTONE) tablet 25 mg, 25 mg, Oral, Daily, 25 mg at 03/02/21 0852    thiamine tablet 100 mg, 100 mg, Oral, Daily, 100 mg at 03/02/21 0852    zolpidem (AMBIEN) tablet 10 mg, 10 mg, Oral, HS PRN, 10 mg at 03/02/21 0032      Assessment/Plan:    1  Recurrent acute pancreatitis HD#3 requiring prior prolonged hospitalizations with TPN  Suspect related to alcohol vs microlithiasis but etiology remains largely unclear  Patient continues to drink minimal amount of wine daily when celebrating mass but has otherwise abstained  Lipase elevated on admission, trending down  § Continue bowel rest, pain control, IVF  Monitor narcotic use  Will trial dry toast/crackers with clear liquids this morning  Will advance to full liquids at lunch if tolerated  § Liver US shows small gallstone, normal liver and bile ducts  § Resume Creon with meals once tolerating PO diet  § Close clinical monitoring for deterioration of clinical status  § Advised complete alcohol abstinence  We discussed [place] altar wine for celebrating mass which is minimally fermented but he states this is not an option    § Advised the importance of tobacco cessation  § Discussed with Dr Nahomy Shaver who discussed case with Dr Jaye Fisher yesterday  Recommends Surgical evaluation for cholecystectomy  2  Pancreatic head mass lesion felt benign with interval improvement noted on CT imaging  Status post EUS x2 last year  Bx showed [facility] [place], operated by [facility] category III atypical cells which was felt nondiagnostic  He was treated with 1 month coarse of PO steroids with taper   CA 19-9 elevated at 63 10/2020    Followed by Dr Jaye Fisher  Patient has follow up with Dr Macy Bazan this Friday, 3/5/2021 at 11:40am   3  Chronic soft unformed stool felt 2/2 pancreatitic insufficiency started on Creon in January  Workup with stool studies including C diff, thyroid function in January as outpatient unrevealing  Restart Creon when taking PO diet  4  Mildly nodular appearance of liver on CT imaging indicative of some degree of fibrosis, likely related to past alcohol consumption  LFTs show AST>ALT   Synthetic liver function appears to be within normal limits  Hepatitis C Ab negative   Avoid hepatotoxins          Lavelle Clifford PA-C

## 2021-03-03 NOTE — ASSESSMENT & PLAN NOTE
· AST ALT ratio 2;1 consistent with alcohol use  Trending down, no elevated ALP or T bili  · Ultrasound liver showed-" Pancreatic tail partially obscured by overlying bowel gas  Cholelithiasis without sonographic evidence of cholecystitis   Unremarkable liver and biliary ducts " per radiology read  · Noted GI consult recommendations

## 2021-03-03 NOTE — PROGRESS NOTES
Progress Note - Anne Santiago 1950, 78 y o  male MRN: [id number]    Unit/Bed#: [facility] 702-01 Encounter: [id number]    Primary Care Provider: Ruben Hernández MD   Date and time admitted to hospital: 2/28/2021  6:26 PM        Preoperative clearance  Assessment & Plan  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, HTN, Pre-Diabetes, Recurrent Pancreatitis       Assessment of Cardiac Risk:  · Denies unstable or severe angina or MI in the last 6 weeks or history of stent placement in the last year   · Denies decompensated heart failure (e g  New onset heart failure, NYHA functional class IV heart failure, or worsening existing heart failure)  · Denies significant arrhythmias such as high grade AV block, symptomatic ventricular arrhythmia, newly recognized ventricular tachycardia, supraventricular tachycardia with resting heart rate >100, or symptomatic bradycardia  · Denies severe heart valve disease including aortic stenosis or symptomatic mitral stenosis     Exercise Capacity:  · Able to walk 4 blocks without symptoms?: Yes  · Able to walk 2 flights without symptoms?: Yes     Prior Anesthesia Reactions: No     Personal history of venous thromboembolic disease? No    History of steroid use for >2 weeks within last year? Yes     · Patient has revised cardiac risk index of 1 with 6% major cardiac event in 30 days  · Can proceed with cholecystectomy if in agreement of these risks  · Hold Spironolactone and Losartan morning of surgery  · Continue Metoprolol and amlodipine morning of surgery      Transaminitis  Assessment & Plan  · AST ALT ratio 2;1 consistent with alcohol use  Trending down, no elevated ALP or T bili  · Ultrasound liver showed-" Pancreatic tail partially obscured by overlying bowel gas  Cholelithiasis without sonographic evidence of cholecystitis   Unremarkable liver and biliary ducts " per radiology read  · Noted GI consult recommendations    Smoking  Assessment & Plan   Continue pre-hospital nicotine gum    Mixed hyperlipidemia  Assessment & Plan   Vytorin currently on hold secondary to acute pancreatitis and elevated liver functions    Hypothyroidism (acquired)  Assessment & Plan   Continue pre-hospital levothyroxine 12 5 mg p o  daily    Thoracic aortic aneurysm without rupture [facility])  Assessment & Plan  Recent CTA 02/26/2021 showed   Ascending aortic aneurysm measures 4 cm   Moderate stenosis at the left renal artery origin  Outpatient surveillance by PCP  Essential hypertension, benign  Assessment & Plan  Continue pre-hospital spironolactone 25 mg p o  daily, Toprol XL 25 mg p o  daily, Cozaar 50 mg p o  daily and Norvasc 10 mg p o  daily    * Idiopathic acute pancreatitis without infection or necrosis  Assessment & Plan  · Recurrent pancreatitis thought related to alcohol which he now declined using since last hospitalization in October 2020  Reported pattern of acute pancreatitis in February and October  · Severe epigastric pain radiated to the right shoulder and right midsternal for the last 4 days  · Lipase more than 12,000 and currently trending down  · GI consulted appreciate the following recommendations:   · Liver ultrasound- showed" Pancreatic tail partially obscured by overlying bowel gas  Cholelithiasis without sonographic evidence of cholecystitis  Unremarkable liver and biliary ducts "  · Restart Creon tonight with dinner  · Continue Zofran and pain control morphine p r n  · Continue to hold pre-hospital Vytorin  · Continue Lactated Ringer, if eating dinner tonight can dc  · Spoke to Dr Damien Oro, outpatient GI doctor and recommended consult to Surgery for cholecystectomy  · Spoke with Lacey De La Cruz with General surgery     · Plan for Cholecystectomy Thursday 3/4          VTE Pharmacologic Prophylaxis:   Pharmacologic: Heparin  Mechanical VTE Prophylaxis in Place: Yes    Patient Centered Rounds: I have performed bedside rounds with nursing staff today     Discussions with Specialists or Other Care Team Provider: GI, General surgery    Education and Discussions with Family / Patient:  Discussed with patient    Time Spent for Care: 20 minutes  More than 50% of total time spent on counseling and coordination of care as described above  Current Length of Stay: 3 day(s)    Current Patient Status: Inpatient   Certification Statement: The patient will continue to require additional inpatient hospital stay due to Continue pain medications  Continue symptomatic treatment for pancreatitis    Discharge Plan:  Continue pain regimen and titrate down medications as able, advanced diet as able  general surgery consult pending    Code Status: Level 1 - Full Code      Subjective:   Patient seen and examined at bedside  Doing better with food, less pain  Hopes to transition to more solid food today  Objective:     Vitals:   Temp (24hrs), Av 6 °F (36 4 °C), Min:97 5 °F (36 4 °C), Max:97 6 °F (36 4 °C)    Temp:  [97 5 °F (36 4 °C)-97 6 °F (36 4 °C)] 97 5 °F (36 4 °C)  HR:  [58-65] 65  Resp:  [18] 18  BP: (121)/(66-76) 121/76  SpO2:  [94 %-95 %] 95 %  Body mass index is 23 86 kg/m²  Input and Output Summary (last 24 hours): Intake/Output Summary (Last 24 hours) at 3/3/2021 1044  Last data filed at 3/3/2021 0825  Gross per 24 hour   Intake 3686 25 ml   Output 1300 ml   Net 2386 25 ml       Physical Exam:     Physical Exam  Constitutional:       General: He is not in acute distress  Appearance: Normal appearance  HENT:      Head: Normocephalic and atraumatic  Eyes:      General:         Right eye: No discharge  Left eye: No discharge  Cardiovascular:      Rate and Rhythm: Normal rate and regular rhythm  Pulses: Normal pulses  Heart sounds: No murmur  Pulmonary:      Effort: Pulmonary effort is normal  No respiratory distress  Breath sounds: Normal breath sounds  No wheezing     Musculoskeletal:      Right lower leg: No edema  Left lower leg: No edema  Skin:     General: Skin is warm and dry  Neurological:      General: No focal deficit present  Mental Status: He is alert and oriented to person, place, and time  Mental status is at baseline  Psychiatric:         Mood and Affect: Mood normal        Additional Data:     Labs:    Results from last 7 days   Lab Units 03/03/21  0439   WBC Thousand/uL 5 70   HEMOGLOBIN g/dL 11 9*   HEMATOCRIT % 36 1*   PLATELETS Thousands/uL 237   NEUTROS PCT % 58   LYMPHS PCT % 26   MONOS PCT % 10   EOS PCT % 6     Results from last 7 days   Lab Units 03/03/21  0439   SODIUM mmol/L 136*   POTASSIUM mmol/L 4 0   CHLORIDE mmol/L 102   CO2 mmol/L 28   BUN mg/dL 9   CREATININE mg/dL 0 94   ANION GAP mmol/L 6   CALCIUM mg/dL 9 1   ALBUMIN g/dL 3 4   TOTAL BILIRUBIN mg/dL 0 60   ALK PHOS U/L 61   ALT U/L 51   AST U/L 65*   GLUCOSE RANDOM mg/dL 69*     Results from last 7 days   Lab Units 03/03/21  0439   INR  1 05                       * I Have Reviewed All Lab Data Listed Above  * Additional Pertinent Lab Tests Reviewed:  All Labs Within Last 24 Hours Reviewed    Imaging:    Imaging Reports Reviewed Today Include: none    Recent Cultures (last 7 days):           Last 24 Hours Medication List:   Current Facility-Administered Medications   Medication Dose Route Frequency Provider Last Rate    acetaminophen  650 mg Oral Q6H PRN Wen Delatorre PA-C      amLODIPine  10 mg Oral Daily Wen Delatorre PA-C      heparin (porcine)  5,000 Units Subcutaneous Q12H [facility] Wen Delatorre PA-C      HYDROcodone-acetaminophen  1 tablet Oral Q6H PRN Wen Delatorre PA-C      lactated ringers  125 mL/hr Intravenous Continuous Aurelia Red  mL/hr (03/03/21 0211)    levothyroxine  12 5 mcg Oral Daily Wen Delatorre PA-C      losartan  50 mg Oral Daily Wen Delatorre PA-C      magnesium oxide  400 mg Oral BID Wen Delatorre PA-C      metoprolol succinate  25 mg Oral Daily SHANNEN [place] morphine injection  2 mg Intravenous Q3H PRN [address], SHANNEN      nicotine polacrilex  4 mg Oral Q2H PRN [address], SHANNEN      ondansetron  4 mg Intravenous Q6H PRN [facility], SHANNEN      pantoprazole  40 mg Oral Early Morning [facility], SHANNEN      spironolactone  25 mg Oral Daily [facility], SHANNEN      thiamine  100 mg Oral Daily [facility], SHANNEN      zolpidem  10 mg Oral HS PRN [facility], SHANNEN          Today, Patient Was Seen By: Shelbi Wong MD    ** Please Note: Dictation voice to text software may have been used in the creation of this document   **

## 2021-03-03 NOTE — ANESTHESIA PREPROCEDURE EVALUATION
Procedure:  CHOLECYSTECTOMY LAPAROSCOPIC (N/A Abdomen)  CHOLANGIOGRAM (N/A Abdomen)    Relevant Problems   ANESTHESIA  old charts reviewed      CARDIO  Lopressor taken in AM    ECHO OK  PVD  carotid stenosis with Left CEA   (+) Ectopic atrial rhythm   (+) Essential hypertension, benign   (+) Mixed hyperlipidemia   (+) Thoracic aortic aneurysm without rupture (HCC)      ENDO   (+) Hypothyroidism (acquired)      GI/HEPATIC  transaminitis  Hx of ETOH use   (+) Chronic pancreatitis (HCC)   (+) Drug-induced acute pancreatitis without infection or necrosis   (+) Hiatal hernia   (+) Idiopathic acute pancreatitis without infection or necrosis   (+) Pancreatic lesion      HEMATOLOGY (within normal limits)      MUSCULOSKELETAL  s/p LL  and posterior cervical fusion  no ROM limitations   (+) Right sided sciatica      PULMONARY   (+) Smoking   (-) Sleep apnea   (-) URI (upper respiratory infection)        Physical Exam    Airway    Mallampati score: II  TM Distance: >3 FB  Neck ROM: limited     Dental       Cardiovascular  Cardiovascular exam normal    Pulmonary  Pulmonary exam normal     Other Findings        Anesthesia Plan  ASA Score- 3     Anesthesia Type- general with ASA Monitors  Additional Monitors:   Airway Plan: ETT  Plan Factors-Exercise tolerance (METS): >4 METS  Chart reviewed  EKG reviewed  Imaging results reviewed  Existing labs reviewed  Patient summary reviewed  Patient is a current smoker  Patient instructed to abstain from smoking on day of procedure  Patient did not smoke on day of surgery  Induction- intravenous  Postoperative Plan- Plan for postoperative opioid use  Informed Consent- Anesthetic plan and risks discussed with patient  I personally reviewed this patient with the CRNA  Discussed and agreed on the Anesthesia Plan with the CRNA  Giovany Tavares

## 2021-03-03 NOTE — CASE MANAGEMENT
LOS 3 Days  Pt admitted with acute pancreatitis  CM name and role reviewed  CM will continue to monitor for progress toward discharge goals in nursing and provider rounds  Pt alert and oriented  Pt lives in a two story home with a second floor set up  There are 3 steps to enter into the home  Pt reported that his nephew, Kristal Valentin still lives with him  Pt is employed and is ADL independent  He ambulates on his own without an assistive device  No current VNA  He has hx of it with [facility] when he had a left hip replacement in 2018  Pt's PCP is Kristel Shane MD  Pt stated that he smokes cigarettes daily  He denied D&A  No MH concerns  Pt uses [facility]  Pt drives  CM reviewed discharge planning process including the following: identifying help at home, patient preference for discharge planning needs, pharmacy preference, and availability of treatment team to discuss questions or concerns patient and/or family may have regarding understanding medications and recognizing signs and symptoms once discharged  CM also encouraged patient to follow up with all recommended appointments after discharge  Patient advised of importance for patient and family to participate in managing patients medical well being      Pt is scheduled for cholecystectomy on 3/4/21  CM will continue to assess any needs prior to discharge

## 2021-03-03 NOTE — ASSESSMENT & PLAN NOTE
· Recurrent pancreatitis thought related to alcohol which he now declined using since last hospitalization in October 2020  Reported pattern of acute pancreatitis in February and October  · Severe epigastric pain radiated to the right shoulder and right midsternal for the last 4 days  · Lipase more than 12,000 and currently trending down  · GI consulted appreciate the following recommendations:   · Liver ultrasound- showed" Pancreatic tail partially obscured by overlying bowel gas  Cholelithiasis without sonographic evidence of cholecystitis  Unremarkable liver and biliary ducts "  · Restart Creon tonight with dinner  · Continue Zofran and pain control morphine p r n  · Continue to hold pre-hospital Vytorin  · Continue Lactated Ringer, if eating dinner tonight can dc  · Spoke to Dr Edgar Sifuentes, outpatient GI doctor and recommended consult to Surgery for cholecystectomy  · Spoke with Jocelynn Quiroga with General surgery     · Plan for Cholecystectomy Thursday 3/4

## 2021-03-04 ENCOUNTER — ANESTHESIA (INPATIENT)
Dept: PERIOP | Facility: HOSPITAL | Age: 71
DRG: 419 | End: 2021-03-04
Payer: COMMERCIAL

## 2021-03-04 ENCOUNTER — APPOINTMENT (INPATIENT)
Dept: RADIOLOGY | Facility: HOSPITAL | Age: 71
DRG: 419 | End: 2021-03-04
Payer: COMMERCIAL

## 2021-03-04 LAB
ANION GAP SERPL CALCULATED.3IONS-SCNC: 7 MMOL/L (ref 5–14)
BUN SERPL-MCNC: 11 MG/DL (ref 5–25)
CALCIUM SERPL-MCNC: 9.1 MG/DL (ref 8.4–10.2)
CHLORIDE SERPL-SCNC: 102 MMOL/L (ref 97–108)
CO2 SERPL-SCNC: 28 MMOL/L (ref 22–30)
CREAT SERPL-MCNC: 0.94 MG/DL (ref 0.7–1.5)
ERYTHROCYTE [DISTWIDTH] IN BLOOD BY AUTOMATED COUNT: 14 %
GFR SERPL CREATININE-BSD FRML MDRD: 83 ML/MIN/1.73SQ M
GLUCOSE SERPL-MCNC: 98 MG/DL (ref 70–99)
HCT VFR BLD AUTO: 34.5 % (ref 41–53)
HGB BLD-MCNC: 11.5 G/DL (ref 13.5–17.5)
INR PPP: 1.02 (ref 0.84–1.19)
MCH RBC QN AUTO: 31.8 PG (ref 26–34)
MCHC RBC AUTO-ENTMCNC: 33.4 G/DL (ref 31–36)
MCV RBC AUTO: 95 FL (ref 80–100)
PLATELET # BLD AUTO: 226 THOUSANDS/UL (ref 150–450)
PMV BLD AUTO: 9.5 FL (ref 8.9–12.7)
POTASSIUM SERPL-SCNC: 4 MMOL/L (ref 3.6–5)
PROTHROMBIN TIME: 13.5 SECONDS (ref 11.6–14.5)
RBC # BLD AUTO: 3.63 MILLION/UL (ref 4.5–5.9)
SODIUM SERPL-SCNC: 137 MMOL/L (ref 137–147)
WBC # BLD AUTO: 5.5 THOUSAND/UL (ref 4.5–11)

## 2021-03-04 PROCEDURE — BF14YZZ FLUOROSCOPY OF GALLBLADDER, BILE DUCTS AND PANCREATIC DUCTS USING OTHER CONTRAST: ICD-10-PCS | Performed by: SPECIALIST

## 2021-03-04 PROCEDURE — 99232 SBSQ HOSP IP/OBS MODERATE 35: CPT | Performed by: PHYSICIAN ASSISTANT

## 2021-03-04 PROCEDURE — 0FT44ZZ RESECTION OF GALLBLADDER, PERCUTANEOUS ENDOSCOPIC APPROACH: ICD-10-PCS | Performed by: SPECIALIST

## 2021-03-04 PROCEDURE — 85610 PROTHROMBIN TIME: CPT | Performed by: INTERNAL MEDICINE

## 2021-03-04 PROCEDURE — 80048 BASIC METABOLIC PNL TOTAL CA: CPT | Performed by: INTERNAL MEDICINE

## 2021-03-04 PROCEDURE — 85027 COMPLETE CBC AUTOMATED: CPT | Performed by: INTERNAL MEDICINE

## 2021-03-04 PROCEDURE — 74300 X-RAY BILE DUCTS/PANCREAS: CPT

## 2021-03-04 PROCEDURE — 88304 TISSUE EXAM BY PATHOLOGIST: CPT | Performed by: PATHOLOGY

## 2021-03-04 PROCEDURE — 47563 LAPARO CHOLECYSTECTOMY/GRAPH: CPT | Performed by: SPECIALIST

## 2021-03-04 PROCEDURE — 99232 SBSQ HOSP IP/OBS MODERATE 35: CPT | Performed by: INTERNAL MEDICINE

## 2021-03-04 RX ORDER — LIDOCAINE HYDROCHLORIDE 10 MG/ML
INJECTION, SOLUTION EPIDURAL; INFILTRATION; INTRACAUDAL; PERINEURAL AS NEEDED
Status: DISCONTINUED | OUTPATIENT
Start: 2021-03-04 | End: 2021-03-04

## 2021-03-04 RX ORDER — NEOSTIGMINE METHYLSULFATE 1 MG/ML
INJECTION INTRAVENOUS AS NEEDED
Status: DISCONTINUED | OUTPATIENT
Start: 2021-03-04 | End: 2021-03-04

## 2021-03-04 RX ORDER — CEFAZOLIN SODIUM 2 G/50ML
SOLUTION INTRAVENOUS AS NEEDED
Status: DISCONTINUED | OUTPATIENT
Start: 2021-03-04 | End: 2021-03-04

## 2021-03-04 RX ORDER — MAGNESIUM HYDROXIDE 1200 MG/15ML
LIQUID ORAL AS NEEDED
Status: DISCONTINUED | OUTPATIENT
Start: 2021-03-04 | End: 2021-03-04 | Stop reason: HOSPADM

## 2021-03-04 RX ORDER — GLYCOPYRROLATE 0.2 MG/ML
INJECTION INTRAMUSCULAR; INTRAVENOUS AS NEEDED
Status: DISCONTINUED | OUTPATIENT
Start: 2021-03-04 | End: 2021-03-04

## 2021-03-04 RX ORDER — DEXAMETHASONE SODIUM PHOSPHATE 10 MG/ML
INJECTION, SOLUTION INTRAMUSCULAR; INTRAVENOUS AS NEEDED
Status: DISCONTINUED | OUTPATIENT
Start: 2021-03-04 | End: 2021-03-04

## 2021-03-04 RX ORDER — DEXAMETHASONE SODIUM PHOSPHATE 4 MG/ML
4 INJECTION, SOLUTION INTRA-ARTICULAR; INTRALESIONAL; INTRAMUSCULAR; INTRAVENOUS; SOFT TISSUE ONCE AS NEEDED
Status: DISCONTINUED | OUTPATIENT
Start: 2021-03-04 | End: 2021-03-04 | Stop reason: HOSPADM

## 2021-03-04 RX ORDER — PROMETHAZINE HYDROCHLORIDE 25 MG/ML
12.5 INJECTION, SOLUTION INTRAMUSCULAR; INTRAVENOUS ONCE AS NEEDED
Status: DISCONTINUED | OUTPATIENT
Start: 2021-03-04 | End: 2021-03-04 | Stop reason: HOSPADM

## 2021-03-04 RX ORDER — SODIUM CHLORIDE 9 MG/ML
INJECTION, SOLUTION INTRAVENOUS AS NEEDED
Status: DISCONTINUED | OUTPATIENT
Start: 2021-03-04 | End: 2021-03-04 | Stop reason: HOSPADM

## 2021-03-04 RX ORDER — KETOROLAC TROMETHAMINE 30 MG/ML
INJECTION, SOLUTION INTRAMUSCULAR; INTRAVENOUS AS NEEDED
Status: DISCONTINUED | OUTPATIENT
Start: 2021-03-04 | End: 2021-03-04

## 2021-03-04 RX ORDER — MIDAZOLAM HYDROCHLORIDE 2 MG/2ML
INJECTION, SOLUTION INTRAMUSCULAR; INTRAVENOUS AS NEEDED
Status: DISCONTINUED | OUTPATIENT
Start: 2021-03-04 | End: 2021-03-04

## 2021-03-04 RX ORDER — ROCURONIUM BROMIDE 10 MG/ML
INJECTION, SOLUTION INTRAVENOUS AS NEEDED
Status: DISCONTINUED | OUTPATIENT
Start: 2021-03-04 | End: 2021-03-04

## 2021-03-04 RX ORDER — MEPERIDINE HYDROCHLORIDE 25 MG/ML
12.5 INJECTION INTRAMUSCULAR; INTRAVENOUS; SUBCUTANEOUS
Status: DISCONTINUED | OUTPATIENT
Start: 2021-03-04 | End: 2021-03-04 | Stop reason: HOSPADM

## 2021-03-04 RX ORDER — PROPOFOL 10 MG/ML
INJECTION, EMULSION INTRAVENOUS AS NEEDED
Status: DISCONTINUED | OUTPATIENT
Start: 2021-03-04 | End: 2021-03-04

## 2021-03-04 RX ORDER — FENTANYL CITRATE 50 UG/ML
INJECTION, SOLUTION INTRAMUSCULAR; INTRAVENOUS AS NEEDED
Status: DISCONTINUED | OUTPATIENT
Start: 2021-03-04 | End: 2021-03-04

## 2021-03-04 RX ORDER — BUPIVACAINE HYDROCHLORIDE 5 MG/ML
INJECTION, SOLUTION PERINEURAL AS NEEDED
Status: DISCONTINUED | OUTPATIENT
Start: 2021-03-04 | End: 2021-03-04 | Stop reason: HOSPADM

## 2021-03-04 RX ORDER — FENTANYL CITRATE/PF 50 MCG/ML
12.5 SYRINGE (ML) INJECTION
Status: DISCONTINUED | OUTPATIENT
Start: 2021-03-04 | End: 2021-03-04 | Stop reason: HOSPADM

## 2021-03-04 RX ORDER — FENTANYL CITRATE/PF 50 MCG/ML
25 SYRINGE (ML) INJECTION
Status: COMPLETED | OUTPATIENT
Start: 2021-03-04 | End: 2021-03-04

## 2021-03-04 RX ORDER — DIPHENHYDRAMINE HYDROCHLORIDE 50 MG/ML
12.5 INJECTION INTRAMUSCULAR; INTRAVENOUS ONCE AS NEEDED
Status: DISCONTINUED | OUTPATIENT
Start: 2021-03-04 | End: 2021-03-04 | Stop reason: HOSPADM

## 2021-03-04 RX ADMIN — LOSARTAN POTASSIUM 50 MG: 50 TABLET, FILM COATED ORAL at 08:05

## 2021-03-04 RX ADMIN — ONDANSETRON HYDROCHLORIDE 4 MG: 2 INJECTION, SOLUTION INTRAMUSCULAR; INTRAVENOUS at 12:15

## 2021-03-04 RX ADMIN — MIDAZOLAM HYDROCHLORIDE 2 MG: 1 INJECTION, SOLUTION INTRAMUSCULAR; INTRAVENOUS at 11:53

## 2021-03-04 RX ADMIN — MORPHINE SULFATE 2 MG: 2 INJECTION, SOLUTION INTRAMUSCULAR; INTRAVENOUS at 08:06

## 2021-03-04 RX ADMIN — AMLODIPINE BESYLATE 10 MG: 10 TABLET ORAL at 08:05

## 2021-03-04 RX ADMIN — MORPHINE SULFATE 2 MG: 2 INJECTION, SOLUTION INTRAMUSCULAR; INTRAVENOUS at 19:53

## 2021-03-04 RX ADMIN — MAGNESIUM OXIDE TAB 400 MG (241.3 MG ELEMENTAL MG) 400 MG: 400 (241.3 MG) TAB at 17:42

## 2021-03-04 RX ADMIN — MORPHINE SULFATE 2 MG: 2 INJECTION, SOLUTION INTRAMUSCULAR; INTRAVENOUS at 15:32

## 2021-03-04 RX ADMIN — MAGNESIUM OXIDE TAB 400 MG (241.3 MG ELEMENTAL MG) 400 MG: 400 (241.3 MG) TAB at 08:05

## 2021-03-04 RX ADMIN — FENTANYL CITRATE 25 MCG: 50 INJECTION, SOLUTION INTRAMUSCULAR; INTRAVENOUS at 14:18

## 2021-03-04 RX ADMIN — FENTANYL CITRATE 25 MCG: 50 INJECTION, SOLUTION INTRAMUSCULAR; INTRAVENOUS at 14:29

## 2021-03-04 RX ADMIN — NEOSTIGMINE METHYLSULFATE 4 MG: 1 INJECTION INTRAVENOUS at 13:25

## 2021-03-04 RX ADMIN — LIDOCAINE HYDROCHLORIDE 50 MG: 10 INJECTION, SOLUTION EPIDURAL; INFILTRATION; INTRACAUDAL; PERINEURAL at 11:58

## 2021-03-04 RX ADMIN — FENTANYL CITRATE 25 MCG: 50 INJECTION, SOLUTION INTRAMUSCULAR; INTRAVENOUS at 14:24

## 2021-03-04 RX ADMIN — KETOROLAC TROMETHAMINE 30 MG: 30 INJECTION, SOLUTION INTRAMUSCULAR; INTRAVENOUS at 13:15

## 2021-03-04 RX ADMIN — THIAMINE HCL TAB 100 MG 100 MG: 100 TAB at 08:06

## 2021-03-04 RX ADMIN — FENTANYL CITRATE 50 MCG: 50 INJECTION INTRAMUSCULAR; INTRAVENOUS at 11:58

## 2021-03-04 RX ADMIN — SODIUM CHLORIDE, SODIUM LACTATE, POTASSIUM CHLORIDE, AND CALCIUM CHLORIDE: .6; .31; .03; .02 INJECTION, SOLUTION INTRAVENOUS at 13:12

## 2021-03-04 RX ADMIN — ZOLPIDEM TARTRATE 10 MG: 5 TABLET, FILM COATED ORAL at 23:24

## 2021-03-04 RX ADMIN — PROPOFOL 200 MG: 10 INJECTION, EMULSION INTRAVENOUS at 11:58

## 2021-03-04 RX ADMIN — METOPROLOL SUCCINATE 25 MG: 25 TABLET, FILM COATED, EXTENDED RELEASE ORAL at 08:05

## 2021-03-04 RX ADMIN — ROCURONIUM BROMIDE 40 MG: 10 INJECTION, SOLUTION INTRAVENOUS at 11:58

## 2021-03-04 RX ADMIN — FENTANYL CITRATE 50 MCG: 50 INJECTION INTRAMUSCULAR; INTRAVENOUS at 12:15

## 2021-03-04 RX ADMIN — HEPARIN SODIUM 5000 UNITS: 5000 INJECTION INTRAVENOUS; SUBCUTANEOUS at 12:17

## 2021-03-04 RX ADMIN — MORPHINE SULFATE 2 MG: 2 INJECTION, SOLUTION INTRAMUSCULAR; INTRAVENOUS at 23:17

## 2021-03-04 RX ADMIN — SODIUM CHLORIDE, SODIUM LACTATE, POTASSIUM CHLORIDE, AND CALCIUM CHLORIDE: .6; .31; .03; .02 INJECTION, SOLUTION INTRAVENOUS at 11:34

## 2021-03-04 RX ADMIN — DEXAMETHASONE SODIUM PHOSPHATE 4 MG: 10 INJECTION, SOLUTION INTRAMUSCULAR; INTRAVENOUS at 12:15

## 2021-03-04 RX ADMIN — FENTANYL CITRATE 25 MCG: 50 INJECTION, SOLUTION INTRAMUSCULAR; INTRAVENOUS at 14:37

## 2021-03-04 RX ADMIN — CEFAZOLIN SODIUM 2000 MG: 2 SOLUTION INTRAVENOUS at 11:50

## 2021-03-04 RX ADMIN — GLYCOPYRROLATE 0.4 MG: 0.2 INJECTION, SOLUTION INTRAMUSCULAR; INTRAVENOUS at 12:03

## 2021-03-04 RX ADMIN — SPIRONOLACTONE 25 MG: 25 TABLET ORAL at 08:06

## 2021-03-04 RX ADMIN — GLYCOPYRROLATE 0.4 MG: 0.2 INJECTION, SOLUTION INTRAMUSCULAR; INTRAVENOUS at 13:25

## 2021-03-04 NOTE — ASSESSMENT & PLAN NOTE
Hold pre-hospital spironolactone 25 mg p o  daily and Cozaar 50 mg p o  daily  ContinueToprol XL 25 mg p o  daily, and Norvasc 10 mg p o  daily

## 2021-03-04 NOTE — UTILIZATION REVIEW
Continued Stay Review    Date:  3/4/2021                     Current Patient Class: IP  Current Level of Care: Dequan Ebbs y o  male initially admitted on  2/28 to  Inpatient M/S with Idiopathic acute pancreatitis and Transaminitis     Assessment/Plan:   3/3 Continues to require IV Morphine for pain  GI recommended Surgical consult for cholecystectomy  Will slowly advance diet today  Per Surgery: Admitted for recurrent pancreatitis  Etiology? No obvious precipitating agents  History of gallstones which could be the etiology  GI recommends cholecystectomy   Plan for  laparoscopic cholecystectomy with intraoperative cholangiogram in am    3/4:SURGERY DATE: 3/4/2021   Procedure(s) (LRB):  CHOLECYSTECTOMY LAPAROSCOPIC (N/A)  CHOLANGIOGRAM (N/A)  Anesthesia Type:   General   Operative Findings:  Dilated common bile duct    Pertinent Labs/Diagnostic Results:   Results from last 7 days   Lab Units 03/04/21 0515 03/03/21 0439 03/02/21 0443 03/01/21 0439 02/28/21  1842   WBC Thousand/uL 5 50 5 70 5 60 6 40 10 10   HEMOGLOBIN g/dL 11 5* 11 9* 11 5* 11 6* 14 0   HEMATOCRIT % 34 5* 36 1* 34 7* 34 8* 42 4   PLATELETS Thousands/uL 226 237 225 226 296   NEUTROS ABS Thousands/µL  --  3 30 3 30  --  8 10*     Results from last 7 days   Lab Units 03/04/21 0515 03/03/21 0439 03/02/21 0443 03/01/21 0439 02/28/21  1842   SODIUM mmol/L 137 136* 135* 136* 140   POTASSIUM mmol/L 4 0 4 0 4 0 4 3 4 9   CHLORIDE mmol/L 102 102 104 107 102   CO2 mmol/L 28 28 26 24 26   ANION GAP mmol/L 7 6 5 5 12   BUN mg/dL 11 9 14 19 20   CREATININE mg/dL 0 94 0 94 0 96 1 12 1 09   EGFR ml/min/1 73sq m 83 83 81 67 69   CALCIUM mg/dL 9 1 9 1 8 9 8 7 10 0     Results from last 7 days   Lab Units 03/03/21 0439 03/02/21 0443 03/01/21 0439 02/28/21  1842   AST U/L 65* 77* 123* 220*   ALT U/L 51 59* 83* 67*   ALK PHOS U/L 61 59 67 58   TOTAL PROTEIN g/dL 6 1 6 1 6 0 8 6*   ALBUMIN g/dL 3 4 3 4 3 4 4 7   TOTAL BILIRUBIN mg/dL 0 60 0 50 0 40 1 20 Results from last 7 days   Lab Units 03/04/21  0515 03/03/21  0439 03/02/21 0443 03/01/21  0439 02/28/21  1842   GLUCOSE RANDOM mg/dL 98 69* 77 82 117*       Results from last 7 days   Lab Units 02/28/21  1842   TROPONIN I ng/mL <0 01     Results from last 7 days   Lab Units 03/04/21  0515 03/03/21  0439   PROTIME seconds 13 5 13 8   INR  1 02 1 05     Results from last 7 days   Lab Units 03/03/21  0439   NT-PRO BNP pg/mL 525*     Results from last 7 days   Lab Units 03/02/21 0443 03/01/21 0439 02/28/21  1842   LIPASE u/L 380* 2,018* 12,279*     Vital Signs:   03/04/21 1445  --  58  14  100/61  --  --  --  --  --  --  Sitting   03/04/21 1444  --  --  --  --  --  95 %  --  --  --  --  --   03/04/21 1437  --  59  13  110/56  --  95 %  --  --  --  --  --   03/04/21 0709  97 °F (36 1 °C)   64  20  157/80  --  92 %  --  --  None (Room air)  --  Lying   03/03/21 2325  98 8 °F (37 1 °C)  71  18  108/63  --  94 %  --  --  None (Room air)  --  Lying   03/03/21 1503  98 1 °F (36 7 °C)  65  18  99/64  74  95 %  --  --  None (Room air)  --  Lying   03/03/21 0715  97 5 °F (36 4 °C)  65  18  121/76  --  95 %  --  --  None (Room air)  --  Lying     Medications:   Scheduled Medications:  amLODIPine, 10 mg, Oral, Daily  heparin (porcine), 5,000 Units, Subcutaneous, Q12H [address]  levothyroxine, 12 5 mcg, Oral, Daily  losartan, 50 mg, Oral, Daily  magnesium oxide, 400 mg, Oral, BID  metoprolol succinate, 25 mg, Oral, Daily  pantoprazole, 40 mg, Oral, Early Morning  spironolactone, 25 mg, Oral, Daily  thiamine, 100 mg, Oral, Daily    Continuous IV Infusions:  lactated ringers, 75 mL/hr, Intravenous, Continuous    PRN Meds:  acetaminophen, 650 mg, Oral, Q6H PRN  HYDROcodone-acetaminophen, 1 tablet, Oral, Q6H PRN  morphine injection, 2 mg, Intravenous, Q3H PRN x 5 3/3  & x 1 3/4  nicotine polacrilex, 4 mg, Oral, Q2H PRN  ondansetron, 4 mg, Intravenous, Q6H PRN   zolpidem, 10 mg, Oral, HS PRN    Discharge Plan: TBD    Network Utilization Review Department  ATTENTION: Please call with any questions or concerns to [phone] and carefully listen to the prompts so that you are directed to the right person  All voicemails are confidential   Lo Baltazar all requests for admission clinical reviews, approved or denied determinations and any other requests to dedicated fax number below belonging to the campus where the patient is receiving treatment   List of dedicated fax numbers for the Facilities:  [address] DENIALS (Administrative/Medical Necessity) [phone]   [address] (Maternity/NICU/Pediatrics) [phone]   [address] Ale Martinez 2281 (  Lana Yang "Jenni" 103) [address] [address] Radha Collins 1481 [address] [place] [address] [phone]

## 2021-03-04 NOTE — ASSESSMENT & PLAN NOTE
· Recurrent pancreatitis thought related to alcohol which he now declined using since last hospitalization in October 2020  Reported pattern of acute pancreatitis in February and October  · Severe epigastric pain radiated to the right shoulder and right midsternal for the last 4 days  · Lipase more than 12,000 and currently trending down  · GI consulted appreciate the following recommendations:   · Liver ultrasound- showed" Pancreatic tail partially obscured by overlying bowel gas  Cholelithiasis without sonographic evidence of cholecystitis  Unremarkable liver and biliary ducts "  · Hold Creon today given surgery  · Continue Zofran and pain control morphine p r n  · Continue to hold pre-hospital Vytorin  · Continue Lactated Ringe preoperatively  · Spoke to Dr Ramiro Constantino, outpatient GI doctor and recommended consult to Surgery for cholecystectomy  · Spoke with Ravindra Contreras with General surgery     · Plan for Cholecystectomy Thursday 3/4  · Preoperative clearance complete 3/3

## 2021-03-04 NOTE — PROGRESS NOTES
Progress Note - Star Elizabeth 1950, 78 y o  male MRN: [id number]    Unit/Bed#: 7T [facility] 702-01 Encounter: [id number]    Primary Care Provider: Elvia Chin MD   Date and time admitted to hospital: 2/28/2021  6:26 PM        Preoperative clearance  Assessment & Plan  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, HTN, Pre-Diabetes, Recurrent Pancreatitis    Assessment of Cardiac Risk:  · Denies unstable or severe angina or MI in the last 6 weeks or history of stent placement in the last year   · Denies decompensated heart failure (e g  New onset heart failure, NYHA functional class IV heart failure, or worsening existing heart failure)  · Denies significant arrhythmias such as high grade AV block, symptomatic ventricular arrhythmia, newly recognized ventricular tachycardia, supraventricular tachycardia with resting heart rate >100, or symptomatic bradycardia  · Denies severe heart valve disease including aortic stenosis or symptomatic mitral stenosis     Exercise Capacity:  · Able to walk 4 blocks without symptoms?: Yes  · Able to walk 2 flights without symptoms?: Yes     Prior Anesthesia Reactions: No     Personal history of venous thromboembolic disease? No    History of steroid use for >2 weeks within last year? Yes     · Patient has revised cardiac risk index of 1 with 6% major cardiac event in 30 days  · Can proceed with cholecystectomy if in agreement of these risks  · Hold Spironolactone and Losartan morning of surgery  · Continue Metoprolol and amlodipine morning of surgery  · Continue IV hydration preoperatively  · CBC, Electrolytes today ok  · Discussed with Dr Frye and Dr Meghan Martinez  · Improved  AST ALT ratio 2;1 consistent with alcohol use  Trending down, no elevated ALP or T bili  · Ultrasound liver showed-" Pancreatic tail partially obscured by overlying bowel gas   Cholelithiasis without sonographic evidence of cholecystitis  Unremarkable liver and biliary ducts " per radiology read  · Noted GI consult recommendations    Mixed hyperlipidemia  Assessment & Plan   Vytorin currently on hold secondary to acute pancreatitis and elevated liver functions    Hypothyroidism (acquired)  Assessment & Plan   Continue pre-hospital levothyroxine 12 5 mg p o  daily    Thoracic aortic aneurysm without rupture [facility])  Assessment & Plan  Recent CTA 02/26/2021 showed   Ascending aortic aneurysm measures 4 cm   Moderate stenosis at the left renal artery origin  Outpatient surveillance by PCP  Essential hypertension, benign  Assessment & Plan  Hold pre-hospital spironolactone 25 mg p o  daily and Cozaar 50 mg p o  daily  ContinueToprol XL 25 mg p o  daily, and Norvasc 10 mg p o  daily    * Idiopathic acute pancreatitis without infection or necrosis  Assessment & Plan  · Recurrent pancreatitis thought related to alcohol which he now declined using since last hospitalization in October 2020  Reported pattern of acute pancreatitis in February and October  · Severe epigastric pain radiated to the right shoulder and right midsternal for the last 4 days  · Lipase more than 12,000 and currently trending down  · GI consulted appreciate the following recommendations:   · Liver ultrasound- showed" Pancreatic tail partially obscured by overlying bowel gas  Cholelithiasis without sonographic evidence of cholecystitis  Unremarkable liver and biliary ducts "  · Hold Creon today given surgery  · Continue Zofran and pain control morphine p r n  · Continue to hold pre-hospital Vytorin  · Continue Lactated Ringe preoperatively  · Spoke to Dr Arabella Jackson, outpatient GI doctor and recommended consult to Surgery for cholecystectomy  · Spoke with Isaias Domingo with General surgery     · Plan for Cholecystectomy Thursday 3/4  · Preoperative clearance complete 3/3          VTE Pharmacologic Prophylaxis:   Pharmacologic: Heparin hold for surgery today  Mechanical VTE Prophylaxis in Place: Yes    Patient Centered Rounds: I have performed bedside rounds with nursing staff today  Discussions with Specialists or Other Care Team Provider:  Surgery    Education and Discussions with Family / Patient:  Discussed with patient    Time Spent for Care: 20 minutes  More than 50% of total time spent on counseling and coordination of care as described above  Current Length of Stay: 4 day(s)    Current Patient Status: Inpatient   Certification Statement: The patient will continue to require additional inpatient hospital stay due to Continue treatment for pancreatitis, intravenous fluids, going for cholecystectomy today    Discharge Plan:  Cholecystectomy today, monitor for postoperative pain, hemodynamic instability  Will likely be discharged in the next 48 hours if stable and pain is controlled    Code Status: Level 1 - Full Code      Subjective:   Patient seen examined at bedside  Patient currently NPO for surgery today  Patient currently feels well  He had pain after his meals  Objective:     Vitals:   Temp (24hrs), Av °F (36 7 °C), Min:97 °F (36 1 °C), Max:98 8 °F (37 1 °C)    Temp:  [97 °F (36 1 °C)-98 8 °F (37 1 °C)] 97 °F (36 1 °C)  HR:  [64-71] 64  Resp:  [18-20] 20  BP: ()/(63-80) 157/80  SpO2:  [92 %-95 %] 92 %  Body mass index is 23 86 kg/m²  Input and Output Summary (last 24 hours): Intake/Output Summary (Last 24 hours) at 3/4/2021 0829  Last data filed at 3/3/2021 1816  Gross per 24 hour   Intake 2490 42 ml   Output 200 ml   Net 2290 42 ml       Physical Exam:     Physical Exam  Constitutional:       General: He is not in acute distress  Appearance: Normal appearance  HENT:      Head: Normocephalic and atraumatic  Eyes:      General:         Right eye: No discharge  Left eye: No discharge  Cardiovascular:      Rate and Rhythm: Normal rate and regular rhythm  Pulses: Normal pulses  Heart sounds: No murmur     Pulmonary: Effort: Pulmonary effort is normal  No respiratory distress  Breath sounds: Normal breath sounds  No wheezing  Abdominal:      General: There is no distension  Tenderness: There is abdominal tenderness  Musculoskeletal:      Right lower leg: No edema  Left lower leg: No edema  Skin:     General: Skin is warm and dry  Neurological:      General: No focal deficit present  Mental Status: He is alert and oriented to person, place, and time  Mental status is at baseline  Psychiatric:         Mood and Affect: Mood normal          Additional Data:     Labs:    Results from last 7 days   Lab Units 03/04/21  0515 03/03/21  0439   WBC Thousand/uL 5 50 5 70   HEMOGLOBIN g/dL 11 5* 11 9*   HEMATOCRIT % 34 5* 36 1*   PLATELETS Thousands/uL 226 237   NEUTROS PCT %  --  58   LYMPHS PCT %  --  26   MONOS PCT %  --  10   EOS PCT %  --  6     Results from last 7 days   Lab Units 03/04/21  0515 03/03/21  0439   SODIUM mmol/L 137 136*   POTASSIUM mmol/L 4 0 4 0   CHLORIDE mmol/L 102 102   CO2 mmol/L 28 28   BUN mg/dL 11 9   CREATININE mg/dL 0 94 0 94   ANION GAP mmol/L 7 6   CALCIUM mg/dL 9 1 9 1   ALBUMIN g/dL  --  3 4   TOTAL BILIRUBIN mg/dL  --  0 60   ALK PHOS U/L  --  61   ALT U/L  --  51   AST U/L  --  65*   GLUCOSE RANDOM mg/dL 98 69*     Results from last 7 days   Lab Units 03/04/21  0515   INR  1 02                       * I Have Reviewed All Lab Data Listed Above  * Additional Pertinent Lab Tests Reviewed:  All Labs Within Last 24 Hours Reviewed    Imaging:    Imaging Reports Reviewed Today Include none    Recent Cultures (last 7 days):           Last 24 Hours Medication List:   Current Facility-Administered Medications   Medication Dose Route Frequency Provider Last Rate    acetaminophen  650 mg Oral Q6H PRN Moisés Eve, PA-C      amLODIPine  10 mg Oral Daily Moisés Prado PA-C      heparin (porcine)  5,000 Units Subcutaneous Q12H [address] Moisés Eve, PA-C      HYDROcodone-acetaminophen  1 tablet Oral Q6H PRN Ralene Expose, PA-C      lactated ringers  125 mL/hr Intravenous Continuous Elroy Arceo  mL/hr (03/03/21 1816)    lactated ringers  75 mL/hr Intravenous Continuous Katrina Pal DO      levothyroxine  12 5 mcg Oral Daily Ralene Expose, PA-C      losartan  50 mg Oral Daily Ralene Expose, PA-C      magnesium oxide  400 mg Oral BID Ralene Expose, PA-C      metoprolol succinate  25 mg Oral Daily Ralene Expose, PA-C      morphine injection  2 mg Intravenous Q3H PRN Ralene Expose, PA-C      nicotine polacrilex  4 mg Oral Q2H PRN Ralene Expose, PA-C      ondansetron  4 mg Intravenous Q6H PRN Ralene Expose, PA-C      pantoprazole  40 mg Oral Early Morning Ralene Expose, PA-C      spironolactone  25 mg Oral Daily Ralene Expose, PA-C      thiamine  100 mg Oral Daily Ralene Expose, PA-C      zolpidem  10 mg Oral HS PRN Ralene Expose, PA-C          Today, Patient Was Seen By: Jessenia Vaughan MD    ** Please Note: Dictation voice to text software may have been used in the creation of this document   **

## 2021-03-04 NOTE — ANESTHESIA POSTPROCEDURE EVALUATION
Post-Op Assessment Note    CV Status:  Stable  Pain Score: 2    Pain management: adequate     Mental Status:  Alert and awake   Hydration Status:  Euvolemic   PONV Controlled:  Controlled   Airway Patency:  Patent  Airway: intubated      Post Op Vitals Reviewed: Yes      Comments: ACV  NDPA        No complications documented      /59 (03/04/21 1418)    Temp      Pulse 56 (03/04/21 1418)   Resp 12 (03/04/21 1418)    SpO2 93 % (03/04/21 1418)

## 2021-03-04 NOTE — OP NOTE
OPERATIVE REPORT  PATIENT NAME: Yissel Tinajero    :  1950  MRN: [id number]  Pt Location:  OR ROOM 10    SURGERY DATE: 3/4/2021    Surgeon(s) and Role:     * Jenaro Rand MD - Primary  [employer]    Preop Diagnosis:  Pancreatitis [K85 90]  Gallstones [K80 20]    Post-Op Diagnosis Codes:     * Pancreatitis [K85 90]     * Gallstones [K80 20]    Procedure(s) (LRB):  CHOLECYSTECTOMY LAPAROSCOPIC (N/A)  CHOLANGIOGRAM (N/A)    Specimen(s):  ID Type Source Tests Collected by Time Destination   1 :  Tissue Gallbladder TISSUE EXAM Jenaro Rand MD 3/4/2021 12:59 PM        Estimated Blood Loss:   Minimal    Drains:  * No LDAs found *    Anesthesia Type:   General    Operative Indications:  Pancreatitis [K85 90]  Gallstones [K80 20]      Operative Findings:  Dilated common bile duct    Complications:   None    Procedure and Technique:  Patient brought the operating room postop table supine position  Under adequate general endotracheal anesthesia the abdomen was prepped draped usual sterile fashion  Small incision was made at the umbilicus  The patient had an obvious umbilical hernia and after cutting through the hernia sac we could peer and directly into the abdominal cavity  At that point a 10 mm port was passed without difficulty and the abdomen was insufflated with CO2 Hg  At that point additional ports placed subxiphoid , right subcostal, and right upper quadrant  These were 5 mm ports placed under direct vision  Patient was placed in reverse Trendelenburg and rotated to the left  The gallbladder was noted to be quite large but not overly distended  The fundus was grasped retracted to the right subphrenic area  The distal end of the gallbladder was covered with a significant amount of fatty tissue and the common duct was quite obvious and visible  It did look to be dilated    The infundibulum of the gallbladder was then grasped retracted laterally  The distal end of the gallbladder was dissected of a significant amount of fibrous tissue fibrosis and fat  The cystic duct was identified and was circumferentially dissected  It was clipped toward the gallbladder  A small nick was made in the cystic duct and a cholangiocath was passed through a separate catheter in the right subcostal area under direct vision  The catheter was then passed through this and into the cystic duct  Was clipped in place  A cholangiogram was performed and there was noted be no filling defects in the common duct itself  There was good flow proximally into both right and left hepatic ducts and distally into the duodenum  Once again no filling defects were noted  At that point the catheter was removed the cystic duct was doubly clipped proximally singly clipped distally and then divided  Just medial above that the artery was identified and was noted to be fairly prominent  Once again circumferential dissection doubly clipped proximally singly clipped distally and then divided  The gallbladder was then taken down from liver bed using the hook cautery  After totally removing it from the liver was brought out through the umbilical port site sent to pathology for histological diagnosis  The operative site was inspected for bleeding no bleeding was noted  The operative site was then irrigated with saline solution and this was suction from the field  The cystic duct and cystic artery were inspected the clips noted be intact and functional   At that point any residual fluid was removed  The CO2 was removed  A fascial defect at the umbilicus closed with 0 Vicryl suture in a figure-of-eight fashion  All ports were infiltrated with 0 5% Marcaine  Skin closed 4 Monocryl subcuticular fashion  Benzoin Steri-Strips applied  The estimated blood was minimal patient tolerated the procedure well he was delivered to the recovery room stable condition  Thank you much    I   I was present for the entire procedure    Patient Disposition:  PACU     SIGNATURE: Tamara Melendez MD  DATE: March 4, 2021  TIME: 4:20 PM

## 2021-03-04 NOTE — PLAN OF CARE
Problem: Potential for Falls  Goal: Patient will remain free of falls  Description: INTERVENTIONS:  - Assess patient frequently for physical needs  -  Identify cognitive and physical deficits and behaviors that affect risk of falls    -  [place] fall precautions as indicated by assessment   - Educate patient/family on patient safety including physical limitations  - Instruct patient to call for assistance with activity based on assessment  - Modify environment to reduce risk of injury  - Consider OT/PT consult to assist with strengthening/mobility  Outcome: Progressing     Problem: PAIN - ADULT  Goal: Verbalizes/displays adequate comfort level or baseline comfort level  Description: Interventions:  - Encourage patient to monitor pain and request assistance  - Assess pain using appropriate pain scale  - Administer analgesics based on type and severity of pain and evaluate response  - Implement non-pharmacological measures as appropriate and evaluate response  - Consider cultural and social influences on pain and pain management  - Notify physician/advanced practitioner if interventions unsuccessful or patient reports new pain  Outcome: Progressing     Problem: INFECTION - ADULT  Goal: Absence or prevention of progression during hospitalization  Description: INTERVENTIONS:  - Assess and monitor for signs and symptoms of infection  - Monitor lab/diagnostic results  - Monitor all insertion sites, i e  indwelling lines, tubes, and drains  - Monitor endotracheal if appropriate and nasal secretions for changes in amount and color  - [place] appropriate cooling/warming therapies per order  - Administer medications as ordered  - Instruct and encourage patient and family to use good hand hygiene technique  - Identify and instruct in appropriate isolation precautions for identified infection/condition  Outcome: Progressing     Problem: SAFETY ADULT  Goal: Patient will remain free of falls  Description: INTERVENTIONS:  - Assess patient frequently for physical needs  -  Identify cognitive and physical deficits and behaviors that affect risk of falls    -  [place] fall precautions as indicated by assessment   - Educate patient/family on patient safety including physical limitations  - Instruct patient to call for assistance with activity based on assessment  - Modify environment to reduce risk of injury  - Consider OT/PT consult to assist with strengthening/mobility  Outcome: Progressing  Goal: Maintain or return to baseline ADL function  Description: INTERVENTIONS:  -  Assess patient's ability to carry out ADLs; assess patient's baseline for ADL function and identify physical deficits which impact ability to perform ADLs (bathing, care of mouth/teeth, toileting, grooming, dressing, etc )  - Assess/evaluate cause of self-care deficits   - Assess range of motion  - Assess patient's mobility; develop plan if impaired  - Assess patient's need for assistive devices and provide as appropriate  - Encourage maximum independence but intervene and supervise when necessary  - Involve family in performance of ADLs  - Assess for home care needs following discharge   - Consider OT consult to assist with ADL evaluation and planning for discharge  - Provide patient education as appropriate  Outcome: Progressing  Goal: Maintain or return mobility status to optimal level  Description: INTERVENTIONS:  - Assess patient's baseline mobility status (ambulation, transfers, stairs, etc )    - Identify cognitive and physical deficits and behaviors that affect mobility  - Identify mobility aids required to assist with transfers and/or ambulation (gait belt, sit-to-stand, lift, walker, cane, etc )  - [place] fall precautions as indicated by assessment  - Record patient progress and toleration of activity level on Mobility SBAR; progress patient to next Phase/Stage  - Instruct patient to call for assistance with activity based on assessment  - Consider rehabilitation consult to assist with strengthening/weightbearing, etc   Outcome: Progressing     Problem: DISCHARGE PLANNING  Goal: Discharge to home or other facility with appropriate resources  Description: INTERVENTIONS:  - Identify barriers to discharge w/patient and caregiver  - Arrange for needed discharge resources and transportation as appropriate  - Identify discharge learning needs (meds, wound care, etc )  - Arrange for interpretive services to assist at discharge as needed  - Refer to Case Management Department for coordinating discharge planning if the patient needs post-hospital services based on physician/advanced practitioner order or complex needs related to functional status, cognitive ability, or social support system  Outcome: Progressing     Problem: GASTROINTESTINAL - ADULT  Goal: Minimal or absence of nausea and/or vomiting  Description: INTERVENTIONS:  - Administer IV fluids if ordered to ensure adequate hydration  - Maintain NPO status until nausea and vomiting are resolved  - Nasogastric tube if ordered  - Administer ordered antiemetic medications as needed  - Provide nonpharmacologic comfort measures as appropriate  - Advance diet as tolerated, if ordered  - Consider nutrition services referral to assist patient with adequate nutrition and appropriate food choices  Outcome: Progressing  Goal: Maintains or returns to baseline bowel function  Description: INTERVENTIONS:  - Assess bowel function  - Encourage oral fluids to ensure adequate hydration  - Administer IV fluids if ordered to ensure adequate hydration  - Administer ordered medications as needed  - Encourage mobilization and activity  - Consider nutritional services referral to assist patient with adequate nutrition and appropriate food choices  Outcome: Progressing  Goal: Maintains adequate nutritional intake  Description: INTERVENTIONS:  - Monitor percentage of each meal consumed  - Identify factors contributing to decreased intake, treat as appropriate  - Assist with meals as needed  - Monitor I&O, weight, and lab values if indicated  - Obtain nutrition services referral as needed  Outcome: Progressing     Problem: Nutrition/Hydration-ADULT  Goal: Nutrient/Hydration intake appropriate for improving, restoring or maintaining nutritional needs  Description: Monitor and assess patient's nutrition/hydration status for malnutrition  Collaborate with interdisciplinary team and initiate plan and interventions as ordered  Monitor patient's weight and dietary intake as ordered or per policy  Utilize nutrition screening tool and intervene as necessary  Determine patient's food preferences and provide high-protein, high-caloric foods as appropriate       INTERVENTIONS:  - Monitor oral intake, urinary output, labs, and treatment plans  - Assess nutrition and hydration status and recommend course of action  - Evaluate amount of meals eaten  - Assist patient with eating if necessary   - Allow adequate time for meals  - Recommend/ encourage appropriate diets, oral nutritional supplements, and vitamin/mineral supplements  - Order, calculate, and assess calorie counts as needed  - Recommend, monitor, and adjust tube feedings and TPN/PPN based on assessed needs  - Assess need for intravenous fluids  - Provide specific nutrition/hydration education as appropriate  - Include patient/family/caregiver in decisions related to nutrition  Outcome: Progressing

## 2021-03-04 NOTE — PROGRESS NOTES
Patient Name: Kt Preston  Patient MRN: [id number]  Date: 03/04/21  Service: [facility]    Subjective   Patients diet advanced from clears (breakfast)-->toast/crackers with full liquids (lunch)-->low fat (dinner)  Attempted chicken for dinner which caused pain  Stated he didn't try toast at lunch  IV morphine needed less yesterday, last dose about an hour ago  NPO for OR this morning  Vitals stable  States pain is currently controlled with morphine  BM this morning, soft/brown  Vitals  Blood pressure 157/80, pulse 64, temperature (!) 97 °F (36 1 °C), temperature source Temporal, resp  rate 20, height 5' 3" (1 6 m), weight 61 1 kg (134 lb 11 2 oz), SpO2 92 %  Physical Exam:     General Appearance:    Awake, alert, oriented x3, no distress, well developed, well    nourished   Head:    Normocephalic without obvious abnormality   Eyes:    PERRL, conjunctiva/corneas clear, EOM's intact        Neck:   Supple, no adenopathy   Throat:   Mucous membranes moist   Lungs:     Clear to auscultation bilaterally, no wheezing or rhonchi   Heart:    Regular rate and rhythm, S1 and S2 normal, no murmur   Abdomen:     Soft, +epigastric tenderness, non-distended  bowel sounds active  No masses, rebound or guarding  Extremities:   Extremities without edema   Psych  Derm:   Normal affect    No jaundice   Neurologic:   CNII-XII grossly intact   Speech intact         Laboratory Studies  Results from last 7 days   Lab Units 03/04/21 0515 03/03/21 0439 03/02/21 0443 03/01/21 0439 02/28/21  1842   WBC Thousand/uL 5 50 5 70 5 60 6 40 10 10   HEMOGLOBIN g/dL 11 5* 11 9* 11 5* 11 6* 14 0   HEMATOCRIT % 34 5* 36 1* 34 7* 34 8* 42 4   PLATELETS Thousands/uL 226 237 225 226 296   INR  1 02 1 05  --   --   --      Results from last 7 days   Lab Units 03/04/21 0515 03/03/21 0439 03/02/21 0443 03/01/21 0439 02/28/21  1842   SODIUM mmol/L 137 136* 135* 136* 140   POTASSIUM mmol/L 4 0 4 0 4 0 4 3 4 9   CHLORIDE mmol/L 102 102 104 107 102   CO2 mmol/L 28 28 26 24 26   BUN mg/dL 11 9 14 19 20   CREATININE mg/dL 0 94 0 94 0 96 1 12 1 09   CALCIUM mg/dL 9 1 9 1 8 9 8 7 10 0   ALBUMIN g/dL  --  3 4 3 4 3 4 4 7   TOTAL BILIRUBIN mg/dL  --  0 60 0 50 0 40 1 20   ALK PHOS U/L  --  61 59 67 58   ALT U/L  --  51 59* 83* 67*   AST U/L  --  65* 77* 123* 220*     Lab Results   Component Value Date    LIPASE 380 (H) 03/02/2021    LIPASE 2,018 (H) 03/01/2021    LIPASE 12,279 (H) 02/28/2021       Imaging and Other Studies      Inhouse Medications     Current Facility-Administered Medications:     acetaminophen (TYLENOL) tablet 650 mg, 650 mg, Oral, Q6H PRN    amLODIPine (NORVASC) tablet 10 mg, 10 mg, Oral, Daily, 10 mg at 03/03/21 0829    heparin (porcine) subcutaneous injection 5,000 Units, 5,000 Units, Subcutaneous, Q12H [facility], 5,000 Units at 03/03/21 2138    HYDROcodone-acetaminophen (NORCO) 5-325 mg per tablet 1 tablet, 1 tablet, Oral, Q6H PRN, 1 tablet at 02/28/21 2300    lactated ringers infusion, 125 mL/hr, Intravenous, Continuous, 125 mL/hr at 03/03/21 1816    lactated ringers infusion, 75 mL/hr, Intravenous, Continuous    levothyroxine tablet 12 5 mcg, 12 5 mcg, Oral, Daily, 12 5 mcg at 03/03/21 0502    losartan (COZAAR) tablet 50 mg, 50 mg, Oral, Daily, 50 mg at 03/03/21 0829    magnesium oxide (MAG-OX) tablet 400 mg, 400 mg, Oral, BID, 400 mg at 03/03/21 1817    metoprolol succinate (TOPROL-XL) 24 hr tablet 25 mg, 25 mg, Oral, Daily, 25 mg at 03/03/21 0829    morphine injection 2 mg, 2 mg, Intravenous, Q3H PRN, 2 mg at 03/03/21 2138    nicotine polacrilex (NICORETTE) gum 4 mg, 4 mg, Oral, Q2H PRN    ondansetron (ZOFRAN) injection 4 mg, 4 mg, Intravenous, Q6H PRN    pantoprazole (PROTONIX) EC tablet 40 mg, 40 mg, Oral, Early Morning, 40 mg at 03/03/21 0502    spironolactone (ALDACTONE) tablet 25 mg, 25 mg, Oral, Daily, 25 mg at 03/03/21 0828    thiamine tablet 100 mg, 100 mg, Oral, Daily, 100 mg at 03/03/21 0829   zolpidem (AMBIEN) tablet 10 mg, 10 mg, Oral, HS PRN, 10 mg at 03/03/21 3737      Assessment/Plan:    1  Recurrent acute pancreatitis HD#4 requiring prior prolonged hospitalizations with TPN  Suspect related to alcohol vs microlithiasis but etiology remains largely unclear  Patient continues to drink minimal amount of wine daily when celebrating mass but has otherwise abstained  Liver US showed small gallstone, normal liver and bile ducts  Lipase elevated on admission, trending down  § Continue pain control, IVF  Monitor narcotic use  NPO this AM for OR today  Post-op diet advancement as tolerated, per surgery  § Resume Creon once back on solid food  § Advised complete alcohol abstinence  We discussed [place] altar wine for celebrating mass which is minimally fermented but he states this is not an option  Advised the importance of tobacco cessation  § Lap cholecystectomy with IOC planned today with Dr Rudolph Jeter  2  Pancreatic head mass lesion felt benign with interval improvement noted on CT imaging  Status post EUS x2 last year  Bx showed [facility] category III atypical cells which was felt nondiagnostic  He was treated with 1 month coarse of PO steroids with taper   CA 19-9 elevated at 63 10/2020    Followed by Dr Areli Steen  Patient has follow up with Dr Sarina Whittington tomorrow, 3/5/2021 at 11:40am--will need to reschedule followup after discharge  3  Chronic soft unformed stool felt 2/2 pancreatitic insufficiency started on Creon in January  Workup with stool studies including C diff, thyroid function in January as outpatient unrevealing  Restart Creon when appropriate  4  Mildly nodular appearance of liver on CT imaging indicative of some degree of fibrosis, likely related to past alcohol consumption  LFTs show AST>ALT  Synthetic liver function appears to be within normal limits  Hepatitis C Ab negative   Avoid hepatotoxins      Gaye Parrish PA-C

## 2021-03-04 NOTE — ASSESSMENT & PLAN NOTE
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, HTN, Pre-Diabetes, Recurrent Pancreatitis    Assessment of Cardiac Risk:  · Denies unstable or severe angina or MI in the last 6 weeks or history of stent placement in the last year   · Denies decompensated heart failure (e g  New onset heart failure, NYHA functional class IV heart failure, or worsening existing heart failure)  · Denies significant arrhythmias such as high grade AV block, symptomatic ventricular arrhythmia, newly recognized ventricular tachycardia, supraventricular tachycardia with resting heart rate >100, or symptomatic bradycardia  · Denies severe heart valve disease including aortic stenosis or symptomatic mitral stenosis     Exercise Capacity:  · Able to walk 4 blocks without symptoms?: Yes  · Able to walk 2 flights without symptoms?: Yes     Prior Anesthesia Reactions: No     Personal history of venous thromboembolic disease? No    History of steroid use for >2 weeks within last year? Yes     · Patient has revised cardiac risk index of 1 with 6% major cardiac event in 30 days  · Can proceed with cholecystectomy if in agreement of these risks  · Hold Spironolactone and Losartan morning of surgery  · Continue Metoprolol and amlodipine morning of surgery  · Continue IV hydration preoperatively  · CBC, Electrolytes today ok     · Discussed with Dr Frye and Dr Leticia Tillman

## 2021-03-04 NOTE — NURSING NOTE
Pt back from PACU  VS stable  All incisions clean dry and intact  Belly button starry strip has scant sanguineous drainage  Pt complained of 8/10 pain  Morphine was given, reassessed pain and went down to 4/10

## 2021-03-04 NOTE — PLAN OF CARE
Problem: Potential for Falls  Goal: Patient will remain free of falls  Description: INTERVENTIONS:  - Assess patient frequently for physical needs  -  Identify cognitive and physical deficits and behaviors that affect risk of falls    -  [place] fall precautions as indicated by assessment   - Educate patient/family on patient safety including physical limitations  - Instruct patient to call for assistance with activity based on assessment  - Modify environment to reduce risk of injury  - Consider OT/PT consult to assist with strengthening/mobility  Outcome: Progressing     Problem: PAIN - ADULT  Goal: Verbalizes/displays adequate comfort level or baseline comfort level  Description: Interventions:  - Encourage patient to monitor pain and request assistance  - Assess pain using appropriate pain scale  - Administer analgesics based on type and severity of pain and evaluate response  - Implement non-pharmacological measures as appropriate and evaluate response  - Consider cultural and social influences on pain and pain management  - Notify physician/advanced practitioner if interventions unsuccessful or patient reports new pain  Outcome: Progressing     Problem: INFECTION - ADULT  Goal: Absence or prevention of progression during hospitalization  Description: INTERVENTIONS:  - Assess and monitor for signs and symptoms of infection  - Monitor lab/diagnostic results  - Monitor all insertion sites, i e  indwelling lines, tubes, and drains  - Monitor endotracheal if appropriate and nasal secretions for changes in amount and color  - [place] appropriate cooling/warming therapies per order  - Administer medications as ordered  - Instruct and encourage patient and family to use good hand hygiene technique  - Identify and instruct in appropriate isolation precautions for identified infection/condition  Outcome: Progressing     Problem: SAFETY ADULT  Goal: Patient will remain free of falls  Description: INTERVENTIONS:  - Assess patient frequently for physical needs  -  Identify cognitive and physical deficits and behaviors that affect risk of falls    -  [place] fall precautions as indicated by assessment   - Educate patient/family on patient safety including physical limitations  - Instruct patient to call for assistance with activity based on assessment  - Modify environment to reduce risk of injury  - Consider OT/PT consult to assist with strengthening/mobility  Outcome: Progressing  Goal: Maintain or return to baseline ADL function  Description: INTERVENTIONS:  -  Assess patient's ability to carry out ADLs; assess patient's baseline for ADL function and identify physical deficits which impact ability to perform ADLs (bathing, care of mouth/teeth, toileting, grooming, dressing, etc )  - Assess/evaluate cause of self-care deficits   - Assess range of motion  - Assess patient's mobility; develop plan if impaired  - Assess patient's need for assistive devices and provide as appropriate  - Encourage maximum independence but intervene and supervise when necessary  - Involve family in performance of ADLs  - Assess for home care needs following discharge   - Consider OT consult to assist with ADL evaluation and planning for discharge  - Provide patient education as appropriate  Outcome: Progressing  Goal: Maintain or return mobility status to optimal level  Description: INTERVENTIONS:  - Assess patient's baseline mobility status (ambulation, transfers, stairs, etc )    - Identify cognitive and physical deficits and behaviors that affect mobility  - Identify mobility aids required to assist with transfers and/or ambulation (gait belt, sit-to-stand, lift, walker, cane, etc )  - [place] fall precautions as indicated by assessment  - Record patient progress and toleration of activity level on Mobility SBAR; progress patient to next Phase/Stage  - Instruct patient to call for assistance with activity based on assessment  - Consider rehabilitation consult to assist with strengthening/weightbearing, etc   Outcome: Progressing     Problem: DISCHARGE PLANNING  Goal: Discharge to home or other facility with appropriate resources  Description: INTERVENTIONS:  - Identify barriers to discharge w/patient and caregiver  - Arrange for needed discharge resources and transportation as appropriate  - Identify discharge learning needs (meds, wound care, etc )  - Arrange for interpretive services to assist at discharge as needed  - Refer to Case Management Department for coordinating discharge planning if the patient needs post-hospital services based on physician/advanced practitioner order or complex needs related to functional status, cognitive ability, or social support system  Outcome: Progressing     Problem: GASTROINTESTINAL - ADULT  Goal: Minimal or absence of nausea and/or vomiting  Description: INTERVENTIONS:  - Administer IV fluids if ordered to ensure adequate hydration  - Maintain NPO status until nausea and vomiting are resolved  - Nasogastric tube if ordered  - Administer ordered antiemetic medications as needed  - Provide nonpharmacologic comfort measures as appropriate  - Advance diet as tolerated, if ordered  - Consider nutrition services referral to assist patient with adequate nutrition and appropriate food choices  Outcome: Progressing  Goal: Maintains or returns to baseline bowel function  Description: INTERVENTIONS:  - Assess bowel function  - Encourage oral fluids to ensure adequate hydration  - Administer IV fluids if ordered to ensure adequate hydration  - Administer ordered medications as needed  - Encourage mobilization and activity  - Consider nutritional services referral to assist patient with adequate nutrition and appropriate food choices  Outcome: Progressing  Goal: Maintains adequate nutritional intake  Description: INTERVENTIONS:  - Monitor percentage of each meal consumed  - Identify factors contributing to decreased intake, treat as appropriate  - Assist with meals as needed  - Monitor I&O, weight, and lab values if indicated  - Obtain nutrition services referral as needed  Outcome: Progressing     Problem: Nutrition/Hydration-ADULT  Goal: Nutrient/Hydration intake appropriate for improving, restoring or maintaining nutritional needs  Description: Monitor and assess patient's nutrition/hydration status for malnutrition  Collaborate with interdisciplinary team and initiate plan and interventions as ordered  Monitor patient's weight and dietary intake as ordered or per policy  Utilize nutrition screening tool and intervene as necessary  Determine patient's food preferences and provide high-protein, high-caloric foods as appropriate       INTERVENTIONS:  - Monitor oral intake, urinary output, labs, and treatment plans  - Assess nutrition and hydration status and recommend course of action  - Evaluate amount of meals eaten  - Assist patient with eating if necessary   - Allow adequate time for meals  - Recommend/ encourage appropriate diets, oral nutritional supplements, and vitamin/mineral supplements  - Order, calculate, and assess calorie counts as needed  - Recommend, monitor, and adjust tube feedings and TPN/PPN based on assessed needs  - Assess need for intravenous fluids  - Provide specific nutrition/hydration education as appropriate  - Include patient/family/caregiver in decisions related to nutrition  Outcome: Progressing

## 2021-03-04 NOTE — ASSESSMENT & PLAN NOTE
· Improved  AST ALT ratio 2;1 consistent with alcohol use  Trending down, no elevated ALP or T bili  · Ultrasound liver showed-" Pancreatic tail partially obscured by overlying bowel gas  Cholelithiasis without sonographic evidence of cholecystitis   Unremarkable liver and biliary ducts " per radiology read  · Noted GI consult recommendations

## 2021-03-04 NOTE — ANESTHESIA POSTPROCEDURE EVALUATION
Post-Op Assessment Note    CV Status:  Stable  Pain Score: 1    Pain management: adequate     Mental Status:  Alert and awake   Hydration Status:  Stable   PONV Controlled:  None   Airway Patency:  Patent      Post Op Vitals Reviewed: Yes      Staff: CRNA         No complications documented      BP      Temp     Pulse     Resp      SpO2

## 2021-03-05 PROBLEM — Z01.818 PREOPERATIVE CLEARANCE: Status: RESOLVED | Noted: 2021-03-03 | Resolved: 2021-03-05

## 2021-03-05 LAB
ALBUMIN SERPL BCP-MCNC: 3.6 G/DL (ref 3–5.2)
ALP SERPL-CCNC: 64 U/L (ref 43–122)
ALT SERPL W P-5'-P-CCNC: 115 U/L (ref 9–52)
ANION GAP SERPL CALCULATED.3IONS-SCNC: 4 MMOL/L (ref 5–14)
AST SERPL W P-5'-P-CCNC: 186 U/L (ref 17–59)
BILIRUB SERPL-MCNC: 0.5 MG/DL
BUN SERPL-MCNC: 9 MG/DL (ref 5–25)
CALCIUM SERPL-MCNC: 9.3 MG/DL (ref 8.4–10.2)
CHLORIDE SERPL-SCNC: 102 MMOL/L (ref 97–108)
CO2 SERPL-SCNC: 29 MMOL/L (ref 22–30)
CREAT SERPL-MCNC: 0.91 MG/DL (ref 0.7–1.5)
ERYTHROCYTE [DISTWIDTH] IN BLOOD BY AUTOMATED COUNT: 14 %
GFR SERPL CREATININE-BSD FRML MDRD: 86 ML/MIN/1.73SQ M
GLUCOSE SERPL-MCNC: 111 MG/DL (ref 70–99)
HCT VFR BLD AUTO: 34.2 % (ref 41–53)
HGB BLD-MCNC: 11.5 G/DL (ref 13.5–17.5)
MCH RBC QN AUTO: 31.9 PG (ref 26–34)
MCHC RBC AUTO-ENTMCNC: 33.5 G/DL (ref 31–36)
MCV RBC AUTO: 95 FL (ref 80–100)
PLATELET # BLD AUTO: 246 THOUSANDS/UL (ref 150–450)
PMV BLD AUTO: 9.7 FL (ref 8.9–12.7)
POTASSIUM SERPL-SCNC: 4.4 MMOL/L (ref 3.6–5)
PROT SERPL-MCNC: 6.3 G/DL (ref 5.9–8.4)
RBC # BLD AUTO: 3.6 MILLION/UL (ref 4.5–5.9)
SODIUM SERPL-SCNC: 135 MMOL/L (ref 137–147)
WBC # BLD AUTO: 6.8 THOUSAND/UL (ref 4.5–11)

## 2021-03-05 PROCEDURE — 99232 SBSQ HOSP IP/OBS MODERATE 35: CPT | Performed by: INTERNAL MEDICINE

## 2021-03-05 PROCEDURE — 85027 COMPLETE CBC AUTOMATED: CPT | Performed by: INTERNAL MEDICINE

## 2021-03-05 PROCEDURE — 99232 SBSQ HOSP IP/OBS MODERATE 35: CPT | Performed by: PHYSICIAN ASSISTANT

## 2021-03-05 PROCEDURE — 99024 POSTOP FOLLOW-UP VISIT: CPT | Performed by: SPECIALIST

## 2021-03-05 PROCEDURE — 80053 COMPREHEN METABOLIC PANEL: CPT | Performed by: INTERNAL MEDICINE

## 2021-03-05 RX ORDER — OXYCODONE HYDROCHLORIDE AND ACETAMINOPHEN 5; 325 MG/1; MG/1
2 TABLET ORAL EVERY 4 HOURS PRN
Status: DISCONTINUED | OUTPATIENT
Start: 2021-03-05 | End: 2021-03-08 | Stop reason: HOSPADM

## 2021-03-05 RX ORDER — ZOLPIDEM TARTRATE 5 MG/1
5 TABLET ORAL
Status: DISCONTINUED | OUTPATIENT
Start: 2021-03-05 | End: 2021-03-05

## 2021-03-05 RX ORDER — ZOLPIDEM TARTRATE 5 MG/1
10 TABLET ORAL
Status: DISCONTINUED | OUTPATIENT
Start: 2021-03-05 | End: 2021-03-08 | Stop reason: HOSPADM

## 2021-03-05 RX ADMIN — HEPARIN SODIUM 5000 UNITS: 5000 INJECTION INTRAVENOUS; SUBCUTANEOUS at 21:17

## 2021-03-05 RX ADMIN — SODIUM CHLORIDE, SODIUM LACTATE, POTASSIUM CHLORIDE, AND CALCIUM CHLORIDE 100 ML/HR: .6; .31; .03; .02 INJECTION, SOLUTION INTRAVENOUS at 23:27

## 2021-03-05 RX ADMIN — SPIRONOLACTONE 25 MG: 25 TABLET ORAL at 08:40

## 2021-03-05 RX ADMIN — HEPARIN SODIUM 5000 UNITS: 5000 INJECTION INTRAVENOUS; SUBCUTANEOUS at 08:41

## 2021-03-05 RX ADMIN — SODIUM CHLORIDE, SODIUM LACTATE, POTASSIUM CHLORIDE, AND CALCIUM CHLORIDE 100 ML/HR: .6; .31; .03; .02 INJECTION, SOLUTION INTRAVENOUS at 13:31

## 2021-03-05 RX ADMIN — PANTOPRAZOLE SODIUM 40 MG: 40 TABLET, DELAYED RELEASE ORAL at 05:24

## 2021-03-05 RX ADMIN — MORPHINE SULFATE 2 MG: 2 INJECTION, SOLUTION INTRAMUSCULAR; INTRAVENOUS at 08:41

## 2021-03-05 RX ADMIN — OXYCODONE HYDROCHLORIDE AND ACETAMINOPHEN 2 TABLET: 5; 325 TABLET ORAL at 13:29

## 2021-03-05 RX ADMIN — ZOLPIDEM TARTRATE 10 MG: 5 TABLET, FILM COATED ORAL at 21:17

## 2021-03-05 RX ADMIN — AMLODIPINE BESYLATE 10 MG: 10 TABLET ORAL at 08:41

## 2021-03-05 RX ADMIN — SODIUM CHLORIDE, SODIUM LACTATE, POTASSIUM CHLORIDE, AND CALCIUM CHLORIDE 125 ML/HR: .6; .31; .03; .02 INJECTION, SOLUTION INTRAVENOUS at 04:36

## 2021-03-05 RX ADMIN — MAGNESIUM OXIDE TAB 400 MG (241.3 MG ELEMENTAL MG) 400 MG: 400 (241.3 MG) TAB at 08:40

## 2021-03-05 RX ADMIN — MAGNESIUM OXIDE TAB 400 MG (241.3 MG ELEMENTAL MG) 400 MG: 400 (241.3 MG) TAB at 17:24

## 2021-03-05 RX ADMIN — LOSARTAN POTASSIUM 50 MG: 50 TABLET, FILM COATED ORAL at 08:40

## 2021-03-05 RX ADMIN — HYDROCODONE BITARTRATE AND ACETAMINOPHEN 1 TABLET: 5; 325 TABLET ORAL at 09:29

## 2021-03-05 RX ADMIN — MORPHINE SULFATE 2 MG: 2 INJECTION, SOLUTION INTRAMUSCULAR; INTRAVENOUS at 04:36

## 2021-03-05 RX ADMIN — MORPHINE SULFATE 2 MG: 2 INJECTION, SOLUTION INTRAMUSCULAR; INTRAVENOUS at 17:25

## 2021-03-05 RX ADMIN — OXYCODONE HYDROCHLORIDE AND ACETAMINOPHEN 2 TABLET: 5; 325 TABLET ORAL at 20:26

## 2021-03-05 RX ADMIN — LEVOTHYROXINE SODIUM 12.5 MCG: 25 TABLET ORAL at 05:24

## 2021-03-05 RX ADMIN — METOPROLOL SUCCINATE 25 MG: 25 TABLET, FILM COATED, EXTENDED RELEASE ORAL at 08:40

## 2021-03-05 NOTE — ASSESSMENT & PLAN NOTE
Blood pressure stable    Continue pre-hospital spironolactone 25 mg p o  daily and Cozaar 50 mg p o  daily  ContinueToprol XL 25 mg p o  daily, and Norvasc 10 mg p o  daily

## 2021-03-05 NOTE — PLAN OF CARE
Problem: Potential for Falls  Goal: Patient will remain free of falls  Description: INTERVENTIONS:  - Assess patient frequently for physical needs  -  Identify cognitive and physical deficits and behaviors that affect risk of falls    -  [place] fall precautions as indicated by assessment   - Educate patient/family on patient safety including physical limitations  - Instruct patient to call for assistance with activity based on assessment  - Modify environment to reduce risk of injury  - Consider OT/PT consult to assist with strengthening/mobility  Outcome: Progressing     Problem: PAIN - ADULT  Goal: Verbalizes/displays adequate comfort level or baseline comfort level  Description: Interventions:  - Encourage patient to monitor pain and request assistance  - Assess pain using appropriate pain scale  - Administer analgesics based on type and severity of pain and evaluate response  - Implement non-pharmacological measures as appropriate and evaluate response  - Consider cultural and social influences on pain and pain management  - Notify physician/advanced practitioner if interventions unsuccessful or patient reports new pain  Outcome: Progressing     Problem: INFECTION - ADULT  Goal: Absence or prevention of progression during hospitalization  Description: INTERVENTIONS:  - Assess and monitor for signs and symptoms of infection  - Monitor lab/diagnostic results  - Monitor all insertion sites, i e  indwelling lines, tubes, and drains  - Monitor endotracheal if appropriate and nasal secretions for changes in amount and color  - [place] appropriate cooling/warming therapies per order  - Administer medications as ordered  - Instruct and encourage patient and family to use good hand hygiene technique  - Identify and instruct in appropriate isolation precautions for identified infection/condition  Outcome: Progressing     Problem: SAFETY ADULT  Goal: Patient will remain free of falls  Description: INTERVENTIONS:  - Assess patient frequently for physical needs  -  Identify cognitive and physical deficits and behaviors that affect risk of falls    -  [place] fall precautions as indicated by assessment   - Educate patient/family on patient safety including physical limitations  - Instruct patient to call for assistance with activity based on assessment  - Modify environment to reduce risk of injury  - Consider OT/PT consult to assist with strengthening/mobility  Outcome: Progressing  Goal: Maintain or return to baseline ADL function  Description: INTERVENTIONS:  -  Assess patient's ability to carry out ADLs; assess patient's baseline for ADL function and identify physical deficits which impact ability to perform ADLs (bathing, care of mouth/teeth, toileting, grooming, dressing, etc )  - Assess/evaluate cause of self-care deficits   - Assess range of motion  - Assess patient's mobility; develop plan if impaired  - Assess patient's need for assistive devices and provide as appropriate  - Encourage maximum independence but intervene and supervise when necessary  - Involve family in performance of ADLs  - Assess for home care needs following discharge   - Consider OT consult to assist with ADL evaluation and planning for discharge  - Provide patient education as appropriate  Outcome: Progressing  Goal: Maintain or return mobility status to optimal level  Description: INTERVENTIONS:  - Assess patient's baseline mobility status (ambulation, transfers, stairs, etc )    - Identify cognitive and physical deficits and behaviors that affect mobility  - Identify mobility aids required to assist with transfers and/or ambulation (gait belt, sit-to-stand, lift, walker, cane, etc )  - [place] fall precautions as indicated by assessment  - Record patient progress and toleration of activity level on Mobility SBAR; progress patient to next Phase/Stage  - Instruct patient to call for assistance with activity based on assessment  - Consider rehabilitation consult to assist with strengthening/weightbearing, etc   Outcome: Progressing     Problem: DISCHARGE PLANNING  Goal: Discharge to home or other facility with appropriate resources  Description: INTERVENTIONS:  - Identify barriers to discharge w/patient and caregiver  - Arrange for needed discharge resources and transportation as appropriate  - Identify discharge learning needs (meds, wound care, etc )  - Arrange for interpretive services to assist at discharge as needed  - Refer to Case Management Department for coordinating discharge planning if the patient needs post-hospital services based on physician/advanced practitioner order or complex needs related to functional status, cognitive ability, or social support system  Outcome: Progressing     Problem: GASTROINTESTINAL - ADULT  Goal: Minimal or absence of nausea and/or vomiting  Description: INTERVENTIONS:  - Administer IV fluids if ordered to ensure adequate hydration  - Maintain NPO status until nausea and vomiting are resolved  - Nasogastric tube if ordered  - Administer ordered antiemetic medications as needed  - Provide nonpharmacologic comfort measures as appropriate  - Advance diet as tolerated, if ordered  - Consider nutrition services referral to assist patient with adequate nutrition and appropriate food choices  Outcome: Progressing  Goal: Maintains or returns to baseline bowel function  Description: INTERVENTIONS:  - Assess bowel function  - Encourage oral fluids to ensure adequate hydration  - Administer IV fluids if ordered to ensure adequate hydration  - Administer ordered medications as needed  - Encourage mobilization and activity  - Consider nutritional services referral to assist patient with adequate nutrition and appropriate food choices  Outcome: Progressing  Goal: Maintains adequate nutritional intake  Description: INTERVENTIONS:  - Monitor percentage of each meal consumed  - Identify factors contributing to decreased intake, treat as appropriate  - Assist with meals as needed  - Monitor I&O, weight, and lab values if indicated  - Obtain nutrition services referral as needed  Outcome: Progressing     Problem: Nutrition/Hydration-ADULT  Goal: Nutrient/Hydration intake appropriate for improving, restoring or maintaining nutritional needs  Description: Monitor and assess patient's nutrition/hydration status for malnutrition  Collaborate with interdisciplinary team and initiate plan and interventions as ordered  Monitor patient's weight and dietary intake as ordered or per policy  Utilize nutrition screening tool and intervene as necessary  Determine patient's food preferences and provide high-protein, high-caloric foods as appropriate       INTERVENTIONS:  - Monitor oral intake, urinary output, labs, and treatment plans  - Assess nutrition and hydration status and recommend course of action  - Evaluate amount of meals eaten  - Assist patient with eating if necessary   - Allow adequate time for meals  - Recommend/ encourage appropriate diets, oral nutritional supplements, and vitamin/mineral supplements  - Order, calculate, and assess calorie counts as needed  - Recommend, monitor, and adjust tube feedings and TPN/PPN based on assessed needs  - Assess need for intravenous fluids  - Provide specific nutrition/hydration education as appropriate  - Include patient/family/caregiver in decisions related to nutrition  Outcome: Progressing

## 2021-03-05 NOTE — ASSESSMENT & PLAN NOTE
· Trending up- AST ALT ratio 2;1 consistent with alcohol use on admission  Trending up but no elevated ALP or T bili  · Ultrasound liver showed-" Pancreatic tail partially obscured by overlying bowel gas  Cholelithiasis without sonographic evidence of cholecystitis  Unremarkable liver and biliary ducts " per radiology read    · S/p cholecystectomy 3/4  · Noted GI consult recommendations

## 2021-03-05 NOTE — PROGRESS NOTES
Progress Note - Yomaira Velasquez 1950, 78 y o  male MRN: [id number]    Unit/Bed#: 7T [facility] 702-01 Encounter: [id number]    Primary Care Provider: Fabiano Pineda MD   Date and time admitted to hospital: 2/28/2021  6:26 PM        * Idiopathic acute pancreatitis without infection or necrosis  Assessment & Plan  · Recurrent pancreatitis thought related to alcohol which he now declined using since last hospitalization in October 2020  Reported pattern of acute pancreatitis in February and October  · Severe epigastric pain radiated to the right shoulder and right midsternal for the last 4 days  · Lipase more than 12,000 and currently trending down  · GI consulted appreciate the following recommendations:   · Liver ultrasound- showed" Pancreatic tail partially obscured by overlying bowel gas  Cholelithiasis without sonographic evidence of cholecystitis  Unremarkable liver and biliary ducts "  · Hold Creon until patient is tolerating more oral intake  · Continue Zofran and pain control morphine p r n  · Continue to hold pre-hospital Vytorin  · Continue Lactated Ringer,   · Spoke to Dr Kelin Smart, outpatient GI doctor and recommended consult to Surgery for cholecystectomy  · Spoke with Blanquita Pfeiffer with General surgery  · Status post Cholecystectomy Thursday 3/4  · Postoperative day #1, doing well  · Upgrade to full liquid diet per surgery    Transaminitis  Assessment & Plan  · Trending up- AST ALT ratio 2;1 consistent with alcohol use on admission  Trending up but no elevated ALP or T bili  · Ultrasound liver showed-" Pancreatic tail partially obscured by overlying bowel gas  Cholelithiasis without sonographic evidence of cholecystitis  Unremarkable liver and biliary ducts " per radiology read    · S/p cholecystectomy 3/4  · Noted GI consult recommendations    Smoking  Assessment & Plan   Continue pre-hospital nicotine gum    Mixed hyperlipidemia  Assessment & Plan  Continue to hold Vytorin secondary to acute pancreatitis and elevated liver functions    Hypothyroidism (acquired)  Assessment & Plan   Continue pre-hospital levothyroxine 12 5 mg p o  daily    Thoracic aortic aneurysm without rupture [facility])  Assessment & Plan  Recent CTA 2021 showed   Ascending aortic aneurysm measures 4 cm   Moderate stenosis at the left renal artery origin  Outpatient surveillance by PCP  Essential hypertension, benign  Assessment & Plan  Blood pressure stable  Continue pre-hospital spironolactone 25 mg p o  daily and Cozaar 50 mg p o  daily  ContinueToprol XL 25 mg p o  daily, and Norvasc 10 mg p o  daily        VTE Pharmacologic Prophylaxis:   Pharmacologic: Heparin  Mechanical VTE Prophylaxis in Place: No (refuses)    Patient Centered Rounds: I have performed bedside rounds with nursing staff today  Discussions with Specialists or Other Care Team Provider: surgery team    Education and Discussions with Family / Patient: none    Time Spent for Care: 20 minutes  More than 50% of total time spent on counseling and coordination of care as described above  Current Length of Stay: 5 day(s)    Current Patient Status: Inpatient   Certification Statement: The patient will continue to require additional inpatient hospital stay due to contiune     Discharge Plan:  Continue to upgrade diet  If patient tolerates p o  Intake and possibly discharge this weekend with oral pain meds    Code Status: Level 1 - Full Code      Subjective:   Patient seen examined at bedside  Patient currently states he feels well after surgery  He still having some "pancreatic pain" states this is worse than than the surgical pain  Objective:     Vitals:   Temp (24hrs), Av 7 °F (35 9 °C), Min:96 1 °F (35 6 °C), Max:97 8 °F (36 6 °C)    Temp:  [96 1 °F (35 6 °C)-97 8 °F (36 6 °C)] 97 8 °F (36 6 °C)  HR:  [43-80] 64  Resp:  [12-18] 16  BP: (100-150)/(55-87) 119/67  SpO2:  [91 %-98 %] 97 %  Body mass index is 23 86 kg/m²       Input and Output Summary (last 24 hours): Intake/Output Summary (Last 24 hours) at 3/5/2021 0825  Last data filed at 3/5/2021 7789  Gross per 24 hour   Intake 2920 ml   Output 1440 ml   Net 1480 ml       Physical Exam:     Physical Exam  Constitutional:       General: He is not in acute distress  Appearance: Normal appearance  HENT:      Head: Normocephalic and atraumatic  Eyes:      General:         Right eye: No discharge  Left eye: No discharge  Cardiovascular:      Rate and Rhythm: Normal rate and regular rhythm  Pulses: Normal pulses  Heart sounds: No murmur  Pulmonary:      Effort: Pulmonary effort is normal  No respiratory distress  Breath sounds: Normal breath sounds  No wheezing  Musculoskeletal:      Right lower leg: No edema  Left lower leg: No edema  Skin:     General: Skin is warm and dry  Neurological:      General: No focal deficit present  Mental Status: He is alert and oriented to person, place, and time  Mental status is at baseline  Psychiatric:         Mood and Affect: Mood normal            Additional Data:     Labs:    Results from last 7 days   Lab Units 03/05/21 0437  03/03/21  0439   WBC Thousand/uL 6 80   < > 5 70   HEMOGLOBIN g/dL 11 5*   < > 11 9*   HEMATOCRIT % 34 2*   < > 36 1*   PLATELETS Thousands/uL 246   < > 237   NEUTROS PCT %  --   --  58   LYMPHS PCT %  --   --  26   MONOS PCT %  --   --  10   EOS PCT %  --   --  6    < > = values in this interval not displayed  Results from last 7 days   Lab Units 03/05/21  0437   SODIUM mmol/L 135*   POTASSIUM mmol/L 4 4   CHLORIDE mmol/L 102   CO2 mmol/L 29   BUN mg/dL 9   CREATININE mg/dL 0 91   ANION GAP mmol/L 4*   CALCIUM mg/dL 9 3   ALBUMIN g/dL 3 6   TOTAL BILIRUBIN mg/dL 0 50   ALK PHOS U/L 64   ALT U/L 115*   AST U/L 186*   GLUCOSE RANDOM mg/dL 111*     Results from last 7 days   Lab Units 03/04/21  0515   INR  1 02                       * I Have Reviewed All Lab Data Listed Above    * Additional Pertinent Lab Tests Reviewed: All Labs Within Last 24 Hours Reviewed    Imaging:    Imaging Reports Reviewed Today Include: none    Recent Cultures (last 7 days):           Last 24 Hours Medication List:   Current Facility-Administered Medications   Medication Dose Route Frequency Provider Last Rate    acetaminophen  650 mg Oral Q6H PRN Wojciech Dahl MD      amLODIPine  10 mg Oral Daily Wojciech Dahl MD      heparin (porcine)  5,000 Units Subcutaneous Q12H [address] Wojciech Dahl MD      HYDROcodone-acetaminophen  1 tablet Oral Q6H PRN Wojciech Dahl MD      lactated ringers  125 mL/hr Intravenous Continuous Wojciech Dahl  mL/hr (03/05/21 0436)    levothyroxine  12 5 mcg Oral Daily Wojciech Dahl MD      losartan  50 mg Oral Daily Wojciech Dahl MD      magnesium oxide  400 mg Oral BID Wojciech Dahl MD      metoprolol succinate  25 mg Oral Daily Wojciech Dahl MD      morphine injection  2 mg Intravenous Q3H PRN Wojciech Dahl MD      nicotine polacrilex  4 mg Oral Q2H PRN Wojciech Dahl MD      ondansetron  4 mg Intravenous Q6H PRN Wojciech Dahl MD      pantoprazole  40 mg Oral Early Morning Wojciech Dahl MD      spironolactone  25 mg Oral Daily Wojciech Dahl MD      thiamine  100 mg Oral Daily Wojciech Dahl MD      zolpidem  10 mg Oral HS PRN Wojciech Dahl MD          Today, Patient Was Seen By: Doris Regalado MD    ** Please Note: Dictation voice to text software may have been used in the creation of this document   **

## 2021-03-05 NOTE — PROGRESS NOTES
Patient Name: Kris Abdullahi  Patient MRN: [id number]  Date: 03/05/21  Service: [facility]    Subjective   No events noted overnight  Morphine adequate to control pain  Still complains of pancreatic pain but no surgical pain  No nausea  Passing gas, no BMs  Tolerated liquids for breakfast  No SOB  Vitals  Blood pressure 119/67, pulse 64, temperature 97 8 °F (36 6 °C), temperature source Temporal, resp  rate 16, height 5' 3" (1 6 m), weight 61 1 kg (134 lb 11 2 oz), SpO2 97 %  Physical Exam:     General Appearance:    Awake, alert, oriented x3, no distress, well developed, well    nourished   Head:    Normocephalic without obvious abnormality   Eyes:    PERRL, conjunctiva/corneas clear, EOM's intact        Neck:   Supple, no adenopathy   Throat:   Mucous membranes moist +O2NC at 3L   Lungs:     Clear to auscultation bilaterally, no wheezing or rhonchi   Heart:    Regular rate and rhythm, S1 and S2 normal, no murmur   Abdomen:     Soft, +epigastric tenderness, non-distended  bowel sounds active  No masses, rebound or guarding  +suture site steri-strips intact   Extremities:   Extremities without edema   Psych  Derm:   Normal affect    No jaundice   Neurologic:   CNII-XII grossly intact   Speech intact         Laboratory Studies  Results from last 7 days   Lab Units 03/05/21 0437 03/04/21 0515 03/03/21 0439 03/02/21 0443 03/01/21 0439 02/28/21  1842   WBC Thousand/uL 6 80 5 50 5 70 5 60 6 40 10 10   HEMOGLOBIN g/dL 11 5* 11 5* 11 9* 11 5* 11 6* 14 0   HEMATOCRIT % 34 2* 34 5* 36 1* 34 7* 34 8* 42 4   PLATELETS Thousands/uL 246 226 237 225 226 296   INR   --  1 02 1 05  --   --   --      Results from last 7 days   Lab Units 03/05/21 0437 03/04/21 0515 03/03/21 0439 03/02/21 0443 03/01/21 0439 02/28/21  1842   SODIUM mmol/L 135* 137 136* 135* 136* 140   POTASSIUM mmol/L 4 4 4 0 4 0 4 0 4 3 4 9   CHLORIDE mmol/L 102 102 102 104 107 102   CO2 mmol/L 29 28 28 26 24 26   BUN mg/dL 9 11 9 14 19 20   CREATININE mg/dL 0 91 0 94 0 94 0 96 1 12 1 09   CALCIUM mg/dL 9 3 9 1 9 1 8 9 8 7 10 0   ALBUMIN g/dL 3 6  --  3 4 3 4 3 4 4 7   TOTAL BILIRUBIN mg/dL 0 50  --  0 60 0 50 0 40 1 20   ALK PHOS U/L 64  --  61 59 67 58   ALT U/L 115*  --  51 59* 83* 67*   AST U/L 186*  --  65* 77* 123* 220*     Lab Results   Component Value Date    LIPASE 380 (H) 03/02/2021    LIPASE 2,018 (H) 03/01/2021    LIPASE 12,279 (H) 02/28/2021       Imaging and Other Studies      Inhouse Medications     Current Facility-Administered Medications:     acetaminophen (TYLENOL) tablet 650 mg, 650 mg, Oral, Q6H PRN    amLODIPine (NORVASC) tablet 10 mg, 10 mg, Oral, Daily, 10 mg at 03/05/21 0841    heparin (porcine) subcutaneous injection 5,000 Units, 5,000 Units, Subcutaneous, Q12H [address], 5,000 Units at 03/05/21 0841    HYDROcodone-acetaminophen (NORCO) 5-325 mg per tablet 1 tablet, 1 tablet, Oral, Q6H PRN, 1 tablet at 02/28/21 2300    lactated ringers infusion, 100 mL/hr, Intravenous, Continuous, 100 mL/hr at 03/05/21 0849    levothyroxine tablet 12 5 mcg, 12 5 mcg, Oral, Daily, 12 5 mcg at 03/05/21 0524    losartan (COZAAR) tablet 50 mg, 50 mg, Oral, Daily, 50 mg at 03/05/21 0840    magnesium oxide (MAG-OX) tablet 400 mg, 400 mg, Oral, BID, 400 mg at 03/05/21 0840    metoprolol succinate (TOPROL-XL) 24 hr tablet 25 mg, 25 mg, Oral, Daily, 25 mg at 03/05/21 0840    morphine injection 2 mg, 2 mg, Intravenous, Q3H PRN, 2 mg at 03/05/21 0841    nicotine polacrilex (NICORETTE) gum 4 mg, 4 mg, Oral, Q2H PRN    ondansetron (ZOFRAN) injection 4 mg, 4 mg, Intravenous, Q6H PRN, 4 mg at 03/04/21 1215    pantoprazole (PROTONIX) EC tablet 40 mg, 40 mg, Oral, Early Morning, 40 mg at 03/05/21 0524    spironolactone (ALDACTONE) tablet 25 mg, 25 mg, Oral, Daily, 25 mg at 03/05/21 0840    zolpidem (AMBIEN) tablet 10 mg, 10 mg, Oral, HS      Assessment/Plan:    1  Recurrent acute pancreatitis HD#5   Suspect related to alcohol vs microlithiasis but etiology unclear  Due to possibility of microlithiasis and gallstone on US, patient underwent lap sabrina 3/4/21  IOC was normal  § Patient has continued to drink minimal amount of wine daily when celebrating mass but has otherwise abstained  Advised complete alcohol abstinence  We discussed [place] altar wine for celebrating mass which is minimally fermented but he states this is not an option  Advised the importance of tobacco cessation  § Lipase elevated on admission, trending down  § Continue pain control, IVF  Post-op diet advancement as tolerated, per surgery (goal: lo fat)  Resume Creon once back on solid food  § Lap cholecystectomy with IOC POD#1, clinically stable  Pathology pending  § Continue daily PPI   2  Pancreatic head mass lesion felt benign with interval improvement noted on CT imaging  Status post EUS x2 last year  Bx showed [place] category III atypical cells which was felt nondiagnostic  He was treated with 1 month coarse of PO steroids with taper   CA 19-9 elevated at 63 10/2020    Followed by Dr Ellis Draper, their office notified of patients admission  3  Chronic soft unformed stool felt 2/2 pancreatitic insufficiency started on Creon in January  Workup with stool studies including C diff, thyroid function in January as outpatient unrevealing  Restart Creon when appropriate  4  Mildly nodular appearance of liver on CT imaging indicative of some degree of fibrosis, likely related to past alcohol consumption  LFTs show AST>ALT  Synthetic liver function appears to be within normal limits  Hepatitis C Ab negative   Avoid hepatotoxins      Mckenna House PA-C

## 2021-03-05 NOTE — ASSESSMENT & PLAN NOTE
· Recurrent pancreatitis thought related to alcohol which he now declined using since last hospitalization in October 2020  Reported pattern of acute pancreatitis in February and October  · Severe epigastric pain radiated to the right shoulder and right midsternal for the last 4 days  · Lipase more than 12,000 and currently trending down  · GI consulted appreciate the following recommendations:   · Liver ultrasound- showed" Pancreatic tail partially obscured by overlying bowel gas  Cholelithiasis without sonographic evidence of cholecystitis  Unremarkable liver and biliary ducts "  · Hold Creon until patient is tolerating more oral intake  · Continue Zofran and pain control morphine p r n  · Continue to hold pre-hospital Vytorin  · Continue Lactated Ringer,   · Spoke to Dr Arabella Jackson, outpatient GI doctor and recommended consult to Surgery for cholecystectomy  · Spoke with Isaias Domingo with General surgery  · Status post Cholecystectomy Thursday 3/4  · Postoperative day #1, doing well     · Upgrade to full liquid diet per surgery

## 2021-03-05 NOTE — PROGRESS NOTES
Looks fine  Denies any incisional pain  Still complains of some pancreatitis pain  Tolerating clear liquids  Voiding well  Abdomen:  Soft nontender no masses noted  Incisions are unremarkable and appeared to be healing well  LFT slightly elevated  Alk-phos and bilirubin within normal limits  Impression:  Doing well status post laparoscopic cholecystectomy with cholangiogram    Plan:  Advance diet slowly  Adjust pain medicine to p o  Ambulate

## 2021-03-05 NOTE — ASSESSMENT & PLAN NOTE
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, HTN, Pre-Diabetes, Recurrent Pancreatitis    Assessment of Cardiac Risk:  · Denies unstable or severe angina or MI in the last 6 weeks or history of stent placement in the last year   · Denies decompensated heart failure (e g  New onset heart failure, NYHA functional class IV heart failure, or worsening existing heart failure)  · Denies significant arrhythmias such as high grade AV block, symptomatic ventricular arrhythmia, newly recognized ventricular tachycardia, supraventricular tachycardia with resting heart rate >100, or symptomatic bradycardia  · Denies severe heart valve disease including aortic stenosis or symptomatic mitral stenosis     Exercise Capacity:  · Able to walk 4 blocks without symptoms?: Yes  · Able to walk 2 flights without symptoms?: Yes     Prior Anesthesia Reactions: No     Personal history of venous thromboembolic disease? No    History of steroid use for >2 weeks within last year? Yes     · Patient has revised cardiac risk index of 1 with 6% major cardiac event in 30 days  · Can proceed with cholecystectomy if in agreement of these risks  · Hold Spironolactone and Losartan morning of surgery  · Continue Metoprolol and amlodipine morning of surgery  · Continue IV hydration preoperatively  · CBC, Electrolytes today ok     · Discussed with Dr Frye and Dr Ghada Chris

## 2021-03-06 LAB
ALBUMIN SERPL BCP-MCNC: 3.3 G/DL (ref 3–5.2)
ALP SERPL-CCNC: 62 U/L (ref 43–122)
ALT SERPL W P-5'-P-CCNC: 79 U/L (ref 9–52)
ANION GAP SERPL CALCULATED.3IONS-SCNC: 5 MMOL/L (ref 5–14)
AST SERPL W P-5'-P-CCNC: 98 U/L (ref 17–59)
BILIRUB SERPL-MCNC: 0.4 MG/DL
BUN SERPL-MCNC: 11 MG/DL (ref 5–25)
CALCIUM ALBUM COR SERPL-MCNC: 9.7 MG/DL (ref 8.3–10.1)
CALCIUM SERPL-MCNC: 9.1 MG/DL (ref 8.4–10.2)
CHLORIDE SERPL-SCNC: 103 MMOL/L (ref 97–108)
CO2 SERPL-SCNC: 29 MMOL/L (ref 22–30)
CREAT SERPL-MCNC: 0.92 MG/DL (ref 0.7–1.5)
ERYTHROCYTE [DISTWIDTH] IN BLOOD BY AUTOMATED COUNT: 14.3 %
GFR SERPL CREATININE-BSD FRML MDRD: 85 ML/MIN/1.73SQ M
GLUCOSE SERPL-MCNC: 89 MG/DL (ref 70–99)
HCT VFR BLD AUTO: 33.3 % (ref 41–53)
HGB BLD-MCNC: 10.9 G/DL (ref 13.5–17.5)
MCH RBC QN AUTO: 31.7 PG (ref 26–34)
MCHC RBC AUTO-ENTMCNC: 32.8 G/DL (ref 31–36)
MCV RBC AUTO: 97 FL (ref 80–100)
PLATELET # BLD AUTO: 244 THOUSANDS/UL (ref 150–450)
PMV BLD AUTO: 10.1 FL (ref 8.9–12.7)
POTASSIUM SERPL-SCNC: 3.6 MMOL/L (ref 3.6–5)
PROT SERPL-MCNC: 5.9 G/DL (ref 5.9–8.4)
RBC # BLD AUTO: 3.45 MILLION/UL (ref 4.5–5.9)
SODIUM SERPL-SCNC: 137 MMOL/L (ref 137–147)
WBC # BLD AUTO: 6.3 THOUSAND/UL (ref 4.5–11)

## 2021-03-06 PROCEDURE — 99024 POSTOP FOLLOW-UP VISIT: CPT | Performed by: SPECIALIST

## 2021-03-06 PROCEDURE — 80053 COMPREHEN METABOLIC PANEL: CPT | Performed by: INTERNAL MEDICINE

## 2021-03-06 PROCEDURE — 85027 COMPLETE CBC AUTOMATED: CPT | Performed by: INTERNAL MEDICINE

## 2021-03-06 PROCEDURE — 99232 SBSQ HOSP IP/OBS MODERATE 35: CPT | Performed by: INTERNAL MEDICINE

## 2021-03-06 RX ORDER — DOCUSATE SODIUM 100 MG/1
100 CAPSULE, LIQUID FILLED ORAL 2 TIMES DAILY
Status: DISCONTINUED | OUTPATIENT
Start: 2021-03-06 | End: 2021-03-08 | Stop reason: HOSPADM

## 2021-03-06 RX ORDER — SENNOSIDES 8.6 MG
1 TABLET ORAL
Status: DISCONTINUED | OUTPATIENT
Start: 2021-03-06 | End: 2021-03-08 | Stop reason: HOSPADM

## 2021-03-06 RX ADMIN — METOPROLOL SUCCINATE 25 MG: 25 TABLET, FILM COATED, EXTENDED RELEASE ORAL at 08:19

## 2021-03-06 RX ADMIN — OXYCODONE HYDROCHLORIDE AND ACETAMINOPHEN 2 TABLET: 5; 325 TABLET ORAL at 08:19

## 2021-03-06 RX ADMIN — MAGNESIUM OXIDE TAB 400 MG (241.3 MG ELEMENTAL MG) 400 MG: 400 (241.3 MG) TAB at 17:05

## 2021-03-06 RX ADMIN — OXYCODONE HYDROCHLORIDE AND ACETAMINOPHEN 2 TABLET: 5; 325 TABLET ORAL at 13:56

## 2021-03-06 RX ADMIN — OXYCODONE HYDROCHLORIDE AND ACETAMINOPHEN 2 TABLET: 5; 325 TABLET ORAL at 21:00

## 2021-03-06 RX ADMIN — HEPARIN SODIUM 5000 UNITS: 5000 INJECTION INTRAVENOUS; SUBCUTANEOUS at 08:19

## 2021-03-06 RX ADMIN — PANCRELIPASE 12000 UNITS: 30000; 6000; 19000 CAPSULE, DELAYED RELEASE PELLETS ORAL at 17:06

## 2021-03-06 RX ADMIN — AMLODIPINE BESYLATE 10 MG: 10 TABLET ORAL at 08:18

## 2021-03-06 RX ADMIN — MAGNESIUM OXIDE TAB 400 MG (241.3 MG ELEMENTAL MG) 400 MG: 400 (241.3 MG) TAB at 08:19

## 2021-03-06 RX ADMIN — HEPARIN SODIUM 5000 UNITS: 5000 INJECTION INTRAVENOUS; SUBCUTANEOUS at 21:00

## 2021-03-06 RX ADMIN — PANTOPRAZOLE SODIUM 40 MG: 40 TABLET, DELAYED RELEASE ORAL at 05:28

## 2021-03-06 RX ADMIN — ZOLPIDEM TARTRATE 10 MG: 5 TABLET, FILM COATED ORAL at 22:04

## 2021-03-06 RX ADMIN — SODIUM CHLORIDE, SODIUM LACTATE, POTASSIUM CHLORIDE, AND CALCIUM CHLORIDE 100 ML/HR: .6; .31; .03; .02 INJECTION, SOLUTION INTRAVENOUS at 08:21

## 2021-03-06 RX ADMIN — MORPHINE SULFATE 2 MG: 2 INJECTION, SOLUTION INTRAMUSCULAR; INTRAVENOUS at 10:40

## 2021-03-06 RX ADMIN — MORPHINE SULFATE 2 MG: 2 INJECTION, SOLUTION INTRAMUSCULAR; INTRAVENOUS at 04:39

## 2021-03-06 RX ADMIN — LOSARTAN POTASSIUM 50 MG: 50 TABLET, FILM COATED ORAL at 08:18

## 2021-03-06 RX ADMIN — LEVOTHYROXINE SODIUM 12.5 MCG: 25 TABLET ORAL at 05:28

## 2021-03-06 RX ADMIN — SENNOSIDES 8.6 MG: 8.6 TABLET, FILM COATED ORAL at 21:01

## 2021-03-06 RX ADMIN — SPIRONOLACTONE 25 MG: 25 TABLET ORAL at 08:19

## 2021-03-06 RX ADMIN — PANCRELIPASE 12000 UNITS: 30000; 6000; 19000 CAPSULE, DELAYED RELEASE PELLETS ORAL at 13:54

## 2021-03-06 NOTE — ASSESSMENT & PLAN NOTE
· Recurrent pancreatitis thought related to alcohol which he now declined using since last hospitalization in October 2020  Reported pattern of acute pancreatitis in February and October  · Severe epigastric pain radiated to the right shoulder and right midsternal for the last 4 days  · Lipase more than 12,000 and currently trending down  · GI consulted appreciate the following recommendations:   · Liver ultrasound- showed" Pancreatic tail partially obscured by overlying bowel gas  Cholelithiasis without sonographic evidence of cholecystitis  Unremarkable liver and biliary ducts "  · Continue Creon today for regular  · Continue Zofran   · Continue Percosets  · Continue to hold pre-hospital Vytorin  · Spoke to Dr Papi Aviles, outpatient GI doctor and recommended consult to Surgery for cholecystectomy  · Spoke with Evangelina Matson with General surgery  · Status post Cholecystectomy Thursday 3/4  · Postoperative day #3, doing well     · Upgrade to regular diet per surgery  · Mobilize patient with PT/OT-consult pending  · Continue bowel regimen

## 2021-03-06 NOTE — ASSESSMENT & PLAN NOTE
· Recurrent pancreatitis thought related to alcohol which he now declined using since last hospitalization in October 2020  Reported pattern of acute pancreatitis in February and October  · Severe epigastric pain radiated to the right shoulder and right midsternal for the last 4 days  · Lipase more than 12,000 and currently trending down  · GI consulted appreciate the following recommendations:   · Liver ultrasound- showed" Pancreatic tail partially obscured by overlying bowel gas  Cholelithiasis without sonographic evidence of cholecystitis  Unremarkable liver and biliary ducts "  · Hold Creon until patient is tolerating more oral intake  · Continue Zofran and pain control morphine p r n  · Continue to hold pre-hospital Vytorin  · Discontinue Lactated Ringer  · Spoke to Dr Norma Kendall, outpatient GI doctor and recommended consult to Surgery for cholecystectomy  · Spoke with Sadiq Jurado with General surgery  · Status post Cholecystectomy Thursday 3/4  · Postoperative day #2, doing well     · Upgrade to Surgical soft per surgery

## 2021-03-06 NOTE — ASSESSMENT & PLAN NOTE
· Trending down  Initially AST ALT ratio 2;1 consistent with alcohol use on admission  Trending up after surgery but no elevated ALP or T bili  · Ultrasound liver showed-" Pancreatic tail partially obscured by overlying bowel gas  Cholelithiasis without sonographic evidence of cholecystitis  Unremarkable liver and biliary ducts " per radiology read    · S/p cholecystectomy 3/4

## 2021-03-06 NOTE — PROGRESS NOTES
Progress Note - Nadia Members 1950, 78 y o  male MRN: [id number]    Unit/Bed#: 7T [facility] 702-01 Encounter: [id number]    Primary Care Provider: Esha Byrne MD   Date and time admitted to hospital: 2/28/2021  6:26 PM        * Idiopathic acute pancreatitis without infection or necrosis  Assessment & Plan  · Recurrent pancreatitis thought related to alcohol which he now declined using since last hospitalization in October 2020  Reported pattern of acute pancreatitis in February and October  · Severe epigastric pain radiated to the right shoulder and right midsternal for the last 4 days  · Lipase more than 12,000 and currently trending down  · GI consulted appreciate the following recommendations:   · Liver ultrasound- showed" Pancreatic tail partially obscured by overlying bowel gas  Cholelithiasis without sonographic evidence of cholecystitis  Unremarkable liver and biliary ducts "  · Restart Creon today for surgical soft diet  · Continue Zofran   · Discontinue Morphine  · Continue Percosets  · Continue to hold pre-hospital Vytorin  · Discontinue Lactated Ringer  · Spoke to Dr Ralph Torres, outpatient GI doctor and recommended consult to Surgery for cholecystectomy  · Spoke with Dorian Burkett with General surgery  · Status post Cholecystectomy Thursday 3/4  · Postoperative day #2, doing well  · Upgrade to Surgical soft per surgery  · Mobilize patient with PT/OT    Transaminitis  Assessment & Plan  · Trending up- AST ALT ratio 2;1 consistent with alcohol use on admission  Trending up but no elevated ALP or T bili  · Ultrasound liver showed-" Pancreatic tail partially obscured by overlying bowel gas  Cholelithiasis without sonographic evidence of cholecystitis  Unremarkable liver and biliary ducts " per radiology read    · S/p cholecystectomy 3/4  · Noted GI consult recommendations    Smoking  Assessment & Plan   Continue pre-hospital nicotine gum    Mixed hyperlipidemia  Assessment & Plan  Continue to hold Vytorin secondary to acute pancreatitis and elevated liver functions    Hypothyroidism (acquired)  Assessment & Plan   Continue pre-hospital levothyroxine 12 5 mg p o  daily    Thoracic aortic aneurysm without rupture [facility])  Assessment & Plan  Recent CTA 2021 showed   Ascending aortic aneurysm measures 4 cm   Moderate stenosis at the left renal artery origin  Outpatient surveillance by PCP  Essential hypertension, benign  Assessment & Plan  Blood pressure stable  Continue pre-hospital spironolactone 25 mg p o  daily and Cozaar 50 mg p o  daily  ContinueToprol XL 25 mg p o  daily, and Norvasc 10 mg p o  daily        VTE Pharmacologic Prophylaxis:   Pharmacologic: Heparin  Mechanical VTE Prophylaxis in Place: Yes    Patient Centered Rounds: I have performed bedside rounds with nursing staff today  Discussions with Specialists or Other Care Team Provider:surgery    Education and Discussions with Family / Patient: patient     Time Spent for Care: 20 minutes  More than 50% of total time spent on counseling and coordination of care as described above  Current Length of Stay: 6 day(s)    Current Patient Status: Inpatient   Certification Statement: The patient will continue to require additional inpatient hospital stay due to continue inpatient managemnt     Discharge Plan:  Pain needs to be better controlled and upgrade diet per DC    Code Status: Level 1 - Full Code      Subjective:   Patient seen examined at bedside  Patient currently doing well  Objective:     Vitals:   Temp (24hrs), Av 1 °F (36 2 °C), Min:96 7 °F (35 9 °C), Max:97 8 °F (36 6 °C)    Temp:  [96 7 °F (35 9 °C)-97 8 °F (36 6 °C)] 97 3 °F (36 3 °C)  HR:  [56-60] 56  Resp:  [18-20] 20  BP: (121-157)/(71-83) 157/83  SpO2:  [92 %-94 %] 93 %  Body mass index is 23 86 kg/m²  Input and Output Summary (last 24 hours):        Intake/Output Summary (Last 24 hours) at 3/6/2021 1242  Last data filed at 3/6/2021 1144  Gross per 24 hour Intake 4282 67 ml   Output 400 ml   Net 3882 67 ml       Physical Exam:     Physical Exam  Constitutional:       General: He is not in acute distress  Appearance: Normal appearance  HENT:      Head: Normocephalic and atraumatic  Eyes:      General:         Right eye: No discharge  Left eye: No discharge  Cardiovascular:      Rate and Rhythm: Normal rate and regular rhythm  Pulses: Normal pulses  Heart sounds: No murmur  Pulmonary:      Effort: Pulmonary effort is normal  No respiratory distress  Breath sounds: Normal breath sounds  No wheezing  Abdominal:      General: There is no distension  Tenderness: There is abdominal tenderness  Musculoskeletal:      Right lower leg: No edema  Left lower leg: No edema  Skin:     General: Skin is warm and dry  Neurological:      General: No focal deficit present  Mental Status: He is alert and oriented to person, place, and time  Mental status is at baseline  Psychiatric:         Mood and Affect: Mood normal            Additional Data:     Labs:    Results from last 7 days   Lab Units 03/06/21  0438  03/03/21  0439   WBC Thousand/uL 6 30   < > 5 70   HEMOGLOBIN g/dL 10 9*   < > 11 9*   HEMATOCRIT % 33 3*   < > 36 1*   PLATELETS Thousands/uL 244   < > 237   NEUTROS PCT %  --   --  58   LYMPHS PCT %  --   --  26   MONOS PCT %  --   --  10   EOS PCT %  --   --  6    < > = values in this interval not displayed  Results from last 7 days   Lab Units 03/06/21  0438   SODIUM mmol/L 137   POTASSIUM mmol/L 3 6   CHLORIDE mmol/L 103   CO2 mmol/L 29   BUN mg/dL 11   CREATININE mg/dL 0 92   ANION GAP mmol/L 5   CALCIUM mg/dL 9 1   ALBUMIN g/dL 3 3   TOTAL BILIRUBIN mg/dL 0 40   ALK PHOS U/L 62   ALT U/L 79*   AST U/L 98*   GLUCOSE RANDOM mg/dL 89     Results from last 7 days   Lab Units 03/04/21  0515   INR  1 02                       * I Have Reviewed All Lab Data Listed Above  * Additional Pertinent Lab Tests Reviewed:  All Labs Within Last 24 Hours Reviewed    Imaging:    Imaging Reports Reviewed Today Include: none    Recent Cultures (last 7 days):           Last 24 Hours Medication List:   Current Facility-Administered Medications   Medication Dose Route Frequency Provider Last Rate    amLODIPine  10 mg Oral Daily Damon Shaw MD      heparin (porcine)  5,000 Units Subcutaneous Q12H Myla Lugo MD      levothyroxine  12 5 mcg Oral Daily Damon Shaw MD      losartan  50 mg Oral Daily Damon Shaw MD      magnesium oxide  400 mg Oral BID Damon Shaw MD      metoprolol succinate  25 mg Oral Daily Damon Shaw MD      nicotine polacrilex  4 mg Oral Q2H PRN Damon Shaw MD      ondansetron  4 mg Intravenous Q6H PRN Damon Shaw MD      oxyCODONE-acetaminophen  2 tablet Oral Q4H PRN Blaine Ndiaye MD      pancrelipase (Lip-Prot-Amyl)  12,000 Units Oral TID With Meals Blaine Ndiaye MD      pantoprazole  40 mg Oral Early Morning Damon Shaw MD      spironolactone  25 mg Oral Daily Damon Shaw MD      zolpidem  10 mg Oral HS Blaine Ndiaye MD          Today, Patient Was Seen By: Blaine Ndiaye MD    ** Please Note: Dictation voice to text software may have been used in the creation of this document   **

## 2021-03-06 NOTE — PLAN OF CARE
Problem: Potential for Falls  Goal: Patient will remain free of falls  Description: INTERVENTIONS:  - Assess patient frequently for physical needs  -  Identify cognitive and physical deficits and behaviors that affect risk of falls    -  [place] fall precautions as indicated by assessment   - Educate patient/family on patient safety including physical limitations  - Instruct patient to call for assistance with activity based on assessment  - Modify environment to reduce risk of injury  - Consider OT/PT consult to assist with strengthening/mobility  Outcome: Progressing     Problem: PAIN - ADULT  Goal: Verbalizes/displays adequate comfort level or baseline comfort level  Description: Interventions:  - Encourage patient to monitor pain and request assistance  - Assess pain using appropriate pain scale  - Administer analgesics based on type and severity of pain and evaluate response  - Implement non-pharmacological measures as appropriate and evaluate response  - Consider cultural and social influences on pain and pain management  - Notify physician/advanced practitioner if interventions unsuccessful or patient reports new pain  Outcome: Progressing     Problem: INFECTION - ADULT  Goal: Absence or prevention of progression during hospitalization  Description: INTERVENTIONS:  - Assess and monitor for signs and symptoms of infection  - Monitor lab/diagnostic results  - Monitor all insertion sites, i e  indwelling lines, tubes, and drains  - Monitor endotracheal if appropriate and nasal secretions for changes in amount and color  - [place] appropriate cooling/warming therapies per order  - Administer medications as ordered  - Instruct and encourage patient and family to use good hand hygiene technique  - Identify and instruct in appropriate isolation precautions for identified infection/condition  Outcome: Progressing     Problem: SAFETY ADULT  Goal: Patient will remain free of falls  Description: INTERVENTIONS:  - Assess patient frequently for physical needs  -  Identify cognitive and physical deficits and behaviors that affect risk of falls    -  [place] fall precautions as indicated by assessment   - Educate patient/family on patient safety including physical limitations  - Instruct patient to call for assistance with activity based on assessment  - Modify environment to reduce risk of injury  - Consider OT/PT consult to assist with strengthening/mobility  Outcome: Progressing  Goal: Maintain or return to baseline ADL function  Description: INTERVENTIONS:  -  Assess patient's ability to carry out ADLs; assess patient's baseline for ADL function and identify physical deficits which impact ability to perform ADLs (bathing, care of mouth/teeth, toileting, grooming, dressing, etc )  - Assess/evaluate cause of self-care deficits   - Assess range of motion  - Assess patient's mobility; develop plan if impaired  - Assess patient's need for assistive devices and provide as appropriate  - Encourage maximum independence but intervene and supervise when necessary  - Involve family in performance of ADLs  - Assess for home care needs following discharge   - Consider OT consult to assist with ADL evaluation and planning for discharge  - Provide patient education as appropriate  Outcome: Progressing  Goal: Maintain or return mobility status to optimal level  Description: INTERVENTIONS:  - Assess patient's baseline mobility status (ambulation, transfers, stairs, etc )    - Identify cognitive and physical deficits and behaviors that affect mobility  - Identify mobility aids required to assist with transfers and/or ambulation (gait belt, sit-to-stand, lift, walker, cane, etc )  - [place] fall precautions as indicated by assessment  - Record patient progress and toleration of activity level on Mobility SBAR; progress patient to next Phase/Stage  - Instruct patient to call for assistance with activity based on assessment  - Consider rehabilitation consult to assist with strengthening/weightbearing, etc   Outcome: Progressing     Problem: DISCHARGE PLANNING  Goal: Discharge to home or other facility with appropriate resources  Description: INTERVENTIONS:  - Identify barriers to discharge w/patient and caregiver  - Arrange for needed discharge resources and transportation as appropriate  - Identify discharge learning needs (meds, wound care, etc )  - Arrange for interpretive services to assist at discharge as needed  - Refer to Case Management Department for coordinating discharge planning if the patient needs post-hospital services based on physician/advanced practitioner order or complex needs related to functional status, cognitive ability, or social support system  Outcome: Progressing     Problem: GASTROINTESTINAL - ADULT  Goal: Minimal or absence of nausea and/or vomiting  Description: INTERVENTIONS:  - Administer IV fluids if ordered to ensure adequate hydration  - Maintain NPO status until nausea and vomiting are resolved  - Nasogastric tube if ordered  - Administer ordered antiemetic medications as needed  - Provide nonpharmacologic comfort measures as appropriate  - Advance diet as tolerated, if ordered  - Consider nutrition services referral to assist patient with adequate nutrition and appropriate food choices  Outcome: Progressing  Goal: Maintains or returns to baseline bowel function  Description: INTERVENTIONS:  - Assess bowel function  - Encourage oral fluids to ensure adequate hydration  - Administer IV fluids if ordered to ensure adequate hydration  - Administer ordered medications as needed  - Encourage mobilization and activity  - Consider nutritional services referral to assist patient with adequate nutrition and appropriate food choices  Outcome: Progressing  Goal: Maintains adequate nutritional intake  Description: INTERVENTIONS:  - Monitor percentage of each meal consumed  - Identify factors contributing to decreased intake, treat as appropriate  - Assist with meals as needed  - Monitor I&O, weight, and lab values if indicated  - Obtain nutrition services referral as needed  Outcome: Progressing     Problem: Nutrition/Hydration-ADULT  Goal: Nutrient/Hydration intake appropriate for improving, restoring or maintaining nutritional needs  Description: Monitor and assess patient's nutrition/hydration status for malnutrition  Collaborate with interdisciplinary team and initiate plan and interventions as ordered  Monitor patient's weight and dietary intake as ordered or per policy  Utilize nutrition screening tool and intervene as necessary  Determine patient's food preferences and provide high-protein, high-caloric foods as appropriate       INTERVENTIONS:  - Monitor oral intake, urinary output, labs, and treatment plans  - Assess nutrition and hydration status and recommend course of action  - Evaluate amount of meals eaten  - Assist patient with eating if necessary   - Allow adequate time for meals  - Recommend/ encourage appropriate diets, oral nutritional supplements, and vitamin/mineral supplements  - Order, calculate, and assess calorie counts as needed  - Recommend, monitor, and adjust tube feedings and TPN/PPN based on assessed needs  - Assess need for intravenous fluids  - Provide specific nutrition/hydration education as appropriate  - Include patient/family/caregiver in decisions related to nutrition  Outcome: Progressing

## 2021-03-06 NOTE — PROGRESS NOTES
Looks fine  Tolerating soft light diet  Still complains of pancreatitis pain   Vital signs stable  Urine output good  Passing gas  LFTs better today  Impression:  Doing well status post laparoscopic cholecystectomy with normal cholangiogram for presumed gallstone pancreatitis  Plan:  DC IV fluid  DC morphine  PT  Discussed with Medicine  Agree

## 2021-03-07 PROCEDURE — 99232 SBSQ HOSP IP/OBS MODERATE 35: CPT | Performed by: INTERNAL MEDICINE

## 2021-03-07 PROCEDURE — 99024 POSTOP FOLLOW-UP VISIT: CPT | Performed by: SPECIALIST

## 2021-03-07 PROCEDURE — 97163 PT EVAL HIGH COMPLEX 45 MIN: CPT

## 2021-03-07 PROCEDURE — 97167 OT EVAL HIGH COMPLEX 60 MIN: CPT

## 2021-03-07 RX ADMIN — LOSARTAN POTASSIUM 50 MG: 50 TABLET, FILM COATED ORAL at 08:00

## 2021-03-07 RX ADMIN — LEVOTHYROXINE SODIUM 12.5 MCG: 25 TABLET ORAL at 05:53

## 2021-03-07 RX ADMIN — PANCRELIPASE 12000 UNITS: 30000; 6000; 19000 CAPSULE, DELAYED RELEASE PELLETS ORAL at 07:59

## 2021-03-07 RX ADMIN — SPIRONOLACTONE 25 MG: 25 TABLET ORAL at 08:00

## 2021-03-07 RX ADMIN — PANTOPRAZOLE SODIUM 40 MG: 40 TABLET, DELAYED RELEASE ORAL at 05:53

## 2021-03-07 RX ADMIN — HEPARIN SODIUM 5000 UNITS: 5000 INJECTION INTRAVENOUS; SUBCUTANEOUS at 21:06

## 2021-03-07 RX ADMIN — MAGNESIUM OXIDE TAB 400 MG (241.3 MG ELEMENTAL MG) 400 MG: 400 (241.3 MG) TAB at 17:39

## 2021-03-07 RX ADMIN — DOCUSATE SODIUM 100 MG: 100 CAPSULE ORAL at 08:00

## 2021-03-07 RX ADMIN — HEPARIN SODIUM 5000 UNITS: 5000 INJECTION INTRAVENOUS; SUBCUTANEOUS at 08:00

## 2021-03-07 RX ADMIN — OXYCODONE HYDROCHLORIDE AND ACETAMINOPHEN 2 TABLET: 5; 325 TABLET ORAL at 16:06

## 2021-03-07 RX ADMIN — MAGNESIUM OXIDE TAB 400 MG (241.3 MG ELEMENTAL MG) 400 MG: 400 (241.3 MG) TAB at 08:00

## 2021-03-07 RX ADMIN — OXYCODONE HYDROCHLORIDE AND ACETAMINOPHEN 2 TABLET: 5; 325 TABLET ORAL at 21:07

## 2021-03-07 RX ADMIN — METOPROLOL SUCCINATE 25 MG: 25 TABLET, FILM COATED, EXTENDED RELEASE ORAL at 08:00

## 2021-03-07 RX ADMIN — AMLODIPINE BESYLATE 10 MG: 10 TABLET ORAL at 08:00

## 2021-03-07 RX ADMIN — ZOLPIDEM TARTRATE 10 MG: 5 TABLET, FILM COATED ORAL at 22:11

## 2021-03-07 RX ADMIN — OXYCODONE HYDROCHLORIDE AND ACETAMINOPHEN 2 TABLET: 5; 325 TABLET ORAL at 08:05

## 2021-03-07 RX ADMIN — PANCRELIPASE 12000 UNITS: 30000; 6000; 19000 CAPSULE, DELAYED RELEASE PELLETS ORAL at 12:01

## 2021-03-07 RX ADMIN — PANCRELIPASE 12000 UNITS: 30000; 6000; 19000 CAPSULE, DELAYED RELEASE PELLETS ORAL at 16:06

## 2021-03-07 NOTE — PROGRESS NOTES
Progress Note - Yissel Tinajero 1950, 78 y o  male MRN: [id number]    Unit/Bed#: 7T [facility] 702-01 Encounter: [id number]    Primary Care Provider: Priyank Prater MD   Date and time admitted to hospital: 2/28/2021  6:26 PM        * Idiopathic acute pancreatitis without infection or necrosis  Assessment & Plan  · Recurrent pancreatitis thought related to alcohol which he now declined using since last hospitalization in October 2020  Reported pattern of acute pancreatitis in February and October  · Severe epigastric pain radiated to the right shoulder and right midsternal for the last 4 days  · Lipase more than 12,000 and currently trending down  · GI consulted appreciate the following recommendations:   · Liver ultrasound- showed" Pancreatic tail partially obscured by overlying bowel gas  Cholelithiasis without sonographic evidence of cholecystitis  Unremarkable liver and biliary ducts "  · Continue Creon today for regular  · Continue Zofran   · Continue Percosets  · Continue to hold pre-hospital Vytorin  · Spoke to Dr Jitendra Joseph, outpatient GI doctor and recommended consult to Surgery for cholecystectomy  · Spoke with Masha Ramos with General surgery  · Status post Cholecystectomy Thursday 3/4  · Postoperative day #3, doing well  · Upgrade to regular diet per surgery  · Mobilize patient with PT/OT-consult pending  · Continue bowel regimen    Transaminitis  Assessment & Plan  · Trending down  Initially AST ALT ratio 2;1 consistent with alcohol use on admission  Trending up after surgery but no elevated ALP or T bili  · Ultrasound liver showed-" Pancreatic tail partially obscured by overlying bowel gas  Cholelithiasis without sonographic evidence of cholecystitis  Unremarkable liver and biliary ducts " per radiology read    · S/p cholecystectomy 3/4      Smoking  Assessment & Plan   Continue pre-hospital nicotine gum    Mixed hyperlipidemia  Assessment & Plan  Continue to hold Vytorin secondary to acute pancreatitis and elevated liver functions    Hypothyroidism (acquired)  Assessment & Plan   Continue pre-hospital levothyroxine 12 5 mg p o  daily    Thoracic aortic aneurysm without rupture [facility])  Assessment & Plan  Recent CTA 2021 showed   Ascending aortic aneurysm measures 4 cm   Moderate stenosis at the left renal artery origin  Outpatient surveillance by PCP  Essential hypertension, benign  Assessment & Plan  Blood pressure stable  Continue pre-hospital spironolactone 25 mg p o  daily and Cozaar 50 mg p o  daily  ContinueToprol XL 25 mg p o  daily, and Norvasc 10 mg p o  daily          VTE Pharmacologic Prophylaxis:   Pharmacologic: Heparin  Mechanical VTE Prophylaxis in Place: Yes    Patient Centered Rounds: I have performed bedside rounds with nursing staff today  Discussions with Specialists or Other Care Team Provider:  Surgery    Education and Discussions with Family / Patient:  Discussed with patient    Time Spent for Care: 20 minutes  More than 50% of total time spent on counseling and coordination of care as described above  Current Length of Stay: 7 day(s)    Current Patient Status: Inpatient   Certification Statement: The patient will continue to require additional inpatient hospital stay due to continue inpatient management     Discharge Plan:  Likely DC tomorrow, pending General surgery clearance, pending PT OT eval    Code Status: Level 1 - Full Code      Subjective:   Patient seen and evaluated at bedside  Patient currently states that his pain is much well controlled he is tolerating oral diet okay  He had a bowel movement  Objective:     Vitals:   Temp (24hrs), Av 5 °F (36 4 °C), Min:96 8 °F (36 °C), Max:98 1 °F (36 7 °C)    Temp:  [96 8 °F (36 °C)-98 1 °F (36 7 °C)] 98 1 °F (36 7 °C)  HR:  [58-64] 58  Resp:  [18-20] 20  BP: (126-149)/(71-77) 126/72  SpO2:  [92 %-95 %] 93 %  Body mass index is 23 86 kg/m²       Input and Output Summary (last 24 hours): Intake/Output Summary (Last 24 hours) at 3/7/2021 1132  Last data filed at 3/7/2021 0805  Gross per 24 hour   Intake 1000 ml   Output --   Net 1000 ml       Physical Exam:     Physical Exam  Constitutional:       General: He is not in acute distress  Appearance: Normal appearance  HENT:      Head: Normocephalic and atraumatic  Eyes:      General:         Right eye: No discharge  Left eye: No discharge  Cardiovascular:      Rate and Rhythm: Normal rate and regular rhythm  Pulses: Normal pulses  Heart sounds: No murmur  Pulmonary:      Effort: Pulmonary effort is normal  No respiratory distress  Breath sounds: Normal breath sounds  No wheezing  Musculoskeletal:      Right lower leg: No edema  Left lower leg: No edema  Skin:     General: Skin is warm and dry  Neurological:      General: No focal deficit present  Mental Status: He is alert and oriented to person, place, and time  Mental status is at baseline  Psychiatric:         Mood and Affect: Mood normal          Additional Data:     Labs:    Results from last 7 days   Lab Units 03/06/21  0438  03/03/21  0439   WBC Thousand/uL 6 30   < > 5 70   HEMOGLOBIN g/dL 10 9*   < > 11 9*   HEMATOCRIT % 33 3*   < > 36 1*   PLATELETS Thousands/uL 244   < > 237   NEUTROS PCT %  --   --  58   LYMPHS PCT %  --   --  26   MONOS PCT %  --   --  10   EOS PCT %  --   --  6    < > = values in this interval not displayed  Results from last 7 days   Lab Units 03/06/21  0438   SODIUM mmol/L 137   POTASSIUM mmol/L 3 6   CHLORIDE mmol/L 103   CO2 mmol/L 29   BUN mg/dL 11   CREATININE mg/dL 0 92   ANION GAP mmol/L 5   CALCIUM mg/dL 9 1   ALBUMIN g/dL 3 3   TOTAL BILIRUBIN mg/dL 0 40   ALK PHOS U/L 62   ALT U/L 79*   AST U/L 98*   GLUCOSE RANDOM mg/dL 89     Results from last 7 days   Lab Units 03/04/21  0515   INR  1 02                       * I Have Reviewed All Lab Data Listed Above    * Additional Pertinent Lab Tests Reviewed: No New Labs Available For Today    Imaging:    Imaging Reports Reviewed Today Include: none      Recent Cultures (last 7 days):           Last 24 Hours Medication List:   Current Facility-Administered Medications   Medication Dose Route Frequency Provider Last Rate    amLODIPine  10 mg Oral Daily Tramaine Lopez MD      docusate sodium  100 mg Oral BID Susan Pagan MD      heparin (porcine)  5,000 Units Subcutaneous Q12H Renata Hoffmann MD      levothyroxine  12 5 mcg Oral Daily Tramaine Lopez MD      losartan  50 mg Oral Daily Tramaine Lopez MD      magnesium oxide  400 mg Oral BID Tramaine Lopez MD      metoprolol succinate  25 mg Oral Daily Tramaine Lopez MD      nicotine polacrilex  4 mg Oral Q2H PRN Tramaine Lopez MD      ondansetron  4 mg Intravenous Q6H PRN Tramaine Lopez MD      oxyCODONE-acetaminophen  2 tablet Oral Q4H PRN Susan Pagan MD      pancrelipase (Lip-Prot-Amyl)  12,000 Units Oral TID With Meals Susan Pagan MD      pantoprazole  40 mg Oral Early Morning Tramaine Lopez MD      senna  1 tablet Oral HS Susan Pagan MD      spironolactone  25 mg Oral Daily Tramaine Lopez MD      zolpidem  10 mg Oral HS Susan Pagan MD          Today, Patient Was Seen By: Susan Pagan MD    ** Please Note: Dictation voice to text software may have been used in the creation of this document   **

## 2021-03-07 NOTE — PLAN OF CARE
Problem: Potential for Falls  Goal: Patient will remain free of falls  Description: INTERVENTIONS:  - Assess patient frequently for physical needs  -  Identify cognitive and physical deficits and behaviors that affect risk of falls    -  [place] fall precautions as indicated by assessment   - Educate patient/family on patient safety including physical limitations  - Instruct patient to call for assistance with activity based on assessment  - Modify environment to reduce risk of injury  - Consider OT/PT consult to assist with strengthening/mobility  Outcome: Progressing     Problem: PAIN - ADULT  Goal: Verbalizes/displays adequate comfort level or baseline comfort level  Description: Interventions:  - Encourage patient to monitor pain and request assistance  - Assess pain using appropriate pain scale  - Administer analgesics based on type and severity of pain and evaluate response  - Implement non-pharmacological measures as appropriate and evaluate response  - Consider cultural and social influences on pain and pain management  - Notify physician/advanced practitioner if interventions unsuccessful or patient reports new pain  Outcome: Progressing     Problem: INFECTION - ADULT  Goal: Absence or prevention of progression during hospitalization  Description: INTERVENTIONS:  - Assess and monitor for signs and symptoms of infection  - Monitor lab/diagnostic results  - Monitor all insertion sites, i e  indwelling lines, tubes, and drains  - Monitor endotracheal if appropriate and nasal secretions for changes in amount and color  - [place] appropriate cooling/warming therapies per order  - Administer medications as ordered  - Instruct and encourage patient and family to use good hand hygiene technique  - Identify and instruct in appropriate isolation precautions for identified infection/condition  Outcome: Progressing     Problem: SAFETY ADULT  Goal: Patient will remain free of falls  Description: INTERVENTIONS:  - Assess patient frequently for physical needs  -  Identify cognitive and physical deficits and behaviors that affect risk of falls    -  [place] fall precautions as indicated by assessment   - Educate patient/family on patient safety including physical limitations  - Instruct patient to call for assistance with activity based on assessment  - Modify environment to reduce risk of injury  - Consider OT/PT consult to assist with strengthening/mobility  Outcome: Progressing  Goal: Maintain or return to baseline ADL function  Description: INTERVENTIONS:  -  Assess patient's ability to carry out ADLs; assess patient's baseline for ADL function and identify physical deficits which impact ability to perform ADLs (bathing, care of mouth/teeth, toileting, grooming, dressing, etc )  - Assess/evaluate cause of self-care deficits   - Assess range of motion  - Assess patient's mobility; develop plan if impaired  - Assess patient's need for assistive devices and provide as appropriate  - Encourage maximum independence but intervene and supervise when necessary  - Involve family in performance of ADLs  - Assess for home care needs following discharge   - Consider OT consult to assist with ADL evaluation and planning for discharge  - Provide patient education as appropriate  Outcome: Progressing  Goal: Maintain or return mobility status to optimal level  Description: INTERVENTIONS:  - Assess patient's baseline mobility status (ambulation, transfers, stairs, etc )    - Identify cognitive and physical deficits and behaviors that affect mobility  - Identify mobility aids required to assist with transfers and/or ambulation (gait belt, sit-to-stand, lift, walker, cane, etc )  - [place] fall precautions as indicated by assessment  - Record patient progress and toleration of activity level on Mobility SBAR; progress patient to next Phase/Stage  - Instruct patient to call for assistance with activity based on assessment  - Consider rehabilitation consult to assist with strengthening/weightbearing, etc   Outcome: Progressing     Problem: DISCHARGE PLANNING  Goal: Discharge to home or other facility with appropriate resources  Description: INTERVENTIONS:  - Identify barriers to discharge w/patient and caregiver  - Arrange for needed discharge resources and transportation as appropriate  - Identify discharge learning needs (meds, wound care, etc )  - Arrange for interpretive services to assist at discharge as needed  - Refer to Case Management Department for coordinating discharge planning if the patient needs post-hospital services based on physician/advanced practitioner order or complex needs related to functional status, cognitive ability, or social support system  Outcome: Progressing     Problem: GASTROINTESTINAL - ADULT  Goal: Minimal or absence of nausea and/or vomiting  Description: INTERVENTIONS:  - Administer IV fluids if ordered to ensure adequate hydration  - Maintain NPO status until nausea and vomiting are resolved  - Nasogastric tube if ordered  - Administer ordered antiemetic medications as needed  - Provide nonpharmacologic comfort measures as appropriate  - Advance diet as tolerated, if ordered  - Consider nutrition services referral to assist patient with adequate nutrition and appropriate food choices  Outcome: Progressing  Goal: Maintains or returns to baseline bowel function  Description: INTERVENTIONS:  - Assess bowel function  - Encourage oral fluids to ensure adequate hydration  - Administer IV fluids if ordered to ensure adequate hydration  - Administer ordered medications as needed  - Encourage mobilization and activity  - Consider nutritional services referral to assist patient with adequate nutrition and appropriate food choices  Outcome: Progressing  Goal: Maintains adequate nutritional intake  Description: INTERVENTIONS:  - Monitor percentage of each meal consumed  - Identify factors contributing to decreased intake, treat as appropriate  - Assist with meals as needed  - Monitor I&O, weight, and lab values if indicated  - Obtain nutrition services referral as needed  Outcome: Progressing     Problem: Nutrition/Hydration-ADULT  Goal: Nutrient/Hydration intake appropriate for improving, restoring or maintaining nutritional needs  Description: Monitor and assess patient's nutrition/hydration status for malnutrition  Collaborate with interdisciplinary team and initiate plan and interventions as ordered  Monitor patient's weight and dietary intake as ordered or per policy  Utilize nutrition screening tool and intervene as necessary  Determine patient's food preferences and provide high-protein, high-caloric foods as appropriate       INTERVENTIONS:  - Monitor oral intake, urinary output, labs, and treatment plans  - Assess nutrition and hydration status and recommend course of action  - Evaluate amount of meals eaten  - Assist patient with eating if necessary   - Allow adequate time for meals  - Recommend/ encourage appropriate diets, oral nutritional supplements, and vitamin/mineral supplements  - Order, calculate, and assess calorie counts as needed  - Recommend, monitor, and adjust tube feedings and TPN/PPN based on assessed needs  - Assess need for intravenous fluids  - Provide specific nutrition/hydration education as appropriate  - Include patient/family/caregiver in decisions related to nutrition  Outcome: Progressing

## 2021-03-07 NOTE — PHYSICAL THERAPY NOTE
Physical Therapy Evaluation    Patient's Name: Nery Smalls    Admitting Diagnosis  Pancreatitis [K85 90]  Abdominal pain [R10 9]    Problem List  Patient Active Problem List   Diagnosis    Essential hypertension, benign    Left carotid artery stenosis s/p cartoid endarterectomy    Thoracic aortic aneurysm without rupture (HCC)    Ectopic atrial rhythm    Prediabetes    Hypothyroidism (acquired)    Mixed hyperlipidemia    Drug-induced acute pancreatitis without infection or necrosis    Smoking    Jejunitis    Radiculopathy, lumbar region    Spinal stenosis of lumbar region without neurogenic claudication    Right sided sciatica    [facility]    Carotid artery stenosis, asymptomatic, bilateral    Severe protein-calorie malnutrition (HCC)    Pancreatic lesion    Carotid artery disease ([facility] )    Hiatal hernia    Idiopathic acute pancreatitis without infection or necrosis    Transaminitis       Past Medical History  Past Medical History:   Diagnosis Date    [facility]     Arthritis     Avascular necrosis of bone of hip, left (HCC)     replaced    Back pain     CAD (coronary artery disease)     Carotid artery narrowing     left side -for endarterectomy today 3/5/2020    Disease of thyroid gland     hypo    GERD (gastroesophageal reflux disease)     History of Clostridioides difficile infection     Hyperlipidemia     Hypertension     Irregular heart beat     Kidney stone     Neck pain     Pancreatitis     Spinal stenosis     Wears glasses        Past Surgical History  Past Surgical History:   Procedure Laterality Date    BACK SURGERY      CARDIAC CATHETERIZATION      cardiac cath 5/2010  adjusted meds    CATARACT EXTRACTION Bilateral     CERVICAL FUSION      CHOLANGIOGRAM N/A 3/4/2021    Procedure: CHOLANGIOGRAM;  Surgeon: Gerhardt Plate, MD;  Location: [address] [place] [address];  Service: General    CHOLECYSTECTOMY LAPAROSCOPIC N/A 3/4/2021    Procedure: CHOLECYSTECTOMY LAPAROSCOPIC;  Surgeon: Vy Paris MD;  Location: [facility] MAIN OR;  Service: General    COLONOSCOPY      JOINT REPLACEMENT Left     hip    LUMBAR FUSION      NECK SURGERY      ORTHOPEDIC SURGERY Left     L THR    [place] INCIS Left 3/5/2020    Procedure: ENDARTERECTOMY ARTERY CAROTID WITH BOVINE PATCH ANGIOPLASTY AND INTRAOP DUPLEX;  Surgeon: Lennox Rankin, MD;  Location: [facility] Main OR;  Service: Vascular    SPINAL FUSION         Recent Imaging  XR cholangiogram intraoperative   Final Result by Henrik Maxwell MD (03/04 1510)      No filling defects  Please see procedure report for further details  Workstation performed: [id number]         [facility] liver   Final Result by Chitra Winkler DO (03/01 1614)   Pancreatic tail partially obscured by overlying bowel gas  Cholelithiasis without sonographic evidence of cholecystitis  Unremarkable liver and biliary ducts  Workstation performed: [id number]             Recent Vital Signs  Vitals:    03/06/21 0730 03/06/21 1501 03/06/21 2331 03/07/21 0715   BP: 157/83 149/77 135/71 126/72   BP Location: Right arm Right arm Right arm Right arm   Pulse: 56 64 62 58   Resp: 20 18 20 20   Temp: (!) 97 3 °F (36 3 °C) 97 6 °F (36 4 °C) (!) 96 8 °F (36 °C) 98 1 °F (36 7 °C)   TempSrc: Temporal Temporal Temporal Temporal   SpO2: 93% 95% 92% 93%   Weight:       Height:            03/07/21 1120   PT Last Visit   PT Visit Date 03/07/21   Note Type   Note type Evaluation   Pain Assessment   Pain Assessment Tool 0-10   Pain Score 5   Pain Location/Orientation Orientation: Lower; Location: Abdomen   Home Living   Type of [address] Two level;Bed/bath upstairs   Home Equipment Walker;Cane;Stair glide  (has DME from brother but not using and PTA)   Prior Function   Level of [place] Independent with ADLs and functional mobility   Lives With [facility] Help From Family   ADL Assistance Independent   IADLs Independent   Falls in the last 6 months 0   Vocational Full time employment   Restrictions/Precautions   Weight Bearing Precautions Per Order No   Other Precautions Pain   General   Family/Caregiver Present No   Cognition   Overall Cognitive Status WFL   Arousal/Participation Alert   Orientation Level Oriented X4   Memory Within functional limits   Following Commands Follows all commands and directions without difficulty   RLE Assessment   RLE Assessment WFL  (3+/5)   LLE Assessment   LLE Assessment WFL  (3+/5)   Coordination   Movements are Fluid and Coordinated 1   Sensation X   Light Touch   RLE Light Touch Impaired   RLE Light Touch Comments numbness plantar aspect base of first toe   LLE Light Touch Grossly intact   Bed Mobility   Supine to Sit 6  Modified independent   Additional items Increased time required   Sit to Supine 6  Modified independent   Additional items Increased time required   Transfers   Sit to Stand 7  Independent   Stand to Sit 7  Independent   Ambulation/Elevation   Gait pattern Short stride;Decreased foot clearance   Gait Assistance 7  Independent   Assistive Device None   Distance 100ft   Balance   Static Sitting Good   Dynamic Sitting Fair +   Static Standing Fair +   Dynamic Standing Fair   Ambulatory Fair   Endurance Deficit   Endurance Deficit Yes   Endurance Deficit Description reduced from baseline   Activity Tolerance   Activity Tolerance Patient tolerated treatment well   Nurse Made Aware spoke to RN   Assessment   Prognosis Good   Problem List Decreased strength;Decreased endurance; Impaired balance;Decreased mobility;Pain;Decreased skin integrity; Impaired sensation   Goals   Patient Goals to get better and go home   Plan   PT Frequency One time visit   Recommendation   PT Discharge Recommendation Return to previous environment with social support   PT - OK to Discharge Yes   Additional Comments home no PT needs when medically cleared   AM-PAC Basic Mobility Inpatient   Turning in Bed Without Bedrails 4   Lying on Back to Sitting on Edge of Flat Bed 4   Moving Bed to Chair 4   Standing Up From Chair 4   Walk in Room 4   Climb 3-5 Stairs 4   Basic Mobility Inpatient Raw Score 24   Basic Mobility Standardized Score 57 68         ASSESSMENT                                                                                                                     Anne Santiago is a 78 y o  male admitted to [address] [place] [address] on 2/28/2021 for Idiopathic acute pancreatitis without infection or necrosis  Pt  has a past medical history of A-fib ([address] ), Arthritis, Avascular necrosis of bone of hip, left (HCC), Back pain, CAD (coronary artery disease), Carotid artery narrowing, Disease of thyroid gland, GERD (gastroesophageal reflux disease), History of Clostridioides difficile infection, Hyperlipidemia, Hypertension, Irregular heart beat, Kidney stone, Neck pain, Pancreatitis, Spinal stenosis, and Wears glasses    PT was consulted and pt was seen on 3/7/2021 for mobility assessment and d/c planning  Pt presents supine in bed alert and agreeable to therapy  He is reporting moderate abdominal pain at this time  Sensation is impaired to R foot plantar aspect of first toe and area surrounding which has been chronic  LLE WFL, weakness due to deconditioning present in BLE, strength does remain functional however  He has fair balance with standing and ambulation  Pt is currently functioning at a modified independent assistance level for bed mobility, independent level for transfers, independent level for ambulation with no assistive device  The patient's -[facility] Basic Mobility Inpatient Short Form Raw Score is 24, Standardized Score is 57 68  A standardized score greater than 42 9 suggests the patient may benefit from discharge to home  Please also refer to the recommendation of the Physical Therapist for safe discharge planning      Recommendations Pt will benefit from continued skilled IP PT to address the above mentioned impairments in order to maximize recovery and increase functional independence when completing mobility and ADLs  See flow sheet for goals and POC       DME:  None    Discharge Disposition:  Home with Social Support      Hyun Doll PT, DPT

## 2021-03-07 NOTE — PROGRESS NOTES
Looks fine  Bowel movement this morning  Indigestion last night  Gravy ? Abdominal pain improved  Abdomen:  Soft nontender  Incisions healing  Steri-Strips in place  Impression:  Continues to improve  Status post laparoscopic cholecystectomy with normal cholangiogram for presumed gallstone pancreatitis  Plan:  Noted bed  Physical therapy  Advance diet  0

## 2021-03-07 NOTE — OCCUPATIONAL THERAPY NOTE
Occupational Therapy Evaluation     Patient Name: Arley Jamil  LZKLP'S Date: 3/7/2021  Problem List  Principal Problem:    Idiopathic acute pancreatitis without infection or necrosis  Active Problems:    Essential hypertension, benign    Thoracic aortic aneurysm without rupture (HCC)    Hypothyroidism (acquired)    Mixed hyperlipidemia    Smoking    Transaminitis    Past Medical History  Past Medical History:   Diagnosis Date    A-fib ([facility] [address] )     Arthritis     Avascular necrosis of bone of hip, left (HCC)     replaced    Back pain     CAD (coronary artery disease)     Carotid artery narrowing     left side -for endarterectomy today 3/5/2020    Disease of thyroid gland     hypo    GERD (gastroesophageal reflux disease)     History of Clostridioides difficile infection     Hyperlipidemia     Hypertension     Irregular heart beat     Kidney stone     Neck pain     Pancreatitis     Spinal stenosis     Wears glasses      Past Surgical History  Past Surgical History:   Procedure Laterality Date    BACK SURGERY      CARDIAC CATHETERIZATION      cardiac cath 5/2010  adjusted meds    CATARACT EXTRACTION Bilateral     CERVICAL FUSION      CHOLANGIOGRAM N/A 3/4/2021    Procedure: CHOLANGIOGRAM;  Surgeon: Renate Marks MD;  Location: [address] [place] [address];  Service: General    CHOLECYSTECTOMY LAPAROSCOPIC N/A 3/4/2021    Procedure: Per Marie;  Surgeon: Renate Marks MD;  Location: [address] [place] [address] OR;  Service: General    COLONOSCOPY      JOINT REPLACEMENT Left     hip    LUMBAR FUSION      NECK SURGERY      ORTHOPEDIC SURGERY Left     L THR    MI THROMBOENDARTECTMY Arley Mello INCIS Left 3/5/2020    Procedure: ENDARTERECTOMY ARTERY CAROTID WITH BOVINE PATCH ANGIOPLASTY AND INTRAOP DUPLEX;  Surgeon: Arnold Lagunas MD;  Location: [facility];  Service: Vascular    SPINAL FUSION             03/07/21 1129   OT Last Visit   OT Visit Date 03/07/21   Note Type   Note type Evaluation Restrictions/Precautions   Weight Bearing Precautions Per Order No   Other Precautions Pain   Pain Assessment   Pain Assessment Tool 0-10   Pain Score 5   Pain Location/Orientation Orientation: Lower; Location: Abdomen   Patient's Stated Pain Goal No pain   Hospital Pain Intervention(s) Repositioned; Ambulation/increased activity; Emotional support; Rest   Home Living   Type of [address] Two level;Bed/bath upstairs;Stairs to enter with rails  (3STE)   Bathroom Shower/Tub Walk-in shower   [address] glide;Walker;Cane  (denies prior use)   Additional Comments Pt owns RW, cane and stair glide from his brother but denies use pta   Prior Function   Level of [place] Independent with ADLs and functional mobility   Lives With Family  (nephew)   ADL Assistance Independent   IADLs Independent   Falls in the last 6 months 0   Vocational Full time employment   Comments +drives   ADL   Eating Assistance 7  Independent   Grooming Assistance 7  Independent   UB Bathing Assistance 7  Independent   LB [address] 6  [address] 6  Modified independent   [address]  7  Independent   Bed Mobility   Supine to Sit 6  Modified independent   Additional items Increased time required   Sit to Supine 6  Modified independent   Additional items Increased time required   Transfers   Sit to Stand 7  Independent   Stand to Sit 7  Independent   Stand pivot 7  Independent   Additional Comments Transfers with no AD   Functional Mobility   Functional Mobility 7  Independent   Additional Comments [facility] in room with no AD   Balance   Static Sitting Good   Dynamic Sitting Good   Static Standing Fair +   Dynamic Standing Fair +   Activity Tolerance   Activity Tolerance Patient tolerated treatment well   Medical Staff Made Aware PT [id number] Nurse cleared pt for evaluation   RUE Assessment   RUE Assessment WFL   LUE Assessment   LUE Assessment WFL   Hand Function   Gross Motor Coordination Functional   Fine Motor Coordination Functional   Vision-Basic Assessment   Current Vision Wears glasses all the time   Cognition   Overall Cognitive Status WFL   Arousal/Participation Alert; Cooperative   Attention Within functional limits   Orientation Level Oriented X4   Memory Within functional limits   Following Commands Follows all commands and directions without difficulty   Assessment   Limitation Decreased UE strength;Decreased endurance   Prognosis Good   Assessment Pt is a 78 y o  male admitted to [facility] on 2/28/2021 w/ Idiopathic Acute Pancreatitis without Infection or Necrosis  Comorbidities include a h/o HTN, Hypothyroidism, HLD, Smoking, Transminitis  Pt presents s/p Gall Bladder Removal  Pt with active OT orders and up and OOB as tolerated orders  [facility] bed/bath and 3STE with his nephew  Full bathroom equipped with walk-in shower with no DME  Pt was I w/ ADLS and IADLS, (+) drove, & required no use of AD PTA - reports owning stair glide, RW and cane from his brother but denies use pta  Currently pt is performing ADLs and functional transfers/mobility with MOD I/IND with no AD  Pt is limited at this time 2*: pain, endurance, activity tolerance and strength s/p surgery  Pt scored overall  90/100 on the Barthel Index  The patient's raw score on the AM-PAC Daily Activity inpatient short form is 24, standardized score is 57 54, greater than  Patients at this level are likely to benefit from D/C home  Please refer to the recommendation of the Occupational Therapist for safe DC planning  Based on the aforementioned OT evaluation, functional performance deficits, and assessments, pt has been identified as a high complexity evaluation  From OT standpoint, anticipate d/c home with family support and pt return home independently   Pt is functioning close to, if not at, his baseline for ADLs  No further acute OT needs are noted  OT signing off on patient at this time  Please re-consult OT if any functional decline occurs  Thank you  Goals   Patient Goals to go home   Plan   OT Frequency Eval only   Recommendation   OT Discharge Recommendation Return to previous environment with social support   OT - OK to Discharge Yes   AM-PAC Daily Activity Inpatient   Lower Body Dressing 4   Bathing 4   Toileting 4   Upper Body Dressing 4   Grooming 4   Eating 4   Daily Activity Raw Score 24   Daily Activity Standardized Score (Calc for Raw Score >=11) 57 54   AM-PAC Applied Cognition Inpatient   Following a Speech/Presentation 4   Understanding Ordinary Conversation 4   Taking Medications 4   Remembering Where Things Are Placed or Put Away 4   Remembering List of 4-5 Errands 4   Taking Care of Complicated Tasks 4   Applied Cognition Raw Score 24   Applied Cognition Standardized Score 62 21   Barthel Index   Feeding 10   Bathing 5   Grooming Score 5   Dressing Score 10   Bladder Score 10   Bowels Score 10   Toilet Use Score 10   Transfers (Bed/Chair) Score 15   Mobility (Level Surface) Score 15   Stairs Score 0  (DNT)   Barthel Index Score 90      Alicia Ayers MOT,OTR/L

## 2021-03-08 VITALS
BODY MASS INDEX: 23.87 KG/M2 | SYSTOLIC BLOOD PRESSURE: 160 MMHG | WEIGHT: 134.7 LBS | TEMPERATURE: 97.4 F | OXYGEN SATURATION: 95 % | HEART RATE: 87 BPM | DIASTOLIC BLOOD PRESSURE: 77 MMHG | HEIGHT: 63 IN | RESPIRATION RATE: 18 BRPM

## 2021-03-08 LAB
ALBUMIN SERPL BCP-MCNC: 3.8 G/DL (ref 3–5.2)
ALP SERPL-CCNC: 86 U/L (ref 43–122)
ALT SERPL W P-5'-P-CCNC: 69 U/L (ref 9–52)
ANION GAP SERPL CALCULATED.3IONS-SCNC: 6 MMOL/L (ref 5–14)
AST SERPL W P-5'-P-CCNC: 73 U/L (ref 17–59)
BILIRUB SERPL-MCNC: 0.4 MG/DL
BUN SERPL-MCNC: 22 MG/DL (ref 5–25)
CALCIUM SERPL-MCNC: 9.5 MG/DL (ref 8.4–10.2)
CHLORIDE SERPL-SCNC: 101 MMOL/L (ref 97–108)
CO2 SERPL-SCNC: 26 MMOL/L (ref 22–30)
CREAT SERPL-MCNC: 0.96 MG/DL (ref 0.7–1.5)
ERYTHROCYTE [DISTWIDTH] IN BLOOD BY AUTOMATED COUNT: 14.2 %
GFR SERPL CREATININE-BSD FRML MDRD: 81 ML/MIN/1.73SQ M
GLUCOSE SERPL-MCNC: 89 MG/DL (ref 70–99)
HCT VFR BLD AUTO: 38.8 % (ref 41–53)
HGB BLD-MCNC: 12.8 G/DL (ref 13.5–17.5)
MCH RBC QN AUTO: 31.6 PG (ref 26–34)
MCHC RBC AUTO-ENTMCNC: 33.1 G/DL (ref 31–36)
MCV RBC AUTO: 96 FL (ref 80–100)
PLATELET # BLD AUTO: 307 THOUSANDS/UL (ref 150–450)
PMV BLD AUTO: 9.9 FL (ref 8.9–12.7)
POTASSIUM SERPL-SCNC: 3.9 MMOL/L (ref 3.6–5)
PROT SERPL-MCNC: 6.6 G/DL (ref 5.9–8.4)
RBC # BLD AUTO: 4.06 MILLION/UL (ref 4.5–5.9)
SODIUM SERPL-SCNC: 133 MMOL/L (ref 137–147)
WBC # BLD AUTO: 5.4 THOUSAND/UL (ref 4.5–11)

## 2021-03-08 PROCEDURE — 99232 SBSQ HOSP IP/OBS MODERATE 35: CPT | Performed by: PHYSICIAN ASSISTANT

## 2021-03-08 PROCEDURE — 99239 HOSP IP/OBS DSCHRG MGMT >30: CPT | Performed by: INTERNAL MEDICINE

## 2021-03-08 PROCEDURE — 85027 COMPLETE CBC AUTOMATED: CPT | Performed by: INTERNAL MEDICINE

## 2021-03-08 PROCEDURE — 80053 COMPREHEN METABOLIC PANEL: CPT | Performed by: INTERNAL MEDICINE

## 2021-03-08 PROCEDURE — 99024 POSTOP FOLLOW-UP VISIT: CPT | Performed by: SPECIALIST

## 2021-03-08 RX ORDER — OXYCODONE HYDROCHLORIDE AND ACETAMINOPHEN 5; 325 MG/1; MG/1
1 TABLET ORAL EVERY 4 HOURS PRN
Qty: 20 TABLET | Refills: 0 | Status: SHIPPED | OUTPATIENT
Start: 2021-03-08 | End: 2021-03-18

## 2021-03-08 RX ADMIN — SPIRONOLACTONE 25 MG: 25 TABLET ORAL at 08:25

## 2021-03-08 RX ADMIN — PANTOPRAZOLE SODIUM 40 MG: 40 TABLET, DELAYED RELEASE ORAL at 05:09

## 2021-03-08 RX ADMIN — HEPARIN SODIUM 5000 UNITS: 5000 INJECTION INTRAVENOUS; SUBCUTANEOUS at 08:22

## 2021-03-08 RX ADMIN — PANCRELIPASE 12000 UNITS: 30000; 6000; 19000 CAPSULE, DELAYED RELEASE PELLETS ORAL at 08:23

## 2021-03-08 RX ADMIN — LOSARTAN POTASSIUM 50 MG: 50 TABLET, FILM COATED ORAL at 08:25

## 2021-03-08 RX ADMIN — LEVOTHYROXINE SODIUM 12.5 MCG: 25 TABLET ORAL at 05:09

## 2021-03-08 RX ADMIN — AMLODIPINE BESYLATE 10 MG: 10 TABLET ORAL at 08:25

## 2021-03-08 RX ADMIN — MAGNESIUM OXIDE TAB 400 MG (241.3 MG ELEMENTAL MG) 400 MG: 400 (241.3 MG) TAB at 08:25

## 2021-03-08 RX ADMIN — METOPROLOL SUCCINATE 25 MG: 25 TABLET, FILM COATED, EXTENDED RELEASE ORAL at 08:24

## 2021-03-08 NOTE — NURSING NOTE
Discharge instructions given to patient with details regarding f/u apts and medications with next dose due  Patient found dressed in clothes  Patient does not have a current IV access to discontinue  Reviewed AVS and denied questions escorted to the lobby by staff

## 2021-03-08 NOTE — PLAN OF CARE
Problem: Potential for Falls  Goal: Patient will remain free of falls  Description: INTERVENTIONS:  - Assess patient frequently for physical needs  -  Identify cognitive and physical deficits and behaviors that affect risk of falls    -  [place] fall precautions as indicated by assessment   - Educate patient/family on patient safety including physical limitations  - Instruct patient to call for assistance with activity based on assessment  - Modify environment to reduce risk of injury  - Consider OT/PT consult to assist with strengthening/mobility  Outcome: Progressing     Problem: PAIN - ADULT  Goal: Verbalizes/displays adequate comfort level or baseline comfort level  Description: Interventions:  - Encourage patient to monitor pain and request assistance  - Assess pain using appropriate pain scale  - Administer analgesics based on type and severity of pain and evaluate response  - Implement non-pharmacological measures as appropriate and evaluate response  - Consider cultural and social influences on pain and pain management  - Notify physician/advanced practitioner if interventions unsuccessful or patient reports new pain  Outcome: Progressing     Problem: INFECTION - ADULT  Goal: Absence or prevention of progression during hospitalization  Description: INTERVENTIONS:  - Assess and monitor for signs and symptoms of infection  - Monitor lab/diagnostic results  - Monitor all insertion sites, i e  indwelling lines, tubes, and drains  - Monitor endotracheal if appropriate and nasal secretions for changes in amount and color  - [place] appropriate cooling/warming therapies per order  - Administer medications as ordered  - Instruct and encourage patient and family to use good hand hygiene technique  - Identify and instruct in appropriate isolation precautions for identified infection/condition  Outcome: Progressing     Problem: SAFETY ADULT  Goal: Patient will remain free of falls  Description: INTERVENTIONS:  - Assess patient frequently for physical needs  -  Identify cognitive and physical deficits and behaviors that affect risk of falls    -  [place] fall precautions as indicated by assessment   - Educate patient/family on patient safety including physical limitations  - Instruct patient to call for assistance with activity based on assessment  - Modify environment to reduce risk of injury  - Consider OT/PT consult to assist with strengthening/mobility  Outcome: Progressing  Goal: Maintain or return to baseline ADL function  Description: INTERVENTIONS:  -  Assess patient's ability to carry out ADLs; assess patient's baseline for ADL function and identify physical deficits which impact ability to perform ADLs (bathing, care of mouth/teeth, toileting, grooming, dressing, etc )  - Assess/evaluate cause of self-care deficits   - Assess range of motion  - Assess patient's mobility; develop plan if impaired  - Assess patient's need for assistive devices and provide as appropriate  - Encourage maximum independence but intervene and supervise when necessary  - Involve family in performance of ADLs  - Assess for home care needs following discharge   - Consider OT consult to assist with ADL evaluation and planning for discharge  - Provide patient education as appropriate  Outcome: Progressing  Goal: Maintain or return mobility status to optimal level  Description: INTERVENTIONS:  - Assess patient's baseline mobility status (ambulation, transfers, stairs, etc )    - Identify cognitive and physical deficits and behaviors that affect mobility  - Identify mobility aids required to assist with transfers and/or ambulation (gait belt, sit-to-stand, lift, walker, cane, etc )  - [place] fall precautions as indicated by assessment  - Record patient progress and toleration of activity level on Mobility SBAR; progress patient to next Phase/Stage  - Instruct patient to call for assistance with activity based on assessment  - Consider rehabilitation consult to assist with strengthening/weightbearing, etc   Outcome: Progressing     Problem: DISCHARGE PLANNING  Goal: Discharge to home or other facility with appropriate resources  Description: INTERVENTIONS:  - Identify barriers to discharge w/patient and caregiver  - Arrange for needed discharge resources and transportation as appropriate  - Identify discharge learning needs (meds, wound care, etc )  - Arrange for interpretive services to assist at discharge as needed  - Refer to Case Management Department for coordinating discharge planning if the patient needs post-hospital services based on physician/advanced practitioner order or complex needs related to functional status, cognitive ability, or social support system  Outcome: Progressing     Problem: GASTROINTESTINAL - ADULT  Goal: Minimal or absence of nausea and/or vomiting  Description: INTERVENTIONS:  - Administer IV fluids if ordered to ensure adequate hydration  - Maintain NPO status until nausea and vomiting are resolved  - Nasogastric tube if ordered  - Administer ordered antiemetic medications as needed  - Provide nonpharmacologic comfort measures as appropriate  - Advance diet as tolerated, if ordered  - Consider nutrition services referral to assist patient with adequate nutrition and appropriate food choices  Outcome: Progressing  Goal: Maintains or returns to baseline bowel function  Description: INTERVENTIONS:  - Assess bowel function  - Encourage oral fluids to ensure adequate hydration  - Administer IV fluids if ordered to ensure adequate hydration  - Administer ordered medications as needed  - Encourage mobilization and activity  - Consider nutritional services referral to assist patient with adequate nutrition and appropriate food choices  Outcome: Progressing  Goal: Maintains adequate nutritional intake  Description: INTERVENTIONS:  - Monitor percentage of each meal consumed  - Identify factors contributing to decreased intake, treat as appropriate  - Assist with meals as needed  - Monitor I&O, weight, and lab values if indicated  - Obtain nutrition services referral as needed  Outcome: Progressing     Problem: Nutrition/Hydration-ADULT  Goal: Nutrient/Hydration intake appropriate for improving, restoring or maintaining nutritional needs  Description: Monitor and assess patient's nutrition/hydration status for malnutrition  Collaborate with interdisciplinary team and initiate plan and interventions as ordered  Monitor patient's weight and dietary intake as ordered or per policy  Utilize nutrition screening tool and intervene as necessary  Determine patient's food preferences and provide high-protein, high-caloric foods as appropriate       INTERVENTIONS:  - Monitor oral intake, urinary output, labs, and treatment plans  - Assess nutrition and hydration status and recommend course of action  - Evaluate amount of meals eaten  - Assist patient with eating if necessary   - Allow adequate time for meals  - Recommend/ encourage appropriate diets, oral nutritional supplements, and vitamin/mineral supplements  - Order, calculate, and assess calorie counts as needed  - Recommend, monitor, and adjust tube feedings and TPN/PPN based on assessed needs  - Assess need for intravenous fluids  - Provide specific nutrition/hydration education as appropriate  - Include patient/family/caregiver in decisions related to nutrition  Outcome: Progressing

## 2021-03-08 NOTE — PROGRESS NOTES
Looks great  Pain decreased  Tolerating his diet  Bowels moving  Belly benign  Incisions healing  Agree with discharge  Follow-up in the office in 2 weeks

## 2021-03-08 NOTE — PROGRESS NOTES
Patient Name: Leighann Pratt  Patient MRN: [id number]  Date: 03/08/21  Service: [facility]    Subjective   Labs, notes reviewed  LFTs continue to improve since surgery  VS stable  Tolerating lo fat diet  Overall paiin has improved - currently 4/10  He states he still has both pancreatic pain and post-surgical pain  No nausea or vomiting  Had 2 BMs yesterday  Vitals  Blood pressure 160/77, pulse 87, temperature (!) 97 4 °F (36 3 °C), temperature source Temporal, resp  rate 18, height 5' 3" (1 6 m), weight 61 1 kg (134 lb 11 2 oz), SpO2 95 %  Physical Exam:     General Appearance:    Awake, alert, oriented x3, no distress, well developed, well    nourished   Head:    Normocephalic without obvious abnormality   Eyes:    PERRL, conjunctiva/corneas clear, EOM's intact        Neck:   Supple, no adenopathy   Throat:   Mucous membranes moist   Lungs:     Clear to auscultation bilaterally, no wheezing or rhonchi   Heart:    Regular rate and rhythm, S1 and S2 normal, no murmur   Abdomen:     Soft, +diffuse tenderness (improved), non-distended  bowel sounds active  No masses, rebound or guarding  Extremities:   Extremities without edema   Psych  Derm:   Normal affect    No jaundice   Neurologic:   CNII-XII grossly intact   Speech intact         Laboratory Studies  Results from last 7 days   Lab Units 03/08/21 0513 03/06/21 0438 03/05/21 0437 03/04/21 0515 03/03/21 0439 03/02/21  0443   WBC Thousand/uL 5 40 6 30 6 80 5 50 5 70 5 60   HEMOGLOBIN g/dL 12 8* 10 9* 11 5* 11 5* 11 9* 11 5*   HEMATOCRIT % 38 8* 33 3* 34 2* 34 5* 36 1* 34 7*   PLATELETS Thousands/uL 307 244 246 226 237 225   INR   --   --   --  1 02 1 05  --      Results from last 7 days   Lab Units 03/08/21 0513 03/06/21 0438 03/05/21 0437 03/04/21 0515 03/03/21 0439 03/02/21  0443   SODIUM mmol/L 133* 137 135* 137 136* 135*   POTASSIUM mmol/L 3 9 3 6 4 4 4 0 4 0 4 0   CHLORIDE mmol/L 101 103 102 102 102 104   CO2 mmol/L 26 29 29 28 28 26   BUN mg/dL 22 11 9 11 9 14   CREATININE mg/dL 0 96 0 92 0 91 0 94 0 94 0 96   CALCIUM mg/dL 9 5 9 1 9 3 9 1 9 1 8 9   ALBUMIN g/dL 3 8 3 3 3 6  --  3 4 3 4   TOTAL BILIRUBIN mg/dL 0 40 0 40 0 50  --  0 60 0 50   ALK PHOS U/L 86 62 64  --  61 59   ALT U/L 69* 79* 115*  --  51 59*   AST U/L 73* 98* 186*  --  65* 77*     Lab Results   Component Value Date    LIPASE 380 (H) 03/02/2021    LIPASE 2,018 (H) 03/01/2021    LIPASE 12,279 (H) 02/28/2021       Imaging and Other Studies      Inhouse Medications     Current Facility-Administered Medications:     amLODIPine (NORVASC) tablet 10 mg, 10 mg, Oral, Daily, 10 mg at 03/07/21 0800    docusate sodium (COLACE) capsule 100 mg, 100 mg, Oral, BID, 100 mg at 03/07/21 0800    heparin (porcine) subcutaneous injection 5,000 Units, 5,000 Units, Subcutaneous, Q12H [address], 5,000 Units at 03/07/21 2106    levothyroxine tablet 12 5 mcg, 12 5 mcg, Oral, Daily, 12 5 mcg at 03/08/21 0509    losartan (COZAAR) tablet 50 mg, 50 mg, Oral, Daily, 50 mg at 03/07/21 0800    magnesium oxide (MAG-OX) tablet 400 mg, 400 mg, Oral, BID, 400 mg at 03/07/21 1739    metoprolol succinate (TOPROL-XL) 24 hr tablet 25 mg, 25 mg, Oral, Daily, 25 mg at 03/07/21 0800    nicotine polacrilex (NICORETTE) gum 4 mg, 4 mg, Oral, Q2H PRN    ondansetron (ZOFRAN) injection 4 mg, 4 mg, Intravenous, Q6H PRN, 4 mg at 03/04/21 1215    oxyCODONE-acetaminophen (PERCOCET) 5-325 mg per tablet 2 tablet, 2 tablet, Oral, Q4H PRN, 2 tablet at 03/07/21 2107    pancrelipase (Lip-Prot-Amyl) (CREON) delayed release capsule 12,000 Units, 12,000 Units, Oral, TID With Meals, 12,000 Units at 03/07/21 1606    pantoprazole (PROTONIX) EC tablet 40 mg, 40 mg, Oral, Early Morning, 40 mg at 03/08/21 0509    senna (SENOKOT) tablet 8 6 mg, 1 tablet, Oral, HS, 8 6 mg at 03/06/21 2101    spironolactone (ALDACTONE) tablet 25 mg, 25 mg, Oral, Daily, 25 mg at 03/07/21 0800    zolpidem (AMBIEN) tablet 10 mg, 10 mg, Oral, HS, 10 mg at 03/07/21 2211      Assessment/Plan:    1  Recurrent acute pancreatitis HD#8  Suspect related to alcohol vs microlithiasis but etiology unclear  Due to possibility of microlithiasis and gallstone on US, patient underwent lap sabrina 3/4/21  IOC was normal  § Patient has continued to drink minimal amount of wine daily when celebrating mass but has otherwise abstained  Advised complete alcohol abstinence  We discussed [place] altar wine for celebrating mass which is minimally fermented but he states this is not an option  Advised the importance of tobacco cessation  § Lipase elevated on admission with downward trend   § Post-op plan per surgery  Creon restarted 3/6  § Lap cholecystectomy with IOC POD#4, clinically stable  Pathology pending  § Continue daily PPI   2  Pancreatic head mass lesion felt benign with interval improvement noted on CT imaging  Status post EUS x2 last year  Bx showed [facility] category III atypical cells which was felt nondiagnostic  He was treated with 1 month coarse of PO steroids with taper   CA 19-9 elevated at 63 10/2020    Followed by Dr Gavin Song, their office notified of patients admission  3  Chronic soft unformed stool felt 2/2 pancreatitic insufficiency started on Creon in January  Workup with stool studies including C diff, thyroid function in January as outpatient unrevealing  Continue Creon  4  Mildly nodular appearance of liver on CT imaging indicative of some degree of fibrosis, likely related to past alcohol consumption  LFTs show AST>ALT  LFTs have trended down since surgery  Synthetic liver function appears to be within normal limits  Hepatitis C Ab negative   Avoid hepatotoxins          Magdalena Ku PA-C

## 2021-03-08 NOTE — DISCHARGE SUMMARY
Discharge- [facility] 1950, 78 y o  male MRN: [id number]    Unit/Bed#: 7T [facility] 702-01 Encounter: [id number]    Primary Care Provider: Tatiana Fuentes MD   Date and time admitted to hospital: 2/28/2021  6:26 PM        * Idiopathic acute pancreatitis without infection or necrosis  Assessment & Plan  · Recurrent pancreatitis thought related to alcohol which he now declined using since last hospitalization in October 2020  Reported pattern of acute pancreatitis in February and October  · Severe epigastric pain radiated to the right shoulder and right midsternal for the last 4 days  · Lipase more than 12,000 and currently trending down  · GI consulted appreciate the following recommendations:   · Liver ultrasound- showed" Pancreatic tail partially obscured by overlying bowel gas  Cholelithiasis without sonographic evidence of cholecystitis  Unremarkable liver and biliary ducts "  · Continue Creon today for regular  · Continue Zofran   · Continue Percosets  · Continue to hold pre-hospital Vytorin  · Spoke to Dr Brittney Ivey, outpatient GI doctor and recommended consult to Surgery for cholecystectomy  · Spoke with Arlinda Fothergill with General surgery  · Status post Cholecystectomy Thursday 3/4  · Upgrade to regular diet per surgery  · Mobilize patient with PT/OT  · Continue bowel regimen    Transaminitis  Assessment & Plan  · Improved  Trending down  Initially AST ALT ratio 2;1 consistent with alcohol use on admission  Trending up after surgery but no elevated ALP or T bili  · Ultrasound liver showed-" Pancreatic tail partially obscured by overlying bowel gas  Cholelithiasis without sonographic evidence of cholecystitis  Unremarkable liver and biliary ducts " per radiology read    · S/p cholecystectomy 3/4      Smoking  Assessment & Plan   Continue nicotine gum    Mixed hyperlipidemia  Assessment & Plan  Restart Vytorin at home    Hypothyroidism (acquired)  Assessment & Plan   Continue pre-hospital levothyroxine 12 5 mg p o  daily    Thoracic aortic aneurysm without rupture [facility])  Assessment & Plan  Recent CTA 02/26/2021 showed   Ascending aortic aneurysm measures 4 cm   Moderate stenosis at the left renal artery origin  Outpatient surveillance by PCP  Essential hypertension, benign  Assessment & Plan  Blood pressure stable  Continue pre-hospital spironolactone 25 mg p o  daily and Cozaar 50 mg p o  daily  ContinueToprol XL 25 mg p o  daily, and Norvasc 10 mg p o  daily        Discharging Physician / Practitioner: Dylan Macdonald MD  PCP: Liam Liu MD  Admission Date:   Admission Orders (From admission, onward)     Ordered        02/28/21 1934  Inpatient Admission  Once                   Discharge Date: -3/08/2021    Resolved Problems  Date Reviewed: 3/1/2021          Resolved    Preoperative clearance 3/5/2021     Resolved by  Dylan Macdonald MD          Consultations During Hospital Stay:  · General surgery  · Gastroenterology    Procedures Performed:   · Cholecystectomy- March 4th 2021    Significant Findings / Test Results:   Pancreatitis      Xr Cholangiogram Intraoperative  Result Date: 3/4/2021  Impression: No filling defects  Please see procedure report for further details  Us Liver  Result Date: 3/1/2021  · Impression: Pancreatic tail partially obscured by overlying bowel gas  Cholelithiasis without sonographic evidence of cholecystitis  Unremarkable liver and biliary ducts  Incidental Findings:   · none    Test Results Pending at Discharge (will require follow up):   · none     Outpatient Tests Requested:  · none    Complications:  none    Reason for Admission: abdominal pain    Hospital Course:     Collette Shad is a 78 y o  male patient who originally presented to the hospital on 2/28/2021 due to abdominal pain  Laboratory examinations were significant for elevated lipase level, elevated liver enzymes  Patient was made NPO started on intravenous fluids    GI was consulted for further evaluation  Recommended intravenous medications, patient was doing well and intravenous morphine  He was tolerating liquid diet okay  Ultrasound of the right upper quadrant did not reveal any cholecystitis did show some gallstones  Pancreas weren't visualized very well  Patient still required high amounts of pain medication  GI recommended cholecystectomy  Patient underwent cholecystectomy on March 4th  Patient was able to be advanced and tolerated oral diet okay  Patient was transitioned to Percocet  He was stable to discharge on postoperative day 4  Patient had normal bowel movements a day of discharge  Patient is aware he is to follow-up with his primary care physician as well as GI, and surgery of discharge  Please see above list of diagnoses and related plan for additional information  Condition at Discharge: stable     Discharge Day Visit / Exam:     Subjective:  Patient seen examined at bedside  Patient currently doing well  He is ready for discharge  Pain is tolerable  No diarrhea  Patient is having bowel movements  Vitals: Blood Pressure: 160/77 (03/08/21 0720)  Pulse: 87 (03/08/21 0720)  Temperature: (!) 97 4 °F (36 3 °C) (03/08/21 0720)  Temp Source: Temporal (03/08/21 0720)  Respirations: 18 (03/08/21 0720)  Height: 5' 3" (160 cm) (02/28/21 2030)  Weight - Scale: 61 1 kg (134 lb 11 2 oz) (02/28/21 2030)  SpO2: 95 % (03/08/21 0720)  Exam:   Physical Exam  Constitutional:       General: He is not in acute distress  Appearance: Normal appearance  HENT:      Head: Normocephalic and atraumatic  Eyes:      General:         Right eye: No discharge  Left eye: No discharge  Cardiovascular:      Rate and Rhythm: Normal rate and regular rhythm  Pulses: Normal pulses  Heart sounds: No murmur  Pulmonary:      Effort: Pulmonary effort is normal  No respiratory distress  Breath sounds: Normal breath sounds  No wheezing     Musculoskeletal:      Right lower leg: No edema  Left lower leg: No edema  Skin:     General: Skin is warm and dry  Neurological:      General: No focal deficit present  Mental Status: He is alert and oriented to person, place, and time  Mental status is at baseline  Psychiatric:         Mood and Affect: Mood normal            Discussion with Family: none    Discharge instructions/Information to patient and family:   See after visit summary for information provided to patient and family  Provisions for Follow-Up Care:  See after visit summary for information related to follow-up care and any pertinent home health orders  Disposition:     Home      Planned Readmission:  none     Discharge Statement:  I spent 31 minutes discharging the patient  This time was spent on the day of discharge  I had direct contact with the patient on the day of discharge  Greater than 50% of the total time was spent examining patient, answering all patient questions, arranging and discussing plan of care with patient as well as directly providing post-discharge instructions  Additional time then spent on discharge activities  Discharge Medications:  See after visit summary for reconciled discharge medications provided to patient and family        ** Please Note: This note has been constructed using a voice recognition system **

## 2021-03-08 NOTE — DISCHARGE INSTRUCTIONS
Cholesterol and Your Health   AMBULATORY CARE:   Cholesterol  is a waxy, fat-like substance  Your body uses cholesterol to make hormones and new cells, and to protect nerves  Cholesterol is made by your body  It also comes from certain foods you eat, such as meat and dairy products  Your healthcare provider can help you set goals for your cholesterol levels  He or she can help you create a plan to meet your goals  Cholesterol level goals: Your cholesterol level goals depend on your risk for heart disease, your age, and your other health conditions  The following are general guidelines:  · Total cholesterol  includes low-density lipoprotein (LDL), high-density lipoprotein (HDL), and triglyceride levels  The total cholesterol level should be lower than 200 mg/dL and is best at about 150 mg/dL  · LDL cholesterol  is called bad cholesterol  because it forms plaque in your arteries  As plaque builds up, your arteries become narrow, and less blood flows through  When plaque decreases blood flow to your heart, you may have chest pain  If plaque completely blocks an artery that brings blood to your heart, you may have a heart attack  Plaque can break off and form blood clots  Blood clots may block arteries in your brain and cause a stroke  The level should be less than 130 mg/dL and is best at about 100 mg/dL  · HDL cholesterol  is called good cholesterol  because it helps remove LDL cholesterol from your arteries  It does this by attaching to LDL cholesterol and carrying it to your liver  Your liver breaks down LDL cholesterol so your body can get rid of it  High levels of HDL cholesterol can help prevent a heart attack and stroke  Low levels of HDL cholesterol can increase your risk for heart disease, heart attack, and stroke  The level should be 60 mg/dL or higher  · Triglycerides  are a type of fat that store energy from foods you eat  High levels of triglycerides also cause plaque buildup   This can increase your risk for a heart attack or stroke  If your triglyceride level is high, your LDL cholesterol level may also be high  The level should be less than 150 mg/dL  What increases your risk for high cholesterol:   · Smoking cigarettes    · Being overweight or obese, or not getting enough exercise    · Drinking large amounts of alcohol    · A medical condition such as hypertension (high blood pressure) or diabetes    · Certain genes passed from your parents to you    · Age older than 65 years    What you need to know about having your cholesterol levels checked: Adults 21to 39years of age should have their cholesterol levels checked every 4 to 6 years  Adults 45 years or older should have their cholesterol checked every 1 to 2 years  You may need your cholesterol checked more often, or at a younger age, if you have risk factors for heart disease  You may also need to have your cholesterol checked more often if you have other health conditions, such as diabetes  Blood tests are used to check cholesterol levels  Blood tests measure your levels of triglycerides, LDL cholesterol, and HDL cholesterol  How healthy fats affect your cholesterol levels:  Healthy fats, also called unsaturated fats, help lower LDL cholesterol and triglyceride levels  Healthy fats include the following:  · Monounsaturated fats  are found in foods such as olive oil, canola oil, avocado, nuts, and olives  · Polyunsaturated fats,  such as omega 3 fats, are found in fish, such as salmon, trout, and tuna  They can also be found in plant foods such as flaxseed, walnuts, and soybeans  How unhealthy fats affect your cholesterol levels:  Unhealthy fats increase LDL cholesterol and triglyceride levels  They are found in foods high in cholesterol, saturated fat, and trans fat:  · Cholesterol  is found in eggs, dairy, and meat  · Saturated fat  is found in butter, cheese, ice cream, whole milk, and coconut oil   Saturated fat is also found in meat, such as sausage, hot dogs, and bologna  · Trans fat  is found in liquid oils and is used in fried and baked foods  Foods that contain trans fats include chips, crackers, muffins, sweet rolls, microwave popcorn, and cookies  Treatment  for high cholesterol will also decrease your risk of heart disease, heart attack, and stroke  Treatment may include any of the following:  · Lifestyle changes  may include food, exercise, weight loss, and quitting smoking  You may also need to decrease the amount of alcohol you drink  Your healthcare provider will want you to start with lifestyle changes  Other treatment may be added if lifestyle changes are not enough  · Medicines  may be given to lower your LDL cholesterol, triglyceride levels, or total cholesterol level  You may need medicines to lower your cholesterol if any of the following is true:     ? You have a history of stroke, TIA, unstable angina, or a heart attack  ? Your LDL cholesterol level is 190 mg/dL or higher  ? You are age 36 to 76 years, have diabetes or heart disease risk factors, and your LDL cholesterol is 70 mg/dL or higher  · Supplements  include fish oil, red yeast rice, and garlic  Fish oil may help lower your triglyceride and LDL cholesterol levels  It may also increase your HDL cholesterol level  Red yeast rice may help decrease your total cholesterol level and LDL cholesterol level  Garlic may help lower your total cholesterol level  Do not take these supplements without talking to your healthcare provider  Food changes you can make to lower your cholesterol levels:  A dietitian can help you create a healthy eating plan  He or she can show you how to read food labels and choose foods low in saturated fat, trans fats, and cholesterol  · Decrease the total amount of fat you eat  Choose lean meats, fat-free or 1% fat milk, and low-fat dairy products, such as yogurt and cheese  Try to limit or avoid red meats  Limit or do not eat fried foods or baked goods, such as cookies  · Replace unhealthy fats with healthy fats  Cook foods in olive oil or canola oil  Choose soft margarines that are low in saturated fat and trans fat  Seeds, nuts, and avocados are other examples of healthy fats  · Eat foods with omega-3 fats  Examples include salmon, tuna, mackerel, walnuts, and flaxseed  Eat fish 2 times per week  Pregnant women should not eat fish that have high levels of mercury, such as shark, swordfish, and nereyda mackerel  · Increase the amount of high-fiber foods you eat  High-fiber foods can help lower your LDL cholesterol  Aim to get between 20 and 30 grams of fiber each day  Fruits and vegetables are high in fiber  Eat at least 5 servings each day  Other high-fiber foods are whole-grain or whole-wheat breads, pastas, or cereals, and brown rice  Eat 3 ounces of whole-grain foods each day  Increase fiber slowly  You may have abdominal discomfort, bloating, and gas if you add fiber to your diet too quickly  · Eat healthy protein foods  Examples include low-fat dairy products, skinless chicken and turkey, fish, and nuts  · Limit foods and drinks that are high in sugar  Your dietitian or healthcare provider can help you create daily limits for high-sugar foods and drinks  The limit may be lower if you have diabetes or another health condition  Limits can also help you lose weight if needed  Lifestyle changes you can make to lower your cholesterol levels:   · Maintain a healthy weight  Ask your healthcare provider what a healthy weight is for you  Ask him or her to help you create a weight loss plan if needed  Weight loss can decrease your total cholesterol and triglyceride levels  Weight loss may also help keep your blood pressure at a healthy level  · Exercise regularly  Exercise can help lower your total cholesterol level and maintain a healthy weight   Exercise can also help increase your HDL cholesterol level  Work with your healthcare provider to create an exercise program that is right for you  Get at least 30 to 40 minutes of moderate exercise most days of the week  Examples of exercise include brisk walking, swimming, or biking  · Do not smoke  Nicotine and other chemicals in cigarettes and cigars can raise your cholesterol levels  Ask your healthcare provider for information if you currently smoke and need help to quit  E-cigarettes or smokeless tobacco still contain nicotine  Talk to your healthcare provider before you use these products  · Limit or do not drink alcohol  Alcohol can increase your triglyceride levels  Ask your healthcare provider before you drink alcohol  Ask how much is okay for you to drink in 1 day or 1 week  © Copyright [address] [place] [address] Drive Information is for End User's use only and may not be sold, redistributed or otherwise used for commercial purposes  All illustrations and images included in CareNotes® are the copyrighted property of [employer]  or [facility] Marbella Benavidez   The above information is an  only  It is not intended as medical advice for individual conditions or treatments  Talk to your doctor, nurse or pharmacist before following any medical regimen to see if it is safe and effective for you

## 2021-03-09 ENCOUNTER — TRANSITIONAL CARE MANAGEMENT (OUTPATIENT)
Dept: FAMILY MEDICINE CLINIC | Facility: CLINIC | Age: 71
End: 2021-03-09

## 2021-03-09 NOTE — UTILIZATION REVIEW
Notification of Discharge  This is a Notification of Discharge from our facility [address]  Please be advised that this patient has been discharge from our facility  Below you will find the admission and discharge date and time including the patients disposition  PRESENTATION DATE: 2/28/2021  6:26 PM  OBS ADMISSION DATE: n/a  IP ADMISSION DATE: 2/28/21 1934   DISCHARGE DATE: 3/8/2021 12:33 PM  DISPOSITION: Home/Self Care Home/Self Care   Admission Orders listed below:  Admission Orders (From admission, onward)     Ordered        02/28/21 1934  Inpatient Admission  Once                   Please contact the UR Department if additional information is required to close this patient's authorization/case  [facility]  Utilization Review Department  Main: [phone] x carefully listen to the prompts  All voicemails are confidential   [email or web address]  Send all requests for admission clinical reviews, approved or denied determinations and any other requests to dedicated fax number below belonging to the campus where the patient is receiving treatment   List of dedicated fax numbers:  [address] DENIALS (Administrative/Medical Necessity) [phone]   [address] (Maternity/NICU/Pediatrics) [phone]   Felipe Needs [phone]   Miroslava Montelongo [phone]   Fran Noe [phone]   [facility] [address] [place] [address] [phone]   [facility]  [phone]   [address] [facility]  [address] [address] [address] [facility] [phone]

## 2021-03-11 NOTE — ASSESSMENT & PLAN NOTE
· Improved  Trending down  Initially AST ALT ratio 2;1 consistent with alcohol use on admission  Trending up after surgery but no elevated ALP or T bili  · Ultrasound liver showed-" Pancreatic tail partially obscured by overlying bowel gas  Cholelithiasis without sonographic evidence of cholecystitis  Unremarkable liver and biliary ducts " per radiology read    · S/p cholecystectomy 3/4

## 2021-03-11 NOTE — ASSESSMENT & PLAN NOTE
· Recurrent pancreatitis thought related to alcohol which he now declined using since last hospitalization in October 2020  Reported pattern of acute pancreatitis in February and October  · Severe epigastric pain radiated to the right shoulder and right midsternal for the last 4 days  · Lipase more than 12,000 and currently trending down  · GI consulted appreciate the following recommendations:   · Liver ultrasound- showed" Pancreatic tail partially obscured by overlying bowel gas  Cholelithiasis without sonographic evidence of cholecystitis  Unremarkable liver and biliary ducts "  · Continue Creon today for regular  · Continue Zofran   · Continue Percosets  · Continue to hold pre-hospital Vytorin  · Spoke to Dr Alvaro Carlisle, outpatient GI doctor and recommended consult to Surgery for cholecystectomy  · Spoke with Leslie Morales with General surgery     · Status post Cholecystectomy Thursday 3/4  · Upgrade to regular diet per surgery  · Mobilize patient with PT/OT  · Continue bowel regimen

## 2021-03-12 ENCOUNTER — TELEPHONE (OUTPATIENT)
Dept: VASCULAR SURGERY | Facility: CLINIC | Age: 71
End: 2021-03-12

## 2021-03-12 NOTE — TELEPHONE ENCOUNTER
Surgeon - Mariza Garcia (NPI: [id number])  Date of Surgery - 3/5/2020  Follow-up Window Closes - 12/06/2021  !!!! Patient must be scheduled for their 9-month office visit before the follow-up window close date !!!!    NAA/ CEA VQI Protocol  patients must be scheduled for the following    [] 3-month Doppler - DOS 06/09/2020    [x] 9-month Doppler Expected - DOS ASAP    [x] 9-month OV after Doppler - DOS ASAP          Scheduling Reminders  1  Insurance requires, 9-month doppler to be scheduled 2-days after the 3-month doppler  2  Appointment notes MUST be entered in the following format:  a  VQI FORMS NEEDED VQI LTFU (NAA or CEA) (Date of Surgery) CV Duplex (Date of Doppler) (Location of Doppler)  3  If unable to schedule, a recall MUST be entered  >>Please route this encounter to [employer] [place], [place] city, and [place]  <<

## 2021-03-17 ENCOUNTER — OFFICE VISIT (OUTPATIENT)
Dept: SURGERY | Facility: CLINIC | Age: 71
End: 2021-03-17

## 2021-03-17 VITALS
TEMPERATURE: 97.2 F | HEART RATE: 80 BPM | HEIGHT: 63 IN | BODY MASS INDEX: 23.39 KG/M2 | DIASTOLIC BLOOD PRESSURE: 72 MMHG | WEIGHT: 132 LBS | SYSTOLIC BLOOD PRESSURE: 118 MMHG

## 2021-03-17 DIAGNOSIS — K85.10 GALLSTONE PANCREATITIS: Primary | ICD-10-CM

## 2021-03-17 PROCEDURE — 99024 POSTOP FOLLOW-UP VISIT: CPT | Performed by: SPECIALIST

## 2021-03-17 NOTE — PROGRESS NOTES
Father presents today for his postop visit status post laparoscopic cholecystectomy with cholangiogram for recurrent pancreatitis  Father has had a history of idiopathic pancreatitis over the past few years  Initially was felt to be related to 2 carbon fragments  He radically decreased his alcohol intake and says he does not even take a drink of wine with dinner  He was hospitalized for few weeks last year with severe abdominal pain and recurrent pancreatitis  An imaging study CT scan MRI are both demonstrated a fluid collection in the pancreas consistent with pancreatic pseudocyst   It was felt that perhaps it could undergo endoscopic ultrasonic drainage  He was transferred to [facility] had underwent endoscopic ultrasound and no drainable cystic area was identified  He eventually improved was discharged home  He had lost a significant amount of weight during this time but eventually it began to eat and gain weight  He had no recurrent symptoms of pancreatitis  He was doing quite well then all the sudden he developed recurrent epigastric abdominal pain that persisted and got progressively worse  He presented to the emergency room at [facility] and his lipase was 12,000  Once again he had not had any alcohol or any precipitating agent  Consultations were obtained with GI and ultimately surgery  The patient was found to have gallstones and it was felt that this may be the etiology of his recurrent pancreatitis  GI recommended laparoscopic cholecystectomy at this appeared to be a reasonable approach  A lap choly with cholangiogram was performed  Gallbladder was taken out  Cholangiogram demonstrated what appeared to be a dilated common duct but no filling defects no obstruction good flow proximally and distally in the biliary radicles  He did well  His diet was advanced  His pancreatitis improved  As did his pain  He is here today for follow-up visit        Today in the office says he feels fine  He has no abdominal pain  He can eat anything  He takes Creon with each meal   His bowels move  He is about twice a day    Physical exam elderly slight male 5 ft 3 in 132 lb  Abdomen: Flat firm nontender  Laparoscopic incisions are healing well with no evidence of infection or hernia  Excellent cosmetic result  Impression:  Doing fine status post laparoscopic cholecystectomy with cholangiogram   Hopefully this will solve his problem and his pancreatitis will be a bad memory  Plan:  Discharge  Call with any problems  I will see him at [place]

## 2021-03-23 DIAGNOSIS — I65.23 CAROTID STENOSIS, BILATERAL: Primary | ICD-10-CM

## 2021-03-23 NOTE — TELEPHONE ENCOUNTER
Spoke with patient  Scheduled 9 month carotid doppler  Provided patient with call back number ([phone] option 3, should they need to reschedule  Surgeon - Elysia Queen (NPI: [id number])  Date of Surgery - 3/5/2020  All appointments must be scheduled by, 12/06/2021  !!!! Patient must be scheduled for their 9-month office visit before the follow-up window close date !!!!    NAA/ CEA VQI Protocol  patients must be scheduled for the following    [] 3-month Doppler - scheduled for 6/9/2020    [] 9-month Doppler Expected - scheduled for 3/30/2021    [] 9-month OV after Doppler - scheduled for 30/31/2021          Scheduling Reminders  1  Insurance requires, 9-month doppler to be scheduled 2-days after the 3-month doppler  2  Appointment notes MUST be entered in the following format:  a  VQI FORMS NEEDED VQI LTFU (NAA or CEA) (Date of Surgery) CV Duplex (Date of Doppler) (Location of Doppler)  3  If unable to schedule, a recall MUST be entered  >>Please route this encounter to [facility], and [facility]  <<

## 2021-03-30 ENCOUNTER — HOSPITAL ENCOUNTER (OUTPATIENT)
Dept: NON INVASIVE DIAGNOSTICS | Facility: HOSPITAL | Age: 71
Discharge: HOME | End: 2021-03-30
Attending: SURGERY
Payer: COMMERCIAL

## 2021-03-30 DIAGNOSIS — I65.23 CAROTID STENOSIS, BILATERAL: ICD-10-CM

## 2021-03-30 PROCEDURE — 93880 EXTRACRANIAL BILAT STUDY: CPT

## 2021-03-30 PROCEDURE — 93880 EXTRACRANIAL BILAT STUDY: CPT | Performed by: SURGERY

## 2021-03-31 ENCOUNTER — OFFICE VISIT (OUTPATIENT)
Dept: VASCULAR SURGERY | Facility: CLINIC | Age: 71
End: 2021-03-31
Payer: COMMERCIAL

## 2021-03-31 VITALS
BODY MASS INDEX: 23.74 KG/M2 | HEIGHT: 63 IN | HEART RATE: 75 BPM | TEMPERATURE: 98.5 F | DIASTOLIC BLOOD PRESSURE: 58 MMHG | RESPIRATION RATE: 18 BRPM | SYSTOLIC BLOOD PRESSURE: 112 MMHG | WEIGHT: 134 LBS

## 2021-03-31 DIAGNOSIS — F17.219 CIGARETTE NICOTINE DEPENDENCE WITH NICOTINE-INDUCED DISORDER: ICD-10-CM

## 2021-03-31 DIAGNOSIS — E78.2 MIXED HYPERLIPIDEMIA: ICD-10-CM

## 2021-03-31 DIAGNOSIS — I65.23 CAROTID ARTERY STENOSIS, ASYMPTOMATIC, BILATERAL: Primary | Chronic | ICD-10-CM

## 2021-03-31 DIAGNOSIS — I71.2 THORACIC AORTIC ANEURYSM WITHOUT RUPTURE (HCC): ICD-10-CM

## 2021-03-31 DIAGNOSIS — I65.22 LEFT CAROTID ARTERY STENOSIS: ICD-10-CM

## 2021-03-31 DIAGNOSIS — I10 ESSENTIAL HYPERTENSION, BENIGN: ICD-10-CM

## 2021-03-31 PROCEDURE — 99214 OFFICE O/P EST MOD 30 MIN: CPT | Performed by: PHYSICIAN ASSISTANT

## 2021-03-31 NOTE — PROGRESS NOTES
Assessment/Plan:    Carotid artery stenosis, asymptomatic, bilateral   19-year-old male smoker with HTN, HLD, hypothyroidism, recurrent pancreatitis s/p recent lap sabrina 3/4/21, 4 0 cm ascending aortic aneurysm and asymptomatic carotid artery stenosis, s/p L CEA by Dr De Leon [id number] presents for review of surveillance imaging and VQI LTFU  -carotid duplex 3/30/2021 reviewed with patent left endarterectomy site, velocity 39/6, ratio 0 32 and stable 50-69% contralateral disease, R ICA velocity 147/37, ratio 1 61   -asymptomatic  No prior history TIA/CVA   -continue surveillance with carotid duplex Q 6 months  -continue ASA and statin therapy  - discussed the pathophysiology of arterial occlusive disease and direct relationship with smoking  Continued to encourage smoking cessation   -return to office in 1 year with carotid duplex for [facility]  -instructed to contact the office with new concerns or symptoms  Educated in signs/symptoms of TIA/CVA and instructed to call 911 immediately  -VQI LTFU data completed    Essential hypertension, benign  -BP well controlled  -continue current medical regimen  -management per PCP    Mixed hyperlipidemia  -stable  -continue statin therapy  -management per PCP    Cigarette nicotine dependence with nicotine-induced disorder  -discussed the pathophysiology and relationship of smoking and arterial occlusive disease  -encouraged smoking cessation  - patient utilizing Nicorette gum and has cut his smoking in half but continues to smoke and recognizes his lack of desire to quit    Thoracic aortic aneurysm without rupture (HCC)   4 0 cm ascending aortic aneurysm  -Continue follow-up with primary service  -continue to maximize BP management per PCP       Diagnoses and all orders for this visit:    Carotid artery stenosis, asymptomatic, bilateral  -     VAS carotid complete study; Future  -     VAS carotid complete study;  Future    Essential hypertension, benign    Left carotid artery stenosis s/p cartoid endarterectomy    Mixed hyperlipidemia    Cigarette nicotine dependence with nicotine-induced disorder    Thoracic aortic aneurysm without rupture (HCC)          Subjective:      Patient ID: Ashleigh Potter is a 78 y o  male  Patient presents in office to review the results of his AOIL w/ ELVIS done on 3/29/2021  Patient denies any side, back or abdominal pain  Patient denies any change in appetite or discomfort after eating  Patient denies any leg pain with walking or resting  Patient is a current everyday smoker x 1/2 pack per day  Patient is taking aspirin and atorvastatin  62-year-old male smoker with HTN, HLD, hypothyroidism, recurrent pancreatitis s/p recent lap sabrina 3/4/21, 4 0 cm ascending aortic aneurysm and asymptomatic carotid artery stenosis, s/p L CEA by Dr Susana Cortes 3/5/2020 presents for review of surveillance imaging and V LT  Carotid duplex 3/5/2021 reviewed with patent left endarterectomy site, velocity 39/6, ratio 0 32 and stable 50-69% contralateral disease, R ICA velocity 147/37, ratio 1 61  Patient asymptomatic and denies aphasia, dysarthria, ataxia, lateralizing extremity weakness or amaurosis fugax  No prior history TIA/CVA  Patient fully functional with no limitations, modified [place] 0  Patient remains on ASA 81 mg daily and simvastatin (Vytorin)  The following portions of the patient's history were reviewed and updated as appropriate: allergies, current medications, past family history, past medical history, past social history, past surgical history and problem list     Review of Systems   Constitutional: Negative  Negative for chills and fever  HENT: Negative  Eyes: Negative  Respiratory: Negative  Negative for cough, chest tightness and shortness of breath  Cardiovascular: Negative  Negative for leg swelling  Gastrointestinal: Negative  Negative for diarrhea, nausea and vomiting  Endocrine: Negative  Genitourinary: Negative  Musculoskeletal: Negative  Negative for gait problem  Skin: Negative  Negative for wound  Allergic/Immunologic: Negative  Neurological: Positive for numbness (nueropathy in R foot)  Negative for dizziness, speech difficulty, weakness and headaches  Hematological: Negative  Does not bruise/bleed easily  Psychiatric/Behavioral: Negative  Negative for self-injury and suicidal ideas  I have reviewed and made appropriate changes to the review of systems input by the medical assistant      Vitals:    03/31/21 1359   BP: 112/58   BP Location: Right arm   Patient Position: Sitting   Cuff Size: Adult   Pulse: 75   Resp: 18   Temp: 98 5 °F (36 9 °C)   TempSrc: Tympanic   Weight: 60 8 kg (134 lb)   Height: 5' 3" (1 6 m)       Patient Active Problem List   Diagnosis    Essential hypertension, benign    Left carotid artery stenosis s/p cartoid endarterectomy    Thoracic aortic aneurysm without rupture (HCC)    Ectopic atrial rhythm    Prediabetes    Hypothyroidism (acquired)    Mixed hyperlipidemia    Drug-induced acute pancreatitis without infection or necrosis    Cigarette nicotine dependence with nicotine-induced disorder    Jejunitis    Radiculopathy, lumbar region    Spinal stenosis of lumbar region without neurogenic claudication    Right sided sciatica    Chronic pancreatitis ([address] )    Carotid artery stenosis, asymptomatic, bilateral    Severe protein-calorie malnutrition (HCC)    Pancreatic lesion    Hiatal hernia    Idiopathic acute pancreatitis without infection or necrosis    Transaminitis       Past Surgical History:   Procedure Laterality Date    BACK SURGERY      CARDIAC CATHETERIZATION      cardiac cath 5/2010  adjusted meds    CATARACT EXTRACTION Bilateral     CERVICAL FUSION      CHOLANGIOGRAM N/A 3/4/2021    Procedure: CHOLANGIOGRAM;  Surgeon: Madison Mtz MD;  Location: [address] [place] [address];  Service: General    CHOLECYSTECTOMY LAPAROSCOPIC N/A 3/4/2021    Procedure: CHOLECYSTECTOMY LAPAROSCOPIC;  Surgeon: Stefan Brooks MD;  Location:  MAIN OR;  Service: General    COLONOSCOPY      JOINT REPLACEMENT Left     hip    LUMBAR FUSION      NECK SURGERY      ORTHOPEDIC SURGERY Left     L THR    IL THROMBOENDARTECTMY NECK,NECK INCIS Left 3/5/2020    Procedure: ENDARTERECTOMY ARTERY CAROTID WITH BOVINE PATCH ANGIOPLASTY AND INTRAOP DUPLEX;  Surgeon: Nivia Hendricks MD;  Location: [facility];  Service: Vascular    SPINAL FUSION         Family History   Problem Relation Age of Onset    Heart disease Mother     Parkinsonism Mother     Heart disease Father        Social History     Socioeconomic History    Marital status: Single     Spouse name: Not on file    Number of children: 0    Years of education: Not on file    Highest education level: Not on file   Occupational History    Occupation: [employer] Needs    Financial resource strain: Not on file    Food insecurity     Worry: Not on file     Inability: Not on file   [place] needs     Medical: Not on file     Non-medical: Not on file   Tobacco Use    Smoking status: Current Every Day Smoker     Packs/day: 0 50     Years: 50 00     Pack years: 25 00     Types: Cigarettes    Smokeless tobacco: Never Used    Tobacco comment: 3-4 cigarrettes   Substance and Sexual Activity    Alcohol use: Not Currently     Binge frequency: Never     Comment: 3 oz of wine with communion multiple times/day/week    Drug use: Never    Sexual activity: Not Currently     Comment: DAVIDSON   Lifestyle    Physical activity     Days per week: Not on file     Minutes per session: Not on file    Stress: Not on file   Relationships    Social connections     Talks on phone: Not on file     Gets together: Not on file     Attends [employer] service: Not on file     Active member of club or organization: Not on file     Attends meetings of clubs or organizations: Not on file     Relationship status: Not on file    Intimate partner violence     Fear of current or ex partner: Not on file     Emotionally abused: Not on file     Physically abused: Not on file     Forced sexual activity: Not on file   Other Topics Concern    Not on file   Social History Narrative    Lives independently       No Known Allergies      Current Outpatient Medications:     amLODIPine (NORVASC) 10 mg tablet, Take 1 tablet (10 mg total) by mouth daily, Disp: 90 tablet, Rfl: 3    ezetimibe-simvastatin (VYTORIN) 10-40 mg per tablet, Take 1 tablet by mouth daily at bedtime, Disp: 90 tablet, Rfl: 1    levothyroxine 25 mcg tablet, Take 0 5 tablets (12 5 mcg total) by mouth daily, Disp: 45 tablet, Rfl: 3    losartan (COZAAR) 50 mg tablet, TAKE 1 TABLET BY MOUTH EVERY DAY, Disp: 30 tablet, Rfl: 0    magnesium oxide (MAG-OX) 400 mg, Take 1 tablet (400 mg total) by mouth 2 (two) times a day, Disp: 180 tablet, Rfl: 1    metoprolol succinate (TOPROL-XL) 50 mg 24 hr tablet, Take 0 5 tablets (25 mg total) by mouth daily, Disp: 90 tablet, Rfl: 1    nicotine polacrilex (NICORETTE) 4 mg gum, Chew 4 mg as needed for smoking cessation, Disp: , Rfl:     omeprazole (PriLOSEC) 40 MG capsule, Take 1 capsule (40 mg total) by mouth daily Please stop 20 mg, Disp: 90 capsule, Rfl: 3    pancrelipase, Lip-Prot-Amyl, (CREON) 12,000 units capsule, Take 12,000 units of lipase by mouth 3 (three) times a day with meals, Disp: 90 capsule, Rfl: 5    spironolactone (ALDACTONE) 25 mg tablet, Take 1 tablet (25 mg total) by mouth daily, Disp: 90 tablet, Rfl: 3    zolpidem (AMBIEN) 10 mg tablet, Take by mouth daily at bedtime as needed , Disp: , Rfl:     thiamine 100 MG tablet, Take 1 tablet (100 mg total) by mouth daily (Patient not taking: Reported on 3/31/2021), Disp: 90 tablet, Rfl: 3    Objective:   carotid duplex 3/30/2021:   Imaging study reviewed and as described above  See full report below:   Impression  RIGHT:  There is 50-69% stenosis noted in the internal carotid artery   Plaque is  homogenous/calcific and irregular  Moderate to severe stenosis noted in the proximal external carotid artery  Vertebral artery flow is antegrade  There is no significant subclavian artery  disease  LEFT:  Widely patent internal carotid artery and endarterectomy site  Vertebral artery flow is antegrade  There is no significant subclavian artery  disease  Compared to previous study on 6/9/20, there is no significant interval change  in the disease process  /58 (BP Location: Right arm, Patient Position: Sitting, Cuff Size: Adult)   Pulse 75   Temp 98 5 °F (36 9 °C) (Tympanic)   Resp 18   Ht 5' 3" (1 6 m)   Wt 60 8 kg (134 lb)   BMI 23 74 kg/m²          Physical Exam  Vitals signs and nursing note reviewed  Constitutional:       General: He is not in acute distress  Appearance: He is well-developed  HENT:      Head: Normocephalic and atraumatic  Eyes:      General: No scleral icterus  Conjunctiva/sclera: Conjunctivae normal       Pupils: Pupils are equal, round, and reactive to light  Neck:      Musculoskeletal: Normal range of motion and neck supple  Thyroid: No thyromegaly  Vascular: Carotid bruit (Right) present  No JVD  Trachea: No tracheal deviation  Comments: Well-healed left neck surgical scar  Cardiovascular:      Rate and Rhythm: Normal rate and regular rhythm  Pulses:           Carotid pulses are 2+ on the right side with bruit and 2+ on the left side  Radial pulses are 2+ on the right side and 2+ on the left side  Heart sounds: S1 normal and S2 normal  No murmur  No friction rub  No gallop  No S3 sounds  Pulmonary:      Effort: No respiratory distress  Breath sounds: Normal breath sounds  No stridor  No wheezing, rhonchi or rales  Abdominal:      General: Bowel sounds are normal  There is no distension or abdominal bruit  Palpations: Abdomen is soft  There is no mass or pulsatile mass  Tenderness:  There is no abdominal tenderness  There is no rebound  Musculoskeletal: Normal range of motion  General: No deformity  Skin:     General: Skin is warm and dry  Coloration: Skin is not pale  Findings: No erythema or lesion  Neurological:      General: No focal deficit present  Mental Status: He is alert and oriented to person, place, and time  Cranial Nerves: No dysarthria  Sensory: Sensation is intact  No sensory deficit  Motor: Motor function is intact  No weakness  Comments:  Tongue midline  Smile symmetrical   Muscle strength 5/5 bilaterally in the upper and lower extremities and equal bilaterally  Sensation intact and equal bilaterally   Psychiatric:         Mood and Affect: Mood normal          Thought Content: Thought content normal          VQI Carotid Endarterectomy Follow-Up  * No surgery found * Procedure: Carotid Endarterectomy     General Information  Date of contact: 3/31/2021    Contact by: face to face   Current smoking: Social History     Tobacco Use   Smoking Status Current Every Day Smoker    Packs/day: 0 50    Years: 50 00    Pack years: 25 00    Types: Cigarettes   Smokeless Tobacco Never Used   Tobacco Comment    3-4 cigarrettes      Current living status: home   Dialysis: no     Current Medications  Is patient compliant with medications? yes     Neurologic Event since DC: None       Modified [place] Score: 0       Myocardial Infarction since DC:  No       Cranial Nerve Injury: None       Duplex CEA Site: Yes, If Duplex CEA Site is Yes:  Results for orders placed during the hospital encounter of 03/30/21   VAS CAROTID COMPLETE STUDY (Final) 3/30/2021  7:35 PM     Signed by: Rosi Terry MD      PSV: 39 cm/sec   EDV: 6   ICA/CCA Ratio: 0 32   Duplex Stenosis: <=50%   CEA Site Re-operation: No   CEA Site PCI: No

## 2021-03-31 NOTE — ASSESSMENT & PLAN NOTE
4 0 cm ascending aortic aneurysm  -Continue follow-up with primary service  -continue to maximize BP management per PCP

## 2021-03-31 NOTE — PATIENT INSTRUCTIONS
Carotid Artery Disease   AMBULATORY CARE:   Carotid artery disease  is a condition that causes narrow or blocked carotid arteries  Your carotid arteries are the blood vessels that supply your brain with most of the blood it needs to work  You have 2 carotid arteries, one on each side of your neck  Call 911 for any of the following:   · You have any of the following signs of a stroke:      ¨ Numbness or drooping on one side of your face     ¨ Weakness in an arm or leg    ¨ Confusion or difficulty speaking    ¨ Dizziness, a severe headache, or vision loss    · You have any of the following signs of a heart attack:      ¨ Squeezing, pressure, or pain in your chest that lasts longer than 5 minutes or returns    ¨ Discomfort or pain in your back, neck, jaw, stomach, or arm     ¨ Trouble breathing    ¨ Nausea or vomiting    ¨ Lightheadedness or a sudden cold sweat, especially with chest pain or trouble breathing  Contact your healthcare provider if:   · You have questions or concerns about your condition or care  Signs and symptoms of carotid artery disease: You may have no signs or symptoms  Most commonly, carotid artery disease causes transient ischemic attacks (TIAs), or mini-strokes  You may have numbness, weakness, lack of movement, or vision or speech problems  A TIA goes away quickly and does not cause permanent damage  A TIA may be a warning sign that you are about to have a stroke  If you have any symptoms of a TIA or stroke, seek care immediately  Warning signs of a stroke: The word F A S T  can help you remember and recognize warning signs of a stroke  · F = Face:  One side of the face droops  · A = Arms:  One arm starts to drop when both arms are raised  · S = Speech:  Speech is slurred or sounds different than usual     · T = Time:  A person who is having a stroke needs to be seen immediately  A stroke is a medical emergency that needs immediate treatment   Some medicines and treatments work best if given within a few hours of a stroke  Treatment  for carotid artery disease depends on how narrow your arteries have become, your symptoms, and your general health  The goal of treatment is to lower your risk for a stroke  You may need any of the following:  · Take aspirin if directed  Your healthcare provider may suggest that you take an aspirin a day to prevent blood clots from forming in the carotid arteries  If your healthcare provider wants you to take aspirin daily, do not take acetaminophen or ibuprofen instead  · Control risk factors  High blood pressure, high cholesterol, heart disease, diabetes, and being overweight increase your risk for atherosclerosis  · Procedures can help open blocked arteries  A carotid endarterectomy is used to cut plaque out of the artery  An angioplasty is used to push the plaque against the artery wall with a balloon device  Sometimes a stent is placed during an angioplasty  A stent is a metal mesh tube that is placed in the artery to keep it open  Manage carotid artery disease:   · Eat a variety of healthy foods  Healthy foods include fruit, vegetables, whole-grain breads, low-fat dairy products, lean meat, and fish  Choose fish that are high in omega-3 fatty acids, such as salmon and fresh tuna  Ask your healthcare provider for more information on a heart healthy diet and the DASH eating plan  · Limit sodium (salt)  Sodium may increase your blood pressure  Add less table salt to your foods  Read food labels and choose foods that are low in sodium  Your healthcare provider may suggest you follow a low sodium diet  · Reach or maintain a healthy weight  Extra weight makes your heart work harder  Ask your healthcare provider how much you should weight  He can help you create a safe weight loss plan  Even a weight loss of 10% of your body weight can help your heart function better  · Exercise as directed    Exercise helps improve heart function and can help you manage your weight  Exercise can also help lower your cholesterol and blood sugar levels  Try to get at least 30 minutes of exercise at least 5 times each week  Try to be active every day  Your healthcare provider can help you create an exercise plan that works best for you  · Limit alcohol  Alcohol can increase your blood pressure and triglyceride levels  Men should limit alcohol to 2 drinks per day  Women should limit alcohol to 1 drink per day  A drink of alcohol is 12 ounces of beer, 5 ounces of wine, or 1½ ounces of liquor  · Do not smoke  Nicotine and other chemicals in cigarettes and cigars can cause heart and lung damage  Ask your healthcare provider for information if you currently smoke and need help to quit  E-cigarettes or smokeless tobacco still contain nicotine  Talk to your healthcare provider before you use these products  Follow up with your healthcare provider as directed:  Write down your questions so you remember to ask them during your visits  © 2017 [address] [employer] Information is for End User's use only and may not be sold, redistributed or otherwise used for commercial purposes  All illustrations and images included in CareNotes® are the copyrighted property of [employer]  or Shailesh Casanova  The above information is an  only  It is not intended as medical advice for individual conditions or treatments  Talk to your doctor, nurse or pharmacist before following any medical regimen to see if it is safe and effective for you  - recent carotid ultrasound demonstrates widely patent left carotid operative site and stable 50-69% right carotid stenosis    we will continue surveillance with carotid ultrasound every 6 months   -return to office in 1 year with carotid ultrasound For routine follow-up  -continue aspirin and statin therapy  -please contact the office in the interim with any questions, concerns or new symptoms  -call 110 immediately for signs or symptoms of TIA/CVA

## 2021-03-31 NOTE — ASSESSMENT & PLAN NOTE
70-year-old male smoker with HTN, HLD, hypothyroidism, recurrent pancreatitis s/p recent lap sabrina 3/4/21, 4 0 cm ascending aortic aneurysm and asymptomatic carotid artery stenosis, s/p L CEA by Dr Sandoval Clamp [id number] presents for review of surveillance imaging and VQI LTFU  -carotid duplex 3/30/2021 reviewed with patent left endarterectomy site, velocity 39/6, ratio 0 32 and stable 50-69% contralateral disease, R ICA velocity 147/37, ratio 1 61   -asymptomatic  No prior history TIA/CVA   -continue surveillance with carotid duplex Q 6 months  -continue ASA and statin therapy  - discussed the pathophysiology of arterial occlusive disease and direct relationship with smoking  Continued to encourage smoking cessation   -return to office in 1 year with carotid duplex for [facility]  -instructed to contact the office with new concerns or symptoms    Educated in signs/symptoms of TIA/CVA and instructed to call 911 immediately  -VQI LTFU data completed

## 2021-03-31 NOTE — ASSESSMENT & PLAN NOTE
-discussed the pathophysiology and relationship of smoking and arterial occlusive disease  -encouraged smoking cessation  - patient utilizing Nicorette gum and has cut his smoking in half but continues to smoke and recognizes his lack of desire to quit

## 2021-04-01 ENCOUNTER — HOSPITAL ENCOUNTER (OUTPATIENT)
Dept: CT IMAGING | Facility: HOSPITAL | Age: 71
Discharge: HOME | End: 2021-04-01
Attending: INTERNAL MEDICINE
Payer: COMMERCIAL

## 2021-04-01 DIAGNOSIS — K85.00 IDIOPATHIC ACUTE PANCREATITIS WITHOUT INFECTION OR NECROSIS: ICD-10-CM

## 2021-04-01 PROCEDURE — 74177 CT ABD & PELVIS W/CONTRAST: CPT

## 2021-04-01 PROCEDURE — G1004 CDSM NDSC: HCPCS

## 2021-04-01 RX ADMIN — IOHEXOL 100 ML: 350 INJECTION, SOLUTION INTRAVENOUS at 16:26

## 2021-04-02 NOTE — NURSING NOTE
Attempted to contact patient to follow up after extravasation of contrast into right antecubital space at [address] CT scan on April 1st  Message left  Radiology RN's name and call back number provided in message

## 2021-04-02 NOTE — NURSING NOTE
Received a call back from patient  Patient states the site where the contrast extravasated (right antecubital space) "looks fine " Patient denies pain, redness or other changes in skin color, swelling, blistering, hardness, changes in sensation or increase/decrease temperature of skin  Patient encouraged to call back if he has any questions, concerns or symptoms worsen

## 2021-04-08 ENCOUNTER — OFFICE VISIT (OUTPATIENT)
Dept: FAMILY MEDICINE CLINIC | Facility: CLINIC | Age: 71
End: 2021-04-08
Payer: COMMERCIAL

## 2021-04-08 VITALS
RESPIRATION RATE: 14 BRPM | TEMPERATURE: 96.6 F | SYSTOLIC BLOOD PRESSURE: 124 MMHG | WEIGHT: 133 LBS | BODY MASS INDEX: 23.57 KG/M2 | DIASTOLIC BLOOD PRESSURE: 80 MMHG | HEART RATE: 78 BPM | OXYGEN SATURATION: 97 % | HEIGHT: 63 IN

## 2021-04-08 DIAGNOSIS — Z72.0 TOBACCO ABUSE: ICD-10-CM

## 2021-04-08 DIAGNOSIS — I71.2 THORACIC AORTIC ANEURYSM WITHOUT RUPTURE (HCC): ICD-10-CM

## 2021-04-08 DIAGNOSIS — E03.9 HYPOTHYROIDISM (ACQUIRED): ICD-10-CM

## 2021-04-08 DIAGNOSIS — E78.2 MIXED HYPERLIPIDEMIA: ICD-10-CM

## 2021-04-08 DIAGNOSIS — Z90.49 S/P CHOLECYSTECTOMY: ICD-10-CM

## 2021-04-08 DIAGNOSIS — K21.9 GASTROESOPHAGEAL REFLUX DISEASE WITHOUT ESOPHAGITIS: ICD-10-CM

## 2021-04-08 DIAGNOSIS — E83.42 HYPOMAGNESEMIA: ICD-10-CM

## 2021-04-08 DIAGNOSIS — I65.23 CAROTID STENOSIS, ASYMPTOMATIC, BILATERAL: Chronic | ICD-10-CM

## 2021-04-08 DIAGNOSIS — I10 ESSENTIAL HYPERTENSION, BENIGN: ICD-10-CM

## 2021-04-08 DIAGNOSIS — I49.1 ECTOPIC ATRIAL RHYTHM: ICD-10-CM

## 2021-04-08 DIAGNOSIS — I10 ESSENTIAL HYPERTENSION: Primary | ICD-10-CM

## 2021-04-08 DIAGNOSIS — Z12.11 SCREEN FOR COLON CANCER: ICD-10-CM

## 2021-04-08 PROCEDURE — 99214 OFFICE O/P EST MOD 30 MIN: CPT | Performed by: INTERNAL MEDICINE

## 2021-04-08 RX ORDER — LEVOTHYROXINE SODIUM 0.03 MG/1
12.5 TABLET ORAL DAILY
Qty: 45 TABLET | Refills: 3 | Status: SHIPPED | OUTPATIENT
Start: 2021-04-08 | End: 2022-01-04 | Stop reason: SDUPTHER

## 2021-04-08 RX ORDER — EZETIMIBE AND SIMVASTATIN 10; 40 MG/1; MG/1
1 TABLET ORAL
Qty: 90 TABLET | Refills: 1 | Status: SHIPPED | OUTPATIENT
Start: 2021-04-08 | End: 2021-07-13

## 2021-04-08 RX ORDER — OMEPRAZOLE 40 MG/1
40 CAPSULE, DELAYED RELEASE ORAL DAILY
Qty: 90 CAPSULE | Refills: 3 | Status: SHIPPED | OUTPATIENT
Start: 2021-04-08 | End: 2021-07-13

## 2021-04-08 RX ORDER — AMLODIPINE BESYLATE 10 MG/1
10 TABLET ORAL DAILY
Qty: 90 TABLET | Refills: 3 | Status: SHIPPED | OUTPATIENT
Start: 2021-04-08 | End: 2022-01-04 | Stop reason: SDUPTHER

## 2021-04-08 RX ORDER — SPIRONOLACTONE 25 MG/1
12.5 TABLET ORAL DAILY
Qty: 90 TABLET | Refills: 3 | Status: SHIPPED | OUTPATIENT
Start: 2021-04-08 | End: 2022-04-12

## 2021-04-08 RX ORDER — LOSARTAN POTASSIUM 50 MG/1
50 TABLET ORAL DAILY
Qty: 90 TABLET | Refills: 3 | Status: SHIPPED | OUTPATIENT
Start: 2021-04-08 | End: 2022-04-12

## 2021-04-08 RX ORDER — METOPROLOL SUCCINATE 50 MG/1
25 TABLET, EXTENDED RELEASE ORAL DAILY
Qty: 90 TABLET | Refills: 1 | Status: SHIPPED | OUTPATIENT
Start: 2021-04-08 | End: 2021-04-15 | Stop reason: SDUPTHER

## 2021-04-08 NOTE — PROGRESS NOTES
Assessment/Plan:         Diagnoses and all orders for this visit:    Essential hypertension; continue :  -     metoprolol succinate (TOPROL-XL) 50 mg 24 hr tablet; Take 0 5 tablets (25 mg total) by mouth daily  RTC in 3mos w :  -     Comprehensive metabolic panel; Future  -     Lipase; Future  -     Magnesium; Future  -     CBC and differential; Future  -     Lipid panel; Future  -     UA (URINE) with reflex to Scope; Future    Screen for colon cancer  -     Cologuard; Future    Carotid stenosis, asymptomatic, bilateral; continue same  Yearly US Carotids  [facility] :  -     ezetimibe-simvastatin (VYTORIN) 10-40 mg per tablet; Take 1 tablet by mouth daily at bedtime    Thoracic aortic aneurysm without rupture ([facility] [address] ); continue same  Yearly CTA Chest  [facility] :  -     ezetimibe-simvastatin (VYTORIN) 10-40 mg per tablet; Take 1 tablet by mouth daily at bedtime    Ectopic atrial rhythm  -     ezetimibe-simvastatin (VYTORIN) 10-40 mg per tablet; Take 1 tablet by mouth daily at bedtime    Tobacco abuse  -     ezetimibe-simvastatin (VYTORIN) 10-40 mg per tablet; Take 1 tablet by mouth daily at bedtime  -     Lipid panel; Future    Mixed hyperlipidemia  -     ezetimibe-simvastatin (VYTORIN) 10-40 mg per tablet; Take 1 tablet by mouth daily at bedtime    Hypothyroidism (acquired)  -     levothyroxine 25 mcg tablet; Take 0 5 tablets (12 5 mcg total) by mouth daily  -     TSH, 3rd generation; Future  -     T4, free; Future    [facility]ew :  -     losartan (COZAAR) 50 mg tablet; Take 1 tablet (50 mg total) by mouth daily  -     amLODIPine (NORVASC) 10 mg tablet; Take 1 tablet (10 mg total) by mouth daily  -     spironolactone (ALDACTONE) 25 mg tablet; Take 0 5 tablets (12 5 mg total) by mouth daily    Hypomagnesemia  -     magnesium oxide (MAG-OX) 400 mg; Take 1 tablet (400 mg total) by mouth daily  -     Magnesium; Future    Gastroesophageal reflux disease without esophagitis  -     omeprazole (PriLOSEC) 40 MG capsule;  Take 1 capsule (40 mg total) by mouth daily Please stop 20 mg  -     Comprehensive metabolic panel; Future  -     Lipase; Future  -     Magnesium; Future  -     CBC and differential; Future  -     Lipid panel; Future  -     UA (URINE) with reflex to Scope; Future    S/P cholecystectomy; Pt feels Better        Subjective:      Patient ID: Rodney Mcfarlane is a 78 y o  male  78 y o man is here for regular check up, recent Blood work and med list Reviewed w pt, He feels ok, He still smokes, No ETOH since last year,  The following portions of the patient's history were reviewed and updated as appropriate: allergies, current medications, past medical history, past social history, past surgical history and problem list     Review of Systems   Constitutional: Negative for chills, fatigue and fever  HENT: Negative for congestion, facial swelling, sore throat, trouble swallowing and voice change  Eyes: Negative for pain, discharge and visual disturbance  Respiratory: Negative for cough, shortness of breath and wheezing  Cardiovascular: Negative for chest pain, palpitations and leg swelling  Gastrointestinal: Negative for abdominal pain, blood in stool, constipation, diarrhea and nausea  Endocrine: Negative for polydipsia, polyphagia and polyuria  Genitourinary: Negative for difficulty urinating, hematuria and urgency  Musculoskeletal: Negative for arthralgias and myalgias  Skin: Negative for rash  Neurological: Negative for dizziness, tremors, weakness and headaches  Hematological: Negative for adenopathy  Does not bruise/bleed easily  Psychiatric/Behavioral: Negative for dysphoric mood, sleep disturbance and suicidal ideas           Objective:      /80 (BP Location: Left arm, Patient Position: Sitting, Cuff Size: Standard)   Pulse 78   Temp (!) 96 6 °F (35 9 °C) (Tympanic)   Resp 14   Ht 5' 3" (1 6 m)   Wt 60 3 kg (133 lb)   SpO2 97%   BMI 23 56 kg/m²          Physical Exam  Constitutional: General: He is not in acute distress  HENT:      Head: Normocephalic  Right Ear: There is no impacted cerumen  Left Ear: There is no impacted cerumen  Mouth/Throat:      Pharynx: No oropharyngeal exudate  Eyes:      General: No scleral icterus  Conjunctiva/sclera: Conjunctivae normal       Pupils: Pupils are equal, round, and reactive to light  Neck:      Musculoskeletal: Neck supple  Thyroid: No thyromegaly  Cardiovascular:      Rate and Rhythm: Normal rate and regular rhythm  Heart sounds: Normal heart sounds  No murmur  Pulmonary:      Effort: Pulmonary effort is normal  No respiratory distress  Breath sounds: Normal breath sounds  No wheezing or rales  Abdominal:      General: Bowel sounds are normal  There is no distension  Palpations: Abdomen is soft  Tenderness: There is no abdominal tenderness  There is no guarding or rebound  Musculoskeletal:         General: No tenderness  Lymphadenopathy:      Cervical: No cervical adenopathy  Skin:     Coloration: Skin is not pale  Findings: No rash  Neurological:      Mental Status: He is alert and oriented to person, place, and time  Sensory: No sensory deficit  Motor: No weakness

## 2021-04-13 ENCOUNTER — APPOINTMENT (OUTPATIENT)
Dept: LAB | Facility: HOSPITAL | Age: 71
End: 2021-04-13
Payer: COMMERCIAL

## 2021-04-13 DIAGNOSIS — R73.01 IMPAIRED FASTING GLUCOSE: ICD-10-CM

## 2021-04-13 DIAGNOSIS — R73.01 IMPAIRED FASTING GLUCOSE: Primary | ICD-10-CM

## 2021-04-13 PROCEDURE — 83036 HEMOGLOBIN GLYCOSYLATED A1C: CPT

## 2021-04-13 PROCEDURE — 36415 COLL VENOUS BLD VENIPUNCTURE: CPT

## 2021-04-14 LAB
EST. AVERAGE GLUCOSE BLD GHB EST-MCNC: 123 MG/DL
HBA1C MFR BLD: 5.9 %

## 2021-04-15 DIAGNOSIS — I10 ESSENTIAL HYPERTENSION: ICD-10-CM

## 2021-04-15 RX ORDER — METOPROLOL SUCCINATE 50 MG/1
25 TABLET, EXTENDED RELEASE ORAL DAILY
Qty: 90 TABLET | Refills: 0 | Status: SHIPPED | OUTPATIENT
Start: 2021-04-15 | End: 2021-08-03 | Stop reason: SDUPTHER

## 2021-04-20 ENCOUNTER — TELEPHONE (OUTPATIENT)
Dept: GASTROENTEROLOGY | Facility: MEDICAL CENTER | Age: 71
End: 2021-04-20

## 2021-04-20 NOTE — TELEPHONE ENCOUNTER
----- Message from Jayda Andino MD sent at 4/16/2021 12:59 PM EDT -----  Please call patient :    CT scan showed internal resolution of the inflammation seen in the pancreas  Follow up in the office  Please make an appointment with him for follow-up with me in the office in the next month    Thank you

## 2021-04-21 NOTE — ASSESSMENT & PLAN NOTE
· Resume Synthroid Patient Weight (Optional But Required For Cumulative Dose-Numbers And Decimals Only): 147

## 2021-05-28 ENCOUNTER — OFFICE VISIT (OUTPATIENT)
Dept: CARDIOLOGY CLINIC | Facility: CLINIC | Age: 71
End: 2021-05-28
Payer: COMMERCIAL

## 2021-05-28 VITALS
WEIGHT: 132 LBS | BODY MASS INDEX: 23.39 KG/M2 | HEIGHT: 63 IN | DIASTOLIC BLOOD PRESSURE: 56 MMHG | SYSTOLIC BLOOD PRESSURE: 117 MMHG | HEART RATE: 87 BPM

## 2021-05-28 DIAGNOSIS — I65.22 LEFT CAROTID ARTERY STENOSIS: ICD-10-CM

## 2021-05-28 DIAGNOSIS — I49.1 ECTOPIC ATRIAL RHYTHM: ICD-10-CM

## 2021-05-28 DIAGNOSIS — I10 ESSENTIAL HYPERTENSION, BENIGN: ICD-10-CM

## 2021-05-28 DIAGNOSIS — E78.2 MIXED HYPERLIPIDEMIA: Primary | ICD-10-CM

## 2021-05-28 DIAGNOSIS — I71.2 THORACIC AORTIC ANEURYSM WITHOUT RUPTURE (HCC): ICD-10-CM

## 2021-05-28 PROCEDURE — 99214 OFFICE O/P EST MOD 30 MIN: CPT | Performed by: INTERNAL MEDICINE

## 2021-05-28 NOTE — PATIENT INSTRUCTIONS
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 is overall doing well from cardiac perspective with no recent signs and symptoms that suggest decompensated heart failure or symptomatic arrhythmias or angina  His blood pressure is well controlled  He is on good medical regimen that includes losartan, amlodipine, metoprolol succinate and Aldactone  He has mild stable ectasia of the ascending thoracic aorta measuring up to 4 0 cm      -- at this time we will continue current medical therapy  -- I again strongly encouraged him to quit smoking completely specially because he has aortic aneurysm  -- he will continue to follow-up with his primary care physician on regular basis and have follow-up blood work from time to time  A CT scan can be considered sometime in 2022 to reassess his aortic aneurysm  -- is advised to avoid lifting objects greater than 50 lb  HEALTHY LIFESTYLE RECOMMENDATIONS         Lifestyle recommendations  SMOKING CESSATION-- Use pharmacological and behavioural strategies to help patients quit smoking  Avoid passive smoking  HEALTHY DIET-- Diet high in vegetables, fruit, and wholegrains  Limit saturated fat to <10% of total intake  Limit alcohol to <100 g/week or 15 g/day  PHYSICAL ACTIVITY-- 30 - 60 min moderate physical activity most days, but even irregular activity is beneficial   HEALTHY WEIGHT-- Obtain and maintain a healthy weight (<25 kg/m2), or reduce weight through recommended energy intake and increased  physical activity  OTHER-- Take medications as prescribed       Healthy diet characteristics  Choose a dietary pattern that emphasizes intake of vegetables, fruit, whole grains, low-fat dairy products, poultry, fish, legumes, nontropical vegetable oils, and nuts and limits the intake of sugar-sweetened beverages and red meats:     1  Increase consumption of fruits and vegetables (>_200 g each per day)  2  35-45 g of fibre per day, preferably from wholegrains  3  Moderate consumption of nuts (30 g per day, unsalted)  4  Consume fish at least twice a week, one of which should be an oily fish   5  Limited lean meat, low-fat dairy products, and liquid vegetable oils  6  Saturated FAs should account for less than 5-10% of total energy intake; replace with polyunsaturated fats  7  As little intake of trans unsaturated fats as possible, preferably no intake from processed food, and <1% of total energy intake  <_56 g of salt per day  8  If alcohol is consumed, limiting intake to <_100 g/week or <15 g/day is recommended  9  Avoid energy-dense foods such as sugar-sweetened soft drinks  Recommendation for Physical Activity   All adults should avoid inactivity:    Some physical activity is better than none, and any amount of physical activity results in some health benefits  For substantial health benefits:    150 min per week of moderate-intensity aerobic activity, or    75 min per week of vigorous-intensity aerobic physical activity, or    an equivalent combination of moderate- and vigorous-intensity aerobic activity   Activity episodes should be at least 10 min in duration and spread throughout the week  Combine with moderate- or high-intensity muscle-strengthening activities that involve all major muscle groups on 2 or more days per week

## 2021-05-28 NOTE — PROGRESS NOTES
[facility]  [id number] [facility], [place] [facility]  Phone#  [phone]  Fax#  [phone]  *-*-*-*-*-*-*-*-*-*-*-*-*-*-*-*-*-*-*-*-*-*-*-*-*-*-*-*-*-*-*-*-*-*-*-*-*-*-*-*-*-*-*-*-*-*-*-*-*-*-*-*-*-*  Blanca Bolton DATE: 05/28/21 2:12 PM  PATIENT NAME: Morris BARAKAT [facility] Pending sale to [facility]   1950    [id number]  Age: 73 y o  Sex: male  AUTHOR: Shlomo Frye MD  PRIMARYCARE PHYSICIAN: Marcelo Min MD    DIAGNOSES:  1  Benign essential hypertension  2  Mild ascending thoracic aorta aneurysm, 4 cm at level of right pulmonary artery  on CT scan February 26, 2021  3  History of mild mitral and tricuspid valve regurgitation  4  History of mild inter atrial septal aneurysm without evidence of ASD or PFO without history of TIA/CVA  5  History of hiatal hernia  6  History of colonic polyps  7  History of C diff in the past  8  Carotid artery disease, s/p status post left carotid endarterectomy March 2020  9  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 in July 2018, also status post cervical and lumbar spinal fusion surgeries  10  Mild hypothyroidism  11  Pre diabetes  12  Current chronic pancreatitis of uncertain etiology, possible gallstone related  Now status post cholecystectomy  13  Left renal artery stenosis     LEXISCAN NUCLEAR STRESS TEST February 2020:  1  Negative pharmacologic stress test with regadenoson for symptoms of angina pectoris and negative for ECG evidence of ischemia  2  Tomographic perfusion series consistent with normal perfusion without definite evidence of ischemia or prior infarction  3  Normal left ventricular cavity size with normal left ventricular systolic function and wall motion  EF determined as 65%    4  Elevated resting blood pressure with appropriate blood pressure changes noted during the stress test   5  No chest pain was reported during the stress test   6  Nonspecific resting ECG abnormalities with no significant changes and some nonspecific supraventricular ectopy noted during stress test     Diagnostic sensitivity was limited by submaximal stress  ECHOCARDIOGRAM March 2018 showed mildly increased left ventricular wall thickness, normal left ventricular systolic function and hyperdynamic wall motion, normal diastolic function, EF around 65%  Mild aortic valve sclerosis, trace tricuspid valve regurgitation, no pulmonary hypertension, borderline dilated aortic root  CTA of chest significant for 4 cm ascending thoracic aortic aneurysm with a focal atelectasis or scarring lower lungs without any lung mass or effusion  CURRENT ECG:  No results found for this visit on 05/28/21  CARDIOLOGY ASSESSMENT & PLAN:  1  Mixed hyperlipidemia     2  Essential hypertension, benign     3  Ectopic atrial rhythm     4  Left carotid artery stenosis s/p cartoid endarterectomy     5  Thoracic aortic aneurysm without rupture [facility])       Essential hypertension, benign  Father Jaswinder Marshall is overall doing well from cardiac perspective with no recent signs and symptoms that suggest decompensated heart failure or symptomatic arrhythmias or angina  His blood pressure is well controlled  He is on good medical regimen that includes losartan, amlodipine, metoprolol succinate and Aldactone  He has mild stable ectasia of the ascending thoracic aorta measuring up to 4 0 cm      -- at this time we will continue current medical therapy  -- I again strongly encouraged him to quit smoking completely specially because he has aortic aneurysm  -- he will continue to follow-up with his primary care physician on regular basis and have follow-up blood work from time to time  A CT scan can be considered sometime in 2022 to reassess his aortic aneurysm  -- is advised to avoid lifting objects greater than 50 lb      HEALTHY LIFESTYLE RECOMMENDATIONS         Lifestyle recommendations  SMOKING CESSATION-- Use pharmacological and behavioural strategies to help patients quit smoking  Avoid passive smoking  HEALTHY DIET-- Diet high in vegetables, fruit, and wholegrains  Limit saturated fat to <10% of total intake  Limit alcohol to <100 g/week or 15 g/day  PHYSICAL ACTIVITY-- 30 - 60 min moderate physical activity most days, but even irregular activity is beneficial   HEALTHY WEIGHT-- Obtain and maintain a healthy weight (<25 kg/m2), or reduce weight through recommended energy intake and increased  physical activity  OTHER-- Take medications as prescribed  Healthy diet characteristics  Choose a dietary pattern that emphasizes intake of vegetables, fruit, whole grains, low-fat dairy products, poultry, fish, legumes, nontropical vegetable oils, and nuts and limits the intake of sugar-sweetened beverages and red meats:     1  Increase consumption of fruits and vegetables (>_200 g each per day)  2  35-45 g of fibre per day, preferably from wholegrains  3  Moderate consumption of nuts (30 g per day, unsalted)  4  Consume fish at least twice a week, one of which should be an oily fish   5  Limited lean meat, low-fat dairy products, and liquid vegetable oils  6  Saturated FAs should account for less than 5-10% of total energy intake; replace with polyunsaturated fats  7  As little intake of trans unsaturated fats as possible, preferably no intake from processed food, and <1% of total energy intake  <_56 g of salt per day  8  If alcohol is consumed, limiting intake to <_100 g/week or <15 g/day is recommended  9  Avoid energy-dense foods such as sugar-sweetened soft drinks  Recommendation for Physical Activity   All adults should avoid inactivity:    Some physical activity is better than none, and any amount of physical activity results in some health benefits       For substantial health benefits:    150 min per week of moderate-intensity aerobic activity, or    75 min per week of vigorous-intensity aerobic physical activity, or    an equivalent combination of moderate- and vigorous-intensity aerobic activity   Activity episodes should be at least 10 min in duration and spread throughout the week  Combine with moderate- or high-intensity muscle-strengthening activities that involve all major muscle groups on 2 or more days per week  INTERVAL HISTORY & HISTORY OF PRESENT ILLNESS:  Patito Dooley is here for follow-up regarding his cardiac comorbidities which include: Ascending thoracic aortic aneurysm, essential hypertension, interatrial septal aneurysm and carotid artery disease  He was last seen by us in September 2020  He had been dealing with recurrent hospitalizations with acute on chronic pancreatitis  There was concern for cancerous lesion in the pancreas  Following detail extensive workup it was determined that his pancreatitis may have been due to inflammation relating to gallstones  He underwent cholecystectomy in March 2021 and since then he has had no recurrence of pancreatitis  From a symptom perspective he has been feeling good now  There have been no recent active symptoms of chest discomfort or exertional angina  There is no dyspnea with usual activities or exertion  No orthopnea, PND or pedal edema  No palpitations, lightheadedness, presyncope, or syncope  Denies any recent hospitalizations or other illnesses  Reports being compliant with medications  Functional capacity status:  Good   (Excellent- >10 METs; Good: (7-10 METs); Moderate (4-7 METs); Poor (<= 4 METs)    Any chronic stressors:  None   (feeling of poor health, financial problems, and social isolation etc)  Tobacco or alcohol dependence:  Smokes about 4 cigarettes a day  Denies any alcohol use  REVIEW OF SYMPTOMS:    Positive for:  As described above in HPI  Negative for: All remaining as reviewed below and in HPI      SYSTEM SYMPTOMS REVIEWED:  General--weight change, fever, night sweats  Respiratory--cough, wheezing, shortness of breath, sputum production  Cardiovascular--chest pain, syncope, dyspnea on exertion, edema, decline in exercise tolerance, claudication   Gastrointestinal--persistent vomiting, diarrhea, abdominal distention, blood in stool   Muscular or skeletal--joint pain or swelling   Neurologic--headaches, syncope, abnormal movement  Hematologic--history of easy bruising and bleeding   Endocrine--thyroid enlargement, heat or cold intolerance, polyuria   Psychiatric--anxiety, depression     *-*-*-*-*-*-*-*-*-*-*-*-*-*-*-*-*-*-*-*-*-*-*-*-*-*-*-*-*-*-*-*-*-*-*-*-*-*-*-*-*-*-*-*-*-*-*-*-*-*-*-*-*-*-  VITAL SIGNS:  Vitals:    05/28/21 1350   BP: 117/56   Pulse: 87   Weight: 59 9 kg (132 lb)   Height: 5' 3" (1 6 m)     Weight (last 2 days)     Date/Time   Weight    05/28/21 1350   59 9 (132)           ,   Wt Readings from Last 3 Encounters:   05/28/21 59 9 kg (132 lb)   04/08/21 60 3 kg (133 lb)   03/31/21 60 8 kg (134 lb)    , Body mass index is 23 38 kg/m²  *-*-*-*-*-*-*-*-*-*-*-*-*-*-*-*-*-*-*-*-*-*-*-*-*-*-*-*-*-*-*-*-*-*-*-*-*-*-*-*-*-*-*-*-*-*-*-*-*-*-*-*-*-*-  PHYSICAL EXAM:  General Appearance:    Alert, cooperative, no distress, appears stated age   Head, Eyes, ENT:    No obvious abnormality, moist mucous mebranes  Neck:   Supple, no carotid bruit or JVD   Back:     Symmetric, no curvature  Lungs:     Respirations unlabored  Clear to auscultation bilaterally,    Chest wall:    No tenderness or deformity   Heart:    Regular rate and rhythm, S1 and S2 normal, no murmur, rub  or gallop     Abdomen:     Soft, non-tender, No obvious masses, or organomegaly   Extremities:   Extremities warm, no cyanosis or edema    Skin:   Skin color, texture, turgor normal, no rashes or lesions     *-*-*-*-*-*-*-*-*-*-*-*-*-*-*-*-*-*-*-*-*-*-*-*-*-*-*-*-*-*-*-*-*-*-*-*-*-*-*-*-*-*-*-*-*-*-*-*-*-*-*-*-*-*-  CURRENT MEDICATION LIST:    Current Outpatient Medications:     amLODIPine (NORVASC) 10 mg tablet, Take 1 tablet (10 mg total) by mouth daily, Disp: 90 tablet, Rfl: 3    ezetimibe-simvastatin (VYTORIN) 10-40 mg per tablet, Take 1 tablet by mouth daily at bedtime, Disp: 90 tablet, Rfl: 1    levothyroxine 25 mcg tablet, Take 0 5 tablets (12 5 mcg total) by mouth daily, Disp: 45 tablet, Rfl: 3    losartan (COZAAR) 50 mg tablet, Take 1 tablet (50 mg total) by mouth daily, Disp: 90 tablet, Rfl: 3    magnesium oxide (MAG-OX) 400 mg, Take 1 tablet (400 mg total) by mouth daily, Disp: 90 tablet, Rfl: 3    metoprolol succinate (TOPROL-XL) 50 mg 24 hr tablet, Take 0 5 tablets (25 mg total) by mouth daily, Disp: 90 tablet, Rfl: 0    nicotine polacrilex (NICORETTE) 4 mg gum, Chew 4 mg as needed for smoking cessation, Disp: , Rfl:     omeprazole (PriLOSEC) 40 MG capsule, Take 1 capsule (40 mg total) by mouth daily Please stop 20 mg, Disp: 90 capsule, Rfl: 3    pancrelipase, Lip-Prot-Amyl, (CREON) 12,000 units capsule, Take 12,000 units of lipase by mouth 3 (three) times a day with meals, Disp: 90 capsule, Rfl: 5    spironolactone (ALDACTONE) 25 mg tablet, Take 0 5 tablets (12 5 mg total) by mouth daily, Disp: 90 tablet, Rfl: 3    thiamine 100 MG tablet, Take 1 tablet (100 mg total) by mouth daily, Disp: 90 tablet, Rfl: 3    zolpidem (AMBIEN) 10 mg tablet, Take by mouth daily at bedtime as needed , Disp: , Rfl:     ALLERGIES:  No Known Allergies    *-*-*-*-*-*-*-*-*-*-*-*-*-*-*-*-*-*-*-*-*-*-*-*-*-*-*-*-*-*-*-*-*-*-*-*-*-*-*-*-*-*-*-*-*-*-*-*-*-*-*-*-*-*-  The LABORATORY DATA:  I have personally reviewed pertinent labs      No results found for: NA  Potassium   Date Value Ref Range Status   03/08/2021 3 9 3 6 - 5 0 mmol/L Final   03/06/2021 3 6 3 6 - 5 0 mmol/L Final   03/05/2021 4 4 3 6 - 5 0 mmol/L Final     Chloride   Date Value Ref Range Status   03/08/2021 101 97 - 108 mmol/L Final   03/06/2021 103 97 - 108 mmol/L Final   03/05/2021 102 97 - 108 mmol/L Final     CO2   Date Value Ref Range Status   03/08/2021 26 22 - 30 mmol/L Final 03/06/2021 29 22 - 30 mmol/L Final   03/05/2021 29 22 - 30 mmol/L Final     BUN   Date Value Ref Range Status   03/08/2021 22 5 - 25 mg/dL Final   03/06/2021 11 5 - 25 mg/dL Final   03/05/2021 9 5 - 25 mg/dL Final     Creatinine   Date Value Ref Range Status   03/08/2021 0 96 0 70 - 1 50 mg/dL Final     Comment:     Standardized to IDMS reference method   03/06/2021 0 92 0 70 - 1 50 mg/dL Final     Comment:     Standardized to IDMS reference method   03/05/2021 0 91 0 70 - 1 50 mg/dL Final     Comment:     Standardized to IDMS reference method     eGFR   Date Value Ref Range Status   03/08/2021 81 >60 ml/min/1 73sq m Final   03/06/2021 85 >60 ml/min/1 73sq m Final   03/05/2021 86 >60 ml/min/1 73sq m Final     Calcium   Date Value Ref Range Status   03/08/2021 9 5 8 4 - 10 2 mg/dL Final   03/06/2021 9 1 8 4 - 10 2 mg/dL Final   03/05/2021 9 3 8 4 - 10 2 mg/dL Final     AST   Date Value Ref Range Status   03/08/2021 73 (H) 17 - 59 U/L Final     Comment:     Specimen collection should occur prior to Sulfasalazine administration due to the potential for falsely depressed results  03/06/2021 98 (H) 17 - 59 U/L Final     Comment:     Specimen collection should occur prior to Sulfasalazine administration due to the potential for falsely depressed results  03/05/2021 186 (H) 17 - 59 U/L Final     Comment:     Specimen collection should occur prior to Sulfasalazine administration due to the potential for falsely depressed results  ALT   Date Value Ref Range Status   03/08/2021 69 (H) 9 - 52 U/L Final     Comment:     Specimen collection should occur prior to Sulfasalazine administration due to the potential for falsely depressed results  03/06/2021 79 (H) 9 - 52 U/L Final     Comment:     Specimen collection should occur prior to Sulfasalazine administration due to the potential for falsely depressed results      03/05/2021 115 (H) 9 - 52 U/L Final     Comment:     Specimen collection should occur prior to Sulfasalazine administration due to the potential for falsely depressed results  Alkaline Phosphatase   Date Value Ref Range Status   03/08/2021 86 43 - 122 U/L Final   03/06/2021 62 43 - 122 U/L Final   03/05/2021 64 43 - 122 U/L Final     Magnesium   Date Value Ref Range Status   10/27/2020 1 9 1 6 - 2 6 mg/dL Final   10/26/2020 2 3 1 6 - 2 6 mg/dL Final   10/25/2020 1 9 1 6 - 2 6 mg/dL Final     WBC   Date Value Ref Range Status   03/08/2021 5 40 4 50 - 11 00 Thousand/uL Final   03/06/2021 6 30 4 50 - 11 00 Thousand/uL Final   03/05/2021 6 80 4 50 - 11 00 Thousand/uL Final     Hemoglobin   Date Value Ref Range Status   03/08/2021 12 8 (L) 13 5 - 17 5 g/dL Final   03/06/2021 10 9 (L) 13 5 - 17 5 g/dL Final   03/05/2021 11 5 (L) 13 5 - 17 5 g/dL Final     Platelets   Date Value Ref Range Status   03/08/2021 307 150 - 450 Thousands/uL Final   03/06/2021 244 150 - 450 Thousands/uL Final   03/05/2021 246 150 - 450 Thousands/uL Final     PTT   Date Value Ref Range Status   10/09/2020 37 23 - 37 seconds Final     Comment:     Therapeutic Heparin Range =  60-90 seconds   03/06/2020 40 (H) 23 - 37 seconds Final     Comment:     Therapeutic Heparin Range =  60-90 seconds     INR   Date Value Ref Range Status   03/04/2021 1 02 0 84 - 1 19 Final   03/03/2021 1 05 0 84 - 1 19 Final     No results found for: CKMB, DIGOXIN  No results found for: TSH  No results found for: CHOL   Hemoglobin A1C   Date Value Ref Range Status   04/13/2021 5 9 (H) Normal 3 8-5 6%; PreDiabetic 5 7-6 4%; Diabetic >=6 5%; Glycemic control for adults with diabetes <7 0% % Final     C difficile toxin by PCR   Date Value Ref Range Status   02/17/2020 Negative Negative Final     Comment:     No evidence for C  difficile colonization or infection  No special contact precautions required        C difficile toxin by PCR    Date Value Ref Range Status   01/29/2021 Negative Negative Final     Comment:     No evidence for C  difficile colonization or infection  No special contact precautions required  *-*-*-*-*-*-*-*-*-*-*-*-*-*-*-*-*-*-*-*-*-*-*-*-*-*-*-*-*-*-*-*-*-*-*-*-*-*-*-*-*-*-*-*-*-*-*-*-*-*-*-*-*-*-  PREVIOUS CARDIOLOGY & RADIOLOGY RESULTS:  Results for orders placed during the hospital encounter of 20   Echo complete with contrast if indicated    Narrative [address] [employer]  [facility], [address] [place] [address]    Transthoracic Echocardiogram  2D, M-mode, Doppler, and Color Doppler    Study date:  2020    Patient: Юлия Handy  MR number: [id number]  Account number: [de-identified]  : 1950  Age: 81 years  Gender: Male  Status: Inpatient  Location: [facility]  Height: 63 in  Weight: 137 7 lb  BP: 166/ 92 mmHg    Indications: Cardiac Heart Failure    Diagnoses: I50 9 - Heart failure, unspecified    Sonographer:  Tracey Childers RDCS  Primary Physician:  Ha Burciaga MD  Referring Physician:  Yasmine Munoz MD  Group:  [place] [id number] [facility]  Interpreting Physician:  Janalyn Oppenheim, MD    SUMMARY    LEFT VENTRICLE:  Normal left ventricular cavity size, normal wall thickness, normal left ventricular systolic function, no regional wall motion abnormality noted  Ejection fraction is determined as around 63%  Doppler evaluation is consistent with normal  diastolic left ventricular function  RIGHT VENTRICLE:  Normal right ventricular size and systolic function  Estimated right ventricular systolic pressure is mildly increased, 41 mmHg  Possible mild pulmonary hypertension  LEFT ATRIUM:  Normal left atrial cavity size  Intact interatrial septum  RIGHT ATRIUM:  Normal right atrial cavity size  MITRAL VALVE:  Mild mitral valve leaflet sclerosis, adequate leaflet mobility  Trace mitral valve regurgitation  AORTIC VALVE:  Tricuspid aortic valve with mild sclerosis  No aortic valve stenosis or regurgitation  TRICUSPID VALVE:  Trace tricuspid valve regurgitation      PULMONIC VALVE:  No significant pulmonic valve regurgitation  AORTA:  Mildly dilated aortic root and proximal ascending aorta measuring up to 3 9 cm     IVC, HEPATIC VEINS:  Inferior vena cava is normal in size and demonstrates appropriate respiratory phasic changes in diameter  PERICARDIUM:  No pericardial effusion  COMPARISONS:  Technical quality: Good  Cardiac rhythm: Normal sinus    1  Normal left ventricular size and systolic and diastolic function, EF around 63%  2  No significant chamber hypertrophy or enlargement  3  Aortic valve sclerosis, no aortic valve stenosis or regurgitation  4  Mild mitral valve sclerosis, trace mitral valve regurgitation  5  Trace tricuspid valve regurgitation  6  Possible mild pulmonary hypertension  Estimated RVSP/PASP is 41 mmHg  7  No pericardial effusion  Compared to report of previous echocardiogram from March 19, 2018 possible mild pulmonary hypertension is new otherwise no significant change  HISTORY: PRIOR HISTORY: Risk factors: hypertension, hypercholesterolemia, and a history of current cigarette use (within the last month)  PROCEDURE: The study was performed in the [place] [address]  This was a routine study  The transthoracic approach was used  The study included complete 2D imaging, M-mode, complete spectral Doppler, and color Doppler  The heart rate was 71  bpm, at the start of the study  Image quality was adequate  LEFT VENTRICLE: Normal left ventricular cavity size, normal wall thickness, normal left ventricular systolic function, no regional wall motion abnormality noted  Ejection fraction is determined as around 63%  Doppler evaluation is  consistent with normal diastolic left ventricular function  RIGHT VENTRICLE: Normal right ventricular size and systolic function  Estimated right ventricular systolic pressure is mildly increased, 41 mmHg  Possible mild pulmonary hypertension  LEFT ATRIUM: Normal left atrial cavity size   Intact interatrial septum  RIGHT ATRIUM: Normal right atrial cavity size  MITRAL VALVE: Mild mitral valve leaflet sclerosis, adequate leaflet mobility  Trace mitral valve regurgitation  AORTIC VALVE: Tricuspid aortic valve with mild sclerosis  No aortic valve stenosis or regurgitation  TRICUSPID VALVE: Trace tricuspid valve regurgitation  PULMONIC VALVE: No significant pulmonic valve regurgitation  PERICARDIUM: No pericardial effusion  AORTA: Mildly dilated aortic root and proximal ascending aorta measuring up to 3 9 cm  SYSTEMIC VEINS: IVC: Inferior vena cava is normal in size and demonstrates appropriate respiratory phasic changes in diameter  SYSTEM MEASUREMENT TABLES    2D  %FS: 32 19 %  Ao Diam: 3 85 cm  EDV(Teich): 83 18 ml  EF(Teich): 60 7 %  ESV(Teich): 32 69 ml  IVSd: 0 92 cm  LA Area: 12 53 cm2  LA Diam: 3 51 cm  LVEDV MOD A4C: 81 65 ml  LVEF MOD A4C: 62 91 %  LVESV MOD A4C: 30 28 ml  LVIDd: 4 3 cm  LVIDs: 2 92 cm  LVLd A4C: 8 87 cm  LVLs A4C: 7 35 cm  LVPWd: 0 9 cm  RA Area: 13 63 cm2  RVIDd: 2 63 cm  SV MOD A4C: 51 37 ml  SV(Teich): 50 49 ml    CW  TR Vmax: 2 8 m/s  TR maxP 34 mmHg    MM  TAPSE: 2 54 cm    PW  E': 0 09 m/s  E/E': 11 67  MV A Lam: 0 8 m/s  MV Dec [place]: 4 67 m/s2  MV DecT: 230 85 ms  MV E Lam: 1 08 m/s  MV E/A Ratio: 1 35  MV PHT: 66 95 ms  MVA By PHT: 3 29 cm2    [facility] Accredited [facility]chocardi[facility]    Prepared and electronically signed by    Lin Pinto MD  Signed 2020 11:53:29       No results found for this or any previous visit  No results found for this or any previous visit  No results found for this or any previous visit  CT abdomen pelvis w contrast  Narrative: CT ABDOMEN AND PELVIS WITH IV CONTRAST    INDICATION:   K85 00: Idiopathic acute pancreatitis without necrosis or infection  COMPARISON:  2020    TECHNIQUE:  CT examination of the abdomen and pelvis was performed   Axial, sagittal, and coronal 2D reformatted images were created from the source data and submitted for interpretation  Radiation dose length product (DLP) for this visit:  287 mGy-cm   This examination, like all CT scans performed in the [facility], was performed utilizing techniques to minimize radiation dose exposure, including the use of iterative   reconstruction and automated exposure control  IV Contrast:  100 mL of iohexol (OMNIPAQUE)  Enteric Contrast:  Enteric contrast was not administered  FINDINGS:    ABDOMEN    LOWER CHEST:  No clinically significant abnormality identified in the visualized lower chest     LIVER/BILIARY TREE:  Unremarkable  GALLBLADDER:  Gallbladder is surgically absent  SPLEEN:  Unremarkable  PANCREAS:  Unremarkable  Previously noted hypodensity within the pancreatic head has resolved in keeping with resolved changes related to acute pancreatitis  ADRENAL GLANDS:  Unremarkable  KIDNEYS/URETERS:  Stable renal cysts are identified as well as subcentimeter hypodensities too small to characterize  STOMACH AND BOWEL:  There is colonic diverticulosis without evidence of acute diverticulitis  APPENDIX:  No findings to suggest appendicitis  ABDOMINOPELVIC CAVITY:  No ascites  No pneumoperitoneum  No lymphadenopathy  VESSELS:  Atherosclerotic changes are present  No evidence of aneurysm  PELVIS    REPRODUCTIVE ORGANS:  Unremarkable for patient's age  URINARY BLADDER:  Unremarkable  ABDOMINAL WALL/INGUINAL REGIONS:  Unremarkable  OSSEOUS STRUCTURES:  No acute fracture or destructive osseous lesion  Left total hip arthroplasty is present  Impression: Interval resolution of previously noted hypodensity within the pancreatic head in keeping with resolving changes related to acute pancreatitis      Workstation performed: [id number]        *-*-*-*-*-*-*-*-*-*-*-*-*-*-*-*-*-*-*-*-*-*-*-*-*-*-*-*-*-*-*-*-*-*-*-*-*-*-*-*-*-*-*-*-*-*-*-*-*-*-*-*-*-*-  SIGNATURES:   @EDD@ Shlomo Frye MD     *-*-*-*-*-*-*-*-*-*-*-*-*-*-*-*-*-*-*-*-*-*-*-*-*-*-*-*-*-*-*-*-*-*-*-*-*-*-*-*-*-*-*-*-*-*-*-*-*-*-*-*-*-*-    PAST MEDICAL HISTORY:  Past Medical History:   Diagnosis Date    A-fib [facility])     Arthritis     Avascular necrosis of bone of hip, left (HCC)     replaced    Back pain     CAD (coronary artery disease)     Carotid artery narrowing     left side -for endarterectomy today 3/5/2020    Disease of thyroid gland     hypo    GERD (gastroesophageal reflux disease)     History of Clostridioides difficile infection     Hyperlipidemia     Hypertension     Irregular heart beat     Kidney stone     Neck pain     Pancreatitis     Spinal stenosis     Wears glasses     PAST SURGICAL HISTORY:   Past Surgical History:   Procedure Laterality Date    BACK SURGERY      CARDIAC CATHETERIZATION      cardiac cath 5/2010  adjusted meds    CATARACT EXTRACTION Bilateral     CERVICAL FUSION      CHOLANGIOGRAM N/A 3/4/2021    Procedure: CHOLANGIOGRAM;  Surgeon: Bradley Lloyd MD;  Location:  MAIN OR;  Service: General    CHOLECYSTECTOMY LAPAROSCOPIC N/A 3/4/2021    Procedure: CHOLECYSTECTOMY LAPAROSCOPIC;  Surgeon: Bradley Lloyd MD;  Location:  MAIN OR;  Service: General    COLONOSCOPY      JOINT REPLACEMENT Left     hip    LUMBAR FUSION      NECK SURGERY      ORTHOPEDIC SURGERY Left     L THR    UT THROMBOENDARTECTMY NECK,NECK INCIS Left 3/5/2020    Procedure: ENDARTERECTOMY ARTERY CAROTID WITH BOVINE PATCH ANGIOPLASTY AND INTRAOP DUPLEX;  Surgeon: Mike Alves MD;  Location: [facility];  Service: Vascular    SPINAL FUSION           FAMILY HISTORY:  Family History   Problem Relation Age of Onset    Heart disease Mother     Parkinsonism Mother     Heart disease Father     SOCIAL HISTORY:  Social History     Tobacco Use   Smoking Status Current Every Day Smoker    Packs/day: 0 50    Years: 50 00    Pack years: 25 00    Types: Cigarettes   Smokeless Tobacco Never Used Tobacco Comment    3-4 cigarrettes      Social History     Substance and Sexual Activity   Alcohol Use Not Currently    Binge frequency: Never    Comment: 3 oz of wine with communion multiple times/day/week     Social History     Substance and Sexual Activity   Drug Use Never    [id number]     *-*-*-*-*-*-*-*-*-*-*-*-*-*-*-*-*-*-*-*-*-*-*-*-*-*-*-*-*-*-*-*-*-*-*-*-*-*-*-*-*-*-*-*-*-*-*-*-*-*-*-*-*-*  ALLERGIES:  No Known Allergies CURRENT SCHEDULED MEDICATIONS:    Current Outpatient Medications:     amLODIPine (NORVASC) 10 mg tablet, Take 1 tablet (10 mg total) by mouth daily, Disp: 90 tablet, Rfl: 3    ezetimibe-simvastatin (VYTORIN) 10-40 mg per tablet, Take 1 tablet by mouth daily at bedtime, Disp: 90 tablet, Rfl: 1    levothyroxine 25 mcg tablet, Take 0 5 tablets (12 5 mcg total) by mouth daily, Disp: 45 tablet, Rfl: 3    losartan (COZAAR) 50 mg tablet, Take 1 tablet (50 mg total) by mouth daily, Disp: 90 tablet, Rfl: 3    magnesium oxide (MAG-OX) 400 mg, Take 1 tablet (400 mg total) by mouth daily, Disp: 90 tablet, Rfl: 3    metoprolol succinate (TOPROL-XL) 50 mg 24 hr tablet, Take 0 5 tablets (25 mg total) by mouth daily, Disp: 90 tablet, Rfl: 0    nicotine polacrilex (NICORETTE) 4 mg gum, Chew 4 mg as needed for smoking cessation, Disp: , Rfl:     omeprazole (PriLOSEC) 40 MG capsule, Take 1 capsule (40 mg total) by mouth daily Please stop 20 mg, Disp: 90 capsule, Rfl: 3    pancrelipase, Lip-Prot-Amyl, (CREON) 12,000 units capsule, Take 12,000 units of lipase by mouth 3 (three) times a day with meals, Disp: 90 capsule, Rfl: 5    spironolactone (ALDACTONE) 25 mg tablet, Take 0 5 tablets (12 5 mg total) by mouth daily, Disp: 90 tablet, Rfl: 3    thiamine 100 MG tablet, Take 1 tablet (100 mg total) by mouth daily, Disp: 90 tablet, Rfl: 3    zolpidem (AMBIEN) 10 mg tablet, Take by mouth daily at bedtime as needed , Disp: , Rfl: *-*-*-*-*-*-*-*-*-*-*-*-*-*-*-*-*-*-*-*-*-*-*-*-*-*-*-*-*-*-*-*-*-*-*-*-*-*-*-*-*-*-*-*-*-*-*-*-*-*-*-*-*-*

## 2021-05-28 NOTE — ASSESSMENT & PLAN NOTE
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 with no recent signs and symptoms that suggest decompensated heart failure or symptomatic arrhythmias or angina  His blood pressure is well controlled  He is on good medical regimen that includes losartan, amlodipine, metoprolol succinate and Aldactone  He has mild stable ectasia of the ascending thoracic aorta measuring up to 4 0 cm      -- at this time we will continue current medical therapy  -- I again strongly encouraged him to quit smoking completely specially because he has aortic aneurysm  -- he will continue to follow-up with his primary care physician on regular basis and have follow-up blood work from time to time  A CT scan can be considered sometime in 2022 to reassess his aortic aneurysm  -- is advised to avoid lifting objects greater than 50 lb  HEALTHY LIFESTYLE RECOMMENDATIONS         Lifestyle recommendations  SMOKING CESSATION-- Use pharmacological and behavioural strategies to help patients quit smoking  Avoid passive smoking  HEALTHY DIET-- Diet high in vegetables, fruit, and wholegrains  Limit saturated fat to <10% of total intake  Limit alcohol to <100 g/week or 15 g/day  PHYSICAL ACTIVITY-- 30 - 60 min moderate physical activity most days, but even irregular activity is beneficial   HEALTHY WEIGHT-- Obtain and maintain a healthy weight (<25 kg/m2), or reduce weight through recommended energy intake and increased  physical activity  OTHER-- Take medications as prescribed  Healthy diet characteristics  Choose a dietary pattern that emphasizes intake of vegetables, fruit, whole grains, low-fat dairy products, poultry, fish, legumes, nontropical vegetable oils, and nuts and limits the intake of sugar-sweetened beverages and red meats:     1  Increase consumption of fruits and vegetables (>_200 g each per day)  2  35-45 g of fibre per day, preferably from wholegrains    3  Moderate consumption of nuts (30 g per day, unsalted)  4  Consume fish at least twice a week, one of which should be an oily fish   5  Limited lean meat, low-fat dairy products, and liquid vegetable oils  6  Saturated FAs should account for less than 5-10% of total energy intake; replace with polyunsaturated fats  7  As little intake of trans unsaturated fats as possible, preferably no intake from processed food, and <1% of total energy intake  <_56 g of salt per day  8  If alcohol is consumed, limiting intake to <_100 g/week or <15 g/day is recommended  9  Avoid energy-dense foods such as sugar-sweetened soft drinks  Recommendation for Physical Activity   All adults should avoid inactivity:    Some physical activity is better than none, and any amount of physical activity results in some health benefits  For substantial health benefits:    150 min per week of moderate-intensity aerobic activity, or    75 min per week of vigorous-intensity aerobic physical activity, or    an equivalent combination of moderate- and vigorous-intensity aerobic activity   Activity episodes should be at least 10 min in duration and spread throughout the week  Combine with moderate- or high-intensity muscle-strengthening activities that involve all major muscle groups on 2 or more days per week

## 2021-06-25 DIAGNOSIS — Z12.5 SCREENING FOR PROSTATE CANCER: Primary | ICD-10-CM

## 2021-07-02 ENCOUNTER — APPOINTMENT (OUTPATIENT)
Dept: LAB | Facility: HOSPITAL | Age: 71
End: 2021-07-02
Payer: COMMERCIAL

## 2021-07-02 DIAGNOSIS — Z12.5 SCREENING FOR PROSTATE CANCER: ICD-10-CM

## 2021-07-02 LAB — PSA SERPL-MCNC: 1.4 NG/ML (ref 0–4)

## 2021-07-02 PROCEDURE — 84153 ASSAY OF PSA TOTAL: CPT

## 2021-07-13 ENCOUNTER — OFFICE VISIT (OUTPATIENT)
Dept: FAMILY MEDICINE CLINIC | Facility: CLINIC | Age: 71
End: 2021-07-13
Payer: COMMERCIAL

## 2021-07-13 VITALS
DIASTOLIC BLOOD PRESSURE: 60 MMHG | OXYGEN SATURATION: 97 % | TEMPERATURE: 97.4 F | BODY MASS INDEX: 23.21 KG/M2 | SYSTOLIC BLOOD PRESSURE: 122 MMHG | RESPIRATION RATE: 16 BRPM | WEIGHT: 131 LBS | HEART RATE: 104 BPM | HEIGHT: 63 IN

## 2021-07-13 DIAGNOSIS — I10 ESSENTIAL HYPERTENSION: ICD-10-CM

## 2021-07-13 DIAGNOSIS — K21.9 GASTROESOPHAGEAL REFLUX DISEASE WITHOUT ESOPHAGITIS: ICD-10-CM

## 2021-07-13 DIAGNOSIS — E03.9 HYPOTHYROIDISM (ACQUIRED): ICD-10-CM

## 2021-07-13 DIAGNOSIS — E78.2 MIXED HYPERLIPIDEMIA: Primary | ICD-10-CM

## 2021-07-13 DIAGNOSIS — R94.5 LIVER FUNCTION ABNORMALITY: ICD-10-CM

## 2021-07-13 PROCEDURE — 99214 OFFICE O/P EST MOD 30 MIN: CPT | Performed by: INTERNAL MEDICINE

## 2021-07-13 RX ORDER — ROSUVASTATIN CALCIUM 10 MG/1
10 TABLET, COATED ORAL DAILY
Qty: 90 TABLET | Refills: 3 | Status: SHIPPED | OUTPATIENT
Start: 2021-07-13 | End: 2022-04-19 | Stop reason: SDUPTHER

## 2021-07-13 RX ORDER — FAMOTIDINE 20 MG/1
20 TABLET, FILM COATED ORAL 2 TIMES DAILY
Qty: 180 TABLET | Refills: 3 | Status: SHIPPED | OUTPATIENT
Start: 2021-07-13 | End: 2021-09-28

## 2021-07-13 NOTE — PROGRESS NOTES
Assessment/Plan:         Diagnoses and all orders for this visit:    Mixed hyperlipidemia;   -    Try :  -     rosuvastatin (CRESTOR) 10 MG tablet; Take 1 tablet (10 mg total) by mouth daily Please STOP Vytorin  Adam Cochran RTC in 3mos w :  -     Comprehensive metabolic panel; Future  -     UA (URINE) with reflex to Scope; Future  -     Lipid panel; Future    Hypothyroidism (acquired); continue Synthroid 25 mcg    RTC in 3mos w B W     Essential hypertension; stable on Losartan and Toprol xl, and Amlodipine    Liver function abnormality; cause ? ? FU w GI  Renew :  -     famotidine (PEPCID) 20 mg tablet; Take 1 tablet (20 mg total) by mouth 2 (two) times a day Please STOP Omeprazole     -     rosuvastatin (CRESTOR) 10 MG tablet; Take 1 tablet (10 mg total) by mouth daily Please STOP Vytorin     -     Comprehensive metabolic panel; Future  -     UA (URINE) with reflex to Scope; Future  -     Lipid panel; Future    Gastroesophageal reflux disease without esophagitis; Try :  -     famotidine (PEPCID) 20 mg tablet; Take 1 tablet (20 mg total) by mouth 2 (two) times a day Please STOP Omeprazole    Subjective:      Patient ID: Abdirahman Lunsford is a 73 y o  male  73 Y O Man is here for Regular Check Up, He feels Ok, Recent Blood  Work and Med List reviewed w Pt,         The following portions of the patient's history were reviewed and updated as appropriate: allergies, current medications, past family history, past social history, past surgical history and problem list     Review of Systems   Constitutional: Negative for chills, fatigue and fever  HENT: Negative for congestion, facial swelling, sore throat, trouble swallowing and voice change  Eyes: Negative for pain, discharge and visual disturbance  Respiratory: Negative for cough, shortness of breath and wheezing  Cardiovascular: Negative for chest pain, palpitations and leg swelling     Gastrointestinal: Negative for abdominal pain, blood in stool, constipation, diarrhea and nausea  Endocrine: Negative for polydipsia, polyphagia and polyuria  Genitourinary: Negative for difficulty urinating, hematuria and urgency  Musculoskeletal: Negative for arthralgias and myalgias  Skin: Negative for rash  Neurological: Negative for dizziness, tremors, weakness and headaches  Hematological: Negative for adenopathy  Does not bruise/bleed easily  Psychiatric/Behavioral: Negative for dysphoric mood, sleep disturbance and suicidal ideas  Objective:      /60 (BP Location: Left arm, Patient Position: Sitting, Cuff Size: Standard)   Pulse 104   Temp (!) 97 4 °F (36 3 °C) (Tympanic)   Resp 16   Ht 5' 3" (1 6 m)   Wt 59 4 kg (131 lb)   SpO2 97%   BMI 23 21 kg/m²          Physical Exam  Constitutional:       General: He is not in acute distress  HENT:      Head: Normocephalic  Mouth/Throat:      Pharynx: No oropharyngeal exudate  Eyes:      General: No scleral icterus  Conjunctiva/sclera: Conjunctivae normal       Pupils: Pupils are equal, round, and reactive to light  Neck:      Thyroid: No thyromegaly  Cardiovascular:      Rate and Rhythm: Normal rate and regular rhythm  Heart sounds: Normal heart sounds  No murmur heard  Pulmonary:      Effort: Pulmonary effort is normal  No respiratory distress  Breath sounds: Normal breath sounds  No wheezing or rales  Abdominal:      General: Bowel sounds are normal  There is no distension  Palpations: Abdomen is soft  Tenderness: There is no abdominal tenderness  There is no guarding or rebound  Musculoskeletal:         General: No tenderness  Cervical back: Neck supple  Lymphadenopathy:      Cervical: No cervical adenopathy  Skin:     Coloration: Skin is not pale  Findings: No rash  Neurological:      Mental Status: He is alert and oriented to person, place, and time  Sensory: No sensory deficit  Motor: No weakness

## 2021-08-03 DIAGNOSIS — I10 ESSENTIAL HYPERTENSION: ICD-10-CM

## 2021-08-03 RX ORDER — METOPROLOL SUCCINATE 50 MG/1
25 TABLET, EXTENDED RELEASE ORAL DAILY
Qty: 90 TABLET | Refills: 1 | Status: SHIPPED | OUTPATIENT
Start: 2021-08-03 | End: 2022-01-31 | Stop reason: SDUPTHER

## 2021-08-19 DIAGNOSIS — R73.01 IMPAIRED FASTING GLUCOSE: Primary | ICD-10-CM

## 2021-09-24 ENCOUNTER — APPOINTMENT (OUTPATIENT)
Dept: LAB | Facility: HOSPITAL | Age: 71
End: 2021-09-24
Payer: COMMERCIAL

## 2021-09-24 DIAGNOSIS — E78.2 MIXED HYPERLIPIDEMIA: ICD-10-CM

## 2021-09-24 DIAGNOSIS — R94.5 LIVER FUNCTION ABNORMALITY: ICD-10-CM

## 2021-09-24 LAB
ALBUMIN SERPL BCP-MCNC: 4.4 G/DL (ref 3–5.2)
ALP SERPL-CCNC: 53 U/L (ref 43–122)
ALT SERPL W P-5'-P-CCNC: 38 U/L
ANION GAP SERPL CALCULATED.3IONS-SCNC: 10 MMOL/L (ref 5–14)
AST SERPL W P-5'-P-CCNC: 57 U/L (ref 17–59)
BILIRUB SERPL-MCNC: 0.66 MG/DL
BUN SERPL-MCNC: 31 MG/DL (ref 5–25)
CALCIUM SERPL-MCNC: 10 MG/DL (ref 8.4–10.2)
CHLORIDE SERPL-SCNC: 102 MMOL/L (ref 97–108)
CHOLEST SERPL-MCNC: 132 MG/DL
CO2 SERPL-SCNC: 27 MMOL/L (ref 22–30)
CREAT SERPL-MCNC: 1.21 MG/DL (ref 0.7–1.5)
GFR SERPL CREATININE-BSD FRML MDRD: 61 ML/MIN/1.73SQ M
GLUCOSE P FAST SERPL-MCNC: 98 MG/DL (ref 70–99)
HDLC SERPL-MCNC: 46 MG/DL
LDLC SERPL CALC-MCNC: 68 MG/DL
NONHDLC SERPL-MCNC: 86 MG/DL
POTASSIUM SERPL-SCNC: 4.4 MMOL/L (ref 3.6–5)
PROT SERPL-MCNC: 7.4 G/DL (ref 5.9–8.4)
SODIUM SERPL-SCNC: 139 MMOL/L (ref 137–147)
TRIGL SERPL-MCNC: 88 MG/DL

## 2021-09-24 PROCEDURE — 80061 LIPID PANEL: CPT

## 2021-09-24 PROCEDURE — 80053 COMPREHEN METABOLIC PANEL: CPT

## 2021-09-24 PROCEDURE — 36415 COLL VENOUS BLD VENIPUNCTURE: CPT

## 2021-09-28 ENCOUNTER — OFFICE VISIT (OUTPATIENT)
Dept: FAMILY MEDICINE CLINIC | Facility: CLINIC | Age: 71
End: 2021-09-28
Payer: COMMERCIAL

## 2021-09-28 VITALS
HEART RATE: 76 BPM | DIASTOLIC BLOOD PRESSURE: 70 MMHG | RESPIRATION RATE: 16 BRPM | BODY MASS INDEX: 24.27 KG/M2 | WEIGHT: 137 LBS | OXYGEN SATURATION: 96 % | TEMPERATURE: 96.7 F | HEIGHT: 63 IN | SYSTOLIC BLOOD PRESSURE: 120 MMHG

## 2021-09-28 DIAGNOSIS — I71.2 THORACIC AORTIC ANEURYSM WITHOUT RUPTURE (HCC): ICD-10-CM

## 2021-09-28 DIAGNOSIS — Z00.01 ENCOUNTER FOR GENERAL ADULT MEDICAL EXAMINATION WITH ABNORMAL FINDINGS: Primary | ICD-10-CM

## 2021-09-28 DIAGNOSIS — F17.210 CIGARETTE SMOKER: ICD-10-CM

## 2021-09-28 DIAGNOSIS — E78.2 MIXED HYPERLIPIDEMIA: ICD-10-CM

## 2021-09-28 DIAGNOSIS — E03.8 HYPOTHYROIDISM DUE TO HASHIMOTO'S THYROIDITIS: ICD-10-CM

## 2021-09-28 DIAGNOSIS — K21.9 GASTROESOPHAGEAL REFLUX DISEASE WITHOUT ESOPHAGITIS: ICD-10-CM

## 2021-09-28 DIAGNOSIS — Z23 NEED FOR INFLUENZA VACCINATION: ICD-10-CM

## 2021-09-28 DIAGNOSIS — E06.3 HYPOTHYROIDISM DUE TO HASHIMOTO'S THYROIDITIS: ICD-10-CM

## 2021-09-28 PROCEDURE — 99397 PER PM REEVAL EST PAT 65+ YR: CPT | Performed by: INTERNAL MEDICINE

## 2021-09-28 PROCEDURE — 99214 OFFICE O/P EST MOD 30 MIN: CPT | Performed by: INTERNAL MEDICINE

## 2021-09-28 RX ORDER — OMEPRAZOLE 20 MG/1
20 CAPSULE, DELAYED RELEASE ORAL DAILY
Qty: 90 CAPSULE | Refills: 3 | Status: SHIPPED | OUTPATIENT
Start: 2021-09-28 | End: 2022-04-19 | Stop reason: SDUPTHER

## 2021-09-28 NOTE — PROGRESS NOTES
Assessment/Plan:         Diagnoses and all orders for this visit:    Gastroesophageal reflux disease without esophagitis; stable on :  -     omeprazole (PriLOSEC) 20 mg delayed release capsule; Take 1 capsule (20 mg total) by mouth daily  -     Lipase; Future    Need for influenza vaccination; pt refused    Encounter for general adult medical examination with abnormal findings; done in detail  RTC in 3mos w :  -     UA (URINE) with reflex to Scope; Future  -     Comprehensive metabolic panel; Future  -     CBC and differential; Future  -     Magnesium; Future  -     Lipase; Future  -     Vitamin D 25 hydroxy; Future  -     Vitamin B12; Future  Hyperlipidemia; Continue Crestor,  RTC in 3mos w :  -     Lipid panel; Future  -     Vitamin D 25 hydroxy; Future  -     Vitamin B12; Future    Thoracic aortic aneurysm without rupture ([facility] [address] ); stable  Continue same  Yearly CTA Chest    Cigarette smoker; advised to quit    Hypothyroidism due to Hashimoto's thyroiditis; continue Synthroid 25 mcg AM  RTC in 3mos w :  -     TSH, 3rd generation; Future  -     T4, free; Future    Other orders  -     Cancel: influenza vaccine, high-dose, PF 0 7 mL (FLUZONE HIGH-DOSE)        Subjective:      Patient ID: Nena Fuentes is a 73 y o  male  73 Y O Man is here for Annual physical exam and Regular check up, He feels ok, recent Blood  Work and med list reviewed,  The following portions of the patient's history were reviewed and updated as appropriate: allergies, past family history, past medical history, past social history, past surgical history and problem list     Review of Systems   Constitutional: Negative for chills, fatigue and fever  HENT: Negative for congestion, facial swelling, sore throat, trouble swallowing and voice change  Eyes: Negative for pain, discharge and visual disturbance  Respiratory: Negative for cough, shortness of breath and wheezing      Cardiovascular: Negative for chest pain, palpitations and leg swelling  Gastrointestinal: Negative for abdominal pain, blood in stool, constipation, diarrhea and nausea  Endocrine: Negative for polydipsia, polyphagia and polyuria  Genitourinary: Negative for difficulty urinating, hematuria and urgency  Musculoskeletal: Negative for arthralgias and myalgias  Skin: Negative for rash  Neurological: Negative for dizziness, tremors, weakness and headaches  Hematological: Negative for adenopathy  Does not bruise/bleed easily  Psychiatric/Behavioral: Negative for dysphoric mood, sleep disturbance and suicidal ideas  Objective:      /70 (BP Location: Left arm, Patient Position: Sitting, Cuff Size: Standard)   Pulse 76   Temp (!) 96 7 °F (35 9 °C)   Resp 16   Ht 5' 3" (1 6 m)   Wt 62 1 kg (137 lb)   SpO2 96%   BMI 24 27 kg/m²          Physical Exam  Constitutional:       General: He is not in acute distress  HENT:      Head: Normocephalic  Mouth/Throat:      Pharynx: No oropharyngeal exudate  Eyes:      General: No scleral icterus  Conjunctiva/sclera: Conjunctivae normal       Pupils: Pupils are equal, round, and reactive to light  Neck:      Thyroid: No thyromegaly  Cardiovascular:      Rate and Rhythm: Normal rate and regular rhythm  Heart sounds: Normal heart sounds  No murmur heard  Pulmonary:      Effort: Pulmonary effort is normal  No respiratory distress  Breath sounds: Normal breath sounds  No wheezing or rales  Abdominal:      General: Bowel sounds are normal  There is no distension  Palpations: Abdomen is soft  Tenderness: There is no abdominal tenderness  There is no guarding or rebound  Musculoskeletal:         General: No tenderness  Cervical back: Neck supple  Lymphadenopathy:      Cervical: No cervical adenopathy  Skin:     Coloration: Skin is not pale  Findings: No rash  Neurological:      Mental Status: He is alert and oriented to person, place, and time        Sensory: No sensory deficit  Motor: No weakness

## 2021-12-13 ENCOUNTER — TELEPHONE (OUTPATIENT)
Dept: FAMILY MEDICINE CLINIC | Facility: CLINIC | Age: 71
End: 2021-12-13

## 2021-12-13 DIAGNOSIS — J06.9 ACUTE URI: Primary | ICD-10-CM

## 2021-12-13 RX ORDER — LEVOFLOXACIN 500 MG/1
500 TABLET, FILM COATED ORAL EVERY 24 HOURS
Qty: 10 TABLET | Refills: 0 | Status: SHIPPED | OUTPATIENT
Start: 2021-12-13 | End: 2021-12-23

## 2021-12-13 RX ORDER — BENZONATATE 100 MG/1
100 CAPSULE ORAL 3 TIMES DAILY PRN
Qty: 30 CAPSULE | Refills: 0 | Status: SHIPPED | OUTPATIENT
Start: 2021-12-13 | End: 2021-12-27 | Stop reason: ALTCHOICE

## 2021-12-13 RX ORDER — PREDNISONE 10 MG/1
TABLET ORAL
Qty: 20 TABLET | Refills: 0 | Status: SHIPPED | OUTPATIENT
Start: 2021-12-13 | End: 2021-12-27 | Stop reason: ALTCHOICE

## 2021-12-20 ENCOUNTER — TELEPHONE (OUTPATIENT)
Dept: FAMILY MEDICINE CLINIC | Facility: CLINIC | Age: 71
End: 2021-12-20

## 2021-12-20 DIAGNOSIS — M25.572 ACUTE BILATERAL ANKLE PAIN: ICD-10-CM

## 2021-12-20 DIAGNOSIS — M25.571 ACUTE BILATERAL ANKLE PAIN: ICD-10-CM

## 2021-12-20 DIAGNOSIS — R49.0 HOARSENESS: Primary | ICD-10-CM

## 2021-12-27 ENCOUNTER — HOSPITAL ENCOUNTER (EMERGENCY)
Facility: HOSPITAL | Age: 71
Discharge: HOME | End: 2021-12-27
Attending: EMERGENCY MEDICINE
Payer: COMMERCIAL

## 2021-12-27 ENCOUNTER — APPOINTMENT (EMERGENCY)
Dept: RADIOLOGY | Facility: HOSPITAL | Age: 71
End: 2021-12-27
Payer: COMMERCIAL

## 2021-12-27 VITALS
WEIGHT: 147.05 LBS | SYSTOLIC BLOOD PRESSURE: 131 MMHG | HEART RATE: 72 BPM | DIASTOLIC BLOOD PRESSURE: 73 MMHG | RESPIRATION RATE: 20 BRPM | BODY MASS INDEX: 26.05 KG/M2 | TEMPERATURE: 96.7 F | OXYGEN SATURATION: 95 %

## 2021-12-27 DIAGNOSIS — M19.90 ARTHRITIS: Primary | ICD-10-CM

## 2021-12-27 LAB
ALBUMIN SERPL BCP-MCNC: 3.9 G/DL (ref 3–5.2)
ALP SERPL-CCNC: 43 U/L (ref 43–122)
ALT SERPL W P-5'-P-CCNC: 23 U/L
ANION GAP SERPL CALCULATED.3IONS-SCNC: 4 MMOL/L (ref 5–14)
AST SERPL W P-5'-P-CCNC: 43 U/L (ref 17–59)
BASOPHILS # BLD AUTO: 0 THOUSANDS/ΜL (ref 0–0.1)
BASOPHILS NFR BLD AUTO: 1 % (ref 0–1)
BILIRUB SERPL-MCNC: 0.54 MG/DL
BUN SERPL-MCNC: 26 MG/DL (ref 5–25)
CALCIUM SERPL-MCNC: 9.5 MG/DL (ref 8.4–10.2)
CHLORIDE SERPL-SCNC: 104 MMOL/L (ref 97–108)
CO2 SERPL-SCNC: 28 MMOL/L (ref 22–30)
CREAT SERPL-MCNC: 1.18 MG/DL (ref 0.7–1.5)
CRP SERPL QL: 9.3 MG/L
EOSINOPHIL # BLD AUTO: 0.2 THOUSAND/ΜL (ref 0–0.4)
EOSINOPHIL NFR BLD AUTO: 3 % (ref 0–6)
ERYTHROCYTE [DISTWIDTH] IN BLOOD BY AUTOMATED COUNT: 14.5 %
ERYTHROCYTE [SEDIMENTATION RATE] IN BLOOD: 25 MM/HOUR (ref 0–19)
GFR SERPL CREATININE-BSD FRML MDRD: 62 ML/MIN/1.73SQ M
GLUCOSE SERPL-MCNC: 105 MG/DL (ref 70–99)
HCT VFR BLD AUTO: 37 % (ref 41–53)
HGB BLD-MCNC: 12.9 G/DL (ref 13.5–17.5)
LYMPHOCYTES # BLD AUTO: 1.3 THOUSANDS/ΜL (ref 0.5–4)
LYMPHOCYTES NFR BLD AUTO: 21 % (ref 25–45)
MCH RBC QN AUTO: 33.3 PG (ref 26–34)
MCHC RBC AUTO-ENTMCNC: 34.9 G/DL (ref 31–36)
MCV RBC AUTO: 95 FL (ref 80–100)
MONOCYTES # BLD AUTO: 0.5 THOUSAND/ΜL (ref 0.2–0.9)
MONOCYTES NFR BLD AUTO: 9 % (ref 1–10)
NEUTROPHILS # BLD AUTO: 4 THOUSANDS/ΜL (ref 1.8–7.8)
NEUTS SEG NFR BLD AUTO: 67 % (ref 45–65)
PLATELET # BLD AUTO: 200 THOUSANDS/UL (ref 150–450)
PMV BLD AUTO: 9.1 FL (ref 8.9–12.7)
POTASSIUM SERPL-SCNC: 4.2 MMOL/L (ref 3.6–5)
PROT SERPL-MCNC: 7.1 G/DL (ref 5.9–8.4)
RBC # BLD AUTO: 3.87 MILLION/UL (ref 4.5–5.9)
SODIUM SERPL-SCNC: 136 MMOL/L (ref 137–147)
WBC # BLD AUTO: 6 THOUSAND/UL (ref 4.5–11)

## 2021-12-27 PROCEDURE — 96372 THER/PROPH/DIAG INJ SC/IM: CPT

## 2021-12-27 PROCEDURE — 80053 COMPREHEN METABOLIC PANEL: CPT | Performed by: EMERGENCY MEDICINE

## 2021-12-27 PROCEDURE — 36415 COLL VENOUS BLD VENIPUNCTURE: CPT | Performed by: EMERGENCY MEDICINE

## 2021-12-27 PROCEDURE — 99283 EMERGENCY DEPT VISIT LOW MDM: CPT

## 2021-12-27 PROCEDURE — 86140 C-REACTIVE PROTEIN: CPT | Performed by: EMERGENCY MEDICINE

## 2021-12-27 PROCEDURE — 73630 X-RAY EXAM OF FOOT: CPT

## 2021-12-27 PROCEDURE — 99283 EMERGENCY DEPT VISIT LOW MDM: CPT | Performed by: EMERGENCY MEDICINE

## 2021-12-27 PROCEDURE — 85025 COMPLETE CBC W/AUTO DIFF WBC: CPT | Performed by: EMERGENCY MEDICINE

## 2021-12-27 PROCEDURE — 85652 RBC SED RATE AUTOMATED: CPT | Performed by: EMERGENCY MEDICINE

## 2021-12-27 RX ORDER — TRAMADOL HYDROCHLORIDE 50 MG/1
50 TABLET ORAL EVERY 8 HOURS PRN
Qty: 9 TABLET | Refills: 0 | Status: SHIPPED | OUTPATIENT
Start: 2021-12-27 | End: 2021-12-30

## 2021-12-27 RX ORDER — ACETAMINOPHEN 325 MG/1
975 TABLET ORAL ONCE
Status: COMPLETED | OUTPATIENT
Start: 2021-12-27 | End: 2021-12-27

## 2021-12-27 RX ORDER — KETOROLAC TROMETHAMINE 30 MG/ML
15 INJECTION, SOLUTION INTRAMUSCULAR; INTRAVENOUS ONCE
Status: COMPLETED | OUTPATIENT
Start: 2021-12-27 | End: 2021-12-27

## 2021-12-27 RX ADMIN — KETOROLAC TROMETHAMINE 15 MG: 30 INJECTION, SOLUTION INTRAMUSCULAR; INTRAVENOUS at 12:04

## 2021-12-27 RX ADMIN — ACETAMINOPHEN 975 MG: 325 TABLET ORAL at 12:04

## 2021-12-27 NOTE — ED PROVIDER NOTES
History  Chief Complaint   Patient presents with    Leg Pain     Patient is a 65-year-old male presenting for concerns of bilateral foot pain  Symptoms have been present for approximately 2 weeks  No hx of trauma  No fevers  Pain present in the achilles tendon  No hx of previous surgeries or injury to feet          Leg Pain  Associated symptoms: no fever and no neck pain        Prior to Admission Medications   Prescriptions Last Dose Informant Patient Reported? Taking? amLODIPine (NORVASC) 10 mg tablet 12/26/2021 at Unknown time Self No Yes   Sig: Take 1 tablet (10 mg total) by mouth daily   levothyroxine 25 mcg tablet 12/26/2021 at Unknown time Self No Yes   Sig: Take 0 5 tablets (12 5 mcg total) by mouth daily   losartan (COZAAR) 50 mg tablet 12/26/2021 at Unknown time Self No Yes   Sig: Take 1 tablet (50 mg total) by mouth daily   magnesium oxide (MAG-OX) 400 mg 12/26/2021 at Unknown time Self No Yes   Sig: Take 1 tablet (400 mg total) by mouth daily   metoprolol succinate (TOPROL-XL) 50 mg 24 hr tablet 12/26/2021 at Unknown time  No Yes   Sig: Take 0 5 tablets (25 mg total) by mouth daily   omeprazole (PriLOSEC) 20 mg delayed release capsule 12/26/2021 at Unknown time  No Yes   Sig: Take 1 capsule (20 mg total) by mouth daily   pancrelipase, Lip-Prot-Amyl, (CREON) 12,000 units capsule 12/26/2021 at Unknown time Self No Yes   Sig: Take 12,000 units of lipase by mouth 3 (three) times a day with meals   rosuvastatin (CRESTOR) 10 MG tablet 12/26/2021 at Unknown time  No Yes   Sig: Take 1 tablet (10 mg total) by mouth daily Please STOP Vytorin  Uziel Onofre    spironolactone (ALDACTONE) 25 mg tablet 12/26/2021 at Unknown time Self No Yes   Sig: Take 0 5 tablets (12 5 mg total) by mouth daily   thiamine 100 MG tablet Not Taking at Unknown time Self No No   Sig: Take 1 tablet (100 mg total) by mouth daily   Patient not taking: Reported on 12/27/2021    zolpidem (AMBIEN) 10 mg tablet More than a month at Unknown time Self Yes No Sig: Take by mouth daily at bedtime as needed       Facility-Administered Medications: None       Past Medical History:   Diagnosis Date    A-fib ([address] )     Arthritis     Avascular necrosis of bone of hip, left (HCC)     replaced    Back pain     CAD (coronary artery disease)     Carotid artery narrowing     left side -for endarterectomy today 3/5/2020    Disease of thyroid gland     hypo    GERD (gastroesophageal reflux disease)     History of Clostridioides difficile infection     Hyperlipidemia     Hypertension     Irregular heart beat     Kidney stone     Neck pain     Pancreatitis     Spinal stenosis     Wears glasses        Past Surgical History:   Procedure Laterality Date    BACK SURGERY      CARDIAC CATHETERIZATION      cardiac cath 5/2010  adjusted meds    CATARACT EXTRACTION Bilateral     CERVICAL FUSION      CHOLANGIOGRAM N/A 3/4/2021    Procedure: CHOLANGIOGRAM;  Surgeon: Gustavo Garibay MD;  Location:  MAIN OR;  Service: General    CHOLECYSTECTOMY LAPAROSCOPIC N/A 3/4/2021    Procedure: CHOLECYSTECTOMY LAPAROSCOPIC;  Surgeon: Gustavo Garibay MD;  Location:  MAIN OR;  Service: General    COLONOSCOPY      JOINT REPLACEMENT Left     hip    LUMBAR FUSION      NECK SURGERY      ORTHOPEDIC SURGERY Left     L THR    HI THROMBOENDARTECTMY NECK,NECK INCIS Left 3/5/2020    Procedure: ENDARTERECTOMY ARTERY CAROTID WITH BOVINE PATCH ANGIOPLASTY AND INTRAOP DUPLEX;  Surgeon: Aimee Crain MD;  Location: [facility];  Service: Vascular    SPINAL FUSION         Family History   Problem Relation Age of Onset    Heart disease Mother     Parkinsonism Mother     Heart disease Father      I have reviewed and agree with the history as documented      E-Cigarette/Vaping    E-Cigarette Use Never User      E-Cigarette/Vaping Substances    Nicotine No     THC No     CBD No     Flavoring No     Other No     Unknown No      Social History     Tobacco Use    Smoking status: Current Every Day Smoker     Packs/day: 0 50     Years: 50 00     Pack years: 25 00     Types: Cigarettes    Smokeless tobacco: Never Used    Tobacco comment: 3-4 cigarrettes   Vaping Use    Vaping Use: Never used   Substance Use Topics    Alcohol use: Yes     Comment: 3 oz of wine with communion multiple times/day/week    Drug use: Never       Review of Systems   Constitutional: Negative  Negative for chills and fever  HENT: Negative  Negative for rhinorrhea, sore throat, trouble swallowing and voice change  Eyes: Negative  Negative for pain and visual disturbance  Respiratory: Negative  Negative for cough, shortness of breath and wheezing  Cardiovascular: Negative  Negative for chest pain and palpitations  Gastrointestinal: Negative for abdominal pain, diarrhea, nausea and vomiting  Genitourinary: Negative  Negative for dysuria and frequency  Musculoskeletal: Positive for arthralgias  Negative for neck pain and neck stiffness  Skin: Negative  Negative for rash  Neurological: Negative  Negative for dizziness, speech difficulty, weakness, light-headedness and numbness  Physical Exam  Physical Exam  Vitals and nursing note reviewed  Constitutional:       General: He is not in acute distress  Appearance: He is well-developed  HENT:      Head: Normocephalic and atraumatic  Eyes:      Conjunctiva/sclera: Conjunctivae normal       Pupils: Pupils are equal, round, and reactive to light  Neck:      Trachea: No tracheal deviation  Cardiovascular:      Rate and Rhythm: Normal rate and regular rhythm  Pulmonary:      Effort: Pulmonary effort is normal  No respiratory distress  Breath sounds: Normal breath sounds  No wheezing or rales  Abdominal:      General: Bowel sounds are normal  There is no distension  Palpations: Abdomen is soft  Tenderness: There is no abdominal tenderness  There is no guarding or rebound     Musculoskeletal:         General: No tenderness or deformity  Normal range of motion  Cervical back: Normal range of motion and neck supple  Legs:    Skin:     General: Skin is warm and dry  Capillary Refill: Capillary refill takes less than 2 seconds  Findings: No rash  Neurological:      Mental Status: He is alert and oriented to person, place, and time     Psychiatric:         Behavior: Behavior normal          Vital Signs  ED Triage Vitals [12/27/21 1119]   Temperature Pulse Respirations Blood Pressure SpO2   (!) 96 7 °F (35 9 °C) 76 20 145/78 98 %      Temp Source Heart Rate Source Patient Position - Orthostatic VS BP Location FiO2 (%)   Tympanic Monitor Sitting Left arm --      Pain Score       5           Vitals:    12/27/21 1119 12/27/21 1300 12/27/21 1330 12/27/21 1400   BP: 145/78 128/76 128/75 131/73   Pulse: 76 60 64 72   Patient Position - Orthostatic VS: Sitting            Visual Acuity      ED Medications  Medications   ketorolac (TORADOL) injection 15 mg (15 mg Intramuscular Given 12/27/21 1204)   acetaminophen (TYLENOL) tablet 975 mg (975 mg Oral Given 12/27/21 1204)       Diagnostic Studies  Results Reviewed     Procedure Component Value Units Date/Time    Sedimentation rate, automated [[id number]]  (Abnormal) Collected: 12/27/21 1213    Lab Status: Final result Specimen: Blood from Arm, Right Updated: 12/27/21 1315     Sed Rate 25 mm/hour     Comprehensive metabolic panel [[id number]]  (Abnormal) Collected: 12/27/21 1213    Lab Status: Final result Specimen: Blood from Arm, Right Updated: 12/27/21 1256     Sodium 136 mmol/L      Potassium 4 2 mmol/L      Chloride 104 mmol/L      CO2 28 mmol/L      ANION GAP 4 mmol/L      BUN 26 mg/dL      Creatinine 1 18 mg/dL      Glucose 105 mg/dL      Calcium 9 5 mg/dL      AST 43 U/L      ALT 23 U/L      Alkaline Phosphatase 43 U/L      Total Protein 7 1 g/dL      Albumin 3 9 g/dL      Total Bilirubin 0 54 mg/dL      eGFR 62 ml/min/1 73sq m     Narrative:      National Kidney Disease Foundation guidelines for Chronic Kidney Disease (CKD):     Stage 1 with normal or high GFR (GFR > 90 mL/min/1 73 square meters)    Stage 2 Mild CKD (GFR = 60-89 mL/min/1 73 square meters)    Stage 3A Moderate CKD (GFR = 45-59 mL/min/1 73 square meters)    Stage 3B Moderate CKD (GFR = 30-44 mL/min/1 73 square meters)    Stage 4 Severe CKD (GFR = 15-29 mL/min/1 73 square meters)    Stage 5 End Stage CKD (GFR <15 mL/min/1 73 square meters)  Note: GFR calculation is accurate only with a steady state creatinine    C-reactive protein [[id number]]  (Normal) Collected: 12/27/21 1213    Lab Status: Final result Specimen: Blood from Arm, Right Updated: 12/27/21 1256     CRP 9 3 mg/L     CBC and differential [[id number]]  (Abnormal) Collected: 12/27/21 1213    Lab Status: Final result Specimen: Blood from Arm, Right Updated: 12/27/21 1241     WBC 6 00 Thousand/uL      RBC 3 87 Million/uL      Hemoglobin 12 9 g/dL      Hematocrit 37 0 %      MCV 95 fL      MCH 33 3 pg      MCHC 34 9 g/dL      RDW 14 5 %      MPV 9 1 fL      Platelets 713 Thousands/uL      Neutrophils Relative 67 %      Lymphocytes Relative 21 %      Monocytes Relative 9 %      Eosinophils Relative 3 %      Basophils Relative 1 %      Neutrophils Absolute 4 00 Thousands/µL      Lymphocytes Absolute 1 30 Thousands/µL      Monocytes Absolute 0 50 Thousand/µL      Eosinophils Absolute 0 20 Thousand/µL      Basophils Absolute 0 00 Thousands/µL                  XR foot 3+ views RIGHT   Final Result by Jennifer Cesar DO (12/27 1225)      No acute osseous abnormality  Workstation performed: [id number]         XR foot 3+ views LEFT   Final Result by Jennifer Cesar DO (12/27 1224)      No acute osseous abnormality  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 Yes Filed at: 12/27/2021 1215   Initial Alcohol Screen: US AUDIT-C     1  How often do you have a drink containing alcohol? 1 Filed at: 12/27/2021 1215   2  How many drinks containing alcohol do you have on a typical day you are drinking? 1 Filed at: 12/27/2021 1215   3b  FEMALE Any Age, or MALE 65+: How often do you have 4 or more drinks on one occassion? 0 Filed at: 12/27/2021 1215   Audit-C Score 2 Filed at: 12/27/2021 1215   LAKEISHA: How many times in the past year have you    Used an illegal drug or used a prescription medication for non-medical reasons? Never Filed at: 12/27/2021 1215                    MDM  Number of Diagnoses or Management Options  Arthritis  Diagnosis management comments: I have reviewed any of the patient's vist and any testing done in the emergency department  They have verbalized their understanding of any testing done today and have no further questions or concerns regarding their care in the emergency room  They will follow up with their primary care physician as well as with any specialist in their discharge instructions  Strict return precautions were discussed  Amount and/or Complexity of Data Reviewed  Clinical lab tests: ordered and reviewed  Tests in the radiology section of CPT®: ordered and reviewed        Disposition  Final diagnoses:   Arthritis     Time reflects when diagnosis was documented in both MDM as applicable and the Disposition within this note     Time User Action Codes Description Comment    12/27/2021  1:48 PM Brook Garcia Add [T27 30] Arthritis       ED Disposition     ED Disposition Condition Date/Time Comment    Discharge Stable Mon Dec 27, 2021  1:47 PM [facility] discharge to home/self care              Follow-up Information     Follow up With Specialties Details Why Contact Info Additional Information    Deangelo Arana MD Internal Medicine   [address] [facility] [address]  [phone] [address] [facility]   [address]  [facility] [address]  [address] [facility] [address], [address] [facility], [place], [place], [address] [phone]          Discharge Medication List as of 12/27/2021  1:48 PM      START taking these medications    Details   traMADol (ULTRAM) 50 mg tablet Take 1 tablet (50 mg total) by mouth every 8 (eight) hours as needed for severe pain for up to 3 days, Starting Mon 12/27/2021, Until Thu 12/30/2021 at 2359, Normal         CONTINUE these medications which have NOT CHANGED    Details   amLODIPine (NORVASC) 10 mg tablet Take 1 tablet (10 mg total) by mouth daily, Starting Thu 4/8/2021, Normal      levothyroxine 25 mcg tablet Take 0 5 tablets (12 5 mcg total) by mouth daily, Starting Thu 4/8/2021, Until Mon 12/27/2021, Normal      losartan (COZAAR) 50 mg tablet Take 1 tablet (50 mg total) by mouth daily, Starting Thu 4/8/2021, Normal      magnesium oxide (MAG-OX) 400 mg Take 1 tablet (400 mg total) by mouth daily, Starting Thu 4/8/2021, Normal      metoprolol succinate (TOPROL-XL) 50 mg 24 hr tablet Take 0 5 tablets (25 mg total) by mouth daily, Starting Tue 8/3/2021, Normal      omeprazole (PriLOSEC) 20 mg delayed release capsule Take 1 capsule (20 mg total) by mouth daily, Starting Tue 9/28/2021, Normal      pancrelipase, Lip-Prot-Amyl, (CREON) 12,000 units capsule Take 12,000 units of lipase by mouth 3 (three) times a day with meals, Starting Thu 1/28/2021, Normal      rosuvastatin (CRESTOR) 10 MG tablet Take 1 tablet (10 mg total) by mouth daily Please STOP Vytorin   , Starting Tue 7/13/2021, Normal      spironolactone (ALDACTONE) 25 mg tablet Take 0 5 tablets (12 5 mg total) by mouth daily, Starting Thu 4/8/2021, Normal thiamine 100 MG tablet Take 1 tablet (100 mg total) by mouth daily, Starting Tue 12/1/2020, Normal      zolpidem (AMBIEN) 10 mg tablet Take by mouth daily at bedtime as needed , Starting Fri 11/22/2019, Historical Med             No discharge procedures on file      PDMP Review       Value Time User    PDMP Reviewed  Yes 4/8/2021  2:16 PM Shima Pacheco MD          ED Provider  Electronically Signed by           Abdoulaye Bush DO  12/27/21 1876

## 2021-12-27 NOTE — Clinical Note
Kari Quintana was seen and treated in our emergency department on 12/27/2021  Diagnosis:     Carlos Casiano    He may return on this date: 12/30/2021         If you have any questions or concerns, please don't hesitate to call        Tana Spann DO    ______________________________           _______________          _______________  Hospital Representative                              Date                                Time

## 2021-12-27 NOTE — ED TRIAGE NOTES
For the past 2 weeks extreme pain the both legs from the heels to the back of the calf  If he is off his legs they are better  Walking makes the pain  Denies hx of clots in the legs  Denies CP or SOB  Reports swelling in the ankles  Denies any injuries or trauma to the legs

## 2022-01-04 ENCOUNTER — OFFICE VISIT (OUTPATIENT)
Dept: FAMILY MEDICINE CLINIC | Facility: CLINIC | Age: 72
End: 2022-01-04
Payer: COMMERCIAL

## 2022-01-04 VITALS
OXYGEN SATURATION: 98 % | DIASTOLIC BLOOD PRESSURE: 68 MMHG | HEIGHT: 63 IN | RESPIRATION RATE: 16 BRPM | WEIGHT: 148.8 LBS | TEMPERATURE: 98.7 F | BODY MASS INDEX: 26.36 KG/M2 | HEART RATE: 88 BPM | SYSTOLIC BLOOD PRESSURE: 130 MMHG

## 2022-01-04 DIAGNOSIS — Z12.11 SCREEN FOR COLON CANCER: ICD-10-CM

## 2022-01-04 DIAGNOSIS — E03.8 HYPOTHYROIDISM DUE TO HASHIMOTO'S THYROIDITIS: ICD-10-CM

## 2022-01-04 DIAGNOSIS — I87.2 VENOUS INSUFFICIENCY: ICD-10-CM

## 2022-01-04 DIAGNOSIS — E03.9 HYPOTHYROIDISM (ACQUIRED): ICD-10-CM

## 2022-01-04 DIAGNOSIS — Z23 NEED FOR PNEUMOCOCCAL VACCINATION: Primary | ICD-10-CM

## 2022-01-04 DIAGNOSIS — F17.210 CIGARETTE SMOKER: ICD-10-CM

## 2022-01-04 DIAGNOSIS — I71.2 THORACIC AORTIC ANEURYSM WITHOUT RUPTURE (HCC): ICD-10-CM

## 2022-01-04 DIAGNOSIS — Z12.5 SCREENING FOR PROSTATE CANCER: ICD-10-CM

## 2022-01-04 DIAGNOSIS — E06.3 HYPOTHYROIDISM DUE TO HASHIMOTO'S THYROIDITIS: ICD-10-CM

## 2022-01-04 DIAGNOSIS — I10 ESSENTIAL HYPERTENSION, BENIGN: ICD-10-CM

## 2022-01-04 DIAGNOSIS — K86.1 OTHER CHRONIC PANCREATITIS (HCC): ICD-10-CM

## 2022-01-04 DIAGNOSIS — E43 UNSPECIFIED SEVERE PROTEIN-CALORIE MALNUTRITION (HCC): ICD-10-CM

## 2022-01-04 PROCEDURE — 99214 OFFICE O/P EST MOD 30 MIN: CPT | Performed by: INTERNAL MEDICINE

## 2022-01-04 PROCEDURE — 90471 IMMUNIZATION ADMIN: CPT

## 2022-01-04 PROCEDURE — 90732 PPSV23 VACC 2 YRS+ SUBQ/IM: CPT

## 2022-01-04 RX ORDER — LEVOTHYROXINE SODIUM 0.03 MG/1
12.5 TABLET ORAL DAILY
Qty: 45 TABLET | Refills: 3 | Status: SHIPPED | OUTPATIENT
Start: 2022-01-04 | End: 2022-04-19 | Stop reason: SDUPTHER

## 2022-01-04 RX ORDER — AMLODIPINE BESYLATE 10 MG/1
10 TABLET ORAL DAILY
Qty: 90 TABLET | Refills: 3 | Status: SHIPPED | OUTPATIENT
Start: 2022-01-04 | End: 2022-04-19 | Stop reason: SDUPTHER

## 2022-01-04 NOTE — PROGRESS NOTES
BMI Counseling: Body mass index is 26 36 kg/m²  The BMI is above normal  Exercise recommendations include exercising 3-5 times per week

## 2022-01-04 NOTE — PROGRESS NOTES
Assessment/Plan:         Diagnoses and all orders for this visit:    Need for pneumococcal vaccination  -     PNEUMOCOCCAL POLYSACCHARIDE VACCINE 23-VALENT =>1YO SQ IM    Essential hypertension, benign; stable  Continue same  Renew :  -     amLODIPine (NORVASC) 10 mg tablet; Take 1 tablet (10 mg total) by mouth daily    Screen for colon cancer  -     Occult Blood, Fecal Immunochemical; Future    Thoracic aortic aneurysm without rupture ([facility] ); stable  Continue same  RTC in 3mos w :  -     CTA chest wo w contrast; Future    Cigarette smoker; advised to quit  Yearly ct Lungs  -     UA (URINE) with reflex to Scope; Future  -     Magnesium; Future    Hypothyroidism due to Hashimoto's thyroiditis; continue Synthroid 12 5 MCG AM  RTC in 3mos w Blood work    Screening for prostate cancer  -     PSA Total, Diagnostic; Future    Unspecified severe protein-calorie malnutrition ([facility] ); Improving Nicely  Continue same  RTC in 3mos w Blood  work    Other chronic pancreatitis ([facility] ); stable  Improved  Continue same  RTC in 3mos w Blood  work    Venous insufficiency  -     Jobst Stockings        Subjective:      Patient ID: Ashu Mina is a 73 y o  male  73 Y O man is here for Regular check Up, and Post ER Visit, He feels a little Better, recent blood work and med list reviewed w pt,  The following portions of the patient's history were reviewed and updated as appropriate: allergies, current medications, past family history, past social history, past surgical history and problem list     Review of Systems   Constitutional: Negative for chills, fatigue and fever  HENT: Negative for congestion, facial swelling, sore throat, trouble swallowing and voice change  Eyes: Negative for pain, discharge and visual disturbance  Respiratory: Negative for cough, shortness of breath and wheezing  Cardiovascular: Negative for chest pain, palpitations and leg swelling     Gastrointestinal: Negative for abdominal pain, blood in stool, constipation, diarrhea and nausea  Endocrine: Negative for polydipsia, polyphagia and polyuria  Genitourinary: Negative for difficulty urinating, hematuria and urgency  Musculoskeletal: Negative for arthralgias and myalgias  Skin: Negative for rash  Neurological: Negative for dizziness, tremors, weakness and headaches  Hematological: Negative for adenopathy  Does not bruise/bleed easily  Psychiatric/Behavioral: Negative for dysphoric mood, sleep disturbance and suicidal ideas  Objective:      /68 (BP Location: Left arm, Patient Position: Sitting, Cuff Size: Standard)   Pulse 88   Temp 98 7 °F (37 1 °C)   Resp 16   Ht 5' 3" (1 6 m)   Wt 67 5 kg (148 lb 12 8 oz)   SpO2 98%   BMI 26 36 kg/m²          Physical Exam  Constitutional:       General: He is not in acute distress  HENT:      Head: Normocephalic  Mouth/Throat:      Pharynx: No oropharyngeal exudate  Eyes:      General: No scleral icterus  Conjunctiva/sclera: Conjunctivae normal       Pupils: Pupils are equal, round, and reactive to light  Neck:      Thyroid: No thyromegaly  Cardiovascular:      Rate and Rhythm: Normal rate and regular rhythm  Heart sounds: Normal heart sounds  No murmur heard  Pulmonary:      Effort: Pulmonary effort is normal  No respiratory distress  Breath sounds: Normal breath sounds  No wheezing or rales  Abdominal:      General: Bowel sounds are normal  There is no distension  Palpations: Abdomen is soft  Tenderness: There is no abdominal tenderness  There is no guarding or rebound  Musculoskeletal:         General: No tenderness  Cervical back: Neck supple  Lymphadenopathy:      Cervical: No cervical adenopathy  Skin:     Coloration: Skin is not pale  Findings: No rash  Neurological:      Mental Status: He is alert and oriented to person, place, and time  Sensory: No sensory deficit  Motor: No weakness

## 2022-01-31 DIAGNOSIS — I10 ESSENTIAL HYPERTENSION: ICD-10-CM

## 2022-02-01 RX ORDER — METOPROLOL SUCCINATE 50 MG/1
25 TABLET, EXTENDED RELEASE ORAL DAILY
Qty: 90 TABLET | Refills: 0 | Status: SHIPPED | OUTPATIENT
Start: 2022-02-01 | End: 2022-04-19 | Stop reason: SDUPTHER

## 2022-02-14 ENCOUNTER — HOSPITAL ENCOUNTER (OUTPATIENT)
Dept: CT IMAGING | Facility: HOSPITAL | Age: 72
Discharge: HOME | End: 2022-02-14
Payer: COMMERCIAL

## 2022-02-14 DIAGNOSIS — I71.2 THORACIC AORTIC ANEURYSM WITHOUT RUPTURE (HCC): ICD-10-CM

## 2022-02-14 PROCEDURE — 71275 CT ANGIOGRAPHY CHEST: CPT

## 2022-02-14 PROCEDURE — G1004 CDSM NDSC: HCPCS

## 2022-02-14 RX ADMIN — IOHEXOL 100 ML: 350 INJECTION, SOLUTION INTRAVENOUS at 11:05

## 2022-03-17 ENCOUNTER — ANESTHESIA (OUTPATIENT)
Dept: ANESTHESIOLOGY | Facility: HOSPITAL | Age: 72
End: 2022-03-17

## 2022-03-17 ENCOUNTER — ANESTHESIA EVENT (OUTPATIENT)
Dept: ANESTHESIOLOGY | Facility: HOSPITAL | Age: 72
End: 2022-03-17

## 2022-03-18 ENCOUNTER — ANESTHESIA EVENT (OUTPATIENT)
Dept: GASTROENTEROLOGY | Facility: HOSPITAL | Age: 72
End: 2022-03-18

## 2022-03-18 ENCOUNTER — HOSPITAL ENCOUNTER (OUTPATIENT)
Dept: GASTROENTEROLOGY | Facility: HOSPITAL | Age: 72
Setting detail: OUTPATIENT SURGERY
Discharge: HOME | End: 2022-03-18
Attending: COLON & RECTAL SURGERY
Payer: COMMERCIAL

## 2022-03-18 ENCOUNTER — ANESTHESIA (OUTPATIENT)
Dept: GASTROENTEROLOGY | Facility: HOSPITAL | Age: 72
End: 2022-03-18

## 2022-03-18 VITALS
HEART RATE: 57 BPM | HEIGHT: 63 IN | DIASTOLIC BLOOD PRESSURE: 64 MMHG | WEIGHT: 148 LBS | BODY MASS INDEX: 26.22 KG/M2 | TEMPERATURE: 97.2 F | OXYGEN SATURATION: 97 % | SYSTOLIC BLOOD PRESSURE: 121 MMHG | RESPIRATION RATE: 18 BRPM

## 2022-03-18 DIAGNOSIS — Z86.010 PERSONAL HISTORY OF COLONIC POLYPS: ICD-10-CM

## 2022-03-18 DIAGNOSIS — Z12.11 ENCOUNTER FOR SCREENING FOR MALIGNANT NEOPLASM OF COLON: ICD-10-CM

## 2022-03-18 PROCEDURE — 88305 TISSUE EXAM BY PATHOLOGIST: CPT | Performed by: PATHOLOGY

## 2022-03-18 RX ORDER — METOPROLOL SUCCINATE 50 MG/1
50 TABLET, EXTENDED RELEASE ORAL DAILY
Status: DISCONTINUED | OUTPATIENT
Start: 2022-03-18 | End: 2022-03-22 | Stop reason: HOSPADM

## 2022-03-18 RX ORDER — SODIUM CHLORIDE 9 MG/ML
50 INJECTION, SOLUTION INTRAVENOUS CONTINUOUS
Status: DISCONTINUED | OUTPATIENT
Start: 2022-03-18 | End: 2022-03-22 | Stop reason: HOSPADM

## 2022-03-18 RX ORDER — PROPOFOL 10 MG/ML
INJECTION, EMULSION INTRAVENOUS AS NEEDED
Status: DISCONTINUED | OUTPATIENT
Start: 2022-03-18 | End: 2022-03-18

## 2022-03-18 RX ADMIN — PROPOFOL 30 MG: 10 INJECTION, EMULSION INTRAVENOUS at 10:57

## 2022-03-18 RX ADMIN — SODIUM CHLORIDE: 0.9 INJECTION, SOLUTION INTRAVENOUS at 10:46

## 2022-03-18 RX ADMIN — PROPOFOL 100 MG: 10 INJECTION, EMULSION INTRAVENOUS at 10:53

## 2022-03-18 RX ADMIN — METOPROLOL SUCCINATE 50 MG: 50 TABLET, EXTENDED RELEASE ORAL at 10:18

## 2022-03-18 RX ADMIN — PROPOFOL 30 MG: 10 INJECTION, EMULSION INTRAVENOUS at 10:55

## 2022-03-18 RX ADMIN — SODIUM CHLORIDE 50 ML/HR: 0.9 INJECTION, SOLUTION INTRAVENOUS at 09:38

## 2022-03-18 NOTE — INTERVAL H&P NOTE
H&P reviewed  After examining the patient I find no changes in the patients condition since the H&P had been written      Vitals:    03/18/22 0931   BP: 108/74   Pulse: 70   Resp: 18   Temp: (!) 96 8 °F (36 °C)   SpO2: 97%

## 2022-03-18 NOTE — ANESTHESIA POSTPROCEDURE EVALUATION
Post-Op Assessment Note    CV Status:  Stable  Pain Score: 0    Pain management: adequate     Mental Status:  Alert and awake   Hydration Status:  Euvolemic   PONV Controlled:  Controlled   Airway Patency:  Patent      Post Op Vitals Reviewed: Yes      Staff: Anesthesiologist, CRNA         No complications documented      BP   109/67   Temp     Pulse  52   Resp   14   SpO2   98

## 2022-03-18 NOTE — ANESTHESIA PREPROCEDURE EVALUATION
Procedure:  COLONOSCOPY    Relevant Problems   CARDIO   (+) Ectopic atrial rhythm   (+) Essential hypertension, benign   (+) Left carotid artery stenosis s/p cartoid endarterectomy   (+) Mixed hyperlipidemia   (+) Thoracic aortic aneurysm without rupture (HCC)      ENDO   (+) Hypothyroidism (acquired)      GI/HEPATIC   (+) Chronic pancreatitis (HCC)   (+) Drug-induced acute pancreatitis without infection or necrosis   (+) Hiatal hernia   (+) Idiopathic acute pancreatitis without infection or necrosis   (+) Pancreatic lesion      MUSCULOSKELETAL   (+) Right sided sciatica      Other   (+) Cigarette nicotine dependence with nicotine-induced disorder        Physical Exam    Airway    Mallampati score: II  TM Distance: >3 FB  Neck ROM: full     Dental       Cardiovascular  Cardiovascular exam normal    Pulmonary  Pulmonary exam normal     Other Findings        Anesthesia Plan  ASA Score- 2     Anesthesia Type- IV sedation with anesthesia with ASA Monitors  Additional Monitors:   Airway Plan:           Plan Factors-Exercise tolerance (METS): >4 METS  Chart reviewed  EKG reviewed  Existing labs reviewed  Patient summary reviewed  Patient is a current smoker  Patient instructed to abstain from smoking on day of procedure  Patient did not smoke on day of surgery  Induction-     Postoperative Plan-     Informed Consent- Anesthetic plan and risks discussed with patient  I personally reviewed this patient with the CRNA  Discussed and agreed on the Anesthesia Plan with the CRNA               Lab Results   Component Value Date    HGBA1C 5 9 (H) 04/13/2021       Lab Results   Component Value Date    K 4 2 12/27/2021     12/27/2021    CO2 28 12/27/2021    BUN 26 (H) 12/27/2021    CREATININE 1 18 12/27/2021    GLUF 98 09/24/2021    CALCIUM 9 5 12/27/2021    CORRECTEDCA 9 7 03/06/2021    AST 43 12/27/2021    ALT 23 12/27/2021    ALKPHOS 43 12/27/2021    EGFR 62 12/27/2021       Lab Results   Component Value Date    WBC 6 00 12/27/2021    HGB 12 9 (L) 12/27/2021    HCT 37 0 (L) 12/27/2021    MCV 95 12/27/2021     12/27/2021   Normal sinus rhythm  Septal infarct (cited on or before 28-FEB-2021)  Abnormal ECG  When compared with ECG of 09-OCT-2020 12:25,  Sinus rhythm has replaced Ectopic atrial rhythm  Nonspecific T wave abnormality no longer evident in Inferior leads  Confirmed by Jewels Swan ([id number]) on 2/28/2021 9:03:08 PM    Cardiology consult from may last yr reviewed       1  Benign essential hypertension  2  Mild ascending thoracic aorta aneurysm, 4 cm at level of right pulmonary artery  on CT scan February 26, 2021  3  History of mild mitral and tricuspid valve regurgitation  4  History of mild inter atrial septal aneurysm without evidence of ASD or PFO without history of TIA/CVA  5  History of hiatal hernia  6  History of colonic polyps  7  History of C diff in the past  8  Carotid artery disease, s/p status post left carotid endarterectomy March 2020  9  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 in July 2018, also status post cervical and lumbar spinal fusion surgeries  10  Mild hypothyroidism  11  Pre diabetes  12  Current chronic pancreatitis of uncertain etiology, possible gallstone related  Now status post cholecystectomy  13  Left renal artery stenosis      LEXISCAN NUCLEAR STRESS TEST February 2020:  1  Negative pharmacologic stress test with regadenoson for symptoms of angina pectoris and negative for ECG evidence of ischemia  2  Tomographic perfusion series consistent with normal perfusion without definite evidence of ischemia or prior infarction  3  Normal left ventricular cavity size with normal left ventricular systolic function and wall motion  EF determined as 65%    4  Elevated resting blood pressure with appropriate blood pressure changes noted during the stress test   5  No chest pain was reported during the stress test   6  Nonspecific resting ECG abnormalities with no significant changes and some nonspecific supraventricular ectopy noted during stress test     Diagnostic sensitivity was limited by submaximal stress       ECHOCARDIOGRAM March 2018 showed mildly increased left ventricular wall thickness, normal left ventricular systolic function and hyperdynamic wall motion, normal diastolic function, EF around 65%   Mild aortic valve sclerosis, trace tricuspid valve regurgitation, no pulmonary hypertension, borderline dilated aortic root      CTA of chest significant for 4 cm ascending thoracic aortic aneurysm with a focal atelectasis or scarring lower lungs without any lung mass or effusion      CURRENT ECG:  No results found for this visit on 05/28/21

## 2022-03-18 NOTE — NURSING NOTE
Pt is awake,alert,tolerated diet, written and verbal instructions given, pt verbalizes an understanding and voices no questions or complaints

## 2022-03-18 NOTE — DISCHARGE INSTRUCTIONS
[facility] [address], [address] [place] [address] [address]  Phone: [phone]    DISCHARGE INSTRUCTIONS:    1   ___ Complete Exam - Normal    2   ___ Exam normal, but entire colon not seen  We will discuss this with you  3   ___ Polyp(s) removed by "burning" - NO pathology report will follow    4   _1__ Polyp(s) removed by excision  Pathology report will be available in 4-5 days   Someone from our office will call you with results  5   ___ Exam prompted biopsies  Pathology report will be available in 4-5 days   Someone from our office will call you with results  6   ___ Exam demonstrated findings that need treatment  Prescriptions will be   Given to you  Return to our office in ____ weeks  Please call for appt  7   ___ Original office visit or colonoscopy findings necessitate an office visit  Please call to set up a new appointment    8   ___ Medication  __________________________________________        [address]:    - Go straight home and rest today    - No driving or drinking alcohol for 24 hours    - Resume regular diet and medications unless otherwise instructed  Coumadin and Plavix are blood thinners  You can resume these medications on __________      IF YOU ARE HAVING ANY FEVER, BLEEDING OR PERSISTENT PAIN IN THE ABDOMEN, PLEASE CALL OUR OFFICE ANY DAY OR TIME  [phone]

## 2022-03-31 ENCOUNTER — HOSPITAL ENCOUNTER (OUTPATIENT)
Dept: NON INVASIVE DIAGNOSTICS | Facility: HOSPITAL | Age: 72
Discharge: HOME | End: 2022-03-31
Payer: COMMERCIAL

## 2022-03-31 DIAGNOSIS — I65.23 CAROTID ARTERY STENOSIS, ASYMPTOMATIC, BILATERAL: Chronic | ICD-10-CM

## 2022-03-31 PROCEDURE — 93880 EXTRACRANIAL BILAT STUDY: CPT

## 2022-04-11 DIAGNOSIS — K86.1 OTHER CHRONIC PANCREATITIS (HCC): ICD-10-CM

## 2022-04-11 DIAGNOSIS — I10 ESSENTIAL HYPERTENSION, BENIGN: ICD-10-CM

## 2022-04-11 DIAGNOSIS — R19.7 DIARRHEA, UNSPECIFIED TYPE: ICD-10-CM

## 2022-04-11 DIAGNOSIS — E83.42 HYPOMAGNESEMIA: ICD-10-CM

## 2022-04-12 ENCOUNTER — OFFICE VISIT (OUTPATIENT)
Dept: VASCULAR SURGERY | Facility: CLINIC | Age: 72
End: 2022-04-12
Payer: COMMERCIAL

## 2022-04-12 VITALS
BODY MASS INDEX: 27.54 KG/M2 | OXYGEN SATURATION: 98 % | WEIGHT: 155.4 LBS | HEART RATE: 104 BPM | HEIGHT: 63 IN | SYSTOLIC BLOOD PRESSURE: 126 MMHG | TEMPERATURE: 98.8 F | DIASTOLIC BLOOD PRESSURE: 60 MMHG

## 2022-04-12 DIAGNOSIS — I10 ESSENTIAL HYPERTENSION, BENIGN: ICD-10-CM

## 2022-04-12 DIAGNOSIS — I65.23 CAROTID ARTERY STENOSIS, ASYMPTOMATIC, BILATERAL: Primary | Chronic | ICD-10-CM

## 2022-04-12 PROCEDURE — 99214 OFFICE O/P EST MOD 30 MIN: CPT | Performed by: PHYSICIAN ASSISTANT

## 2022-04-12 RX ORDER — SPIRONOLACTONE 25 MG/1
TABLET ORAL
Qty: 45 TABLET | Refills: 0 | Status: SHIPPED | OUTPATIENT
Start: 2022-04-12 | End: 2022-04-19 | Stop reason: SDUPTHER

## 2022-04-12 RX ORDER — LANOLIN ALCOHOL/MO/W.PET/CERES
CREAM (GRAM) TOPICAL
Qty: 90 TABLET | Refills: 0 | Status: SHIPPED | OUTPATIENT
Start: 2022-04-12 | End: 2022-04-15 | Stop reason: SDUPTHER

## 2022-04-12 RX ORDER — LOSARTAN POTASSIUM 50 MG/1
TABLET ORAL
Qty: 90 TABLET | Refills: 0 | Status: SHIPPED | OUTPATIENT
Start: 2022-04-12 | End: 2022-04-19 | Stop reason: SDUPTHER

## 2022-04-12 RX ORDER — LOSARTAN POTASSIUM 50 MG/1
TABLET ORAL
Qty: 90 TABLET | Refills: 0 | Status: SHIPPED | OUTPATIENT
Start: 2022-04-12 | End: 2022-04-12

## 2022-04-12 RX ORDER — PANCRELIPASE 60000; 12000; 38000 [USP'U]/1; [USP'U]/1; [USP'U]/1
CAPSULE, DELAYED RELEASE PELLETS ORAL
Qty: 90 CAPSULE | Refills: 0 | Status: SHIPPED | OUTPATIENT
Start: 2022-04-12 | End: 2022-07-10 | Stop reason: SDUPTHER

## 2022-04-12 NOTE — PATIENT INSTRUCTIONS
Carotid Artery Disease   AMBULATORY CARE:   Carotid artery disease (CAD)  means the major blood vessels in your neck are narrowed or becoming blocked  These 2 major blood vessels are called the carotid arteries  They supply your brain with blood  The narrow or blocked blood vessels increase your risk for a stroke  CAD is also called carotid artery stenosis  Have someone call your local emergency number (911 in the [address] [facility]) if:   · You have any of the following signs of a stroke:      ? Numbness or drooping on one side of your face     ? Weakness in an arm or leg    ? Confusion or difficulty speaking    ? Dizziness, a severe headache, or vision loss    · You have any of the following signs of a heart attack:      ? Squeezing, pressure, or pain in your chest    ? You may  also have any of the following:     § Discomfort or pain in your back, neck, jaw, stomach, or arm    § Shortness of breath    § Nausea or vomiting    § Lightheadedness or a sudden cold sweat    Call your doctor if:   · You have questions or concerns about your condition or care  Common signs and symptoms:  CAD develops slowly  You may have no signs or symptoms until you have a mini-stroke, or transient ischemic attack (TIA)  A TIA is a temporary lack of blood flow to your brain  A TIA goes away quickly and does not cause permanent damage  A TIA may be a warning sign that you are about to have a stroke  If you have any symptoms of a TIA or stroke, seek care immediately  Warning signs of a stroke: The words BE FAST can help you remember and recognize warning signs of a stroke:  · B = Balance:  Sudden loss of balance    · E = Eyes:  Loss of vision in one or both eyes    · F = Face:  Face droops on one side    · A = Arms:  Arm drops when both arms are raised    · S = Speech:  Speech is slurred or sounds different    · T = Time:  Time to get help immediately       Treatment  depends on how narrow your arteries have become   Treatment also depends on your symptoms and your general health  The goal of treatment is to lower your risk for a stroke  You may need any of the following:  · Medicines:      ? Aspirin,  or other blood thinner, may be recommended  These will help prevent blood clots from forming in your carotid arteries  If your healthcare provider wants you to take aspirin, do not take acetaminophen or ibuprofen instead  ? Cholesterol medicine  lowers your cholesterol level  ? Blood pressure medicine  lowers or helps control your blood pressure  · Procedures  can help open blocked arteries:     ? Carotid endarterectomy (CEA)  is used to cut and remove plaque buildup from your arteries  ? Carotid angioplasty and stenting (NAA)  is used to push the plaque against the artery wall with a balloon device  Then, a stent is placed to keep the artery open  A stent is a small metal mesh tube  Prevent a stroke:   · Do not smoke, and avoid secondhand smoke  Nicotine and other chemicals in cigarettes and cigars increase your risk for a stroke  Ask your healthcare provider for information if you currently smoke and need help to quit  E-cigarettes or smokeless tobacco still contain nicotine  Talk to your healthcare provider before you use these products  · Eat a variety of healthy foods  Healthy foods include fruit, vegetables, whole-grain breads, low-fat dairy products, chicken, and fish  Choose fish that are high in omega-3 fatty acids, such as salmon and fresh tuna  Ask your healthcare provider for more information on a heart healthy diet and the DASH eating plan  · Limit sodium (salt)  Sodium may increase your blood pressure  Add less table salt to your foods  Read food labels and choose foods that are low in sodium  Your healthcare provider may suggest you follow a low sodium diet  · Reach or maintain a healthy weight  Extra weight makes your heart work harder  Ask your healthcare provider what a healthy weight is for you  He or she can help you create a safe weight loss plan  Even a weight loss of 10% of your extra body weight can help your heart function better  · Exercise regularly  Exercise helps improve heart function and can help you manage your weight  Exercise can also help lower your cholesterol and blood sugar levels  Try to get at least 30 minutes of exercise 5 times each week  Try to be physically active every day  This may include walking, riding a bicycle, or swimming  Your healthcare provider can help you create an exercise plan that works best for you  · Limit alcohol  Alcohol can increase your blood pressure and triglyceride levels  A drink of alcohol is 12 ounces of beer, 5 ounces of wine, or 1½ ounces of liquor  Follow up with your doctor as directed:  Write down your questions so you remember to ask them during your visits  © Copyright [employer] 2022 Information is for End User's use only and may not be sold, redistributed or otherwise used for commercial purposes  All illustrations and images included in CareNotes® are the copyrighted property of [employer]  or [facility] Marbella [employer]   The above information is an  only  It is not intended as medical advice for individual conditions or treatments  Talk to your doctor, nurse or pharmacist before following any medical regimen to see if it is safe and effective for you

## 2022-04-12 NOTE — ASSESSMENT & PLAN NOTE
79-year-old male smoker with HTN, HLD, hypothyroidism, recurrent pancreatitis s/p lap sabrina 3/4/21, 4 0 cm ascending aortic aneurysm and asymptomatic carotid artery stenosis, s/p L CEA by Dr Geovani Cooper [id number] presents for review of surveillance imaging    -carotid duplex 3/31/22 reviewed with patent left endarterectomy site without restenosis and stable, moderate contralateral disease  -asymptomatic  No prior history TIA/CVA  -patient has been obtaining yearly carotid duplex studies which he would like to continue- given that there has been no change in duplex findings we can proceed with this however if there is progression I would recommend switching to q6mo duplexes   -continue ASA and statin therapy  -discussed the pathophysiology of arterial occlusive disease and direct relationship with smoking  Continued to encourage smoking cessation   -return to office in 1 year with carotid duplex for [facility]  -instructed to contact the office with new concerns or symptoms    Educated in signs/symptoms of TIA/CVA and instructed to call 911 immediately

## 2022-04-12 NOTE — PROGRESS NOTES
Assessment/Plan:    Carotid artery stenosis, asymptomatic, bilateral  72-year-old male smoker with HTN, HLD, hypothyroidism, recurrent pancreatitis s/p lap sabrina 3/4/21, 4 0 cm ascending aortic aneurysm and asymptomatic carotid artery stenosis, s/p L CEA by Dr Brie Manzano [id number] presents for review of surveillance imaging    -carotid duplex 3/31/22 reviewed with patent left endarterectomy site without restenosis and stable, moderate contralateral disease  -asymptomatic  No prior history TIA/CVA  -patient has been obtaining yearly carotid duplex studies which he would like to continue- given that there has been no change in duplex findings we can proceed with this however if there is progression I would recommend switching to q6mo duplexes   -continue ASA and statin therapy  -discussed the pathophysiology of arterial occlusive disease and direct relationship with smoking  Continued to encourage smoking cessation   -return to office in 1 year with carotid duplex for RF[facility]  -instructed to contact the office with new concerns or symptoms  Educated in signs/symptoms of TIA/CVA and instructed to call 911 immediately       Diagnoses and all orders for this visit:    Carotid artery stenosis, asymptomatic, bilateral          Subjective:      Patient ID: Yoli Dixon is a 73 y o  male  Pt is here today to review results of CV done 3/31/2022  He is s/p a Left CEA done 3/5/2020 by Dr Brie Manzano  Pt denies any headaches, dizziness, sudden loss of vision, trouble swallowing, slurred speech, TIA or Stroke symptoms  He is taking Rosuvastatin  Pt is a current smoker  72-year-old male smoker with hypertension, hyperlipidemia, hypothyroidism, recurrent pancreatitis s/p lap sabrina, 4 cm ascending aortic aneurysm and asymptomatic carotid artery stenosis s/p left carotid endarterectomy on 03/05/2020, presenting for review of recent carotid duplex  Patient remains asymptomatic    He denies signs or symptoms of TIA or stroke including aphasia, amaurosis, upper lower extremity weakness  Duplex reviewed with patient which demonstrates widely patent left endarterectomy site without restenosis and stable moderate contralateral disease  Patient has been obtaining his duplex is yearly and wishes to continue doing so  He already has a duplex scheduled for next year  The following portions of the patient's history were reviewed and updated as appropriate: allergies, current medications, past family history, past medical history, past social history, past surgical history and problem list     Review of Systems   Constitutional: Negative  HENT: Negative  Eyes: Negative  Respiratory: Positive for cough and shortness of breath  Cardiovascular: Negative  Gastrointestinal: Negative  Endocrine: Negative  Genitourinary: Positive for frequency  Musculoskeletal: Negative  Skin: Negative  Allergic/Immunologic: Negative  Neurological: Negative  Hematological: Negative  Psychiatric/Behavioral: Negative  I have reviewed and made appropriate changes to the review of systems input by the medical assistant      Vitals:    04/12/22 1334   BP: 126/60   BP Location: Left arm   Patient Position: Sitting   Cuff Size: Standard   Pulse: 104   Temp: 98 8 °F (37 1 °C)   TempSrc: Tympanic   SpO2: 98%   Weight: 70 5 kg (155 lb 6 4 oz)   Height: 5' 3" (1 6 m)       Patient Active Problem List   Diagnosis    Essential hypertension, benign    Left carotid artery stenosis s/p cartoid endarterectomy    Thoracic aortic aneurysm without rupture (HCC)    Ectopic atrial rhythm    Prediabetes    Hypothyroidism (acquired)    Mixed hyperlipidemia    Drug-induced acute pancreatitis without infection or necrosis    Cigarette nicotine dependence with nicotine-induced disorder    Jejunitis    Radiculopathy, lumbar region    Spinal stenosis of lumbar region without neurogenic claudication    Right sided sciatica    Chronic pancreatitis ([facility] [address] )    Carotid artery stenosis, asymptomatic, bilateral    Severe protein-calorie malnutrition (HCC)    Pancreatic lesion    Hiatal hernia    Idiopathic acute pancreatitis without infection or necrosis    Transaminitis       Past Surgical History:   Procedure Laterality Date    BACK SURGERY      CARDIAC CATHETERIZATION      cardiac cath 5/2010  adjusted meds    CATARACT EXTRACTION Bilateral     CERVICAL FUSION      CHOLANGIOGRAM N/A 3/4/2021    Procedure: CHOLANGIOGRAM;  Surgeon: Yefri Gifford MD;  Location: [address] [place] [address] MAIN OR;  Service: General    CHOLECYSTECTOMY LAPAROSCOPIC N/A 3/4/2021    Procedure: CHOLECYSTECTOMY LAPAROSCOPIC;  Surgeon: Yefri Gifford MD;  Location:  MAIN OR;  Service: General    COLONOSCOPY      JOINT REPLACEMENT Left     hip    LUMBAR FUSION      NECK SURGERY      ORTHOPEDIC SURGERY Left     L THR    [place] THROMBOENDARTECTMY NECK,NECK INCIS Left 3/5/2020    Procedure: ENDARTERECTOMY ARTERY CAROTID WITH BOVINE PATCH ANGIOPLASTY AND INTRAOP DUPLEX;  Surgeon: Ankush Morrow MD;  Location: [facility] Main OR;  Service: Vascular    SPINAL FUSION         Family History   Problem Relation Age of Onset    Heart disease Mother     Parkinsonism Mother     Heart disease Father        Social History     Socioeconomic History    Marital status: Single     Spouse name: Not on file    Number of children: 0    Years of education: Not on file    Highest education level: Not on file   Occupational History    Occupation: [address] [place] [address]   Tobacco Use    Smoking status: Current Every Day Smoker     Packs/day: 0 50     Years: 50 00     Pack years: 25 00     Types: Cigarettes    Smokeless tobacco: Never Used    Tobacco comment: 3-4 cigarrettes   Vaping Use    Vaping Use: Never used   Substance and Sexual Activity    Alcohol use: Yes     Comment: 3 oz of wine with communion multiple times/day/week    Drug use: Never    Sexual activity: Not Currently     Comment: DAVIDSON   Other Topics Concern    Not on file   Social History Narrative    Lives independently     Social Determinants of Health     Financial Resource Strain: Not on file   Food Insecurity: Not on file   Transportation Needs: Not on file   Physical Activity: Not on file   Stress: Not on file   Social Connections: Not on file   Intimate Partner Violence: Not on file   Housing Stability: Not on file       No Known Allergies      Current Outpatient Medications:     amLODIPine (NORVASC) 10 mg tablet, Take 1 tablet (10 mg total) by mouth daily, Disp: 90 tablet, Rfl: 3    Creon 47637-10983 units capsule, TAKE 1 CAPSULE BY MOUTH 3 TIMESA DAY WITH MEALS, Disp: 90 capsule, Rfl: 0    losartan (COZAAR) 50 mg tablet, TAKE 1 TABLET BY MOUTH DAILY  , Disp: 90 tablet, Rfl: 0    magnesium Oxide (MAG-OX) 400 mg TABS, TAKE 1 TABLET BY MOUTH DAILY  , Disp: 90 tablet, Rfl: 0    metoprolol succinate (TOPROL-XL) 50 mg 24 hr tablet, Take 0 5 tablets (25 mg total) by mouth daily, Disp: 90 tablet, Rfl: 0    omeprazole (PriLOSEC) 20 mg delayed release capsule, Take 1 capsule (20 mg total) by mouth daily, Disp: 90 capsule, Rfl: 3    rosuvastatin (CRESTOR) 10 MG tablet, Take 1 tablet (10 mg total) by mouth daily Please STOP Vytorin   , Disp: 90 tablet, Rfl: 3    spironolactone (ALDACTONE) 25 mg tablet, TAKE 1/2 TABLET BY MOUTH DAILY  , Disp: 45 tablet, Rfl: 0    thiamine 100 MG tablet, Take 1 tablet (100 mg total) by mouth daily, Disp: 90 tablet, Rfl: 3    zolpidem (AMBIEN) 10 mg tablet, Take by mouth daily at bedtime as needed , Disp: , Rfl:     levothyroxine 25 mcg tablet, Take 0 5 tablets (12 5 mcg total) by mouth daily, Disp: 45 tablet, Rfl: 3    Objective:      /60 (BP Location: Left arm, Patient Position: Sitting, Cuff Size: Standard)   Pulse 104   Temp 98 8 °F (37 1 °C) (Tympanic)   Ht 5' 3" (1 6 m)   Wt 70 5 kg (155 lb 6 4 oz)   SpO2 98%   BMI 27 53 kg/m²          Physical Exam  Constitutional:       General: He is not in acute distress  Appearance: Normal appearance  He is not ill-appearing, toxic-appearing or diaphoretic  HENT:      Head: Normocephalic and atraumatic  Right Ear: External ear normal       Left Ear: External ear normal       Nose: Nose normal       Mouth/Throat:      Mouth: Mucous membranes are moist       Pharynx: Oropharynx is clear  Eyes:      General: No scleral icterus  Extraocular Movements: Extraocular movements intact  Conjunctiva/sclera: Conjunctivae normal    Cardiovascular:      Rate and Rhythm: Normal rate and regular rhythm  Heart sounds: Normal heart sounds  Pulmonary:      Effort: Pulmonary effort is normal  No respiratory distress  Breath sounds: Normal breath sounds  Abdominal:      General: Abdomen is flat  Palpations: Abdomen is soft  Tenderness: There is no abdominal tenderness  Musculoskeletal:         General: Normal range of motion  Cervical back: Normal range of motion and neck supple  Skin:     General: Skin is warm and dry  Neurological:      General: No focal deficit present  Mental Status: He is alert and oriented to person, place, and time  Mental status is at baseline  Cranial Nerves: No cranial nerve deficit  Sensory: No sensory deficit  Motor: No weakness     Psychiatric:         Mood and Affect: Mood normal          Behavior: Behavior normal

## 2022-04-15 ENCOUNTER — APPOINTMENT (OUTPATIENT)
Dept: LAB | Facility: HOSPITAL | Age: 72
End: 2022-04-15
Payer: COMMERCIAL

## 2022-04-15 DIAGNOSIS — E83.42 HYPOMAGNESEMIA: ICD-10-CM

## 2022-04-15 DIAGNOSIS — F17.210 CIGARETTE SMOKER: ICD-10-CM

## 2022-04-15 DIAGNOSIS — Z12.5 SCREENING FOR PROSTATE CANCER: ICD-10-CM

## 2022-04-15 LAB
MAGNESIUM SERPL-MCNC: 1.5 MG/DL (ref 1.6–2.3)
PSA SERPL-MCNC: 1.4 NG/ML (ref 0–4)

## 2022-04-15 PROCEDURE — 36415 COLL VENOUS BLD VENIPUNCTURE: CPT

## 2022-04-15 PROCEDURE — 84153 ASSAY OF PSA TOTAL: CPT

## 2022-04-15 PROCEDURE — 83735 ASSAY OF MAGNESIUM: CPT

## 2022-04-15 RX ORDER — LANOLIN ALCOHOL/MO/W.PET/CERES
400 CREAM (GRAM) TOPICAL 2 TIMES DAILY
Qty: 180 TABLET | Refills: 3 | Status: SHIPPED | OUTPATIENT
Start: 2022-04-15 | End: 2022-04-19 | Stop reason: SDUPTHER

## 2022-04-19 ENCOUNTER — OFFICE VISIT (OUTPATIENT)
Dept: FAMILY MEDICINE CLINIC | Facility: CLINIC | Age: 72
End: 2022-04-19
Payer: COMMERCIAL

## 2022-04-19 VITALS
WEIGHT: 155.2 LBS | HEIGHT: 63 IN | SYSTOLIC BLOOD PRESSURE: 132 MMHG | RESPIRATION RATE: 14 BRPM | DIASTOLIC BLOOD PRESSURE: 68 MMHG | TEMPERATURE: 97.4 F | HEART RATE: 90 BPM | BODY MASS INDEX: 27.5 KG/M2 | OXYGEN SATURATION: 98 %

## 2022-04-19 DIAGNOSIS — E83.42 HYPOMAGNESEMIA: ICD-10-CM

## 2022-04-19 DIAGNOSIS — R94.5 LIVER FUNCTION ABNORMALITY: ICD-10-CM

## 2022-04-19 DIAGNOSIS — K85.90 ACUTE PANCREATITIS: ICD-10-CM

## 2022-04-19 DIAGNOSIS — F17.210 CIGARETTE SMOKER: ICD-10-CM

## 2022-04-19 DIAGNOSIS — E78.2 MIXED HYPERLIPIDEMIA: ICD-10-CM

## 2022-04-19 DIAGNOSIS — I10 ESSENTIAL HYPERTENSION: ICD-10-CM

## 2022-04-19 DIAGNOSIS — E03.9 HYPOTHYROIDISM (ACQUIRED): ICD-10-CM

## 2022-04-19 DIAGNOSIS — K21.9 GASTROESOPHAGEAL REFLUX DISEASE WITHOUT ESOPHAGITIS: ICD-10-CM

## 2022-04-19 DIAGNOSIS — R49.0 HOARSENESS OF VOICE: Primary | ICD-10-CM

## 2022-04-19 DIAGNOSIS — R22.1 LOCALIZED SWELLING, MASS AND LUMP, NECK: ICD-10-CM

## 2022-04-19 DIAGNOSIS — I10 ESSENTIAL HYPERTENSION, BENIGN: ICD-10-CM

## 2022-04-19 PROCEDURE — 99214 OFFICE O/P EST MOD 30 MIN: CPT | Performed by: INTERNAL MEDICINE

## 2022-04-19 RX ORDER — SPIRONOLACTONE 25 MG/1
12.5 TABLET ORAL DAILY
Qty: 45 TABLET | Refills: 3 | Status: SHIPPED | OUTPATIENT
Start: 2022-04-19 | End: 2022-07-10 | Stop reason: SDUPTHER

## 2022-04-19 RX ORDER — LEVOTHYROXINE SODIUM 0.03 MG/1
12.5 TABLET ORAL DAILY
Qty: 45 TABLET | Refills: 3 | Status: SHIPPED | OUTPATIENT
Start: 2022-04-19 | End: 2022-07-10 | Stop reason: SDUPTHER

## 2022-04-19 RX ORDER — AMLODIPINE BESYLATE 10 MG/1
10 TABLET ORAL DAILY
Qty: 90 TABLET | Refills: 3 | Status: SHIPPED | OUTPATIENT
Start: 2022-04-19 | End: 2022-07-10 | Stop reason: SDUPTHER

## 2022-04-19 RX ORDER — OMEPRAZOLE 20 MG/1
20 CAPSULE, DELAYED RELEASE ORAL 2 TIMES DAILY
Qty: 180 CAPSULE | Refills: 3 | Status: SHIPPED | OUTPATIENT
Start: 2022-04-19 | End: 2022-07-05

## 2022-04-19 RX ORDER — PREDNISONE 1 MG/1
TABLET ORAL
Qty: 20 TABLET | Refills: 0 | Status: SHIPPED | OUTPATIENT
Start: 2022-04-19 | End: 2022-05-26

## 2022-04-19 RX ORDER — ROSUVASTATIN CALCIUM 10 MG/1
10 TABLET, COATED ORAL DAILY
Qty: 90 TABLET | Refills: 3 | Status: SHIPPED | OUTPATIENT
Start: 2022-04-19 | End: 2022-07-10 | Stop reason: SDUPTHER

## 2022-04-19 RX ORDER — METOPROLOL SUCCINATE 50 MG/1
25 TABLET, EXTENDED RELEASE ORAL DAILY
Qty: 90 TABLET | Refills: 3 | Status: SHIPPED | OUTPATIENT
Start: 2022-04-19 | End: 2022-07-10 | Stop reason: SDUPTHER

## 2022-04-19 RX ORDER — OMEPRAZOLE 20 MG/1
20 CAPSULE, DELAYED RELEASE ORAL DAILY
Qty: 90 CAPSULE | Refills: 3 | Status: SHIPPED | OUTPATIENT
Start: 2022-04-19 | End: 2022-04-19 | Stop reason: SDUPTHER

## 2022-04-19 RX ORDER — BENZONATATE 100 MG/1
100 CAPSULE ORAL 3 TIMES DAILY PRN
Qty: 20 CAPSULE | Refills: 0 | Status: SHIPPED | OUTPATIENT
Start: 2022-04-19 | End: 2022-05-26

## 2022-04-19 RX ORDER — LOSARTAN POTASSIUM 50 MG/1
50 TABLET ORAL DAILY
Qty: 90 TABLET | Refills: 3 | Status: SHIPPED | OUTPATIENT
Start: 2022-04-19 | End: 2022-07-10 | Stop reason: SDUPTHER

## 2022-04-19 RX ORDER — LEVOFLOXACIN 500 MG/1
500 TABLET, FILM COATED ORAL EVERY 24 HOURS
Qty: 10 TABLET | Refills: 0 | Status: SHIPPED | OUTPATIENT
Start: 2022-04-19 | End: 2022-04-29

## 2022-04-19 RX ORDER — LANOLIN ALCOHOL/MO/W.PET/CERES
100 CREAM (GRAM) TOPICAL DAILY
Qty: 90 TABLET | Refills: 3 | Status: SHIPPED | OUTPATIENT
Start: 2022-04-19 | End: 2022-07-10 | Stop reason: SDUPTHER

## 2022-04-19 RX ORDER — LANOLIN ALCOHOL/MO/W.PET/CERES
400 CREAM (GRAM) TOPICAL 2 TIMES DAILY
Qty: 180 TABLET | Refills: 3 | Status: SHIPPED | OUTPATIENT
Start: 2022-04-19 | End: 2022-07-10 | Stop reason: SDUPTHER

## 2022-04-19 NOTE — PROGRESS NOTES
Assessment/Plan:         Diagnoses and all orders for this visit:    Hoarseness of voice; with long History of smoking;RTC in 1 mo w ;  -     CT soft tissue neck w contrast; Future  -     benzonatate (TESSALON PERLES) 100 mg capsule; Take 1 capsule (100 mg total) by mouth 3 (three) times a day as needed for cough  -     levofloxacin (LEVAQUIN) 500 mg tablet; Take 1 tablet (500 mg total) by mouth every 24 hours for 10 days  -     Magnesium; Future  -     TSH, 3rd generation; Future  -     T4, free; Future  -     predniSONE 5 mg tablet; Take 3 tabs daily with breakfast for 3 days then Take 2 tabs daily for 3 Days then take one tab daily for 3 days then stop       Essential hypertension, benign  -     amLODIPine (NORVASC) 10 mg tablet; Take 1 tablet (10 mg total) by mouth daily  -     losartan (COZAAR) 50 mg tablet; Take 1 tablet (50 mg total) by mouth daily  -     spironolactone (ALDACTONE) 25 mg tablet; Take 0 5 tablets (12 5 mg total) by mouth daily    Hypothyroidism (acquired)  -     levothyroxine 25 mcg tablet; Take 0 5 tablets (12 5 mcg total) by mouth daily    Hypomagnesemia  -     magnesium Oxide (MAG-OX) 400 mg TABS; Take 1 tablet (400 mg total) by mouth 2 (two) times a day    Essential hypertension  -     metoprolol succinate (TOPROL-XL) 50 mg 24 hr tablet; Take 0 5 tablets (25 mg total) by mouth daily  -     UA (URINE) with reflex to Scope; Future  -     Comprehensive metabolic panel; Future  -     CBC and differential; Future    Gastroesophageal reflux disease without esophagitis  -     Discontinue: omeprazole (PriLOSEC) 20 mg delayed release capsule; Take 1 capsule (20 mg total) by mouth daily  -     omeprazole (PriLOSEC) 20 mg delayed release capsule; Take 1 capsule (20 mg total) by mouth 2 (two) times a day    Mixed hyperlipidemia  -     rosuvastatin (CRESTOR) 10 MG tablet; Take 1 tablet (10 mg total) by mouth daily Please STOP Vytorin     -     UA (URINE) with reflex to Scope;  Future  -     Comprehensive metabolic panel; Future  -     CBC and differential; Future    Liver function abnormality  -     rosuvastatin (CRESTOR) 10 MG tablet; Take 1 tablet (10 mg total) by mouth daily Please STOP Vytorin       Acute pancreatitis  -     thiamine 100 MG tablet; Take 1 tablet (100 mg total) by mouth daily    Cigarette smoker  -     CT soft tissue neck w contrast; Future  -     benzonatate (TESSALON PERLES) 100 mg capsule; Take 1 capsule (100 mg total) by mouth 3 (three) times a day as needed for cough  -     levofloxacin (LEVAQUIN) 500 mg tablet; Take 1 tablet (500 mg total) by mouth every 24 hours for 10 days  -     Magnesium; Future  -     TSH, 3rd generation; Future  -     T4, free; Future  -     predniSONE 5 mg tablet; Take 3 tabs daily with breakfast for 3 days then Take 2 tabs daily for 3 Days then take one tab daily for 3 days then stop       Localized swelling, mass and lump, neck        Subjective:      Patient ID: Malka Bowers is a 73 y o  male  73 Y O Man is here for Regular check up, He has few symptoms, and still smokes, recent Blood work and med list reviewed w Pt in detail    The following portions of the patient's history were reviewed and updated as appropriate: allergies, past family history, past medical history, past social history, past surgical history and problem list     Review of Systems   Constitutional: Negative for chills, fatigue and fever  HENT: Positive for sore throat and voice change  Negative for congestion, facial swelling and trouble swallowing  Eyes: Negative for pain, discharge and visual disturbance  Respiratory: Negative for cough, shortness of breath and wheezing  Cardiovascular: Negative for chest pain, palpitations and leg swelling  Gastrointestinal: Negative for abdominal pain, blood in stool, constipation, diarrhea and nausea  Endocrine: Negative for polydipsia, polyphagia and polyuria     Genitourinary: Negative for difficulty urinating, hematuria and urgency  Musculoskeletal: Negative for arthralgias and myalgias  Skin: Negative for rash  Neurological: Negative for dizziness, tremors, weakness and headaches  Hematological: Negative for adenopathy  Does not bruise/bleed easily  Psychiatric/Behavioral: Negative for dysphoric mood, sleep disturbance and suicidal ideas  Objective:      /68 (BP Location: Left arm, Patient Position: Sitting, Cuff Size: Standard)   Pulse 90   Temp (!) 97 4 °F (36 3 °C)   Resp 14   Ht 5' 3" (1 6 m)   Wt 70 4 kg (155 lb 3 2 oz)   SpO2 98%   BMI 27 49 kg/m²          Physical Exam  Constitutional:       General: He is not in acute distress  HENT:      Head: Normocephalic  Mouth/Throat:      Pharynx: No oropharyngeal exudate  Eyes:      General: No scleral icterus  Conjunctiva/sclera: Conjunctivae normal       Pupils: Pupils are equal, round, and reactive to light  Neck:      Thyroid: No thyromegaly  Cardiovascular:      Rate and Rhythm: Normal rate and regular rhythm  Heart sounds: Normal heart sounds  No murmur heard  Pulmonary:      Effort: Pulmonary effort is normal  No respiratory distress  Breath sounds: Normal breath sounds  No wheezing or rales  Abdominal:      General: Bowel sounds are normal  There is no distension  Palpations: Abdomen is soft  Tenderness: There is no abdominal tenderness  There is no guarding or rebound  Musculoskeletal:         General: No tenderness  Cervical back: Neck supple  Lymphadenopathy:      Cervical: No cervical adenopathy  Skin:     Coloration: Skin is not pale  Findings: No rash  Neurological:      Mental Status: He is alert and oriented to person, place, and time  Sensory: No sensory deficit  Motor: No weakness

## 2022-04-29 ENCOUNTER — HOSPITAL ENCOUNTER (OUTPATIENT)
Dept: CT IMAGING | Facility: HOSPITAL | Age: 72
Discharge: HOME | End: 2022-04-29
Payer: COMMERCIAL

## 2022-04-29 DIAGNOSIS — F17.210 CIGARETTE SMOKER: ICD-10-CM

## 2022-04-29 DIAGNOSIS — R49.0 HOARSENESS OF VOICE: ICD-10-CM

## 2022-04-29 PROCEDURE — 70491 CT SOFT TISSUE NECK W/DYE: CPT

## 2022-04-29 RX ADMIN — IOHEXOL 85 ML: 350 INJECTION, SOLUTION INTRAVENOUS at 11:27

## 2022-05-02 DIAGNOSIS — R22.1 LOCALIZED SWELLING, MASS AND LUMP, NECK: Primary | ICD-10-CM

## 2022-05-02 DIAGNOSIS — F17.210 CIGARETTE SMOKER: ICD-10-CM

## 2022-05-02 DIAGNOSIS — R49.0 HOARSENESS OF VOICE: ICD-10-CM

## 2022-05-13 NOTE — H&P (VIEW-ONLY)
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  who presents with a chief complaint of hoarseness    Independent historian: none    Pertinent elements of the history include:  hoarseness since [place]  Difficulty with projection   Round of prednisone unhelpful     No pain  No ear pain  No dysphagia  No weight loss  No ENT consult        Review of systems 10 point review of systems reviewed, as documented on a separate form  Results reviewed; images from any scan have been personally reviewed:    Scans: 04/29/22 CT neck: IMPRESSION: New 1 2 cm left tonsillar necrotic lesion, suspicious for primary tonsillar neoplasm  New 1 5 cm enhancing nodular tissue in left glossotonsillar sulcus, suspicious for concurrent primary tongue neoplasm  New 0 9 cm left level 2A necrotic lymph node, suspicious for necrotic samson metastasis  Findings suggestive of right vocal cord paralysis      Labs:   Notes: The past medical, surgical, social and family history have been reviewed as documented in today's record  Physical exam: (abnormal findings appear in bold and supercede any conflicting normal findings listed below)    Temp 97 6 °F (36 4 °C)   Ht 5' 3" (1 6 m)   Wt 70 3 kg (155 lb)   BMI 27 46 kg/m²     Constitutional:  Well developed, well nourished and groomed, in no acute distress  Eyes:  Extra-ocular movements intact, pupils equally round and reactive to light and accommodation, the lids and conjunctivae are normal in appearance  Head: Atraumatic, normocephalic, no visible scalp lesions, bony palpation unremarkable without stepoffs, parotid and submandibular salivary glands non-tender to palpation and without masses bilaterally       Ears:  Auricles normal in appearance bilaterally, mastoid prominence non-tender, external auditory canals clear bilaterally, tympanic membranes intact bilaterally without evidence of middle ear effusion or masses, normal appearing ossicles  Nose/Sinuses:  External appearance unremarkable, no maxillary or frontal sinus tenderness to palpation bilaterally  Anterior rhinoscopy demonstrates pink mucosa  No polyps or other masses identified  Turbinates are non-edematous  No evidence of purulent drainage  Oral Cavity:  Moist mucus membranes, gums and dentition unremarkable, no oral mucosal masses or lesions, floor of mouth soft, tongue mobile without masses or lesions  Oropharynx:  Base of tongue soft and without masses, tonsils bilaterally unremarkable, soft palate mucosa unremarkable  Neck:  No visible or palpable cervical lesions or lymphadenopathy, thyroid gland is normal in size and symmetry and without masses, normal laryngeal elevation with swallowing  Cardiovascular:  Normal rate and rhythm, no palpable thrills, no jugulovenous distension observed  Respiratory:  Normal respiratory effort without evidence of retractions or use of accessory muscles  Integument:  Normal appearing without observed masses or lesions  Neurologic:  Cranial nerves II-XII intact bilaterally  Psychiatric:  Alert and oriented to time, place and person, normal affect  Procedures     Laryngoscopy          Performed by Eladia Hollingsworth MD      Authorized by Robbin Hollingsworth MD        Consent: Verbal consent obtained  Consent given by: patient  Patient understanding: patient states understanding of the procedure being performed        Local anesthesia used: yes      Anesthesia    Local anesthesia used: yes  Local Anesthetic: topical anesthetic      Sedation    Patient sedated: no           Culture: no   Procedure Details    Procedure Notes:  Nasopharynx unremarkable, with normal eustachian tube orifices and normal Fossa of Rosenmuller bilaterally  Posterior nasopharyngeal and oropharyngeal walls normal  Oropharyngeal mucosa moist, no masses or lesions   Tongue base normal without masses or lesions  Vallecula and pyriform sinuses clear, without pooling of secretions  Epiglottis, aryepiglottic folds and remainder of supraglottis well-appearing  True vocal folds mobile, without masses or lesions, normal glottic chink  R TVC noted to have sessile leukoplakia            Patient tolerance: Patient tolerated the procedure well with no immediate complications         Assessment:   1  Vocal cord leukoplakia     2  Hoarseness of voice  Ambulatory Referral to Otolaryngology   3  Cigarette smoker  Ambulatory Referral to Otolaryngology   4  Localized swelling, mass and lump, neck  Ambulatory Referral to Otolaryngology       Orders  No orders of the defined types were placed in this encounter  Discussion/Plan:    1  Reviewed 04/29/22 CT neck:New 1 2 cm left tonsillar necrotic lesion  New 1 5 cm enhancing nodular tissue in left glossotonsillar sulcus  New 0 9 cm left level 2A necrotic lymph node  Findings suggestive of right vocal cord paralysis  OP clear  No LAD  BOT is clear  TVCs fully mobile bilaterally  R TVC noted to have sessile leukoplakia  Recommend MDL w Biopsy, L tonsil biopsy  Reviewed risks, benefits, and alternatives  Patient agreeable to proceed  Written consent obtained  He will follow up with surgical scheduling in the near future  Thank you for allowing me to participate in the care of your patient

## 2022-05-24 ENCOUNTER — APPOINTMENT (OUTPATIENT)
Dept: LAB | Facility: HOSPITAL | Age: 72
End: 2022-05-24
Payer: COMMERCIAL

## 2022-05-24 ENCOUNTER — OFFICE VISIT (OUTPATIENT)
Dept: LAB | Facility: HOSPITAL | Age: 72
End: 2022-05-24
Attending: OTOLARYNGOLOGY
Payer: COMMERCIAL

## 2022-05-24 DIAGNOSIS — F17.210 CIGARETTE SMOKER: ICD-10-CM

## 2022-05-24 DIAGNOSIS — Z00.01 ENCOUNTER FOR GENERAL ADULT MEDICAL EXAMINATION WITH ABNORMAL FINDINGS: ICD-10-CM

## 2022-05-24 DIAGNOSIS — Z12.11 SCREEN FOR COLON CANCER: ICD-10-CM

## 2022-05-24 DIAGNOSIS — E78.2 MIXED HYPERLIPIDEMIA: ICD-10-CM

## 2022-05-24 DIAGNOSIS — J38.3 VOCAL CORD LEUKOPLAKIA: ICD-10-CM

## 2022-05-24 DIAGNOSIS — E03.8 HYPOTHYROIDISM DUE TO HASHIMOTO'S THYROIDITIS: ICD-10-CM

## 2022-05-24 DIAGNOSIS — K21.9 GASTROESOPHAGEAL REFLUX DISEASE WITHOUT ESOPHAGITIS: ICD-10-CM

## 2022-05-24 DIAGNOSIS — R49.0 HOARSENESS OF VOICE: ICD-10-CM

## 2022-05-24 DIAGNOSIS — I10 ESSENTIAL HYPERTENSION: ICD-10-CM

## 2022-05-24 DIAGNOSIS — E06.3 HYPOTHYROIDISM DUE TO HASHIMOTO'S THYROIDITIS: ICD-10-CM

## 2022-05-24 LAB
25(OH)D3 SERPL-MCNC: 105.7 NG/ML (ref 30–100)
ALBUMIN SERPL BCP-MCNC: 4.5 G/DL (ref 3–5.2)
ALP SERPL-CCNC: 48 U/L (ref 43–122)
ALT SERPL W P-5'-P-CCNC: 22 U/L
ANION GAP SERPL CALCULATED.3IONS-SCNC: 10 MMOL/L (ref 5–14)
AST SERPL W P-5'-P-CCNC: 39 U/L (ref 17–59)
ATRIAL RATE: 63 BPM
BASOPHILS # BLD AUTO: 0.03 THOUSANDS/ΜL (ref 0–0.1)
BASOPHILS NFR BLD AUTO: 1 % (ref 0–1)
BILIRUB SERPL-MCNC: 0.74 MG/DL
BILIRUB UR QL STRIP: NEGATIVE
BUN SERPL-MCNC: 28 MG/DL (ref 5–25)
CALCIUM SERPL-MCNC: 10 MG/DL (ref 8.4–10.2)
CHLORIDE SERPL-SCNC: 103 MMOL/L (ref 97–108)
CHOLEST SERPL-MCNC: 139 MG/DL
CLARITY UR: CLEAR
CO2 SERPL-SCNC: 26 MMOL/L (ref 22–30)
COLOR UR: YELLOW
CREAT SERPL-MCNC: 1.37 MG/DL (ref 0.7–1.5)
EOSINOPHIL # BLD AUTO: 0.46 THOUSAND/ΜL (ref 0–0.61)
EOSINOPHIL NFR BLD AUTO: 8 % (ref 0–6)
ERYTHROCYTE [DISTWIDTH] IN BLOOD BY AUTOMATED COUNT: 13.5 % (ref 11.6–15.1)
GFR SERPL CREATININE-BSD FRML MDRD: 51 ML/MIN/1.73SQ M
GLUCOSE P FAST SERPL-MCNC: 97 MG/DL (ref 70–99)
GLUCOSE UR STRIP-MCNC: NEGATIVE MG/DL
HCT VFR BLD AUTO: 43.2 % (ref 36.5–49.3)
HDLC SERPL-MCNC: 45 MG/DL
HGB BLD-MCNC: 13.9 G/DL (ref 12–17)
HGB UR QL STRIP.AUTO: NEGATIVE
IMM GRANULOCYTES # BLD AUTO: 0.03 THOUSAND/UL (ref 0–0.2)
IMM GRANULOCYTES NFR BLD AUTO: 1 % (ref 0–2)
KETONES UR STRIP-MCNC: NEGATIVE MG/DL
LDLC SERPL CALC-MCNC: 56 MG/DL
LEUKOCYTE ESTERASE UR QL STRIP: NEGATIVE
LIPASE SERPL-CCNC: 187 U/L (ref 23–300)
LYMPHOCYTES # BLD AUTO: 1.38 THOUSANDS/ΜL (ref 0.6–4.47)
LYMPHOCYTES NFR BLD AUTO: 23 % (ref 14–44)
MAGNESIUM SERPL-MCNC: 1.6 MG/DL (ref 1.6–2.3)
MCH RBC QN AUTO: 31.8 PG (ref 26.8–34.3)
MCHC RBC AUTO-ENTMCNC: 32.2 G/DL (ref 31.4–37.4)
MCV RBC AUTO: 99 FL (ref 82–98)
MONOCYTES # BLD AUTO: 0.48 THOUSAND/ΜL (ref 0.17–1.22)
MONOCYTES NFR BLD AUTO: 8 % (ref 4–12)
NEUTROPHILS # BLD AUTO: 3.72 THOUSANDS/ΜL (ref 1.85–7.62)
NEUTS SEG NFR BLD AUTO: 59 % (ref 43–75)
NITRITE UR QL STRIP: NEGATIVE
NONHDLC SERPL-MCNC: 94 MG/DL
NRBC BLD AUTO-RTO: 0 /100 WBCS
P AXIS: 62 DEGREES
PH UR STRIP.AUTO: 6.5 [PH]
PLATELET # BLD AUTO: 227 THOUSANDS/UL (ref 149–390)
PMV BLD AUTO: 10.5 FL (ref 8.9–12.7)
POTASSIUM SERPL-SCNC: 4.7 MMOL/L (ref 3.6–5)
PR INTERVAL: 166 MS
PROT SERPL-MCNC: 8 G/DL (ref 5.9–8.4)
PROT UR STRIP-MCNC: NEGATIVE MG/DL
QRS AXIS: 46 DEGREES
QRSD INTERVAL: 80 MS
QT INTERVAL: 394 MS
QTC INTERVAL: 403 MS
RBC # BLD AUTO: 4.37 MILLION/UL (ref 3.88–5.62)
SODIUM SERPL-SCNC: 139 MMOL/L (ref 137–147)
SP GR UR STRIP.AUTO: 1.01 (ref 1–1.04)
T WAVE AXIS: 55 DEGREES
T4 FREE SERPL-MCNC: 1.31 NG/DL (ref 0.76–1.46)
TRIGL SERPL-MCNC: 188 MG/DL
TSH SERPL DL<=0.05 MIU/L-ACNC: 1.62 UIU/ML (ref 0.45–4.5)
UROBILINOGEN UA: NEGATIVE MG/DL
VENTRICULAR RATE: 63 BPM
VIT B12 SERPL-MCNC: 1815 PG/ML (ref 100–900)
WBC # BLD AUTO: 6.1 THOUSAND/UL (ref 4.31–10.16)

## 2022-05-24 PROCEDURE — 80053 COMPREHEN METABOLIC PANEL: CPT

## 2022-05-24 PROCEDURE — 83690 ASSAY OF LIPASE: CPT

## 2022-05-24 PROCEDURE — 93005 ELECTROCARDIOGRAM TRACING: CPT

## 2022-05-24 PROCEDURE — 82607 VITAMIN B-12: CPT

## 2022-05-24 PROCEDURE — 82306 VITAMIN D 25 HYDROXY: CPT

## 2022-05-24 PROCEDURE — 80061 LIPID PANEL: CPT

## 2022-05-24 PROCEDURE — 93010 ELECTROCARDIOGRAM REPORT: CPT | Performed by: INTERNAL MEDICINE

## 2022-05-24 PROCEDURE — 84439 ASSAY OF FREE THYROXINE: CPT

## 2022-05-24 PROCEDURE — 83735 ASSAY OF MAGNESIUM: CPT

## 2022-05-24 PROCEDURE — 85025 COMPLETE CBC W/AUTO DIFF WBC: CPT

## 2022-05-24 PROCEDURE — 84443 ASSAY THYROID STIM HORMONE: CPT

## 2022-05-24 PROCEDURE — 36415 COLL VENOUS BLD VENIPUNCTURE: CPT

## 2022-05-26 NOTE — PRE-PROCEDURE INSTRUCTIONS
Pre-Surgery Instructions:   Medication Instructions    amLODIPine (NORVASC) 10 mg tablet Take day of surgery   Creon 97367-85708 units capsule Hold day of surgery   levothyroxine 25 mcg tablet Take day of surgery   losartan (COZAAR) 50 mg tablet Hold day of surgery   magnesium Oxide (MAG-OX) 400 mg TABS Hold day of surgery   metoprolol succinate (TOPROL-XL) 50 mg 24 hr tablet Take day of surgery   omeprazole (PriLOSEC) 20 mg delayed release capsule Take day of surgery   rosuvastatin (CRESTOR) 10 MG tablet Take night before surgery    spironolactone (ALDACTONE) 25 mg tablet Take night before surgery    thiamine 100 MG tablet Hold day of surgery   zolpidem (AMBIEN) 10 mg tablet Take night before surgery      Reviewed with patient, in detail, instructions from "My Surgical Experience"  Instructed to avoid all  OTC vitamins/supplements and NSAIDS from now until after surgery per anesthesia guidelines  Tylenol ok to take PRN  Advised patient that Willie Sawyer will call with surgery arrival time and hospital directions the business day prior to surgery  Advised patient nothing eat or drink after midnight prior to surgery  Instructed to call surgeon's office in meantime with any new illnesses/exposure  Patient verbalized understanding of current visitor restrictions/masking guidelines and advised that he/she can confirm these at time of arrival call with Willie Sawyer  Patient verbalized understanding and knows to call surgeon's office with any additional questions prior to surgery

## 2022-06-01 ENCOUNTER — ANESTHESIA (OUTPATIENT)
Dept: PERIOP | Facility: HOSPITAL | Age: 72
End: 2022-06-01
Payer: COMMERCIAL

## 2022-06-01 ENCOUNTER — HOSPITAL ENCOUNTER (OUTPATIENT)
Facility: HOSPITAL | Age: 72
Setting detail: OUTPATIENT SURGERY
Discharge: HOME | End: 2022-06-01
Attending: OTOLARYNGOLOGY | Admitting: OTOLARYNGOLOGY
Payer: COMMERCIAL

## 2022-06-01 ENCOUNTER — ANESTHESIA EVENT (OUTPATIENT)
Dept: PERIOP | Facility: HOSPITAL | Age: 72
End: 2022-06-01
Payer: COMMERCIAL

## 2022-06-01 VITALS
TEMPERATURE: 96.6 F | RESPIRATION RATE: 18 BRPM | DIASTOLIC BLOOD PRESSURE: 60 MMHG | OXYGEN SATURATION: 93 % | HEART RATE: 66 BPM | BODY MASS INDEX: 27.46 KG/M2 | WEIGHT: 155 LBS | HEIGHT: 63 IN | SYSTOLIC BLOOD PRESSURE: 117 MMHG

## 2022-06-01 DIAGNOSIS — J38.3 VOCAL CORD LEUKOPLAKIA: ICD-10-CM

## 2022-06-01 PROCEDURE — 88305 TISSUE EXAM BY PATHOLOGIST: CPT | Performed by: PATHOLOGY

## 2022-06-01 PROCEDURE — 88312 SPECIAL STAINS GROUP 1: CPT | Performed by: PATHOLOGY

## 2022-06-01 PROCEDURE — 88342 IMHCHEM/IMCYTCHM 1ST ANTB: CPT | Performed by: PATHOLOGY

## 2022-06-01 PROCEDURE — 88341 IMHCHEM/IMCYTCHM EA ADD ANTB: CPT | Performed by: PATHOLOGY

## 2022-06-01 PROCEDURE — 42800 BIOPSY OF THROAT: CPT | Performed by: OTOLARYNGOLOGY

## 2022-06-01 PROCEDURE — 31536 LARYNGOSCOPY W/BX & OP SCOPE: CPT | Performed by: OTOLARYNGOLOGY

## 2022-06-01 RX ORDER — LIDOCAINE HYDROCHLORIDE AND EPINEPHRINE 10; 10 MG/ML; UG/ML
INJECTION, SOLUTION INFILTRATION; PERINEURAL AS NEEDED
Status: DISCONTINUED | OUTPATIENT
Start: 2022-06-01 | End: 2022-06-01 | Stop reason: HOSPADM

## 2022-06-01 RX ORDER — ONDANSETRON 2 MG/ML
INJECTION INTRAMUSCULAR; INTRAVENOUS AS NEEDED
Status: DISCONTINUED | OUTPATIENT
Start: 2022-06-01 | End: 2022-06-01

## 2022-06-01 RX ORDER — SODIUM CHLORIDE, SODIUM LACTATE, POTASSIUM CHLORIDE, CALCIUM CHLORIDE 600; 310; 30; 20 MG/100ML; MG/100ML; MG/100ML; MG/100ML
INJECTION, SOLUTION INTRAVENOUS CONTINUOUS PRN
Status: DISCONTINUED | OUTPATIENT
Start: 2022-06-01 | End: 2022-06-01

## 2022-06-01 RX ORDER — DIPHENHYDRAMINE HYDROCHLORIDE 50 MG/ML
12.5 INJECTION INTRAMUSCULAR; INTRAVENOUS ONCE AS NEEDED
Status: DISCONTINUED | OUTPATIENT
Start: 2022-06-01 | End: 2022-06-01 | Stop reason: HOSPADM

## 2022-06-01 RX ORDER — EPHEDRINE SULFATE 50 MG/ML
INJECTION INTRAVENOUS AS NEEDED
Status: DISCONTINUED | OUTPATIENT
Start: 2022-06-01 | End: 2022-06-01

## 2022-06-01 RX ORDER — METOCLOPRAMIDE HYDROCHLORIDE 5 MG/ML
10 INJECTION INTRAMUSCULAR; INTRAVENOUS ONCE AS NEEDED
Status: DISCONTINUED | OUTPATIENT
Start: 2022-06-01 | End: 2022-06-01 | Stop reason: HOSPADM

## 2022-06-01 RX ORDER — LIDOCAINE HYDROCHLORIDE 10 MG/ML
0.5 INJECTION, SOLUTION EPIDURAL; INFILTRATION; INTRACAUDAL; PERINEURAL ONCE AS NEEDED
Status: COMPLETED | OUTPATIENT
Start: 2022-06-01 | End: 2022-06-01

## 2022-06-01 RX ORDER — SUCCINYLCHOLINE/SOD CL,ISO/PF 100 MG/5ML
SYRINGE (ML) INTRAVENOUS AS NEEDED
Status: DISCONTINUED | OUTPATIENT
Start: 2022-06-01 | End: 2022-06-01

## 2022-06-01 RX ORDER — HYDROMORPHONE HCL/PF 1 MG/ML
0.5 SYRINGE (ML) INJECTION
Status: DISCONTINUED | OUTPATIENT
Start: 2022-06-01 | End: 2022-06-01 | Stop reason: HOSPADM

## 2022-06-01 RX ORDER — DEXAMETHASONE SODIUM PHOSPHATE 10 MG/ML
INJECTION, SOLUTION INTRAMUSCULAR; INTRAVENOUS AS NEEDED
Status: DISCONTINUED | OUTPATIENT
Start: 2022-06-01 | End: 2022-06-01

## 2022-06-01 RX ORDER — FENTANYL CITRATE 50 UG/ML
INJECTION, SOLUTION INTRAMUSCULAR; INTRAVENOUS AS NEEDED
Status: DISCONTINUED | OUTPATIENT
Start: 2022-06-01 | End: 2022-06-01

## 2022-06-01 RX ORDER — SODIUM CHLORIDE, SODIUM LACTATE, POTASSIUM CHLORIDE, CALCIUM CHLORIDE 600; 310; 30; 20 MG/100ML; MG/100ML; MG/100ML; MG/100ML
125 INJECTION, SOLUTION INTRAVENOUS CONTINUOUS
Status: DISCONTINUED | OUTPATIENT
Start: 2022-06-01 | End: 2022-06-01 | Stop reason: HOSPADM

## 2022-06-01 RX ORDER — HYDROMORPHONE HCL IN WATER/PF 6 MG/30 ML
0.2 PATIENT CONTROLLED ANALGESIA SYRINGE INTRAVENOUS
Status: DISCONTINUED | OUTPATIENT
Start: 2022-06-01 | End: 2022-06-01 | Stop reason: HOSPADM

## 2022-06-01 RX ORDER — PROPOFOL 10 MG/ML
INJECTION, EMULSION INTRAVENOUS AS NEEDED
Status: DISCONTINUED | OUTPATIENT
Start: 2022-06-01 | End: 2022-06-01

## 2022-06-01 RX ORDER — ROCURONIUM BROMIDE 10 MG/ML
INJECTION, SOLUTION INTRAVENOUS AS NEEDED
Status: DISCONTINUED | OUTPATIENT
Start: 2022-06-01 | End: 2022-06-01

## 2022-06-01 RX ORDER — FENTANYL CITRATE/PF 50 MCG/ML
50 SYRINGE (ML) INJECTION
Status: DISCONTINUED | OUTPATIENT
Start: 2022-06-01 | End: 2022-06-01 | Stop reason: HOSPADM

## 2022-06-01 RX ORDER — OXYMETAZOLINE HYDROCHLORIDE 0.05 G/100ML
SPRAY NASAL AS NEEDED
Status: DISCONTINUED | OUTPATIENT
Start: 2022-06-01 | End: 2022-06-01 | Stop reason: HOSPADM

## 2022-06-01 RX ORDER — ONDANSETRON 2 MG/ML
4 INJECTION INTRAMUSCULAR; INTRAVENOUS ONCE AS NEEDED
Status: DISCONTINUED | OUTPATIENT
Start: 2022-06-01 | End: 2022-06-01 | Stop reason: HOSPADM

## 2022-06-01 RX ADMIN — PHENYLEPHRINE HYDROCHLORIDE 40 MCG/MIN: 10 INJECTION INTRAVENOUS at 11:20

## 2022-06-01 RX ADMIN — FENTANYL CITRATE 100 MCG: 50 INJECTION, SOLUTION INTRAMUSCULAR; INTRAVENOUS at 11:31

## 2022-06-01 RX ADMIN — LIDOCAINE HYDROCHLORIDE 0.2 ML: 10 INJECTION, SOLUTION EPIDURAL; INFILTRATION; INTRACAUDAL at 10:22

## 2022-06-01 RX ADMIN — PROPOFOL 150 MG: 10 INJECTION, EMULSION INTRAVENOUS at 11:04

## 2022-06-01 RX ADMIN — FENTANYL CITRATE 100 MCG: 50 INJECTION, SOLUTION INTRAMUSCULAR; INTRAVENOUS at 11:04

## 2022-06-01 RX ADMIN — SODIUM CHLORIDE, SODIUM LACTATE, POTASSIUM CHLORIDE, AND CALCIUM CHLORIDE: .6; .31; .03; .02 INJECTION, SOLUTION INTRAVENOUS at 10:57

## 2022-06-01 RX ADMIN — EPHEDRINE SULFATE 5 MG: 50 INJECTION INTRAVENOUS at 11:18

## 2022-06-01 RX ADMIN — ROCURONIUM BROMIDE 20 MG: 50 INJECTION, SOLUTION INTRAVENOUS at 11:22

## 2022-06-01 RX ADMIN — SUGAMMADEX 280 MG: 100 INJECTION, SOLUTION INTRAVENOUS at 11:58

## 2022-06-01 RX ADMIN — DEXAMETHASONE SODIUM PHOSPHATE 10 MG: 10 INJECTION, SOLUTION INTRAMUSCULAR; INTRAVENOUS at 11:04

## 2022-06-01 RX ADMIN — ONDANSETRON 4 MG: 2 INJECTION INTRAMUSCULAR; INTRAVENOUS at 11:04

## 2022-06-01 RX ADMIN — SODIUM CHLORIDE, SODIUM LACTATE, POTASSIUM CHLORIDE, AND CALCIUM CHLORIDE: .6; .31; .03; .02 INJECTION, SOLUTION INTRAVENOUS at 11:37

## 2022-06-01 RX ADMIN — Medication 80 MG: at 11:04

## 2022-06-01 RX ADMIN — SODIUM CHLORIDE, SODIUM LACTATE, POTASSIUM CHLORIDE, AND CALCIUM CHLORIDE 125 ML/HR: .6; .31; .03; .02 INJECTION, SOLUTION INTRAVENOUS at 10:22

## 2022-06-01 RX ADMIN — LIDOCAINE HYDROCHLORIDE 2 ML: 10 INJECTION, SOLUTION EPIDURAL; INFILTRATION; INTRACAUDAL at 11:04

## 2022-06-01 NOTE — ANESTHESIA PREPROCEDURE EVALUATION
Procedure:  MICRO DIRECT LARYNGOSCOPY WITH BIOPSY, BIOPSY LEFT TONSIL (Left Throat)    Relevant Problems   ANESTHESIA (within normal limits)      CARDIO   (+) Carotid artery stenosis, asymptomatic, bilateral   (+) Ectopic atrial rhythm   (+) Essential hypertension, benign   (+) Mixed hyperlipidemia   (+) Thoracic aortic aneurysm without rupture (HCC)      ENDO   (+) Hypothyroidism (acquired)      GI/HEPATIC   (+) Chronic pancreatitis (HCC)   (+) Drug-induced acute pancreatitis without infection or necrosis   (+) Hiatal hernia   (+) Idiopathic acute pancreatitis without infection or necrosis   (+) Pancreatic lesion      /RENAL (within normal limits)      HEMATOLOGY (within normal limits)      MUSCULOSKELETAL   (+) Right sided sciatica      NEURO/PSYCH (within normal limits)      PULMONARY (within normal limits)      Nervous and Auditory   (+) Cigarette nicotine dependence with nicotine-induced disorder   (+) Radiculopathy, lumbar region      Other   (+) Spinal stenosis of lumbar region without neurogenic claudication        Physical Exam    Airway    Mallampati score: II  TM Distance: >3 FB  Neck ROM: full     Dental   No notable dental hx     Cardiovascular  Rhythm: regular, Rate: normal, Cardiovascular exam normal    Pulmonary  Pulmonary exam normal Breath sounds clear to auscultation,     Other Findings        Anesthesia Plan  ASA Score- 3     Anesthesia Type- general with ASA Monitors  Additional Monitors:   Airway Plan: ETT  Plan Factors-    Chart reviewed  Existing labs reviewed  Patient summary reviewed  Induction- intravenous  Postoperative Plan- Plan for postoperative opioid use  Informed Consent- Anesthetic plan and risks discussed with patient  I personally reviewed this patient with the CRNA  Discussed and agreed on the Anesthesia Plan with the CRNA  Jovanny Damico           Recent labs personally reviewed:  Lab Results   Component Value Date    WBC 6 10 05/24/2022    HGB 13 9 05/24/2022     05/24/2022     Lab Results   Component Value Date    K 4 7 05/24/2022    BUN 28 (H) 05/24/2022    CREATININE 1 37 05/24/2022     Lab Results   Component Value Date    PTT 37 10/09/2020      Lab Results   Component Value Date    INR 1 02 03/04/2021       Blood type A    Lab Results   Component Value Date    HGBA1C 5 9 (H) 04/13/2021       I, Lenora Perez MD, have personally seen and evaluated the patient prior to anesthetic care  I have reviewed the pre-anesthetic record, and other medical records if appropriate to the anesthetic care  If a CRNA is involved in the case, I have reviewed the CRNA assessment, if present, and agree  Risks/benefits and alternatives discussed with patient including possible PONV, sore throat, and possibility of rare anesthetic and surgical emergencies

## 2022-06-01 NOTE — ANESTHESIA POSTPROCEDURE EVALUATION
Post-Op Assessment Note    CV Status:  Stable  Pain Score: 0    Pain management: adequate     Mental Status:  Alert and awake   Hydration Status:  Euvolemic   PONV Controlled:  Controlled   Airway Patency:  Patent      Post Op Vitals Reviewed: Yes      Staff: Anesthesiologist, CRNA         No complications documented      BP   140/69   Temp  97 1   Pulse  71   Resp   15   SpO2   99 room air

## 2022-06-01 NOTE — INTERVAL H&P NOTE
H&P reviewed  After examining the patient I find no changes in the patients condition since the H&P had been written    NAD  AAOx3  CTAB  RRR  Abd soft NT/ND  CLARKE    TM/EAC clear bilaterally  OC/OP clear  Neck supple without lymph adenopathy  Nares clear on anterior rhinoscopy    Plan for R TVC MDL excision of leukoplakia, L tonsil biopsy  Vitals:    06/01/22 0922   BP: 125/80   Pulse: 81   Resp: 16   Temp: (!) 96 5 °F (35 8 °C)   SpO2: 95%

## 2022-06-02 NOTE — OP NOTE
OPERATIVE REPORT  PATIENT NAME: Nadia Olivo    :  1950  MRN: [id number]  Pt Location: [facility] OR ROOM 05    SURGERY DATE: 2022    Surgeon(s) and Role:     * Maria Esther Silvestre MD - Primary     * Rossana Garcia MD - Assisting    Preop Diagnosis:  Vocal cord leukoplakia [J38 3]    Post-Op Diagnosis Codes:     * Vocal cord leukoplakia [J38 3]    Procedure(s) (LRB):  MICRO DIRECT LARYNGOSCOPY WITH BIOPSY, BIOPSY LEFT TONSIL (Left)    Specimen(s):  ID Type Source Tests Collected by Time Destination   1 : left tonsil bx Tissue Tonsil TISSUE EXAM Maria Esther Silvestre MD 2022    2 : right true vocal cord Tissue Vocal cord TISSUE EXAM Maria Esther Silvestre MD 2022        Estimated Blood Loss:   Minimal    Drains:  * No LDAs found *    Anesthesia Type:   General    Operative Indications:  Vocal cord leukoplakia [J38 3]  This is a 27-year-old gentleman with a long-standing history of tobacco or alcohol exposure who presented with a CT scan with concerning findings of a left tonsil neoplasm and pathologic left level 2 lymph node  Evaluation in the office was notable for small amount of unremarkable appearing Right true vocal fold leukoplakia  Examination of his tonsils and neck were also unremarkable  Given the findings on the CT scan though and his history we elected to proceed with a biopsy of the right vocal fold and left tonsil with examination in the operating room as well as direct laryngoscopy    Operative Findings:  Mulford Bong right true vocal full leukoplakia  Normal remaining direct laryngoscopy  Palpably normal left tonsil and base of tongue      Complications:   None    Procedure and Technique:  The patient was taken to the operating room he was placed on the operating table and general anesthesia was induced  He was intubated uneventfully  The table was then turned 90°  Shoulder rolls placed  A timeout was performed at the patient's identity and procedure were then confirmed    The teeth were protected with a tooth guard  A Dedo laryngoscope was utilized for the direct laryngoscopy  Prior to laryngoscopy the left and right tonsils were palpated and found to be palpably unremarkable  Perhaps there was an additional fullness in the left tonsil on palpation but visibly there were no obvious changes in the mucosa of the left tonsil  There were some large stones removed from the left tonsil, approximately 1 cm x 1 cm of tonsil stones was removed  The base of tongue was also palpated and found to be unremarkable on palpation  The laryngoscope was then introduced into the oral cavity and the larynx was evaluated  The patient was placed in suspension  Local anesthesia in the form of lidocaine 1% with 1 100,000 epinephrine was placed into the right true vocal fold  The area of leukoplakia was then grasped using a cup forceps and excised using an up-biting endoscopic scissors  The vocalis was left undisturbed  The lesion on the right focal full was then passed off for permanent evaluation  The laryngoscope was then withdrawn  A tooth guard was also withdrawn  A Cristian-Jaskaran placed into the oral cavity and he was placed again in suspension  The tonsils were evaluated  Additional tonsil stones were removed and the left tonsil  Several biopsies were taken from the left tonsil nearly completing an intracapsular tonsillectomy  Stasis was achieved using a suction Bovie  The left tonsil biopsies were then sent off the field for permanent evaluation  The Cheikh Morenoling was then withdrawn and the patient was turned back to anesthesia where he was awoken and extubated  He tolerated the procedure well without complication     I was present for the entire procedure    Patient Disposition:  PACU       SIGNATURE: Saundra Dias MD  DATE: June 2, 2022  TIME: 11:03 AM

## 2022-06-07 ENCOUNTER — OFFICE VISIT (OUTPATIENT)
Dept: CARDIOLOGY CLINIC | Facility: CLINIC | Age: 72
End: 2022-06-07
Payer: COMMERCIAL

## 2022-06-07 VITALS
WEIGHT: 153 LBS | HEART RATE: 88 BPM | DIASTOLIC BLOOD PRESSURE: 64 MMHG | HEIGHT: 63 IN | BODY MASS INDEX: 27.11 KG/M2 | SYSTOLIC BLOOD PRESSURE: 106 MMHG

## 2022-06-07 DIAGNOSIS — I10 ESSENTIAL HYPERTENSION, BENIGN: ICD-10-CM

## 2022-06-07 DIAGNOSIS — F17.219 CIGARETTE NICOTINE DEPENDENCE WITH NICOTINE-INDUCED DISORDER: ICD-10-CM

## 2022-06-07 DIAGNOSIS — I49.1 ECTOPIC ATRIAL RHYTHM: ICD-10-CM

## 2022-06-07 DIAGNOSIS — I71.2 THORACIC AORTIC ANEURYSM WITHOUT RUPTURE (HCC): ICD-10-CM

## 2022-06-07 DIAGNOSIS — E78.2 MIXED HYPERLIPIDEMIA: Primary | ICD-10-CM

## 2022-06-07 PROCEDURE — 99214 OFFICE O/P EST MOD 30 MIN: CPT | Performed by: INTERNAL MEDICINE

## 2022-06-07 RX ORDER — FENOFIBRATE 160 MG/1
160 TABLET ORAL DAILY
Qty: 90 TABLET | Refills: 3 | Status: SHIPPED | OUTPATIENT
Start: 2022-06-07

## 2022-06-07 NOTE — ASSESSMENT & PLAN NOTE
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 with no recent symptoms that suggest symptomatic hypertension or angina or heart failure  Blood pressure is noted to be slightly on the lower side but he is asymptomatic  On examination there is no evidence of pulmonary or peripheral vascular congestion  He does have moderate stenosis in the right carotid artery  His recent CT scan of the chest did not identify significant thoracic aortic aneurysm  His recent blood work is significant for mildly increased triglycerides  -- at this time I am advising him to continue current medications  We are adding back fenofibrate 160 mg daily to his regimen and he felt good while he was on this in the past   -- he will continue follow regarding his postoperative care of recent biopsies  -- I again reinforced with him the importance of quitting smoking as this is his single major risk factor for future cardiovascular disease  -- Dietary and medical compliance are reinforced  -- He is advised  to report any concerning symptoms such as chest pain, shortness of breath, decline in exercise tolerance or presyncope/syncope

## 2022-06-07 NOTE — PROGRESS NOTES
[facility]  [id number] [facility], [place] [address]  Phone#  [phone]  Fax#  [phone]  *-*-*-*-*-*-*-*-*-*-*-*-*-*-*-*-*-*-*-*-*-*-*-*-*-*-*-*-*-*-*-*-*-*-*-*-*-*-*-*-*-*-*-*-*-*-*-*-*-*-*-*-*-*  ENCOUNTER DATE: 06/07/22 3:13 PM  PATIENT NAME: Nadia Olivo   1950    [id number]  AGE:72 y o  SEX: male  Kusum Lemus MD     PRIMARY CARE PHYSICIAN: Esha Byrne MD    DIAGNOSES:  1  Benign essential hypertension  2  Mild ascending thoracic aorta aneurysm, 4 cm at level of right pulmonary artery on CT scan February 26, 2021  3  History of mild mitral and tricuspid valve regurgitation  4  History of mild inter atrial septal aneurysm without evidence of ASD or PFO without history of TIA/CVA  5  History of hiatal hernia  6  History of colonic polyps  7  History of C diff in the past  8  Carotid artery disease, s/p status post left carotid endarterectomy March 2020  9  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 in July 2018, also status post cervical and lumbar spinal fusion surgeries  10  Mild hypothyroidism  11  Pre diabetes  12  Current chronic pancreatitis of uncertain etiology, possible gallstone related  Now status post cholecystectomy  13  Left renal artery stenosis   14  Vocal cord neoplasm, 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     LEXISCAN NUCLEAR STRESS TEST February 2020:  1  Negative pharmacologic stress test with regadenoson for symptoms of angina pectoris and negative for ECG evidence of ischemia  2  Tomographic perfusion series consistent with normal perfusion without definite evidence of ischemia or prior infarction  3  Normal left ventricular cavity size with normal left ventricular systolic function and wall motion  EF determined as 65%    4  Elevated resting blood pressure with appropriate blood pressure changes noted during the stress test   5  No chest pain was reported during the stress test   6  Nonspecific resting ECG abnormalities with no significant changes and some nonspecific supraventricular ectopy noted during stress test     Diagnostic sensitivity was limited by submaximal stress  ECHOCARDIOGRAM March 2018 showed mildly increased left ventricular wall thickness, normal left ventricular systolic function and hyperdynamic wall motion, normal diastolic function, EF around 65%  Mild aortic valve sclerosis, trace tricuspid valve regurgitation, no pulmonary hypertension, borderline dilated aortic root  CTA of chest significant for 4 cm ascending thoracic aortic aneurysm with a focal atelectasis or scarring lower lungs without any lung mass or effusion  CURRENT ECG   No results found for this visit on 06/07/22  CARDIOLOGY ASSESSMENT & PLAN     1  Mixed hyperlipidemia  fenofibrate 160 MG tablet   2  Cigarette nicotine dependence with nicotine-induced disorder     3  Essential hypertension, benign     4  Thoracic aortic aneurysm without rupture (HCC)     5  Ectopic atrial rhythm       Essential hypertension, benign  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 is overall doing well from cardiac perspective with no recent symptoms that suggest symptomatic hypertension or angina or heart failure  Blood pressure is noted to be slightly on the lower side but he is asymptomatic  On examination there is no evidence of pulmonary or peripheral vascular congestion  He does have moderate stenosis in the right carotid artery  His recent CT scan of the chest did not identify significant thoracic aortic aneurysm  His recent blood work is significant for mildly increased triglycerides  -- at this time I am advising him to continue current medications  We are adding back fenofibrate 160 mg daily to his regimen and he felt good while he was on this in the past   -- he will continue follow regarding his postoperative care of recent biopsies    -- I again reinforced with him the importance of quitting smoking as this is his single major risk factor for future cardiovascular disease  -- Dietary and medical compliance are reinforced  -- He is advised  to report any concerning symptoms such as chest pain, shortness of breath, decline in exercise tolerance or presyncope/syncope  INTERVAL HISTORY & HISTORY OF PRESENT ILLNESS     Pam Johns is here for follow-up regarding his cardiac comorbidities which include:  Ectasia of Ascending thoracic aorta, essential hypertension, interatrial septal aneurysm and carotid artery disease  he was last seen by me in May 2021  He reports that he has been overall well from cardiac perspective with no chest pain or shortness of breath or dizziness or palpitations or orthopnea or PND or worsening pedal edema  He developed change in his voice with hoarseness and some difficulty in swallowing around [place]  Over time he has had investigations and has been found to have 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  Initially underwent laryngoscopy exam in the office and 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  Subsequently to recently he underwent direct long requests copy exam with multiple samples taken with report still pending  Today he reports that he has inflamed tonsils and difficulty swallowing otherwise he has been fine  Denies any fevers or chills  Functional capacity status:  Good for his age  (Excellent- >10 METs; Good: (7-10 METs); Moderate (4-7 METs); Poor (<= 4 METs)    Any chronic stressors:  None   (feeling of poor health, financial problems, and social isolation etc)  Tobacco or alcohol dependence:  He continues to smoke half a pack of cigarettes a day  Denies alcohol use  Current cardiac medications:  Amlodipine 10 mg daily, metoprolol succinate 25 mg in the evening daily, spironolactone 12 5 mg daily, magnesium oxide, rosuvastatin 10 mg daily  He was previously on fenofibrate but this was discontinued      Last blood work is from May 24, 2022 it indicates normal renal function and electrolytes, normal liver function tests, magnesium was borderline low at 1 6, lipase level was normal, total cholesterol was 139, triglyceride was 188 up from 88 in September 2021, HDL was 45, calculated LDL was 56  TSH was normal at 1 62  Last A1c was 5 9  Imaging studies of the carotids in March indicated 50-69% stenosis in right external carotid artery  CTA chest in February 2022 did not identify any aortic aneurysm  REVIEW OF SYSTEMS   Positive for:  5 9 in April 2021  Negative for: All remaining as reviewed below and in HPI  SYSTEM SYMPTOMS REVIEWED:  General--weight change, fever, night sweats  Respiratory--cough, wheezing, shortness of breath, sputum production  Cardiovascular--chest pain, syncope, dyspnea on exertion, edema, decline in exercise tolerance, claudication   Gastrointestinal--persistent vomiting, diarrhea, abdominal distention, blood in stool   Muscular or skeletal--joint pain or swelling   Neurologic--headaches, syncope, abnormal movement  Hematologic--history of easy bruising and bleeding   Endocrine--thyroid enlargement, heat or cold intolerance, polyuria   Psychiatric--anxiety, depression     *-*-*-*-*-*-*-*-*-*-*-*-*-*-*-*-*-*-*-*-*-*-*-*-*-*-*-*-*-*-*-*-*-*-*-*-*-*-*-*-*-*-*-*-*-*-*-*-*-*-*-*-*-*-  VITAL SIGNS     CURRENT VITAL SIGNS:   Vitals:    06/07/22 1439   BP: 106/64   BP Location: Right arm   Patient Position: Sitting   Cuff Size: Adult   Pulse: 88   Weight: 69 4 kg (153 lb)   Height: 5' 3" (1 6 m)       BMI: Body mass index is 27 1 kg/m²      WEIGHTS:   Wt Readings from Last 25 Encounters:   06/07/22 69 4 kg (153 lb)   06/01/22 70 3 kg (155 lb)   05/13/22 70 3 kg (155 lb)   04/19/22 70 4 kg (155 lb 3 2 oz)   04/12/22 70 5 kg (155 lb 6 4 oz)   03/18/22 67 1 kg (148 lb)   01/04/22 67 5 kg (148 lb 12 8 oz)   12/27/21 66 7 kg (147 lb 0 8 oz)   09/28/21 62 1 kg (137 lb)   07/13/21 59 4 kg (131 lb)   05/28/21 59 9 kg (132 lb) 04/08/21 60 3 kg (133 lb)   03/31/21 60 8 kg (134 lb)   03/17/21 59 9 kg (132 lb)   02/28/21 61 1 kg (134 lb 11 2 oz)   02/10/21 61 7 kg (136 lb)   01/28/21 62 1 kg (136 lb 12 8 oz)   12/10/20 61 7 kg (136 lb)   10/28/20 59 6 kg (131 lb 6 3 oz)   10/22/20 60 9 kg (134 lb 4 2 oz)   10/09/20 64 9 kg (143 lb 1 3 oz)   09/10/20 65 6 kg (144 lb 9 6 oz)   05/05/20 59 9 kg (132 lb)   03/16/20 60 2 kg (132 lb 12 8 oz)   03/13/20 60 3 kg (133 lb)        *-*-*-*-*-*-*-*-*-*-*-*-*-*-*-*-*-*-*-*-*-*-*-*-*-*-*-*-*-*-*-*-*-*-*-*-*-*-*-*-*-*-*-*-*-*-*-*-*-*-*-*-*-*-  PHYSICAL EXAM     General Appearance:    Alert, cooperative, no distress, appears stated age   Head, Eyes, ENT:    No obvious abnormality, moist mucous mebranes  Neck:   Supple, no carotid bruit or JVD   Back:     Symmetric, no curvature  Lungs:     Respirations unlabored  Clear to auscultation bilaterally,    Chest wall:    No tenderness or deformity   Heart:    Regular rate and rhythm, S1 and S2 normal, no murmur, rub  or gallop  Abdomen:     Soft, non-tender, No obvious masses, or organomegaly   Extremities:   Extremities warm, no cyanosis or edema    Skin:   No venostatic changes in lower extremities  Normal skin color, texture, and turgor   No rashes or lesions     *-*-*-*-*-*-*-*-*-*-*-*-*-*-*-*-*-*-*-*-*-*-*-*-*-*-*-*-*-*-*-*-*-*-*-*-*-*-*-*-*-*-*-*-*-*-*-*-*-*-*-*-*-*-  CURRENT MEDICATIONS LIST     Current Outpatient Medications:     amLODIPine (NORVASC) 10 mg tablet, Take 1 tablet (10 mg total) by mouth daily (Patient taking differently: Take 10 mg by mouth every morning), Disp: 90 tablet, Rfl: 3    Creon 22318-86397 units capsule, TAKE 1 CAPSULE BY MOUTH 3 TIMESA DAY WITH MEALS (Patient taking differently: Take 12,000 units of lipase by mouth 3 (three) times a day with meals), Disp: 90 capsule, Rfl: 0    fenofibrate 160 MG tablet, Take 1 tablet (160 mg total) by mouth daily, Disp: 90 tablet, Rfl: 3    levothyroxine 25 mcg tablet, Take 0 5 tablets (12 5 mcg total) by mouth daily (Patient taking differently: Take 12 5 mcg by mouth every morning), Disp: 45 tablet, Rfl: 3    losartan (COZAAR) 50 mg tablet, Take 1 tablet (50 mg total) by mouth daily (Patient taking differently: Take 50 mg by mouth every morning), Disp: 90 tablet, Rfl: 3    magnesium Oxide (MAG-OX) 400 mg TABS, Take 1 tablet (400 mg total) by mouth 2 (two) times a day, Disp: 180 tablet, Rfl: 3    metoprolol succinate (TOPROL-XL) 50 mg 24 hr tablet, Take 0 5 tablets (25 mg total) by mouth daily (Patient taking differently: Take 25 mg by mouth every evening), Disp: 90 tablet, Rfl: 3    omeprazole (PriLOSEC) 20 mg delayed release capsule, Take 1 capsule (20 mg total) by mouth 2 (two) times a day (Patient taking differently: Take 20 mg by mouth every morning), Disp: 180 capsule, Rfl: 3    rosuvastatin (CRESTOR) 10 MG tablet, Take 1 tablet (10 mg total) by mouth daily Please STOP Vytorin    (Patient taking differently: Take 10 mg by mouth every evening Please STOP Vytorin  [place] ), Disp: 90 tablet, Rfl: 3    spironolactone (ALDACTONE) 25 mg tablet, Take 0 5 tablets (12 5 mg total) by mouth daily (Patient taking differently: Take 12 5 mg by mouth daily at bedtime), Disp: 45 tablet, Rfl: 3    thiamine 100 MG tablet, Take 1 tablet (100 mg total) by mouth daily (Patient taking differently: Take 100 mg by mouth every evening), Disp: 90 tablet, Rfl: 3    zolpidem (AMBIEN) 10 mg tablet, Take by mouth daily at bedtime as needed , Disp: , Rfl:        ALLERGIES   No Known Allergies    *-*-*-*-*-*-*-*-*-*-*-*-*-*-*-*-*-*-*-*-*-*-*-*-*-*-*-*-*-*-*-*-*-*-*-*-*-*-*-*-*-*-*-*-*-*-*-*-*-*-*-*-*-*-  LABORATORY DATA   No results found for: NA  Potassium   Date Value Ref Range Status   05/24/2022 4 7 3 6 - 5 0 mmol/L Final   12/27/2021 4 2 3 6 - 5 0 mmol/L Final   09/24/2021 4 4 3 6 - 5 0 mmol/L Final     Chloride   Date Value Ref Range Status   05/24/2022 103 97 - 108 mmol/L Final   12/27/2021 104 97 - 108 mmol/L Final 09/24/2021 102 97 - 108 mmol/L Final     CO2   Date Value Ref Range Status   05/24/2022 26 22 - 30 mmol/L Final   12/27/2021 28 22 - 30 mmol/L Final   09/24/2021 27 22 - 30 mmol/L Final     BUN   Date Value Ref Range Status   05/24/2022 28 (H) 5 - 25 mg/dL Final   12/27/2021 26 (H) 5 - 25 mg/dL Final   09/24/2021 31 (H) 5 - 25 mg/dL Final     Creatinine   Date Value Ref Range Status   05/24/2022 1 37 0 70 - 1 50 mg/dL Final     Comment:     Standardized to IDMS reference method   12/27/2021 1 18 0 70 - 1 50 mg/dL Final     Comment:     Standardized to IDMS reference method   09/24/2021 1 21 0 70 - 1 50 mg/dL Final     Comment:     Standardized to IDMS reference method     eGFR   Date Value Ref Range Status   05/24/2022 51 ml/min/1 73sq m Final   12/27/2021 62 ml/min/1 73sq m Final   09/24/2021 61 >60 ml/min/1 73sq m Final     Calcium   Date Value Ref Range Status   05/24/2022 10 0 8 4 - 10 2 mg/dL Final   12/27/2021 9 5 8 4 - 10 2 mg/dL Final   09/24/2021 10 0 8 4 - 10 2 mg/dL Final     AST   Date Value Ref Range Status   05/24/2022 39 17 - 59 U/L Final     Comment:     Specimen collection should occur prior to Sulfasalazine administration due to the potential for falsely depressed results  12/27/2021 43 17 - 59 U/L Final     Comment:     Specimen collection should occur prior to Sulfasalazine administration due to the potential for falsely depressed results  09/24/2021 57 17 - 59 U/L Final     Comment:     Specimen collection should occur prior to Sulfasalazine administration due to the potential for falsely depressed results  ALT   Date Value Ref Range Status   05/24/2022 22 <50 U/L Final     Comment:     Specimen collection should occur prior to Sulfasalazine administration due to the potential for falsely depressed results  12/27/2021 23 <50 U/L Final     Comment:     Specimen collection should occur prior to Sulfasalazine administration due to the potential for falsely depressed results  09/24/2021 38 <50 U/L Final     Comment:     Specimen collection should occur prior to Sulfasalazine administration due to the potential for falsely depressed results  Alkaline Phosphatase   Date Value Ref Range Status   05/24/2022 48 43 - 122 U/L Final   12/27/2021 43 43 - 122 U/L Final   09/24/2021 53 43 - 122 U/L Final     Magnesium   Date Value Ref Range Status   05/24/2022 1 6 1 6 - 2 3 mg/dL Final   04/15/2022 1 5 (L) 1 6 - 2 3 mg/dL Final   07/02/2021 1 6 1 6 - 2 3 mg/dL Final     WBC   Date Value Ref Range Status   05/24/2022 6 10 4 31 - 10 16 Thousand/uL Final   12/27/2021 6 00 4 50 - 11 00 Thousand/uL Final   07/02/2021 6 90 4 50 - 11 00 Thousand/uL Final     Hemoglobin   Date Value Ref Range Status   05/24/2022 13 9 12 0 - 17 0 g/dL Final   12/27/2021 12 9 (L) 13 5 - 17 5 g/dL Final   07/02/2021 14 0 13 5 - 17 5 g/dL Final     Platelets   Date Value Ref Range Status   05/24/2022 227 149 - 390 Thousands/uL Final   12/27/2021 200 150 - 450 Thousands/uL Final   07/02/2021 287 150 - 450 Thousands/uL Final     PTT   Date Value Ref Range Status   10/09/2020 37 23 - 37 seconds Final     Comment:     Therapeutic Heparin Range =  60-90 seconds   03/06/2020 40 (H) 23 - 37 seconds Final     Comment:     Therapeutic Heparin Range =  60-90 seconds     INR   Date Value Ref Range Status   03/04/2021 1 02 0 84 - 1 19 Final   03/03/2021 1 05 0 84 - 1 19 Final     No results found for: CKMB, DIGOXIN  No results found for: TSH  No results found for: CHOL   Hemoglobin A1C   Date Value Ref Range Status   04/13/2021 5 9 (H) Normal 3 8-5 6%; PreDiabetic 5 7-6 4%; Diabetic >=6 5%; Glycemic control for adults with diabetes <7 0% % Final     C difficile toxin by PCR   Date Value Ref Range Status   02/17/2020 Negative Negative Final     Comment:     No evidence for C  difficile colonization or infection  No special contact precautions required        C difficile toxin by PCR    Date Value Ref Range Status   01/29/2021 Negative Negative Final     Comment:     No evidence for C  difficile colonization or infection  No special contact precautions required  *-*-*-*-*-*-*-*-*-*-*-*-*-*-*-*-*-*-*-*-*-*-*-*-*-*-*-*-*-*-*-*-*-*-*-*-*-*-*-*-*-*-*-*-*-*-*-*-*-*-*-*-*-*-  PREVIOUS CARDIOLOGY & RADIOLOGY TEST RESULTS   I have personally reviewed pertinent results of cardiovascular tests noted below  Results for orders placed during the hospital encounter of 20    Echo complete with contrast if indicated    Narrative  [facility] [employer] [address]    Transthoracic Echocardiogram  2D, M-mode, Doppler, and Color Doppler    Study date:  2020    Patient: Jemal Yeager  MR number: [id number]  Account number: [de-identified]  : 1950  Age: 81 years  Gender: Male  Status: Inpatient  Location: [facility]  Height: 63 in  Weight: 137 7 lb  BP: 166/ 92 mmHg    Indications: Cardiac Heart Failure    Diagnoses: I50 9 - Heart failure, unspecified    Sonographer:  Orlando Vizcaino RDCS  Primary Physician:  Milady Boyer MD  Referring Physician:  Elizabeth White MD  Group:  [place] [id number] [facility]  Interpreting Physician:  Genet Harris MD    SUMMARY    LEFT VENTRICLE:  Normal left ventricular cavity size, normal wall thickness, normal left ventricular systolic function, no regional wall motion abnormality noted  Ejection fraction is determined as around 63%  Doppler evaluation is consistent with normal  diastolic left ventricular function  RIGHT VENTRICLE:  Normal right ventricular size and systolic function  Estimated right ventricular systolic pressure is mildly increased, 41 mmHg  Possible mild pulmonary hypertension  LEFT ATRIUM:  Normal left atrial cavity size  Intact interatrial septum  RIGHT ATRIUM:  Normal right atrial cavity size  MITRAL VALVE:  Mild mitral valve leaflet sclerosis, adequate leaflet mobility   Trace mitral valve regurgitation  AORTIC VALVE:  Tricuspid aortic valve with mild sclerosis  No aortic valve stenosis or regurgitation  TRICUSPID VALVE:  Trace tricuspid valve regurgitation  PULMONIC VALVE:  No significant pulmonic valve regurgitation  AORTA:  Mildly dilated aortic root and proximal ascending aorta measuring up to 3 9 cm     IVC, HEPATIC VEINS:  Inferior vena cava is normal in size and demonstrates appropriate respiratory phasic changes in diameter  PERICARDIUM:  No pericardial effusion  COMPARISONS:  Technical quality: Good  Cardiac rhythm: Normal sinus    1  Normal left ventricular size and systolic and diastolic function, EF around 63%  2  No significant chamber hypertrophy or enlargement  3  Aortic valve sclerosis, no aortic valve stenosis or regurgitation  4  Mild mitral valve sclerosis, trace mitral valve regurgitation  5  Trace tricuspid valve regurgitation  6  Possible mild pulmonary hypertension  Estimated RVSP/PASP is 41 mmHg  7  No pericardial effusion  Compared to report of previous echocardiogram from March 19, 2018 possible mild pulmonary hypertension is new otherwise no significant change  HISTORY: PRIOR HISTORY: Risk factors: hypertension, hypercholesterolemia, and a history of current cigarette use (within the last month)  PROCEDURE: The study was performed in the [place] [address]  This was a routine study  The transthoracic approach was used  The study included complete 2D imaging, M-mode, complete spectral Doppler, and color Doppler  The heart rate was 71  bpm, at the start of the study  Image quality was adequate  LEFT VENTRICLE: Normal left ventricular cavity size, normal wall thickness, normal left ventricular systolic function, no regional wall motion abnormality noted  Ejection fraction is determined as around 63%  Doppler evaluation is  consistent with normal diastolic left ventricular function      RIGHT VENTRICLE: Normal right ventricular size and systolic function  Estimated right ventricular systolic pressure is mildly increased, 41 mmHg  Possible mild pulmonary hypertension  LEFT ATRIUM: Normal left atrial cavity size  Intact interatrial septum  RIGHT ATRIUM: Normal right atrial cavity size  MITRAL VALVE: Mild mitral valve leaflet sclerosis, adequate leaflet mobility  Trace mitral valve regurgitation  AORTIC VALVE: Tricuspid aortic valve with mild sclerosis  No aortic valve stenosis or regurgitation  TRICUSPID VALVE: Trace tricuspid valve regurgitation  PULMONIC VALVE: No significant pulmonic valve regurgitation  PERICARDIUM: No pericardial effusion  AORTA: Mildly dilated aortic root and proximal ascending aorta measuring up to 3 9 cm  SYSTEMIC VEINS: IVC: Inferior vena cava is normal in size and demonstrates appropriate respiratory phasic changes in diameter  SYSTEM MEASUREMENT TABLES    2D  %FS: 32 19 %  Ao Diam: 3 85 cm  EDV(Teich): 83 18 ml  EF(Teich): 60 7 %  ESV(Teich): 32 69 ml  IVSd: 0 92 cm  LA Area: 12 53 cm2  LA Diam: 3 51 cm  LVEDV MOD A4C: 81 65 ml  LVEF MOD A4C: 62 91 %  LVESV MOD A4C: 30 28 ml  LVIDd: 4 3 cm  LVIDs: 2 92 cm  LVLd A4C: 8 87 cm  LVLs A4C: 7 35 cm  LVPWd: 0 9 cm  RA Area: 13 63 cm2  RVIDd: 2 63 cm  SV MOD A4C: 51 37 ml  SV(Teich): 50 49 ml    CW  TR Vmax: 2 8 m/s  TR maxP 34 mmHg    MM  TAPSE: 2 54 cm    PW  E': 0 09 m/s  E/E': 11 67  MV A Lam: 0 8 m/s  MV Dec Gray: 4 67 m/s2  MV DecT: 230 85 ms  MV E Lam: 1 08 m/s  MV E/A Ratio: 1 35  MV PHT: 66 95 ms  MVA By PHT: 3 29 cm2    [employer] Accredited Echocardiography Laboratory    Prepared and electronically signed by    Tin Rodney MD  Signed 2020 11:53:29    No results found for this or any previous visit  No results found for this or any previous visit  No results found for this or any previous visit      CT soft tissue neck w contrast  Narrative: CT NECK WITH CONTRAST    INDICATION:   R49 0: Dysphonia  F17 210: Nicotine dependence, cigarettes, uncomplicated  COMPARISON:  CTA chest with and without contrast February 14, 2022  CTA neck with and without contrast December 30, 2019  TECHNIQUE:  Axial, sagittal, and coronal 2D reformatted images were created from the axial source data and submitted for interpretation  Radiation dose length product (DLP) for this visit:  285 mGy-cm   This examination, like all CT scans performed in the [facility], was performed utilizing techniques to minimize radiation dose exposure, including the use of iterative   reconstruction and automated exposure control  IV Contrast:  85 mL of iohexol (OMNIPAQUE)    IMAGE QUALITY:  Diagnostic  FINDINGS:    VISUALIZED BRAIN PARENCHYMA:  No acute intracranial pathology of the visualized brain parenchyma  VISUALIZED ORBITS AND PARANASAL SINUSES:  Sequela of bilateral cataract surgery  Clear sinuses  NASAL CAVITY AND NASOPHARYNX:  Normal     SUPRAHYOID NECK: Dental amalgam with beam hardening artifact, slightly limits evaluation of anterior oral cavity  New 1 2 x 0 9 cm left tonsillar enhancing lesion with central hypoattenuation (3:20)  New 1 5 x 1 5 cm asymmetric enhancing nodular soft   tissue in left glossotonsillar sulcus (3:24)  Normal tongue base, right tonsillar fossa and epiglottis  INFRAHYOID NECK:  Aryepiglottic folds and piriform sinuses are normal   Medialization of right true vocal cord with mildly dilated right piriform sinus, findings suggestive of right vocal cord paralysis  Normal subglottic airway  THYROID GLAND:  Unremarkable  PAROTID AND SUBMANDIBULAR GLANDS:  Normal     LYMPH NODES:  0 7 cm left level 2A heterogeneous nodule with central hypoattenuation (3:27)  No other suspicious cervical lymphadenopathy  VASCULAR STRUCTURES:  Unchanged occluded left vertebral artery at its origin with distal reconstitution through peripheral muscular branch at the level of C3    Postsurgical changes of left carotid endarterectomy which is patent  Persistent moderate   stenosis in right ICA cervical segment due to calcified atherosclerotic disease  Mild-to-moderate scattered calcified atherosclerotic disease Otherwise, normal enhancement of the cervical vasculature  THORACIC INLET:  Respiratory motion artifact with mild emphysematous changes  BONY STRUCTURES: No acute fracture or destructive osseous lesion  ACDF C4-C5, C5-C6, and C6-C7 with interbody disc spacers at C4-C5 and C5-C6  Unchanged grade 1 anterolisthesis C3-C4 and C7-T1  Multilevel degenerative changes of cervical spine with   hypertrophic facet arthropathy and evidence of facet ankylosis  Impression: New 1 2 cm left tonsillar necrotic lesion, suspicious for primary tonsillar neoplasm  Recommend consultation and direct visualization by with ENT  New 1 5 cm enhancing nodular tissue in left glossotonsillar sulcus, suspicious for concurrent primary tongue neoplasm  Recommend direct visualization by ENT  New 0 9 cm left level 2A necrotic lymph node, suspicious for necrotic samson metastasis  Findings suggestive of right vocal cord paralysis  Recommend direct visualization by ENT  Additional chronic/incidental findings as detailed above  The study was marked in [facility] for immediate notification      Workstation performed: [id number]        *-*-*-*-*-*-*-*-*-*-*-*-*-*-*-*-*-*-*-*-*-*-*-*-*-*-*-*-*-*-*-*-*-*-*-*-*-*-*-*-*-*-*-*-*-*-*-*-*-*-*-*-*-*-  SIGNATURES:   @OCB@   Shlomo Frye MD; MHA    *-*-*-*-*-*-*-*-*-*-*-*-*-*-*-*-*-*-*-*-*-*-*-*-*-*-*-*-*-*-*-*-*-*-*-*-*-*-*-*-*-*-*-*-*-*-*-*-*-*-*-*-*-*-  PAST MEDICAL HISTORY:  Past Medical History:   Diagnosis Date    A-fib ([address] )     Arthritis     Avascular necrosis of bone of hip, left (HCC)     replaced    Back pain     CAD (coronary artery disease)     Carotid artery narrowing     left side -for endarterectomy today 3/5/2020    Disease of thyroid gland     hypo    GERD (gastroesophageal reflux disease)     History of Clostridioides difficile infection     Hyperlipidemia     Hypertension     Irregular heart beat     Jejunitis     Kidney stone     Neck pain     Pancreatitis     Spinal stenosis     Wears glasses     PAST SURGICAL HISTORY:  Past Surgical History:   Procedure Laterality Date    BACK SURGERY      CARDIAC CATHETERIZATION      cardiac cath 5/2010  adjusted meds    CATARACT EXTRACTION Bilateral     CERVICAL FUSION      CHOLANGIOGRAM N/A 03/04/2021    Procedure: CHOLANGIOGRAM;  Surgeon: Sumaya Covarrubias MD;  Location: [address] [place] [address] MAIN OR;  Service: General    CHOLECYSTECTOMY      CHOLECYSTECTOMY LAPAROSCOPIC N/A 03/04/2021    Procedure: Felicia Law;  Surgeon: Sumaya Covarrubias MD;  Location: [address] [place] [address] MAIN OR;  Service: General    COLONOSCOPY      JOINT REPLACEMENT Left     hip    LUMBAR FUSION      NECK SURGERY      ORTHOPEDIC SURGERY Left     L THR    TN LARYNGOSCOPY,DIRCT,OP SCOPE,BIOPSY Left 6/1/2022    Procedure: MICRO DIRECT LARYNGOSCOPY WITH BIOPSY, BIOPSY LEFT TONSIL;  Surgeon: Adrian Rivero MD;  Location:  MAIN OR;  Service: ENT    TN THROMBOENDARTECTMY Alisson Reiniere Left 03/05/2020    Procedure: ENDARTERECTOMY ARTERY CAROTID WITH BOVINE PATCH ANGIOPLASTY AND INTRAOP DUPLEX;  Surgeon: Omar Krause MD;  Location: [facility] Main OR;  Service: Vascular    SPINAL FUSION           FAMILY HISTORY:  Family History   Problem Relation Age of Onset    Heart disease Mother     Parkinsonism Mother     Heart disease Father     SOCIAL HISTORY:  Social History     Tobacco Use   Smoking Status Current Every Day Smoker    Packs/day: 0 50    Years: 50 00    Pack years: 25 00    Types: Cigarettes   Smokeless Tobacco Never Used   Tobacco Comment    3-4 cigarrettes      Social History     Substance and Sexual Activity   Alcohol Use Yes    Comment: 3 oz of wine with communion multiple times/day/week     Social History     Substance and Sexual Activity   Drug Use Never    [id number]     *-*-*-*-*-*-*-*-*-*-*-*-*-*-*-*-*-*-*-*-*-*-*-*-*-*-*-*-*-*-*-*-*-*-*-*-*-*-*-*-*-*-*-*-*-*-*-*-*-*-*-*-*-*  ALLERGIES:  No Known Allergies CURRENT SCHEDULED MEDICATIONS:    Current Outpatient Medications:     amLODIPine (NORVASC) 10 mg tablet, Take 1 tablet (10 mg total) by mouth daily (Patient taking differently: Take 10 mg by mouth every morning), Disp: 90 tablet, Rfl: 3    Creon 58521-28624 units capsule, TAKE 1 CAPSULE BY MOUTH 3 TIMESA DAY WITH MEALS (Patient taking differently: Take 12,000 units of lipase by mouth 3 (three) times a day with meals), Disp: 90 capsule, Rfl: 0    fenofibrate 160 MG tablet, Take 1 tablet (160 mg total) by mouth daily, Disp: 90 tablet, Rfl: 3    levothyroxine 25 mcg tablet, Take 0 5 tablets (12 5 mcg total) by mouth daily (Patient taking differently: Take 12 5 mcg by mouth every morning), Disp: 45 tablet, Rfl: 3    losartan (COZAAR) 50 mg tablet, Take 1 tablet (50 mg total) by mouth daily (Patient taking differently: Take 50 mg by mouth every morning), Disp: 90 tablet, Rfl: 3    magnesium Oxide (MAG-OX) 400 mg TABS, Take 1 tablet (400 mg total) by mouth 2 (two) times a day, Disp: 180 tablet, Rfl: 3    metoprolol succinate (TOPROL-XL) 50 mg 24 hr tablet, Take 0 5 tablets (25 mg total) by mouth daily (Patient taking differently: Take 25 mg by mouth every evening), Disp: 90 tablet, Rfl: 3    omeprazole (PriLOSEC) 20 mg delayed release capsule, Take 1 capsule (20 mg total) by mouth 2 (two) times a day (Patient taking differently: Take 20 mg by mouth every morning), Disp: 180 capsule, Rfl: 3    rosuvastatin (CRESTOR) 10 MG tablet, Take 1 tablet (10 mg total) by mouth daily Please STOP Vytorin    (Patient taking differently: Take 10 mg by mouth every evening Please STOP Vytorin  Manisha Hebert ), Disp: 90 tablet, Rfl: 3    spironolactone (ALDACTONE) 25 mg tablet, Take 0 5 tablets (12 5 mg total) by mouth daily (Patient taking differently: Take 12 5 mg by mouth daily at bedtime), Disp: 45 tablet, Rfl: 3    thiamine 100 MG tablet, Take 1 tablet (100 mg total) by mouth daily (Patient taking differently: Take 100 mg by mouth every evening), Disp: 90 tablet, Rfl: 3    zolpidem (AMBIEN) 10 mg tablet, Take by mouth daily at bedtime as needed , Disp: , Rfl:      *-*-*-*-*-*-*-*-*-*-*-*-*-*-*-*-*-*-*-*-*-*-*-*-*-*-*-*-*-*-*-*-*-*-*-*-*-*-*-*-*-*-*-*-*-*-*-*-*-*-*-*-*-*

## 2022-06-07 NOTE — PATIENT INSTRUCTIONS
CARDIOLOGY ASSESSMENT & PLAN     1  Mixed hyperlipidemia  fenofibrate 160 MG tablet   2  Cigarette nicotine dependence with nicotine-induced disorder     3  Essential hypertension, benign     4  Thoracic aortic aneurysm without rupture (HCC)     5  Ectopic atrial rhythm       Essential hypertension, benign  Father Arley Jamil is overall doing well from cardiac perspective with no recent symptoms that suggest symptomatic hypertension or angina or heart failure  Blood pressure is noted to be slightly on the lower side but he is asymptomatic  On examination there is no evidence of pulmonary or peripheral vascular congestion  He does have moderate stenosis in the right carotid artery  His recent CT scan of the chest did not identify significant thoracic aortic aneurysm  His recent blood work is significant for mildly increased triglycerides  -- at this time I am advising him to continue current medications  We are adding back fenofibrate 160 mg daily to his regimen and he felt good while he was on this in the past   -- he will continue follow regarding his postoperative care of recent biopsies  -- I again reinforced with him the importance of quitting smoking as this is his single major risk factor for future cardiovascular disease  -- Dietary and medical compliance are reinforced  -- He is advised  to report any concerning symptoms such as chest pain, shortness of breath, decline in exercise tolerance or presyncope/syncope

## 2022-07-05 ENCOUNTER — OFFICE VISIT (OUTPATIENT)
Dept: FAMILY MEDICINE CLINIC | Facility: CLINIC | Age: 72
End: 2022-07-05
Payer: COMMERCIAL

## 2022-07-05 VITALS
HEIGHT: 63 IN | WEIGHT: 153.2 LBS | SYSTOLIC BLOOD PRESSURE: 112 MMHG | TEMPERATURE: 98.1 F | OXYGEN SATURATION: 93 % | HEART RATE: 80 BPM | RESPIRATION RATE: 16 BRPM | BODY MASS INDEX: 27.14 KG/M2 | DIASTOLIC BLOOD PRESSURE: 68 MMHG

## 2022-07-05 DIAGNOSIS — R49.0 HOARSENESS OF VOICE: ICD-10-CM

## 2022-07-05 DIAGNOSIS — E06.3 HYPOTHYROIDISM DUE TO HASHIMOTO'S THYROIDITIS: ICD-10-CM

## 2022-07-05 DIAGNOSIS — J37.0 CHRONIC FUNGAL LARYNGITIS: Primary | ICD-10-CM

## 2022-07-05 DIAGNOSIS — B49 CHRONIC FUNGAL LARYNGITIS: Primary | ICD-10-CM

## 2022-07-05 DIAGNOSIS — I10 ESSENTIAL HYPERTENSION: ICD-10-CM

## 2022-07-05 DIAGNOSIS — F17.210 CIGARETTE SMOKER: ICD-10-CM

## 2022-07-05 DIAGNOSIS — E03.8 HYPOTHYROIDISM DUE TO HASHIMOTO'S THYROIDITIS: ICD-10-CM

## 2022-07-05 DIAGNOSIS — K21.9 GASTROESOPHAGEAL REFLUX DISEASE WITHOUT ESOPHAGITIS: ICD-10-CM

## 2022-07-05 PROCEDURE — 99214 OFFICE O/P EST MOD 30 MIN: CPT | Performed by: INTERNAL MEDICINE

## 2022-07-05 RX ORDER — DEXLANSOPRAZOLE 60 MG/1
60 CAPSULE, DELAYED RELEASE ORAL DAILY
Qty: 90 CAPSULE | Refills: 3 | Status: SHIPPED | OUTPATIENT
Start: 2022-07-05 | End: 2022-07-12

## 2022-07-05 RX ORDER — FLUCONAZOLE 150 MG/1
150 TABLET ORAL DAILY
Qty: 3 TABLET | Refills: 0 | Status: SHIPPED | OUTPATIENT
Start: 2022-07-05 | End: 2022-07-08

## 2022-07-05 NOTE — PROGRESS NOTES
Assessment/Plan:         Diagnoses and all orders for this visit:    Chronic fungal laryngitis; Try :  -     fluconazole (DIFLUCAN) 150 mg tablet; Take 1 tablet (150 mg total) by mouth daily for 3 doses  -     nystatin (MYCOSTATIN) 500,000 units/5 mL suspension; Apply 5 mL (500,000 Units total) to the mouth or throat 4 (four) times a day for 10 days    Hoarseness of voice; due to above, Try :  -     fluconazole (DIFLUCAN) 150 mg tablet; Take 1 tablet (150 mg total) by mouth daily for 3 doses  -     nystatin (MYCOSTATIN) 500,000 units/5 mL suspension; Apply 5 mL (500,000 Units total) to the mouth or throat 4 (four) times a day for 10 days  -     dexlansoprazole (DEXILANT) 60 MG capsule; Take 1 capsule (60 mg total) by mouth daily Please Stop Omeprazole  FU w ENT  RTC in 3 mos w Blood  Work    Cigarette smoker; advised to quit, RTC in 3 mos w :  -     UA (URINE) with reflex to Scope; Future  -     Comprehensive metabolic panel; Future  -     CBC and differential; Future  -     Lipid panel; Future  -     Magnesium; Future    Essential hypertension  -     UA (URINE) with reflex to Scope; Future  -     Comprehensive metabolic panel; Future  -     CBC and differential; Future  -     Lipid panel; Future  -     Magnesium; Future    Gastroesophageal reflux disease without esophagitis  -     dexlansoprazole (DEXILANT) 60 MG capsule; Take 1 capsule (60 mg total) by mouth daily Please Stop Omeprazole  -     Lipase; Future  -     UA (URINE) with reflex to Scope; Future  -     Comprehensive metabolic panel; Future  -     CBC and differential; Future  -     Lipid panel; Future  -     Magnesium; Future    Hypothyroidism due to Hashimoto's thyroiditis  -     Lipase; Future  -     TSH, 3rd generation; Future  -     T4, free; Future        Subjective:      Patient ID: Marylen Risser is a 81 y o  male      81 Y O Man is here for Regular check up, He still has Hoarseness, had ENT consult, and EGD, recent blood work and med list Reviewed w Pt,  The following portions of the patient's history were reviewed and updated as appropriate: allergies, current medications, past family history, past medical history, past surgical history and problem list     Review of Systems   Constitutional: Negative for chills, fatigue and fever  HENT: Positive for voice change  Negative for congestion, facial swelling, sore throat and trouble swallowing  Eyes: Negative for pain, discharge and visual disturbance  Respiratory: Negative for cough, shortness of breath and wheezing  Cardiovascular: Negative for chest pain, palpitations and leg swelling  Gastrointestinal: Negative for abdominal pain, blood in stool, constipation, diarrhea and nausea  Endocrine: Negative for polydipsia, polyphagia and polyuria  Genitourinary: Negative for difficulty urinating, hematuria and urgency  Musculoskeletal: Negative for arthralgias and myalgias  Skin: Negative for rash  Neurological: Negative for dizziness, tremors, weakness and headaches  Hematological: Negative for adenopathy  Does not bruise/bleed easily  Psychiatric/Behavioral: Negative for dysphoric mood, sleep disturbance and suicidal ideas  Objective:      /68 (BP Location: Left arm, Patient Position: Sitting, Cuff Size: Standard)   Pulse 80   Temp 98 1 °F (36 7 °C)   Resp 16   Ht 5' 3" (1 6 m)   Wt 69 5 kg (153 lb 3 2 oz)   SpO2 93%   BMI 27 14 kg/m²          Physical Exam  Constitutional:       General: He is not in acute distress  HENT:      Head: Normocephalic  Mouth/Throat:      Pharynx: No oropharyngeal exudate  Eyes:      General: No scleral icterus  Conjunctiva/sclera: Conjunctivae normal       Pupils: Pupils are equal, round, and reactive to light  Neck:      Thyroid: No thyromegaly  Cardiovascular:      Rate and Rhythm: Normal rate and regular rhythm  Heart sounds: Normal heart sounds  No murmur heard    Pulmonary:      Effort: Pulmonary effort is normal  No respiratory distress  Breath sounds: Normal breath sounds  No wheezing or rales  Abdominal:      General: Bowel sounds are normal  There is no distension  Palpations: Abdomen is soft  Tenderness: There is no abdominal tenderness  There is no guarding or rebound  Musculoskeletal:         General: No tenderness  Cervical back: Neck supple  Lymphadenopathy:      Cervical: No cervical adenopathy  Skin:     Coloration: Skin is not pale  Findings: No rash  Neurological:      Mental Status: He is alert and oriented to person, place, and time  Sensory: No sensory deficit  Motor: No weakness

## 2022-07-10 DIAGNOSIS — R19.7 DIARRHEA, UNSPECIFIED TYPE: ICD-10-CM

## 2022-07-10 DIAGNOSIS — E03.9 HYPOTHYROIDISM (ACQUIRED): ICD-10-CM

## 2022-07-10 DIAGNOSIS — E83.42 HYPOMAGNESEMIA: ICD-10-CM

## 2022-07-10 DIAGNOSIS — R94.5 LIVER FUNCTION ABNORMALITY: ICD-10-CM

## 2022-07-10 DIAGNOSIS — E78.2 MIXED HYPERLIPIDEMIA: ICD-10-CM

## 2022-07-10 DIAGNOSIS — I10 ESSENTIAL HYPERTENSION, BENIGN: ICD-10-CM

## 2022-07-10 DIAGNOSIS — K85.90 ACUTE PANCREATITIS: ICD-10-CM

## 2022-07-10 DIAGNOSIS — K86.1 OTHER CHRONIC PANCREATITIS (HCC): ICD-10-CM

## 2022-07-10 DIAGNOSIS — I10 ESSENTIAL HYPERTENSION: ICD-10-CM

## 2022-07-11 RX ORDER — AMLODIPINE BESYLATE 10 MG/1
10 TABLET ORAL DAILY
Qty: 90 TABLET | Refills: 0 | Status: SHIPPED | OUTPATIENT
Start: 2022-07-11 | End: 2022-10-05 | Stop reason: SDUPTHER

## 2022-07-11 RX ORDER — METOPROLOL SUCCINATE 50 MG/1
25 TABLET, EXTENDED RELEASE ORAL DAILY
Qty: 90 TABLET | Refills: 0 | Status: SHIPPED | OUTPATIENT
Start: 2022-07-11 | End: 2022-10-05 | Stop reason: SDUPTHER

## 2022-07-11 RX ORDER — SPIRONOLACTONE 25 MG/1
12.5 TABLET ORAL DAILY
Qty: 45 TABLET | Refills: 0 | Status: SHIPPED | OUTPATIENT
Start: 2022-07-11 | End: 2022-10-05 | Stop reason: SDUPTHER

## 2022-07-11 RX ORDER — PANCRELIPASE 60000; 12000; 38000 [USP'U]/1; [USP'U]/1; [USP'U]/1
12000 CAPSULE, DELAYED RELEASE PELLETS ORAL
Qty: 90 CAPSULE | Refills: 0 | Status: SHIPPED | OUTPATIENT
Start: 2022-07-11 | End: 2022-10-05 | Stop reason: SDUPTHER

## 2022-07-11 RX ORDER — LOSARTAN POTASSIUM 50 MG/1
50 TABLET ORAL DAILY
Qty: 90 TABLET | Refills: 0 | Status: SHIPPED | OUTPATIENT
Start: 2022-07-11 | End: 2022-10-05 | Stop reason: SDUPTHER

## 2022-07-11 RX ORDER — ROSUVASTATIN CALCIUM 10 MG/1
10 TABLET, COATED ORAL DAILY
Qty: 90 TABLET | Refills: 0 | Status: SHIPPED | OUTPATIENT
Start: 2022-07-11 | End: 2022-10-05 | Stop reason: SDUPTHER

## 2022-07-11 RX ORDER — LANOLIN ALCOHOL/MO/W.PET/CERES
400 CREAM (GRAM) TOPICAL 2 TIMES DAILY
Qty: 180 TABLET | Refills: 0 | Status: SHIPPED | OUTPATIENT
Start: 2022-07-11 | End: 2022-10-07 | Stop reason: SDUPTHER

## 2022-07-11 RX ORDER — LEVOTHYROXINE SODIUM 0.03 MG/1
12.5 TABLET ORAL DAILY
Qty: 45 TABLET | Refills: 0 | Status: SHIPPED | OUTPATIENT
Start: 2022-07-11 | End: 2022-10-05 | Stop reason: SDUPTHER

## 2022-07-11 RX ORDER — LANOLIN ALCOHOL/MO/W.PET/CERES
100 CREAM (GRAM) TOPICAL DAILY
Qty: 90 TABLET | Refills: 0 | Status: SHIPPED | OUTPATIENT
Start: 2022-07-11 | End: 2022-10-11 | Stop reason: SDUPTHER

## 2022-07-12 ENCOUNTER — TELEPHONE (OUTPATIENT)
Dept: FAMILY MEDICINE CLINIC | Facility: CLINIC | Age: 72
End: 2022-07-12

## 2022-07-12 DIAGNOSIS — K21.9 GASTROESOPHAGEAL REFLUX DISEASE WITHOUT ESOPHAGITIS: Primary | ICD-10-CM

## 2022-07-12 RX ORDER — PANTOPRAZOLE SODIUM 40 MG/1
40 TABLET, DELAYED RELEASE ORAL DAILY
Qty: 90 TABLET | Refills: 3 | Status: SHIPPED | OUTPATIENT
Start: 2022-07-12 | End: 2022-10-11

## 2022-07-12 NOTE — TELEPHONE ENCOUNTER
Pt called stating that the dexilant is not covered through his insurance and would like to have the prilosec called into the pharmacy   Please advise - as

## 2022-07-22 ENCOUNTER — HOSPITAL ENCOUNTER (OUTPATIENT)
Dept: ULTRASOUND IMAGING | Facility: HOSPITAL | Age: 72
Discharge: HOME | End: 2022-07-22
Payer: COMMERCIAL

## 2022-07-22 DIAGNOSIS — R22.1 LOCALIZED SWELLING, MASS AND LUMP, NECK: ICD-10-CM

## 2022-07-22 DIAGNOSIS — F17.210 CIGARETTE SMOKER: ICD-10-CM

## 2022-07-22 PROCEDURE — 76536 US EXAM OF HEAD AND NECK: CPT

## 2022-09-06 ENCOUNTER — APPOINTMENT (OUTPATIENT)
Dept: LAB | Facility: HOSPITAL | Age: 72
End: 2022-09-06

## 2022-09-06 DIAGNOSIS — Z00.8 ENCOUNTER FOR OTHER GENERAL EXAMINATION: ICD-10-CM

## 2022-09-06 LAB
CHOLEST SERPL-MCNC: 132 MG/DL
EST. AVERAGE GLUCOSE BLD GHB EST-MCNC: 120 MG/DL
HBA1C MFR BLD: 5.8 %
HDLC SERPL-MCNC: 38 MG/DL
LDLC SERPL CALC-MCNC: 61 MG/DL
NONHDLC SERPL-MCNC: 94 MG/DL
TRIGL SERPL-MCNC: 165 MG/DL

## 2022-09-06 PROCEDURE — 80061 LIPID PANEL: CPT

## 2022-09-06 PROCEDURE — 36415 COLL VENOUS BLD VENIPUNCTURE: CPT

## 2022-09-06 PROCEDURE — 83036 HEMOGLOBIN GLYCOSYLATED A1C: CPT

## 2022-09-20 PROCEDURE — 87147 CULTURE TYPE IMMUNOLOGIC: CPT | Performed by: OTOLARYNGOLOGY

## 2022-09-20 PROCEDURE — 87070 CULTURE OTHR SPECIMN AEROBIC: CPT | Performed by: OTOLARYNGOLOGY

## 2022-09-20 PROCEDURE — 87106 FUNGI IDENTIFICATION YEAST: CPT | Performed by: OTOLARYNGOLOGY

## 2022-09-20 PROCEDURE — 87205 SMEAR GRAM STAIN: CPT | Performed by: OTOLARYNGOLOGY

## 2022-10-03 ENCOUNTER — HOSPITAL ENCOUNTER (OUTPATIENT)
Dept: RADIOLOGY | Facility: HOSPITAL | Age: 72
Discharge: HOME | End: 2022-10-03
Payer: COMMERCIAL

## 2022-10-03 DIAGNOSIS — R22.1 LOCALIZED SWELLING, MASS AND LUMP, NECK: ICD-10-CM

## 2022-10-03 PROCEDURE — 88184 FLOWCYTOMETRY/ TC 1 MARKER: CPT | Performed by: PHYSICIAN ASSISTANT

## 2022-10-03 PROCEDURE — 88305 TISSUE EXAM BY PATHOLOGIST: CPT | Performed by: PATHOLOGY

## 2022-10-03 PROCEDURE — 88341 IMHCHEM/IMCYTCHM EA ADD ANTB: CPT | Performed by: PATHOLOGY

## 2022-10-03 PROCEDURE — 88172 CYTP DX EVAL FNA 1ST EA SITE: CPT | Performed by: PATHOLOGY

## 2022-10-03 PROCEDURE — 88173 CYTOPATH EVAL FNA REPORT: CPT | Performed by: PATHOLOGY

## 2022-10-03 PROCEDURE — 88185 FLOWCYTOMETRY/TC ADD-ON: CPT

## 2022-10-03 PROCEDURE — 88342 IMHCHEM/IMCYTCHM 1ST ANTB: CPT | Performed by: PATHOLOGY

## 2022-10-03 PROCEDURE — 10005 FNA BX W/US GDN 1ST LES: CPT

## 2022-10-03 RX ORDER — LIDOCAINE HYDROCHLORIDE 10 MG/ML
2 INJECTION, SOLUTION EPIDURAL; INFILTRATION; INTRACAUDAL; PERINEURAL ONCE
Status: COMPLETED | OUTPATIENT
Start: 2022-10-03 | End: 2022-10-03

## 2022-10-03 RX ADMIN — LIDOCAINE HYDROCHLORIDE 2 ML: 10 INJECTION, SOLUTION EPIDURAL; INFILTRATION; INTRACAUDAL; PERINEURAL at 10:50

## 2022-10-05 DIAGNOSIS — E03.9 HYPOTHYROIDISM (ACQUIRED): ICD-10-CM

## 2022-10-05 DIAGNOSIS — E78.2 MIXED HYPERLIPIDEMIA: ICD-10-CM

## 2022-10-05 DIAGNOSIS — I10 ESSENTIAL HYPERTENSION, BENIGN: ICD-10-CM

## 2022-10-05 DIAGNOSIS — R94.5 LIVER FUNCTION ABNORMALITY: ICD-10-CM

## 2022-10-05 DIAGNOSIS — I10 ESSENTIAL HYPERTENSION: ICD-10-CM

## 2022-10-05 DIAGNOSIS — K86.1 OTHER CHRONIC PANCREATITIS (HCC): ICD-10-CM

## 2022-10-05 DIAGNOSIS — R19.7 DIARRHEA, UNSPECIFIED TYPE: ICD-10-CM

## 2022-10-05 LAB — SCAN RESULT: NORMAL

## 2022-10-05 RX ORDER — SPIRONOLACTONE 25 MG/1
12.5 TABLET ORAL DAILY
Qty: 45 TABLET | Refills: 0 | Status: SHIPPED | OUTPATIENT
Start: 2022-10-05 | End: 2022-10-11 | Stop reason: SDUPTHER

## 2022-10-05 RX ORDER — AMLODIPINE BESYLATE 10 MG/1
10 TABLET ORAL DAILY
Qty: 90 TABLET | Refills: 0 | Status: SHIPPED | OUTPATIENT
Start: 2022-10-05 | End: 2022-10-11 | Stop reason: SDUPTHER

## 2022-10-05 RX ORDER — FENOFIBRATE 160 MG/1
160 TABLET ORAL DAILY
Qty: 90 TABLET | Refills: 0 | Status: SHIPPED | OUTPATIENT
Start: 2022-10-05 | End: 2022-10-11 | Stop reason: SDUPTHER

## 2022-10-05 RX ORDER — LOSARTAN POTASSIUM 50 MG/1
50 TABLET ORAL DAILY
Qty: 90 TABLET | Refills: 0 | Status: SHIPPED | OUTPATIENT
Start: 2022-10-05 | End: 2022-10-11 | Stop reason: SDUPTHER

## 2022-10-05 RX ORDER — LEVOTHYROXINE SODIUM 0.03 MG/1
12.5 TABLET ORAL DAILY
Qty: 45 TABLET | Refills: 0 | Status: SHIPPED | OUTPATIENT
Start: 2022-10-05 | End: 2022-10-11 | Stop reason: SDUPTHER

## 2022-10-05 RX ORDER — ROSUVASTATIN CALCIUM 10 MG/1
10 TABLET, COATED ORAL DAILY
Qty: 90 TABLET | Refills: 0 | Status: SHIPPED | OUTPATIENT
Start: 2022-10-05 | End: 2022-10-11 | Stop reason: SDUPTHER

## 2022-10-05 RX ORDER — PANCRELIPASE 60000; 12000; 38000 [USP'U]/1; [USP'U]/1; [USP'U]/1
12000 CAPSULE, DELAYED RELEASE PELLETS ORAL
Qty: 90 CAPSULE | Refills: 0 | Status: SHIPPED | OUTPATIENT
Start: 2022-10-05 | End: 2022-10-11 | Stop reason: SDUPTHER

## 2022-10-05 RX ORDER — METOPROLOL SUCCINATE 50 MG/1
25 TABLET, EXTENDED RELEASE ORAL DAILY
Qty: 90 TABLET | Refills: 0 | Status: SHIPPED | OUTPATIENT
Start: 2022-10-05 | End: 2022-10-11 | Stop reason: SDUPTHER

## 2022-10-07 ENCOUNTER — APPOINTMENT (OUTPATIENT)
Dept: LAB | Facility: HOSPITAL | Age: 72
End: 2022-10-07
Payer: COMMERCIAL

## 2022-10-07 DIAGNOSIS — F17.210 CIGARETTE SMOKER: ICD-10-CM

## 2022-10-07 DIAGNOSIS — E83.42 HYPOMAGNESEMIA: ICD-10-CM

## 2022-10-07 DIAGNOSIS — I10 ESSENTIAL HYPERTENSION: ICD-10-CM

## 2022-10-07 DIAGNOSIS — E06.3 HYPOTHYROIDISM DUE TO HASHIMOTO'S THYROIDITIS: ICD-10-CM

## 2022-10-07 DIAGNOSIS — K21.9 GASTROESOPHAGEAL REFLUX DISEASE WITHOUT ESOPHAGITIS: ICD-10-CM

## 2022-10-07 DIAGNOSIS — E03.8 HYPOTHYROIDISM DUE TO HASHIMOTO'S THYROIDITIS: ICD-10-CM

## 2022-10-07 LAB
ALBUMIN SERPL BCP-MCNC: 4.3 G/DL (ref 3.5–5)
ALP SERPL-CCNC: 54 U/L (ref 43–122)
ALT SERPL W P-5'-P-CCNC: 22 U/L
ANION GAP SERPL CALCULATED.3IONS-SCNC: 12 MMOL/L (ref 5–14)
AST SERPL W P-5'-P-CCNC: 39 U/L (ref 17–59)
BASOPHILS # BLD AUTO: 0.04 THOUSANDS/ΜL (ref 0–0.1)
BASOPHILS NFR BLD AUTO: 1 % (ref 0–1)
BILIRUB SERPL-MCNC: 0.54 MG/DL (ref 0.2–1)
BUN SERPL-MCNC: 22 MG/DL (ref 5–25)
CALCIUM SERPL-MCNC: 9.7 MG/DL (ref 8.4–10.2)
CHLORIDE SERPL-SCNC: 102 MMOL/L (ref 96–108)
CHOLEST SERPL-MCNC: 116 MG/DL
CO2 SERPL-SCNC: 24 MMOL/L (ref 21–32)
CREAT SERPL-MCNC: 1.48 MG/DL (ref 0.7–1.5)
EOSINOPHIL # BLD AUTO: 0.28 THOUSAND/ΜL (ref 0–0.61)
EOSINOPHIL NFR BLD AUTO: 4 % (ref 0–6)
ERYTHROCYTE [DISTWIDTH] IN BLOOD BY AUTOMATED COUNT: 13.6 % (ref 11.6–15.1)
GFR SERPL CREATININE-BSD FRML MDRD: 47 ML/MIN/1.73SQ M
GLUCOSE P FAST SERPL-MCNC: 105 MG/DL (ref 70–99)
HCT VFR BLD AUTO: 38.9 % (ref 36.5–49.3)
HDLC SERPL-MCNC: 28 MG/DL
HGB BLD-MCNC: 13.4 G/DL (ref 12–17)
IMM GRANULOCYTES # BLD AUTO: 0.05 THOUSAND/UL (ref 0–0.2)
IMM GRANULOCYTES NFR BLD AUTO: 1 % (ref 0–2)
LDLC SERPL CALC-MCNC: 54 MG/DL
LIPASE SERPL-CCNC: 1439 U/L (ref 23–300)
LYMPHOCYTES # BLD AUTO: 1.82 THOUSANDS/ΜL (ref 0.6–4.47)
LYMPHOCYTES NFR BLD AUTO: 23 % (ref 14–44)
MAGNESIUM SERPL-MCNC: 1.4 MG/DL (ref 1.6–2.3)
MCH RBC QN AUTO: 32.8 PG (ref 26.8–34.3)
MCHC RBC AUTO-ENTMCNC: 34.4 G/DL (ref 31.4–37.4)
MCV RBC AUTO: 95 FL (ref 82–98)
MONOCYTES # BLD AUTO: 0.6 THOUSAND/ΜL (ref 0.17–1.22)
MONOCYTES NFR BLD AUTO: 8 % (ref 4–12)
NEUTROPHILS # BLD AUTO: 5.25 THOUSANDS/ΜL (ref 1.85–7.62)
NEUTS SEG NFR BLD AUTO: 63 % (ref 43–75)
NONHDLC SERPL-MCNC: 88 MG/DL
NRBC BLD AUTO-RTO: 0 /100 WBCS
PLATELET # BLD AUTO: 315 THOUSANDS/UL (ref 149–390)
PMV BLD AUTO: 11.1 FL (ref 8.9–12.7)
POTASSIUM SERPL-SCNC: 5.2 MMOL/L (ref 3.5–5.3)
PROT SERPL-MCNC: 7.6 G/DL (ref 6.4–8.4)
RBC # BLD AUTO: 4.08 MILLION/UL (ref 3.88–5.62)
SODIUM SERPL-SCNC: 138 MMOL/L (ref 135–147)
T4 FREE SERPL-MCNC: 1.42 NG/DL (ref 0.76–1.46)
TRIGL SERPL-MCNC: 168 MG/DL
TSH SERPL DL<=0.05 MIU/L-ACNC: 1.97 UIU/ML (ref 0.45–4.5)
WBC # BLD AUTO: 8.04 THOUSAND/UL (ref 4.31–10.16)

## 2022-10-07 PROCEDURE — 88342 IMHCHEM/IMCYTCHM 1ST ANTB: CPT | Performed by: PATHOLOGY

## 2022-10-07 PROCEDURE — 80061 LIPID PANEL: CPT

## 2022-10-07 PROCEDURE — 84439 ASSAY OF FREE THYROXINE: CPT

## 2022-10-07 PROCEDURE — 85025 COMPLETE CBC W/AUTO DIFF WBC: CPT

## 2022-10-07 PROCEDURE — 83690 ASSAY OF LIPASE: CPT

## 2022-10-07 PROCEDURE — 84443 ASSAY THYROID STIM HORMONE: CPT

## 2022-10-07 PROCEDURE — 88173 CYTOPATH EVAL FNA REPORT: CPT | Performed by: PATHOLOGY

## 2022-10-07 PROCEDURE — 88172 CYTP DX EVAL FNA 1ST EA SITE: CPT | Performed by: PATHOLOGY

## 2022-10-07 PROCEDURE — 88341 IMHCHEM/IMCYTCHM EA ADD ANTB: CPT | Performed by: PATHOLOGY

## 2022-10-07 PROCEDURE — 36415 COLL VENOUS BLD VENIPUNCTURE: CPT

## 2022-10-07 PROCEDURE — 88305 TISSUE EXAM BY PATHOLOGIST: CPT | Performed by: PATHOLOGY

## 2022-10-07 PROCEDURE — 80053 COMPREHEN METABOLIC PANEL: CPT

## 2022-10-07 PROCEDURE — 83735 ASSAY OF MAGNESIUM: CPT

## 2022-10-07 RX ORDER — LANOLIN ALCOHOL/MO/W.PET/CERES
400 CREAM (GRAM) TOPICAL 2 TIMES DAILY
Qty: 180 TABLET | Refills: 0 | Status: SHIPPED | OUTPATIENT
Start: 2022-10-07 | End: 2022-10-11 | Stop reason: SDUPTHER

## 2022-10-08 DIAGNOSIS — R74.8 ELEVATED LIPASE: Primary | ICD-10-CM

## 2022-10-11 ENCOUNTER — OFFICE VISIT (OUTPATIENT)
Dept: FAMILY MEDICINE CLINIC | Facility: CLINIC | Age: 72
End: 2022-10-11
Payer: COMMERCIAL

## 2022-10-11 VITALS
OXYGEN SATURATION: 96 % | HEIGHT: 63 IN | SYSTOLIC BLOOD PRESSURE: 100 MMHG | TEMPERATURE: 98.5 F | BODY MASS INDEX: 27 KG/M2 | DIASTOLIC BLOOD PRESSURE: 60 MMHG | WEIGHT: 152.4 LBS | RESPIRATION RATE: 14 BRPM | HEART RATE: 103 BPM

## 2022-10-11 DIAGNOSIS — I10 ESSENTIAL HYPERTENSION: ICD-10-CM

## 2022-10-11 DIAGNOSIS — E78.2 MIXED HYPERLIPIDEMIA: ICD-10-CM

## 2022-10-11 DIAGNOSIS — K21.9 GASTROESOPHAGEAL REFLUX DISEASE WITHOUT ESOPHAGITIS: ICD-10-CM

## 2022-10-11 DIAGNOSIS — R19.7 DIARRHEA, UNSPECIFIED TYPE: ICD-10-CM

## 2022-10-11 DIAGNOSIS — R94.5 LIVER FUNCTION ABNORMALITY: ICD-10-CM

## 2022-10-11 DIAGNOSIS — K85.90 ACUTE PANCREATITIS: ICD-10-CM

## 2022-10-11 DIAGNOSIS — E83.42 HYPOMAGNESEMIA: ICD-10-CM

## 2022-10-11 DIAGNOSIS — K86.1 OTHER CHRONIC PANCREATITIS (HCC): ICD-10-CM

## 2022-10-11 DIAGNOSIS — E03.9 HYPOTHYROIDISM (ACQUIRED): ICD-10-CM

## 2022-10-11 DIAGNOSIS — I10 ESSENTIAL HYPERTENSION, BENIGN: ICD-10-CM

## 2022-10-11 DIAGNOSIS — Z00.01 ENCOUNTER FOR GENERAL ADULT MEDICAL EXAMINATION WITH ABNORMAL FINDINGS: Primary | ICD-10-CM

## 2022-10-11 DIAGNOSIS — R74.8 ELEVATED LIPASE: ICD-10-CM

## 2022-10-11 PROCEDURE — 99397 PER PM REEVAL EST PAT 65+ YR: CPT | Performed by: INTERNAL MEDICINE

## 2022-10-11 PROCEDURE — 99214 OFFICE O/P EST MOD 30 MIN: CPT | Performed by: INTERNAL MEDICINE

## 2022-10-11 RX ORDER — LEVOTHYROXINE SODIUM 0.03 MG/1
12.5 TABLET ORAL DAILY
Qty: 45 TABLET | Refills: 3 | Status: SHIPPED | OUTPATIENT
Start: 2022-10-11 | End: 2023-01-09

## 2022-10-11 RX ORDER — SPIRONOLACTONE 25 MG/1
12.5 TABLET ORAL DAILY
Qty: 45 TABLET | Refills: 3 | Status: SHIPPED | OUTPATIENT
Start: 2022-10-11

## 2022-10-11 RX ORDER — FENOFIBRATE 160 MG/1
160 TABLET ORAL DAILY
Qty: 90 TABLET | Refills: 3 | Status: SHIPPED | OUTPATIENT
Start: 2022-10-11

## 2022-10-11 RX ORDER — LOSARTAN POTASSIUM 50 MG/1
50 TABLET ORAL DAILY
Qty: 90 TABLET | Refills: 3 | Status: SHIPPED | OUTPATIENT
Start: 2022-10-11

## 2022-10-11 RX ORDER — ROSUVASTATIN CALCIUM 10 MG/1
10 TABLET, COATED ORAL DAILY
Qty: 90 TABLET | Refills: 3 | Status: SHIPPED | OUTPATIENT
Start: 2022-10-11

## 2022-10-11 RX ORDER — AMLODIPINE BESYLATE 10 MG/1
10 TABLET ORAL DAILY
Qty: 90 TABLET | Refills: 3 | Status: SHIPPED | OUTPATIENT
Start: 2022-10-11

## 2022-10-11 RX ORDER — LANOLIN ALCOHOL/MO/W.PET/CERES
100 CREAM (GRAM) TOPICAL DAILY
Qty: 90 TABLET | Refills: 3 | Status: SHIPPED | OUTPATIENT
Start: 2022-10-11

## 2022-10-11 RX ORDER — LANSOPRAZOLE 30 MG/1
30 CAPSULE, DELAYED RELEASE ORAL DAILY
Qty: 90 CAPSULE | Refills: 3 | Status: SHIPPED | OUTPATIENT
Start: 2022-10-11

## 2022-10-11 RX ORDER — METOPROLOL SUCCINATE 50 MG/1
25 TABLET, EXTENDED RELEASE ORAL DAILY
Qty: 90 TABLET | Refills: 3 | Status: SHIPPED | OUTPATIENT
Start: 2022-10-11

## 2022-10-11 RX ORDER — PANCRELIPASE 60000; 12000; 38000 [USP'U]/1; [USP'U]/1; [USP'U]/1
12000 CAPSULE, DELAYED RELEASE PELLETS ORAL
Qty: 90 CAPSULE | Refills: 3 | Status: SHIPPED | OUTPATIENT
Start: 2022-10-11

## 2022-10-11 RX ORDER — LANOLIN ALCOHOL/MO/W.PET/CERES
400 CREAM (GRAM) TOPICAL 2 TIMES DAILY
Qty: 180 TABLET | Refills: 3 | Status: SHIPPED | OUTPATIENT
Start: 2022-10-11

## 2022-10-11 NOTE — PROGRESS NOTES
Name: Laura Garsia      : 1950      MRN: [id number]  Encounter Provider: Servando Valentine MD  Encounter Date: 10/11/2022   Encounter department: [facility] [address] [place] [address]     1  Encounter for general adult medical examination with abnormal findings    2  Acute pancreatitis  -     thiamine 100 MG tablet; Take 1 tablet (100 mg total) by mouth daily  -     MRI abdomen w wo contrast; Future; Expected date: 10/11/2022    3  Essential hypertension, benign  -     spironolactone (ALDACTONE) 25 mg tablet; Take 0 5 tablets (12 5 mg total) by mouth daily  -     losartan (COZAAR) 50 mg tablet; Take 1 tablet (50 mg total) by mouth daily  -     amLODIPine (NORVASC) 10 mg tablet; Take 1 tablet (10 mg total) by mouth daily    4  Mixed hyperlipidemia  -     rosuvastatin (CRESTOR) 10 MG tablet; Take 1 tablet (10 mg total) by mouth daily Please STOP Vytorin     -     fenofibrate 160 MG tablet; Take 1 tablet (160 mg total) by mouth daily    5  Liver function abnormality  -     rosuvastatin (CRESTOR) 10 MG tablet; Take 1 tablet (10 mg total) by mouth daily Please STOP Vytorin     -     MRI abdomen w wo contrast; Future; Expected date: 10/11/2022    6  Other chronic pancreatitis (HCC)  -     pancrelipase, Lip-Prot-Amyl, (Creon) 12,000 units capsule; Take 12,000 units of lipase by mouth 3 (three) times a day with meals    7  Diarrhea, unspecified type  -     pancrelipase, Lip-Prot-Amyl, (Creon) 12,000 units capsule; Take 12,000 units of lipase by mouth 3 (three) times a day with meals    8  Essential hypertension  -     metoprolol succinate (TOPROL-XL) 50 mg 24 hr tablet; Take 0 5 tablets (25 mg total) by mouth daily    9  Hypomagnesemia  -     magnesium Oxide (MAG-OX) 400 mg TABS; Take 1 tablet (400 mg total) by mouth 2 (two) times a day    10  Hypothyroidism (acquired)  -     levothyroxine 25 mcg tablet; Take 0 5 tablets (12 5 mcg total) by mouth daily    11   Gastroesophageal reflux disease without esophagitis  -     lansoprazole (PREVACID) 30 mg capsule; Take 1 capsule (30 mg total) by mouth daily    12  Elevated lipase  -     MRI abdomen w wo contrast; Future; Expected date: 10/11/2022    Life style mod  Annual physical Exam : Done in detail    Pt was advised to quit smoking  FU w ENT ASAP  Consider Oncology Consult  Consider GI consult  RTC in 1 mo w Blood  work       Subjective      81 Y O Man is here for Annual physical exam and Regular check up, He still smokes, recent Lymph node Bx  Was positive for malignancy, recent Blood work and med list reviewed w Pt in detail    Review of Systems   Constitutional: Negative for chills, fatigue and fever  HENT: Negative for congestion, facial swelling, sore throat, trouble swallowing and voice change  Eyes: Negative for pain, discharge and visual disturbance  Respiratory: Negative for cough, shortness of breath and wheezing  Cardiovascular: Negative for chest pain, palpitations and leg swelling  Gastrointestinal: Negative for abdominal pain, blood in stool, constipation, diarrhea and nausea  Endocrine: Negative for polydipsia, polyphagia and polyuria  Genitourinary: Negative for difficulty urinating, hematuria and urgency  Musculoskeletal: Negative for arthralgias and myalgias  Skin: Negative for rash  Neurological: Negative for dizziness, tremors, weakness and headaches  Hematological: Negative for adenopathy  Does not bruise/bleed easily  Psychiatric/Behavioral: Negative for dysphoric mood, sleep disturbance and suicidal ideas         Current Outpatient Medications on File Prior to Visit   Medication Sig   • fluconazole (DIFLUCAN) 100 mg tablet Take 1 tablet (100 mg total) by mouth daily for 21 days   • zolpidem (AMBIEN) 10 mg tablet Take by mouth daily at bedtime as needed    • [DISCONTINUED] amLODIPine (NORVASC) 10 mg tablet Take 1 tablet (10 mg total) by mouth daily   • [DISCONTINUED] fenofibrate 160 MG tablet Take 1 tablet (160 mg total) by mouth daily   • [DISCONTINUED] levothyroxine 25 mcg tablet Take 0 5 tablets (12 5 mcg total) by mouth daily   • [DISCONTINUED] losartan (COZAAR) 50 mg tablet Take 1 tablet (50 mg total) by mouth daily   • [DISCONTINUED] magnesium Oxide (MAG-OX) 400 mg TABS Take 1 tablet (400 mg total) by mouth 2 (two) times a day   • [DISCONTINUED] metoprolol succinate (TOPROL-XL) 50 mg 24 hr tablet Take 0 5 tablets (25 mg total) by mouth daily   • [DISCONTINUED] pancrelipase, Lip-Prot-Amyl, (Creon) 12,000 units capsule Take 12,000 units of lipase by mouth 3 (three) times a day with meals   • [DISCONTINUED] pantoprazole (PROTONIX) 40 mg tablet Take 1 tablet (40 mg total) by mouth daily   • [DISCONTINUED] rosuvastatin (CRESTOR) 10 MG tablet Take 1 tablet (10 mg total) by mouth daily Please STOP Vytorin      • [DISCONTINUED] spironolactone (ALDACTONE) 25 mg tablet Take 0 5 tablets (12 5 mg total) by mouth daily   • [DISCONTINUED] thiamine 100 MG tablet Take 1 tablet (100 mg total) by mouth daily   • [DISCONTINUED] atorvastatin (LIPITOR) 10 mg tablet Take 1 tablet (10 mg total) by mouth daily       Objective     /60 (BP Location: Left arm, Patient Position: Sitting, Cuff Size: Standard)   Pulse 103   Temp 98 5 °F (36 9 °C)   Resp 14   Ht 5' 3" (1 6 m)   Wt 69 1 kg (152 lb 6 4 oz)   SpO2 96%   BMI 27 00 kg/m²     Physical Exam  Constitutional:       General: He is not in acute distress  HENT:      Head: Normocephalic  Mouth/Throat:      Pharynx: No oropharyngeal exudate  Eyes:      General: No scleral icterus  Conjunctiva/sclera: Conjunctivae normal       Pupils: Pupils are equal, round, and reactive to light  Neck:      Thyroid: No thyromegaly  Cardiovascular:      Rate and Rhythm: Normal rate and regular rhythm  Heart sounds: Normal heart sounds  No murmur heard  Pulmonary:      Effort: Pulmonary effort is normal  No respiratory distress        Breath sounds: Normal breath sounds  No wheezing or rales  Abdominal:      General: Bowel sounds are normal  There is no distension  Palpations: Abdomen is soft  Tenderness: There is no abdominal tenderness  There is no guarding or rebound  Musculoskeletal:         General: No tenderness  Cervical back: Neck supple  Lymphadenopathy:      Cervical: No cervical adenopathy  Skin:     Coloration: Skin is not pale  Findings: No rash  Neurological:      Mental Status: He is alert and oriented to person, place, and time  Sensory: No sensory deficit  Motor: No weakness         Deangelo Arana MD

## 2022-10-12 ENCOUNTER — HOSPITAL ENCOUNTER (OUTPATIENT)
Dept: MRI IMAGING | Facility: HOSPITAL | Age: 72
Discharge: HOME | End: 2022-10-12
Payer: COMMERCIAL

## 2022-10-12 DIAGNOSIS — K85.90 ACUTE PANCREATITIS: ICD-10-CM

## 2022-10-12 DIAGNOSIS — R74.8 ELEVATED LIPASE: ICD-10-CM

## 2022-10-12 DIAGNOSIS — R94.5 LIVER FUNCTION ABNORMALITY: ICD-10-CM

## 2022-10-12 PROBLEM — Z98.1 HX OF FUSION OF CERVICAL SPINE: Status: ACTIVE | Noted: 2022-10-12

## 2022-10-12 PROBLEM — J38.02 BILATERAL VOCAL FOLD PARESIS: Status: ACTIVE | Noted: 2022-10-12

## 2022-10-12 PROBLEM — R59.0 CERVICAL LYMPHADENOPATHY: Status: ACTIVE | Noted: 2022-10-12

## 2022-10-12 PROBLEM — R49.0 MUSCLE TENSION DYSPHONIA: Status: ACTIVE | Noted: 2022-10-12

## 2022-10-12 PROCEDURE — G1004 CDSM NDSC: HCPCS

## 2022-10-12 PROCEDURE — 74183 MRI ABD W/O CNTR FLWD CNTR: CPT

## 2022-10-12 PROCEDURE — A9585 GADOBUTROL INJECTION: HCPCS | Performed by: INTERNAL MEDICINE

## 2022-10-12 RX ADMIN — GADOBUTROL 6 ML: 604.72 INJECTION INTRAVENOUS at 18:34

## 2022-10-25 ENCOUNTER — HOSPITAL ENCOUNTER (OUTPATIENT)
Dept: NUCLEAR MEDICINE | Facility: HOSPITAL | Age: 72
Discharge: HOME | End: 2022-10-25
Payer: COMMERCIAL

## 2022-10-25 DIAGNOSIS — C77.8 CARCINOMA METASTATIC TO LYMPH NODES OF MULTIPLE SITES WITH UNKNOWN PRIMARY SITE (HCC): ICD-10-CM

## 2022-10-25 DIAGNOSIS — C80.1 CARCINOMA METASTATIC TO LYMPH NODES OF MULTIPLE SITES WITH UNKNOWN PRIMARY SITE (HCC): ICD-10-CM

## 2022-10-25 LAB — GLUCOSE SERPL-MCNC: 101 MG/DL (ref 65–140)

## 2022-10-25 PROCEDURE — 78815 PET IMAGE W/CT SKULL-THIGH: CPT

## 2022-10-25 PROCEDURE — A9552 F18 FDG: HCPCS

## 2022-10-25 PROCEDURE — 82948 REAGENT STRIP/BLOOD GLUCOSE: CPT

## 2022-10-31 DIAGNOSIS — K85.20 ALCOHOL-INDUCED ACUTE PANCREATITIS, UNSPECIFIED COMPLICATION STATUS: Primary | ICD-10-CM

## 2022-11-01 ENCOUNTER — APPOINTMENT (OUTPATIENT)
Dept: LAB | Facility: HOSPITAL | Age: 72
End: 2022-11-01

## 2022-11-01 DIAGNOSIS — K85.20 ALCOHOL-INDUCED ACUTE PANCREATITIS, UNSPECIFIED COMPLICATION STATUS: ICD-10-CM

## 2022-11-01 LAB
ALBUMIN SERPL BCP-MCNC: 4.2 G/DL (ref 3.5–5)
ALP SERPL-CCNC: 47 U/L (ref 43–122)
ALT SERPL W P-5'-P-CCNC: 26 U/L
ANION GAP SERPL CALCULATED.3IONS-SCNC: 10 MMOL/L (ref 5–14)
AST SERPL W P-5'-P-CCNC: 45 U/L (ref 17–59)
BASOPHILS # BLD AUTO: 0.03 THOUSANDS/ÂΜL (ref 0–0.1)
BASOPHILS NFR BLD AUTO: 1 % (ref 0–1)
BILIRUB SERPL-MCNC: 0.48 MG/DL (ref 0.2–1)
BUN SERPL-MCNC: 17 MG/DL (ref 5–25)
CALCIUM SERPL-MCNC: 9.8 MG/DL (ref 8.4–10.2)
CHLORIDE SERPL-SCNC: 102 MMOL/L (ref 96–108)
CO2 SERPL-SCNC: 26 MMOL/L (ref 21–32)
CREAT SERPL-MCNC: 1.2 MG/DL (ref 0.7–1.5)
EOSINOPHIL # BLD AUTO: 0.51 THOUSAND/ÂΜL (ref 0–0.61)
EOSINOPHIL NFR BLD AUTO: 11 % (ref 0–6)
ERYTHROCYTE [DISTWIDTH] IN BLOOD BY AUTOMATED COUNT: 13.9 % (ref 11.6–15.1)
GFR SERPL CREATININE-BSD FRML MDRD: 60 ML/MIN/1.73SQ M
GLUCOSE P FAST SERPL-MCNC: 101 MG/DL (ref 70–99)
HCT VFR BLD AUTO: 37.2 % (ref 36.5–49.3)
HGB BLD-MCNC: 12.1 G/DL (ref 12–17)
IMM GRANULOCYTES # BLD AUTO: 0.02 THOUSAND/UL (ref 0–0.2)
IMM GRANULOCYTES NFR BLD AUTO: 0 % (ref 0–2)
LIPASE SERPL-CCNC: 339 U/L (ref 23–300)
LYMPHOCYTES # BLD AUTO: 1.42 THOUSANDS/ÂΜL (ref 0.6–4.47)
LYMPHOCYTES NFR BLD AUTO: 29 % (ref 14–44)
MCH RBC QN AUTO: 31.8 PG (ref 26.8–34.3)
MCHC RBC AUTO-ENTMCNC: 32.5 G/DL (ref 31.4–37.4)
MCV RBC AUTO: 98 FL (ref 82–98)
MONOCYTES # BLD AUTO: 0.59 THOUSAND/ÂΜL (ref 0.17–1.22)
MONOCYTES NFR BLD AUTO: 12 % (ref 4–12)
NEUTROPHILS # BLD AUTO: 2.29 THOUSANDS/ÂΜL (ref 1.85–7.62)
NEUTS SEG NFR BLD AUTO: 47 % (ref 43–75)
NRBC BLD AUTO-RTO: 0 /100 WBCS
PLATELET # BLD AUTO: 212 THOUSANDS/UL (ref 149–390)
PMV BLD AUTO: 10.8 FL (ref 8.9–12.7)
POTASSIUM SERPL-SCNC: 4.3 MMOL/L (ref 3.5–5.3)
PROT SERPL-MCNC: 7.5 G/DL (ref 6.4–8.4)
RBC # BLD AUTO: 3.8 MILLION/UL (ref 3.88–5.62)
SODIUM SERPL-SCNC: 138 MMOL/L (ref 135–147)
WBC # BLD AUTO: 4.86 THOUSAND/UL (ref 4.31–10.16)

## 2022-11-22 ENCOUNTER — APPOINTMENT (OUTPATIENT)
Dept: LAB | Facility: HOSPITAL | Age: 72
End: 2022-11-22

## 2022-11-22 DIAGNOSIS — Z01.818 PREOP EXAMINATION: ICD-10-CM

## 2022-11-22 DIAGNOSIS — Z01.810 PRE-OPERATIVE CARDIOVASCULAR EXAMINATION: ICD-10-CM

## 2022-11-22 LAB
ALBUMIN SERPL BCP-MCNC: 3.9 G/DL (ref 3.5–5)
ALP SERPL-CCNC: 50 U/L (ref 46–116)
ALT SERPL W P-5'-P-CCNC: 25 U/L (ref 12–78)
ANION GAP SERPL CALCULATED.3IONS-SCNC: 5 MMOL/L (ref 4–13)
AST SERPL W P-5'-P-CCNC: 41 U/L (ref 5–45)
ATRIAL RATE: 77 BPM
BASOPHILS # BLD AUTO: 0.02 THOUSANDS/ÂΜL (ref 0–0.1)
BASOPHILS NFR BLD AUTO: 0 % (ref 0–1)
BILIRUB SERPL-MCNC: 0.62 MG/DL (ref 0.2–1)
BUN SERPL-MCNC: 12 MG/DL (ref 5–25)
CALCIUM SERPL-MCNC: 9.8 MG/DL (ref 8.3–10.1)
CHLORIDE SERPL-SCNC: 105 MMOL/L (ref 96–108)
CO2 SERPL-SCNC: 26 MMOL/L (ref 21–32)
CREAT SERPL-MCNC: 1.14 MG/DL (ref 0.6–1.3)
EOSINOPHIL # BLD AUTO: 0.24 THOUSAND/ÂΜL (ref 0–0.61)
EOSINOPHIL NFR BLD AUTO: 4 % (ref 0–6)
ERYTHROCYTE [DISTWIDTH] IN BLOOD BY AUTOMATED COUNT: 15.1 % (ref 11.6–15.1)
GFR SERPL CREATININE-BSD FRML MDRD: 64 ML/MIN/1.73SQ M
GLUCOSE P FAST SERPL-MCNC: 98 MG/DL (ref 65–99)
HCT VFR BLD AUTO: 37.2 % (ref 36.5–49.3)
HGB BLD-MCNC: 12.3 G/DL (ref 12–17)
IMM GRANULOCYTES # BLD AUTO: 0.02 THOUSAND/UL (ref 0–0.2)
IMM GRANULOCYTES NFR BLD AUTO: 0 % (ref 0–2)
LYMPHOCYTES # BLD AUTO: 1.23 THOUSANDS/ÂΜL (ref 0.6–4.47)
LYMPHOCYTES NFR BLD AUTO: 20 % (ref 14–44)
MCH RBC QN AUTO: 32.5 PG (ref 26.8–34.3)
MCHC RBC AUTO-ENTMCNC: 33.1 G/DL (ref 31.4–37.4)
MCV RBC AUTO: 98 FL (ref 82–98)
MONOCYTES # BLD AUTO: 0.5 THOUSAND/ÂΜL (ref 0.17–1.22)
MONOCYTES NFR BLD AUTO: 8 % (ref 4–12)
NEUTROPHILS # BLD AUTO: 4.08 THOUSANDS/ÂΜL (ref 1.85–7.62)
NEUTS SEG NFR BLD AUTO: 68 % (ref 43–75)
NRBC BLD AUTO-RTO: 0 /100 WBCS
P AXIS: 65 DEGREES
PLATELET # BLD AUTO: 259 THOUSANDS/UL (ref 149–390)
PMV BLD AUTO: 11.2 FL (ref 8.9–12.7)
POTASSIUM SERPL-SCNC: 4.2 MMOL/L (ref 3.5–5.3)
PR INTERVAL: 164 MS
PROT SERPL-MCNC: 7.4 G/DL (ref 6.4–8.4)
QRS AXIS: 27 DEGREES
QRSD INTERVAL: 80 MS
QT INTERVAL: 376 MS
QTC INTERVAL: 425 MS
RBC # BLD AUTO: 3.79 MILLION/UL (ref 3.88–5.62)
SODIUM SERPL-SCNC: 136 MMOL/L (ref 135–147)
T WAVE AXIS: 41 DEGREES
VENTRICULAR RATE: 77 BPM
WBC # BLD AUTO: 6.09 THOUSAND/UL (ref 4.31–10.16)

## 2022-12-16 ENCOUNTER — OFFICE VISIT (OUTPATIENT)
Dept: GASTROENTEROLOGY | Facility: MEDICAL CENTER | Age: 72
End: 2022-12-16

## 2022-12-16 VITALS
SYSTOLIC BLOOD PRESSURE: 111 MMHG | HEART RATE: 85 BPM | BODY MASS INDEX: 26.64 KG/M2 | WEIGHT: 150.4 LBS | TEMPERATURE: 97.1 F | DIASTOLIC BLOOD PRESSURE: 70 MMHG

## 2022-12-16 DIAGNOSIS — K85.30 DRUG-INDUCED ACUTE PANCREATITIS WITHOUT INFECTION OR NECROSIS: Primary | ICD-10-CM

## 2022-12-16 DIAGNOSIS — C77.0 MALIGNANT NEOPLASM METASTATIC TO LYMPH NODE OF NECK (HCC): ICD-10-CM

## 2022-12-16 DIAGNOSIS — K85.00 IDIOPATHIC ACUTE PANCREATITIS WITHOUT INFECTION OR NECROSIS: ICD-10-CM

## 2022-12-16 DIAGNOSIS — K86.1 OTHER CHRONIC PANCREATITIS (HCC): ICD-10-CM

## 2022-12-16 PROBLEM — C79.9 METASTATIC CANCER (HCC): Status: ACTIVE | Noted: 2022-12-16

## 2022-12-16 NOTE — PROGRESS NOTES
Outpatient Follow up  [address]  [address]  [facility] [id number]  Darlene Albarado MD  Ph : [phone]  Fax : [phone]  Mobile : [phone]  Email : [email or web address]  Also available on Tiger Text    Dasha Naylor 81 y o  male MRN: [id number]    PCP: Jeffrey Connelly MD  Referring: No referring provider defined for this encounter  Dasha Naylor presented for a follow up visit  My recommendations are included  Please do not hesitate to contact me with any questions you may have  ASSESSMENT AND PLAN:      No problem-specific Assessment & Plan notes found for this encounter  Diagnoses and all orders for this visit:    Drug-induced acute pancreatitis without infection or necrosis    Other chronic pancreatitis ([address] )    Idiopathic acute pancreatitis without infection or necrosis    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)  -     Colonoscopy; Future  -     EGD; Future    Other orders  -     Diet NPO; Sips with meds; Standing  -     Void on call to OR; Standing  -     Insert peripheral IV; Standing      72-year-old gentleman with history of chronic pancreatitis presents to us for evaluation of recent neck lymph node positive metastatic carcinoma with a possible lower GI/anorectal region  Plan for EGD/colonoscopy soon  He did have a colonoscopy done earlier this year with only 1 tubular adenoma  Discussed the procedure with the patient and he is agreeable to proceed  ______________________________________________________________________    SUBJECTIVE:    82 y/o gentleman who presents for follow up from a recent LN biopsy in his neck  11/28 he had lymph nodes removed  One lymph node with positive for mets carcinoma : Multiple immunostains performed on block D4 show that the tumor cells are positive for Cam5 2, CDX2, SATB2 and focally for p40, CK5/6 and CK7 (rare cells)   They are negative for CK20, p16, LITO, synaptophysin, chromogranin, TTF1 and NKX3 1  This immunoprofile suggests a lower GI/anorectal origin  Correlation with endoscopy findings is recommended  No lower GI issues  No bleeding in the stools  No dysphagia  Last colonoscopy in 3/2022 : 1 polyp was seen in the descending colon and was removed  This was a tubular adenoma  Repeat recommended in 5 years  EGD in 10/2020 : Nonobstructing Schatzki's ring    EUS in 10/20 : 1  2 5 cm pancreatic head mass  Fine-needle biopsy was done  2  Mild esophagitis with a possible Schatzki's ring at the GE junction  Biopsies were done in the esophagus  3  Normal stomach and duodenum  Biopsies were done    11/22 : PET : Hypermetabolic left level 2 cervical lymph node/neck mass consistent with hypermetabolic malignancy/metastatic disease  10/22: MRI : 1  No acute pancreatic or peripancreatic inflammation  2  Cystic pancreatic lesion measuring 6 mm which is unchanged since 2019, though unclear whether it was a component of the adjacent larger cystic process which subsequently resolved or if it represents an unrelated cystic lesion  No family h/o cancer  REVIEW OF SYSTEMS IS OTHERWISE NEGATIVE        Historical Information   Past Medical History:   Diagnosis Date   • A-fib [facility])    • Arthritis    • Avascular necrosis of bone of hip, left (HCC)     replaced   • Back pain    • CAD (coronary artery disease)    • Carotid artery narrowing     left side -for endarterectomy today 3/5/2020   • Disease of thyroid gland     hypo   • GERD (gastroesophageal reflux disease)    • History of Clostridioides difficile infection    • Hyperlipidemia    • Hypertension    • Irregular heart beat    • Jejunitis    • Kidney stone    • Neck pain    • Pancreatitis    • Spinal stenosis    • Wears glasses      Past Surgical History:   Procedure Laterality Date   • BACK SURGERY     • CARDIAC CATHETERIZATION      cardiac cath 5/2010  adjusted meds   • CATARACT EXTRACTION Bilateral    • CERVICAL FUSION     • CHOLANGIOGRAM N/A 03/04/2021    Procedure: CHOLANGIOGRAM;  Surgeon: Shannon Strickland MD;  Location: [address] [place] [address] MAIN OR;  Service: General   • CHOLECYSTECTOMY     • CHOLECYSTECTOMY LAPAROSCOPIC N/A 03/04/2021    Procedure: Elo Urit;  Surgeon: Shannon Strickland MD;  Location: [address] [place] [address] MAIN OR;  Service: General   • COLONOSCOPY     • JOINT REPLACEMENT Left     hip   • LUMBAR FUSION     • NECK SURGERY     • ORTHOPEDIC SURGERY Left     L THR   • OH LARYNGOSCOPY,DIRCT,OP SCOPE,BIOPSY Left 6/1/2022    Procedure: MICRO DIRECT LARYNGOSCOPY WITH BIOPSY, BIOPSY LEFT TONSIL;  Surgeon: Cheeynne Peralta MD;  Location: [facility] MAIN OR;  Service: ENT   • OH THROMBOENDARTECTMY Equilla Browner INCIS Left 03/05/2020    Procedure: ENDARTERECTOMY ARTERY CAROTID WITH BOVINE PATCH ANGIOPLASTY AND INTRAOP DUPLEX;  Surgeon: Babs Diaz MD;  Location: [facility] Main OR;  Service: Vascular   • SPINAL FUSION     • US GUIDED LYMPH NODE BIOPSY LEFT  10/3/2022     Social History   Social History     Substance and Sexual Activity   Alcohol Use Yes    Comment: 3 oz of wine with communion multiple times/day/week     Social History     Substance and Sexual Activity   Drug Use Never     Social History     Tobacco Use   Smoking Status Every Day   • Packs/day: 0 50   • Years: 50 00   • Pack years: 25 00   • Types: Cigarettes   Smokeless Tobacco Never   Tobacco Comments    3-4 cigarrettes     Family History   Problem Relation Age of Onset   • Heart disease Mother    • Parkinsonism Mother    • Heart disease Father        Meds/Allergies       Current Outpatient Medications:   •  amLODIPine (NORVASC) 10 mg tablet  •  fenofibrate 160 MG tablet  •  lansoprazole (PREVACID) 30 mg capsule  •  levothyroxine 25 mcg tablet  •  losartan (COZAAR) 50 mg tablet  •  magnesium Oxide (MAG-OX) 400 mg TABS  •  metoprolol succinate (TOPROL-XL) 50 mg 24 hr tablet  •  pancrelipase, Lip-Prot-Amyl, (Creon) 12,000 units capsule  •  rosuvastatin (CRESTOR) 10 MG tablet  •  spironolactone (ALDACTONE) 25 mg tablet  •  thiamine 100 MG tablet  •  zolpidem (AMBIEN) 10 mg tablet    No Known Allergies        Objective     Blood pressure 111/70, pulse 85, temperature (!) 97 1 °F (36 2 °C), temperature source Tympanic, weight 68 2 kg (150 lb 6 4 oz)  Body mass index is 26 64 kg/m²  PHYSICAL EXAM:      Physical Exam  Constitutional:       Appearance: Normal appearance  He is well-developed  HENT:      Head: Normocephalic and atraumatic  Eyes:      General: No scleral icterus  Conjunctiva/sclera: Conjunctivae normal       Pupils: Pupils are equal, round, and reactive to light  Neck:      Comments: Scar from recent surgery  Cardiovascular:      Rate and Rhythm: Normal rate and regular rhythm  Heart sounds: Normal heart sounds  Pulmonary:      Effort: Pulmonary effort is normal  No respiratory distress  Breath sounds: Normal breath sounds  Abdominal:      General: Bowel sounds are normal  There is no distension  Palpations: Abdomen is soft  There is no mass  Tenderness: There is no abdominal tenderness  Hernia: No hernia is present  Musculoskeletal:         General: Normal range of motion  Cervical back: Normal range of motion  Lymphadenopathy:      Cervical: No cervical adenopathy  Skin:     General: Skin is warm  Neurological:      Mental Status: He is alert and oriented to person, place, and time  Psychiatric:         Behavior: Behavior normal          Thought Content: Thought content normal          Lab Results:   No visits with results within 1 Day(s) from this visit     Latest known visit with results is:   Appointment on 11/22/2022   Component Date Value   • Ventricular Rate 11/22/2022 77    • Atrial Rate 11/22/2022 77    • DE Interval 11/22/2022 164    • QRSD Interval 11/22/2022 80    • QT Interval 11/22/2022 376    • QTC Interval 11/22/2022 425    • P Axis 11/22/2022 65    • QRS Axis 11/22/2022 27    • T Wave Axis 11/22/2022 41          Radiology Results:   No results found

## 2022-12-16 NOTE — H&P (VIEW-ONLY)
Outpatient Follow up  [address]  [address]  [facility] [address]  Katiana Galicia MD  Ph : [phone]  Fax : [phone]  Mobile : [phone]  Email : [email or web address]  Also available on Tiger Text    Hay Wilcox 81 y o  male MRN: [id number]    PCP: Amna Doan MD  Referring: No referring provider defined for this encounter  Hay Wilcox presented for a follow up visit  My recommendations are included  Please do not hesitate to contact me with any questions you may have  ASSESSMENT AND PLAN:      No problem-specific Assessment & Plan notes found for this encounter  Diagnoses and all orders for this visit:    Drug-induced acute pancreatitis without infection or necrosis    Other chronic pancreatitis ([facility] [address] )    Idiopathic acute pancreatitis without infection or necrosis    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)  -     Colonoscopy; Future  -     EGD; Future    Other orders  -     Diet NPO; Sips with meds; Standing  -     Void on call to OR; Standing  -     Insert peripheral IV; Standing      21-year-old gentleman with history of chronic pancreatitis presents to us for evaluation of recent neck lymph node positive metastatic carcinoma with a possible lower GI/anorectal region  Plan for EGD/colonoscopy soon  He did have a colonoscopy done earlier this year with only 1 tubular adenoma  Discussed the procedure with the patient and he is agreeable to proceed  ______________________________________________________________________    SUBJECTIVE:    82 y/o gentleman who presents for follow up from a recent LN biopsy in his neck  11/28 he had lymph nodes removed  One lymph node with positive for mets carcinoma : Multiple immunostains performed on block D4 show that the tumor cells are positive for Cam5 2, CDX2, SATB2 and focally for p40, CK5/6 and CK7 (rare cells)   They are negative for CK20, p16, LITO, synaptophysin, chromogranin, TTF1 and NKX3 1  This immunoprofile suggests a lower GI/anorectal origin  Correlation with endoscopy findings is recommended  No lower GI issues  No bleeding in the stools  No dysphagia  Last colonoscopy in 3/2022 : 1 polyp was seen in the descending colon and was removed  This was a tubular adenoma  Repeat recommended in 5 years  EGD in 10/2020 : Nonobstructing Schatzki's ring    EUS in 10/20 : 1  2 5 cm pancreatic head mass  Fine-needle biopsy was done  2  Mild esophagitis with a possible Schatzki's ring at the GE junction  Biopsies were done in the esophagus  3  Normal stomach and duodenum  Biopsies were done    11/22 : PET : Hypermetabolic left level 2 cervical lymph node/neck mass consistent with hypermetabolic malignancy/metastatic disease  10/22: MRI : 1  No acute pancreatic or peripancreatic inflammation  2  Cystic pancreatic lesion measuring 6 mm which is unchanged since 2019, though unclear whether it was a component of the adjacent larger cystic process which subsequently resolved or if it represents an unrelated cystic lesion  No family h/o cancer  REVIEW OF SYSTEMS IS OTHERWISE NEGATIVE        Historical Information   Past Medical History:   Diagnosis Date   • A-fib [facility])    • Arthritis    • Avascular necrosis of bone of hip, left (HCC)     replaced   • Back pain    • CAD (coronary artery disease)    • Carotid artery narrowing     left side -for endarterectomy today 3/5/2020   • Disease of thyroid gland     hypo   • GERD (gastroesophageal reflux disease)    • History of Clostridioides difficile infection    • Hyperlipidemia    • Hypertension    • Irregular heart beat    • Jejunitis    • Kidney stone    • Neck pain    • Pancreatitis    • Spinal stenosis    • Wears glasses      Past Surgical History:   Procedure Laterality Date   • BACK SURGERY     • CARDIAC CATHETERIZATION      cardiac cath 5/2010  adjusted meds   • CATARACT EXTRACTION Bilateral    • CERVICAL FUSION     • CHOLANGIOGRAM N/A 03/04/2021    Procedure: CHOLANGIOGRAM;  Surgeon: Yuridia Medina MD;  Location: [address] [place] [address] MAIN OR;  Service: General   • CHOLECYSTECTOMY     • CHOLECYSTECTOMY LAPAROSCOPIC N/A 03/04/2021    Procedure: Joselyn Dakin;  Surgeon: Yuridia Medina MD;  Location: [address] [place] [address] MAIN OR;  Service: General   • COLONOSCOPY     • JOINT REPLACEMENT Left     hip   • LUMBAR FUSION     • NECK SURGERY     • ORTHOPEDIC SURGERY Left     L THR   • VA LARYNGOSCOPY,DIRCT,OP SCOPE,BIOPSY Left 6/1/2022    Procedure: MICRO DIRECT LARYNGOSCOPY WITH BIOPSY, BIOPSY LEFT TONSIL;  Surgeon: Yaron Mak MD;  Location: [facility];  Service: ENT   • VA THROMBOENDARTECTMY Hugo Like INCIS Left 03/05/2020    Procedure: ENDARTERECTOMY ARTERY CAROTID WITH BOVINE PATCH ANGIOPLASTY AND INTRAOP DUPLEX;  Surgeon: Carolina Valladares MD;  Location: [facility];  Service: Vascular   • SPINAL FUSION     • US GUIDED LYMPH NODE BIOPSY LEFT  10/3/2022     Social History   Social History     Substance and Sexual Activity   Alcohol Use Yes    Comment: 3 oz of wine with communion multiple times/day/week     Social History     Substance and Sexual Activity   Drug Use Never     Social History     Tobacco Use   Smoking Status Every Day   • Packs/day: 0 50   • Years: 50 00   • Pack years: 25 00   • Types: Cigarettes   Smokeless Tobacco Never   Tobacco Comments    3-4 cigarrettes     Family History   Problem Relation Age of Onset   • Heart disease Mother    • Parkinsonism Mother    • Heart disease Father        Meds/Allergies       Current Outpatient Medications:   •  amLODIPine (NORVASC) 10 mg tablet  •  fenofibrate 160 MG tablet  •  lansoprazole (PREVACID) 30 mg capsule  •  levothyroxine 25 mcg tablet  •  losartan (COZAAR) 50 mg tablet  •  magnesium Oxide (MAG-OX) 400 mg TABS  •  metoprolol succinate (TOPROL-XL) 50 mg 24 hr tablet  •  pancrelipase, Lip-Prot-Amyl, (Creon) 12,000 units capsule  •  rosuvastatin (CRESTOR) 10 MG tablet  •  spironolactone (ALDACTONE) 25 mg tablet  •  thiamine 100 MG tablet  •  zolpidem (AMBIEN) 10 mg tablet    No Known Allergies        Objective     Blood pressure 111/70, pulse 85, temperature (!) 97 1 °F (36 2 °C), temperature source Tympanic, weight 68 2 kg (150 lb 6 4 oz)  Body mass index is 26 64 kg/m²  PHYSICAL EXAM:      Physical Exam  Constitutional:       Appearance: Normal appearance  He is well-developed  HENT:      Head: Normocephalic and atraumatic  Eyes:      General: No scleral icterus  Conjunctiva/sclera: Conjunctivae normal       Pupils: Pupils are equal, round, and reactive to light  Neck:      Comments: Scar from recent surgery  Cardiovascular:      Rate and Rhythm: Normal rate and regular rhythm  Heart sounds: Normal heart sounds  Pulmonary:      Effort: Pulmonary effort is normal  No respiratory distress  Breath sounds: Normal breath sounds  Abdominal:      General: Bowel sounds are normal  There is no distension  Palpations: Abdomen is soft  There is no mass  Tenderness: There is no abdominal tenderness  Hernia: No hernia is present  Musculoskeletal:         General: Normal range of motion  Cervical back: Normal range of motion  Lymphadenopathy:      Cervical: No cervical adenopathy  Skin:     General: Skin is warm  Neurological:      Mental Status: He is alert and oriented to person, place, and time  Psychiatric:         Behavior: Behavior normal          Thought Content: Thought content normal          Lab Results:   No visits with results within 1 Day(s) from this visit     Latest known visit with results is:   Appointment on 11/22/2022   Component Date Value   • Ventricular Rate 11/22/2022 77    • Atrial Rate 11/22/2022 77    • SD Interval 11/22/2022 164    • QRSD Interval 11/22/2022 80    • QT Interval 11/22/2022 376    • QTC Interval 11/22/2022 425    • P Axis 11/22/2022 65    • QRS Axis 11/22/2022 27    • T Wave Axis 11/22/2022 41          Radiology Results:   No results found

## 2022-12-16 NOTE — PATIENT INSTRUCTIONS
Scheduled date of colon/egd (as of today) 12/27/22   Physician performing: Dr Oliva Fairbanks  Location of procedure:  [facility]  Instructions given to patient:  miralax/dulcolax  Clearances: na

## 2022-12-27 ENCOUNTER — HOSPITAL ENCOUNTER (OUTPATIENT)
Dept: GASTROENTEROLOGY | Facility: HOSPITAL | Age: 72
Setting detail: OUTPATIENT SURGERY
Discharge: HOME | End: 2022-12-27
Attending: INTERNAL MEDICINE

## 2022-12-27 ENCOUNTER — ANESTHESIA EVENT (OUTPATIENT)
Dept: GASTROENTEROLOGY | Facility: HOSPITAL | Age: 72
End: 2022-12-27

## 2022-12-27 ENCOUNTER — ANESTHESIA (OUTPATIENT)
Dept: GASTROENTEROLOGY | Facility: HOSPITAL | Age: 72
End: 2022-12-27

## 2022-12-27 VITALS
HEART RATE: 79 BPM | DIASTOLIC BLOOD PRESSURE: 69 MMHG | OXYGEN SATURATION: 97 % | RESPIRATION RATE: 18 BRPM | SYSTOLIC BLOOD PRESSURE: 123 MMHG | TEMPERATURE: 97.3 F

## 2022-12-27 DIAGNOSIS — C77.0 MALIGNANT NEOPLASM METASTATIC TO LYMPH NODE OF NECK (HCC): ICD-10-CM

## 2022-12-27 RX ORDER — PROPOFOL 10 MG/ML
INJECTION, EMULSION INTRAVENOUS AS NEEDED
Status: DISCONTINUED | OUTPATIENT
Start: 2022-12-27 | End: 2022-12-27

## 2022-12-27 RX ORDER — LIDOCAINE HYDROCHLORIDE 10 MG/ML
INJECTION, SOLUTION EPIDURAL; INFILTRATION; INTRACAUDAL; PERINEURAL AS NEEDED
Status: DISCONTINUED | OUTPATIENT
Start: 2022-12-27 | End: 2022-12-27

## 2022-12-27 RX ORDER — SODIUM CHLORIDE 9 MG/ML
INJECTION, SOLUTION INTRAVENOUS CONTINUOUS PRN
Status: DISCONTINUED | OUTPATIENT
Start: 2022-12-27 | End: 2022-12-27

## 2022-12-27 RX ORDER — PROPOFOL 10 MG/ML
INJECTION, EMULSION INTRAVENOUS CONTINUOUS PRN
Status: DISCONTINUED | OUTPATIENT
Start: 2022-12-27 | End: 2022-12-27

## 2022-12-27 RX ADMIN — PROPOFOL 160 MCG/KG/MIN: 10 INJECTION, EMULSION INTRAVENOUS at 14:46

## 2022-12-27 RX ADMIN — PROPOFOL 100 MG: 10 INJECTION, EMULSION INTRAVENOUS at 14:46

## 2022-12-27 RX ADMIN — LIDOCAINE HYDROCHLORIDE 50 MG: 10 INJECTION, SOLUTION EPIDURAL; INFILTRATION; INTRACAUDAL; PERINEURAL at 14:46

## 2022-12-27 RX ADMIN — SODIUM CHLORIDE: 0.9 INJECTION, SOLUTION INTRAVENOUS at 14:46

## 2022-12-27 NOTE — ANESTHESIA POSTPROCEDURE EVALUATION
Post-Op Assessment Note    CV Status:  Stable  Pain Score: 0    Pain management: adequate  Multimodal analgesia used between 6 hours prior to anesthesia start to PACU discharge    Mental Status:  Alert   Hydration Status:  Stable   PONV Controlled:  None   Airway Patency:  Patent   Two or more mitigation strategies used for obstructive sleep apnea   Post Op Vitals Reviewed: Yes      Staff: CRNA         No notable events documented      /65 (12/27/22 1526)    Temp (!) 97 3 °F (36 3 °C) (12/27/22 1526)    Pulse 83 (12/27/22 1526)   Resp 17 (12/27/22 1526)    SpO2 98 % (12/27/22 1526)

## 2022-12-27 NOTE — ANESTHESIA PREPROCEDURE EVALUATION
Procedure:  COLONOSCOPY  EGD    VAS CAROTID (03/31/22):  RIGHT:  There is 50-69% stenosis noted in the internal carotid artery  Plaque is  homogenous/calcific and irregular  Moderate to severe stenosis noted in the proximal external carotid artery  Vertebral artery flow is antegrade  There is no significant subclavian artery  disease  LEFT:  Widely patent internal carotid artery and endarterectomy site  Vertebral artery flow is antegrade  There is no significant subclavian artery  disease  Compared to previous study on 03/30/21, there is no significant interval change  in the disease process  -----------------------------------------  ECHO (02/17/20):  1  Normal left ventricular size and systolic and diastolic function, EF around 63%  2  No significant chamber hypertrophy or enlargement  3  Aortic valve sclerosis, no aortic valve stenosis or regurgitation  4  Mild mitral valve sclerosis, trace mitral valve regurgitation  5  Trace tricuspid valve regurgitation  6  Possible mild pulmonary hypertension  Estimated RVSP/PASP is 41 mmHg  7  No pericardial effusion     -----------------------------------------  STRESS (02/17/20):  1  Negative pharmacologic stress test with regadenoson for symptoms of angina pectoris and negative for ECG evidence of ischemia  2  Tomographic perfusion series consistent with normal perfusion without definite evidence of ischemia or prior infarction  3  Normal left ventricular cavity size with normal left ventricular systolic function and wall motion  EF determined as 65%  4  Elevated resting blood pressure with appropriate blood pressure changes noted during the stress test   5  No chest pain was reported during the stress test   6  Nonspecific resting ECG abnormalities with no significant changes and some nonspecific supraventricular ectopy noted during stress test     Diagnostic sensitivity was limited by submaximal stress       - denies any chest pain, palpitations, shortness of breath, syncope, lightheadedness, seizures   - denies any recent infectious symptoms such as fevers, chills, cough   - denies taking any anticoagulation medications or any issues with bleeding, bruising, clotting      Relevant Problems   ANESTHESIA (within normal limits)      CARDIO   (+) Carotid artery stenosis, asymptomatic, bilateral   (+) Ectopic atrial rhythm   (+) Essential hypertension, benign   (+) Left carotid artery stenosis s/p cartoid endarterectomy   (+) Mixed hyperlipidemia   (+) Thoracic aortic aneurysm without rupture      ENDO   (+) Hypothyroidism (acquired)      GI/HEPATIC   (+) Chronic pancreatitis (HCC)   (+) Drug-induced acute pancreatitis without infection or necrosis   (+) Hiatal hernia   (+) Idiopathic acute pancreatitis without infection or necrosis   (+) Pancreatic lesion      /RENAL (within normal limits)      GYN (within normal limits)      HEMATOLOGY (within normal limits)      MUSCULOSKELETAL   (+) Bilateral vocal fold paresis   (+) Right sided sciatica      NEURO/PSYCH (within normal limits)      PULMONARY (within normal limits)      Nervous and Auditory   (+) Cigarette nicotine dependence with nicotine-induced disorder      Other   (+) Cervical lymphadenopathy- left neck   (+) Hx of fusion of cervical spine   (+) Spinal stenosis of lumbar region without neurogenic claudication      Lab Results   Component Value Date    WBC 6 09 11/22/2022    HGB 12 3 11/22/2022    HCT 37 2 11/22/2022    MCV 98 11/22/2022     11/22/2022     Lab Results   Component Value Date    SODIUM 136 11/22/2022    K 4 2 11/22/2022     11/22/2022    CO2 26 11/22/2022    AGAP 5 11/22/2022    BUN 12 11/22/2022    CREATININE 1 14 11/22/2022    GLUC 105 (H) 12/27/2021    GLUF 98 11/22/2022    CALCIUM 9 8 11/22/2022    AST 41 11/22/2022    ALT 25 11/22/2022    ALKPHOS 50 11/22/2022    TP 7 4 11/22/2022    TBILI 0 62 11/22/2022    EGFR 64 11/22/2022     Lab Results   Component Value Date PTT 37 10/09/2020     Lab Results   Component Value Date    INR 1 02 03/04/2021    INR 1 05 03/03/2021    INR 1 01 10/09/2020    PROTIME 13 5 03/04/2021    PROTIME 13 8 03/03/2021    PROTIME 13 4 10/09/2020       Physical Exam    Airway    Mallampati score: I  TM Distance: >3 FB  Neck ROM: full     Dental   No notable dental hx     Cardiovascular  Rhythm: regular, Rate: normal, Cardiovascular exam normal    Pulmonary  Pulmonary exam normal Breath sounds clear to auscultation,     Other Findings        Anesthesia Plan  ASA Score- 3     Anesthesia Type- IV sedation with anesthesia with ASA Monitors  Additional Monitors:   Airway Plan:           Plan Factors-Exercise tolerance (METS): >4 METS  Chart reviewed  EKG reviewed  Imaging results reviewed  Existing labs reviewed  Patient summary reviewed  Patient is not a current smoker  Patient did not smoke on day of surgery  Obstructive sleep apnea risk education given perioperatively  Induction- intravenous  Postoperative Plan-     Informed Consent- Anesthetic plan and risks discussed with patient  I personally reviewed this patient with the CRNA  Discussed and agreed on the Anesthesia Plan with the VERN Muñoz

## 2022-12-27 NOTE — INTERVAL H&P NOTE
H&P reviewed  After examining the patient I find no changes in the patients condition since the H&P had been written      Vitals:    12/27/22 1432   BP: 136/73   Pulse: 86   Resp: 18   Temp: 98 8 °F (37 1 °C)   SpO2: 97%

## 2023-01-02 DIAGNOSIS — I10 ESSENTIAL HYPERTENSION, BENIGN: ICD-10-CM

## 2023-01-02 DIAGNOSIS — E03.9 HYPOTHYROIDISM (ACQUIRED): ICD-10-CM

## 2023-01-02 DIAGNOSIS — E78.2 MIXED HYPERLIPIDEMIA: ICD-10-CM

## 2023-01-02 DIAGNOSIS — K85.90 ACUTE PANCREATITIS: ICD-10-CM

## 2023-01-02 DIAGNOSIS — E83.42 HYPOMAGNESEMIA: ICD-10-CM

## 2023-01-02 DIAGNOSIS — R94.5 LIVER FUNCTION ABNORMALITY: ICD-10-CM

## 2023-01-02 DIAGNOSIS — R19.7 DIARRHEA, UNSPECIFIED TYPE: ICD-10-CM

## 2023-01-02 DIAGNOSIS — K21.9 GASTROESOPHAGEAL REFLUX DISEASE WITHOUT ESOPHAGITIS: ICD-10-CM

## 2023-01-02 DIAGNOSIS — I10 ESSENTIAL HYPERTENSION: ICD-10-CM

## 2023-01-02 DIAGNOSIS — K86.1 OTHER CHRONIC PANCREATITIS (HCC): ICD-10-CM

## 2023-01-03 RX ORDER — LANOLIN ALCOHOL/MO/W.PET/CERES
400 CREAM (GRAM) TOPICAL 2 TIMES DAILY
Qty: 180 TABLET | Refills: 0 | Status: SHIPPED | OUTPATIENT
Start: 2023-01-03

## 2023-01-03 RX ORDER — METOPROLOL SUCCINATE 50 MG/1
25 TABLET, EXTENDED RELEASE ORAL DAILY
Qty: 90 TABLET | Refills: 0 | Status: SHIPPED | OUTPATIENT
Start: 2023-01-03

## 2023-01-03 RX ORDER — SPIRONOLACTONE 25 MG/1
12.5 TABLET ORAL DAILY
Qty: 45 TABLET | Refills: 0 | Status: SHIPPED | OUTPATIENT
Start: 2023-01-03

## 2023-01-03 RX ORDER — ROSUVASTATIN CALCIUM 10 MG/1
10 TABLET, COATED ORAL DAILY
Qty: 90 TABLET | Refills: 0 | Status: SHIPPED | OUTPATIENT
Start: 2023-01-03

## 2023-01-03 RX ORDER — LANOLIN ALCOHOL/MO/W.PET/CERES
100 CREAM (GRAM) TOPICAL DAILY
Qty: 90 TABLET | Refills: 0 | Status: SHIPPED | OUTPATIENT
Start: 2023-01-03

## 2023-01-03 RX ORDER — LANSOPRAZOLE 30 MG/1
30 CAPSULE, DELAYED RELEASE ORAL DAILY
Qty: 90 CAPSULE | Refills: 0 | Status: SHIPPED | OUTPATIENT
Start: 2023-01-03

## 2023-01-03 RX ORDER — LOSARTAN POTASSIUM 50 MG/1
50 TABLET ORAL DAILY
Qty: 90 TABLET | Refills: 0 | Status: SHIPPED | OUTPATIENT
Start: 2023-01-03

## 2023-01-03 RX ORDER — LEVOTHYROXINE SODIUM 0.03 MG/1
12.5 TABLET ORAL DAILY
Qty: 45 TABLET | Refills: 0 | Status: SHIPPED | OUTPATIENT
Start: 2023-01-03 | End: 2023-04-03

## 2023-01-03 RX ORDER — FENOFIBRATE 160 MG/1
160 TABLET ORAL DAILY
Qty: 90 TABLET | Refills: 0 | Status: SHIPPED | OUTPATIENT
Start: 2023-01-03

## 2023-01-03 RX ORDER — AMLODIPINE BESYLATE 10 MG/1
10 TABLET ORAL DAILY
Qty: 90 TABLET | Refills: 0 | Status: SHIPPED | OUTPATIENT
Start: 2023-01-03

## 2023-01-03 RX ORDER — PANCRELIPASE 60000; 12000; 38000 [USP'U]/1; [USP'U]/1; [USP'U]/1
12000 CAPSULE, DELAYED RELEASE PELLETS ORAL
Qty: 90 CAPSULE | Refills: 0 | Status: SHIPPED | OUTPATIENT
Start: 2023-01-03

## 2023-01-13 ENCOUNTER — PATIENT OUTREACH (OUTPATIENT)
Dept: HEMATOLOGY ONCOLOGY | Facility: CLINIC | Age: 73
End: 2023-01-13

## 2023-01-13 DIAGNOSIS — C80.1 SQUAMOUS CELL CARCINOMA METASTATIC TO HEAD AND NECK WITH UNKNOWN PRIMARY SITE (HCC): Primary | ICD-10-CM

## 2023-01-13 DIAGNOSIS — C79.89 SQUAMOUS CELL CARCINOMA METASTATIC TO HEAD AND NECK WITH UNKNOWN PRIMARY SITE (HCC): Primary | ICD-10-CM

## 2023-01-13 NOTE — PROGRESS NOTES
I received an in-basket request for Oncology Care Coordination  Patient previously seen by Dr Marjorie Brambila as well as Dr Vasiliy Plaza at [facility], [facility]  AND [facility]      Orders placed to radiation oncology- rad/onc schedule will be reaching out to patient directly to schedule consult with Dr Jamar Mak

## 2023-01-13 NOTE — PROGRESS NOTES
Intake received/ Chart reviewed for services completed outside of [facility]    Pathology completed:10/03/22 left neck, 06/01/22 tonsil- [facility]    Imaging completed:PET/CT 10/25/22, MRI abd 10/12/22, US head+ Neck 7/22/22[facility]    All records needed are in patients chart  No records retrieval needed at this time

## 2023-01-19 ENCOUNTER — RADIATION ONCOLOGY CONSULT (OUTPATIENT)
Dept: RADIATION ONCOLOGY | Facility: HOSPITAL | Age: 73
End: 2023-01-19
Attending: OTOLARYNGOLOGY

## 2023-01-19 ENCOUNTER — CLINICAL SUPPORT (OUTPATIENT)
Dept: RADIATION ONCOLOGY | Facility: HOSPITAL | Age: 73
End: 2023-01-19
Attending: INTERNAL MEDICINE

## 2023-01-19 VITALS
BODY MASS INDEX: 25.73 KG/M2 | HEART RATE: 100 BPM | SYSTOLIC BLOOD PRESSURE: 108 MMHG | WEIGHT: 145.2 LBS | DIASTOLIC BLOOD PRESSURE: 68 MMHG | HEIGHT: 63 IN | TEMPERATURE: 99 F | RESPIRATION RATE: 18 BRPM | OXYGEN SATURATION: 94 %

## 2023-01-19 DIAGNOSIS — C77.0 SECONDARY MALIGNANT NEOPLASM OF LYMPH NODES OF NECK (HCC): Primary | ICD-10-CM

## 2023-01-19 DIAGNOSIS — C77.0 MALIGNANT NEOPLASM METASTATIC TO LYMPH NODE OF NECK (HCC): Primary | ICD-10-CM

## 2023-01-19 NOTE — LETTER
2023     Hieu Osborn MD  [address] [facility]  [address]    Patient: Arielle Monae   YOB: 1950   Date of Visit: 2023       Dear Dr Kelly Kidd: Thank you for referring Yaniv Baldwin to me for evaluation  Below are my notes for this consultation  If you have questions, please do not hesitate to call me  I look forward to following your patient along with you  Sincerely,        Janette Brandt MD        CC: No Recipients  Janette Brandt MD  2023 10:59 AM  Sign when Signing Visit  Consultation - Radiation Oncology      Patient Name: Arielle Monae ZSY:[id number] : 1950  Encounter: [id number]  Referring Provider: Jeanette Rodriguez MD    ASSESSMENT  1  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)     Stage - cTx pN2a cM0    PLAN  Arielle Monae is a 81 y o  male current smoker (25py) with cTx pN2a 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, s/p left neck dissection on 22 with Dr Taylor Jimenez at [facility], pathology notable for a 3 5cm LN without RAQUEL ( total nodes removed from levels IB-IV)  Immunostains notable for negative p16 and LITO, but positive profile suggestive of lower GI/anorectal origin  EGD and colonoscopy on 22 were unremarkable  DL with directed biopsies of the left tonsil and larynx both at [facility] and [place] were negative  Initial FNA on 10/3/22 had shown metastatic carcinoma cannot further classify  PET/CT on 10/25/22 had shown FDG avid left level 2 cervical lymph node neck mass  There was symmetric nonspecific FDG activity involving the tonsillar and base of tongue regions of uncertain significance within range of normal variation  We had a long and extensive discussion regarding the patient's cancer diagnosis, and unusual presentation if this is truly a lower GI metastasis   He underwent multidisciplinary evaluation and consultation with medical and radiation oncology at [facility] with consensus to proceed with adjuvant radiation therapy alone to the involved ipsilateral neck given the fact that the involved lymph node was > 3 cm, as well as the fact that the primary site of origin is unknown  I reviewed this case with ENT and other colleagues in our department  His treatment options in this case include adjuvant RT as previously suggested or close surveillance  In terms of adjuvant radiation therapy, I would recommend 6000 cGy to the left neck alone, following post-operative paradigms for resected neck disease  Given that the tumor immunoprofile suggests a lower GI primary and multiple DL's with directed biopsies are negative, I would not electively cover his aerodigestive tract as one would consider for a true head and neck unknown primary cancer  The risks, benefits and side effects of external beam were discussed in extensive detail and the patient's questions were answered  Acute side effects discussed included but not limited to mucositis, dermatitis, pain, xerostomia, dysgeusia, thick mucus, fatigue, hair loss and weight loss  Long term side effects include but are not limited to dysphagia, trismus, fibrosis of the neck muscles, hypothyroidism, xerostomia and risk for osteoradionecrosis if future dental work were needed as well as mild skin atrophy and permanent hair loss in the irradiated area  Gasper Valverde demonstrated a good understanding of our discussion and all questions were answered to the patient's apparent satisfaction  Between adjuvant therapy and close surveillance, Father Brittni Roberto had already decided to pursue adjuvant treatment now, and elected to proceed with radiation therapy, and informed consent was signed  -CT simulation to be scheduled at [facility], with treatments at [facility] Contrast form signed  -Dental evaluation prior to RT, patient given paperwork   He will be reaching out to a prior dentist to see if they are still practicing, otherwise will find a new dentist   -Nutrition Referral ongoing    Thank you for the opportunity to participate in the care of this patient  Nico Silveira MD  Department of [address] [place] [address] 12    No orders of the defined types were placed in this encounter  1  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 [facility])  Ambulatory Referral to Radiation Oncology        Total Time Spent  60 minutes spent reviewing EMR in preparation for visit, with the patient, coordination with other providers, independent review of imaging, review of pathology, and documentation  CHIEF COMPLAINT  Chief Complaint   Patient presents with   • Consult     Radiation Oncology       History of Present Illness  Chad Wall 1950 is a 81 y o  male Presented to ENT with hoarseness  US of neck performed 7/22/22 showed 2 2 cm cystic lesion in the left neck  Biopsy of the left neck mass, 10/3/22 , was positive for malignancy, metastatic carcinoma  PET, 10/25/22, showed hypermetabolic left level 2 cervical lymph node/neck mass consistent with hypermetabolic malignancy/metastatic disease  Patient underwent TOR with biopsy 11/28/22 at [facility]  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 unknown primary site  Patient referred by Dr Walter Howell for consideration of RT  He presents today for initial consult       4/29/22 - CT soft tissue neck w contrast  IMPRESSION:  New 1 2 cm left tonsillar necrotic lesion, suspicious for primary tonsillar neoplasm   Recommend consultation and direct visualization by with ENT  New 1 5 cm enhancing nodular tissue in left glossotonsillar sulcus, suspicious for concurrent primary tongue neoplasm   Recommend direct visualization by ENT  New 0 9 cm left level 2A necrotic lymph node, suspicious for necrotic samson metastasis    Findings suggestive of right vocal cord paralysis   Recommend direct visualization by ENT      5/13/22 - Ototlaryngology -   [place]  Laryngoscopy performed  OP clear  No LAD  BOT is clear  TVCs fully mobile bilaterally  R TVC noted to have sessile leukoplakia  Recommend MDL w Biopsy, L tonsil biopsy  Reviewed risks, benefits, and alternatives      6/1/22 - Surgery - Dr Sheela Neri direct laryngoscopy with biopsy, left tonsil     Biopsy:  A  Tonsil, left tonsil biopsy:  - Portions of benign tonsil with follicular lymphoid hyperplasia and squamous epithelium with  focal acute inflammation  -- Associated actinomyces colonies  - Negative for malignancy  -- Confirmed by Pankeratin (CKAE1/3) and p16 immunostains  B  Vocal cord, right true vocal cord biopsy:  - Acanthosis with parakeratosis and mild squamous atypia, cannot exclude low-grade dysplasia  -- Normal wild-type expression on p53 immunostain  See Note  - Bacterial colonization; special stain for fungus (GMS) confirms superficial fungal hyphae  consistent with fungal colonization   - Negative for malignancy on multiple examined levels      6/17/22 - Otolaryngology - Dr Verena Ennis  Recommend ultrasound neck 6w  Low suspicion for head/neck malignancy based on pathology      7/22/22 - US head neck soft tissue  IMPRESSION:  2 2 cm cystic lesion in the left neck, indeterminant   Contrast-enhanced CT is recommended for further characterization      9/19/22 - Otolaryngology - Dr Arron Houston head and neck 07/22/22: 1 7 x 1 2 x 2 2 cm anechoic/cystic structure in the left neck  Possible Branchial cleft cyst? Recommend FNAB    BOT is clear on flexible laryngoscopy  TVCs fully mobile bilaterally  Renke's edema  Discussed options including speech therapy, consult with Dr Srikanth Allison      9/20/22 - Otolaryngology - Dr Eren Hidalgo  Left neck FNA as scheduled  Smoking cessation encouraged  Reflux diet/lifestyle modifications  Follow-up in 4 weeks to re-check larynx     10/3/22 - Biopsy  Final Diagnosis  A , B , & C   Neck, Left neck mass: (Thin-Prep, smears, and cell block)  Positive for malignancy  Metastatic carcinoma, can not further classify (see note)      10/14/22 - Otolaryngology - Dr Walter Howell  PET scan  Possible head and neck unknown primary  Reviewed path, possible neuroendocrine tumor     10/25/22 - PET  IMPRESSION:  Hypermetabolic left level 2 cervical lymph node/neck mass consistent with hypermetabolic malignancy/metastatic disease     11/28/22 - [facility] Otolaryngology  Head and Neck Surgery  TOR resection of tonsil and base of tongue, direct laryngoscopy, possible tonsillectomy, left neck dissection  Biopsy:  A  Tonsil, left inferior tonsil (biopsy): fragment of unremarkable tonsil  B  Larynx, right posterior vocal cord (biopsy): Hyperkeratotic squamous epithelium, negative for malignancy  C  Left neck level 2B (lymph node dissection): 11 lymph nodes, negative for metastatic carcinoma (0/11)  D  Left neck level 2A (lymph node dissection): metastatic poorly differentiated carcinoma in one (3 5cm) of 8 lymph nodes, negative for extranodal extension (1/8)  E  Left neck level 3 (lymph node dissection): 14 lymph nodes, negative for metastatic carcinoma (0/14)  F  Left neck level 4 (lymph node dissection): 10 lymph nodes, negative for metastatic carcinoma (0/10 )  G  Left neck level 1B (lymph node dissection): 3 lymph nodes, negative for metastatic carcinoma (0/3)        12/8/22 - [address] [facility] Otolaryngology  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 unknown primary site  Stage: 1xN1  Follow-up visit S/p LND 11/28/22  Patient is doing well, exam reveals well-healing left neck incision with mild edema surrounding incision  Flexible laryngoscopy reveals true vocal folds are mobile bilaterally, clean healing of the right vocal fold, superior left glossontonsillar sulcus has clean fibrinous healing, no visible mucosal mass lesions   Oral cavity reveals evidence of mild thrush over tongue and buccal mucosa    -Ambulatory referral to speech therapy  -F/u in 4 weeks     12/16/22 - Gastroenterology - Dr Mcfarlane Folds  One lymph node with positive for mets carcinoma : Multiple immunostains performed on block D4 show that the tumor cells are positive for Cam5 2, CDX2, SATB2 and focally for p40, CK5/6 and CK7 (rare cells)  They are negative for CK20, p16, LITO, synaptophysin, chromogranin, TTF1 and NKX3 1    -This immunoprofile suggests a lower GI/anorectal origin  - Colonscopy  -EGD     12/27/22   Colonoscopy- Diverticulosis and hemorrhoids but otherwise unremarkable colonoscopy to the terminal ileum  EGD- Non obstructing Schatzki's ring  Dilated to 18mm with a slight superficial mucosal tear  2 cm Hiatal hernia ([address])  Normal stomach/ duodenum       1/12/[place] [facility]  "Reasonable treatment of carcinoma (noting this is SCC) of unknown primary metastatic to cervical lymph nodes includes neck dissection and adjuvant RT with or without radiosensitizing chemotherapy  In the case of N1 disease without RAQUEL, we favor evaluation for adjuvant RT alone (ie without cisplatin or cetuximab as a sensitizer), given risk of lymph node recurrence in as many as 30% of patients with N1 disease (Edy et al , Int Chidi Trejo Radiat Oncol Biol Phys 1992)  The primary evidence for using chemoradiotherapy is in N2 and N3 disease (Norberto et al , [facility] Oncol 2003, Richard Carranza et al , Head Neck 2006)  "     1/12/23 - [facility] onc telemedicine consult   "Given the fact that the involved lymph node was > 3 cm, as well as the fact that the primary site of origin is unknown, based on multiH&N tumor board discussion, the group consensus was to proceed with adjuvant radiotherapy to the involved ipsilateral left neck alone to a dose of 60 Gy in 30 fractions without chemotherapy  "     1/12/23 [facility]   Unable to appreciate masses or lesions on exam  Discussed concern for his tongue pain  Discussed with Dr Elsa Bergman and Dr Lizet Patel   Discussed options including foregoing RT with very close observation vs adjuvant RT  F/u after seeing Dr Joanna Ha    Today, the patient feels well overall  His neck scar has healed  He denies problems with swallowing  He has some baseline hoarseness  He otherwise feels points  He denies pain  Oncology History   Metastatic cancer ([facility] [address] )   6/1/2022 Surgery    Micro direct laryngoscopy with biopsy, left tonsil     6/1/2022 Biopsy    A  Tonsil, left tonsil biopsy:  - Portions of benign tonsil with follicular lymphoid hyperplasia and squamous epithelium with  focal acute inflammation  -- Associated actinomyces colonies  - Negative for malignancy  -- Confirmed by Pankeratin (CKAE1/3) and p16 immunostains  B  Vocal cord, right true vocal cord biopsy:  - Acanthosis with parakeratosis and mild squamous atypia, cannot exclude low-grade dysplasia  -- Normal wild-type expression on p53 immunostain  See Note  - Bacterial colonization; special stain for fungus (GMS) confirms superficial fungal hyphae  consistent with fungal colonization   - Negative for malignancy on multiple examined levels  10/3/2022 Biopsy    Final Diagnosis  A , B , & C  Neck, Left neck mass: (Thin-Prep, smears, and cell block)  Positive for malignancy  Metastatic carcinoma, can not further classify (see note)  11/28/2022 Biopsy    Biopsy:  A  Tonsil, left inferior tonsil (biopsy): fragment of unremarkable tonsil  B  Larynx, right posterior vocal cord (biopsy): Hyperkeratotic squamous epithelium, negative for malignancy  C  Left neck level 2B (lymph node dissection): 11 lymph nodes, negative for metastatic carcinoma (0/11)  D  Left neck level 2A (lymph node dissection): metastatic poorly differentiated carcinoma in one (3 5cm) of 8 lymph nodes, negative for extranodal extension (1/8)  E  Left neck level 3 (lymph node dissection): 14 lymph nodes, negative for metastatic carcinoma (0/14)  F  Left neck level 4 (lymph node dissection): 10 lymph nodes, negative for metastatic carcinoma (0/10 )  G   Left neck level 1B (lymph node dissection): 3 lymph nodes, negative for metastatic carcinoma (0/3)        12/16/2022 Initial Diagnosis    Metastatic cancer ([address] )       Historical Information   Past Medical History:   Diagnosis Date   • A-fib [facility])    • Arthritis    • Avascular necrosis of bone of hip, left (HCC)     replaced   • Back pain    • CAD (coronary artery disease)    • Carotid artery narrowing     left side -for endarterectomy today 3/5/2020   • Disease of thyroid gland     hypo   • GERD (gastroesophageal reflux disease)    • History of Clostridioides difficile infection    • Hyperlipidemia    • Hypertension    • Irregular heart beat    • Jejunitis    • Kidney stone    • Metastatic cancer [facility])    • Neck pain    • Pancreatitis    • Spinal stenosis    • Wears glasses      Past Surgical History:   Procedure Laterality Date   • BACK SURGERY     • CARDIAC CATHETERIZATION      cardiac cath 5/2010  adjusted meds   • CATARACT EXTRACTION Bilateral    • CERVICAL FUSION     • CHOLANGIOGRAM N/A 03/04/2021    Procedure: CHOLANGIOGRAM;  Surgeon: Мария Bass MD;  Location: [facility] MAIN OR;  Service: General   • CHOLECYSTECTOMY     • CHOLECYSTECTOMY LAPAROSCOPIC N/A 03/04/2021    Procedure: CHOLECYSTECTOMY LAPAROSCOPIC;  Surgeon: Мария Bass MD;  Location: [facility] MAIN OR;  Service: General   • COLONOSCOPY     • JOINT REPLACEMENT Left     hip   • LUMBAR FUSION     • NECK SURGERY     • ORTHOPEDIC SURGERY Left     L THR   • DE LARYNGOSCOPY W/BIOPSY MICROSCOPE/TELESCOPE Left 6/1/2022    Procedure: MICRO DIRECT LARYNGOSCOPY WITH BIOPSY, BIOPSY LEFT TONSIL;  Surgeon: Kayla Stafford MD;  Location: [facility] MAIN OR;  Service: ENT   • DE TEAEC W/PATCH GRF CAROTID VERTB SUBCLAV NECK INC Left 03/05/2020    Procedure: ENDARTERECTOMY ARTERY CAROTID WITH BOVINE PATCH ANGIOPLASTY AND INTRAOP DUPLEX;  Surgeon: Emeli Fountain MD;  Location: [facility] Main OR;  Service: Vascular   • SPINAL FUSION     • US GUIDED LYMPH NODE BIOPSY LEFT  10/3/2022     Family History Problem Relation Age of Onset   • Heart disease Mother    • Parkinsonism Mother    • Heart disease Father      Social History   Social History     Substance and Sexual Activity   Alcohol Use Yes    Comment: 3 oz of wine with communion multiple times/day/week     Social History     Substance and Sexual Activity   Drug Use Never     Social History     Tobacco Use   Smoking Status Every Day   • Packs/day: 0 50   • Years: 50 00   • Pack years: 25 00   • Types: Cigarettes   Smokeless Tobacco Never   Tobacco Comments    Half pack per day     Meds/Allergies      Current Outpatient Medications:   •  amLODIPine (NORVASC) 10 mg tablet, Take 1 tablet (10 mg total) by mouth daily, Disp: 90 tablet, Rfl: 0  •  fenofibrate 160 MG tablet, Take 1 tablet (160 mg total) by mouth daily, Disp: 90 tablet, Rfl: 0  •  lansoprazole (PREVACID) 30 mg capsule, Take 1 capsule (30 mg total) by mouth daily, Disp: 90 capsule, Rfl: 0  •  levothyroxine 25 mcg tablet, Take 0 5 tablets (12 5 mcg total) by mouth daily, Disp: 45 tablet, Rfl: 0  •  losartan (COZAAR) 50 mg tablet, Take 1 tablet (50 mg total) by mouth daily, Disp: 90 tablet, Rfl: 0  •  magnesium Oxide (MAG-OX) 400 mg TABS, Take 1 tablet (400 mg total) by mouth 2 (two) times a day, Disp: 180 tablet, Rfl: 0  •  metoprolol succinate (TOPROL-XL) 50 mg 24 hr tablet, Take 0 5 tablets (25 mg total) by mouth daily, Disp: 90 tablet, Rfl: 0  •  pancrelipase, Lip-Prot-Amyl, (Creon) 12,000 units capsule, Take 12,000 units of lipase by mouth 3 (three) times a day with meals, Disp: 90 capsule, Rfl: 0  •  rosuvastatin (CRESTOR) 10 MG tablet, Take 1 tablet (10 mg total) by mouth daily Please STOP Vytorin   , Disp: 90 tablet, Rfl: 0  •  spironolactone (ALDACTONE) 25 mg tablet, Take 0 5 tablets (12 5 mg total) by mouth daily, Disp: 45 tablet, Rfl: 0  •  zolpidem (AMBIEN) 10 mg tablet, Take by mouth daily at bedtime as needed , Disp: , Rfl:   •  thiamine 100 MG tablet, Take 1 tablet (100 mg total) by mouth daily (Patient not taking: Reported on 2023), Disp: 90 tablet, Rfl: 0  No Known Allergies   Pathology:  See above  Review of Systems  Refer to nursing note    OBJECTIVE:   /68 (BP Location: Left arm, Patient Position: Sitting, Cuff Size: Standard)   Pulse 100   Temp 99 °F (37 2 °C) (Temporal)   Resp 18   Ht 5' 3" (1 6 m)   Wt 65 9 kg (145 lb 3 2 oz)   SpO2 94%   BMI 25 72 kg/m²   Pain Assessment:  0  Performance Status: ECO - Asymptomatic    Physical Exam  General Appearance:  Alert, cooperative, no distress, appears stated age  [de-identified]: Left neck incision well healed  No suspicious nodules in the necks bilaterally  No obvious oral/oropharyngeal lesions on exam    Lungs: Respirations unlabored, no cyanosis, able to speak in complete sentences without dyspnea  Skin: No generalized rash or dermatitis  Neurologic: ANOx3, speech and cognition intact  Portions of the record may have been created with voice recognition software  Occasional wrong word or "sound a like" substitutions may have occurred due to the inherent limitations of voice recognition software  Read the chart carefully and recognize, using context, where substitutions have occurred

## 2023-01-19 NOTE — PROGRESS NOTES
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  Stage: 1xN1  Patient referred by Dr Luis M Dawson for consideration of RT  He presents today for initial consult  4/29/22 - CT soft tissue neck w contrast  IMPRESSION:  New 1 2 cm left tonsillar necrotic lesion, suspicious for primary tonsillar neoplasm  Recommend consultation and direct visualization by with ENT  New 1 5 cm enhancing nodular tissue in left glossotonsillar sulcus, suspicious for concurrent primary tongue neoplasm  Recommend direct visualization by ENT  New 0 9 cm left level 2A necrotic lymph node, suspicious for necrotic samson metastasis  Findings suggestive of right vocal cord paralysis  Recommend direct visualization by ENT  5/13/22 - Ototlaryngology - Dr Luis M Dawson  Laryngoscopy performed  OP clear  No LAD  BOT is clear  TVCs fully mobile bilaterally  R TVC noted to have sessile leukoplakia  Recommend MDL w Biopsy, L tonsil biopsy  Reviewed risks, benefits, and alternatives  6/1/22 - Surgery - Dr Katerina Putnam direct laryngoscopy with biopsy, left tonsil    Biopsy:  A  Tonsil, left tonsil biopsy:  - Portions of benign tonsil with follicular lymphoid hyperplasia and squamous epithelium with  focal acute inflammation  -- Associated actinomyces colonies  - Negative for malignancy  -- Confirmed by Pankeratin (CKAE1/3) and p16 immunostains  B  Vocal cord, right true vocal cord biopsy:  - Acanthosis with parakeratosis and mild squamous atypia, cannot exclude low-grade dysplasia    -- Normal wild-type expression on p53 immunostain  See Note  - Bacterial colonization; special stain for fungus (GMS) confirms superficial fungal hyphae  consistent with fungal colonization   - Negative for malignancy on multiple examined levels  6/17/22 - Otolaryngology - Dr Luis M Dawson  Recommend ultrasound neck 6w  Low suspicion for head/neck malignancy based on pathology  7/22/22 - US head neck soft tissue  IMPRESSION:  2 2 cm cystic lesion in the left neck, indeterminant  Contrast-enhanced CT is recommended for further characterization  9/19/22 - Otolaryngology - Dr Angélica Edwards head and neck 07/22/22: 1 7 x 1 2 x 2 2 cm anechoic/cystic structure in the left neck  Possible Branchial cleft cyst? Recommend FNAB  BOT is clear on flexible laryngoscopy  TVCs fully mobile bilaterally  Renke's edema  Discussed options including speech therapy, consult with Dr An Palacios  9/20/22 - Otolaryngology - Dr An Palacios  Left neck FNA as scheduled  Smoking cessation encouraged  Reflux diet/lifestyle modifications  Follow-up in 4 weeks to re-check larynx    10/3/22 - Biopsy  Final Diagnosis  A , B , & C  Neck, Left neck mass: (Thin-Prep, smears, and cell block)  Positive for malignancy  Metastatic carcinoma, can not further classify (see note)  10/14/22 - Otolaryngology - Dr Luis M Dawson  PET scan  Possible head and neck unknown primary  Reviewed path, possible neuroendocrine tumor    10/25/22 - PET  IMPRESSION:  Hypermetabolic left level 2 cervical lymph node/neck mass consistent with hypermetabolic malignancy/metastatic disease    11/28/22 [facility] [place] - Otolaryngology  Head and Neck Surgery  TOR resection of tonsil and base of tongue, direct laryngoscopy, possible tonsillectomy, left neck dissection  Biopsy:  A  Tonsil, left inferior tonsil (biopsy): fragment of unremarkable tonsil  B  Larynx, right posterior vocal cord (biopsy): Hyperkeratotic squamous epithelium, negative for malignancy  C  Left neck level 2B (lymph node dissection): 11 lymph nodes, negative for metastatic carcinoma (0/11)  D  Left neck level 2A (lymph node dissection): metastatic poorly differentiated carcinoma in one (3 5cm) of 8 lymph nodes, negative for extranodal extension (1/8)  E  Left neck level 3 (lymph node dissection): 14 lymph nodes, negative for metastatic carcinoma (0/14)  F  Left neck level 4 (lymph node dissection): 10 lymph nodes, negative for metastatic carcinoma (0/10 )  G  Left neck level 1B (lymph node dissection): 3 lymph nodes, negative for metastatic carcinoma (0/3)  12/8/22 - [address] [facility] Otolaryngology  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 unknown primary site  Stage: 1xN1  Follow-up visit S/p LND 11/28/22  Patient is doing well, exam reveals well-healing left neck incision with mild edema surrounding incision  Flexible laryngoscopy reveals true vocal folds are mobile bilaterally, clean healing of the right vocal fold, superior left glossontonsillar sulcus has clean fibrinous healing, no visible mucosal mass lesions  Oral cavity reveals evidence of mild thrush over tongue and buccal mucosa    -Ambulatory referral to speech therapy  -F/u in 4 weeks    12/16/22 - Gastroenterology - Dr Miranda Sample  One lymph node with positive for mets carcinoma : Multiple immunostains performed on block D4 show that the tumor cells are positive for Cam5 2, CDX2, SATB2 and focally for p40, CK5/6 and CK7 (rare cells)  They are negative for CK20, p16, LITO, synaptophysin, chromogranin, TTF1 and NKX3 1    -This immunoprofile suggests a lower GI/anorectal origin  - Colonscopy  -EGD    12/27/22   Colonoscopy- Diverticulosis and hemorrhoids but otherwise unremarkable colonoscopy to the terminal ileum  EGD- Non obstructing Schatzki's ring  Dilated to 18mm with a slight superficial mucosal tear  2 cm Hiatal hernia ([address])  Normal stomach/ duodenum       1/12/23 [facility] medical oncology telemedicine consult  "Reasonable treatment of carcinoma (noting this is SCC) of unknown primary metastatic to cervical lymph nodes includes neck dissection and adjuvant RT with or without radiosensitizing chemotherapy  In the case of N1 disease without RAQUEL, we favor evaluation for adjuvant RT alone (ie without cisplatin or cetuximab as a sensitizer), given risk of lymph node recurrence in as many as 30% of patients with N1 disease (Edy et al , Int Pradip Pima Radiat Oncol Biol Phys 1992)  The primary evidence for using chemoradiotherapy is in N2 and N3 disease (Norberto et al , Yesy Dears Oncol 2003, Ricci Green et al , Head Neck 2006)  "    1/12/23 - [facility] onc telemedicine consult   "Given the fact that the involved lymph node was > 3 cm, as well as the fact that the primary site of origin is unknown, based on multiH&N tumor board discussion, the group consensus was to proceed with adjuvant radiotherapy to the involved ipsilateral left neck alone to a dose of 60 Gy in 30 fractions without chemotherapy "    1/12/23 [facility] Otolaryngology   Unable to appreciate masses or lesions on exam  Discussed concern for his tongue pain  Discussed with Dr Jamaal Vogt and Dr Melissa Hamilton  Discussed options including foregoing RT with very close observation vs adjuvant RT  F/u after seeing Dr Tra Bowling            Oncology History   Metastatic cancer [facility])   6/1/2022 Surgery    Micro direct laryngoscopy with biopsy, left tonsil     6/1/2022 Biopsy    A  Tonsil, left tonsil biopsy:  - Portions of benign tonsil with follicular lymphoid hyperplasia and squamous epithelium with  focal acute inflammation  -- Associated actinomyces colonies  - Negative for malignancy  -- Confirmed by Pankeratin (CKAE1/3) and p16 immunostains  B  Vocal cord, right true vocal cord biopsy:  - Acanthosis with parakeratosis and mild squamous atypia, cannot exclude low-grade dysplasia  -- Normal wild-type expression on p53 immunostain  See Note    - Bacterial colonization; special stain for fungus (GMS) confirms superficial fungal hyphae  consistent with fungal colonization   - Negative for malignancy on multiple examined levels  10/3/2022 Biopsy    Final Diagnosis  A , B , & C  Neck, Left neck mass: (Thin-Prep, smears, and cell block)  Positive for malignancy  Metastatic carcinoma, can not further classify (see note)  11/28/2022 Biopsy    Biopsy:  A  Tonsil, left inferior tonsil (biopsy): fragment of unremarkable tonsil  B  Larynx, right posterior vocal cord (biopsy): Hyperkeratotic squamous epithelium, negative for malignancy  C  Left neck level 2B (lymph node dissection): 11 lymph nodes, negative for metastatic carcinoma (0/11)  D  Left neck level 2A (lymph node dissection): metastatic poorly differentiated carcinoma in one (3 5cm) of 8 lymph nodes, negative for extranodal extension (1/8)  E  Left neck level 3 (lymph node dissection): 14 lymph nodes, negative for metastatic carcinoma (0/14)  F  Left neck level 4 (lymph node dissection): 10 lymph nodes, negative for metastatic carcinoma (0/10 )  G  Left neck level 1B (lymph node dissection): 3 lymph nodes, negative for metastatic carcinoma (0/3)  12/16/2022 Initial Diagnosis    Metastatic cancer ([facility] [address] )         Review of Systems:  Review of Systems   Constitutional: Positive for activity change (decreased) and fatigue (increased)  Negative for appetite change  HENT: Positive for sore throat  Negative for trouble swallowing  Eyes: Negative  Respiratory: Negative  Cardiovascular: Negative  Gastrointestinal: Positive for diarrhea  Endocrine: Negative  Genitourinary: Negative  Musculoskeletal:        Pain to left side of tongue s/p surgery when eating    Skin: Negative  Allergic/Immunologic: Negative  Neurological: Negative  Hematological: Negative  Psychiatric/Behavioral: Negative          Clinical Trial: no        Prior Radiation: no     Teaching: NCI radiation packet    MST: completed    Implantable Devices (Port, pacemaker, pain stimulator): no    Hip Replacement: left hip replacement    Health Maintenance   Topic Date Due   • Hepatitis A Vaccine (1 of 2 - Risk 2-dose series) Never done   • DTaP,Tdap,and Td Vaccines (1 - Tdap) Never done   • Hepatitis B Vaccine (1 of 3 - Risk 3-dose series) Never done   • PT PLAN OF CARE  08/10/2018   • COVID-19 Vaccine (4 - Booster for Pfizer series) 11/28/2021   • BMI: Followup Plan  01/04/2023   • Lung Cancer Screening  02/14/2023   • Fall Risk  04/19/2023   • Annual Physical  10/11/2023   • BMI: Adult  12/16/2023   • Depression Screening  01/19/2024   • Colorectal Cancer Screening  12/27/2032   • Hepatitis C Screening  Completed   • Pneumococcal Vaccine: 65+ Years  Completed   • Influenza Vaccine  Completed   • HIB Vaccine  Aged Out   • IPV Vaccine  Aged Out   • Meningococcal ACWY Vaccine  Aged Out   • HPV Vaccine  Aged Out       Past Medical History:   Diagnosis Date   • A-fib ([facility] [address] )    • Arthritis    • Avascular necrosis of bone of hip, left ([address] )     replaced   • Back pain    • CAD (coronary artery disease)    • Carotid artery narrowing     left side -for endarterectomy today 3/5/2020   • Disease of thyroid gland     hypo   • GERD (gastroesophageal reflux disease)    • History of Clostridioides difficile infection    • Hyperlipidemia    • Hypertension    • Irregular heart beat    • Jejunitis    • Kidney stone    • Metastatic cancer ([address] )    • Neck pain    • Pancreatitis    • Spinal stenosis    • Wears glasses        Past Surgical History:   Procedure Laterality Date   • BACK SURGERY     • CARDIAC CATHETERIZATION      cardiac cath 5/2010  adjusted meds   • CATARACT EXTRACTION Bilateral    • CERVICAL FUSION     • CHOLANGIOGRAM N/A 03/04/2021    Procedure: CHOLANGIOGRAM;  Surgeon: Nusrat Brantley MD;  Location: [address] [place] [address] MAIN OR;  Service: General   • CHOLECYSTECTOMY     • CHOLECYSTECTOMY LAPAROSCOPIC N/A 03/04/2021    Procedure: CHOLECYSTECTOMY LAPAROSCOPIC;  Surgeon: Nusrat Brantley MD;  Location:  MAIN OR;  Service: General   • COLONOSCOPY     • JOINT REPLACEMENT Left     hip   • LUMBAR FUSION     • NECK SURGERY     • ORTHOPEDIC SURGERY Left     L THR   • ID LARYNGOSCOPY W/BIOPSY MICROSCOPE/TELESCOPE Left 6/1/2022    Procedure: MICRO DIRECT LARYNGOSCOPY WITH BIOPSY, BIOPSY LEFT TONSIL;  Surgeon: Verito Rick MD;  Location: [facility] MAIN OR;  Service: ENT   • ID TEAEC W/PATCH GRF CAROTID VERTB Ej Left 03/05/2020    Procedure: ENDARTERECTOMY ARTERY CAROTID WITH BOVINE PATCH ANGIOPLASTY AND INTRAOP DUPLEX;  Surgeon: Remington Wallace MD;  Location: [facility] Main OR;  Service: Vascular   • SPINAL FUSION     • US GUIDED LYMPH NODE BIOPSY LEFT  10/3/2022       Family History   Problem Relation Age of Onset   • Heart disease Mother    • Parkinsonism Mother    • Heart disease Father        Social History     Tobacco Use   • Smoking status: Every Day     Packs/day: 0 50     Years: 50 00     Pack years: 25 00     Types: Cigarettes   • Smokeless tobacco: Never   • Tobacco comments:     Half pack per day   Vaping Use   • Vaping Use: Never used   Substance Use Topics   • Alcohol use: Yes     Comment: 3 oz of wine with communion multiple times/day/week   • Drug use: Never          Current Outpatient Medications:   •  amLODIPine (NORVASC) 10 mg tablet, Take 1 tablet (10 mg total) by mouth daily, Disp: 90 tablet, Rfl: 0  •  fenofibrate 160 MG tablet, Take 1 tablet (160 mg total) by mouth daily, Disp: 90 tablet, Rfl: 0  •  lansoprazole (PREVACID) 30 mg capsule, Take 1 capsule (30 mg total) by mouth daily, Disp: 90 capsule, Rfl: 0  •  levothyroxine 25 mcg tablet, Take 0 5 tablets (12 5 mcg total) by mouth daily, Disp: 45 tablet, Rfl: 0  •  losartan (COZAAR) 50 mg tablet, Take 1 tablet (50 mg total) by mouth daily, Disp: 90 tablet, Rfl: 0  •  magnesium Oxide (MAG-OX) 400 mg TABS, Take 1 tablet (400 mg total) by mouth 2 (two) times a day, Disp: 180 tablet, Rfl: 0  •  metoprolol succinate (TOPROL-XL) 50 mg 24 hr tablet, Take 0 5 tablets (25 mg total) by mouth daily, Disp: 90 tablet, Rfl: 0  •  pancrelipase, Lip-Prot-Amyl, (Creon) 12,000 units capsule, Take 12,000 units of lipase by mouth 3 (three) times a day with meals, Disp: 90 capsule, Rfl: 0  •  rosuvastatin (CRESTOR) 10 MG tablet, Take 1 tablet (10 mg total) by mouth daily Please STOP Vytorin   , Disp: 90 tablet, Rfl: 0  •  spironolactone (ALDACTONE) 25 mg tablet, Take 0 5 tablets (12 5 mg total) by mouth daily, Disp: 45 tablet, Rfl: 0  •  zolpidem (AMBIEN) 10 mg tablet, Take by mouth daily at bedtime as needed , Disp: , Rfl:   •  thiamine 100 MG tablet, Take 1 tablet (100 mg total) by mouth daily (Patient not taking: Reported on 1/19/2023), Disp: 90 tablet, Rfl: 0    No Known Allergies     Vitals:    01/19/23 1355   BP: 108/68   BP Location: Left arm   Patient Position: Sitting   Cuff Size: Standard   Pulse: 100   Resp: 18   Temp: 99 °F (37 2 °C)   TempSrc: Temporal   SpO2: 94%   Weight: 65 9 kg (145 lb 3 2 oz)   Height: 5' 3" (1 6 m)       Pain Score: 0-No pain

## 2023-01-19 NOTE — PROGRESS NOTES
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)     Stage - cTx pN2a cM0    PLAN  Hay Wilcox is a 81 y o  male current smoker (25py) with cTx pN2a 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, s/p left neck dissection on 22 with Dr Jose Morris at [facility], pathology notable for a 3 5cm LN without RAQUEL ( total nodes removed from levels IB-IV)  Immunostains notable for negative p16 and LITO, but positive profile suggestive of lower GI/anorectal origin  EGD and colonoscopy on 22 were unremarkable  DL with directed biopsies of the left tonsil and larynx both at [facility] and [place] were negative  Initial FNA on 10/3/22 had shown metastatic carcinoma cannot further classify  PET/CT on 10/25/22 had shown FDG avid left level 2 cervical lymph node neck mass  There was symmetric nonspecific FDG activity involving the tonsillar and base of tongue regions of uncertain significance within range of normal variation  We had a long and extensive discussion regarding the patient's cancer diagnosis, and unusual presentation if this is truly a lower GI metastasis  He underwent multidisciplinary evaluation and consultation with medical and radiation oncology at [facility] with consensus to proceed with adjuvant radiation therapy alone to the involved ipsilateral neck given the fact that the involved lymph node was > 3 cm, as well as the fact that the primary site of origin is unknown  I reviewed this case with ENT and other colleagues in our department  His treatment options in this case include adjuvant RT as previously suggested or close surveillance  In terms of adjuvant radiation therapy, I would recommend 6000 cGy to the left neck alone, following post-operative paradigms for resected neck disease  Given that the tumor immunoprofile suggests a lower GI primary and multiple DL's with directed biopsies are negative, I would not electively cover his aerodigestive tract as one would consider for a true head and neck unknown primary cancer  The risks, benefits and side effects of external beam were discussed in extensive detail and the patient's questions were answered  Acute side effects discussed included but not limited to mucositis, dermatitis, pain, xerostomia, dysgeusia, thick mucus, fatigue, hair loss and weight loss  Long term side effects include but are not limited to dysphagia, trismus, fibrosis of the neck muscles, hypothyroidism, xerostomia and risk for osteoradionecrosis if future dental work were needed as well as mild skin atrophy and permanent hair loss in the irradiated area  Blanca Gastelum demonstrated a good understanding of our discussion and all questions were answered to the patient's apparent satisfaction  Between adjuvant therapy and close surveillance, 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 now, and elected to proceed with radiation therapy, and informed consent was signed  -CT simulation to be scheduled at [facility], with treatments at [facility] Contrast form signed  -Dental evaluation prior to RT, patient given paperwork  He will be reaching out to a prior dentist to see if they are still practicing, otherwise will find a new dentist   -Nutrition Referral ongoing    Thank you for the opportunity to participate in the care of this patient  Felicia Neri MD  Department of [address] [place] [address]    No orders of the defined types were placed in this encounter      1  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 [facility])  Ambulatory Referral to Radiation Oncology        Total Time Spent  60 minutes spent reviewing EMR in preparation for visit, with the patient, coordination with other providers, independent review of imaging, review of pathology, and documentation  CHIEF COMPLAINT  Chief Complaint   Patient presents with   • Consult     Radiation Oncology       History of Present Illness  Aristeo Nose 1950 is a 81 y o  male Presented to ENT with hoarseness  US of neck performed 7/22/22 showed 2 2 cm cystic lesion in the left neck  Biopsy of the left neck mass, 10/3/22 , was positive for malignancy, metastatic carcinoma  PET, 10/25/22, showed hypermetabolic left level 2 cervical lymph node/neck mass consistent with hypermetabolic malignancy/metastatic disease  Patient underwent TOR with biopsy 11/28/22 at [facility]  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 unknown primary site  Patient referred by Dr Megan Mendoza for consideration of RT  He presents today for initial consult       4/29/22 - CT soft tissue neck w contrast  IMPRESSION:  New 1 2 cm left tonsillar necrotic lesion, suspicious for primary tonsillar neoplasm   Recommend consultation and direct visualization by with ENT  New 1 5 cm enhancing nodular tissue in left glossotonsillar sulcus, suspicious for concurrent primary tongue neoplasm   Recommend direct visualization by ENT  New 0 9 cm left level 2A necrotic lymph node, suspicious for necrotic samson metastasis  Findings suggestive of right vocal cord paralysis   Recommend direct visualization by ENT      5/13/22 - Ototlaryngology - Dr Megan Mendoza  Laryngoscopy performed  OP clear  No LAD  BOT is clear  TVCs fully mobile bilaterally  R TVC noted to have sessile leukoplakia  Recommend MDL w Biopsy, L tonsil biopsy  Reviewed risks, benefits, and alternatives      6/1/22 - Surgery - Dr Roni Babcock direct laryngoscopy with biopsy, left tonsil     Biopsy:  A  Tonsil, left tonsil biopsy:  - Portions of benign tonsil with follicular lymphoid hyperplasia and squamous epithelium with  focal acute inflammation  -- Associated actinomyces colonies  - Negative for malignancy    -- Confirmed by Pankeratin (CKAE1/3) and p16 immunostains  B  Vocal cord, right true vocal cord biopsy:  - Acanthosis with parakeratosis and mild squamous atypia, cannot exclude low-grade dysplasia  -- Normal wild-type expression on p53 immunostain  See Note  - Bacterial colonization; special stain for fungus (GMS) confirms superficial fungal hyphae  consistent with fungal colonization   - Negative for malignancy on multiple examined levels      6/17/22 - Otolaryngology - Dr Renzo Alcaraz  Recommend ultrasound neck 6w  Low suspicion for head/neck malignancy based on pathology      7/22/22 - US head neck soft tissue  IMPRESSION:  2 2 cm cystic lesion in the left neck, indeterminant   Contrast-enhanced CT is recommended for further characterization      9/19/22 - Otolaryngology - Dr Johny Dickey head and neck 07/22/22: 1 7 x 1 2 x 2 2 cm anechoic/cystic structure in the left neck  Possible Branchial cleft cyst? Recommend FNAB    BOT is clear on flexible laryngoscopy  TVCs fully mobile bilaterally  Renke's edema  Discussed options including speech therapy, consult with Dr Sudeep Addison      9/20/22 - Otolaryngology - Dr Liborio Blanca  Left neck FNA as scheduled  Smoking cessation encouraged  Reflux diet/lifestyle modifications  Follow-up in 4 weeks to re-check larynx     10/3/22 - Biopsy  Final Diagnosis  A , B , & C  Neck, Left neck mass: (Thin-Prep, smears, and cell block)  Positive for malignancy  Metastatic carcinoma, can not further classify (see note)      10/14/22 - Otolaryngology - Dr Renzo Alcaraz  PET scan  Possible head and neck unknown primary  Reviewed path, possible neuroendocrine tumor     10/25/22 - PET  IMPRESSION:  Hypermetabolic left level 2 cervical lymph node/neck mass consistent with hypermetabolic malignancy/metastatic disease     11/28/22 - [facility] Otolaryngology  Head and Neck Surgery  TOR resection of tonsil and base of tongue, direct laryngoscopy, possible tonsillectomy, left neck dissection  Biopsy:  A   Tonsil, left inferior tonsil (biopsy): fragment of unremarkable tonsil  B  Larynx, right posterior vocal cord (biopsy): Hyperkeratotic squamous epithelium, negative for malignancy  C  Left neck level 2B (lymph node dissection): 11 lymph nodes, negative for metastatic carcinoma (0/11)  D  Left neck level 2A (lymph node dissection): metastatic poorly differentiated carcinoma in one (3 5cm) of 8 lymph nodes, negative for extranodal extension (1/8)  E  Left neck level 3 (lymph node dissection): 14 lymph nodes, negative for metastatic carcinoma (0/14)  F  Left neck level 4 (lymph node dissection): 10 lymph nodes, negative for metastatic carcinoma (0/10 )  G  Left neck level 1B (lymph node dissection): 3 lymph nodes, negative for metastatic carcinoma (0/3)        12/8/22 - [address] [facility] Otolaryngology  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 unknown primary site  Stage: 1xN1  Follow-up visit S/p LND 11/28/22  Patient is doing well, exam reveals well-healing left neck incision with mild edema surrounding incision  Flexible laryngoscopy reveals true vocal folds are mobile bilaterally, clean healing of the right vocal fold, superior left glossontonsillar sulcus has clean fibrinous healing, no visible mucosal mass lesions  Oral cavity reveals evidence of mild thrush over tongue and buccal mucosa    -Ambulatory referral to speech therapy  -F/u in 4 weeks     12/16/22 - Gastroenterology - Dr Yanick Mata  One lymph node with positive for mets carcinoma : Multiple immunostains performed on block D4 show that the tumor cells are positive for Cam5 2, CDX2, SATB2 and focally for p40, CK5/6 and CK7 (rare cells)  They are negative for CK20, p16, LITO, synaptophysin, chromogranin, TTF1 and NKX3 1    -This immunoprofile suggests a lower GI/anorectal origin  - Colonscopy  -EGD     12/27/22   Colonoscopy- Diverticulosis and hemorrhoids but otherwise unremarkable colonoscopy to the terminal ileum  EGD- Non obstructing Schatzki's ring   Dilated to 18mm with a slight superficial mucosal tear  2 cm Hiatal hernia (Hill 3)  Normal stomach/ duodenum       1/12/23 [facility] medical oncology telemedicine consult  "Reasonable treatment of carcinoma (noting this is SCC) of unknown primary metastatic to cervical lymph nodes includes neck dissection and adjuvant RT with or without radiosensitizing chemotherapy  In the case of N1 disease without RAQUEL, we favor evaluation for adjuvant RT alone (ie without cisplatin or cetuximab as a sensitizer), given risk of lymph node recurrence in as many as 30% of patients with N1 disease (Edy et al , Int Rodo Sellers Radiat Oncol Biol Phys 1992)  The primary evidence for using chemoradiotherapy is in N2 and N3 disease (Norberto et al , Stann Curia Oncol 2003, Florencio Sorensen et al , Head Neck 2006)  "     1/12/23 - [facility] rad onc telemedicine consult   "Given the fact that the involved lymph node was > 3 cm, as well as the fact that the primary site of origin is unknown, based on multiH&N tumor board discussion, the group consensus was to proceed with adjuvant radiotherapy to the involved ipsilateral left neck alone to a dose of 60 Gy in 30 fractions without chemotherapy  "     1/12/23 [facility] Otolaryngology   Unable to appreciate masses or lesions on exam  Discussed concern for his tongue pain  Discussed with Dr Kisha Mulligan and Dr Kristine Soriano  Discussed options including foregoing RT with very close observation vs adjuvant RT  F/u after seeing Dr Helene Fischer    Today, the patient feels well overall  His neck scar has healed  He denies problems with swallowing  He has some baseline hoarseness  He otherwise feels points  He denies pain  Oncology History   Metastatic cancer ([facility] [address] )   6/1/2022 Surgery    Micro direct laryngoscopy with biopsy, left tonsil     6/1/2022 Biopsy    A  Tonsil, left tonsil biopsy:  - Portions of benign tonsil with follicular lymphoid hyperplasia and squamous epithelium with  focal acute inflammation  -- Associated actinomyces colonies    - Negative for malignancy  -- Confirmed by Pankeratin (CKAE1/3) and p16 immunostains  B  Vocal cord, right true vocal cord biopsy:  - Acanthosis with parakeratosis and mild squamous atypia, cannot exclude low-grade dysplasia  -- Normal wild-type expression on p53 immunostain  See Note  - Bacterial colonization; special stain for fungus (GMS) confirms superficial fungal hyphae  consistent with fungal colonization   - Negative for malignancy on multiple examined levels  10/3/2022 Biopsy    Final Diagnosis  A , B , & C  Neck, Left neck mass: (Thin-Prep, smears, and cell block)  Positive for malignancy  Metastatic carcinoma, can not further classify (see note)  11/28/2022 Biopsy    Biopsy:  A  Tonsil, left inferior tonsil (biopsy): fragment of unremarkable tonsil  B  Larynx, right posterior vocal cord (biopsy): Hyperkeratotic squamous epithelium, negative for malignancy  C  Left neck level 2B (lymph node dissection): 11 lymph nodes, negative for metastatic carcinoma (0/11)  D  Left neck level 2A (lymph node dissection): metastatic poorly differentiated carcinoma in one (3 5cm) of 8 lymph nodes, negative for extranodal extension (1/8)  E  Left neck level 3 (lymph node dissection): 14 lymph nodes, negative for metastatic carcinoma (0/14)  F  Left neck level 4 (lymph node dissection): 10 lymph nodes, negative for metastatic carcinoma (0/10 )  G  Left neck level 1B (lymph node dissection): 3 lymph nodes, negative for metastatic carcinoma (0/3)        12/16/2022 Initial Diagnosis    Metastatic cancer ([facility] [address] )       Historical Information   Past Medical History:   Diagnosis Date   • A-fib [facility])    • Arthritis    • Avascular necrosis of bone of hip, left (HCC)     replaced   • Back pain    • CAD (coronary artery disease)    • Carotid artery narrowing     left side -for endarterectomy today 3/5/2020   • Disease of thyroid gland     hypo   • GERD (gastroesophageal reflux disease)    • History of Clostridioides difficile infection • Hyperlipidemia    • Hypertension    • Irregular heart beat    • Jejunitis    • Kidney stone    • Metastatic cancer [facility])    • Neck pain    • Pancreatitis    • Spinal stenosis    • Wears glasses      Past Surgical History:   Procedure Laterality Date   • BACK SURGERY     • CARDIAC CATHETERIZATION      cardiac cath 5/2010  adjusted meds   • CATARACT EXTRACTION Bilateral    • CERVICAL FUSION     • CHOLANGIOGRAM N/A 03/04/2021    Procedure: CHOLANGIOGRAM;  Surgeon: Elias Venegas MD;  Location: [facility] MAIN OR;  Service: General   • CHOLECYSTECTOMY     • CHOLECYSTECTOMY LAPAROSCOPIC N/A 03/04/2021    Procedure: Nico Heater;  Surgeon: Elias Venegas MD;  Location: [facility] MAIN OR;  Service: General   • COLONOSCOPY     • JOINT REPLACEMENT Left     hip   • LUMBAR FUSION     • NECK SURGERY     • ORTHOPEDIC SURGERY Left     L THR   • ND LARYNGOSCOPY W/BIOPSY MICROSCOPE/TELESCOPE Left 6/1/2022    Procedure: MICRO DIRECT LARYNGOSCOPY WITH BIOPSY, BIOPSY LEFT TONSIL;  Surgeon: Neha Pack MD;  Location:  MAIN OR;  Service: ENT   • ND TEAEC W/PATCH GRF CAROTID VERTB [facility] Left 03/05/2020    Procedure: ENDARTERECTOMY ARTERY CAROTID WITH BOVINE PATCH ANGIOPLASTY AND INTRAOP DUPLEX;  Surgeon: Stas Alonso MD;  Location: [facility] Main OR;  Service: Vascular   • SPINAL FUSION     • US GUIDED LYMPH NODE BIOPSY LEFT  10/3/2022     Family History   Problem Relation Age of Onset   • Heart disease Mother    • Parkinsonism Mother    • Heart disease Father      Social History   Social History     Substance and Sexual Activity   Alcohol Use Yes    Comment: 3 oz of wine with communion multiple times/day/week     Social History     Substance and Sexual Activity   Drug Use Never     Social History     Tobacco Use   Smoking Status Every Day   • Packs/day: 0 50   • Years: 50 00   • Pack years: 25 00   • Types: Cigarettes   Smokeless Tobacco Never   Tobacco Comments    Half pack per day     Meds/Allergies     Current Outpatient Medications:   •  amLODIPine (NORVASC) 10 mg tablet, Take 1 tablet (10 mg total) by mouth daily, Disp: 90 tablet, Rfl: 0  •  fenofibrate 160 MG tablet, Take 1 tablet (160 mg total) by mouth daily, Disp: 90 tablet, Rfl: 0  •  lansoprazole (PREVACID) 30 mg capsule, Take 1 capsule (30 mg total) by mouth daily, Disp: 90 capsule, Rfl: 0  •  levothyroxine 25 mcg tablet, Take 0 5 tablets (12 5 mcg total) by mouth daily, Disp: 45 tablet, Rfl: 0  •  losartan (COZAAR) 50 mg tablet, Take 1 tablet (50 mg total) by mouth daily, Disp: 90 tablet, Rfl: 0  •  magnesium Oxide (MAG-OX) 400 mg TABS, Take 1 tablet (400 mg total) by mouth 2 (two) times a day, Disp: 180 tablet, Rfl: 0  •  metoprolol succinate (TOPROL-XL) 50 mg 24 hr tablet, Take 0 5 tablets (25 mg total) by mouth daily, Disp: 90 tablet, Rfl: 0  •  pancrelipase, Lip-Prot-Amyl, (Creon) 12,000 units capsule, Take 12,000 units of lipase by mouth 3 (three) times a day with meals, Disp: 90 capsule, Rfl: 0  •  rosuvastatin (CRESTOR) 10 MG tablet, Take 1 tablet (10 mg total) by mouth daily Please STOP Vytorin   , Disp: 90 tablet, Rfl: 0  •  spironolactone (ALDACTONE) 25 mg tablet, Take 0 5 tablets (12 5 mg total) by mouth daily, Disp: 45 tablet, Rfl: 0  •  zolpidem (AMBIEN) 10 mg tablet, Take by mouth daily at bedtime as needed , Disp: , Rfl:   •  thiamine 100 MG tablet, Take 1 tablet (100 mg total) by mouth daily (Patient not taking: Reported on 2023), Disp: 90 tablet, Rfl: 0  No Known Allergies    Pathology:  See above      Review of Systems  Refer to nursing note    OBJECTIVE:   /68 (BP Location: Left arm, Patient Position: Sitting, Cuff Size: Standard)   Pulse 100   Temp 99 °F (37 2 °C) (Temporal)   Resp 18   Ht 5' 3" (1 6 m)   Wt 65 9 kg (145 lb 3 2 oz)   SpO2 94%   BMI 25 72 kg/m²   Pain Assessment:  0  Performance Status: ECO - Asymptomatic    Physical Exam  General Appearance:  Alert, cooperative, no distress, appears stated age  [de-identified]: Left neck incision well healed  No suspicious nodules in the necks bilaterally  No obvious oral/oropharyngeal lesions on exam    Lungs: Respirations unlabored, no cyanosis, able to speak in complete sentences without dyspnea  Skin: No generalized rash or dermatitis  Neurologic: ANOx3, speech and cognition intact  Portions of the record may have been created with voice recognition software  Occasional wrong word or "sound a like" substitutions may have occurred due to the inherent limitations of voice recognition software  Read the chart carefully and recognize, using context, where substitutions have occurred

## 2023-01-20 ENCOUNTER — TELEPHONE (OUTPATIENT)
Dept: NUTRITION | Facility: CLINIC | Age: 73
End: 2023-01-20

## 2023-01-20 NOTE — TELEPHONE ENCOUNTER
Received notification by Jagjit Soto RN on 1/19/23 that pt has triggered for oncology nutrition care (reason for referral: HNC dx)  Marta Yang today to establish care  No answer  LVM with the reason for today's call  Will attempt to contact again in the near future 
Attending Only

## 2023-01-23 ENCOUNTER — TELEPHONE (OUTPATIENT)
Dept: RADIATION ONCOLOGY | Facility: HOSPITAL | Age: 73
End: 2023-01-23

## 2023-01-23 PROBLEM — C77.0 SECONDARY MALIGNANT NEOPLASM OF CERVICAL LYMPH NODE (HCC): Status: ACTIVE | Noted: 2023-01-23

## 2023-01-23 NOTE — TELEPHONE ENCOUNTER
Called pt today to find out status on dental clearance for radiation therapy, he left a message with his previous dentist who was out on vacation just returned to the office today  If he does not hear back by this afternoon he will call again  He has clearance form and will let us know status

## 2023-01-24 ENCOUNTER — TELEPHONE (OUTPATIENT)
Dept: NUTRITION | Facility: CLINIC | Age: 73
End: 2023-01-24

## 2023-01-24 ENCOUNTER — PATIENT OUTREACH (OUTPATIENT)
Dept: HEMATOLOGY ONCOLOGY | Facility: CLINIC | Age: 73
End: 2023-01-24

## 2023-01-24 NOTE — TELEPHONE ENCOUNTER
Attempted to contact Jesus Morse to establish care with oncology nutrition  LVM with reason for call and RD contact info encouraging to reach out with any nutrition questions/concerns

## 2023-01-24 NOTE — PROGRESS NOTES
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

## 2023-01-26 ENCOUNTER — OFFICE VISIT (OUTPATIENT)
Dept: DENTISTRY | Facility: CLINIC | Age: 73
End: 2023-01-26

## 2023-01-26 ENCOUNTER — HOSPITAL ENCOUNTER (EMERGENCY)
Facility: HOSPITAL | Age: 73
Discharge: HOME | End: 2023-01-26
Attending: STUDENT IN AN ORGANIZED HEALTH CARE EDUCATION/TRAINING PROGRAM

## 2023-01-26 ENCOUNTER — PATIENT OUTREACH (OUTPATIENT)
Dept: HEMATOLOGY ONCOLOGY | Facility: CLINIC | Age: 73
End: 2023-01-26

## 2023-01-26 ENCOUNTER — APPOINTMENT (EMERGENCY)
Dept: CT IMAGING | Facility: HOSPITAL | Age: 73
End: 2023-01-26

## 2023-01-26 VITALS
TEMPERATURE: 97.2 F | RESPIRATION RATE: 18 BRPM | WEIGHT: 145.1 LBS | DIASTOLIC BLOOD PRESSURE: 66 MMHG | HEART RATE: 74 BPM | OXYGEN SATURATION: 96 % | BODY MASS INDEX: 25.7 KG/M2 | SYSTOLIC BLOOD PRESSURE: 118 MMHG

## 2023-01-26 VITALS — HEART RATE: 78 BPM | TEMPERATURE: 97.7 F | DIASTOLIC BLOOD PRESSURE: 70 MMHG | SYSTOLIC BLOOD PRESSURE: 131 MMHG

## 2023-01-26 DIAGNOSIS — Z01.20 ENCOUNTER FOR DENTAL EXAMINATION: Primary | ICD-10-CM

## 2023-01-26 DIAGNOSIS — R19.7 DIARRHEA OF PRESUMED INFECTIOUS ORIGIN: Primary | ICD-10-CM

## 2023-01-26 LAB
ALBUMIN SERPL BCP-MCNC: 4.6 G/DL (ref 3.5–5)
ALP SERPL-CCNC: 50 U/L (ref 43–122)
ALT SERPL W P-5'-P-CCNC: 20 U/L
ANION GAP SERPL CALCULATED.3IONS-SCNC: 10 MMOL/L (ref 5–14)
AST SERPL W P-5'-P-CCNC: 39 U/L (ref 17–59)
BASOPHILS # BLD AUTO: 0.02 THOUSANDS/ÂΜL (ref 0–0.1)
BASOPHILS NFR BLD AUTO: 0 % (ref 0–1)
BILIRUB SERPL-MCNC: 0.55 MG/DL (ref 0.2–1)
BUN SERPL-MCNC: 22 MG/DL (ref 5–25)
CALCIUM SERPL-MCNC: 10.2 MG/DL (ref 8.4–10.2)
CHLORIDE SERPL-SCNC: 102 MMOL/L (ref 96–108)
CO2 SERPL-SCNC: 25 MMOL/L (ref 21–32)
CREAT SERPL-MCNC: 1.26 MG/DL (ref 0.7–1.5)
EOSINOPHIL # BLD AUTO: 0.15 THOUSAND/ÂΜL (ref 0–0.61)
EOSINOPHIL NFR BLD AUTO: 2 % (ref 0–6)
ERYTHROCYTE [DISTWIDTH] IN BLOOD BY AUTOMATED COUNT: 13.4 % (ref 11.6–15.1)
GFR SERPL CREATININE-BSD FRML MDRD: 57 ML/MIN/1.73SQ M
GLUCOSE SERPL-MCNC: 91 MG/DL (ref 70–99)
HCT VFR BLD AUTO: 38.7 % (ref 36.5–49.3)
HGB BLD-MCNC: 12.6 G/DL (ref 12–17)
IMM GRANULOCYTES # BLD AUTO: 0.01 THOUSAND/UL (ref 0–0.2)
IMM GRANULOCYTES NFR BLD AUTO: 0 % (ref 0–2)
LYMPHOCYTES # BLD AUTO: 1.37 THOUSANDS/ÂΜL (ref 0.6–4.47)
LYMPHOCYTES NFR BLD AUTO: 20 % (ref 14–44)
MAGNESIUM SERPL-MCNC: 1.6 MG/DL (ref 1.6–2.3)
MCH RBC QN AUTO: 31.8 PG (ref 26.8–34.3)
MCHC RBC AUTO-ENTMCNC: 32.6 G/DL (ref 31.4–37.4)
MCV RBC AUTO: 98 FL (ref 82–98)
MONOCYTES # BLD AUTO: 0.4 THOUSAND/ÂΜL (ref 0.17–1.22)
MONOCYTES NFR BLD AUTO: 6 % (ref 4–12)
NEUTROPHILS # BLD AUTO: 5.09 THOUSANDS/ÂΜL (ref 1.85–7.62)
NEUTS SEG NFR BLD AUTO: 72 % (ref 43–75)
NRBC BLD AUTO-RTO: 0 /100 WBCS
PLATELET # BLD AUTO: 285 THOUSANDS/UL (ref 149–390)
PMV BLD AUTO: 10.6 FL (ref 8.9–12.7)
POTASSIUM SERPL-SCNC: 4.7 MMOL/L (ref 3.5–5.3)
PROT SERPL-MCNC: 8.5 G/DL (ref 6.4–8.4)
RBC # BLD AUTO: 3.96 MILLION/UL (ref 3.88–5.62)
SODIUM SERPL-SCNC: 137 MMOL/L (ref 135–147)
WBC # BLD AUTO: 7.04 THOUSAND/UL (ref 4.31–10.16)

## 2023-01-26 RX ADMIN — SODIUM CHLORIDE 1000 ML: 0.9 INJECTION, SOLUTION INTRAVENOUS at 16:26

## 2023-01-26 RX ADMIN — IOHEXOL 100 ML: 350 INJECTION, SOLUTION INTRAVENOUS at 17:01

## 2023-01-26 NOTE — Clinical Note
Felisha Ospina was seen and treated in our emergency department on 1/26/2023  Diagnosis:     Zigmund Breeze    He may return on this date: If you have any questions or concerns, please don't hesitate to call        Petr Sigala MD    ______________________________           _______________          _______________  Hospital Representative                              Date                                Time

## 2023-01-26 NOTE — PROGRESS NOTES
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  Chief Complaint   Patient presents with   • New Patient Visit   • Comprehensive Exam     New Patient Exam    ASA  II  Pain:  )  Reviewed M/DH;   Pt has cancer in his lymph nodes and needs med clearance for radiation therapy  --- Pt has a regular dental office he usually goes to but has not been there since 2020 due to [place]  He stated that office was not able to get him in till 2/8/23 but his doctor wanted him to get clearance sooner than that so he could start his radiation therapy  Exams:  Comprehensive exam and perio charted  Xrays:     PAN/   4BWX  Type of Treatment:     Reviewed OHI  Brush:  2X/day and Floss 1X/day  EO/OCS Exams:  No significant findings  IO: No significant findings  Oral Hygiene:   Fair    Plaque:  Light    Calculus:  Light  / Moderate   Bleeding:  Slight    Gingiva:  Pink  / Firm    Stain:  Light  / Moderate   Perio Charting:  Periocharting was completed and evaluated     1-4 mm w/slight BUP    Perio Findings:  Stage 1-2, Grade A  Caries Findings:   25 - F (V), 27 - F (V), 28 - MOD  Caries Risk Assessment:   Moderate caries risk    Treatment Plan:  Updated    Dr  Exam:  Dr Jorge Pittman  Referral:  No referral given  NV:  No appt unless patient chooses to return here for care

## 2023-01-26 NOTE — ED NOTES
Pt OOB to bathroom with steady gait  Hat placed on toilet to collect stool sample as ordered        Rianna Robert RN  01/26/23 [id number]

## 2023-01-26 NOTE — PROGRESS NOTES
Contraceptive Management  Here for Depo Provera #2 injection, #1 injection given 10/13/21. She is in window of opportunity.   Doing well and is happy with Depo Provera. Does report BTB since 10 weeks after administration.    Depo Provera 150 mg given IM to right deltoid. See MAR. Tolerated well with no issues.    1. Reinforced risks, benefits, and common side effects along with danger signs of use.  2.  F/U in 11 weeks for #3 injection. [place] interval between may decrease BTB.       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

## 2023-01-27 LAB
ATRIAL RATE: 66 BPM
CAMPYLOBACTER DNA SPEC NAA+PROBE: NORMAL
P AXIS: 77 DEGREES
PR INTERVAL: 176 MS
QRS AXIS: 51 DEGREES
QRSD INTERVAL: 78 MS
QT INTERVAL: 416 MS
QTC INTERVAL: 436 MS
SALMONELLA DNA SPEC QL NAA+PROBE: NORMAL
SHIGA TOXIN STX GENE SPEC NAA+PROBE: NORMAL
SHIGELLA DNA SPEC QL NAA+PROBE: NORMAL
T WAVE AXIS: 61 DEGREES
VENTRICULAR RATE: 66 BPM

## 2023-01-27 NOTE — ED PROVIDER NOTES
History  Chief Complaint   Patient presents with   • Diarrhea     Loose stool x2 weeks; advised by PCP to come to ED to r/o dehydration  NO vomiting or abd pain  Patient is a 27-year-old male who comes in for evaluation of 3 weeks of loose stools/diarrhea  Patient has a history of C  difficile, and states that the smell is similar  Patient is in no acute distress, denies abdominal pain, does state he feels fatigued  Patient does have a history of cancer, metastatic, that he is being further evaluated for with a CT next week  Patient stated started after recent colonoscopy      History provided by:  Patient   used: No    Diarrhea  Quality:  Semi-solid  Severity:  Mild  Number of episodes:  8-10  Duration:  3 weeks  Associated symptoms: no abdominal pain, no headaches and no vomiting        Prior to Admission Medications   Prescriptions Last Dose Informant Patient Reported? Taking? amLODIPine (NORVASC) 10 mg tablet   No No   Sig: Take 1 tablet (10 mg total) by mouth daily   fenofibrate 160 MG tablet   No No   Sig: Take 1 tablet (160 mg total) by mouth daily   lansoprazole (PREVACID) 30 mg capsule   No No   Sig: Take 1 capsule (30 mg total) by mouth daily   levothyroxine 25 mcg tablet   No No   Sig: Take 0 5 tablets (12 5 mcg total) by mouth daily   losartan (COZAAR) 50 mg tablet   No No   Sig: Take 1 tablet (50 mg total) by mouth daily   magnesium Oxide (MAG-OX) 400 mg TABS   No No   Sig: Take 1 tablet (400 mg total) by mouth 2 (two) times a day   metoprolol succinate (TOPROL-XL) 50 mg 24 hr tablet   No No   Sig: Take 0 5 tablets (25 mg total) by mouth daily   pancrelipase, Lip-Prot-Amyl, (Creon) 12,000 units capsule   No No   Sig: Take 12,000 units of lipase by mouth 3 (three) times a day with meals   rosuvastatin (CRESTOR) 10 MG tablet   No No   Sig: Take 1 tablet (10 mg total) by mouth daily Please STOP Vytorin  Gwenette Carrier    spironolactone (ALDACTONE) 25 mg tablet   No No   Sig: Take 0 5 tablets (12 5 mg total) by mouth daily   Patient not taking: Reported on 1/26/2023   thiamine 100 MG tablet   No No   Sig: Take 1 tablet (100 mg total) by mouth daily   zolpidem (AMBIEN) 10 mg tablet   Yes No   Sig: Take by mouth daily at bedtime as needed       Facility-Administered Medications: None       Past Medical History:   Diagnosis Date   • A-fib [facility])    • Arthritis    • Avascular necrosis of bone of hip, left (HCC)     replaced   • Back pain    • CAD (coronary artery disease)    • Carotid artery narrowing     left side -for endarterectomy today 3/5/2020   • Disease of thyroid gland     hypo   • GERD (gastroesophageal reflux disease)    • History of Clostridioides difficile infection    • Hyperlipidemia    • Hypertension    • Irregular heart beat    • Jejunitis    • Kidney stone    • Metastatic cancer [facility])    • Neck pain    • Pancreatitis    • Spinal stenosis    • Wears glasses        Past Surgical History:   Procedure Laterality Date   • BACK SURGERY     • CARDIAC CATHETERIZATION      cardiac cath 5/2010  adjusted meds   • CATARACT EXTRACTION Bilateral    • CERVICAL FUSION     • CHOLANGIOGRAM N/A 03/04/2021    Procedure: CHOLANGIOGRAM;  Surgeon: Juan Villarreal MD;  Location: [address] [place] [address] MAIN OR;  Service: General   • CHOLECYSTECTOMY     • CHOLECYSTECTOMY LAPAROSCOPIC N/A 03/04/2021    Procedure: CHOLECYSTECTOMY LAPAROSCOPIC;  Surgeon: Juan Villarreal MD;  Location: [address] [place] [address] MAIN OR;  Service: General   • COLONOSCOPY     • JOINT REPLACEMENT Left     hip   • LUMBAR FUSION     • NECK SURGERY     • ORTHOPEDIC SURGERY Left     L THR   • CA LARYNGOSCOPY W/BIOPSY MICROSCOPE/TELESCOPE Left 6/1/2022    Procedure: MICRO DIRECT LARYNGOSCOPY WITH BIOPSY, BIOPSY LEFT TONSIL;  Surgeon: Viki Fonseca MD;  Location:  MAIN OR;  Service: ENT   • CA TEAEC W/PATCH GRF CAROTID VERTB SUBCLAV NECK INC Left 03/05/2020    Procedure: ENDARTERECTOMY ARTERY CAROTID WITH BOVINE PATCH ANGIOPLASTY AND INTRAOP DUPLEX;  Surgeon: Kris Boo MD; Location: [facility] Main OR;  Service: Vascular   • SPINAL FUSION     • US GUIDED LYMPH NODE BIOPSY LEFT  10/3/2022       Family History   Problem Relation Age of Onset   • Heart disease Mother    • Parkinsonism Mother    • Heart disease Father      I have reviewed and agree with the history as documented  E-Cigarette/Vaping   • E-Cigarette Use Never User      E-Cigarette/Vaping Substances   • Nicotine No    • THC No    • CBD No    • Flavoring No    • Other No    • Unknown No      Social History     Tobacco Use   • Smoking status: Every Day     Packs/day: 0 25     Years: 50 00     Pack years: 12 50     Types: Cigarettes   • Smokeless tobacco: Never   • Tobacco comments:     Half pack per day   Vaping Use   • Vaping Use: Never used   Substance Use Topics   • Alcohol use: Not Currently     Comment: 3 oz of wine with communion multiple times/day/week   • Drug use: Never       Review of Systems   Respiratory: Negative for chest tightness and shortness of breath  Cardiovascular: Negative for chest pain  Gastrointestinal: Positive for diarrhea  Negative for abdominal pain and vomiting  Neurological: Negative for headaches  Physical Exam  Physical Exam  Vitals reviewed  Constitutional:       Appearance: Normal appearance  He is normal weight  HENT:      Head: Normocephalic and atraumatic  Right Ear: External ear normal       Left Ear: External ear normal       Nose: Nose normal    Eyes:      Conjunctiva/sclera: Conjunctivae normal    Cardiovascular:      Rate and Rhythm: Normal rate  Pulmonary:      Effort: Pulmonary effort is normal    Musculoskeletal:         General: Normal range of motion  Cervical back: Normal range of motion  Skin:     General: Skin is warm and dry  Neurological:      Mental Status: He is alert           Vital Signs  ED Triage Vitals [01/26/23 1453]   Temperature Pulse Respirations Blood Pressure SpO2   (!) 97 2 °F (36 2 °C) 82 18 126/75 95 %      Temp Source Heart Rate Source Patient Position - Orthostatic VS BP Location FiO2 (%)   Tympanic Monitor Sitting Left arm --      Pain Score       --           Vitals:    01/26/23 1453 01/26/23 1630 01/26/23 1834   BP: 126/75 118/66    Pulse: 82 78 74   Patient Position - Orthostatic VS: Sitting Lying          Visual Acuity      ED Medications  Medications   sodium chloride 0 9 % bolus 1,000 mL (0 mL Intravenous Stopped 1/26/23 1820)   iohexol (OMNIPAQUE) 350 MG/ML injection (SINGLE-DOSE) 100 mL (100 mL Intravenous Given 1/26/23 1701)       Diagnostic Studies  Results Reviewed     Procedure Component Value Units Date/Time    Stool Enteric Bacterial Panel by PCR [[id number]] Collected: 01/26/23 1834    Lab Status:  In process Specimen: Stool from Rectum Updated: 01/26/23 1840    Magnesium [[id number]]  (Normal) Collected: 01/26/23 1533    Lab Status: Final result Specimen: Blood from Arm, Right Updated: 01/26/23 1553     Magnesium 1 6 mg/dL     Narrative:      Hemolysis    Comprehensive metabolic panel [[id number]]  (Abnormal) Collected: 01/26/23 1533    Lab Status: Final result Specimen: Blood from Arm, Right Updated: 01/26/23 1553     Sodium 137 mmol/L      Potassium 4 7 mmol/L      Chloride 102 mmol/L      CO2 25 mmol/L      ANION GAP 10 mmol/L      BUN 22 mg/dL      Creatinine 1 26 mg/dL      Glucose 91 mg/dL      Calcium 10 2 mg/dL      AST 39 U/L      ALT 20 U/L      Alkaline Phosphatase 50 U/L      Total Protein 8 5 g/dL      Albumin 4 6 g/dL      Total Bilirubin 0 55 mg/dL      eGFR 57 ml/min/1 73sq m     Narrative:      Hemolysis  National Kidney Disease Foundation guidelines for Chronic Kidney Disease (CKD):   •  Stage 1 with normal or high GFR (GFR > 90 mL/min/1 73 square meters)  •  Stage 2 Mild CKD (GFR = 60-89 mL/min/1 73 square meters)  •  Stage 3A Moderate CKD (GFR = 45-59 mL/min/1 73 square meters)  •  Stage 3B Moderate CKD (GFR = 30-44 mL/min/1 73 square meters)  •  Stage 4 Severe CKD (GFR = 15-29 mL/min/1 73 square meters)  • Stage 5 End Stage CKD (GFR <15 mL/min/1 73 square meters)  Note: GFR calculation is accurate only with a steady state creatinine    CBC and differential [[id number]] Collected: 01/26/23 1533    Lab Status: Final result Specimen: Blood from Arm, Right Updated: 01/26/23 1542     WBC 7 04 Thousand/uL      RBC 3 96 Million/uL      Hemoglobin 12 6 g/dL      Hematocrit 38 7 %      MCV 98 fL      MCH 31 8 pg      MCHC 32 6 g/dL      RDW 13 4 %      MPV 10 6 fL      Platelets 966 Thousands/uL      nRBC 0 /100 WBCs      Neutrophils Relative 72 %      Immat GRANS % 0 %      Lymphocytes Relative 20 %      Monocytes Relative 6 %      Eosinophils Relative 2 %      Basophils Relative 0 %      Neutrophils Absolute 5 09 Thousands/µL      Immature Grans Absolute 0 01 Thousand/uL      Lymphocytes Absolute 1 37 Thousands/µL      Monocytes Absolute 0 40 Thousand/µL      Eosinophils Absolute 0 15 Thousand/µL      Basophils Absolute 0 02 Thousands/µL                  CT abdomen pelvis with contrast   Final Result by Jhoan Carlisle MD (01/26 1816)      Suspect enterocolitis  Colonic diverticulosis  Previously noted perisigmoidal /sigmoid mesentery 1 cm lymph node previously appearing necrotic now appears solid  Correlate clinically with patient's history of neoplasm as this could represent a metastatic lymph node  Fat-containing left inguinal hernia which appears to now containing a new indeterminate 1 2 cm structure could represent a lymph node, previously not visualized and limited evaluation on this exam due to streak artifact from left hip hardware  Correlate    clinically with patient's history of neoplasm as this could represent a metastatic lymph node  Other chronic findings as above              Workstation performed: [id number]                    Procedures  Procedures         ED Course  ED Course as of 01/26/23 1946   Thu Jan 26, 2023 1819 CT abdomen pelvis with contrast  Suspect enterocolitis      Colonic diverticulosis      Previously noted perisigmoidal /sigmoid mesentery 1 cm lymph node previously appearing necrotic now appears solid  Correlate clinically with patient's history of neoplasm as this could represent a metastatic lymph node      Fat-containing left inguinal hernia which appears to now containing a new indeterminate 1 2 cm structure could represent a lymph node, previously not visualized and limited evaluation on this exam due to streak artifact from left hip hardware  Correlate   clinically with patient's history of neoplasm as this could represent a metastatic lymph node      Other chronic findings as above                                   SBIRT 20yo+    Flowsheet Row Most Recent Value   SBIRT (25 yo +)    In order to provide better care to our patients, we are screening all of our patients for alcohol and drug use  Would it be okay to ask you these screening questions? No Filed at: 01/26/2023 1544                    Medical Decision Making  Patient is a 70-year-old male coming in for evaluation of diarrhea for the last 3 weeks  Patient does have a history of C  difficile, will treat at this time  Additional potential meta stasis lymph nodes were seen on CT, explained this to patient, and recommend that he follow-up with his oncologist    Diarrhea of presumed infectious origin: complicated acute illness or injury  Amount and/or Complexity of Data Reviewed  Labs: ordered  Radiology: ordered  Decision-making details documented in ED Course  Risk  Prescription drug management            Disposition  Final diagnoses:   Diarrhea of presumed infectious origin     Time reflects when diagnosis was documented in both MDM as applicable and the Disposition within this note     Time User Action Codes Description Comment    1/26/2023  6:27 PM Sarah Manzano Add [R19 7] Diarrhea of presumed infectious origin       ED Disposition     ED Disposition   Discharge    Condition   --    Date/Time   Thu Jan 26, 2023  7:25 PM    Comment   Nghia Winston discharge to home/self care  Follow-up Information     Follow up With Specialties Details Why Contact Info Additional Isabel Prabhakar MD Internal Medicine   [address]  [phone]       [facility] Emergency Department Emergency Medicine  As needed, If symptoms worsen [address] [email or web address] [id number] [address] [facility] Emergency Department          Discharge Medication List as of 1/26/2023  6:29 PM      START taking these medications    Details   fidaxomicin (DIFICID) tablet Take 1 tablet (200 mg total) by mouth 2 (two) times a day for 10 days, Starting Thu 1/26/2023, Until Sun 2/5/2023, Normal         CONTINUE these medications which have NOT CHANGED    Details   amLODIPine (NORVASC) 10 mg tablet Take 1 tablet (10 mg total) by mouth daily, Starting Tue 1/3/2023, Normal      fenofibrate 160 MG tablet Take 1 tablet (160 mg total) by mouth daily, Starting Tue 1/3/2023, Normal      lansoprazole (PREVACID) 30 mg capsule Take 1 capsule (30 mg total) by mouth daily, Starting Tue 1/3/2023, Normal      levothyroxine 25 mcg tablet Take 0 5 tablets (12 5 mcg total) by mouth daily, Starting Tue 1/3/2023, Until Mon 4/3/2023, Normal      losartan (COZAAR) 50 mg tablet Take 1 tablet (50 mg total) by mouth daily, Starting Tue 1/3/2023, Normal      magnesium Oxide (MAG-OX) 400 mg TABS Take 1 tablet (400 mg total) by mouth 2 (two) times a day, Starting Tue 1/3/2023, Normal      metoprolol succinate (TOPROL-XL) 50 mg 24 hr tablet Take 0 5 tablets (25 mg total) by mouth daily, Starting Tue 1/3/2023, Normal      pancrelipase, Lip-Prot-Amyl, (Creon) 12,000 units capsule Take 12,000 units of lipase by mouth 3 (three) times a day with meals, Starting Tue 1/3/2023, Normal      rosuvastatin (CRESTOR) 10 MG tablet Take 1 tablet (10 mg total) by mouth daily Please STOP Vytorin   , Starting Tue 1/3/2023, Normal      spironolactone (ALDACTONE) 25 mg tablet Take 0 5 tablets (12 5 mg total) by mouth daily, Starting Tue 1/3/2023, Normal      zolpidem (AMBIEN) 10 mg tablet Take by mouth daily at bedtime as needed , Starting Fri 11/22/2019, Historical Med      thiamine 100 MG tablet Take 1 tablet (100 mg total) by mouth daily, Starting Tue 1/3/2023, Normal             Outpatient Discharge Orders   Clostridium difficile toxin by PCR with EIA   Standing Status: Future Standing Exp   Date: 01/26/24       PDMP Review       Value Time User    PDMP Reviewed  Yes 4/8/2021  2:16 PM Maximino Delgadillo MD          ED Provider  Electronically Signed by           Evelyn Nieto PA-C  01/26/23 1946

## 2023-01-27 NOTE — PROGRESS NOTES
Beto English, thank you! I am copying Jimmy Peace and Dr Augustine Rodriguez as well so they are aware  Dr Augustine Rdoriguez, can he move forward with scheduling sim? Melolabial Interpolation Flap Text: A decision was made to reconstruct the defect utilizing an interpolation axial flap and a staged reconstruction.  A telfa template was made of the defect.  This telfa template was then used to outline the melolabial interpolation flap.  The donor area for the pedicle flap was then injected with anesthesia.  The flap was excised through the skin and subcutaneous tissue down to the layer of the underlying musculature.  The pedicle flap was carefully excised within this deep plane to maintain its blood supply.  The edges of the donor site were undermined.   The donor site was closed in a primary fashion.  The pedicle was then rotated into position and sutured.  Once the tube was sutured into place, adequate blood supply was confirmed with blanching and refill.  The pedicle was then wrapped with xeroform gauze and dressed appropriately with a telfa and gauze bandage to ensure continued blood supply and protect the attached pedicle.

## 2023-01-30 ENCOUNTER — DOCUMENTATION (OUTPATIENT)
Dept: HEMATOLOGY ONCOLOGY | Facility: CLINIC | Age: 73
End: 2023-01-30

## 2023-01-30 ENCOUNTER — TELEPHONE (OUTPATIENT)
Dept: HEMATOLOGY ONCOLOGY | Facility: CLINIC | Age: 73
End: 2023-01-30

## 2023-01-30 NOTE — PROGRESS NOTES
Intake received/ Chart [facility]    Pathology completed: 10/03/22, 06/01/22    Imaging completed:PET/CT 10/25/22, CT CAP 01/27/23, MRI abd 10/12/22, US H+N 7/22/22    All records needed are in patients chart  No records retrieval needed at this time  Noted Pt schedule to consult Dr Yariel Gaming in medical oncology 02/02/23 for new findings on CT  Onc Care Coordinator will FU with Father Ezra Woodruff after this consultation

## 2023-01-30 NOTE — TELEPHONE ENCOUNTER
Appointment Confirmation (to confirm pre existing appointments - ONLY)  No need to route   Who is calling to confirm? self   If someone other than patient is calling, are they listed on the communication consent form?  self   Appointment with Chanelle Alatorre 2/2 9Am   Appointment date & time 2/2   Appointment location [facility]   Patient verbilized understanding yes

## 2023-02-01 ENCOUNTER — RADIATION THERAPY TREATMENT (OUTPATIENT)
Dept: RADIATION ONCOLOGY | Facility: HOSPITAL | Age: 73
End: 2023-02-01
Attending: INTERNAL MEDICINE

## 2023-02-01 NOTE — PROGRESS NOTES
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 active tobacco use and with hx of afib, HLD, HTN, and C diff infections who is referred to oncology for initial consultation for recently diagnosed tonsillar neoplasm with concern for mets to cervical lymph nodes  Patient had noticed progressively worsening voice hoarseness x 1 year  CT soft tissue neck with contrast from April 2022 revealed a 1 2 cm left tonsillar necrotic lesion, suspicious for primary tonsillar neoplasm, a 1 5 cm enhancing nodular tissue in left glossotonsillar sulcus, suspicious for concurrent primary tongue neoplasm, and new 0 9 cm left level 2A necrotic lymph node, suspicious for necrotic samson metastasis  Patient underwent FNA of the left neck mass initially on 10/3/2022, which resulted positive for malignancy, however it was poorly differentiated carcinoma  IHC stainings were positive for cytokeratin AE1/AE3 and negative for CK7, CK20, CK5/6, p40, p16, NKX3 1, synpatophysin, CD 56, CD3, and CD 20  Of note, the markers for squamous differentiation (p40 and CK5/6) were negative  Afterwards, he underwent left neck dissection with removal of 46 lymph nodes on 11/28/2022 at [facility]   Pathology was notable for one positive LN from left neck level 2A, which was 3 5 cm, metastatic poorly differentiated carcinoma, with IHC staining suggesting a lower GI/anorectal original (staining was positive for Cam5 2, CDX2, SATB2 and focally for p40, CK5/6 and CK7 (rare cells);they are negative for CK20, p16, LITO, synaptophysin, chromogranin, TTF1 and NKX3  1)  Of note, both EGD and colonoscopy, performed in December 2022,  didn't show any findings concerning for GI malignancy  Patient has met with radiation/oncology team  Given positive LN status, adjuvant RT ws recommended  Recent CT abd findings of solid mesenteric lymph node noted, however there is very low clinical suspicion for GI being primary and favor the lymph node enlargement being 2/2 recent Cdiff infection  With patient's extensive smoking history, clinically favor this being a oral/tonsillar cancer as the primary  We will order a PET scan to properly stage the patient (last one was over 3 months ago) and to determine whether there is cancer involvement of the abdominal/pelvic lymph nodes  PET scan has been scheduled for 2/13/23  We will plan for a follow-up office appointment on 2/16/2023 to discuss about PET scan findings and treatment recommendations  Patient may need chemo in addition to XRT depending on the PET scan findings  Labs and imaging studies are reviewed by ordering provider once results are available  If there are findings that need immediate attention, you will be contacted when results available  Discussing results and the implication on your healthcare is best discussed in person at your follow-up visit  HPI:      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 with hx of CAD, HTN, HLD, and GERD with recent diagnosis of tonsillar neoplasm  For the past one year, patient was noticing voice changes that was progressively worsening, associated with oral pain with eating  Denies any associated trouble swallowing, weight loss, lymphadenopathy, or neck tenderness  Patient had a CT soft tissue neck with contrast in April 2022, which revealed a 1 2 cm left tonsillar necrotic lesion, suspicious for primary tonsillar neoplasm, a 1 5 cm enhancing nodular tissue in left glossotonsillar sulcus, suspicious for concurrent primary tongue neoplasm, and new 0 9 cm left level 2A necrotic lymph node, suspicious for necrotic samson metastasis  Findings were suggestive of right vocal cord paralysis   Patient underwent ultrasound guided left neck mass biopsy on October 3, 2022, which resulted positive for malignancy (metastatic carcinoma, but could not further clarify)  Patient saw Dr Domi Grant, ENT physician at [facility], who performed a cervical lymph node dissection on 11/2822, pathology reported metastatic poorly differentiated carcinoma with 1/46 lymph nodes being positive for malignancy  Pathology stains favored lower GI/anorectal origin  Patient underwent both EGD and colonoscopy in December 2022  EGD showed nonobstructing Schatzki ring in the GE junction, which was dilated with a balloon dilator (post dilation mucosal tears were superficial), 2 cm hiatal hernia (esophagus, stomach, and duodenum were normal)  Colonoscopy showed diverticulosis and hemorrhoids, but otherwise unremarkable colonoscopy to the terminal ileum  Patient had a  CT abdomen and pelvis with contrast on 1/26/2023 given episodes of persistent diarrhea x3 weeks with a background of recent cancer diagnosis, it reported a perisigmoidal/sigmoidal mesenteric 1 cm lymph node, previously appearing necrotic, now appearing solid, raising concern for possible metastatic lymph node  Of note, patient with recent Cdiff infection, symptomatic with frequent watery diarrheal episodes x 3 weeks, although symptoms have been gradually improving with abx  Patient did undergo evaluation with laryngoscopy on 1/12/23 which showed intact vocal fold motion, however it did show blaine's edema (R>L), which was deemed 2/2 his smoking history  Given single node without extranodal extension, Dr Domi Grant had recommended foregoing radiation therapy with very close observation versus proceeding with adjuvant radiation therapy  Patient has been seeing Dr Gene Clinton from radiation oncology    Social History: continues to smoke 1/2 pack a day for almost 55 years now  Denies EtOH or drug use        Test Results:    Imaging: CT abdomen pelvis with contrast    Result Date: 1/26/2023  Narrative: CT ABDOMEN AND PELVIS WITH IV CONTRAST INDICATION:   Diarrhea diarrhea x3 weeks, hx of cancer  COMPARISON:  10/12/2022 and 10/25/2022 and 4/1/2021 TECHNIQUE:  CT examination of the abdomen and pelvis was performed  Axial, sagittal, and coronal 2D reformatted images were created from the source data and submitted for interpretation  Radiation dose length product (DLP) for this visit:  387 mGy-cm   This examination, like all CT scans performed in the [facility], was performed utilizing techniques to minimize radiation dose exposure, including the use of iterative reconstruction and automated exposure control  IV Contrast:  100 mL of iohexol (OMNIPAQUE) Enteric Contrast:  Enteric contrast was not administered  FINDINGS: ABDOMEN LOWER CHEST:  No clinically significant abnormality identified in the visualized lower chest  LIVER/BILIARY TREE:  Unremarkable  GALLBLADDER:  Gallbladder is surgically absent  SPLEEN:  Unremarkable  PANCREAS:  Unremarkable  ADRENAL GLANDS:  Unremarkable  KIDNEYS/URETERS:  No hydronephrosis  Left anterior mid pole 3 mm renal nonobstructing calculus  One or more sharply circumscribed subcentimeter renal hypodensities are present, too small to accurately characterize, and statistically most likely benign findings  According to recent literature (Radiology 2019) no further workup of these findings is recommended  STOMACH AND BOWEL:  Fluid-filled colon could relate to enterocolitis  APPENDIX:  No findings to suggest appendicitis  ABDOMINOPELVIC CAVITY:  No ascites  No pneumoperitoneum  Previously noted perisigmoidal /sigmoid mesentery 1 cm lymph node previously appearing necrotic now appears solid  VESSELS:  Atherosclerotic changes are present  No evidence of aneurysm  PELVIS Left hip hardware  Limited evaluation of the pelvis due to streak artifact  REPRODUCTIVE ORGANS:  Unremarkable for patient's age  URINARY BLADDER:  Unremarkable   ABDOMINAL WALL/INGUINAL REGIONS:  Fat-containing left inguinal hernia which appears to now containing a indeterminate 1 2 cm structure could represent a lymph node, previously not visualized and limited evaluation on this exam due to streak artifact from  left hip hardware  OSSEOUS STRUCTURES:  No acute fracture or destructive osseous lesion  Impression: Suspect enterocolitis  Colonic diverticulosis  Previously noted perisigmoidal /sigmoid mesentery 1 cm lymph node previously appearing necrotic now appears solid  Correlate clinically with patient's history of neoplasm as this could represent a metastatic lymph node  Fat-containing left inguinal hernia which appears to now containing a new indeterminate 1 2 cm structure could represent a lymph node, previously not visualized and limited evaluation on this exam due to streak artifact from left hip hardware  Correlate clinically with patient's history of neoplasm as this could represent a metastatic lymph node  Other chronic findings as above  Workstation performed: [id number]       Labs:   Lab Results   Component Value Date    WBC 7 04 01/26/2023    HGB 12 6 01/26/2023    HCT 38 7 01/26/2023    MCV 98 01/26/2023     01/26/2023     Lab Results   Component Value Date    K 4 7 01/26/2023     01/26/2023    CO2 25 01/26/2023    BUN 22 01/26/2023    CREATININE 1 26 01/26/2023    GLUF 98 11/22/2022    CALCIUM 10 2 01/26/2023    CORRECTEDCA 9 7 03/06/2021    AST 39 01/26/2023    ALT 20 01/26/2023    ALKPHOS 50 01/26/2023    EGFR 57 01/26/2023       Lab Results   Component Value Date    FERRITIN 111 01/26/2021       Lab Results   Component Value Date    MLETXQEZ00 1,815 (H) 05/24/2022     Pathology Report from Left neck mass biopsy in October 2022: The sample shows clusters of atypical epithelioid cells with pleomorphism, cellular crowding, condensed chromatin, and occasional crush artifact with no significant glandular or keratin formation    Immunohistochemical stains performed with appropriate controls show the atypical cells to be positive for keratin AE1/3 which are negative for CK7, CK20, CK5/6, TTF1, p40, p16, NXK3 1, synaptophysin, CD56, CD3, and CD20 with patchy elevated Ki-67 proliferative activity  A portion of the sample was submitted for flow cytometric analysis ([id number]) which shows no evidence of B-cell or T-cell lymphoma in a low yield sample      Overall, the combination of findings is compatible with malignancy with features consistent with metastatic carcinoma, though a definitive classification is not possible as the findings are nonspecific  Morphologically, there appears to be features of neuroendocrine differentiation which may raise the possibility of small-cell or large cell neuroendocrine carcinoma, though the staining pattern does not definitively support this  Correlation with clinical impression with consideration for more extensive tissue excision is recommended as clinically indicated  Pathology Report from Tonsillar mass and Lymph node biopsy in November 2022 (from [facility] records):     Diagnosis     A  TONSIL, LEFT INFERIOR TONSIL (BIOPSY): FRAGMENT OF UNREMARKABLE TONSIL        B  LARYNX, RIGHT POSTERIOR VOCAL CORD (BIOPSY): HYPERKERATOTIC SQUAMOUS EPITHELIUM, NEGATIVE FOR MALIGNANCY         C  LEFT NECK LEVEL 2B (LYMPH NODE DISSECTION): 11 LYMPH NODES, NEGATIVE FOR METASTATIC CARCINOMA (0/11)        D  LEFT NECK LEVEL 2A (LYMPH NODE DISSECTION): METASTATIC POORLY DIFFERENTIATED CARCINOMA IN ONE (3 5 CM) OF 8 LYMPH NODES, NEGATIVE FOR EXTRANODAL EXTENSION (1/8)  SEE COMMENT         E  LEFT NECK LEVEL 3 (LYMPH NODE DISSECTION): 14 LYMPH NODES, NEGATIVE FOR METASTATIC CARCINOMA (0/14)        F  LEFT NECK LEVEL 4 (LYMPH NODE DISSECTION): 10 LYMPH NODES, NEGATIVE FOR METASTATIC CARCINOMA (0/10)        G  LEFT NECK LEVEL 1B (LYMPH NODE DISSECTION): 3 LYMPH NODES, NEGATIVE FOR METASTATIC CARCINOMA (0/3)         D  Immunostains for MUC2 (marker of GI differentiation ) and GATA3 are negative  Negative GATA3 rules out urothelial and salivary gland tumors  Multiple immunostains performed on block D4 show that the tumor cells are positive for Cam5 2, CDX2, SATB2 and focally for p40, CK5/6 and CK7 (rare cells)  They are negative for CK20, p16, LITO, synaptophysin, chromogranin, TTF1 and NKX3 1  This immunoprofile suggests a lower GI/anorectal origin       ROS: Review of Systems   Constitutional: Negative for appetite change, chills, fatigue, fever and unexpected weight change  HENT:   Positive for voice change (over past one year)  Negative for hearing loss, mouth sores, nosebleeds and sore throat  Oral pain with eating   Eyes: Negative for eye problems and icterus  Respiratory: Negative for chest tightness, cough, shortness of breath and wheezing  Cardiovascular: Negative for chest pain and palpitations  Gastrointestinal: Positive for diarrhea (for 3 weeks, tested positive for CDiff infection)  Negative for abdominal distention, abdominal pain, blood in stool, constipation, nausea and vomiting  Endocrine: Negative for hot flashes  Genitourinary: Negative for difficulty urinating, dyspareunia, dysuria, frequency and hematuria  Musculoskeletal: Negative for back pain, flank pain, gait problem, myalgias and neck pain  Skin: Negative for itching and rash  Neurological: Negative for dizziness, gait problem, headaches, light-headedness and speech difficulty  Hematological: Negative for adenopathy  Does not bruise/bleed easily  Psychiatric/Behavioral: Negative for confusion, depression and sleep disturbance  The patient is not nervous/anxious  Current Medications: Reviewed  Allergies: Reviewed  PMH/FH/SH:  Reviewed      Physical Exam:    There is no height or weight on file to calculate BSA      Wt Readings from Last 3 Encounters:   01/26/23 65 8 kg (145 lb 1 6 oz)   01/19/23 65 9 kg (145 lb 3 2 oz)   12/16/22 68 2 kg (150 lb 6 4 oz)        Temp Readings from Last 3 Encounters:   23 (!) 97 2 °F (36 2 °C) (Tympanic)   23 97 7 °F (36 5 °C)   23 99 °F (37 2 °C) (Temporal)        BP Readings from Last 3 Encounters:   23 118/66   23 131/70   23 108/68         Pulse Readings from Last 3 Encounters:   23 74   23 78   23 100        Physical Exam  Vitals reviewed  Constitutional:       General: He is not in acute distress  Appearance: He is not ill-appearing, toxic-appearing or diaphoretic  HENT:      Head: Normocephalic and atraumatic  Nose: No congestion  Mouth/Throat:      Mouth: Mucous membranes are moist       Pharynx: No oropharyngeal exudate or posterior oropharyngeal erythema  Eyes:      Extraocular Movements: Extraocular movements intact  Conjunctiva/sclera: Conjunctivae normal    Neck:      Comments: Surgical scar present from left neck biopsy  Cardiovascular:      Rate and Rhythm: Normal rate and regular rhythm  Heart sounds: No murmur heard  No gallop  Pulmonary:      Effort: No respiratory distress  Breath sounds: Normal breath sounds  No wheezing, rhonchi or rales  Abdominal:      General: Bowel sounds are normal  There is no distension  Palpations: Abdomen is soft  There is no mass  Musculoskeletal:         General: No swelling, tenderness or deformity  Cervical back: Normal range of motion  No rigidity  Right lower leg: No edema  Left lower leg: No edema  Skin:     General: Skin is warm and dry  Capillary Refill: Capillary refill takes less than 2 seconds  Findings: No erythema, lesion or rash  Neurological:      General: No focal deficit present  Mental Status: He is alert and oriented to person, place, and time  Psychiatric:         Mood and Affect: Mood normal          Judgment: Judgment normal          ECO    Goals and Barriers:  Current Goal: Minimize effects of disease  Barriers: None  Patient's Capacity to Self Care:  Patient is able to self care

## 2023-02-02 ENCOUNTER — CONSULT (OUTPATIENT)
Dept: HEMATOLOGY ONCOLOGY | Facility: CLINIC | Age: 73
End: 2023-02-02

## 2023-02-02 VITALS
OXYGEN SATURATION: 96 % | TEMPERATURE: 98 F | HEART RATE: 78 BPM | SYSTOLIC BLOOD PRESSURE: 128 MMHG | WEIGHT: 145 LBS | HEIGHT: 63 IN | BODY MASS INDEX: 25.69 KG/M2 | DIASTOLIC BLOOD PRESSURE: 78 MMHG

## 2023-02-02 DIAGNOSIS — C77.0 SECONDARY MALIGNANT NEOPLASM OF LYMPH NODES OF NECK (HCC): Primary | ICD-10-CM

## 2023-02-03 ENCOUNTER — PATIENT OUTREACH (OUTPATIENT)
Dept: HEMATOLOGY ONCOLOGY | Facility: CLINIC | Age: 73
End: 2023-02-03

## 2023-02-03 NOTE — PROGRESS NOTES
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

## 2023-02-06 DIAGNOSIS — R19.7 DIARRHEA, UNSPECIFIED TYPE: Primary | ICD-10-CM

## 2023-02-06 RX ORDER — LACTOBACIL 2/BIFIDO 1/S.THERMO 450B CELL
1 PACKET (EA) ORAL 2 TIMES DAILY
Qty: 60 CAPSULE | Refills: 6 | Status: SHIPPED | OUTPATIENT
Start: 2023-02-06

## 2023-02-06 RX ORDER — VANCOMYCIN HYDROCHLORIDE 125 MG/1
125 CAPSULE ORAL 4 TIMES DAILY
Qty: 28 CAPSULE | Refills: 0 | Status: SHIPPED | OUTPATIENT
Start: 2023-02-06 | End: 2023-02-13

## 2023-02-13 ENCOUNTER — HOSPITAL ENCOUNTER (OUTPATIENT)
Dept: NUCLEAR MEDICINE | Facility: HOSPITAL | Age: 73
Discharge: HOME | End: 2023-02-13
Attending: INTERNAL MEDICINE

## 2023-02-13 DIAGNOSIS — C77.0 SECONDARY MALIGNANT NEOPLASM OF LYMPH NODES OF NECK (HCC): ICD-10-CM

## 2023-02-13 LAB — GLUCOSE SERPL-MCNC: 99 MG/DL (ref 65–140)

## 2023-02-14 ENCOUNTER — TELEPHONE (OUTPATIENT)
Dept: HEMATOLOGY ONCOLOGY | Facility: CLINIC | Age: 73
End: 2023-02-14

## 2023-02-14 NOTE — TELEPHONE ENCOUNTER
Left voicemail for pt letting him know Dr Forbes Pulse requested his 2/16 appt be canceled, as the PET scan was clean  Provided my direct line if he has any questions

## 2023-02-15 ENCOUNTER — TELEPHONE (OUTPATIENT)
Dept: HEMATOLOGY ONCOLOGY | Facility: CLINIC | Age: 73
End: 2023-02-15

## 2023-02-15 ENCOUNTER — TELEPHONE (OUTPATIENT)
Dept: RADIATION ONCOLOGY | Facility: CLINIC | Age: 73
End: 2023-02-15

## 2023-02-15 NOTE — TELEPHONE ENCOUNTER
Patient called in to confirm his appointment on 2/16/23 with Dr Frobes Pulse was still cancelled  Patient stated he received a message regarding his upcoming appointment  I informed patient that his appointment was still cancelled  Patient voiced understanding

## 2023-02-15 NOTE — TELEPHONE ENCOUNTER
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

## 2023-02-20 ENCOUNTER — APPOINTMENT (OUTPATIENT)
Dept: RADIATION ONCOLOGY | Facility: CLINIC | Age: 73
End: 2023-02-20
Attending: INTERNAL MEDICINE

## 2023-03-01 ENCOUNTER — APPOINTMENT (OUTPATIENT)
Dept: RADIATION ONCOLOGY | Facility: CLINIC | Age: 73
End: 2023-03-01
Attending: INTERNAL MEDICINE

## 2023-03-01 ENCOUNTER — TELEPHONE (OUTPATIENT)
Dept: CARDIOLOGY CLINIC | Facility: CLINIC | Age: 73
End: 2023-03-01

## 2023-03-01 DIAGNOSIS — C77.0 MALIGNANT NEOPLASM METASTATIC TO LYMPH NODE OF NECK (HCC): Primary | ICD-10-CM

## 2023-03-02 ENCOUNTER — PATIENT OUTREACH (OUTPATIENT)
Dept: HEMATOLOGY ONCOLOGY | Facility: CLINIC | Age: 73
End: 2023-03-02

## 2023-03-02 ENCOUNTER — APPOINTMENT (OUTPATIENT)
Dept: RADIATION ONCOLOGY | Facility: CLINIC | Age: 73
End: 2023-03-02
Attending: INTERNAL MEDICINE

## 2023-03-02 NOTE — PROGRESS NOTES
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

## 2023-03-03 ENCOUNTER — APPOINTMENT (OUTPATIENT)
Dept: RADIATION ONCOLOGY | Facility: CLINIC | Age: 73
End: 2023-03-03
Attending: INTERNAL MEDICINE

## 2023-03-06 ENCOUNTER — APPOINTMENT (OUTPATIENT)
Dept: RADIATION ONCOLOGY | Facility: CLINIC | Age: 73
End: 2023-03-06
Attending: INTERNAL MEDICINE

## 2023-03-07 ENCOUNTER — APPOINTMENT (OUTPATIENT)
Dept: RADIATION ONCOLOGY | Facility: CLINIC | Age: 73
End: 2023-03-07
Attending: INTERNAL MEDICINE

## 2023-03-08 ENCOUNTER — APPOINTMENT (OUTPATIENT)
Dept: RADIATION ONCOLOGY | Facility: CLINIC | Age: 73
End: 2023-03-08
Attending: INTERNAL MEDICINE

## 2023-03-09 ENCOUNTER — APPOINTMENT (OUTPATIENT)
Dept: RADIATION ONCOLOGY | Facility: CLINIC | Age: 73
End: 2023-03-09

## 2023-03-13 ENCOUNTER — APPOINTMENT (OUTPATIENT)
Dept: RADIATION ONCOLOGY | Facility: CLINIC | Age: 73
End: 2023-03-13
Attending: INTERNAL MEDICINE

## 2023-03-14 ENCOUNTER — APPOINTMENT (OUTPATIENT)
Dept: RADIATION ONCOLOGY | Facility: CLINIC | Age: 73
End: 2023-03-14
Attending: INTERNAL MEDICINE

## 2023-03-15 ENCOUNTER — APPOINTMENT (OUTPATIENT)
Dept: RADIATION ONCOLOGY | Facility: CLINIC | Age: 73
End: 2023-03-15
Attending: INTERNAL MEDICINE

## 2023-03-16 ENCOUNTER — APPOINTMENT (OUTPATIENT)
Dept: RADIATION ONCOLOGY | Facility: CLINIC | Age: 73
End: 2023-03-16
Attending: INTERNAL MEDICINE

## 2023-03-17 ENCOUNTER — APPOINTMENT (OUTPATIENT)
Dept: RADIATION ONCOLOGY | Facility: CLINIC | Age: 73
End: 2023-03-17
Attending: INTERNAL MEDICINE

## 2023-03-20 ENCOUNTER — APPOINTMENT (OUTPATIENT)
Dept: RADIATION ONCOLOGY | Facility: CLINIC | Age: 73
End: 2023-03-20
Attending: INTERNAL MEDICINE

## 2023-03-21 ENCOUNTER — APPOINTMENT (OUTPATIENT)
Dept: RADIATION ONCOLOGY | Facility: CLINIC | Age: 73
End: 2023-03-21
Attending: INTERNAL MEDICINE

## 2023-03-22 ENCOUNTER — APPOINTMENT (OUTPATIENT)
Dept: RADIATION ONCOLOGY | Facility: CLINIC | Age: 73
End: 2023-03-22

## 2023-03-22 ENCOUNTER — APPOINTMENT (OUTPATIENT)
Dept: RADIATION ONCOLOGY | Facility: CLINIC | Age: 73
End: 2023-03-22
Attending: INTERNAL MEDICINE

## 2023-03-23 ENCOUNTER — APPOINTMENT (OUTPATIENT)
Dept: RADIATION ONCOLOGY | Facility: CLINIC | Age: 73
End: 2023-03-23
Attending: INTERNAL MEDICINE

## 2023-03-24 DIAGNOSIS — C77.0 MALIGNANT NEOPLASM METASTATIC TO LYMPH NODE OF NECK (HCC): Primary | ICD-10-CM

## 2023-03-24 RX ORDER — OXYCODONE HYDROCHLORIDE AND ACETAMINOPHEN 5; 325 MG/1; MG/1
1 TABLET ORAL EVERY 8 HOURS PRN
Qty: 24 TABLET | Refills: 0 | Status: SHIPPED | OUTPATIENT
Start: 2023-03-24 | End: 2023-03-31 | Stop reason: SDUPTHER

## 2023-03-27 ENCOUNTER — APPOINTMENT (OUTPATIENT)
Dept: RADIATION ONCOLOGY | Facility: CLINIC | Age: 73
End: 2023-03-27
Attending: INTERNAL MEDICINE

## 2023-03-28 ENCOUNTER — APPOINTMENT (OUTPATIENT)
Dept: RADIATION ONCOLOGY | Facility: CLINIC | Age: 73
End: 2023-03-28
Attending: INTERNAL MEDICINE

## 2023-03-29 ENCOUNTER — APPOINTMENT (OUTPATIENT)
Dept: RADIATION ONCOLOGY | Facility: CLINIC | Age: 73
End: 2023-03-29
Attending: INTERNAL MEDICINE

## 2023-03-29 ENCOUNTER — APPOINTMENT (OUTPATIENT)
Dept: RADIATION ONCOLOGY | Facility: CLINIC | Age: 73
End: 2023-03-29

## 2023-03-30 ENCOUNTER — APPOINTMENT (OUTPATIENT)
Dept: RADIATION ONCOLOGY | Facility: CLINIC | Age: 73
End: 2023-03-30
Attending: INTERNAL MEDICINE

## 2023-03-31 ENCOUNTER — APPOINTMENT (OUTPATIENT)
Dept: RADIATION ONCOLOGY | Facility: CLINIC | Age: 73
End: 2023-03-31
Attending: INTERNAL MEDICINE

## 2023-03-31 ENCOUNTER — APPOINTMENT (OUTPATIENT)
Dept: RADIATION ONCOLOGY | Facility: CLINIC | Age: 73
End: 2023-03-31

## 2023-03-31 DIAGNOSIS — C77.0 MALIGNANT NEOPLASM METASTATIC TO LYMPH NODE OF NECK (HCC): ICD-10-CM

## 2023-03-31 RX ORDER — OXYCODONE HYDROCHLORIDE AND ACETAMINOPHEN 5; 325 MG/1; MG/1
1 TABLET ORAL EVERY 8 HOURS PRN
Qty: 24 TABLET | Refills: 0 | Status: SHIPPED | OUTPATIENT
Start: 2023-03-31

## 2023-04-03 ENCOUNTER — APPOINTMENT (OUTPATIENT)
Dept: RADIATION ONCOLOGY | Facility: CLINIC | Age: 73
End: 2023-04-03
Attending: INTERNAL MEDICINE

## 2023-04-04 ENCOUNTER — PATIENT OUTREACH (OUTPATIENT)
Dept: HEMATOLOGY ONCOLOGY | Facility: CLINIC | Age: 73
End: 2023-04-04

## 2023-04-04 NOTE — PROGRESS NOTES
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

## 2023-05-12 DIAGNOSIS — E06.3 HYPOTHYROIDISM DUE TO HASHIMOTO'S THYROIDITIS: ICD-10-CM

## 2023-05-12 DIAGNOSIS — E03.8 HYPOTHYROIDISM DUE TO HASHIMOTO'S THYROIDITIS: ICD-10-CM

## 2023-05-12 DIAGNOSIS — E78.2 MIXED HYPERLIPIDEMIA: Primary | ICD-10-CM

## 2023-05-12 DIAGNOSIS — I10 ESSENTIAL HYPERTENSION: ICD-10-CM

## 2023-05-12 DIAGNOSIS — Z00.01 ENCOUNTER FOR GENERAL ADULT MEDICAL EXAMINATION WITH ABNORMAL FINDINGS: ICD-10-CM

## 2023-05-12 DIAGNOSIS — R94.5 LIVER FUNCTION ABNORMALITY: ICD-10-CM

## 2023-05-16 ENCOUNTER — RADIATION ONCOLOGY FOLLOW-UP (OUTPATIENT)
Dept: RADIATION ONCOLOGY | Facility: CLINIC | Age: 73
End: 2023-05-16
Attending: INTERNAL MEDICINE

## 2023-05-16 DIAGNOSIS — C77.0 SECONDARY MALIGNANT NEOPLASM OF LYMPH NODES OF NECK (HCC): Primary | ICD-10-CM

## 2023-05-16 NOTE — Clinical Note
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 I'm ordered CT scans for 3 months so perhaps after then would be appropriate  I'm seeing him back in 6 months    Thanks, Lala Lamb

## 2023-05-16 NOTE — PROGRESS NOTES
Virtual Brief Visit - [address] 1950 72 y o  male [phone]      REQUIRED DOCUMENTATION:     1  This service was provided via Telemedicine  2  Provider located at   [facility] [address]  [facility] [id number]  3  TeleMed provider: Marta Mcbride MD   4  Identify all parties in room with patient during tele consult:  None  5  Patient was then informed that this was a Telemedicine visit and that the exam was being conducted confidentially over secure lines  My office door was closed  No one else was in the room  Patient acknowledged consent and understanding of privacy and security of the Telemedicine visit, and gave us permission to have the assistant stay in the room in order to assist with the history and to conduct the exam   I informed the patient that I have reviewed their record in Epic and presented the opportunity for them to ask any questions regarding the visit today  The patient agreed to participate  Cancer Staging   1  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)                Stage - cTx pN2a cM0    Assessment/Plan:  London Guzmán is a 81 y o  male current smoker (25py) with cTx pN2a 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, s/p left neck dissection on 11/28/22 with Dr Romi Daly at [facility], pathology notable for a 3 5cm LN without RAQUEL (1/46 total nodes removed from levels IB-IV)  Immunostains notable for negative p16 and LITO, but positive profile suggestive of lower GI/anorectal origin  EGD and colonoscopy on 12/16/22 were unremarkable  DL with directed biopsies of the left tonsil and larynx both at [facility] and [place] were negative  Initial FNA on 10/3/22 had shown metastatic carcinoma cannot further classify  PET/CT on 10/25/22 had shown FDG avid left level 2 cervical lymph node neck mass    There was symmetric nonspecific FDG activity involving the tonsillar and base of tongue regions of uncertain significance within range of normal variation  He had seen Radonc and Medonc at [place], and I communicated with Mike Childers (his surgeon) via telephone at [place]  He proceeded with RT alone to the neck, without chemosensitization based on MDT consensus at [place], completing on 4/3/23  For routine end of treatment follow-up visit  He notes dramatic improvement in his expected radiation dermatitis  He has continued improvement with dysphagia  He continues to note changes in taste, occasional burning on the tongue, and hoarse voice  He continues moisturizer and was advised to use baking soda salt water rinses as well as Magic mouthwash as needed  -CT neck and CT chest ordered for posttreatment evaluation  -I will route to nurse navigation to ensure he has ENT follow-up with Dr Devon Coates  RTC in 6 months  Orders Placed This Encounter   Procedures   • CT soft tissue neck w contrast   • CT chest wo contrast        History of Present Illness   Interval History:  Completed RT to left neck on 4/3/23  Today's visit is an EOT in person follow-up    Patient feels overall today  He notes improvement in his skin symptoms  He still has some changed taste, burning sensation on his tongue, and hoarse voice  Historical Information   Oncology History Overview Note   History of secondary and unspecified malignant neoplasm of lymph nodes of head, face and neck  Completed RT to left neck on 4/3/23  Today's visit is an EOT in person follow-up     Metastatic cancer ([facility] [address] )   6/1/2022 Surgery    Micro direct laryngoscopy with biopsy, left tonsil     6/1/2022 Biopsy    A  Tonsil, left tonsil biopsy:  - Portions of benign tonsil with follicular lymphoid hyperplasia and squamous epithelium with  focal acute inflammation  -- Associated actinomyces colonies  - Negative for malignancy  -- Confirmed by Pankeratin (CKAE1/3) and p16 immunostains    B  Vocal cord, right true vocal cord biopsy:  - Acanthosis with parakeratosis and mild squamous atypia, cannot exclude low-grade dysplasia  -- Normal wild-type expression on p53 immunostain  See Note  - Bacterial colonization; special stain for fungus (GMS) confirms superficial fungal hyphae  consistent with fungal colonization   - Negative for malignancy on multiple examined levels  10/3/2022 Biopsy    Final Diagnosis  A , B , & C  Neck, Left neck mass: (Thin-Prep, smears, and cell block)  Positive for malignancy  Metastatic carcinoma, can not further classify (see note)  11/28/2022 Biopsy    Biopsy:  A  Tonsil, left inferior tonsil (biopsy): fragment of unremarkable tonsil  B  Larynx, right posterior vocal cord (biopsy): Hyperkeratotic squamous epithelium, negative for malignancy  C  Left neck level 2B (lymph node dissection): 11 lymph nodes, negative for metastatic carcinoma (0/11)  D  Left neck level 2A (lymph node dissection): metastatic poorly differentiated carcinoma in one (3 5cm) of 8 lymph nodes, negative for extranodal extension (1/8)  E  Left neck level 3 (lymph node dissection): 14 lymph nodes, negative for metastatic carcinoma (0/14)  F  Left neck level 4 (lymph node dissection): 10 lymph nodes, negative for metastatic carcinoma (0/10 )  G  Left neck level 1B (lymph node dissection): 3 lymph nodes, negative for metastatic carcinoma (0/3)        12/16/2022 Initial Diagnosis    Metastatic cancer ([facility] [address] )     2/20/2023 - 4/3/2023 Radiation    Treatment:  Course: C1    Plan ID Energy Fractions Dose per Fraction (cGy) Dose Correction (cGy) Total Dose Delivered (cGy) Elapsed Days   Left Neck 6X 30 / 30 200 0 6,000 42      Treatment dates:  C1: 2/20/2023 - 4/3/2023           Past Medical History:   Diagnosis Date   • A-fib [facility])    • Arthritis    • Avascular necrosis of bone of hip, left (HCC)     replaced   • Back pain    • CAD (coronary artery disease)    • Carotid artery narrowing     left side -for endarterectomy today 3/5/2020   • Disease of thyroid gland     hypo   • GERD (gastroesophageal reflux disease)    • History of Clostridioides difficile infection    • Hyperlipidemia    • Hypertension    • Irregular heart beat    • Jejunitis    • Kidney stone    • Metastatic cancer [facility])    • Neck pain    • Pancreatitis    • Spinal stenosis    • Wears glasses      Past Surgical History:   Procedure Laterality Date   • BACK SURGERY     • CARDIAC CATHETERIZATION      cardiac cath 5/2010  adjusted meds   • CATARACT EXTRACTION Bilateral    • CERVICAL FUSION     • CHOLANGIOGRAM N/A 03/04/2021    Procedure: CHOLANGIOGRAM;  Surgeon: Anjelica Lara MD;  Location: [address] [place] [address] MAIN OR;  Service: General   • CHOLECYSTECTOMY     • CHOLECYSTECTOMY LAPAROSCOPIC N/A 03/04/2021    Procedure: Julieth Iverson;  Surgeon: Anjelica Lara MD;  Location: [address] [place] [address] MAIN OR;  Service: General   • COLONOSCOPY     • JOINT REPLACEMENT Left     hip   • LUMBAR FUSION     • NECK SURGERY     • ORTHOPEDIC SURGERY Left     L THR   • MN LARYNGOSCOPY W/BIOPSY MICROSCOPE/TELESCOPE Left 6/1/2022    Procedure: MICRO DIRECT LARYNGOSCOPY WITH BIOPSY, BIOPSY LEFT TONSIL;  Surgeon: Julisa Sheldon MD;  Location:  MAIN OR;  Service: ENT   • MN TEAEC W/PATCH GRF CAROTID VERTB [facility] Left 03/05/2020    Procedure: ENDARTERECTOMY ARTERY CAROTID WITH BOVINE PATCH ANGIOPLASTY AND INTRAOP DUPLEX;  Surgeon: Herberth Berger MD;  Location: [facility] Main OR;  Service: Vascular   • SPINAL FUSION     • [address] BIOPSY LEFT  10/3/2022     Social History   Social History     Substance and Sexual Activity   Alcohol Use Not Currently    Comment: 3 oz of wine with communion multiple times/day/week     Social History     Substance and Sexual Activity   Drug Use Never     Social History     Tobacco Use   Smoking Status Every Day   • Packs/day: 0 25   • Years: 50 00   • Pack years: 12 50   • Types: Cigarettes   Smokeless Tobacco Never   Tobacco Comments    Half pack per day     Meds/Allergies Current Outpatient Medications:   •  al mag oxide-diphenhydramine-lidocaine viscous (MAGIC MOUTHWASH) 1:1:1 suspension, Swish and swallow 10 mL every 4 (four) hours as needed for mouth pain or discomfort, Disp: 480 mL, Rfl: 1  •  amLODIPine (NORVASC) 10 mg tablet, Take 1 tablet (10 mg total) by mouth daily, Disp: 90 tablet, Rfl: 0  •  fenofibrate 160 MG tablet, Take 1 tablet (160 mg total) by mouth daily, Disp: 90 tablet, Rfl: 0  •  lansoprazole (PREVACID) 30 mg capsule, Take 1 capsule (30 mg total) by mouth daily, Disp: 90 capsule, Rfl: 0  •  levothyroxine 25 mcg tablet, Take 0 5 tablets (12 5 mcg total) by mouth daily, Disp: 45 tablet, Rfl: 0  •  losartan (COZAAR) 50 mg tablet, Take 1 tablet (50 mg total) by mouth daily, Disp: 90 tablet, Rfl: 0  •  magnesium Oxide (MAG-OX) 400 mg TABS, Take 1 tablet (400 mg total) by mouth 2 (two) times a day, Disp: 180 tablet, Rfl: 0  •  metoprolol succinate (TOPROL-XL) 50 mg 24 hr tablet, Take 0 5 tablets (25 mg total) by mouth daily, Disp: 90 tablet, Rfl: 0  •  oxyCODONE-acetaminophen (Percocet) 5-325 mg per tablet, Take 1 tablet by mouth every 8 (eight) hours as needed for moderate pain Max Daily Amount: 3 tablets, Disp: 24 tablet, Rfl: 0  •  pancrelipase, Lip-Prot-Amyl, (Creon) 12,000 units capsule, Take 12,000 units of lipase by mouth 3 (three) times a day with meals, Disp: 90 capsule, Rfl: 0  •  Probiotic Product (VSL#3) CAPS, Take 1 capsule by mouth 2 (two) times a day, Disp: 60 capsule, Rfl: 6  •  rosuvastatin (CRESTOR) 10 MG tablet, Take 1 tablet (10 mg total) by mouth daily Please STOP Vytorin   , Disp: 90 tablet, Rfl: 0  •  silver sulfadiazine (SILVADENE,SSD) 1 % cream, Apply topically daily, Disp: 50 g, Rfl: 0  •  spironolactone (ALDACTONE) 25 mg tablet, Take 0 5 tablets (12 5 mg total) by mouth daily, Disp: 45 tablet, Rfl: 0  •  thiamine 100 MG tablet, Take 1 tablet (100 mg total) by mouth daily, Disp: 90 tablet, Rfl: 0  •  zolpidem (AMBIEN) 10 mg tablet, Take by "mouth daily at bedtime as needed , Disp: , Rfl:   No Known Allergies    OBJECTIVE:   Physical exam limited over telephone encounter  Herve Blanco MD  5/16/2023,9:52 PM    VIRTUAL VISIT DISCLAIMER  Patient verbally agrees to participate in [facility]  Pt is aware that [facility] could be limited without vital signs or the ability to perform a full hands-on physical exam  Patient understands that the patient or the provider may request at any time to terminate the video visit and request the patient to seek care or treatment in person  Portions of the record may have been created with voice recognition software  Occasional wrong word or \"sound a like\" substitutions may have occurred due to the inherent limitations of voice recognition software  Read the chart carefully and recognize, using context, where substitutions have occurred    "

## 2023-05-17 ENCOUNTER — PATIENT OUTREACH (OUTPATIENT)
Dept: HEMATOLOGY ONCOLOGY | Facility: CLINIC | Age: 73
End: 2023-05-17

## 2023-05-17 ENCOUNTER — APPOINTMENT (OUTPATIENT)
Dept: LAB | Facility: CLINIC | Age: 73
End: 2023-05-17

## 2023-05-17 DIAGNOSIS — Z00.01 ENCOUNTER FOR GENERAL ADULT MEDICAL EXAMINATION WITH ABNORMAL FINDINGS: ICD-10-CM

## 2023-05-17 DIAGNOSIS — E06.3 HYPOTHYROIDISM DUE TO HASHIMOTO'S THYROIDITIS: ICD-10-CM

## 2023-05-17 DIAGNOSIS — I10 ESSENTIAL HYPERTENSION: ICD-10-CM

## 2023-05-17 DIAGNOSIS — E78.2 MIXED HYPERLIPIDEMIA: ICD-10-CM

## 2023-05-17 DIAGNOSIS — R94.5 LIVER FUNCTION ABNORMALITY: ICD-10-CM

## 2023-05-17 DIAGNOSIS — E03.8 HYPOTHYROIDISM DUE TO HASHIMOTO'S THYROIDITIS: ICD-10-CM

## 2023-05-17 LAB
25(OH)D3 SERPL-MCNC: 151.7 NG/ML (ref 30–100)
ALBUMIN SERPL BCP-MCNC: 3.6 G/DL (ref 3.5–5)
ALP SERPL-CCNC: 43 U/L (ref 46–116)
ALT SERPL W P-5'-P-CCNC: 20 U/L (ref 12–78)
ANION GAP SERPL CALCULATED.3IONS-SCNC: 4 MMOL/L (ref 4–13)
AST SERPL W P-5'-P-CCNC: 28 U/L (ref 5–45)
BASOPHILS # BLD AUTO: 0.04 THOUSANDS/ÂΜL (ref 0–0.1)
BASOPHILS NFR BLD AUTO: 1 % (ref 0–1)
BILIRUB SERPL-MCNC: 0.36 MG/DL (ref 0.2–1)
BILIRUB UR QL STRIP: NEGATIVE
BUN SERPL-MCNC: 8 MG/DL (ref 5–25)
CALCIUM SERPL-MCNC: 10.3 MG/DL (ref 8.3–10.1)
CHLORIDE SERPL-SCNC: 107 MMOL/L (ref 96–108)
CHOLEST SERPL-MCNC: 133 MG/DL
CLARITY UR: CLEAR
CO2 SERPL-SCNC: 25 MMOL/L (ref 21–32)
COLOR UR: NORMAL
CREAT SERPL-MCNC: 1.33 MG/DL (ref 0.6–1.3)
EOSINOPHIL # BLD AUTO: 0.32 THOUSAND/ÂΜL (ref 0–0.61)
EOSINOPHIL NFR BLD AUTO: 5 % (ref 0–6)
ERYTHROCYTE [DISTWIDTH] IN BLOOD BY AUTOMATED COUNT: 14.9 % (ref 11.6–15.1)
FOLATE SERPL-MCNC: >22.3 NG/ML
GFR SERPL CREATININE-BSD FRML MDRD: 53 ML/MIN/1.73SQ M
GLUCOSE P FAST SERPL-MCNC: 100 MG/DL (ref 65–99)
GLUCOSE UR STRIP-MCNC: NEGATIVE MG/DL
HCT VFR BLD AUTO: 35.5 % (ref 36.5–49.3)
HDLC SERPL-MCNC: 31 MG/DL
HGB BLD-MCNC: 11.7 G/DL (ref 12–17)
HGB UR QL STRIP.AUTO: NEGATIVE
IMM GRANULOCYTES # BLD AUTO: 0.03 THOUSAND/UL (ref 0–0.2)
IMM GRANULOCYTES NFR BLD AUTO: 0 % (ref 0–2)
KETONES UR STRIP-MCNC: NEGATIVE MG/DL
LDLC SERPL CALC-MCNC: 57 MG/DL (ref 0–100)
LEUKOCYTE ESTERASE UR QL STRIP: NEGATIVE
LIPASE SERPL-CCNC: 300 U/L (ref 73–393)
LYMPHOCYTES # BLD AUTO: 0.64 THOUSANDS/ÂΜL (ref 0.6–4.47)
LYMPHOCYTES NFR BLD AUTO: 10 % (ref 14–44)
MAGNESIUM SERPL-MCNC: 1.4 MG/DL (ref 1.6–2.6)
MCH RBC QN AUTO: 32.9 PG (ref 26.8–34.3)
MCHC RBC AUTO-ENTMCNC: 33 G/DL (ref 31.4–37.4)
MCV RBC AUTO: 100 FL (ref 82–98)
MONOCYTES # BLD AUTO: 0.7 THOUSAND/ÂΜL (ref 0.17–1.22)
MONOCYTES NFR BLD AUTO: 11 % (ref 4–12)
NEUTROPHILS # BLD AUTO: 4.96 THOUSANDS/ÂΜL (ref 1.85–7.62)
NEUTS SEG NFR BLD AUTO: 73 % (ref 43–75)
NITRITE UR QL STRIP: NEGATIVE
NONHDLC SERPL-MCNC: 102 MG/DL
NRBC BLD AUTO-RTO: 0 /100 WBCS
PH UR STRIP.AUTO: 6 [PH]
PLATELET # BLD AUTO: 288 THOUSANDS/UL (ref 149–390)
PMV BLD AUTO: 11.4 FL (ref 8.9–12.7)
POTASSIUM SERPL-SCNC: 5 MMOL/L (ref 3.5–5.3)
PROT SERPL-MCNC: 7.4 G/DL (ref 6.4–8.4)
PROT UR STRIP-MCNC: NEGATIVE MG/DL
PSA SERPL-MCNC: 2.05 NG/ML (ref 0–4)
RBC # BLD AUTO: 3.56 MILLION/UL (ref 3.88–5.62)
SODIUM SERPL-SCNC: 136 MMOL/L (ref 135–147)
SP GR UR STRIP.AUTO: 1.01 (ref 1–1.03)
T4 FREE SERPL-MCNC: 1.05 NG/DL (ref 0.61–1.12)
TRIGL SERPL-MCNC: 226 MG/DL
TSH SERPL DL<=0.05 MIU/L-ACNC: 1.55 UIU/ML (ref 0.45–4.5)
UROBILINOGEN UR STRIP-ACNC: <2 MG/DL
VIT B12 SERPL-MCNC: 1500 PG/ML (ref 180–914)
WBC # BLD AUTO: 6.69 THOUSAND/UL (ref 4.31–10.16)

## 2023-05-17 NOTE — PROGRESS NOTES
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

## 2023-05-21 LAB — VIT B6 SERPL-MCNC: 1.6 UG/L (ref 3.4–65.2)

## 2023-05-22 ENCOUNTER — TELEPHONE (OUTPATIENT)
Dept: FAMILY MEDICINE CLINIC | Facility: CLINIC | Age: 73
End: 2023-05-22

## 2023-05-22 NOTE — TELEPHONE ENCOUNTER
----- Message from Karen Bae MD sent at 5/22/2023  7:34 AM EDT -----  Pt needs vit b6 supplements  Raghu Quinn   ----- Message -----  From: Lab, Background User  Sent: 5/17/2023   4:33 PM EDT  To: Karen Bae MD

## 2023-05-23 ENCOUNTER — PATIENT OUTREACH (OUTPATIENT)
Dept: HEMATOLOGY ONCOLOGY | Facility: CLINIC | Age: 73
End: 2023-05-23

## 2023-05-23 NOTE — PROGRESS NOTES
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

## 2023-05-23 NOTE — PROGRESS NOTES
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

## 2023-05-25 LAB — VIT B1 BLD-SCNC: 51.1 NMOL/L (ref 66.5–200)

## 2023-06-21 DIAGNOSIS — E03.9 HYPOTHYROIDISM (ACQUIRED): ICD-10-CM

## 2023-06-21 DIAGNOSIS — R94.5 LIVER FUNCTION ABNORMALITY: ICD-10-CM

## 2023-06-21 DIAGNOSIS — K86.1 OTHER CHRONIC PANCREATITIS (HCC): ICD-10-CM

## 2023-06-21 DIAGNOSIS — K21.9 GASTROESOPHAGEAL REFLUX DISEASE WITHOUT ESOPHAGITIS: ICD-10-CM

## 2023-06-21 DIAGNOSIS — I10 ESSENTIAL HYPERTENSION: ICD-10-CM

## 2023-06-21 DIAGNOSIS — E78.2 MIXED HYPERLIPIDEMIA: ICD-10-CM

## 2023-06-21 DIAGNOSIS — E83.42 HYPOMAGNESEMIA: ICD-10-CM

## 2023-06-21 DIAGNOSIS — I10 ESSENTIAL HYPERTENSION, BENIGN: ICD-10-CM

## 2023-06-21 DIAGNOSIS — R19.7 DIARRHEA, UNSPECIFIED TYPE: ICD-10-CM

## 2023-06-21 RX ORDER — FENOFIBRATE 160 MG/1
160 TABLET ORAL DAILY
Qty: 90 TABLET | Refills: 0 | Status: SHIPPED | OUTPATIENT
Start: 2023-06-21

## 2023-06-21 RX ORDER — LOSARTAN POTASSIUM 50 MG/1
50 TABLET ORAL DAILY
Qty: 90 TABLET | Refills: 0 | Status: SHIPPED | OUTPATIENT
Start: 2023-06-21

## 2023-06-21 RX ORDER — ROSUVASTATIN CALCIUM 10 MG/1
10 TABLET, COATED ORAL DAILY
Qty: 90 TABLET | Refills: 0 | Status: SHIPPED | OUTPATIENT
Start: 2023-06-21

## 2023-06-21 RX ORDER — METOPROLOL SUCCINATE 50 MG/1
25 TABLET, EXTENDED RELEASE ORAL DAILY
Qty: 90 TABLET | Refills: 0 | Status: SHIPPED | OUTPATIENT
Start: 2023-06-21

## 2023-06-21 RX ORDER — LANSOPRAZOLE 30 MG/1
30 CAPSULE, DELAYED RELEASE ORAL DAILY
Qty: 90 CAPSULE | Refills: 0 | Status: SHIPPED | OUTPATIENT
Start: 2023-06-21

## 2023-06-21 RX ORDER — PANCRELIPASE 60000; 12000; 38000 [USP'U]/1; [USP'U]/1; [USP'U]/1
12000 CAPSULE, DELAYED RELEASE PELLETS ORAL
Qty: 90 CAPSULE | Refills: 0 | Status: SHIPPED | OUTPATIENT
Start: 2023-06-21

## 2023-06-21 RX ORDER — SPIRONOLACTONE 25 MG/1
12.5 TABLET ORAL DAILY
Qty: 45 TABLET | Refills: 0 | Status: SHIPPED | OUTPATIENT
Start: 2023-06-21

## 2023-06-21 RX ORDER — LEVOTHYROXINE SODIUM 0.03 MG/1
12.5 TABLET ORAL DAILY
Qty: 45 TABLET | Refills: 0 | Status: SHIPPED | OUTPATIENT
Start: 2023-06-21 | End: 2023-09-19

## 2023-06-21 RX ORDER — AMLODIPINE BESYLATE 10 MG/1
10 TABLET ORAL DAILY
Qty: 90 TABLET | Refills: 0 | Status: SHIPPED | OUTPATIENT
Start: 2023-06-21

## 2023-06-21 RX ORDER — LANOLIN ALCOHOL/MO/W.PET/CERES
400 CREAM (GRAM) TOPICAL 2 TIMES DAILY
Qty: 180 TABLET | Refills: 0 | Status: SHIPPED | OUTPATIENT
Start: 2023-06-21

## 2023-08-05 NOTE — ASSESSMENT & PLAN NOTE
Continue to hold Vytorin secondary to acute pancreatitis and elevated liver functions Followed by nephrology at [facility]. May be going for fistula to prepare for eventual  dialysis

## 2023-08-08 NOTE — TELEPHONE ENCOUNTER
As long as he's tolerating the gabapentin I'd like him to increase his dosing along the titration schedule to a goal dose of 600mg TID  Griseofulvin Counseling:  I discussed with the patient the risks of griseofulvin including but not limited to photosensitivity, cytopenia, liver damage, nausea/vomiting and severe allergy.  The patient understands that this medication is best absorbed when taken with a fatty meal (e.g., ice cream or french fries).

## 2023-08-16 ENCOUNTER — HOSPITAL ENCOUNTER (OUTPATIENT)
Dept: CT IMAGING | Facility: HOSPITAL | Age: 73
Discharge: HOME | End: 2023-08-16
Payer: COMMERCIAL

## 2023-08-16 DIAGNOSIS — C77.0 SECONDARY MALIGNANT NEOPLASM OF LYMPH NODES OF NECK (HCC): ICD-10-CM

## 2023-08-16 PROCEDURE — 71260 CT THORAX DX C+: CPT

## 2023-08-16 PROCEDURE — G1004 CDSM NDSC: HCPCS

## 2023-08-16 PROCEDURE — 70491 CT SOFT TISSUE NECK W/DYE: CPT

## 2023-08-16 RX ADMIN — IOHEXOL 100 ML: 350 INJECTION, SOLUTION INTRAVENOUS at 14:23

## 2023-08-18 ENCOUNTER — TELEPHONE (OUTPATIENT)
Dept: RADIATION ONCOLOGY | Facility: CLINIC | Age: 73
End: 2023-08-18

## 2023-08-18 NOTE — TELEPHONE ENCOUNTER
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 He said he was scheduled to meet woth you after he had the CT done. I didn't see an appt that was cancelled and I told hm that. He said he would like to talk to you about the CT that he just had done.

## 2023-08-22 ENCOUNTER — PATIENT OUTREACH (OUTPATIENT)
Dept: HEMATOLOGY ONCOLOGY | Facility: CLINIC | Age: 73
End: 2023-08-22

## 2023-08-22 NOTE — PROGRESS NOTES
Received In- Basket massage from radiation oncology notifying me that he missed and appointment for follow up with ENT. 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 I contacted his cell first but  was not set up. I left  on his home phone.  I included the number for the ENT office for direct scheduling as well as my phone number if he needs any assistance,

## 2023-08-24 ENCOUNTER — PATIENT OUTREACH (OUTPATIENT)
Dept: HEMATOLOGY ONCOLOGY | Facility: CLINIC | Age: 73
End: 2023-08-24

## 2023-08-25 NOTE — PROGRESS NOTES
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 Per Dr. Gavin Perry he had missed an appnt with ENT. I poke with Father Trang Ibrahim and he would appreciate if I could schedule this appnt for him. He would prefer an afternoon appnt. I will reach out to [facility] office to set this up for him.

## 2023-08-25 NOTE — PROGRESS NOTES
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 Katie Alberto in the [facility] office on 9/11/23 at 11:30am. He confirmed that he knows where the office is and has the business card on his refrigerator. He understands that he should contact their office directly to make any changes to the appnt as needed. He was appreciative for the assistance.

## 2024-02-05 ENCOUNTER — HOSPITAL ENCOUNTER (OUTPATIENT)
Dept: NUCLEAR MEDICINE | Facility: HOSPITAL | Age: 74
Discharge: HOME | End: 2024-02-05
Payer: MEDICARE

## 2024-02-05 DIAGNOSIS — C76.0 PRIMARY CARCINOMA OF HEAD AND NECK REGION OF UNKNOWN CELL TYPE (HCC): ICD-10-CM

## 2024-02-05 DIAGNOSIS — C77.8 CARCINOMA METASTATIC TO LYMPH NODES OF MULTIPLE SITES WITH UNKNOWN PRIMARY SITE (HCC): ICD-10-CM

## 2024-02-05 DIAGNOSIS — C80.1 CARCINOMA METASTATIC TO LYMPH NODES OF MULTIPLE SITES WITH UNKNOWN PRIMARY SITE (HCC): ICD-10-CM

## 2024-02-05 LAB — GLUCOSE SERPL-MCNC: 101 MG/DL (ref 65–140)

## 2024-02-05 PROCEDURE — 78815 PET IMAGE W/CT SKULL-THIGH: CPT

## 2024-02-05 PROCEDURE — 82948 REAGENT STRIP/BLOOD GLUCOSE: CPT

## 2024-02-05 PROCEDURE — G1004 CDSM NDSC: HCPCS

## 2024-02-05 PROCEDURE — A9552 F18 FDG: HCPCS

## 2024-03-05 DIAGNOSIS — I10 ESSENTIAL HYPERTENSION, BENIGN: ICD-10-CM

## 2024-03-05 DIAGNOSIS — E83.42 HYPOMAGNESEMIA: ICD-10-CM

## 2024-03-05 DIAGNOSIS — K13.0 ANGULAR CHEILITIS: ICD-10-CM

## 2024-03-05 DIAGNOSIS — E03.9 HYPOTHYROIDISM (ACQUIRED): ICD-10-CM

## 2024-03-05 DIAGNOSIS — K21.9 GASTROESOPHAGEAL REFLUX DISEASE WITHOUT ESOPHAGITIS: ICD-10-CM

## 2024-03-05 DIAGNOSIS — E78.2 MIXED HYPERLIPIDEMIA: ICD-10-CM

## 2024-03-05 DIAGNOSIS — I10 ESSENTIAL HYPERTENSION: ICD-10-CM

## 2024-03-05 RX ORDER — AMLODIPINE BESYLATE 10 MG/1
10 TABLET ORAL DAILY
Qty: 90 TABLET | Refills: 0 | Status: SHIPPED | OUTPATIENT
Start: 2024-03-05 | End: 2024-03-13 | Stop reason: SDUPTHER

## 2024-03-05 RX ORDER — LANOLIN ALCOHOL/MO/W.PET/CERES
400 CREAM (GRAM) TOPICAL 2 TIMES DAILY
Qty: 180 TABLET | Refills: 0 | Status: SHIPPED | OUTPATIENT
Start: 2024-03-05 | End: 2024-03-13 | Stop reason: SDUPTHER

## 2024-03-05 RX ORDER — METOPROLOL SUCCINATE 50 MG/1
25 TABLET, EXTENDED RELEASE ORAL DAILY
Qty: 90 TABLET | Refills: 0 | Status: SHIPPED | OUTPATIENT
Start: 2024-03-05 | End: 2024-03-13 | Stop reason: SDUPTHER

## 2024-03-05 RX ORDER — LANSOPRAZOLE 30 MG/1
30 CAPSULE, DELAYED RELEASE ORAL DAILY
Qty: 90 CAPSULE | Refills: 0 | Status: SHIPPED | OUTPATIENT
Start: 2024-03-05 | End: 2024-03-13 | Stop reason: SDUPTHER

## 2024-03-05 RX ORDER — LOSARTAN POTASSIUM 50 MG/1
50 TABLET ORAL DAILY
Qty: 90 TABLET | Refills: 0 | Status: SHIPPED | OUTPATIENT
Start: 2024-03-05 | End: 2024-03-13 | Stop reason: SDUPTHER

## 2024-03-05 RX ORDER — FENOFIBRATE 160 MG/1
160 TABLET ORAL DAILY
Qty: 90 TABLET | Refills: 0 | Status: SHIPPED | OUTPATIENT
Start: 2024-03-05 | End: 2024-03-13 | Stop reason: SDUPTHER

## 2024-03-05 RX ORDER — CLOTRIMAZOLE AND BETAMETHASONE DIPROPIONATE 10; .64 MG/G; MG/G
CREAM TOPICAL AS NEEDED
Qty: 15 G | Refills: 0 | Status: SHIPPED | OUTPATIENT
Start: 2024-03-05 | End: 2024-03-13

## 2024-03-05 RX ORDER — SPIRONOLACTONE 25 MG/1
12.5 TABLET ORAL DAILY
Qty: 45 TABLET | Refills: 0 | Status: SHIPPED | OUTPATIENT
Start: 2024-03-05 | End: 2024-03-13 | Stop reason: SDUPTHER

## 2024-03-05 RX ORDER — LEVOTHYROXINE SODIUM 0.03 MG/1
12.5 TABLET ORAL DAILY
Qty: 45 TABLET | Refills: 0 | Status: SHIPPED | OUTPATIENT
Start: 2024-03-05 | End: 2024-03-13 | Stop reason: SDUPTHER

## 2024-03-12 DIAGNOSIS — R19.7 DIARRHEA, UNSPECIFIED TYPE: ICD-10-CM

## 2024-03-12 DIAGNOSIS — K86.1 OTHER CHRONIC PANCREATITIS (HCC): ICD-10-CM

## 2024-03-12 DIAGNOSIS — E78.2 MIXED HYPERLIPIDEMIA: ICD-10-CM

## 2024-03-12 DIAGNOSIS — I10 ESSENTIAL HYPERTENSION, BENIGN: ICD-10-CM

## 2024-03-12 DIAGNOSIS — R94.5 LIVER FUNCTION ABNORMALITY: ICD-10-CM

## 2024-03-12 RX ORDER — ROSUVASTATIN CALCIUM 10 MG/1
10 TABLET, COATED ORAL DAILY
Qty: 90 TABLET | Refills: 0 | OUTPATIENT
Start: 2024-03-12

## 2024-03-12 RX ORDER — FENOFIBRATE 160 MG/1
160 TABLET ORAL DAILY
Qty: 90 TABLET | Refills: 0 | OUTPATIENT
Start: 2024-03-12

## 2024-03-12 RX ORDER — PANCRELIPASE 60000; 12000; 38000 [USP'U]/1; [USP'U]/1; [USP'U]/1
12000 CAPSULE, DELAYED RELEASE PELLETS ORAL
Qty: 90 CAPSULE | Refills: 0 | OUTPATIENT
Start: 2024-03-12

## 2024-03-12 RX ORDER — AMLODIPINE BESYLATE 10 MG/1
10 TABLET ORAL DAILY
Qty: 90 TABLET | Refills: 0 | OUTPATIENT
Start: 2024-03-12

## 2024-03-13 ENCOUNTER — OFFICE VISIT (OUTPATIENT)
Dept: FAMILY MEDICINE CLINIC | Facility: CLINIC | Age: 74
End: 2024-03-13
Payer: MEDICARE

## 2024-03-13 ENCOUNTER — TELEPHONE (OUTPATIENT)
Dept: FAMILY MEDICINE CLINIC | Facility: CLINIC | Age: 74
End: 2024-03-13

## 2024-03-13 VITALS
RESPIRATION RATE: 14 BRPM | HEART RATE: 70 BPM | TEMPERATURE: 99.1 F | BODY MASS INDEX: 25.51 KG/M2 | HEIGHT: 63 IN | OXYGEN SATURATION: 96 % | DIASTOLIC BLOOD PRESSURE: 82 MMHG | SYSTOLIC BLOOD PRESSURE: 128 MMHG

## 2024-03-13 DIAGNOSIS — F17.210 CIGARETTE SMOKER: ICD-10-CM

## 2024-03-13 DIAGNOSIS — R19.7 DIARRHEA, UNSPECIFIED TYPE: ICD-10-CM

## 2024-03-13 DIAGNOSIS — I71.21 ANEURYSM OF ASCENDING AORTA WITHOUT RUPTURE (HCC): ICD-10-CM

## 2024-03-13 DIAGNOSIS — K85.90 ACUTE PANCREATITIS: ICD-10-CM

## 2024-03-13 DIAGNOSIS — R22.1 NECK MASS: ICD-10-CM

## 2024-03-13 DIAGNOSIS — K86.1 OTHER CHRONIC PANCREATITIS (HCC): ICD-10-CM

## 2024-03-13 DIAGNOSIS — K21.9 GASTROESOPHAGEAL REFLUX DISEASE WITHOUT ESOPHAGITIS: ICD-10-CM

## 2024-03-13 DIAGNOSIS — E83.42 HYPOMAGNESEMIA: ICD-10-CM

## 2024-03-13 DIAGNOSIS — L98.9 SKIN LESIONS: ICD-10-CM

## 2024-03-13 DIAGNOSIS — E78.2 MIXED HYPERLIPIDEMIA: ICD-10-CM

## 2024-03-13 DIAGNOSIS — J44.9 CHRONIC OBSTRUCTIVE PULMONARY DISEASE, UNSPECIFIED COPD TYPE (HCC): ICD-10-CM

## 2024-03-13 DIAGNOSIS — R94.5 LIVER FUNCTION ABNORMALITY: ICD-10-CM

## 2024-03-13 DIAGNOSIS — Z00.01 ENCOUNTER FOR GENERAL ADULT MEDICAL EXAMINATION WITH ABNORMAL FINDINGS: Primary | ICD-10-CM

## 2024-03-13 DIAGNOSIS — E03.9 HYPOTHYROIDISM (ACQUIRED): ICD-10-CM

## 2024-03-13 DIAGNOSIS — C76.0 HEAD AND NECK CANCER (HCC): ICD-10-CM

## 2024-03-13 DIAGNOSIS — I10 ESSENTIAL HYPERTENSION: ICD-10-CM

## 2024-03-13 PROBLEM — E43 SEVERE PROTEIN-CALORIE MALNUTRITION (HCC): Status: RESOLVED | Noted: 2020-10-14 | Resolved: 2024-03-13

## 2024-03-13 LAB — SL AMB POCT HEMOGLOBIN AIC: 5.5 (ref ?–6.5)

## 2024-03-13 PROCEDURE — G0403 EKG FOR INITIAL PREVENT EXAM: HCPCS | Performed by: INTERNAL MEDICINE

## 2024-03-13 PROCEDURE — 83036 HEMOGLOBIN GLYCOSYLATED A1C: CPT | Performed by: INTERNAL MEDICINE

## 2024-03-13 PROCEDURE — 99214 OFFICE O/P EST MOD 30 MIN: CPT | Performed by: INTERNAL MEDICINE

## 2024-03-13 PROCEDURE — G0402 INITIAL PREVENTIVE EXAM: HCPCS | Performed by: INTERNAL MEDICINE

## 2024-03-13 RX ORDER — AMLODIPINE BESYLATE 10 MG/1
10 TABLET ORAL DAILY
Qty: 90 TABLET | Refills: 3 | Status: SHIPPED | OUTPATIENT
Start: 2024-03-13

## 2024-03-13 RX ORDER — SPIRONOLACTONE 25 MG/1
12.5 TABLET ORAL DAILY
Qty: 45 TABLET | Refills: 3 | Status: SHIPPED | OUTPATIENT
Start: 2024-03-13

## 2024-03-13 RX ORDER — PANCRELIPASE 60000; 12000; 38000 [USP'U]/1; [USP'U]/1; [USP'U]/1
12000 CAPSULE, DELAYED RELEASE PELLETS ORAL
Qty: 90 CAPSULE | Refills: 3 | Status: SHIPPED | OUTPATIENT
Start: 2024-03-13 | End: 2024-03-13

## 2024-03-13 RX ORDER — FENOFIBRATE 160 MG/1
160 TABLET ORAL DAILY
Qty: 90 TABLET | Refills: 3 | Status: SHIPPED | OUTPATIENT
Start: 2024-03-13

## 2024-03-13 RX ORDER — LOSARTAN POTASSIUM 50 MG/1
50 TABLET ORAL DAILY
Qty: 90 TABLET | Refills: 3 | Status: SHIPPED | OUTPATIENT
Start: 2024-03-13

## 2024-03-13 RX ORDER — ROSUVASTATIN CALCIUM 10 MG/1
10 TABLET, COATED ORAL DAILY
Qty: 90 TABLET | Refills: 3 | Status: SHIPPED | OUTPATIENT
Start: 2024-03-13

## 2024-03-13 RX ORDER — LEVOTHYROXINE SODIUM 0.03 MG/1
12.5 TABLET ORAL DAILY
Qty: 45 TABLET | Refills: 3 | Status: SHIPPED | OUTPATIENT
Start: 2024-03-13 | End: 2024-06-11

## 2024-03-13 RX ORDER — LANSOPRAZOLE 30 MG/1
30 CAPSULE, DELAYED RELEASE ORAL DAILY
Qty: 90 CAPSULE | Refills: 3 | Status: SHIPPED | OUTPATIENT
Start: 2024-03-13

## 2024-03-13 RX ORDER — PANCRELIPASE 60000; 12000; 38000 [USP'U]/1; [USP'U]/1; [USP'U]/1
12000 CAPSULE, DELAYED RELEASE PELLETS ORAL
Qty: 100 CAPSULE | Refills: 2 | Status: SHIPPED | OUTPATIENT
Start: 2024-03-13

## 2024-03-13 RX ORDER — LANOLIN ALCOHOL/MO/W.PET/CERES
400 CREAM (GRAM) TOPICAL 2 TIMES DAILY
Qty: 180 TABLET | Refills: 3 | Status: SHIPPED | OUTPATIENT
Start: 2024-03-13

## 2024-03-13 RX ORDER — LANOLIN ALCOHOL/MO/W.PET/CERES
100 CREAM (GRAM) TOPICAL DAILY
Qty: 90 TABLET | Refills: 3 | Status: SHIPPED | OUTPATIENT
Start: 2024-03-13

## 2024-03-13 RX ORDER — METOPROLOL SUCCINATE 50 MG/1
25 TABLET, EXTENDED RELEASE ORAL DAILY
Qty: 90 TABLET | Refills: 3 | Status: SHIPPED | OUTPATIENT
Start: 2024-03-13

## 2024-03-13 NOTE — PROGRESS NOTES
Assessment and Plan:     Problem List Items Addressed This Visit    None       Preventive health issues were discussed with patient, and age appropriate screening tests were ordered as noted in patient's After Visit Summary.  Personalized health advice and appropriate referrals for health education or preventive services given if needed, as noted in patient's After Visit Summary.     History of Present Illness:     Patient presents for a Medicare Wellness Visit    HPI   Patient Care Team:  Garry Vasquez MD as PCP - General (Internal Medicine)  Obdulia Artis MD (Radiation Oncology)  Catalino Mcgrath MD (Otolaryngology)  Valeria Salazar RD (Nutrition)     Review of Systems:     Review of Systems     Problem List:     Patient Active Problem List   Diagnosis    Essential hypertension, benign    Left carotid artery stenosis s/p cartoid endarterectomy    Thoracic aortic aneurysm without rupture (HCC)    Ectopic atrial rhythm    Prediabetes    Hypothyroidism (acquired)    Mixed hyperlipidemia    Drug-induced acute pancreatitis without infection or necrosis    Cigarette nicotine dependence with nicotine-induced disorder    Jejunitis    Radiculopathy, lumbar region    Spinal stenosis of lumbar region without neurogenic claudication    Right sided sciatica    Chronic pancreatitis (HCC)    Carotid artery stenosis, asymptomatic, bilateral    Severe protein-calorie malnutrition (HCC)    Pancreatic lesion    Hiatal hernia    Idiopathic acute pancreatitis without infection or necrosis    Transaminitis    Bilateral vocal fold paresis    Muscle tension dysphonia    Hx of fusion of cervical spine    Cervical lymphadenopathy- left neck    Metastatic cancer (HCC)    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)      Past Medical and Surgical History:     Past Medical History:   Diagnosis Date    A-fib (HCC)     Arthritis     Avascular necrosis of bone of hip, left (HCC)     replaced    Back pain     CAD (coronary artery  disease)     Carotid artery narrowing     left side -for endarterectomy today 3/5/2020    Disease of thyroid gland     hypo    GERD (gastroesophageal reflux disease)     History of Clostridioides difficile infection     Hyperlipidemia     Hypertension     Irregular heart beat     Jejunitis     Kidney stone     Metastatic cancer (HCC)     Neck pain     Pancreatitis     Spinal stenosis     Wears glasses      Past Surgical History:   Procedure Laterality Date    BACK SURGERY      CARDIAC CATHETERIZATION      cardiac cath 5/2010  adjusted meds    CATARACT EXTRACTION Bilateral     CERVICAL FUSION      CHOLANGIOGRAM N/A 03/04/2021    Procedure: CHOLANGIOGRAM;  Surgeon: Raymond Baez MD;  Location:  MAIN OR;  Service: General    CHOLECYSTECTOMY      CHOLECYSTECTOMY LAPAROSCOPIC N/A 03/04/2021    Procedure: CHOLECYSTECTOMY LAPAROSCOPIC;  Surgeon: Raymond Baez MD;  Location:  MAIN OR;  Service: General    COLONOSCOPY      JOINT REPLACEMENT Left     hip    LUMBAR FUSION      NECK SURGERY      ORTHOPEDIC SURGERY Left     L THR    [place] LARYNGOSCOPY W/BIOPSY MICROSCOPE/TELESCOPE Left 6/1/2022    Procedure: MICRO DIRECT LARYNGOSCOPY WITH BIOPSY, BIOPSY LEFT TONSIL;  Surgeon: Catalino Mcgrath MD;  Location: [facility];  Service: ENT    N[place] TEAEC W/PATCH GRF CAROTID VERTB SUBCLAV NECK INC Left 03/05/2020    Procedure: ENDARTERECTOMY ARTERY CAROTID WITH BOVINE PATCH ANGIOPLASTY AND INTRAOP DUPLEX;  Surgeon: Zane Lopez MD;  Location: [facility];  Service: Vascular    SPINAL FUSION      US GUIDED LYMPH NODE BIOPSY LEFT  10/3/2022      Family History:     Family History   Problem Relation Age of Onset    Heart disease Mother     Parkinsonism Mother     Heart disease Father       Social History:     Social History     Socioeconomic History    Marital status: Single     Spouse name: Not on file    Number of children: 0    Years of education: Not on file    Highest education level: Not on file   Occupational History    Occupation:      Tobacco Use    Smoking status: Every Day     Current packs/day: 0.25     Average packs/day: 0.3 packs/day for 50.0 years (12.5 ttl pk-yrs)     Types: Cigarettes    Smokeless tobacco: Never    Tobacco comments:     Half pack per day   Vaping Use    Vaping status: Never Used   Substance and Sexual Activity    Alcohol use: Not Currently     Comment: 3 oz of wine with communion multiple times/day/week    Drug use: Never    Sexual activity: Not Currently     Comment: DAVIDSON   Other Topics Concern    Not on file   Social History Narrative    Lives independently     Social Determinants of Health     Financial Resource Strain: Not on file   Food Insecurity: No Food Insecurity (3/13/2024)    Hunger Vital Sign     Worried About Running Out of Food in the Last Year: Never true     Ran Out of Food in the Last Year: Never true   Transportation Needs: No Transportation Needs (3/13/2024)    PRAPARE - Transportation     Lack of Transportation (Medical): No     Lack of Transportation (Non-Medical): No   Physical Activity: Not on file   Stress: Not on file   Social Connections: Not on file   Intimate Partner Violence: Not on file   Housing Stability: Unknown (3/13/2024)    Housing Stability Vital Sign     Unable to Pay for Housing in the Last Year: No     Number of Places Lived in the Last Year: Not on file     Unstable Housing in the Last Year: No      Medications and Allergies:     Current Outpatient Medications   Medication Sig Dispense Refill    amLODIPine (NORVASC) 10 mg tablet Take 1 tablet (10 mg total) by mouth daily 90 tablet 0    fenofibrate 160 MG tablet Take 1 tablet (160 mg total) by mouth daily 90 tablet 0    lansoprazole (PREVACID) 30 mg capsule Take 1 capsule (30 mg total) by mouth daily 90 capsule 0    levothyroxine 25 mcg tablet Take 0.5 tablets (12.5 mcg total) by mouth daily 45 tablet 0    losartan (COZAAR) 50 mg tablet Take 1 tablet (50 mg total) by mouth daily 90 tablet 0    magnesium Oxide (MAG-OX) 400  mg TABS Take 1 tablet (400 mg total) by mouth 2 (two) times a day 180 tablet 0    metoprolol succinate (TOPROL-XL) 50 mg 24 hr tablet Take 0.5 tablets (25 mg total) by mouth daily 90 tablet 0    oxyCODONE-acetaminophen (Percocet) 5-325 mg per tablet Take 1 tablet by mouth every 8 (eight) hours as needed for moderate pain Max Daily Amount: 3 tablets 24 tablet 0    pancrelipase, Lip-Prot-Amyl, (Creon) 12,000 units capsule Take 12,000 units of lipase by mouth 3 (three) times a day with meals 90 capsule 0    rosuvastatin (CRESTOR) 10 MG tablet Take 1 tablet (10 mg total) by mouth daily Please STOP Vytorin.. 90 tablet 0    spironolactone (ALDACTONE) 25 mg tablet Take 0.5 tablets (12.5 mg total) by mouth daily 45 tablet 0    zolpidem (AMBIEN) 10 mg tablet Take by mouth daily at bedtime as needed       al mag oxide-diphenhydramine-lidocaine viscous (MAGIC MOUTHWASH) 1:1:1 suspension Swish and swallow 10 mL every 4 (four) hours as needed for mouth pain or discomfort (Patient not taking: Reported on 9/11/2023) 480 mL 1    clotrimazole-betamethasone (LOTRISONE) 1-0.05 % cream Apply topically as needed (chapped lips) To the corner of the mouth 15 g 0    Probiotic Product (VSL#3) CAPS Take 1 capsule by mouth 2 (two) times a day (Patient not taking: Reported on 9/11/2023) 60 capsule 6    silver sulfadiazine (SILVADENE,SSD) 1 % cream Apply topically daily (Patient not taking: Reported on 9/11/2023) 50 g 0    thiamine 100 MG tablet Take 1 tablet (100 mg total) by mouth daily 90 tablet 0     No current facility-administered medications for this visit.     No Known Allergies   Immunizations:     Immunization History   Administered Date(s) Administered    COVID-19 PFIZER VACCINE 0.3 ML IM 12/19/2020, 01/09/2021, 10/03/2021    H1N1, All Formulations 10/28/2009    INFLUENZA 10/28/2021    Influenza, high dose seasonal 0.7 mL 10/09/2020    Pneumococcal Conjugate 13-Valent 10/14/2020    Pneumococcal Polysaccharide PPV23 01/04/2022       Health Maintenance:         Topic Date Due    Colorectal Cancer Screening  12/27/2032    Hepatitis C Screening  Completed    Lung Cancer Screening  Discontinued         Topic Date Due    Hepatitis A Vaccine (1 of 2 - Risk 2-dose series) Never done    DTaP,Tdap,and Td Vaccines (1 - Tdap) Never done    COVID-19 Vaccine (4 - 2023-24 season) 01/02/2024      Medicare Screening Tests and Risk Assessments:     Omero is here for his Welcome to Medicare visit.     Health Risk Assessment:   Patient rates overall health as good. Patient feels that their physical health rating is much worse. Patient is dissatisfied with their life. Eyesight was rated as same. Hearing was rated as same. Patient feels that their emotional and mental health rating is slightly worse. Patients states they are never, rarely angry. Patient states they are often unusually tired/fatigued. Pain experienced in the last 7 days has been some. Patient's pain rating has been 5/10. Patient states that he has experienced no weight loss or gain in last 6 months.     Depression Screening:   PHQ-2 Score: 6  PHQ-9 Score: 15      Fall Risk Screening:   In the past year, patient has experienced: no history of falling in past year      Home Safety:  Patient does not have trouble with stairs inside or outside of their home. Patient has working smoke alarms and has no working carbon monoxide detector. Home safety hazards include: none.     Nutrition:   Current diet is Regular.     Medications:   Patient is currently taking over-the-counter supplements. OTC medications include: see medication list. Patient is able to manage medications.     Activities of Daily Living (ADLs)/Instrumental Activities of Daily Living (IADLs):   Walk and transfer into and out of bed and chair?: Yes  Dress and groom yourself?: Yes    Bathe or shower yourself?: Yes    Feed yourself? Yes  Do your laundry/housekeeping?: Yes  Manage your money, pay your bills and track your expenses?: Yes  Make  your own meals?: Yes    Do your own shopping?: Yes    Previous Hospitalizations:   Any hospitalizations or ED visits within the last 12 months?: No      Advance Care Planning:   Living will: No    Durable POA for healthcare: No    Advanced directive: No      PREVENTIVE SCREENINGS      Cardiovascular Screening:    General: Screening Not Indicated, History Lipid Disorder and Risks and Benefits Discussed      Diabetes Screening:     General: Screening Current and Risks and Benefits Discussed      Colorectal Cancer Screening:     General: Screening Current and Risks and Benefits Discussed      Prostate Cancer Screening:    General: Screening Current and Risks and Benefits Discussed      Osteoporosis Screening:    General: Risks and Benefits Discussed      Abdominal Aortic Aneurysm (AAA) Screening:    Risk factors include: age between 65-76 yo and tobacco use        General: Risks and Benefits Discussed      Lung Cancer Screening:     General: Screening Not Indicated and Risks and Benefits Discussed      Hepatitis C Screening:    General: Screening Current and Risks and Benefits Discussed    Screening, Brief Intervention, and Referral to Treatment (SBIRT)    Screening  Typical number of drinks in a day: 2  Typical number of drinks in a week: 14  Interpretation: Low risk drinking behavior.    AUDIT-C Screenin) How often did you have a drink containing alcohol in the past year? 4 or more times a week  2) How many drinks did you have on a typical day when you were drinking in the past year? 1 to 2  3) How often did you have 6 or more drinks on one occasion in the past year? never    AUDIT-C Score: 4  Interpretation: Score 4-12 (male): POSITIVE screen for alcohol misuse    AUDIT Screenin) How often during the last year have you found that you were not able to stop drinking once you had started? 0 - never  5) How often during the last year have you failed to do what was normally expected from you because of  drinking? 0 - never  6) How often during the last year have you needed a first drink in the morning to get yourself going after a heavy drinking session? 0 - never  7) How often during the last year have you had a feeling of guilt or remorse after drinking? 0 - never  8) How often during the last year have you been unable to remember what happened the night before because you had been drinking? 0 - never  9) Have you or someone else been injured as a result of your drinking? 0 - no  10) Has a relative or friend or a doctor or another health worker been concerned about your drinking or suggested you cut down? 0 - no    AUDIT Score: 4  Interpretation: Low risk alcohol consumption    Single Item Drug Screening:  How often have you used an illegal drug (including marijuana) or a prescription medication for non-medical reasons in the past year? never    Single Item Drug Screen Score: 0  Interpretation: Negative screen for possible drug use disorder    Other Counseling Topics:   Car/seat belt/driving safety, skin self-exam, sunscreen and calcium and vitamin D intake and regular weightbearing exercise.     No results found.     Physical Exam:     There were no vitals taken for this visit.    Physical Exam     Garry Vasquez MD

## 2024-03-13 NOTE — PROGRESS NOTES
Name: Omero Winston      : 1950      MRN: [id number]  Encounter Provider: Garry Vasquez MD  Encounter Date: 3/13/2024   Encounter department: [facility] C[facility]    Assessment & Plan     1. Encounter for general adult medical examination with abnormal findings  Comments:  done in Detail  life style Mod  Pt was advised to quit smoking  RTC in 3 mos w Blood work  Orders:  -     POCT hemoglobin A1c  -     POCT ECG  -     Vitamin B12; Future  -     Vitamin D 25 hydroxy; Future; Expected date: 2024  -     UA (URINE) with reflex to Scope; Future  -     Magnesium; Future  -     Lipid panel; Future; Expected date: 2024  -     CBC and differential; Future; Expected date: 2024  -     Comprehensive metabolic panel; Future; Expected date: 2024  -     Folate; Future  -     Vitamin B1 (Thiamine), Serum/Plasma, LC/MS/MS; Future    2. Chronic obstructive pulmonary disease, unspecified COPD type (HCC)  Comments:  continue same  advised to quit smoking    3. Other chronic pancreatitis (HCC)  Comments:  FU w GI    4. Aneurysm of ascending aorta without rupture (HCC)  Comments:  stable  continue same  Yearly CTA Chest    5. BMI 25.0-25.9,adult  -     amLODIPine (NORVASC) 10 mg tablet; Take 1 tablet (10 mg total) by mouth daily  -     losartan (COZAAR) 50 mg tablet; Take 1 tablet (50 mg total) by mouth daily  -     spironolactone (ALDACTONE) 25 mg tablet; Take 0.5 tablets (12.5 mg total) by mouth daily    6. Mixed hyperlipidemia  -     fenofibrate 160 MG tablet; Take 1 tablet (160 mg total) by mouth daily  -     rosuvastatin (CRESTOR) 10 MG tablet; Take 1 tablet (10 mg total) by mouth daily    7. Gastroesophageal reflux disease without esophagitis  -     lansoprazole (PREVACID) 30 mg capsule; Take 1 capsule (30 mg total) by mouth daily    8. Hypothyroidism (acquired)  -     levothyroxine 25 mcg tablet; Take 0.5 tablets (12.5 mcg total) by mouth daily    9. Hypomagnesemia  -      magnesium Oxide (MAG-OX) 400 mg TABS; Take 1 tablet (400 mg total) by mouth 2 (two) times a day    10. Essential hypertension  -     metoprolol succinate (TOPROL-XL) 50 mg 24 hr tablet; Take 0.5 tablets (25 mg total) by mouth daily    11. Liver function abnormality  -     rosuvastatin (CRESTOR) 10 MG tablet; Take 1 tablet (10 mg total) by mouth daily    12. Acute pancreatitis  -     thiamine 100 MG tablet; Take 1 tablet (100 mg total) by mouth daily    13. Neck mass  -     CT soft tissue neck w contrast; Future; Expected date: 03/13/2024  -     TSH, 3rd generation; Future; Expected date: 03/13/2024  -     T4, free; Future; Expected date: 03/13/2024  -     CBC and differential; Future; Expected date: 03/20/2024  -     Comprehensive metabolic panel; Future; Expected date: 09/13/2024  -     Folate; Future  -     Vitamin B1 (Thiamine), Serum/Plasma, LC/MS/MS; Future    14. Cigarette smoker  -     CT lung screening program; Future; Expected date: 03/13/2024  -     Ferritin; Future    15. Head and neck cancer (HCC)  Comments:  S/P Radiation Tx  stop smoking  fu w ent  Orders:  -     TSH, 3rd generation; Future; Expected date: 03/13/2024  -     T4, free; Future; Expected date: 03/13/2024  -     Ferritin; Future  -     Ambulatory Referral to Ophthalmology; Future; Expected date: 03/13/2024    16. Skin lesions  -     Ambulatory Referral to Dermatology; Future    Welcome to [facility] ; Done in Detail  Life style Mod  Rtc in 1-2 mos w blood work       Subjective      73 Y O Man is here for Welcome to [facility] Visit, and Regular check Up, he still smokes, ?? He has few symptoms, No recent Blood work, med list reviewed,...      Review of Systems   Constitutional:  Negative for chills, fatigue and fever.   HENT:  Positive for postnasal drip. Negative for congestion, facial swelling, sore throat, trouble swallowing and voice change.    Eyes:  Negative for pain, discharge and visual disturbance.   Respiratory:  Negative for cough,  shortness of breath and wheezing.    Cardiovascular:  Negative for chest pain, palpitations and leg swelling.   Gastrointestinal:  Negative for abdominal pain, blood in stool, constipation, diarrhea and nausea.   Endocrine: Negative for polydipsia, polyphagia and polyuria.   Genitourinary:  Negative for difficulty urinating, hematuria and urgency.   Musculoskeletal:  Positive for neck pain. Negative for arthralgias and myalgias.   Skin:  Negative for rash.   Neurological:  Negative for dizziness, tremors, weakness and headaches.   Hematological:  Negative for adenopathy. Does not bruise/bleed easily.   Psychiatric/Behavioral:  Negative for dysphoric mood, sleep disturbance and suicidal ideas.        Current Outpatient Medications on File Prior to Visit   Medication Sig    zolpidem (AMBIEN) 10 mg tablet Take by mouth daily at bedtime as needed     [DISCONTINUED] amLODIPine (NORVASC) 10 mg tablet Take 1 tablet (10 mg total) by mouth daily    [DISCONTINUED] fenofibrate 160 MG tablet Take 1 tablet (160 mg total) by mouth daily    [DISCONTINUED] lansoprazole (PREVACID) 30 mg capsule Take 1 capsule (30 mg total) by mouth daily    [DISCONTINUED] levothyroxine 25 mcg tablet Take 0.5 tablets (12.5 mcg total) by mouth daily    [DISCONTINUED] losartan (COZAAR) 50 mg tablet Take 1 tablet (50 mg total) by mouth daily    [DISCONTINUED] magnesium Oxide (MAG-OX) 400 mg TABS Take 1 tablet (400 mg total) by mouth 2 (two) times a day    [DISCONTINUED] metoprolol succinate (TOPROL-XL) 50 mg 24 hr tablet Take 0.5 tablets (25 mg total) by mouth daily    [DISCONTINUED] oxyCODONE-acetaminophen (Percocet) 5-325 mg per tablet Take 1 tablet by mouth every 8 (eight) hours as needed for moderate pain Max Daily Amount: 3 tablets    [DISCONTINUED] pancrelipase, Lip-Prot-Amyl, (Creon) 12,000 units capsule Take 12,000 units of lipase by mouth 3 (three) times a day with meals    [DISCONTINUED] rosuvastatin (CRESTOR) 10 MG tablet Take 1 tablet (10 mg  "total) by mouth daily Please STOP Vytorin..    [DISCONTINUED] spironolactone (ALDACTONE) 25 mg tablet Take 0.5 tablets (12.5 mg total) by mouth daily    al mag oxide-diphenhydramine-lidocaine viscous (MAGIC MOUTHWASH) 1:1:1 suspension Swish and swallow 10 mL every 4 (four) hours as needed for mouth pain or discomfort (Patient not taking: Reported on 9/11/2023)    Probiotic Product (VSL#3) CAPS Take 1 capsule by mouth 2 (two) times a day (Patient not taking: Reported on 9/11/2023)    [DISCONTINUED] atorvastatin (LIPITOR) 10 mg tablet Take 1 tablet (10 mg total) by mouth daily    [DISCONTINUED] clotrimazole-betamethasone (LOTRISONE) 1-0.05 % cream Apply topically as needed (chapped lips) To the corner of the mouth    [DISCONTINUED] silver sulfadiazine (SILVADENE,SSD) 1 % cream Apply topically daily (Patient not taking: Reported on 9/11/2023)    [DISCONTINUED] thiamine 100 MG tablet Take 1 tablet (100 mg total) by mouth daily       Objective     /82 (BP Location: Left arm, Patient Position: Sitting, Cuff Size: Standard)   Pulse 70   Temp 99.1 °F (37.3 °C) (Tympanic)   Resp 14   Ht 5' 3\" (1.6 m)   SpO2 96%   BMI 25.51 kg/m²     Physical Exam  Constitutional:       General: He is not in acute distress.  HENT:      Head: Normocephalic.      Mouth/Throat:      Pharynx: No oropharyngeal exudate.   Eyes:      General: No scleral icterus.     Conjunctiva/sclera: Conjunctivae normal.      Pupils: Pupils are equal, round, and reactive to light.   Neck:      Thyroid: No thyromegaly.   Cardiovascular:      Rate and Rhythm: Normal rate and regular rhythm.      Heart sounds: Murmur heard.   Pulmonary:      Effort: Pulmonary effort is normal. No respiratory distress.      Breath sounds: Wheezing present. No rales.   Abdominal:      General: Bowel sounds are normal. There is no distension.      Palpations: Abdomen is soft.      Tenderness: There is no abdominal tenderness. There is no guarding or rebound. "   Musculoskeletal:         General: No tenderness.      Cervical back: Neck supple.   Lymphadenopathy:      Cervical: No cervical adenopathy.   Skin:     Coloration: Skin is not pale.      Findings: Lesion present. No rash.      Comments: Neck lump/mass ????   Neurological:      Mental Status: He is alert and oriented to person, place, and time.      Sensory: No sensory deficit.      Motor: No weakness.       Garry Vasquez MD    Answers submitted by the patient for this visit:  AUDIT (Alcohol Use Disorders Identification Test) Screening (Submitted on 3/13/2024)  How often during the last year have you found that you were not able to stop drinking once you had started?: 0 - never  How often during the last year have you failed to do what was normally expected from you because of drinking?: 0 - never  How often during the last year have you needed a first drink in the morning to get yourself going after a heavy drinking session?: 0 - never  How often during the last year have you had a feeling of guilt or remorse after drinking?: 0 - never  How often during the last year have you been unable to remember what happened the night before because you had been drinking?: 0 - never  Have you or someone else been injured as a result of your drinking?: 0 - no  Has a relative or friend or a doctor or another health worker been concerned about your drinking or suggested you cut down?: 0 - no  Medicare Annual Wellness Visit (Submitted on 3/13/2024)  How would you rate your overall health?: good  Compared to last year, how is your physical health?: much worse  In general, how satisfied are you with your life?: dissatisfied  Compared to last year, how is your eyesight?: same  Compared to last year, how is your hearing?: same  Compared to last year, how is your emotional/mental health?: slightly worse  How often is anger a problem for you?: never, rarely  How often do you feel unusually tired/fatigued?: often  In the past 7 days, how  "much pain have you experienced?: some  If you answered \"some\" or \"a lot\", please rate the severity of your pain on a scale of 1 to 10 (1 being the least severe pain and 10 being the most intense pain).: 5/10  In the past 6 months, have you lost or gained 10 pounds without trying?: No  One or more falls in the last year: No  Do you have trouble with the stairs inside or outside your home?: No  Does your home have working smoke alarms?: Yes  Does your home have a carbon monoxide monitor?: No  Which safety hazards (if any) have you experienced in your home? Please select all that apply.: none  How would you describe your current diet? Please select all that apply.: Regular  In addition to prescription medications, are you taking any over-the-counter supplements?: Yes  If yes, what supplements are you taking?: B12, D, [id number]  Can you manage your medications?: Yes  Are you currently taking any opioid medications?: No  Can you walk and transfer into and out of your bed and chair?: Yes  Can you dress and groom yourself?: Yes  Can you bathe or shower yourself?: Yes  Can you feed yourself?: Yes  Can you do your laundry/ housekeeping?: Yes  Can you manage your money, pay your bills, and track your expenses?: Yes  Can you make your own meals?: Yes  Can you do your own shopping?: Yes  Within the last 12 months, have you had any hospitalizations or Emergency Department visits?: No  Do you have a living will?: No  Do you have a Durable POA (Power of ) for healthcare decisions?: No  Do you have an Advanced Directive for end of life decisions?: No  How often have you used an illegal drug (including marijuana) or a prescription medication for non-medical reasons in the past year?: never  What is the typical number of drinks you consume in a day?: 2  What is the typical number of drinks you consume in a week?: 14  How often did you have a drink containing alcohol in the past year?: 4 or more times a week  How many drinks " did you have on a typical day  when you were drinking in the past year?: 1 to 2  How often did you have 6 or more drinks on one occasion in the past year?: never

## 2024-03-13 NOTE — PATIENT INSTRUCTIONS
Medicare Preventive Visit Patient Instructions  Thank you for completing your Welcome to Medicare Visit or Medicare Annual Wellness Visit today. Your next wellness visit will be due in one year (3/14/2025).  The screening/preventive services that you may require over the next 5-10 years are detailed below. Some tests may not apply to you based off risk factors and/or age. Screening tests ordered at today's visit but not completed yet may show as past due. Also, please note that scanned in results may not display below.  Preventive Screenings:  Service Recommendations Previous Testing/Comments   Colorectal Cancer Screening  Colonoscopy    Fecal Occult Blood Test (FOBT)/Fecal Immunochemical Test (FIT)  Fecal DNA/Cologuard Test  Flexible Sigmoidoscopy Age: 45-75 years old   Colonoscopy: every 10 years (May be performed more frequently if at higher risk)  OR  FOBT/FIT: every 1 year  OR  Cologuard: every 3 years  OR  Sigmoidoscopy: every 5 years  Screening may be recommended earlier than age 45 if at higher risk for colorectal cancer. Also, an individualized decision between you and your healthcare provider will decide whether screening between the ages of 76-85 would be appropriate. Colonoscopy: 12/27/2022  FOBT/FIT: Not on file  Cologuard: Not on file  Sigmoidoscopy: Not on file    Screening Current     Prostate Cancer Screening Individualized decision between patient and health care provider in men between ages of 55-69   Medicare will cover every 12 months beginning on the day after your 50th birthday PSA: 2.05 ng/mL     Screening Current     Hepatitis C Screening Once for adults born between 1945 and 1965  More frequently in patients at high risk for Hepatitis C Hep C Antibody: 10/24/2019    Screening Current   Diabetes Screening 1-2 times per year if you're at risk for diabetes or have pre-diabetes Fasting glucose: 100 mg/dL (5/17/2023)  A1C: 5.8 % (9/6/2022)  Screening Current   Cholesterol Screening Once every 5  years if you don't have a lipid disorder. May order more often based on risk factors. Lipid panel: 05/17/2023  Screening Not Indicated  History Lipid Disorder      Other Preventive Screenings Covered by Medicare:  Abdominal Aortic Aneurysm (AAA) Screening: covered once if your at risk. You're considered to be at risk if you have a family history of AAA or a male between the age of 65-75 who smoking at least 100 cigarettes in your lifetime.  Lung Cancer Screening: covers low dose CT scan once per year if you meet all of the following conditions: (1) Age 55-77; (2) No signs or symptoms of lung cancer; (3) Current smoker or have quit smoking within the last 15 years; (4) You have a tobacco smoking history of at least 20 pack years (packs per day x number of years you smoked); (5) You get a written order from a healthcare provider.  Glaucoma Screening: covered annually if you're considered high risk: (1) You have diabetes OR (2) Family history of glaucoma OR (3)  aged 50 and older OR (4)  American aged 65 and older  Osteoporosis Screening: covered every 2 years if you meet one of the following conditions: (1) Have a vertebral abnormality; (2) On glucocorticoid therapy for more than 3 months; (3) Have primary hyperparathyroidism; (4) On osteoporosis medications and need to assess response to drug therapy.  HIV Screening: covered annually if you're between the age of 15-65. Also covered annually if you are younger than 15 and older than 65 with risk factors for HIV infection. For pregnant patients, it is covered up to 3 times per pregnancy.    Immunizations:  Immunization Recommendations   Influenza Vaccine Annual influenza vaccination during flu season is recommended for all persons aged >= 6 months who do not have contraindications   Pneumococcal Vaccine   * Pneumococcal conjugate vaccine = PCV13 (Prevnar 13), PCV15 (Vaxneuvance), PCV20 (Prevnar 20)  * Pneumococcal polysaccharide vaccine = PPSV23  (Pneumovax) Adults 19-65 yo with certain risk factors or if 65+ yo  If never received any pneumonia vaccine: recommend Prevnar 20 (PCV20)  Give PCV20 if previously received 1 dose of PCV13 or PPSV23   Hepatitis B Vaccine 3 dose series if at intermediate or high risk (ex: diabetes, end stage renal disease, liver disease)   Respiratory syncytial virus (RSV) Vaccine - COVERED BY MEDICARE PART D  * RSVPreF3 (Arexvy) CDC recommends that adults 60 years of age and older may receive a single dose of RSV vaccine using shared clinical decision-making (SCDM)   Tetanus (Td) Vaccine - COST NOT COVERED BY MEDICARE PART B Following completion of primary series, a booster dose should be given every 10 years to maintain immunity against tetanus. Td may also be given as tetanus wound prophylaxis.   Tdap Vaccine - COST NOT COVERED BY MEDICARE PART B Recommended at least once for all adults. For pregnant patients, recommended with each pregnancy.   Shingles Vaccine (Shingrix) - COST NOT COVERED BY MEDICARE PART B  2 shot series recommended in those 19 years and older who have or will have weakened immune systems or those 50 years and older     Health Maintenance Due:      Topic Date Due   • Colorectal Cancer Screening  12/27/2032   • Hepatitis C Screening  Completed   • Lung Cancer Screening  Discontinued     Immunizations Due:      Topic Date Due   • Hepatitis A Vaccine (1 of 2 - Risk 2-dose series) Never done   • DTaP,Tdap,and Td Vaccines (1 - Tdap) Never done   • COVID-19 Vaccine (4 - 2023-24 season) 01/02/2024     Advance Directives   What are advance directives?  Advance directives are legal documents that state your wishes and plans for medical care. These plans are made ahead of time in case you lose your ability to make decisions for yourself. Advance directives can apply to any medical decision, such as the treatments you want, and if you want to donate organs.   What are the types of advance directives?  There are many types  of advance directives, and each state has rules about how to use them. You may choose a combination of any of the following:  Living will:  This is a written record of the treatment you want. You can also choose which treatments you do not want, which to limit, and which to stop at a certain time. This includes surgery, medicine, IV fluid, and tube feedings.   Durable power of  for healthcare (DPAHC):  This is a written record that states who you want to make healthcare choices for you when you are unable to make them for yourself. This person, called a proxy, is usually a family member or a friend. You may choose more than 1 proxy.  Do not resuscitate (DNR) order:  A DNR order is used in case your heart stops beating or you stop breathing. It is a request not to have certain forms of treatment, such as CPR. A DNR order may be included in other types of advance directives.  Medical directive:  This covers the care that you want if you are in a coma, near death, or unable to make decisions for yourself. You can list the treatments you want for each condition. Treatment may include pain medicine, surgery, blood transfusions, dialysis, IV or tube feedings, and a ventilator (breathing machine).  Values history:  This document has questions about your views, beliefs, and how you feel and think about life. This information can help others choose the care that you would choose.  Why are advance directives important?  An advance directive helps you control your care. Although spoken wishes may be used, it is better to have your wishes written down. Spoken wishes can be misunderstood, or not followed. Treatments may be given even if you do not want them. An advance directive may make it easier for your family to make difficult choices about your care.   Depression   Depression  is a medical condition that causes feelings of sadness or hopelessness that do not go away. Depression may cause you to lose interest in  things you used to enjoy. These feelings may interfere with your daily life.  Call your local emergency number (911 in the [place]) if:   You think about harming yourself or someone else.  You have done something on purpose to hurt yourself.  The following resources are available at any time to help you, if needed:   National Suicide Prevention Lifeline: 1-693.170.2687 (6-016-236-TALK)   Suicide Hotline: 1-194.842.4923 (4-549-SODINKC)   For a list of international numbers: https://save.org/find-help/international-resources/  Treatment for depression may include medicine to relieve depression. Medicine is often used together with therapy. Therapy is a way for you to talk about your feelings and anything that may be causing depression. Therapy can be done alone or in a group. It may also be done with family members or a significant other.  Get regular physical activity.    Create a regular sleep schedule.    Eat a variety of healthy foods.    Do not drink alcohol or use drugs.     Cigarette Smoking and Your Health   Risks to your health if you smoke:  Nicotine and other chemicals found in tobacco damage every cell in your body. Even if you are a light smoker, you have an increased risk for cancer, heart disease, and lung disease. If you are pregnant or have diabetes, smoking increases your risk for complications.   Benefits to your health if you stop smoking:   You decrease respiratory symptoms such as coughing, wheezing, and shortness of breath.   You reduce your risk for cancers of the lung, mouth, throat, kidney, bladder, pancreas, stomach, and cervix. If you already have cancer, you increase the benefits of chemotherapy. You also reduce your risk for cancer returning or a second cancer from developing.   You reduce your risk for heart disease, blood clots, heart attack, and stroke.   You reduce your risk for lung infections, and diseases such as pneumonia, asthma, chronic bronchitis, and emphysema.  Your circulation  improves. More oxygen can be delivered to your body. If you have diabetes, you lower your risk for complications, such as kidney, artery, and eye diseases. You also lower your risk for nerve damage. Nerve damage can lead to amputations, poor vision, and blindness.  You improve your body's ability to heal and to fight infections.  For more information and support to stop smoking:   [employer]  Phone: 7- 666 - 087-8707  Web Address: www.[employer]  Weight Management   Why it is important to manage your weight:  Being overweight increases your risk of health conditions such as heart disease, high blood pressure, type 2 diabetes, and certain types of cancer. It can also increase your risk for osteoarthritis, sleep apnea, and other respiratory problems. Aim for a slow, steady weight loss. Even a small amount of weight loss can lower your risk of health problems.  How to lose weight safely:  A safe and healthy way to lose weight is to eat fewer calories and get regular exercise. You can lose up about 1 pound a week by decreasing the number of calories you eat by 500 calories each day.   Healthy meal plan for weight management:  A healthy meal plan includes a variety of foods, contains fewer calories, and helps you stay healthy. A healthy meal plan includes the following:  Eat whole-grain foods more often.  A healthy meal plan should contain fiber. Fiber is the part of grains, fruits, and vegetables that is not broken down by your body. Whole-grain foods are healthy and provide extra fiber in your diet. Some examples of whole-grain foods are whole-wheat breads and pastas, oatmeal, brown rice, and bulgur.  Eat a variety of vegetables every day.  Include dark, leafy greens such as spinach, kale, rosenda greens, and mustard greens. Eat yellow and orange vegetables such as carrots, sweet potatoes, and winter squash.   Eat a variety of fruits every day.  Choose fresh or canned fruit (canned in its own juice or light  "syrup) instead of juice. Fruit juice has very little or no fiber.  Eat low-fat dairy foods.  Drink fat-free (skim) milk or 1% milk. Eat fat-free yogurt and low-fat cottage cheese. Try low-fat cheeses such as mozzarella and other reduced-fat cheeses.  Choose meat and other protein foods that are low in fat.  Choose beans or other legumes such as split peas or lentils. Choose fish, skinless poultry (chicken or turkey), or lean cuts of red meat (beef or pork). Before you cook meat or poultry, cut off any visible fat.   Use less fat and oil.  Try baking foods instead of frying them. Add less fat, such as margarine, sour cream, regular salad dressing and mayonnaise to foods. Eat fewer high-fat foods. Some examples of high-fat foods include french fries, doughnuts, ice cream, and cakes.  Eat fewer sweets.  Limit foods and drinks that are high in sugar. This includes candy, cookies, regular soda, and sweetened drinks.  Exercise:  Exercise at least 30 minutes per day on most days of the week. Some examples of exercise include walking, biking, dancing, and swimming. You can also fit in more physical activity by taking the stairs instead of the elevator or parking farther away from stores. Ask your healthcare provider about the best exercise plan for you.   Alcohol Use and Your Health    Drinking too much can harm your health.  Excessive alcohol use leads to about 88,000 death in the [place] each year, and shortens the life of those who diet by almost 30 years.  Further, excessive drinking cost the economy $249 billion in 2010.  Most excessive drinkers are not alcohol dependent.    Excessive alcohol use has immediate effects that increase the risk of many harmful health conditions.  These are most often the result of binge drinking.  Over time, excessive alcohol use can lead to the development of chronic diseases and other series health problems.    What is considered a \"drink\"?        Excessive alcohol use " includes:  Binge Drinking: For women, 4 or more drinks consumed on one occasion. For men, 5 or more drinks consumed on one occasion.  Heavy Drinking: For women, 8 or more drinks per week. For men, 15 or more drinks per week  Any alcohol used by pregnant women  Any alcohol used by those under the age of 21 years    If you choose to drink, do so in moderation:  Do not drink at all if you are under the age of 21, or if you are or may be pregnant, or have health problems that could be made worse by drinking.  For women, up to 1 drink per day  For men, up to 2 drinks a day    No one should begin drinking or drink more frequently based on potential health benefits    Short-Term Health Risks:  Injuries: motor vehicle crashes, falls, drownings, burns  Violence: homicide, suicide, sexual assault, intimate partner violence  Alcohol poisoning  Reproductive health: risky sexual behaviors, unintended prengnacy, sexually transmitted diseases, miscarriage, stillbirth, fetal alcohol syndrome    Long-Term Health Risks:  Chronic diseases: high blood pressure, heart disease, stroke, liver disease, digestive problems  Cancers: breast, mouth and throat, liver, colon  Learning and memory problems: dementia, poor school performance  Mental health: depression, anxiety, insomnia  Social problems: lost productivity, family problems, unemployment  Alcohol dependence    For support and more information:  Substance Abuse and Mental Health Services Administration  [address]  [place] [address]  Web Address: http://www.samhsa.gov    Alcoholics Anonymous        Web Address: http://www.aa.org    https://www.cdc.gov/alcohol/fact-sheets/alcohol-use.htm     © Copyright [employer] 2018 Information is for End User's use only and may not be sold, redistributed or otherwise used for commercial purposes. All illustrations and images included in CareNotes® are the copyrighted property of [employer].D.A[employer]M., Inc. or [employer]

## 2024-03-17 NOTE — ASSESSMENT & PLAN NOTE
· Cessation counseling  · Nicotine patch ordered  · Nasal cannula oxygen p r n   To maintain oxygen saturations over 88% Calm

## 2024-04-12 ENCOUNTER — APPOINTMENT (OUTPATIENT)
Dept: LAB | Facility: CLINIC | Age: 74
End: 2024-04-12
Payer: MEDICARE

## 2024-04-12 DIAGNOSIS — C76.0 HEAD AND NECK CANCER (HCC): ICD-10-CM

## 2024-04-12 DIAGNOSIS — F17.210 CIGARETTE SMOKER: ICD-10-CM

## 2024-04-12 DIAGNOSIS — Z00.01 ENCOUNTER FOR GENERAL ADULT MEDICAL EXAMINATION WITH ABNORMAL FINDINGS: ICD-10-CM

## 2024-04-12 DIAGNOSIS — R22.1 NECK MASS: ICD-10-CM

## 2024-04-12 LAB
25(OH)D3 SERPL-MCNC: >120 NG/ML (ref 30–100)
BACTERIA UR QL AUTO: ABNORMAL /HPF
BILIRUB UR QL STRIP: NEGATIVE
CLARITY UR: CLEAR
COLOR UR: ABNORMAL
FERRITIN SERPL-MCNC: 109 NG/ML (ref 24–336)
FOLATE SERPL-MCNC: >22.3 NG/ML
GLUCOSE UR STRIP-MCNC: NEGATIVE MG/DL
HGB UR QL STRIP.AUTO: NEGATIVE
HYALINE CASTS #/AREA URNS LPF: ABNORMAL /LPF
KETONES UR STRIP-MCNC: NEGATIVE MG/DL
LEUKOCYTE ESTERASE UR QL STRIP: NEGATIVE
MAGNESIUM SERPL-MCNC: 1.4 MG/DL (ref 1.9–2.7)
NITRITE UR QL STRIP: NEGATIVE
NON-SQ EPI CELLS URNS QL MICRO: ABNORMAL /HPF
PH UR STRIP.AUTO: 7 [PH]
PROT UR STRIP-MCNC: ABNORMAL MG/DL
RBC #/AREA URNS AUTO: ABNORMAL /HPF
SP GR UR STRIP.AUTO: 1.01 (ref 1–1.03)
T4 FREE SERPL-MCNC: 0.93 NG/DL (ref 0.61–1.12)
TSH SERPL DL<=0.05 MIU/L-ACNC: 3.88 UIU/ML (ref 0.45–4.5)
UROBILINOGEN UR STRIP-ACNC: <2 MG/DL
VIT B12 SERPL-MCNC: 1064 PG/ML (ref 180–914)
WBC #/AREA URNS AUTO: ABNORMAL /HPF

## 2024-04-12 PROCEDURE — 84439 ASSAY OF FREE THYROXINE: CPT

## 2024-04-12 PROCEDURE — 36415 COLL VENOUS BLD VENIPUNCTURE: CPT

## 2024-04-12 PROCEDURE — 82728 ASSAY OF FERRITIN: CPT

## 2024-04-12 PROCEDURE — 82306 VITAMIN D 25 HYDROXY: CPT

## 2024-04-12 PROCEDURE — 83735 ASSAY OF MAGNESIUM: CPT

## 2024-04-12 PROCEDURE — 84425 ASSAY OF VITAMIN B-1: CPT

## 2024-04-12 PROCEDURE — 82746 ASSAY OF FOLIC ACID SERUM: CPT

## 2024-04-12 PROCEDURE — 82607 VITAMIN B-12: CPT

## 2024-04-12 PROCEDURE — 81001 URINALYSIS AUTO W/SCOPE: CPT

## 2024-04-12 PROCEDURE — 84443 ASSAY THYROID STIM HORMONE: CPT

## 2024-04-15 ENCOUNTER — TELEPHONE (OUTPATIENT)
Dept: FAMILY MEDICINE CLINIC | Facility: CLINIC | Age: 74
End: 2024-04-15

## 2024-04-15 DIAGNOSIS — E83.42 HYPOMAGNESEMIA: ICD-10-CM

## 2024-04-15 RX ORDER — LANOLIN ALCOHOL/MO/W.PET/CERES
400 CREAM (GRAM) TOPICAL 2 TIMES DAILY
Qty: 180 TABLET | Refills: 3 | Status: SHIPPED | OUTPATIENT
Start: 2024-04-15 | End: 2024-04-22 | Stop reason: SDUPTHER

## 2024-04-15 NOTE — TELEPHONE ENCOUNTER
----- Message from Garry Vasquez MD sent at 4/15/2024  8:21 AM EDT -----  Stop Vit B12 and Vit D for 3 mos. Increase Mag supplements to BID  ----- Message -----  From: Lab, Background User  Sent: 4/12/2024   4:35 PM EDT  To: Garry Vasquez MD

## 2024-04-15 NOTE — TELEPHONE ENCOUNTER
Called pt and left a detailed message in regards to labs, per dr ness pt is to stop taking vitamin b12 and d for 3 months and to increase his magnesium supplements to twice daily. If he has any other questions or concerns to please call the office and to keep his appointment for next week 4/24.

## 2024-04-15 NOTE — TELEPHONE ENCOUNTER
Patient returning call, reviewed PCP notes regarded previously. Patient verbalized understanding and states he is keeping his 4/24/24 appointment.

## 2024-04-16 ENCOUNTER — HOSPITAL ENCOUNTER (OUTPATIENT)
Dept: CT IMAGING | Facility: HOSPITAL | Age: 74
Discharge: HOME | End: 2024-04-16
Payer: MEDICARE

## 2024-04-16 DIAGNOSIS — F17.210 CIGARETTE SMOKER: ICD-10-CM

## 2024-04-16 DIAGNOSIS — R22.1 NECK MASS: ICD-10-CM

## 2024-04-16 PROCEDURE — 70491 CT SOFT TISSUE NECK W/DYE: CPT

## 2024-04-16 PROCEDURE — 71271 CT THORAX LUNG CANCER SCR C-: CPT

## 2024-04-16 RX ADMIN — IOHEXOL 85 ML: 350 INJECTION, SOLUTION INTRAVENOUS at 14:39

## 2024-04-19 LAB — MISCELLANEOUS LAB TEST RESULT: NORMAL

## 2024-04-22 DIAGNOSIS — E83.42 HYPOMAGNESEMIA: ICD-10-CM

## 2024-04-22 RX ORDER — LANOLIN ALCOHOL/MO/W.PET/CERES
400 CREAM (GRAM) TOPICAL 2 TIMES DAILY
Qty: 180 TABLET | Refills: 3 | Status: SHIPPED | OUTPATIENT
Start: 2024-04-22

## 2024-04-24 ENCOUNTER — OFFICE VISIT (OUTPATIENT)
Dept: FAMILY MEDICINE CLINIC | Facility: CLINIC | Age: 74
End: 2024-04-24
Payer: MEDICARE

## 2024-04-24 VITALS
BODY MASS INDEX: 24.98 KG/M2 | TEMPERATURE: 98.2 F | SYSTOLIC BLOOD PRESSURE: 118 MMHG | HEIGHT: 63 IN | RESPIRATION RATE: 14 BRPM | WEIGHT: 141 LBS | OXYGEN SATURATION: 93 % | HEART RATE: 72 BPM | DIASTOLIC BLOOD PRESSURE: 72 MMHG

## 2024-04-24 DIAGNOSIS — E83.42 HYPOMAGNESEMIA: ICD-10-CM

## 2024-04-24 DIAGNOSIS — D52.0 DIETARY FOLATE DEFICIENCY ANEMIA: ICD-10-CM

## 2024-04-24 DIAGNOSIS — Z00.01 ENCOUNTER FOR GENERAL ADULT MEDICAL EXAMINATION WITH ABNORMAL FINDINGS: Primary | ICD-10-CM

## 2024-04-24 DIAGNOSIS — F17.210 CIGARETTE SMOKER: ICD-10-CM

## 2024-04-24 DIAGNOSIS — E03.8 HYPOTHYROIDISM DUE TO HASHIMOTO'S THYROIDITIS: ICD-10-CM

## 2024-04-24 DIAGNOSIS — K21.00 GASTROESOPHAGEAL REFLUX DISEASE WITH ESOPHAGITIS WITHOUT HEMORRHAGE: ICD-10-CM

## 2024-04-24 DIAGNOSIS — L98.9 SKIN LESIONS: ICD-10-CM

## 2024-04-24 DIAGNOSIS — E06.3 HYPOTHYROIDISM DUE TO HASHIMOTO'S THYROIDITIS: ICD-10-CM

## 2024-04-24 DIAGNOSIS — K86.0 ALCOHOL-INDUCED CHRONIC PANCREATITIS (HCC): ICD-10-CM

## 2024-04-24 PROCEDURE — 99214 OFFICE O/P EST MOD 30 MIN: CPT | Performed by: INTERNAL MEDICINE

## 2024-04-24 PROCEDURE — G2211 COMPLEX E/M VISIT ADD ON: HCPCS | Performed by: INTERNAL MEDICINE

## 2024-04-24 RX ORDER — LEVOTHYROXINE SODIUM 0.03 MG/1
25 TABLET ORAL DAILY
Qty: 90 TABLET | Refills: 3 | Status: SHIPPED | OUTPATIENT
Start: 2024-04-24 | End: 2024-07-23

## 2024-04-24 RX ORDER — VONOPRAZAN FUMARATE 26.72 MG/1
20 TABLET ORAL DAILY
Qty: 60 TABLET | Refills: 0 | Status: SHIPPED | OUTPATIENT
Start: 2024-04-24

## 2024-04-24 NOTE — PROGRESS NOTES
Name: Omero Winston      : 1950      MRN: [id number]  Encounter Provider: Garry Vasquez MD  Encounter Date: 2024   Encounter department: [facility]    Assessment & Plan     1. Skin lesions  -     Ambulatory Referral to Dermatology; Future    2. Alcohol-induced chronic pancreatitis (HCC)  Comments:  Improved  Orders:  -     levothyroxine 25 mcg tablet; Take 1 tablet (25 mcg total) by mouth daily    3. Hypomagnesemia  -     Magnesium; Future  -     Vitamin B12; Future  -     Vitamin D 25 hydroxy; Future; Expected date: 2024    4. Hypothyroidism due to Hashimoto's thyroiditis  -     TSH, 3rd generation; Future; Expected date: 2024  -     T4, free; Future; Expected date: 2024  -     Vitamin B12; Future  -     Vitamin D 25 hydroxy; Future; Expected date: 2024    5. Cigarette smoker  -     Vitamin B12; Future  -     Vitamin D 25 hydroxy; Future; Expected date: 2024    6. Dietary folate deficiency anemia  -     Vitamin B12; Future    7. Gastroesophageal reflux disease with esophagitis without hemorrhage  -     Vonoprazan Fumarate (Voquezna) 20 MG TABS; Take 20 mg by mouth in the morning    8. Encounter for general adult medical examination with abnormal findings    RTC in  3mos w Blood work       Subjective      74 Y O Man is here for Annual physical exam and Regular check up, he feels better, recent Blood work and med list reviewed,...      Review of Systems   Constitutional:  Negative for chills, fatigue and fever.   HENT:  Negative for congestion, facial swelling, sore throat, trouble swallowing and voice change.    Eyes:  Negative for pain, discharge and visual disturbance.   Respiratory:  Negative for cough, shortness of breath and wheezing.    Cardiovascular:  Negative for chest pain, palpitations and leg swelling.   Gastrointestinal:  Negative for abdominal pain, blood in stool, constipation, diarrhea and nausea.   Endocrine:  Negative for polydipsia, polyphagia and polyuria.   Genitourinary:  Negative for difficulty urinating, hematuria and urgency.   Musculoskeletal:  Negative for arthralgias and myalgias.   Skin:  Negative for rash.   Neurological:  Negative for dizziness, tremors, weakness and headaches.   Hematological:  Negative for adenopathy. Does not bruise/bleed easily.   Psychiatric/Behavioral:  Negative for dysphoric mood, sleep disturbance and suicidal ideas.        Current Outpatient Medications on File Prior to Visit   Medication Sig    amLODIPine (NORVASC) 10 mg tablet Take 1 tablet (10 mg total) by mouth daily    fenofibrate 160 MG tablet Take 1 tablet (160 mg total) by mouth daily    losartan (COZAAR) 50 mg tablet Take 1 tablet (50 mg total) by mouth daily    magnesium Oxide (MAG-OX) 400 mg TABS Take 1 tablet (400 mg total) by mouth 2 (two) times a day    metoprolol succinate (TOPROL-XL) 50 mg 24 hr tablet Take 0.5 tablets (25 mg total) by mouth daily    pancrelipase, Lip-Prot-Amyl, (Creon) 12,000 units capsule TAKE 12,000 UNITS OF LIPASE BY MOUTH 3 (THREE) TIMES A DAY WITH MEALS    rosuvastatin (CRESTOR) 10 MG tablet Take 1 tablet (10 mg total) by mouth daily    spironolactone (ALDACTONE) 25 mg tablet Take 0.5 tablets (12.5 mg total) by mouth daily    thiamine 100 MG tablet Take 1 tablet (100 mg total) by mouth daily    zolpidem (AMBIEN) 10 mg tablet Take by mouth daily at bedtime as needed     [DISCONTINUED] lansoprazole (PREVACID) 30 mg capsule Take 1 capsule (30 mg total) by mouth daily    [DISCONTINUED] levothyroxine 25 mcg tablet Take 0.5 tablets (12.5 mcg total) by mouth daily    al mag oxide-diphenhydramine-lidocaine viscous (MAGIC MOUTHWASH) 1:1:1 suspension Swish and swallow 10 mL every 4 (four) hours as needed for mouth pain or discomfort (Patient not taking: Reported on 9/11/2023)    Probiotic Product (VSL#3) CAPS Take 1 capsule by mouth 2 (two) times a day (Patient not taking: Reported on 9/11/2023)     "[DISCONTINUED] atorvastatin (LIPITOR) 10 mg tablet Take 1 tablet (10 mg total) by mouth daily       Objective     /72 (BP Location: Left arm, Patient Position: Sitting, Cuff Size: Standard)   Pulse 72   Temp 98.2 °F (36.8 °C) (Tympanic)   Resp 14   Ht 5' 3\" (1.6 m)   Wt 64 kg (141 lb)   SpO2 93%   BMI 24.98 kg/m²     Physical Exam  Constitutional:       General: He is not in acute distress.  HENT:      Head: Normocephalic.      Mouth/Throat:      Pharynx: No oropharyngeal exudate.   Eyes:      General: No scleral icterus.     Conjunctiva/sclera: Conjunctivae normal.      Pupils: Pupils are equal, round, and reactive to light.   Neck:      Thyroid: No thyromegaly.   Cardiovascular:      Rate and Rhythm: Normal rate and regular rhythm.      Heart sounds: Normal heart sounds. No murmur heard.  Pulmonary:      Effort: Pulmonary effort is normal. No respiratory distress.      Breath sounds: Normal breath sounds. No wheezing or rales.   Abdominal:      General: Bowel sounds are normal. There is no distension.      Palpations: Abdomen is soft.      Tenderness: There is no abdominal tenderness. There is no guarding or rebound.   Musculoskeletal:         General: No tenderness.      Cervical back: Neck supple.   Lymphadenopathy:      Cervical: No cervical adenopathy.   Skin:     Coloration: Skin is not pale.      Findings: No rash.   Neurological:      Mental Status: He is alert and oriented to person, place, and time.      Sensory: No sensory deficit.      Motor: No weakness.       Garry Vasquez MD    "

## 2024-04-26 ENCOUNTER — TELEPHONE (OUTPATIENT)
Dept: FAMILY MEDICINE CLINIC | Facility: CLINIC | Age: 74
End: 2024-04-26

## 2024-05-01 NOTE — TELEPHONE ENCOUNTER
PA for  Voquezna  Denied    Reason:(Screenshot if applicable)        Message sent to office clinical pool Yes    Denial letter scanned into Media Yes    Appeal started No ( Provider will need to decide if appeal is warranted and send clinical documentation to PA team for initiation.)

## 2024-05-01 NOTE — TELEPHONE ENCOUNTER
PA for Voquezna 20MG tablets    Submitted via    [x]CMM-KEY [id number]  []SurescriDayima-Case ID #   []Faxed to plan   []Other website   []Phone call Case ID #     Office notes sent, clinical questions answered. Awaiting determination    Turnaround time for your insurance to make a decision on your Prior Authorization can take 7-21 business days.

## 2024-05-04 ENCOUNTER — TELEPHONE (OUTPATIENT)
Age: 74
End: 2024-05-04

## 2024-05-04 NOTE — TELEPHONE ENCOUNTER
Called pt to verify if he still needs appt for ASAP referral - skin lesions    Please call pt to triage and schedule appropriately

## 2024-07-08 ENCOUNTER — APPOINTMENT (OUTPATIENT)
Dept: LAB | Facility: CLINIC | Age: 74
End: 2024-07-08
Payer: MEDICARE

## 2024-07-08 DIAGNOSIS — E83.42 HYPOMAGNESEMIA: ICD-10-CM

## 2024-07-08 DIAGNOSIS — F17.210 CIGARETTE SMOKER: ICD-10-CM

## 2024-07-08 DIAGNOSIS — E06.3 HYPOTHYROIDISM DUE TO HASHIMOTO'S THYROIDITIS: ICD-10-CM

## 2024-07-08 DIAGNOSIS — D52.0 DIETARY FOLATE DEFICIENCY ANEMIA: ICD-10-CM

## 2024-07-08 DIAGNOSIS — E03.8 HYPOTHYROIDISM DUE TO HASHIMOTO'S THYROIDITIS: ICD-10-CM

## 2024-07-08 LAB
25(OH)D3 SERPL-MCNC: >120 NG/ML (ref 30–100)
MAGNESIUM SERPL-MCNC: 1.3 MG/DL (ref 1.9–2.7)
T4 FREE SERPL-MCNC: 1.11 NG/DL (ref 0.61–1.12)
TSH SERPL DL<=0.05 MIU/L-ACNC: 3.3 UIU/ML (ref 0.45–4.5)
VIT B12 SERPL-MCNC: 1083 PG/ML (ref 180–914)

## 2024-07-08 PROCEDURE — 82306 VITAMIN D 25 HYDROXY: CPT

## 2024-07-08 PROCEDURE — 84439 ASSAY OF FREE THYROXINE: CPT

## 2024-07-08 PROCEDURE — 84443 ASSAY THYROID STIM HORMONE: CPT

## 2024-07-08 PROCEDURE — 82607 VITAMIN B-12: CPT

## 2024-07-08 PROCEDURE — 83735 ASSAY OF MAGNESIUM: CPT

## 2024-07-08 PROCEDURE — 36415 COLL VENOUS BLD VENIPUNCTURE: CPT

## 2024-08-29 ENCOUNTER — OFFICE VISIT (OUTPATIENT)
Dept: FAMILY MEDICINE CLINIC | Facility: CLINIC | Age: 74
End: 2024-08-29
Payer: MEDICARE

## 2024-08-29 VITALS
OXYGEN SATURATION: 98 % | WEIGHT: 141.6 LBS | TEMPERATURE: 98.2 F | BODY MASS INDEX: 25.09 KG/M2 | HEIGHT: 63 IN | HEART RATE: 85 BPM | SYSTOLIC BLOOD PRESSURE: 106 MMHG | RESPIRATION RATE: 14 BRPM | DIASTOLIC BLOOD PRESSURE: 64 MMHG

## 2024-08-29 DIAGNOSIS — E83.42 HYPOMAGNESEMIA: ICD-10-CM

## 2024-08-29 DIAGNOSIS — R01.1 HEART MURMUR: ICD-10-CM

## 2024-08-29 DIAGNOSIS — F17.210 CIGARETTE SMOKER: ICD-10-CM

## 2024-08-29 DIAGNOSIS — M81.8 IDIOPATHIC OSTEOPOROSIS: ICD-10-CM

## 2024-08-29 DIAGNOSIS — E03.8 HYPOTHYROIDISM DUE TO HASHIMOTO'S THYROIDITIS: ICD-10-CM

## 2024-08-29 DIAGNOSIS — N40.0 ENLARGED PROSTATE: ICD-10-CM

## 2024-08-29 DIAGNOSIS — I71.21 ANEURYSM OF ASCENDING AORTA WITHOUT RUPTURE (HCC): ICD-10-CM

## 2024-08-29 DIAGNOSIS — E06.3 HYPOTHYROIDISM DUE TO HASHIMOTO'S THYROIDITIS: ICD-10-CM

## 2024-08-29 DIAGNOSIS — J44.9 COPD, SEVERE (HCC): Primary | ICD-10-CM

## 2024-08-29 PROCEDURE — 99214 OFFICE O/P EST MOD 30 MIN: CPT | Performed by: INTERNAL MEDICINE

## 2024-08-29 PROCEDURE — G2211 COMPLEX E/M VISIT ADD ON: HCPCS | Performed by: INTERNAL MEDICINE

## 2024-08-29 RX ORDER — BUDESONIDE, GLYCOPYRROLATE, AND FORMOTEROL FUMARATE 160; 9; 4.8 UG/1; UG/1; UG/1
2 AEROSOL, METERED RESPIRATORY (INHALATION) 2 TIMES DAILY
Qty: 10.7 G | Refills: 3 | Status: SHIPPED | OUTPATIENT
Start: 2024-08-29

## 2024-08-29 NOTE — PROGRESS NOTES
Ambulatory Visit  Name: Omero Winston      : 1950      MRN: [id number]  Encounter Provider: Garry Vasquez MD  Encounter Date: 2024   Encounter department: [facility] CARE [facility]    Assessment & Plan   1. Hypomagnesemia  -     Magnesium; Future  -     Comprehensive metabolic panel; Future; Expected date: 2024  -     Lipid panel; Future; Expected date: 2024  -     Vitamin B12; Future  -     Vitamin D 25 hydroxy; Future; Expected date: 2024  2. Hypothyroidism due to Hashimoto's thyroiditis  -     Magnesium; Future  -     TSH, 3rd generation; Future; Expected date: 2024  -     CBC and differential; Future; Expected date: 2024  -     Comprehensive metabolic panel; Future; Expected date: 2024  -     Lipid panel; Future; Expected date: 2024  -     Vitamin B12; Future  -     Vitamin D 25 hydroxy; Future; Expected date: 2024  3. Cigarette smoker  -     Magnesium; Future  -     TSH, 3rd generation; Future; Expected date: 2024  -     CBC and differential; Future; Expected date: 2024  -     Comprehensive metabolic panel; Future; Expected date: 2024  -     Lipid panel; Future; Expected date: 2024  -     Vitamin B12; Future  -     Vitamin D 25 hydroxy; Future; Expected date: 2024  4. Aneurysm of ascending aorta without rupture (HCC)  -     Echo complete w/ contrast if indicated; Future; Expected date: 2024  -     Magnesium; Future  -     TSH, 3rd generation; Future; Expected date: 2024  -     CBC and differential; Future; Expected date: 2024  -     Comprehensive metabolic panel; Future; Expected date: 2024  -     Lipid panel; Future; Expected date: 2024  -     Vitamin B12; Future  -     Vitamin D 25 hydroxy; Future; Expected date: 2024  5. Heart murmur  -     Echo complete w/ contrast if indicated; Future; Expected date: 2024  6. Idiopathic osteoporosis  -     DXA bone  "density spine hip and pelvis; Future; Expected date: 08/29/2024  -     Vitamin B12; Future  -     Vitamin D 25 hydroxy; Future; Expected date: 08/29/2024  7. Enlarged prostate  -     PSA Total, Diagnostic; Future  -     UA (URINE) with reflex to Scope; Future  Pt was advised to quit smoking  RTC in 3 mos w Blood work       History of Present Illness     74 Y O Man is here for Regular Check up, he still smokes, he has few symptoms, recent blood work and med list reviewed,...         Review of Systems   Constitutional:  Negative for chills, fatigue and fever.   HENT:  Negative for congestion, facial swelling, sore throat, trouble swallowing and voice change.    Eyes:  Negative for pain, discharge and visual disturbance.   Respiratory:  Positive for shortness of breath. Negative for cough and wheezing.    Cardiovascular:  Negative for chest pain, palpitations and leg swelling.   Gastrointestinal:  Negative for abdominal pain, blood in stool, constipation, diarrhea and nausea.   Endocrine: Negative for polydipsia, polyphagia and polyuria.   Genitourinary:  Negative for difficulty urinating, hematuria and urgency.   Musculoskeletal:  Negative for arthralgias and myalgias.   Skin:  Negative for rash.   Neurological:  Negative for dizziness, tremors, weakness and headaches.   Hematological:  Negative for adenopathy. Does not bruise/bleed easily.   Psychiatric/Behavioral:  Negative for dysphoric mood, sleep disturbance and suicidal ideas.        Objective     /64 (BP Location: Left arm, Patient Position: Sitting, Cuff Size: Standard)   Pulse 85   Temp 98.2 °F (36.8 °C) (Tympanic)   Resp 14   Ht 5' 3\" (1.6 m)   Wt 64.2 kg (141 lb 9.6 oz)   SpO2 98%   BMI 25.08 kg/m²     Physical Exam  Constitutional:       General: He is not in acute distress.     Appearance: He is well-developed.   HENT:      Head: Normocephalic.      Right Ear: External ear normal.      Left Ear: External ear normal.   Eyes:      Extraocular " Movements: EOM normal.      Pupils: Pupils are equal, round, and reactive to light.   Neck:      Thyroid: No thyromegaly.      Trachea: No tracheal deviation.   Cardiovascular:      Rate and Rhythm: Normal rate and regular rhythm.      Heart sounds: Murmur heard.      No friction rub.   Pulmonary:      Effort: Pulmonary effort is normal. No respiratory distress.      Breath sounds: Wheezing present.   Abdominal:      General: Bowel sounds are normal. There is no distension.      Palpations: Abdomen is soft.   Musculoskeletal:         General: No deformity or edema. Normal range of motion.      Cervical back: Neck supple.   Skin:     General: Skin is warm and dry.      Findings: No erythema or rash.   Neurological:      Mental Status: He is alert and oriented to person, place, and time.      Cranial Nerves: No cranial nerve deficit.      Coordination: Coordination normal.      Deep Tendon Reflexes: Reflexes normal.   Psychiatric:         Mood and Affect: Mood and affect normal.         Behavior: Behavior normal.       Administrative Statements

## 2024-11-06 ENCOUNTER — HOSPITAL ENCOUNTER (OUTPATIENT)
Dept: BONE DENSITY | Facility: MEDICAL CENTER | Age: 74
Discharge: HOME | End: 2024-11-06
Payer: MEDICARE

## 2024-11-06 DIAGNOSIS — M81.8 IDIOPATHIC OSTEOPOROSIS: ICD-10-CM

## 2024-11-06 PROCEDURE — 77080 DXA BONE DENSITY AXIAL: CPT

## 2024-11-11 DIAGNOSIS — M81.8 IDIOPATHIC OSTEOPOROSIS: Primary | ICD-10-CM

## 2024-11-11 RX ORDER — CAL/D3/MAG11/ZINC/COP/MANG/BOR 600 MG-800
1 TABLET ORAL 2 TIMES DAILY WITH MEALS
Qty: 200 TABLET | Refills: 6 | Status: SHIPPED | OUTPATIENT
Start: 2024-11-11

## 2024-11-13 ENCOUNTER — RESULTS FOLLOW-UP (OUTPATIENT)
Dept: FAMILY MEDICINE CLINIC | Facility: CLINIC | Age: 74
End: 2024-11-13

## 2024-11-13 NOTE — TELEPHONE ENCOUNTER
----- Message from Garry Vasquez MD sent at 11/11/2024  7:20 PM EST -----  Prolia Inj and caltarte Plus D  ----- Message -----  From: Interface, Radiology Results In  Sent: 11/11/2024  12:26 PM EST  To: Garry Vasquez MD

## 2024-11-15 ENCOUNTER — TELEPHONE (OUTPATIENT)
Age: 74
End: 2024-11-15

## 2024-11-15 ENCOUNTER — HOSPITAL ENCOUNTER (OUTPATIENT)
Dept: NUCLEAR MEDICINE | Facility: HOSPITAL | Age: 74
End: 2024-11-15
Payer: MEDICARE

## 2024-11-15 DIAGNOSIS — C76.0 PRIMARY CARCINOMA OF HEAD AND NECK REGION OF UNKNOWN CELL TYPE (HCC): ICD-10-CM

## 2024-11-15 LAB — GLUCOSE SERPL-MCNC: 99 MG/DL (ref 65–140)

## 2024-11-15 PROCEDURE — 78815 PET IMAGE W/CT SKULL-THIGH: CPT

## 2024-11-15 PROCEDURE — A9552 F18 FDG: HCPCS

## 2024-11-15 PROCEDURE — 82948 REAGENT STRIP/BLOOD GLUCOSE: CPT

## 2024-11-15 NOTE — TELEPHONE ENCOUNTER
Reason for call:   [x] Prior Auth  [] Other:     Caller:  [x] Patient  [] Pharmacy  Name:   Address:   Callback Number:     Medication: Prolia 60mg/1mL    Dose/Frequency: inject 1mL under skin once for 1 dose    Quantity: 1mL    Ordering Provider:   [x] PCP/Provider -   [] Speciality/Provider -     Has the patient tried other medications and failed? If failed, which medications did they fail?    [] No   [] Yes -     Is the patient's insurance updated in EPIC?   [x] Yes   [] No     Is a copy of the patient's insurance scanned in EPIC?   [x] Yes   [] No

## 2024-11-19 ENCOUNTER — TELEPHONE (OUTPATIENT)
Dept: FAMILY MEDICINE CLINIC | Facility: CLINIC | Age: 74
End: 2024-11-19

## 2024-11-19 NOTE — TELEPHONE ENCOUNTER
Prolia prior auth    Osteoporosis  M81.8    T-scores:    Lumbar +2.2  Hip -1.0  Femoral neck -1.5    Scanned into media

## 2024-11-20 ENCOUNTER — TELEPHONE (OUTPATIENT)
Age: 74
End: 2024-11-20

## 2024-11-20 ENCOUNTER — APPOINTMENT (OUTPATIENT)
Dept: LAB | Facility: CLINIC | Age: 74
End: 2024-11-20
Payer: MEDICARE

## 2024-11-20 DIAGNOSIS — F17.210 CIGARETTE SMOKER: ICD-10-CM

## 2024-11-20 DIAGNOSIS — Z00.01 ENCOUNTER FOR GENERAL ADULT MEDICAL EXAMINATION WITH ABNORMAL FINDINGS: ICD-10-CM

## 2024-11-20 DIAGNOSIS — N40.0 ENLARGED PROSTATE: ICD-10-CM

## 2024-11-20 DIAGNOSIS — I71.21 ANEURYSM OF ASCENDING AORTA WITHOUT RUPTURE (HCC): ICD-10-CM

## 2024-11-20 DIAGNOSIS — M81.8 IDIOPATHIC OSTEOPOROSIS: ICD-10-CM

## 2024-11-20 DIAGNOSIS — R22.1 NECK MASS: ICD-10-CM

## 2024-11-20 DIAGNOSIS — E06.3 HYPOTHYROIDISM DUE TO HASHIMOTO'S THYROIDITIS: ICD-10-CM

## 2024-11-20 DIAGNOSIS — E83.42 HYPOMAGNESEMIA: ICD-10-CM

## 2024-11-20 LAB
25(OH)D3 SERPL-MCNC: >120 NG/ML (ref 30–100)
ALBUMIN SERPL BCG-MCNC: 4.2 G/DL (ref 3.5–5)
ALP SERPL-CCNC: 40 U/L (ref 34–104)
ALT SERPL W P-5'-P-CCNC: 18 U/L (ref 7–52)
ANION GAP SERPL CALCULATED.3IONS-SCNC: 10 MMOL/L (ref 4–13)
AST SERPL W P-5'-P-CCNC: 28 U/L (ref 13–39)
BILIRUB SERPL-MCNC: 0.57 MG/DL (ref 0.2–1)
BILIRUB UR QL STRIP: NEGATIVE
BUN SERPL-MCNC: 16 MG/DL (ref 5–25)
CALCIUM SERPL-MCNC: 9.9 MG/DL (ref 8.4–10.2)
CHLORIDE SERPL-SCNC: 100 MMOL/L (ref 96–108)
CLARITY UR: CLEAR
CO2 SERPL-SCNC: 28 MMOL/L (ref 21–32)
COLOR UR: NORMAL
CREAT SERPL-MCNC: 1.27 MG/DL (ref 0.6–1.3)
GFR SERPL CREATININE-BSD FRML MDRD: 56 ML/MIN/1.73SQ M
GLUCOSE SERPL-MCNC: 90 MG/DL (ref 65–140)
GLUCOSE UR STRIP-MCNC: NEGATIVE MG/DL
HGB UR QL STRIP.AUTO: NEGATIVE
KETONES UR STRIP-MCNC: NEGATIVE MG/DL
LEUKOCYTE ESTERASE UR QL STRIP: NEGATIVE
MAGNESIUM SERPL-MCNC: 1.6 MG/DL (ref 1.9–2.7)
NITRITE UR QL STRIP: NEGATIVE
PH UR STRIP.AUTO: 7 [PH]
POTASSIUM SERPL-SCNC: 4 MMOL/L (ref 3.5–5.3)
PROT SERPL-MCNC: 7.1 G/DL (ref 6.4–8.4)
PROT UR STRIP-MCNC: NEGATIVE MG/DL
PSA SERPL-MCNC: 5.33 NG/ML (ref 0–4)
SODIUM SERPL-SCNC: 138 MMOL/L (ref 135–147)
SP GR UR STRIP.AUTO: 1.01 (ref 1–1.03)
TSH SERPL DL<=0.05 MIU/L-ACNC: 4.43 UIU/ML (ref 0.45–4.5)
UROBILINOGEN UR STRIP-ACNC: <2 MG/DL
VIT B12 SERPL-MCNC: 865 PG/ML (ref 180–914)

## 2024-11-20 PROCEDURE — 36415 COLL VENOUS BLD VENIPUNCTURE: CPT

## 2024-11-20 PROCEDURE — 81003 URINALYSIS AUTO W/O SCOPE: CPT

## 2024-11-20 PROCEDURE — 82607 VITAMIN B-12: CPT

## 2024-11-20 PROCEDURE — 82306 VITAMIN D 25 HYDROXY: CPT

## 2024-11-20 PROCEDURE — 80053 COMPREHEN METABOLIC PANEL: CPT

## 2024-11-20 PROCEDURE — 84443 ASSAY THYROID STIM HORMONE: CPT

## 2024-11-20 PROCEDURE — 83735 ASSAY OF MAGNESIUM: CPT

## 2024-11-20 PROCEDURE — 84153 ASSAY OF PSA TOTAL: CPT

## 2024-11-21 DIAGNOSIS — R97.20 ELEVATED PSA: ICD-10-CM

## 2024-11-21 DIAGNOSIS — K21.00 GASTROESOPHAGEAL REFLUX DISEASE WITH ESOPHAGITIS WITHOUT HEMORRHAGE: Primary | ICD-10-CM

## 2024-11-21 DIAGNOSIS — E83.42 HYPOMAGNESEMIA: ICD-10-CM

## 2024-11-21 RX ORDER — VONOPRAZAN FUMARATE 13.36 MG/1
10 TABLET ORAL DAILY
Qty: 90 TABLET | Refills: 1 | Status: SHIPPED | OUTPATIENT
Start: 2024-11-21

## 2024-11-21 RX ORDER — LANOLIN ALCOHOL/MO/W.PET/CERES
400 CREAM (GRAM) TOPICAL 2 TIMES DAILY
Qty: 180 TABLET | Refills: 3 | Status: SHIPPED | OUTPATIENT
Start: 2024-11-21

## 2024-11-21 NOTE — TELEPHONE ENCOUNTER
Referral shell created, no authorization is required through medicare a and b nor blue cross as it is a supplemental policy

## 2024-11-22 NOTE — TELEPHONE ENCOUNTER
Referral shell created, no authorization is required through Medicare A and B nor [employer] as it is a supplemental policy.

## 2024-11-26 ENCOUNTER — HOSPITAL ENCOUNTER (OUTPATIENT)
Dept: NON INVASIVE DIAGNOSTICS | Facility: HOSPITAL | Age: 74
Discharge: HOME | End: 2024-11-26
Payer: MEDICARE

## 2024-11-26 VITALS
DIASTOLIC BLOOD PRESSURE: 64 MMHG | SYSTOLIC BLOOD PRESSURE: 106 MMHG | WEIGHT: 141 LBS | HEART RATE: 85 BPM | BODY MASS INDEX: 24.98 KG/M2 | HEIGHT: 63 IN

## 2024-11-26 DIAGNOSIS — I71.21 ANEURYSM OF ASCENDING AORTA WITHOUT RUPTURE (HCC): ICD-10-CM

## 2024-11-26 DIAGNOSIS — I71.21 ANEURYSM OF ASCENDING AORTA WITHOUT RUPTURE (HCC): Primary | ICD-10-CM

## 2024-11-26 DIAGNOSIS — R01.1 HEART MURMUR: ICD-10-CM

## 2024-11-26 LAB
AORTIC ROOT: 3.8 CM
APICAL FOUR CHAMBER EJECTION FRACTION: 58 %
ASCENDING AORTA: 3.9 CM
BSA FOR ECHO PROCEDURE: 1.67 M2
E WAVE DECELERATION TIME: 200 MS
E/A RATIO: 0.78
FRACTIONAL SHORTENING: 19 (ref 28–44)
INTERVENTRICULAR SEPTUM IN DIASTOLE (PARASTERNAL SHORT AXIS VIEW): 1 CM
INTERVENTRICULAR SEPTUM: 1 CM (ref 0.6–1.1)
LAAS-AP2: 11.5 CM2
LAAS-AP4: 11.6 CM2
LEFT ATRIUM SIZE: 3.2 CM
LEFT ATRIUM VOLUME (MOD BIPLANE): 24 ML
LEFT ATRIUM VOLUME INDEX (MOD BIPLANE): 14.4 ML/M2
LEFT INTERNAL DIMENSION IN SYSTOLE: 3 CM (ref 2.1–4)
LEFT VENTRICULAR INTERNAL DIMENSION IN DIASTOLE: 3.7 CM (ref 3.5–6)
LEFT VENTRICULAR POSTERIOR WALL IN END DIASTOLE: 1.1 CM
LEFT VENTRICULAR STROKE VOLUME: 25 ML
LVSV (TEICH): 25 ML
MV E'TISSUE VEL-LAT: 7 CM/S
MV E'TISSUE VEL-SEP: 6 CM/S
MV PEAK A VEL: 1.14 M/S
MV PEAK E VEL: 89 CM/S
MV STENOSIS PRESSURE HALF TIME: 58 MS
MV VALVE AREA P 1/2 METHOD: 3.79
RA PRESSURE ESTIMATED: 5 MMHG
RIGHT ATRIUM AREA SYSTOLE A4C: 14.9 CM2
RIGHT VENTRICLE ID DIMENSION: 3.1 CM
RV PSP: 29 MMHG
SL CV LEFT ATRIUM LENGTH A2C: 4.7 CM
SL CV LV EF: 60
SL CV PED ECHO LEFT VENTRICLE DIASTOLIC VOLUME (MOD BIPLANE) 2D: 59 ML
SL CV PED ECHO LEFT VENTRICLE SYSTOLIC VOLUME (MOD BIPLANE) 2D: 34 ML
SL CV SINUS OF VALSALVA 2D: 3.9 CM
TR MAX PG: 24 MMHG
TR PEAK VELOCITY: 2.5 M/S
TRICUSPID ANNULAR PLANE SYSTOLIC EXCURSION: 2 CM
TRICUSPID VALVE PEAK REGURGITATION VELOCITY: 2.45 M/S

## 2024-11-26 PROCEDURE — 93306 TTE W/DOPPLER COMPLETE: CPT

## 2024-11-26 PROCEDURE — 93306 TTE W/DOPPLER COMPLETE: CPT | Performed by: STUDENT IN AN ORGANIZED HEALTH CARE EDUCATION/TRAINING PROGRAM

## 2024-12-05 ENCOUNTER — OFFICE VISIT (OUTPATIENT)
Dept: FAMILY MEDICINE CLINIC | Facility: CLINIC | Age: 74
End: 2024-12-05
Payer: MEDICARE

## 2024-12-05 VITALS
HEIGHT: 63 IN | SYSTOLIC BLOOD PRESSURE: 122 MMHG | TEMPERATURE: 98.2 F | OXYGEN SATURATION: 99 % | BODY MASS INDEX: 23.92 KG/M2 | DIASTOLIC BLOOD PRESSURE: 80 MMHG | HEART RATE: 66 BPM | WEIGHT: 135 LBS | RESPIRATION RATE: 14 BRPM

## 2024-12-05 DIAGNOSIS — Z23 ENCOUNTER FOR IMMUNIZATION: ICD-10-CM

## 2024-12-05 DIAGNOSIS — R97.20 ELEVATED PSA: Primary | ICD-10-CM

## 2024-12-05 DIAGNOSIS — K21.00 GASTROESOPHAGEAL REFLUX DISEASE WITH ESOPHAGITIS WITHOUT HEMORRHAGE: ICD-10-CM

## 2024-12-05 DIAGNOSIS — M81.8 IDIOPATHIC OSTEOPOROSIS: ICD-10-CM

## 2024-12-05 DIAGNOSIS — I71.21 ANEURYSM OF ASCENDING AORTA WITHOUT RUPTURE (HCC): ICD-10-CM

## 2024-12-05 DIAGNOSIS — R94.5 LIVER FUNCTION ABNORMALITY: ICD-10-CM

## 2024-12-05 DIAGNOSIS — K86.0 ALCOHOL-INDUCED CHRONIC PANCREATITIS (HCC): ICD-10-CM

## 2024-12-05 DIAGNOSIS — F17.210 CIGARETTE SMOKER: ICD-10-CM

## 2024-12-05 DIAGNOSIS — E78.2 MIXED HYPERLIPIDEMIA: ICD-10-CM

## 2024-12-05 DIAGNOSIS — E83.42 HYPOMAGNESEMIA: ICD-10-CM

## 2024-12-05 DIAGNOSIS — I10 ESSENTIAL HYPERTENSION: ICD-10-CM

## 2024-12-05 DIAGNOSIS — J44.9 CHRONIC OBSTRUCTIVE PULMONARY DISEASE, UNSPECIFIED COPD TYPE (HCC): ICD-10-CM

## 2024-12-05 PROBLEM — C79.9 METASTATIC CANCER (HCC): Status: RESOLVED | Noted: 2022-12-16 | Resolved: 2024-12-05

## 2024-12-05 PROBLEM — C77.0 SECONDARY MALIGNANT NEOPLASM OF LYMPH NODES OF NECK (HCC): Status: RESOLVED | Noted: 2023-01-23 | Resolved: 2024-12-05

## 2024-12-05 PROCEDURE — 99214 OFFICE O/P EST MOD 30 MIN: CPT | Performed by: INTERNAL MEDICINE

## 2024-12-05 PROCEDURE — G2211 COMPLEX E/M VISIT ADD ON: HCPCS | Performed by: INTERNAL MEDICINE

## 2024-12-05 RX ORDER — ROSUVASTATIN CALCIUM 10 MG/1
10 TABLET, COATED ORAL DAILY
Qty: 90 TABLET | Refills: 3 | Status: SHIPPED | OUTPATIENT
Start: 2024-12-05

## 2024-12-05 RX ORDER — VONOPRAZAN FUMARATE 13.36 MG/1
10 TABLET ORAL DAILY
Qty: 90 TABLET | Refills: 1 | Status: SHIPPED | OUTPATIENT
Start: 2024-12-05

## 2024-12-05 RX ORDER — CAL/D3/MAG11/ZINC/COP/MANG/BOR 600 MG-800
1 TABLET ORAL 2 TIMES DAILY WITH MEALS
Qty: 200 TABLET | Refills: 6 | Status: SHIPPED | OUTPATIENT
Start: 2024-12-05

## 2024-12-05 RX ORDER — LANOLIN ALCOHOL/MO/W.PET/CERES
400 CREAM (GRAM) TOPICAL 2 TIMES DAILY
Qty: 180 TABLET | Refills: 3 | Status: SHIPPED | OUTPATIENT
Start: 2024-12-05

## 2024-12-05 RX ORDER — FENOFIBRATE 160 MG/1
160 TABLET ORAL DAILY
Qty: 90 TABLET | Refills: 3 | Status: SHIPPED | OUTPATIENT
Start: 2024-12-05

## 2024-12-05 RX ORDER — AMLODIPINE BESYLATE 10 MG/1
10 TABLET ORAL DAILY
Qty: 90 TABLET | Refills: 3 | Status: SHIPPED | OUTPATIENT
Start: 2024-12-05

## 2024-12-05 RX ORDER — LEVOTHYROXINE SODIUM 25 UG/1
25 TABLET ORAL DAILY
Qty: 90 TABLET | Refills: 3 | Status: SHIPPED | OUTPATIENT
Start: 2024-12-05 | End: 2025-03-05

## 2024-12-05 RX ORDER — METOPROLOL SUCCINATE 50 MG/1
25 TABLET, EXTENDED RELEASE ORAL DAILY
Qty: 90 TABLET | Refills: 3 | Status: SHIPPED | OUTPATIENT
Start: 2024-12-05

## 2024-12-05 RX ORDER — LOSARTAN POTASSIUM 50 MG/1
50 TABLET ORAL DAILY
Qty: 90 TABLET | Refills: 3 | Status: SHIPPED | OUTPATIENT
Start: 2024-12-05

## 2024-12-05 RX ORDER — SPIRONOLACTONE 25 MG/1
12.5 TABLET ORAL DAILY
Qty: 45 TABLET | Refills: 3 | Status: SHIPPED | OUTPATIENT
Start: 2024-12-05

## 2024-12-05 RX ORDER — LANOLIN ALCOHOL/MO/W.PET/CERES
100 CREAM (GRAM) TOPICAL DAILY
Qty: 90 TABLET | Refills: 3 | Status: SHIPPED | OUTPATIENT
Start: 2024-12-05

## 2024-12-06 NOTE — PROGRESS NOTES
Name: Omero Winston      : 1950      MRN: [id number]  Encounter Provider: Garry Vasquez MD  Encounter Date: 2024   Encounter department: [facility]ARE [facility]  :  Assessment & Plan  BMI 25.0-25.9,adult    Orders:    amLODIPine (NORVASC) 10 mg tablet; Take 1 tablet (10 mg total) by mouth daily    losartan (COZAAR) 50 mg tablet; Take 1 tablet (50 mg total) by mouth daily    spironolactone (ALDACTONE) 25 mg tablet; Take 0.5 tablets (12.5 mg total) by mouth daily    Idiopathic osteoporosis    Orders:    Calcium Carbonate-Vit D-Min (Caltrate 600+D Plus Minerals) 600-800 MG-UNIT TABS; Take 1 tablet by mouth 2 (two) times a day with meals    denosumab (PROLIA) 60 mg/mL; Inject 1 mL (60 mg total) under the skin once for 1 dose    Mixed hyperlipidemia    Orders:    fenofibrate 160 MG tablet; Take 1 tablet (160 mg total) by mouth daily    rosuvastatin (CRESTOR) 10 MG tablet; Take 1 tablet (10 mg total) by mouth daily    Alcohol-induced chronic pancreatitis (HCC)  Improved nicely    Orders:    levothyroxine 25 mcg tablet; Take 1 tablet (25 mcg total) by mouth daily    Hypomagnesemia    Orders:    magnesium Oxide (MAG-OX) 400 mg TABS; Take 1 tablet (400 mg total) by mouth 2 (two) times a day    Essential hypertension    Orders:    metoprolol succinate (TOPROL-XL) 50 mg 24 hr tablet; Take 0.5 tablets (25 mg total) by mouth daily    PSA, total and free; Future    TSH, 3rd generation; Future    T4, free; Future    CBC and differential; Future    Lipid panel; Future    Vitamin D 25 hydroxy; Future    Liver function abnormality    Orders:    rosuvastatin (CRESTOR) 10 MG tablet; Take 1 tablet (10 mg total) by mouth daily    Cigarette smoker    Orders:    thiamine 100 MG tablet; Take 1 tablet (100 mg total) by mouth daily    Gastroesophageal reflux disease with esophagitis without hemorrhage    Orders:    Vonoprazan Fumarate (Voquezna) 10 MG TABS; Take 10 mg by mouth in the  "morning    Elevated PSA    Orders:    PSA, total and free; Future    TSH, 3rd generation; Future    T4, free; Future    CBC and differential; Future    Lipid panel; Future    Vitamin D 25 hydroxy; Future    Encounter for immunization         Aneurysm of ascending aorta without rupture (HCC)  stable         Chronic obstructive pulmonary disease, unspecified COPD type (HCC)  Stop smoking  Use inhalers       FU w hepatology  FU w GI  FU w Pulmonary   w Urology  RTC in 3 mos w Blood work         History of Present Illness     74 Y O Man is here for Regular Check up, he still smokes, he has few symptoms, recent blood work and med list reviewed,....      Review of Systems   Constitutional:  Negative for chills, fatigue and fever.   HENT:  Negative for congestion, facial swelling, sore throat, trouble swallowing and voice change.    Eyes:  Negative for pain, discharge and visual disturbance.   Respiratory:  Negative for cough, shortness of breath and wheezing.    Cardiovascular:  Negative for chest pain, palpitations and leg swelling.   Gastrointestinal:  Negative for abdominal pain, blood in stool, constipation, diarrhea and nausea.   Endocrine: Negative for polydipsia, polyphagia and polyuria.   Genitourinary:  Positive for frequency and urgency. Negative for difficulty urinating and hematuria.   Musculoskeletal:  Negative for arthralgias and myalgias.   Skin:  Negative for rash.   Neurological:  Negative for dizziness, tremors, weakness and headaches.   Hematological:  Negative for adenopathy. Does not bruise/bleed easily.   Psychiatric/Behavioral:  Negative for dysphoric mood, sleep disturbance and suicidal ideas.           Objective   /80 (BP Location: Left arm, Patient Position: Standing, Cuff Size: Standard)   Pulse 66   Temp 98.2 °F (36.8 °C) (Tympanic)   Resp 14   Ht 5' 3\" (1.6 m)   Wt 61.2 kg (135 lb)   SpO2 99%   BMI 23.91 kg/m²      Physical Exam  Constitutional:       General: He is not in acute " distress.     Appearance: He is well-developed.   HENT:      Head: Normocephalic.      Right Ear: External ear normal.      Left Ear: External ear normal.   Eyes:      Pupils: Pupils are equal, round, and reactive to light.   Neck:      Thyroid: No thyromegaly.      Trachea: No tracheal deviation.   Cardiovascular:      Rate and Rhythm: Normal rate and regular rhythm.      Heart sounds: Murmur heard.      No friction rub.   Pulmonary:      Effort: Pulmonary effort is normal. No respiratory distress.      Breath sounds: Normal breath sounds. No wheezing.   Abdominal:      General: Bowel sounds are normal. There is no distension.      Palpations: Abdomen is soft.   Musculoskeletal:         General: No deformity. Normal range of motion.      Cervical back: Neck supple.   Skin:     General: Skin is warm and dry.      Findings: No erythema or rash.   Neurological:      Mental Status: He is alert and oriented to person, place, and time.      Cranial Nerves: No cranial nerve deficit.      Coordination: Coordination normal.      Deep Tendon Reflexes: Reflexes normal.   Psychiatric:         Behavior: Behavior normal.

## 2024-12-06 NOTE — ASSESSMENT & PLAN NOTE
Improved nicely    Orders:    levothyroxine 25 mcg tablet; Take 1 tablet (25 mcg total) by mouth daily

## 2024-12-06 NOTE — ASSESSMENT & PLAN NOTE
Stop smoking  Use inhalers       FU w hepatology  FU w GI  FU w Pulmonary   w Urology  RTC in 3 mos w Blood work

## 2024-12-06 NOTE — PATIENT INSTRUCTIONS

## 2024-12-06 NOTE — ASSESSMENT & PLAN NOTE
Orders:    fenofibrate 160 MG tablet; Take 1 tablet (160 mg total) by mouth daily    rosuvastatin (CRESTOR) 10 MG tablet; Take 1 tablet (10 mg total) by mouth daily

## 2024-12-27 DIAGNOSIS — F51.01 PRIMARY INSOMNIA: Primary | ICD-10-CM

## 2024-12-27 DIAGNOSIS — F17.210 CIGARETTE SMOKER: ICD-10-CM

## 2024-12-27 DIAGNOSIS — J44.9 COPD, SEVERE (HCC): ICD-10-CM

## 2024-12-30 RX ORDER — BUDESONIDE, GLYCOPYRROLATE, AND FORMOTEROL FUMARATE 160; 9; 4.8 UG/1; UG/1; UG/1
2 AEROSOL, METERED RESPIRATORY (INHALATION) 2 TIMES DAILY
Qty: 10.7 G | Refills: 0 | Status: SHIPPED | OUTPATIENT
Start: 2024-12-30

## 2024-12-30 RX ORDER — ZOLPIDEM TARTRATE 10 MG/1
10 TABLET ORAL
Qty: 30 TABLET | Refills: 2 | Status: SHIPPED | OUTPATIENT
Start: 2024-12-30

## 2025-01-02 ENCOUNTER — TELEPHONE (OUTPATIENT)
Dept: UROLOGY | Facility: MEDICAL CENTER | Age: 75
End: 2025-01-02

## 2025-01-03 ENCOUNTER — OFFICE VISIT (OUTPATIENT)
Dept: UROLOGY | Facility: MEDICAL CENTER | Age: 75
End: 2025-01-03
Payer: MEDICARE

## 2025-01-03 VITALS
DIASTOLIC BLOOD PRESSURE: 80 MMHG | SYSTOLIC BLOOD PRESSURE: 140 MMHG | HEIGHT: 63 IN | WEIGHT: 135 LBS | OXYGEN SATURATION: 95 % | HEART RATE: 72 BPM | BODY MASS INDEX: 23.92 KG/M2

## 2025-01-03 DIAGNOSIS — R97.20 INCREASED PROSTATE SPECIFIC ANTIGEN (PSA) VELOCITY: ICD-10-CM

## 2025-01-03 DIAGNOSIS — R97.20 ELEVATED PSA: ICD-10-CM

## 2025-01-03 DIAGNOSIS — K21.00 GASTROESOPHAGEAL REFLUX DISEASE WITH ESOPHAGITIS WITHOUT HEMORRHAGE: Primary | ICD-10-CM

## 2025-01-03 DIAGNOSIS — C76.0 PRIMARY CARCINOMA OF HEAD AND NECK REGION OF UNKNOWN CELL TYPE (HCC): ICD-10-CM

## 2025-01-03 DIAGNOSIS — N40.1 BPH WITH ELEVATED PSA AND LOWER URINARY TRACT SYMPTOMS: Primary | ICD-10-CM

## 2025-01-03 DIAGNOSIS — R97.20 BPH WITH ELEVATED PSA AND LOWER URINARY TRACT SYMPTOMS: Primary | ICD-10-CM

## 2025-01-03 LAB
POST-VOID RESIDUAL VOLUME, ML POC: 36 ML
SL AMB  POCT GLUCOSE, UA: NORMAL
SL AMB LEUKOCYTE ESTERASE,UA: NORMAL
SL AMB POCT BILIRUBIN,UA: NORMAL
SL AMB POCT BLOOD,UA: NORMAL
SL AMB POCT CLARITY,UA: CLEAR
SL AMB POCT COLOR,UA: YELLOW
SL AMB POCT KETONES,UA: NORMAL
SL AMB POCT NITRITE,UA: NORMAL
SL AMB POCT PH,UA: 7
SL AMB POCT SPECIFIC GRAVITY,UA: 1.01
SL AMB POCT URINE PROTEIN: NORMAL
SL AMB POCT UROBILINOGEN: 0.2

## 2025-01-03 PROCEDURE — 99203 OFFICE O/P NEW LOW 30 MIN: CPT | Performed by: UROLOGY

## 2025-01-03 PROCEDURE — 81003 URINALYSIS AUTO W/O SCOPE: CPT | Performed by: UROLOGY

## 2025-01-03 PROCEDURE — 51798 US URINE CAPACITY MEASURE: CPT | Performed by: UROLOGY

## 2025-01-03 RX ORDER — LANSOPRAZOLE 30 MG/1
30 CAPSULE, DELAYED RELEASE ORAL DAILY
COMMUNITY
End: 2025-01-03 | Stop reason: SDUPTHER

## 2025-01-03 RX ORDER — TAMSULOSIN HYDROCHLORIDE 0.4 MG/1
0.4 CAPSULE ORAL
Qty: 90 CAPSULE | Refills: 3 | Status: SHIPPED | OUTPATIENT
Start: 2025-01-03

## 2025-01-03 NOTE — LETTER
January 3, 2025     No Recipients    Patient: Omero Winston   YOB: 1950   Date of Visit: 1/3/2025       Dear Dr. Vasquez:    Thank you for referring Omero Winston to me for evaluation. Below are my notes for this consultation.    If you have questions, please do not hesitate to call me. I look forward to following your patient along with you.         Sincerely,        Toro Kimbrough MD        CC: No Recipients    Toro Kimbrough MD  1/3/2025  4:12 PM  Incomplete  Name: Omero Winston      : 1950      MRN: [id number]  Encounter Provider: Toro Kimbrough MD  Encounter Date: 1/3/2025   Encounter department: [facility] UROLOGY [place]  :  Assessment & Plan  BPH with elevated PSA and lower urinary tract symptoms    Orders:  •  POCT urine dip auto non-scope  •  POCT Measure PVR  •  Cystoscopy; Future  •  tamsulosin (FLOMAX) 0.4 mg; Take 1 capsule (0.4 mg total) by mouth daily with dinner    Elevated PSA    Orders:  •  MRI prostate multiparametric wo w contrast; Future  •  Basic metabolic panel    Increased prostate specific antigen (PSA) velocity    Orders:  •  MRI prostate multiparametric wo w contrast; Future  •  Basic metabolic panel    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 male who presents ***  AUA SYMPTOM SCORE      Flowsheet Row Most Recent Value   AUA SYMPTOM SCORE    How often have you had a sensation of not emptying your bladder completely after you finished urinating? 5 (P)     How often have you had to urinate again less than two hours after you finished urinating? 3 (P)     How often have you found you stopped and started again several times when you urinate? 0 (P)     How often have you found it difficult to postpone urination? 3 (P)     How often  "have you had a weak urinary stream? 5 (P)     How often have you had to push or strain to begin urination? 4 (P)     How many times did you most typically get up to urinate from the time you went to bed at night until the time you got up in the morning? 1 (P)     Quality of Life: If you were to spend the rest of your life with your urinary condition just the way it is now, how would you feel about that? 5 (P)     AUA SYMPTOM SCORE 21 (P)            Review of Systems  {Select to insert medical history sections (Optional):33444}     Objective{?Quick Links Trend Vitals * Enter New Vitals * Results Review * Timeline (Adult) * Labs * Imaging * Cardiology * Procedures * Lung Cancer Screening * Surgical eConsent :[id number]}  /80 (BP Location: Left arm, Patient Position: Sitting, Cuff Size: Standard)   Pulse 72   Ht 5' 3\" (1.6 m)   Wt 61.2 kg (135 lb)   SpO2 95%   BMI 23.91 kg/m²     Physical Exam ***    Results  Lab Results   Component Value Date    PSA 5.328 (H) 11/20/2024    PSA 2.05 05/17/2023    PSA 1.4 04/15/2022     Lab Results   Component Value Date    CALCIUM 9.9 11/20/2024    K 4.0 11/20/2024    CO2 28 11/20/2024     11/20/2024    BUN 16 11/20/2024    CREATININE 1.27 11/20/2024     Lab Results   Component Value Date    WBC 6.69 05/17/2023    HGB 11.7 (L) 05/17/2023    HCT 35.5 (L) 05/17/2023     (H) 05/17/2023     05/17/2023       Office Urine Dip  Recent Results (from the past hour)   POCT Measure PVR    Collection Time: 01/03/25  3:21 PM   Result Value Ref Range    POST-VOID RESIDUAL VOLUME, ML POC 36 mL   POCT urine dip auto non-scope    Collection Time: 01/03/25  3:22 PM   Result Value Ref Range     COLOR,UA yellow     CLARITY,UA clear     SPECIFIC GRAVITY,UA 1.015      PH,UA 7.0     LEUKOCYTE ESTERASE,UA n     NITRITE,UA n     GLUCOSE, UA n     KETONES,UA n     BILIRUBIN,UA n     BLOOD,UA n     POCT URINE PROTEIN n     SL AMB POCT UROBILINOGEN 0.2    ]    {Administrative / Billing " Section (Optional):64247}

## 2025-01-03 NOTE — LETTER
January 3, 2025     Garry Vasquez MD  [address] [place] [id number]    Patient: Omero Winston   YOB: 1950   Date of Visit: 1/3/2025       Dear Dr. Vasquez:    Thank you for referring Omero Winston to me for evaluation. Below are my notes for this consultation.    If you have questions, please do not hesitate to call me. I look forward to following your patient along with you.         Sincerely,        Toro Kimbrough MD        CC: No Recipients    Toro Kimbrough MD  1/3/2025  4:25 PM  Sign when Signing Visit  Name: Omero Winston      : 1950      MRN: [id number]  Encounter Provider: Toro Kimbrough MD  Encounter Date: 1/3/2025   Encounter [facility]  :  Assessment & Plan  BPH with elevated PSA and lower urinary tract symptoms  Patient has a high AUA symptom score complains of a weakened urinary stream with straining to void and urinary hesitancy having to sit to void.  Oddly enough he does not have appreciable nocturia no urinary incontinence no hematuria.  Plan cystoscopy after trial of alpha blockade.  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 male who presents with an elevated AUA symptom score complaining of urinary hesitancy straining and sitting to void.  The patient notes that this has been present for the past year to 2  years.  He also notes an elevated PSA with a jump from a normal PSA to an abnormal level between 2023 and 2024.  He presents for evaluation  AUA SYMPTOM SCORE      Flowsheet Row Most Recent Value   AUA SYMPTOM SCORE    How often have you had a sensation of not emptying your bladder completely after you finished urinating? 5 (P)     How often have you had to urinate again less than two hours after you finished urinating? 3 (P)     How often have you found you stopped and started again several times when you urinate? 0 (P)     How often have you found it difficult to postpone urination? 3 (P)     How often have you had a weak urinary stream? 5 (P)     How often have you had to push or strain to begin urination? 4 (P)     How many times did you most typically get up to urinate from the time you went to bed at night until the time you got up in the morning? 1 (P)     Quality of Life: If you were to spend the rest of your life with your urinary condition just the way it is now, how would you feel about that? 5 (P)     AUA SYMPTOM SCORE 21 (P)            Review of Systems   Genitourinary:  Positive for dysuria, frequency and urgency.   All other systems reviewed and are negative.    Pertinent Medical History          Medical History Reviewed by provider this encounter:  Problems     .  Past Medical History  Past Medical History:   Diagnosis Date   • A-fib (HCC)    • Arthritis    • Avascular necrosis of bone of hip, left (HCC)     replaced   • Back pain    • CAD (coronary artery disease)    • Carotid artery narrowing     left side -for endarterectomy today 3/5/2020   • Disease of thyroid gland     hypo   • GERD (gastroesophageal reflux disease)    • History of Clostridioides difficile infection    • Hyperlipidemia    • Hypertension    • Irregular heart beat    • Jejunitis    • Kidney stone    • Metastatic cancer (HCC)    • Neck pain    • Pancreatitis    • Spinal stenosis    • Voice disorder 2022   • Wears glasses       Past Surgical History:   Procedure Laterality Date   • BACK SURGERY     • CARDIAC CATHETERIZATION      cardiac cath 5/2010  adjusted meds   • CATARACT EXTRACTION Bilateral    • CERVICAL FUSION     • CHOLANGIOGRAM N/A 03/04/2021    Procedure: CHOLANGIOGRAM;  Surgeon: Raymond Baez MD;  Location:  MAIN OR;  Service: General   • CHOLECYSTECTOMY     • CHOLECYSTECTOMY LAPAROSCOPIC N/A 03/04/2021    Procedure: CHOLECYSTECTOMY LAPAROSCOPIC;  Surgeon: Raymond Baez MD;  Location:  MAIN OR;  Service: General   • COLONOSCOPY     • JOINT REPLACEMENT Left     hip   • LUMBAR FUSION     • NECK SURGERY     • ORTHOPEDIC SURGERY Left     L THR   • TX LARYNGOSCOPY W/BIOPSY MICROSCOPE/TELESCOPE Left 06/01/2022    Procedure: MICRO DIRECT LARYNGOSCOPY WITH BIOPSY, BIOPSY LEFT TONSIL;  Surgeon: Catalino Mcgrath MD;  Location:  MAIN OR;  Service: ENT   • TX TEAEC W/PATCH GRF CAROTID VERTB SUBCLAV NECK INC Left 03/05/2020    Procedure: ENDARTERECTOMY ARTERY CAROTID WITH BOVINE PATCH ANGIOPLASTY AND INTRAOP DUPLEX;  Surgeon: Zane Lopez MD;  Location: [facility];  Service: Vascular   • SPINAL FUSION     • TONSILLECTOMY     • US GUIDED LYMPH NODE BIOPSY LEFT  10/03/2022     Family History   Problem Relation Age of Onset   • Heart disease Mother    • Parkinsonism Mother    • Heart failure Mother    • Heart disease Father    • Heart failure Father       reports that he has been smoking cigarettes. He has a 17.5 pack-year smoking history. He has never used smokeless tobacco. He reports current alcohol use of about 10.0 standard drinks of alcohol per week. He reports that he does not use drugs.  Current Outpatient Medications on File Prior to Visit   Medication Sig Dispense Refill   • amLODIPine (NORVASC) 10 mg tablet Take 1 tablet (10 mg total) by mouth daily 90 tablet 3   • Budeson-Glycopyrrol-Formoterol (Breztri Aerosphere) 160-9-4.8 MCG/ACT AERO Inhale 2 puffs 2 (two) times a day Rinse mouth after use. 10.7 g 0   • Calcium  Carbonate-Vit D-Min (Caltrate 600+D Plus Minerals) 600-800 MG-UNIT TABS Take 1 tablet by mouth 2 (two) times a day with meals 200 tablet 6   • denosumab (PROLIA) 60 mg/mL Inject 1 mL (60 mg total) under the skin once for 1 dose 1 mL 1   • fenofibrate 160 MG tablet Take 1 tablet (160 mg total) by mouth daily 90 tablet 3   • lansoprazole (PREVACID) 30 mg capsule Take 30 mg by mouth daily     • levothyroxine 25 mcg tablet Take 1 tablet (25 mcg total) by mouth daily 90 tablet 3   • losartan (COZAAR) 50 mg tablet Take 1 tablet (50 mg total) by mouth daily 90 tablet 3   • magnesium Oxide (MAG-OX) 400 mg TABS Take 1 tablet (400 mg total) by mouth 2 (two) times a day 180 tablet 3   • metoprolol succinate (TOPROL-XL) 50 mg 24 hr tablet Take 0.5 tablets (25 mg total) by mouth daily 90 tablet 3   • pancrelipase, Lip-Prot-Amyl, (Creon) 12,000 units capsule TAKE 12,000 UNITS OF LIPASE BY MOUTH 3 (THREE) TIMES A DAY WITH MEALS 100 capsule 2   • rosuvastatin (CRESTOR) 10 MG tablet Take 1 tablet (10 mg total) by mouth daily 90 tablet 3   • spironolactone (ALDACTONE) 25 mg tablet Take 0.5 tablets (12.5 mg total) by mouth daily 45 tablet 3   • thiamine 100 MG tablet Take 1 tablet (100 mg total) by mouth daily 90 tablet 3   • zolpidem (AMBIEN) 10 mg tablet Take 1 tablet (10 mg total) by mouth daily at bedtime as needed for sleep 30 tablet 2   • al mag oxide-diphenhydramine-lidocaine viscous (MAGIC MOUTHWASH) 1:1:1 suspension Swish and swallow 10 mL every 4 (four) hours as needed for mouth pain or discomfort 480 mL 1   • Probiotic Product (VSL#3) CAPS Take 1 capsule by mouth 2 (two) times a day 60 capsule 6   • Vonoprazan Fumarate (Voquezna) 10 MG TABS Take 10 mg by mouth in the morning 90 tablet 1   • [DISCONTINUED] atorvastatin (LIPITOR) 10 mg tablet Take 1 tablet (10 mg total) by mouth daily 30 tablet 5     No current facility-administered medications on file prior to visit.   No Known Allergies   Current Outpatient Medications on  File Prior to Visit   Medication Sig Dispense Refill   • amLODIPine (NORVASC) 10 mg tablet Take 1 tablet (10 mg total) by mouth daily 90 tablet 3   • Budeson-Glycopyrrol-Formoterol (Breztri Aerosphere) 160-9-4.8 MCG/ACT AERO Inhale 2 puffs 2 (two) times a day Rinse mouth after use. 10.7 g 0   • Calcium Carbonate-Vit D-Min (Caltrate 600+D Plus Minerals) 600-800 MG-UNIT TABS Take 1 tablet by mouth 2 (two) times a day with meals 200 tablet 6   • denosumab (PROLIA) 60 mg/mL Inject 1 mL (60 mg total) under the skin once for 1 dose 1 mL 1   • fenofibrate 160 MG tablet Take 1 tablet (160 mg total) by mouth daily 90 tablet 3   • lansoprazole (PREVACID) 30 mg capsule Take 30 mg by mouth daily     • levothyroxine 25 mcg tablet Take 1 tablet (25 mcg total) by mouth daily 90 tablet 3   • losartan (COZAAR) 50 mg tablet Take 1 tablet (50 mg total) by mouth daily 90 tablet 3   • magnesium Oxide (MAG-OX) 400 mg TABS Take 1 tablet (400 mg total) by mouth 2 (two) times a day 180 tablet 3   • metoprolol succinate (TOPROL-XL) 50 mg 24 hr tablet Take 0.5 tablets (25 mg total) by mouth daily 90 tablet 3   • pancrelipase, Lip-Prot-Amyl, (Creon) 12,000 units capsule TAKE 12,000 UNITS OF LIPASE BY MOUTH 3 (THREE) TIMES A DAY WITH MEALS 100 capsule 2   • rosuvastatin (CRESTOR) 10 MG tablet Take 1 tablet (10 mg total) by mouth daily 90 tablet 3   • spironolactone (ALDACTONE) 25 mg tablet Take 0.5 tablets (12.5 mg total) by mouth daily 45 tablet 3   • thiamine 100 MG tablet Take 1 tablet (100 mg total) by mouth daily 90 tablet 3   • zolpidem (AMBIEN) 10 mg tablet Take 1 tablet (10 mg total) by mouth daily at bedtime as needed for sleep 30 tablet 2   • al mag oxide-diphenhydramine-lidocaine viscous (MAGIC MOUTHWASH) 1:1:1 suspension Swish and swallow 10 mL every 4 (four) hours as needed for mouth pain or discomfort 480 mL 1   • Probiotic Product (VSL#3) CAPS Take 1 capsule by mouth 2 (two) times a day 60 capsule 6   • Vonoprazan Fumarate  "(Voquezna) 10 MG TABS Take 10 mg by mouth in the morning 90 tablet 1   • [DISCONTINUED] atorvastatin (LIPITOR) 10 mg tablet Take 1 tablet (10 mg total) by mouth daily 30 tablet 5     No current facility-administered medications on file prior to visit.      Social History     Tobacco Use   • Smoking status: Every Day     Current packs/day: 0.25     Average packs/day: 0.3 packs/day for 60.0 years (17.5 ttl pk-yrs)     Types: Cigarettes   • Smokeless tobacco: Never   • Tobacco comments:     Half pack per day   Vaping Use   • Vaping status: Never Used   Substance and Sexual Activity   • Alcohol use: Yes     Alcohol/week: 10.0 standard drinks of alcohol     Types: 10 Standard drinks or equivalent per week     Comment: 3 oz of wine with communion multiple times/day/week   • Drug use: Never   • Sexual activity: Not Currently     Comment: DAVIDSON        Objective   /80 (BP Location: Left arm, Patient Position: Sitting, Cuff Size: Standard)   Pulse 72   Ht 5' 3\" (1.6 m)   Wt 61.2 kg (135 lb)   SpO2 95%   BMI 23.91 kg/m²     Physical Exam  Vitals reviewed.   Constitutional:       General: He is not in acute distress.     Appearance: Normal appearance. He is not ill-appearing, toxic-appearing or diaphoretic.   HENT:      Head: Normocephalic and atraumatic.      Nose: Nose normal.      Mouth/Throat:      Mouth: Mucous membranes are moist.   Eyes:      Extraocular Movements: Extraocular movements intact.   Pulmonary:      Effort: Pulmonary effort is normal. No respiratory distress.   Genitourinary:     Penis: Normal.       Testes: Normal.      Prostate: Normal.      Rectum: Normal.      Comments: YAIR normal anal verge normal anal sphincter tone no palpable rectal masses.  Prostate approximately 40 g a nodular nontender  Musculoskeletal:         General: Normal range of motion.      Cervical back: Neck supple.   Skin:     General: Skin is dry.   Neurological:      Mental Status: He is alert and oriented to person, place, " and time.   Psychiatric:         Mood and Affect: Mood normal.         Behavior: Behavior normal.         Thought Content: Thought content normal.         Judgment: Judgment normal.           Results  Lab Results   Component Value Date    PSA 5.328 (H) 11/20/2024    PSA 2.05 05/17/2023    PSA 1.4 04/15/2022     Lab Results   Component Value Date    CALCIUM 9.9 11/20/2024    K 4.0 11/20/2024    CO2 28 11/20/2024     11/20/2024    BUN 16 11/20/2024    CREATININE 1.27 11/20/2024     Lab Results   Component Value Date    WBC 6.69 05/17/2023    HGB 11.7 (L) 05/17/2023    HCT 35.5 (L) 05/17/2023     (H) 05/17/2023     05/17/2023       Office Urine Dip  Recent Results (from the past hour)   POCT Measure PVR    Collection Time: 01/03/25  3:21 PM   Result Value Ref Range    POST-VOID RESIDUAL VOLUME, ML POC 36 mL   POCT urine dip auto non-scope    Collection Time: 01/03/25  3:22 PM   Result Value Ref Range     COLOR,UA yellow     CLARITY,UA clear     SPECIFIC GRAVITY,UA 1.015      PH,UA 7.0     LEUKOCYTE ESTERASE,UA n     NITRITE,UA n     GLUCOSE, UA n     KETONES,UA n     BILIRUBIN,UA n     BLOOD,UA n     POCT URINE PROTEIN n     SL AMB POCT UROBILINOGEN 0.2    ]

## 2025-01-03 NOTE — PROGRESS NOTES
Name: Omero Winston      : 1950      MRN: [id number]  Encounter Provider: Toro Kimbrough MD  Encounter Date: 1/3/2025   [facility]  :  Assessment & Plan  BPH with elevated PSA and lower urinary tract symptoms  Patient has a high AUA symptom score complains of a weakened urinary stream with straining to void and urinary hesitancy having to sit to void.  Oddly enough he does not have appreciable nocturia no urinary incontinence no hematuria.  Plan cystoscopy after trial of alpha blockade.  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 male who presents with an elevated AUA symptom score complaining of urinary hesitancy straining and sitting to void.  The patient notes that this has been present for the past year to 2 years.  He also notes an elevated PSA with a jump from a normal PSA to an abnormal level between  and .  He presents for evaluation  AUA SYMPTOM SCORE      Flowsheet Row Most Recent Value   AUA SYMPTOM SCORE    How often have you had a sensation of not emptying your bladder completely after you finished urinating? 5 (P)     How often have you had to urinate again less than two hours after you finished urinating? 3 (P)     How often have you found you stopped and started again several times when you urinate? 0 (P)     How often have you  found it difficult to postpone urination? 3 (P)     How often have you had a weak urinary stream? 5 (P)     How often have you had to push or strain to begin urination? 4 (P)     How many times did you most typically get up to urinate from the time you went to bed at night until the time you got up in the morning? 1 (P)     Quality of Life: If you were to spend the rest of your life with your urinary condition just the way it is now, how would you feel about that? 5 (P)     AUA SYMPTOM SCORE 21 (P)            Review of Systems   Genitourinary:  Positive for dysuria, frequency and urgency.   All other systems reviewed and are negative.    Pertinent Medical History           Medical History Reviewed by provider this encounter:  Problems     .  Past Medical History   Past Medical History:   Diagnosis Date    A-fib (HCC)     Arthritis     Avascular necrosis of bone of hip, left (HCC)     replaced    Back pain     CAD (coronary artery disease)     Carotid artery narrowing     left side -for endarterectomy today 3/5/2020    Disease of thyroid gland     hypo    GERD (gastroesophageal reflux disease)     History of Clostridioides difficile infection     Hyperlipidemia     Hypertension     Irregular heart beat     Jejunitis     Kidney stone     Metastatic cancer (HCC)     Neck pain     Pancreatitis     Spinal stenosis     Voice disorder 2022    Wears glasses      Past Surgical History:   Procedure Laterality Date    BACK SURGERY      CARDIAC CATHETERIZATION      cardiac cath 5/2010  adjusted meds    CATARACT EXTRACTION Bilateral     CERVICAL FUSION      CHOLANGIOGRAM N/A 03/04/2021    Procedure: CHOLANGIOGRAM;  Surgeon: Raymond Baez MD;  Location:  MAIN OR;  Service: General    CHOLECYSTECTOMY      CHOLECYSTECTOMY LAPAROSCOPIC N/A 03/04/2021    Procedure: CHOLECYSTECTOMY LAPAROSCOPIC;  Surgeon: Raymond Baez MD;  Location:  MAIN OR;  Service: General    COLONOSCOPY      JOINT REPLACEMENT Left     hip    LUMBAR FUSION       NECK SURGERY      ORTHOPEDIC SURGERY Left     L THR    CT LARYNGOSCOPY W/BIOPSY MICROSCOPE/TELESCOPE Left 06/01/2022    Procedure: MICRO DIRECT LARYNGOSCOPY WITH BIOPSY, BIOPSY LEFT TONSIL;  Surgeon: Catalino Mcgrath MD;  Location: [facility];  Service: ENT    CT TEAEC W/PATCH GRF CAROTID VERTB SUBCLAV NECK INC Left 03/05/2020    Procedure: ENDARTERECTOMY ARTERY CAROTID WITH BOVINE PATCH ANGIOPLASTY AND INTRAOP DUPLEX;  Surgeon: Zane Lopez MD;  Location: [facility];  Service: Vascular    SPINAL FUSION      TONSILLECTOMY      US GUIDED LYMPH NODE BIOPSY LEFT  10/03/2022     Family History   Problem Relation Age of Onset    Heart disease Mother     Parkinsonism Mother     Heart failure Mother     Heart disease Father     Heart failure Father       reports that he has been smoking cigarettes. He has a 17.5 pack-year smoking history. He has never used smokeless tobacco. He reports current alcohol use of about 10.0 standard drinks of alcohol per week. He reports that he does not use drugs.  Current Outpatient Medications on File Prior to Visit   Medication Sig Dispense Refill    amLODIPine (NORVASC) 10 mg tablet Take 1 tablet (10 mg total) by mouth daily 90 tablet 3    Budeson-Glycopyrrol-Formoterol (Breztri Aerosphere) 160-9-4.8 MCG/ACT AERO Inhale 2 puffs 2 (two) times a day Rinse mouth after use. 10.7 g 0    Calcium Carbonate-Vit D-Min (Caltrate 600+D Plus Minerals) 600-800 MG-UNIT TABS Take 1 tablet by mouth 2 (two) times a day with meals 200 tablet 6    denosumab (PROLIA) 60 mg/mL Inject 1 mL (60 mg total) under the skin once for 1 dose 1 mL 1    fenofibrate 160 MG tablet Take 1 tablet (160 mg total) by mouth daily 90 tablet 3    lansoprazole (PREVACID) 30 mg capsule Take 30 mg by mouth daily      levothyroxine 25 mcg tablet Take 1 tablet (25 mcg total) by mouth daily 90 tablet 3    losartan (COZAAR) 50 mg tablet Take 1 tablet (50 mg total) by mouth daily 90 tablet 3    magnesium Oxide (MAG-OX) 400 mg TABS Take  1 tablet (400 mg total) by mouth 2 (two) times a day 180 tablet 3    metoprolol succinate (TOPROL-XL) 50 mg 24 hr tablet Take 0.5 tablets (25 mg total) by mouth daily 90 tablet 3    pancrelipase, Lip-Prot-Amyl, (Creon) 12,000 units capsule TAKE 12,000 UNITS OF LIPASE BY MOUTH 3 (THREE) TIMES A DAY WITH MEALS 100 capsule 2    rosuvastatin (CRESTOR) 10 MG tablet Take 1 tablet (10 mg total) by mouth daily 90 tablet 3    spironolactone (ALDACTONE) 25 mg tablet Take 0.5 tablets (12.5 mg total) by mouth daily 45 tablet 3    thiamine 100 MG tablet Take 1 tablet (100 mg total) by mouth daily 90 tablet 3    zolpidem (AMBIEN) 10 mg tablet Take 1 tablet (10 mg total) by mouth daily at bedtime as needed for sleep 30 tablet 2    al mag oxide-diphenhydramine-lidocaine viscous (MAGIC MOUTHWASH) 1:1:1 suspension Swish and swallow 10 mL every 4 (four) hours as needed for mouth pain or discomfort 480 mL 1    Probiotic Product (VSL#3) CAPS Take 1 capsule by mouth 2 (two) times a day 60 capsule 6    Vonoprazan Fumarate (Voquezna) 10 MG TABS Take 10 mg by mouth in the morning 90 tablet 1    [DISCONTINUED] atorvastatin (LIPITOR) 10 mg tablet Take 1 tablet (10 mg total) by mouth daily 30 tablet 5     No current facility-administered medications on file prior to visit.   No Known Allergies   Current Outpatient Medications on File Prior to Visit   Medication Sig Dispense Refill    amLODIPine (NORVASC) 10 mg tablet Take 1 tablet (10 mg total) by mouth daily 90 tablet 3    Budeson-Glycopyrrol-Formoterol (Breztri Aerosphere) 160-9-4.8 MCG/ACT AERO Inhale 2 puffs 2 (two) times a day Rinse mouth after use. 10.7 g 0    Calcium Carbonate-Vit D-Min (Caltrate 600+D Plus Minerals) 600-800 MG-UNIT TABS Take 1 tablet by mouth 2 (two) times a day with meals 200 tablet 6    denosumab (PROLIA) 60 mg/mL Inject 1 mL (60 mg total) under the skin once for 1 dose 1 mL 1    fenofibrate 160 MG tablet Take 1 tablet (160 mg total) by mouth daily 90 tablet 3     lansoprazole (PREVACID) 30 mg capsule Take 30 mg by mouth daily      levothyroxine 25 mcg tablet Take 1 tablet (25 mcg total) by mouth daily 90 tablet 3    losartan (COZAAR) 50 mg tablet Take 1 tablet (50 mg total) by mouth daily 90 tablet 3    magnesium Oxide (MAG-OX) 400 mg TABS Take 1 tablet (400 mg total) by mouth 2 (two) times a day 180 tablet 3    metoprolol succinate (TOPROL-XL) 50 mg 24 hr tablet Take 0.5 tablets (25 mg total) by mouth daily 90 tablet 3    pancrelipase, Lip-Prot-Amyl, (Creon) 12,000 units capsule TAKE 12,000 UNITS OF LIPASE BY MOUTH 3 (THREE) TIMES A DAY WITH MEALS 100 capsule 2    rosuvastatin (CRESTOR) 10 MG tablet Take 1 tablet (10 mg total) by mouth daily 90 tablet 3    spironolactone (ALDACTONE) 25 mg tablet Take 0.5 tablets (12.5 mg total) by mouth daily 45 tablet 3    thiamine 100 MG tablet Take 1 tablet (100 mg total) by mouth daily 90 tablet 3    zolpidem (AMBIEN) 10 mg tablet Take 1 tablet (10 mg total) by mouth daily at bedtime as needed for sleep 30 tablet 2    al mag oxide-diphenhydramine-lidocaine viscous (MAGIC MOUTHWASH) 1:1:1 suspension Swish and swallow 10 mL every 4 (four) hours as needed for mouth pain or discomfort 480 mL 1    Probiotic Product (VSL#3) CAPS Take 1 capsule by mouth 2 (two) times a day 60 capsule 6    Vonoprazan Fumarate (Voquezna) 10 MG TABS Take 10 mg by mouth in the morning 90 tablet 1    [DISCONTINUED] atorvastatin (LIPITOR) 10 mg tablet Take 1 tablet (10 mg total) by mouth daily 30 tablet 5     No current facility-administered medications on file prior to visit.      Social History     Tobacco Use    Smoking status: Every Day     Current packs/day: 0.25     Average packs/day: 0.3 packs/day for 60.0 years (17.5 ttl pk-yrs)     Types: Cigarettes    Smokeless tobacco: Never    Tobacco comments:     Half pack per day   Vaping Use    Vaping status: Never Used   Substance and Sexual Activity    Alcohol use: Yes     Alcohol/week: 10.0 standard drinks of  "alcohol     Types: 10 Standard drinks or equivalent per week     Comment: 3 oz of wine with communion multiple times/day/week    Drug use: Never    Sexual activity: Not Currently     Comment: DAVIDSON        Objective   /80 (BP Location: Left arm, Patient Position: Sitting, Cuff Size: Standard)   Pulse 72   Ht 5' 3\" (1.6 m)   Wt 61.2 kg (135 lb)   SpO2 95%   BMI 23.91 kg/m²     Physical Exam  Vitals reviewed.   Constitutional:       General: He is not in acute distress.     Appearance: Normal appearance. He is not ill-appearing, toxic-appearing or diaphoretic.   HENT:      Head: Normocephalic and atraumatic.      Nose: Nose normal.      Mouth/Throat:      Mouth: Mucous membranes are moist.   Eyes:      Extraocular Movements: Extraocular movements intact.   Pulmonary:      Effort: Pulmonary effort is normal. No respiratory distress.   Genitourinary:     Penis: Normal.       Testes: Normal.      Prostate: Normal.      Rectum: Normal.      Comments: YAIR normal anal verge normal anal sphincter tone no palpable rectal masses.  Prostate approximately 40 g a nodular nontender  Musculoskeletal:         General: Normal range of motion.      Cervical back: Neck supple.   Skin:     General: Skin is dry.   Neurological:      Mental Status: He is alert and oriented to person, place, and time.   Psychiatric:         Mood and Affect: Mood normal.         Behavior: Behavior normal.         Thought Content: Thought content normal.         Judgment: Judgment normal.           Results  Lab Results   Component Value Date    PSA 5.328 (H) 11/20/2024    PSA 2.05 05/17/2023    PSA 1.4 04/15/2022     Lab Results   Component Value Date    CALCIUM 9.9 11/20/2024    K 4.0 11/20/2024    CO2 28 11/20/2024     11/20/2024    BUN 16 11/20/2024    CREATININE 1.27 11/20/2024     Lab Results   Component Value Date    WBC 6.69 05/17/2023    HGB 11.7 (L) 05/17/2023    HCT 35.5 (L) 05/17/2023     (H) 05/17/2023     05/17/2023 "       Office Urine Dip  Recent Results (from the past hour)   POCT Measure PVR    Collection Time: 01/03/25  3:21 PM   Result Value Ref Range    POST-VOID RESIDUAL VOLUME, ML POC 36 mL   POCT urine dip auto non-scope    Collection Time: 01/03/25  3:22 PM   Result Value Ref Range     COLOR,UA yellow     CLARITY,UA clear     SPECIFIC GRAVITY,UA 1.015      PH,UA 7.0     LEUKOCYTE ESTERASE,UA n     NITRITE,UA n     GLUCOSE, UA n     KETONES,UA n     BILIRUBIN,UA n     BLOOD,UA n     POCT URINE PROTEIN n     SL AMB POCT UROBILINOGEN 0.2    ]

## 2025-01-03 NOTE — LETTER
January 3, 2025     No Recipients    Patient: Omero Winston   YOB: 1950   Date of Visit: 1/3/2025       Dear Dr. Vasquez:    Thank you for referring Omero iWnston to me for evaluation. Below are my notes for this consultation.    If you have questions, please do not hesitate to call me. I look forward to following your patient along with you.         Sincerely,        Toro Kimbrough MD        CC: No Recipients    Toro Kimbrough MD  1/3/2025  4:21 PM  Sign when Signing Visit  Name: Omero Winston      : 1950      MRN: [id number]  Encounter Provider: Toro Kimbrough MD  Encounter Date: 1/3/2025   Encounter department: [facility] UROLOGY [place]  :  Assessment & Plan  BPH with elevated PSA and lower urinary tract symptoms  Patient has a high AUA symptom score complains of a weakened urinary stream with straining to void and urinary hesitancy having to sit to void.  Oddly enough he does not have appreciable nocturia no urinary incontinence no hematuria.  Plan cystoscopy after trial of alpha blockade.  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 male who presents with an elevated AUA symptom score complaining of urinary hesitancy straining and sitting to void.  " The patient notes that this has been present for the past year to 2 years.  He also notes an elevated PSA with a jump from a normal PSA to an abnormal level between 2023 and 2024.  He presents for evaluation  AUA SYMPTOM SCORE      Flowsheet Row Most Recent Value   AUA SYMPTOM SCORE    How often have you had a sensation of not emptying your bladder completely after you finished urinating? 5 (P)     How often have you had to urinate again less than two hours after you finished urinating? 3 (P)     How often have you found you stopped and started again several times when you urinate? 0 (P)     How often have you found it difficult to postpone urination? 3 (P)     How often have you had a weak urinary stream? 5 (P)     How often have you had to push or strain to begin urination? 4 (P)     How many times did you most typically get up to urinate from the time you went to bed at night until the time you got up in the morning? 1 (P)     Quality of Life: If you were to spend the rest of your life with your urinary condition just the way it is now, how would you feel about that? 5 (P)     AUA SYMPTOM SCORE 21 (P)            Review of Systems   Genitourinary:  Positive for dysuria, frequency and urgency.   All other systems reviewed and are negative.    {Select to insert medical history sections (Optional):40194}     Objective {?Quick Links Trend Vitals * Enter New Vitals * Results Review * Timeline (Adult) * Labs * Imaging * Cardiology * Procedures * Lung Cancer Screening * Surgical eConsent :52354}  /80 (BP Location: Left arm, Patient Position: Sitting, Cuff Size: Standard)   Pulse 72   Ht 5' 3\" (1.6 m)   Wt 61.2 kg (135 lb)   SpO2 95%   BMI 23.91 kg/m²     Physical Exam  Vitals reviewed.   Constitutional:       General: He is not in acute distress.     Appearance: Normal appearance. He is not ill-appearing, toxic-appearing or diaphoretic.   HENT:      Head: Normocephalic and atraumatic.      Nose: Nose normal. "      Mouth/Throat:      Mouth: Mucous membranes are moist.   Eyes:      Extraocular Movements: Extraocular movements intact.   Pulmonary:      Effort: Pulmonary effort is normal. No respiratory distress.   Genitourinary:     Penis: Normal.       Testes: Normal.      Prostate: Normal.      Rectum: Normal.      Comments: YAIR normal anal verge normal anal sphincter tone no palpable rectal masses.  Prostate approximately 40 g a nodular nontender  Musculoskeletal:         General: Normal range of motion.      Cervical back: Neck supple.   Skin:     General: Skin is dry.   Neurological:      Mental Status: He is alert and oriented to person, place, and time.   Psychiatric:         Mood and Affect: Mood normal.         Behavior: Behavior normal.         Thought Content: Thought content normal.         Judgment: Judgment normal.      ***    Results  Lab Results   Component Value Date    PSA 5.328 (H) 11/20/2024    PSA 2.05 05/17/2023    PSA 1.4 04/15/2022     Lab Results   Component Value Date    CALCIUM 9.9 11/20/2024    K 4.0 11/20/2024    CO2 28 11/20/2024     11/20/2024    BUN 16 11/20/2024    CREATININE 1.27 11/20/2024     Lab Results   Component Value Date    WBC 6.69 05/17/2023    HGB 11.7 (L) 05/17/2023    HCT 35.5 (L) 05/17/2023     (H) 05/17/2023     05/17/2023       Office Urine Dip  Recent Results (from the past hour)   POCT Measure PVR    Collection Time: 01/03/25  3:21 PM   Result Value Ref Range    POST-VOID RESIDUAL VOLUME, ML POC 36 mL   POCT urine dip auto non-scope    Collection Time: 01/03/25  3:22 PM   Result Value Ref Range     COLOR,UA yellow     CLARITY,UA clear     SPECIFIC GRAVITY,UA 1.015      PH,UA 7.0     LEUKOCYTE ESTERASE,UA n     NITRITE,UA n     GLUCOSE, UA n     KETONES,UA n     BILIRUBIN,UA n     BLOOD,UA n     POCT URINE PROTEIN n     SL AMB POCT UROBILINOGEN 0.2    ]    {Administrative / Billing Section (Optional):61453}

## 2025-01-06 RX ORDER — LANSOPRAZOLE 30 MG/1
30 CAPSULE, DELAYED RELEASE ORAL DAILY
Qty: 30 CAPSULE | Refills: 0 | Status: SHIPPED | OUTPATIENT
Start: 2025-01-06

## 2025-01-28 DIAGNOSIS — K21.00 GASTROESOPHAGEAL REFLUX DISEASE WITH ESOPHAGITIS WITHOUT HEMORRHAGE: ICD-10-CM

## 2025-01-29 RX ORDER — LANSOPRAZOLE 30 MG/1
30 CAPSULE, DELAYED RELEASE ORAL DAILY
Qty: 90 CAPSULE | Refills: 1 | Status: SHIPPED | OUTPATIENT
Start: 2025-01-29

## 2025-02-23 DIAGNOSIS — K86.1 OTHER CHRONIC PANCREATITIS (HCC): ICD-10-CM

## 2025-02-23 DIAGNOSIS — R19.7 DIARRHEA, UNSPECIFIED TYPE: ICD-10-CM

## 2025-02-25 RX ORDER — PANCRELIPASE 60000; 12000; 38000 [USP'U]/1; [USP'U]/1; [USP'U]/1
12000 CAPSULE, DELAYED RELEASE PELLETS ORAL
Qty: 100 CAPSULE | Refills: 3 | Status: SHIPPED | OUTPATIENT
Start: 2025-02-25

## 2025-02-26 ENCOUNTER — HOSPITAL ENCOUNTER (OUTPATIENT)
Dept: CT IMAGING | Facility: HOSPITAL | Age: 75
Discharge: HOME | End: 2025-02-26
Payer: MEDICARE

## 2025-02-26 DIAGNOSIS — I71.21 ANEURYSM OF ASCENDING AORTA WITHOUT RUPTURE (HCC): ICD-10-CM

## 2025-02-26 PROCEDURE — 71275 CT ANGIOGRAPHY CHEST: CPT

## 2025-02-26 RX ADMIN — IOHEXOL 85 ML: 350 INJECTION, SOLUTION INTRAVENOUS at 14:27

## 2025-03-03 ENCOUNTER — APPOINTMENT (OUTPATIENT)
Dept: LAB | Facility: CLINIC | Age: 75
End: 2025-03-03
Payer: MEDICARE

## 2025-03-03 ENCOUNTER — RESULTS FOLLOW-UP (OUTPATIENT)
Dept: FAMILY MEDICINE CLINIC | Facility: CLINIC | Age: 75
End: 2025-03-03

## 2025-03-03 DIAGNOSIS — I10 ESSENTIAL HYPERTENSION: ICD-10-CM

## 2025-03-03 DIAGNOSIS — R97.20 ELEVATED PSA: ICD-10-CM

## 2025-03-03 LAB
25(OH)D3 SERPL-MCNC: >120 NG/ML (ref 30–100)
ANION GAP SERPL CALCULATED.3IONS-SCNC: 10 MMOL/L (ref 4–13)
BUN SERPL-MCNC: 23 MG/DL (ref 5–25)
CALCIUM SERPL-MCNC: 10.6 MG/DL (ref 8.4–10.2)
CHLORIDE SERPL-SCNC: 98 MMOL/L (ref 96–108)
CO2 SERPL-SCNC: 28 MMOL/L (ref 21–32)
CREAT SERPL-MCNC: 1.48 MG/DL (ref 0.6–1.3)
GFR SERPL CREATININE-BSD FRML MDRD: 46 ML/MIN/1.73SQ M
GLUCOSE P FAST SERPL-MCNC: 95 MG/DL (ref 65–99)
POTASSIUM SERPL-SCNC: 4.3 MMOL/L (ref 3.5–5.3)
PSA FREE MFR SERPL: 22.85 %
PSA FREE SERPL-MCNC: 0.51 NG/ML
PSA SERPL-MCNC: 2.23 NG/ML (ref 0–4)
SODIUM SERPL-SCNC: 136 MMOL/L (ref 135–147)
T4 FREE SERPL-MCNC: 1.02 NG/DL (ref 0.61–1.12)
TSH SERPL DL<=0.05 MIU/L-ACNC: 10.15 UIU/ML (ref 0.45–4.5)

## 2025-03-03 PROCEDURE — 84439 ASSAY OF FREE THYROXINE: CPT

## 2025-03-03 PROCEDURE — 84443 ASSAY THYROID STIM HORMONE: CPT

## 2025-03-03 PROCEDURE — 82306 VITAMIN D 25 HYDROXY: CPT

## 2025-03-03 PROCEDURE — 36415 COLL VENOUS BLD VENIPUNCTURE: CPT

## 2025-03-03 PROCEDURE — 84154 ASSAY OF PSA FREE: CPT

## 2025-03-03 PROCEDURE — 84153 ASSAY OF PSA TOTAL: CPT

## 2025-03-03 NOTE — TELEPHONE ENCOUNTER
----- Message from Garry Vasquez MD sent at 3/1/2025  3:50 PM EST -----  Repeat : CTA Chest in 1 year  ----- Message -----  From: Interface, Radiology Results In  Sent: 2/28/2025   4:21 PM EST  To: Garry Vasquez MD   Department of Anesthesiology  Postprocedure Note    Patient: Candance Lock  MRN: [id number]  [facility] [facility] 1951  Date of evaluation: 8/2/2023      Procedure Summary     Date: 08/02/23 Room / Location: Count includes the [facility] ENDO 02 / [address]    Anesthesia Start: 6396 Anesthesia Stop:     Procedure: COLORECTAL CANCER SCREENING, NOT HIGH RISK (Abdomen) Diagnosis:       Personal history of colon cancer      (Personal history of colon cancer [Z85.038])    Surgeons: Haven Fuller MD Responsible Provider: JYOTI Rahman CRNA    Anesthesia Type: general, TIVA ASA Status: 2          Anesthesia Type: No value filed.     Leonela Phase I:      Leonela Phase II:        Anesthesia Post Evaluation    Patient location during evaluation: bedside  Patient participation: complete - patient participated  Level of consciousness: sleepy but conscious  Pain score: 0  Airway patency: patent  Nausea & Vomiting: no nausea and no vomiting  Complications: no  Cardiovascular status: blood pressure returned to baseline  Respiratory status: acceptable, room air and spontaneous ventilation  Hydration status: euvolemic  Pain management: adequate

## 2025-03-04 ENCOUNTER — RESULTS FOLLOW-UP (OUTPATIENT)
Dept: FAMILY MEDICINE CLINIC | Facility: CLINIC | Age: 75
End: 2025-03-04

## 2025-03-04 NOTE — TELEPHONE ENCOUNTER
----- Message from Garry Vasquez MD sent at 3/4/2025  7:31 AM EST -----  Stop vit D supplements., Repeat; TFT s in 2mos  ----- Message -----  From: Lab, Background User  Sent: 3/3/2025   5:02 PM EST  To: Grary Vasquez MD

## 2025-03-11 ENCOUNTER — OFFICE VISIT (OUTPATIENT)
Dept: FAMILY MEDICINE CLINIC | Facility: CLINIC | Age: 75
End: 2025-03-11
Payer: MEDICARE

## 2025-03-11 VITALS
HEIGHT: 63 IN | OXYGEN SATURATION: 98 % | TEMPERATURE: 97.9 F | HEART RATE: 74 BPM | SYSTOLIC BLOOD PRESSURE: 118 MMHG | BODY MASS INDEX: 24.1 KG/M2 | RESPIRATION RATE: 16 BRPM | DIASTOLIC BLOOD PRESSURE: 84 MMHG | WEIGHT: 136 LBS

## 2025-03-11 DIAGNOSIS — K86.1 OTHER CHRONIC PANCREATITIS (HCC): ICD-10-CM

## 2025-03-11 DIAGNOSIS — E78.2 MIXED HYPERLIPIDEMIA: ICD-10-CM

## 2025-03-11 DIAGNOSIS — I71.21 ANEURYSM OF ASCENDING AORTA WITHOUT RUPTURE (HCC): ICD-10-CM

## 2025-03-11 DIAGNOSIS — E83.42 HYPOMAGNESEMIA: ICD-10-CM

## 2025-03-11 DIAGNOSIS — R97.20 ELEVATED PSA: ICD-10-CM

## 2025-03-11 DIAGNOSIS — E06.3 HYPOTHYROIDISM DUE TO HASHIMOTO'S THYROIDITIS: ICD-10-CM

## 2025-03-11 DIAGNOSIS — F17.210 CIGARETTE SMOKER: Primary | ICD-10-CM

## 2025-03-11 DIAGNOSIS — M81.8 IDIOPATHIC OSTEOPOROSIS: ICD-10-CM

## 2025-03-11 PROBLEM — J44.9 CHRONIC OBSTRUCTIVE PULMONARY DISEASE, UNSPECIFIED COPD TYPE (HCC): Status: RESOLVED | Noted: 2024-03-13 | Resolved: 2025-03-11

## 2025-03-11 PROCEDURE — G2211 COMPLEX E/M VISIT ADD ON: HCPCS | Performed by: INTERNAL MEDICINE

## 2025-03-11 PROCEDURE — 99214 OFFICE O/P EST MOD 30 MIN: CPT | Performed by: INTERNAL MEDICINE

## 2025-03-11 RX ORDER — SPIRONOLACTONE 25 MG/1
12.5 TABLET ORAL DAILY
Qty: 45 TABLET | Refills: 3 | Status: SHIPPED | OUTPATIENT
Start: 2025-03-11

## 2025-03-11 RX ORDER — LOSARTAN POTASSIUM 50 MG/1
50 TABLET ORAL DAILY
Qty: 90 TABLET | Refills: 3 | Status: SHIPPED | OUTPATIENT
Start: 2025-03-11

## 2025-03-11 RX ORDER — LEVOTHYROXINE SODIUM 25 UG/1
25 TABLET ORAL DAILY
Qty: 90 TABLET | Refills: 3 | Status: SHIPPED | OUTPATIENT
Start: 2025-03-11 | End: 2025-06-09

## 2025-03-11 RX ORDER — CAL/D3/MAG11/ZINC/COP/MANG/BOR 600 MG-800
1 TABLET ORAL 2 TIMES DAILY WITH MEALS
Qty: 200 TABLET | Refills: 6 | Status: SHIPPED | OUTPATIENT
Start: 2025-03-11

## 2025-03-11 RX ORDER — ROSUVASTATIN CALCIUM 10 MG/1
10 TABLET, COATED ORAL DAILY
Qty: 90 TABLET | Refills: 3 | Status: SHIPPED | OUTPATIENT
Start: 2025-03-11

## 2025-03-11 RX ORDER — METOPROLOL SUCCINATE 50 MG/1
25 TABLET, EXTENDED RELEASE ORAL DAILY
Qty: 90 TABLET | Refills: 3 | Status: SHIPPED | OUTPATIENT
Start: 2025-03-11

## 2025-03-11 RX ORDER — FENOFIBRATE 160 MG/1
160 TABLET ORAL DAILY
Qty: 90 TABLET | Refills: 3 | Status: SHIPPED | OUTPATIENT
Start: 2025-03-11

## 2025-03-11 RX ORDER — TAMSULOSIN HYDROCHLORIDE 0.4 MG/1
0.4 CAPSULE ORAL
Qty: 90 CAPSULE | Refills: 3 | Status: SHIPPED | OUTPATIENT
Start: 2025-03-11

## 2025-03-11 RX ORDER — PANCRELIPASE 60000; 12000; 38000 [USP'U]/1; [USP'U]/1; [USP'U]/1
12000 CAPSULE, DELAYED RELEASE PELLETS ORAL
Qty: 100 CAPSULE | Refills: 3 | Status: SHIPPED | OUTPATIENT
Start: 2025-03-11

## 2025-03-11 RX ORDER — AMLODIPINE BESYLATE 10 MG/1
10 TABLET ORAL DAILY
Qty: 90 TABLET | Refills: 3 | Status: SHIPPED | OUTPATIENT
Start: 2025-03-11

## 2025-03-11 NOTE — ASSESSMENT & PLAN NOTE
Stable  Continue same  RTC in 3 mos w Blood work  Renew ;  Orders:    amLODIPine (NORVASC) 10 mg tablet; Take 1 tablet (10 mg total) by mouth daily    fenofibrate 160 MG tablet; Take 1 tablet (160 mg total) by mouth daily    levothyroxine 25 mcg tablet; Take 1 tablet (25 mcg total) by mouth daily    losartan (COZAAR) 50 mg tablet; Take 1 tablet (50 mg total) by mouth daily    metoprolol succinate (TOPROL-XL) 50 mg 24 hr tablet; Take 0.5 tablets (25 mg total) by mouth daily    pancrelipase, Lip-Prot-Amyl, (Creon) 12,000 units capsule; Take 12,000 units of lipase by mouth 3 (three) times a day with meals    rosuvastatin (CRESTOR) 10 MG tablet; Take 1 tablet (10 mg total) by mouth daily    spironolactone (ALDACTONE) 25 mg tablet; Take 0.5 tablets (12.5 mg total) by mouth daily    tamsulosin (FLOMAX) 0.4 mg; Take 1 capsule (0.4 mg total) by mouth daily with dinner    Lipase; Future    UA (URINE) with reflex to Scope; Future    Magnesium; Future    TSH, 3rd generation; Future    T4, free; Future    CBC and differential; Future    Comprehensive metabolic panel; Future    Lipid panel; Future

## 2025-03-11 NOTE — ASSESSMENT & PLAN NOTE
Orders:    amLODIPine (NORVASC) 10 mg tablet; Take 1 tablet (10 mg total) by mouth daily    fenofibrate 160 MG tablet; Take 1 tablet (160 mg total) by mouth daily    levothyroxine 25 mcg tablet; Take 1 tablet (25 mcg total) by mouth daily    losartan (COZAAR) 50 mg tablet; Take 1 tablet (50 mg total) by mouth daily    metoprolol succinate (TOPROL-XL) 50 mg 24 hr tablet; Take 0.5 tablets (25 mg total) by mouth daily    pancrelipase, Lip-Prot-Amyl, (Creon) 12,000 units capsule; Take 12,000 units of lipase by mouth 3 (three) times a day with meals    rosuvastatin (CRESTOR) 10 MG tablet; Take 1 tablet (10 mg total) by mouth daily    spironolactone (ALDACTONE) 25 mg tablet; Take 0.5 tablets (12.5 mg total) by mouth daily    tamsulosin (FLOMAX) 0.4 mg; Take 1 capsule (0.4 mg total) by mouth daily with dinner    Lipase; Future    UA (URINE) with reflex to Scope; Future

## 2025-03-11 NOTE — PROGRESS NOTES
Name: Omero Winston      : 1950      MRN: [id number]  Encounter Provider: Garry Vasquez MD  Encounter Date: 3/11/2025   Encounter department: [place] [facility]  :  Assessment & Plan  Cigarette smoker    Orders:    amLODIPine (NORVASC) 10 mg tablet; Take 1 tablet (10 mg total) by mouth daily    fenofibrate 160 MG tablet; Take 1 tablet (160 mg total) by mouth daily    levothyroxine 25 mcg tablet; Take 1 tablet (25 mcg total) by mouth daily    losartan (COZAAR) 50 mg tablet; Take 1 tablet (50 mg total) by mouth daily    metoprolol succinate (TOPROL-XL) 50 mg 24 hr tablet; Take 0.5 tablets (25 mg total) by mouth daily    pancrelipase, Lip-Prot-Amyl, (Creon) 12,000 units capsule; Take 12,000 units of lipase by mouth 3 (three) times a day with meals    rosuvastatin (CRESTOR) 10 MG tablet; Take 1 tablet (10 mg total) by mouth daily    spironolactone (ALDACTONE) 25 mg tablet; Take 0.5 tablets (12.5 mg total) by mouth daily    tamsulosin (FLOMAX) 0.4 mg; Take 1 capsule (0.4 mg total) by mouth daily with dinner    Mixed hyperlipidemia    Orders:    amLODIPine (NORVASC) 10 mg tablet; Take 1 tablet (10 mg total) by mouth daily    fenofibrate 160 MG tablet; Take 1 tablet (160 mg total) by mouth daily    levothyroxine 25 mcg tablet; Take 1 tablet (25 mcg total) by mouth daily    losartan (COZAAR) 50 mg tablet; Take 1 tablet (50 mg total) by mouth daily    metoprolol succinate (TOPROL-XL) 50 mg 24 hr tablet; Take 0.5 tablets (25 mg total) by mouth daily    pancrelipase, Lip-Prot-Amyl, (Creon) 12,000 units capsule; Take 12,000 units of lipase by mouth 3 (three) times a day with meals    rosuvastatin (CRESTOR) 10 MG tablet; Take 1 tablet (10 mg total) by mouth daily    spironolactone (ALDACTONE) 25 mg tablet; Take 0.5 tablets (12.5 mg total) by mouth daily    tamsulosin (FLOMAX) 0.4 mg; Take 1 capsule (0.4 mg total) by mouth daily with dinner    Lipase; Future    UA (URINE) with reflex to  Scope; Future    Magnesium; Future    TSH, 3rd generation; Future    T4, free; Future    CBC and differential; Future    Comprehensive metabolic panel; Future    Lipid panel; Future    Hypomagnesemia    Orders:    amLODIPine (NORVASC) 10 mg tablet; Take 1 tablet (10 mg total) by mouth daily    fenofibrate 160 MG tablet; Take 1 tablet (160 mg total) by mouth daily    levothyroxine 25 mcg tablet; Take 1 tablet (25 mcg total) by mouth daily    losartan (COZAAR) 50 mg tablet; Take 1 tablet (50 mg total) by mouth daily    metoprolol succinate (TOPROL-XL) 50 mg 24 hr tablet; Take 0.5 tablets (25 mg total) by mouth daily    pancrelipase, Lip-Prot-Amyl, (Creon) 12,000 units capsule; Take 12,000 units of lipase by mouth 3 (three) times a day with meals    rosuvastatin (CRESTOR) 10 MG tablet; Take 1 tablet (10 mg total) by mouth daily    spironolactone (ALDACTONE) 25 mg tablet; Take 0.5 tablets (12.5 mg total) by mouth daily    tamsulosin (FLOMAX) 0.4 mg; Take 1 capsule (0.4 mg total) by mouth daily with dinner    Elevated PSA    Orders:    amLODIPine (NORVASC) 10 mg tablet; Take 1 tablet (10 mg total) by mouth daily    fenofibrate 160 MG tablet; Take 1 tablet (160 mg total) by mouth daily    levothyroxine 25 mcg tablet; Take 1 tablet (25 mcg total) by mouth daily    losartan (COZAAR) 50 mg tablet; Take 1 tablet (50 mg total) by mouth daily    metoprolol succinate (TOPROL-XL) 50 mg 24 hr tablet; Take 0.5 tablets (25 mg total) by mouth daily    pancrelipase, Lip-Prot-Amyl, (Creon) 12,000 units capsule; Take 12,000 units of lipase by mouth 3 (three) times a day with meals    rosuvastatin (CRESTOR) 10 MG tablet; Take 1 tablet (10 mg total) by mouth daily    spironolactone (ALDACTONE) 25 mg tablet; Take 0.5 tablets (12.5 mg total) by mouth daily    tamsulosin (FLOMAX) 0.4 mg; Take 1 capsule (0.4 mg total) by mouth daily with dinner    Aneurysm of ascending aorta without rupture (HCC)    Orders:    amLODIPine (NORVASC) 10 mg  tablet; Take 1 tablet (10 mg total) by mouth daily    fenofibrate 160 MG tablet; Take 1 tablet (160 mg total) by mouth daily    levothyroxine 25 mcg tablet; Take 1 tablet (25 mcg total) by mouth daily    losartan (COZAAR) 50 mg tablet; Take 1 tablet (50 mg total) by mouth daily    metoprolol succinate (TOPROL-XL) 50 mg 24 hr tablet; Take 0.5 tablets (25 mg total) by mouth daily    pancrelipase, Lip-Prot-Amyl, (Creon) 12,000 units capsule; Take 12,000 units of lipase by mouth 3 (three) times a day with meals    rosuvastatin (CRESTOR) 10 MG tablet; Take 1 tablet (10 mg total) by mouth daily    spironolactone (ALDACTONE) 25 mg tablet; Take 0.5 tablets (12.5 mg total) by mouth daily    tamsulosin (FLOMAX) 0.4 mg; Take 1 capsule (0.4 mg total) by mouth daily with dinner    Lipase; Future    UA (URINE) with reflex to Scope; Future    Hypothyroidism due to Hashimoto's thyroiditis    Orders:    amLODIPine (NORVASC) 10 mg tablet; Take 1 tablet (10 mg total) by mouth daily    fenofibrate 160 MG tablet; Take 1 tablet (160 mg total) by mouth daily    levothyroxine 25 mcg tablet; Take 1 tablet (25 mcg total) by mouth daily    losartan (COZAAR) 50 mg tablet; Take 1 tablet (50 mg total) by mouth daily    metoprolol succinate (TOPROL-XL) 50 mg 24 hr tablet; Take 0.5 tablets (25 mg total) by mouth daily    pancrelipase, Lip-Prot-Amyl, (Creon) 12,000 units capsule; Take 12,000 units of lipase by mouth 3 (three) times a day with meals    rosuvastatin (CRESTOR) 10 MG tablet; Take 1 tablet (10 mg total) by mouth daily    spironolactone (ALDACTONE) 25 mg tablet; Take 0.5 tablets (12.5 mg total) by mouth daily    tamsulosin (FLOMAX) 0.4 mg; Take 1 capsule (0.4 mg total) by mouth daily with dinner    Lipase; Future    UA (URINE) with reflex to Scope; Future    Magnesium; Future    TSH, 3rd generation; Future    T4, free; Future    CBC and differential; Future    Comprehensive metabolic panel; Future    Lipid panel; Future    Idiopathic  osteoporosis    Orders:    denosumab (PROLIA) 60 mg/mL; Inject 1 mL (60 mg total) under the skin once for 1 dose    Calcium Carbonate-Vit D-Min (Caltrate 600+D Plus Minerals) 600-800 MG-UNIT TABS; Take 1 tablet by mouth 2 (two) times a day with meals    Other chronic pancreatitis (HCC)  Stable  Continue same  RTC in 3 mos w Blood work  Renew ;  Orders:    amLODIPine (NORVASC) 10 mg tablet; Take 1 tablet (10 mg total) by mouth daily    fenofibrate 160 MG tablet; Take 1 tablet (160 mg total) by mouth daily    levothyroxine 25 mcg tablet; Take 1 tablet (25 mcg total) by mouth daily    losartan (COZAAR) 50 mg tablet; Take 1 tablet (50 mg total) by mouth daily    metoprolol succinate (TOPROL-XL) 50 mg 24 hr tablet; Take 0.5 tablets (25 mg total) by mouth daily    pancrelipase, Lip-Prot-Amyl, (Creon) 12,000 units capsule; Take 12,000 units of lipase by mouth 3 (three) times a day with meals    rosuvastatin (CRESTOR) 10 MG tablet; Take 1 tablet (10 mg total) by mouth daily    spironolactone (ALDACTONE) 25 mg tablet; Take 0.5 tablets (12.5 mg total) by mouth daily    tamsulosin (FLOMAX) 0.4 mg; Take 1 capsule (0.4 mg total) by mouth daily with dinner    Lipase; Future    UA (URINE) with reflex to Scope; Future    Magnesium; Future    TSH, 3rd generation; Future    T4, free; Future    CBC and differential; Future    Comprehensive metabolic panel; Future    Lipid panel; Future           History of Present Illness   74 Y O Man is here for Regular Check up , he still smokes , recent Blood work and med list Reviewed,....      Review of Systems   Constitutional:  Negative for chills, fatigue and fever.   HENT:  Negative for congestion, facial swelling, sore throat, trouble swallowing and voice change.    Eyes:  Negative for pain, discharge and visual disturbance.   Respiratory:  Negative for cough, shortness of breath and wheezing.    Cardiovascular:  Negative for chest pain, palpitations and leg swelling.   Gastrointestinal:   "Negative for abdominal pain, blood in stool, constipation, diarrhea and nausea.   Endocrine: Negative for polydipsia, polyphagia and polyuria.   Genitourinary:  Negative for difficulty urinating, hematuria and urgency.   Musculoskeletal:  Negative for arthralgias and myalgias.   Skin:  Negative for rash.   Neurological:  Negative for dizziness, tremors, weakness and headaches.   Hematological:  Negative for adenopathy. Does not bruise/bleed easily.   Psychiatric/Behavioral:  Negative for dysphoric mood, sleep disturbance and suicidal ideas.        Objective   /84 (BP Location: Left arm, Patient Position: Sitting, Cuff Size: Standard)   Pulse 74   Temp 97.9 °F (36.6 °C) (Skin)   Resp 16   Ht 5' 3\" (1.6 m)   Wt 61.7 kg (136 lb)   SpO2 98%   BMI 24.09 kg/m²      Physical Exam  Vitals and nursing note reviewed.   Constitutional:       General: He is not in acute distress.     Appearance: He is well-developed.   HENT:      Head: Normocephalic and atraumatic.      Right Ear: External ear normal.      Left Ear: External ear normal.   Eyes:      Conjunctiva/sclera: Conjunctivae normal.      Pupils: Pupils are equal, round, and reactive to light.   Neck:      Thyroid: No thyromegaly.      Trachea: No tracheal deviation.   Cardiovascular:      Rate and Rhythm: Normal rate and regular rhythm.      Heart sounds: Murmur heard.      No friction rub.   Pulmonary:      Effort: Pulmonary effort is normal. No respiratory distress.      Breath sounds: Normal breath sounds. No wheezing.   Abdominal:      General: Bowel sounds are normal. There is no distension.      Palpations: Abdomen is soft.      Tenderness: There is no abdominal tenderness.   Musculoskeletal:         General: No swelling or deformity. Normal range of motion.      Cervical back: Neck supple.   Skin:     General: Skin is warm and dry.      Capillary Refill: Capillary refill takes less than 2 seconds.      Findings: No erythema or rash.   Neurological:    "   Mental Status: He is alert and oriented to person, place, and time.      Cranial Nerves: No cranial nerve deficit.      Coordination: Coordination normal.      Deep Tendon Reflexes: Reflexes normal.   Psychiatric:         Mood and Affect: Mood normal.         Behavior: Behavior normal.

## 2025-03-11 NOTE — ASSESSMENT & PLAN NOTE
Orders:    amLODIPine (NORVASC) 10 mg tablet; Take 1 tablet (10 mg total) by mouth daily    fenofibrate 160 MG tablet; Take 1 tablet (160 mg total) by mouth daily    levothyroxine 25 mcg tablet; Take 1 tablet (25 mcg total) by mouth daily    losartan (COZAAR) 50 mg tablet; Take 1 tablet (50 mg total) by mouth daily    metoprolol succinate (TOPROL-XL) 50 mg 24 hr tablet; Take 0.5 tablets (25 mg total) by mouth daily    pancrelipase, Lip-Prot-Amyl, (Creon) 12,000 units capsule; Take 12,000 units of lipase by mouth 3 (three) times a day with meals    rosuvastatin (CRESTOR) 10 MG tablet; Take 1 tablet (10 mg total) by mouth daily    spironolactone (ALDACTONE) 25 mg tablet; Take 0.5 tablets (12.5 mg total) by mouth daily    tamsulosin (FLOMAX) 0.4 mg; Take 1 capsule (0.4 mg total) by mouth daily with dinner    Lipase; Future    UA (URINE) with reflex to Scope; Future    Magnesium; Future    TSH, 3rd generation; Future    T4, free; Future    CBC and differential; Future    Comprehensive metabolic panel; Future    Lipid panel; Future

## 2025-03-11 NOTE — PATIENT INSTRUCTIONS

## 2025-03-27 ENCOUNTER — HOSPITAL ENCOUNTER (OUTPATIENT)
Facility: MEDICAL CENTER | Age: 75
Discharge: HOME | End: 2025-03-27
Attending: UROLOGY
Payer: MEDICARE

## 2025-03-27 DIAGNOSIS — R97.20 ELEVATED PSA: ICD-10-CM

## 2025-03-27 DIAGNOSIS — R97.20 INCREASED PROSTATE SPECIFIC ANTIGEN (PSA) VELOCITY: ICD-10-CM

## 2025-03-27 PROCEDURE — 76377 3D RENDER W/INTRP POSTPROCES: CPT

## 2025-03-27 PROCEDURE — A9585 GADOBUTROL INJECTION: HCPCS | Performed by: UROLOGY

## 2025-03-27 PROCEDURE — 72197 MRI PELVIS W/O & W/DYE: CPT

## 2025-03-27 RX ORDER — GADOBUTROL 604.72 MG/ML
6 INJECTION INTRAVENOUS
Status: COMPLETED | OUTPATIENT
Start: 2025-03-27 | End: 2025-03-27

## 2025-03-27 RX ADMIN — GADOBUTROL 6 ML: 604.72 INJECTION INTRAVENOUS at 13:54

## 2025-03-31 ENCOUNTER — TELEPHONE (OUTPATIENT)
Dept: FAMILY MEDICINE CLINIC | Facility: CLINIC | Age: 75
End: 2025-03-31

## 2025-06-09 ENCOUNTER — APPOINTMENT (OUTPATIENT)
Dept: LAB | Facility: CLINIC | Age: 75
End: 2025-06-09
Payer: MEDICARE

## 2025-06-09 DIAGNOSIS — E78.2 MIXED HYPERLIPIDEMIA: ICD-10-CM

## 2025-06-09 DIAGNOSIS — I71.21 ANEURYSM OF ASCENDING AORTA WITHOUT RUPTURE (HCC): ICD-10-CM

## 2025-06-09 DIAGNOSIS — K86.1 OTHER CHRONIC PANCREATITIS (HCC): ICD-10-CM

## 2025-06-09 DIAGNOSIS — E06.3 HYPOTHYROIDISM DUE TO HASHIMOTO'S THYROIDITIS: ICD-10-CM

## 2025-06-09 LAB
ALBUMIN SERPL BCG-MCNC: 4 G/DL (ref 3.5–5)
ALP SERPL-CCNC: 38 U/L (ref 34–104)
ALT SERPL W P-5'-P-CCNC: 17 U/L (ref 7–52)
ANION GAP SERPL CALCULATED.3IONS-SCNC: 10 MMOL/L (ref 4–13)
AST SERPL W P-5'-P-CCNC: 27 U/L (ref 13–39)
BILIRUB SERPL-MCNC: 0.42 MG/DL (ref 0.2–1)
BILIRUB UR QL STRIP: NEGATIVE
BUN SERPL-MCNC: 20 MG/DL (ref 5–25)
CALCIUM SERPL-MCNC: 10.1 MG/DL (ref 8.4–10.2)
CHLORIDE SERPL-SCNC: 103 MMOL/L (ref 96–108)
CLARITY UR: CLEAR
CO2 SERPL-SCNC: 26 MMOL/L (ref 21–32)
COLOR UR: NORMAL
CREAT SERPL-MCNC: 1.37 MG/DL (ref 0.6–1.3)
GFR SERPL CREATININE-BSD FRML MDRD: 50 ML/MIN/1.73SQ M
GLUCOSE SERPL-MCNC: 92 MG/DL (ref 65–140)
GLUCOSE UR STRIP-MCNC: NEGATIVE MG/DL
HGB UR QL STRIP.AUTO: NEGATIVE
KETONES UR STRIP-MCNC: NEGATIVE MG/DL
LEUKOCYTE ESTERASE UR QL STRIP: NEGATIVE
LIPASE SERPL-CCNC: 42 U/L (ref 11–82)
MAGNESIUM SERPL-MCNC: 1.7 MG/DL (ref 1.9–2.7)
NITRITE UR QL STRIP: NEGATIVE
PH UR STRIP.AUTO: 7 [PH]
POTASSIUM SERPL-SCNC: 5 MMOL/L (ref 3.5–5.3)
PROT SERPL-MCNC: 6.9 G/DL (ref 6.4–8.4)
PROT UR STRIP-MCNC: NEGATIVE MG/DL
SODIUM SERPL-SCNC: 139 MMOL/L (ref 135–147)
SP GR UR STRIP.AUTO: 1.01 (ref 1–1.03)
T4 FREE SERPL-MCNC: 1.09 NG/DL (ref 0.61–1.12)
TSH SERPL DL<=0.05 MIU/L-ACNC: 4.48 UIU/ML (ref 0.45–4.5)
UROBILINOGEN UR STRIP-ACNC: <2 MG/DL

## 2025-06-09 PROCEDURE — 81003 URINALYSIS AUTO W/O SCOPE: CPT

## 2025-06-09 PROCEDURE — 84443 ASSAY THYROID STIM HORMONE: CPT

## 2025-06-09 PROCEDURE — 36415 COLL VENOUS BLD VENIPUNCTURE: CPT

## 2025-06-09 PROCEDURE — 83735 ASSAY OF MAGNESIUM: CPT

## 2025-06-09 PROCEDURE — 83690 ASSAY OF LIPASE: CPT

## 2025-06-09 PROCEDURE — 84439 ASSAY OF FREE THYROXINE: CPT

## 2025-06-09 PROCEDURE — 80053 COMPREHEN METABOLIC PANEL: CPT

## 2025-06-17 ENCOUNTER — OFFICE VISIT (OUTPATIENT)
Dept: FAMILY MEDICINE CLINIC | Facility: CLINIC | Age: 75
End: 2025-06-17
Payer: MEDICARE

## 2025-06-17 VITALS
WEIGHT: 130 LBS | BODY MASS INDEX: 23.04 KG/M2 | HEART RATE: 89 BPM | SYSTOLIC BLOOD PRESSURE: 120 MMHG | OXYGEN SATURATION: 96 % | TEMPERATURE: 97.7 F | DIASTOLIC BLOOD PRESSURE: 58 MMHG | RESPIRATION RATE: 16 BRPM | HEIGHT: 63 IN

## 2025-06-17 DIAGNOSIS — E78.2 MIXED HYPERLIPIDEMIA: ICD-10-CM

## 2025-06-17 DIAGNOSIS — R97.20 ELEVATED PSA: ICD-10-CM

## 2025-06-17 DIAGNOSIS — K86.1 OTHER CHRONIC PANCREATITIS (HCC): ICD-10-CM

## 2025-06-17 DIAGNOSIS — I71.21 ANEURYSM OF ASCENDING AORTA WITHOUT RUPTURE (HCC): ICD-10-CM

## 2025-06-17 DIAGNOSIS — E83.42 HYPOMAGNESEMIA: ICD-10-CM

## 2025-06-17 DIAGNOSIS — F17.210 CIGARETTE SMOKER: ICD-10-CM

## 2025-06-17 DIAGNOSIS — E06.3 HYPOTHYROIDISM DUE TO HASHIMOTO'S THYROIDITIS: ICD-10-CM

## 2025-06-17 DIAGNOSIS — Z00.01 ENCOUNTER FOR GENERAL ADULT MEDICAL EXAMINATION WITH ABNORMAL FINDINGS: Primary | ICD-10-CM

## 2025-06-17 PROBLEM — C76.0: Status: RESOLVED | Noted: 2025-01-03 | Resolved: 2025-06-17

## 2025-06-17 PROCEDURE — G2211 COMPLEX E/M VISIT ADD ON: HCPCS | Performed by: INTERNAL MEDICINE

## 2025-06-17 PROCEDURE — G0439 PPPS, SUBSEQ VISIT: HCPCS | Performed by: INTERNAL MEDICINE

## 2025-06-17 PROCEDURE — 99214 OFFICE O/P EST MOD 30 MIN: CPT | Performed by: INTERNAL MEDICINE

## 2025-06-17 RX ORDER — ROSUVASTATIN CALCIUM 10 MG/1
10 TABLET, COATED ORAL DAILY
Qty: 90 TABLET | Refills: 3 | Status: SHIPPED | OUTPATIENT
Start: 2025-06-17

## 2025-06-17 RX ORDER — METOPROLOL SUCCINATE 50 MG/1
25 TABLET, EXTENDED RELEASE ORAL DAILY
Qty: 90 TABLET | Refills: 3 | Status: SHIPPED | OUTPATIENT
Start: 2025-06-17

## 2025-06-17 RX ORDER — LOSARTAN POTASSIUM 50 MG/1
50 TABLET ORAL DAILY
Qty: 90 TABLET | Refills: 3 | Status: SHIPPED | OUTPATIENT
Start: 2025-06-17

## 2025-06-17 RX ORDER — AMLODIPINE BESYLATE 10 MG/1
10 TABLET ORAL DAILY
Qty: 90 TABLET | Refills: 3 | Status: SHIPPED | OUTPATIENT
Start: 2025-06-17

## 2025-06-17 RX ORDER — FENOFIBRATE 160 MG/1
160 TABLET ORAL DAILY
Qty: 90 TABLET | Refills: 3 | Status: SHIPPED | OUTPATIENT
Start: 2025-06-17

## 2025-06-17 RX ORDER — LANOLIN ALCOHOL/MO/W.PET/CERES
400 CREAM (GRAM) TOPICAL 2 TIMES DAILY
Qty: 180 TABLET | Refills: 3 | Status: SHIPPED | OUTPATIENT
Start: 2025-06-17

## 2025-06-17 NOTE — ASSESSMENT & PLAN NOTE
In remission  Continue same    Orders:    amLODIPine (NORVASC) 10 mg tablet; Take 1 tablet (10 mg total) by mouth daily    fenofibrate 160 MG tablet; Take 1 tablet (160 mg total) by mouth daily    losartan (COZAAR) 50 mg tablet; Take 1 tablet (50 mg total) by mouth daily    metoprolol succinate (TOPROL-XL) 50 mg 24 hr tablet; Take 0.5 tablets (25 mg total) by mouth daily    rosuvastatin (CRESTOR) 10 MG tablet; Take 1 tablet (10 mg total) by mouth daily

## 2025-06-17 NOTE — ASSESSMENT & PLAN NOTE
Stable  Continue same  Yearly CTA Chest    Orders:    amLODIPine (NORVASC) 10 mg tablet; Take 1 tablet (10 mg total) by mouth daily    fenofibrate 160 MG tablet; Take 1 tablet (160 mg total) by mouth daily    losartan (COZAAR) 50 mg tablet; Take 1 tablet (50 mg total) by mouth daily    metoprolol succinate (TOPROL-XL) 50 mg 24 hr tablet; Take 0.5 tablets (25 mg total) by mouth daily    rosuvastatin (CRESTOR) 10 MG tablet; Take 1 tablet (10 mg total) by mouth daily    UA (URINE) with reflex to Scope; Future    Magnesium; Future    TSH, 3rd generation; Future    T4, free; Future    CBC and differential; Future    Comprehensive metabolic panel; Future    Lipid panel; Future    Vitamin B12; Future    Vitamin D 25 hydroxy; Future

## 2025-06-17 NOTE — PROGRESS NOTES
Name: Omero Winston      : 1950      MRN: [id number]  Encounter Provider: Garry Vasquez MD  Encounter Date: 2025   Encounter department: [facility]  :  Assessment & Plan  Cigarette smoker    Orders:    amLODIPine (NORVASC) 10 mg tablet; Take 1 tablet (10 mg total) by mouth daily    fenofibrate 160 MG tablet; Take 1 tablet (160 mg total) by mouth daily    losartan (COZAAR) 50 mg tablet; Take 1 tablet (50 mg total) by mouth daily    metoprolol succinate (TOPROL-XL) 50 mg 24 hr tablet; Take 0.5 tablets (25 mg total) by mouth daily    rosuvastatin (CRESTOR) 10 MG tablet; Take 1 tablet (10 mg total) by mouth daily    UA (URINE) with reflex to Scope; Future    Magnesium; Future    TSH, 3rd generation; Future    T4, free; Future    CBC and differential; Future    Comprehensive metabolic panel; Future    Lipid panel; Future    Vitamin B12; Future    Vitamin D 25 hydroxy; Future    Mixed hyperlipidemia    Orders:    amLODIPine (NORVASC) 10 mg tablet; Take 1 tablet (10 mg total) by mouth daily    fenofibrate 160 MG tablet; Take 1 tablet (160 mg total) by mouth daily    losartan (COZAAR) 50 mg tablet; Take 1 tablet (50 mg total) by mouth daily    metoprolol succinate (TOPROL-XL) 50 mg 24 hr tablet; Take 0.5 tablets (25 mg total) by mouth daily    rosuvastatin (CRESTOR) 10 MG tablet; Take 1 tablet (10 mg total) by mouth daily    UA (URINE) with reflex to Scope; Future    Magnesium; Future    TSH, 3rd generation; Future    T4, free; Future    CBC and differential; Future    Comprehensive metabolic panel; Future    Lipid panel; Future    Vitamin B12; Future    Vitamin D 25 hydroxy; Future    Hypomagnesemia    Orders:    amLODIPine (NORVASC) 10 mg tablet; Take 1 tablet (10 mg total) by mouth daily    fenofibrate 160 MG tablet; Take 1 tablet (160 mg total) by mouth daily    losartan (COZAAR) 50 mg tablet; Take 1 tablet (50 mg total) by mouth daily    magnesium Oxide (MAG-OX) 400  mg TABS; Take 1 tablet (400 mg total) by mouth 2 (two) times a day    metoprolol succinate (TOPROL-XL) 50 mg 24 hr tablet; Take 0.5 tablets (25 mg total) by mouth daily    rosuvastatin (CRESTOR) 10 MG tablet; Take 1 tablet (10 mg total) by mouth daily    UA (URINE) with reflex to Scope; Future    Magnesium; Future    Elevated PSA    Orders:    amLODIPine (NORVASC) 10 mg tablet; Take 1 tablet (10 mg total) by mouth daily    fenofibrate 160 MG tablet; Take 1 tablet (160 mg total) by mouth daily    losartan (COZAAR) 50 mg tablet; Take 1 tablet (50 mg total) by mouth daily    metoprolol succinate (TOPROL-XL) 50 mg 24 hr tablet; Take 0.5 tablets (25 mg total) by mouth daily    rosuvastatin (CRESTOR) 10 MG tablet; Take 1 tablet (10 mg total) by mouth daily    Aneurysm of ascending aorta without rupture (HCC)  Stable  Continue same  Yearly CTA Chest    Orders:    amLODIPine (NORVASC) 10 mg tablet; Take 1 tablet (10 mg total) by mouth daily    fenofibrate 160 MG tablet; Take 1 tablet (160 mg total) by mouth daily    losartan (COZAAR) 50 mg tablet; Take 1 tablet (50 mg total) by mouth daily    metoprolol succinate (TOPROL-XL) 50 mg 24 hr tablet; Take 0.5 tablets (25 mg total) by mouth daily    rosuvastatin (CRESTOR) 10 MG tablet; Take 1 tablet (10 mg total) by mouth daily    UA (URINE) with reflex to Scope; Future    Magnesium; Future    TSH, 3rd generation; Future    T4, free; Future    CBC and differential; Future    Comprehensive metabolic panel; Future    Lipid panel; Future    Vitamin B12; Future    Vitamin D 25 hydroxy; Future    Hypothyroidism due to Hashimoto's thyroiditis  Continue Synthroid  Renew:  Orders:    amLODIPine (NORVASC) 10 mg tablet; Take 1 tablet (10 mg total) by mouth daily    fenofibrate 160 MG tablet; Take 1 tablet (160 mg total) by mouth daily    losartan (COZAAR) 50 mg tablet; Take 1 tablet (50 mg total) by mouth daily    metoprolol succinate (TOPROL-XL) 50 mg 24 hr tablet; Take 0.5 tablets (25 mg  total) by mouth daily    rosuvastatin (CRESTOR) 10 MG tablet; Take 1 tablet (10 mg total) by mouth daily    UA (URINE) with reflex to Scope; Future    Magnesium; Future    TSH, 3rd generation; Future    T4, free; Future    CBC and differential; Future    Comprehensive metabolic panel; Future    Lipid panel; Future    Vitamin B12; Future    Vitamin D 25 hydroxy; Future    Other chronic pancreatitis (HCC)  In remission  Continue same    Orders:    amLODIPine (NORVASC) 10 mg tablet; Take 1 tablet (10 mg total) by mouth daily    fenofibrate 160 MG tablet; Take 1 tablet (160 mg total) by mouth daily    losartan (COZAAR) 50 mg tablet; Take 1 tablet (50 mg total) by mouth daily    metoprolol succinate (TOPROL-XL) 50 mg 24 hr tablet; Take 0.5 tablets (25 mg total) by mouth daily    rosuvastatin (CRESTOR) 10 MG tablet; Take 1 tablet (10 mg total) by mouth daily    Encounter for general adult medical examination with abnormal findings  Done in Detail  RTC in  3 mos w Blood work,...              History of Present Illness   75 Y O Man is here for AWV and Regular check up, he feels the same, he still smokes, recent blood work and med list reviewed,....      Review of Systems   Constitutional:  Negative for chills, fatigue and fever.   HENT:  Negative for congestion, facial swelling, sore throat, trouble swallowing and voice change.    Eyes:  Negative for pain, discharge and visual disturbance.   Respiratory:  Negative for cough, shortness of breath and wheezing.    Cardiovascular:  Negative for chest pain, palpitations and leg swelling.   Gastrointestinal:  Negative for abdominal pain, blood in stool, constipation, diarrhea and nausea.   Endocrine: Negative for polydipsia, polyphagia and polyuria.   Genitourinary:  Negative for difficulty urinating, hematuria and urgency.   Musculoskeletal:  Negative for arthralgias and myalgias.   Skin:  Negative for rash.   Neurological:  Negative for dizziness, tremors, weakness and  "headaches.   Hematological:  Negative for adenopathy. Does not bruise/bleed easily.   Psychiatric/Behavioral:  Negative for dysphoric mood, sleep disturbance and suicidal ideas.        Objective   /58 (BP Location: Left arm, Patient Position: Sitting, Cuff Size: Standard)   Pulse 89   Temp 97.7 °F (36.5 °C) (Skin)   Resp 16   Ht 5' 3\" (1.6 m)   Wt 59 kg (130 lb)   SpO2 96%   BMI 23.03 kg/m²      Physical Exam  Vitals and nursing note reviewed.   Constitutional:       General: He is not in acute distress.     Appearance: He is well-developed.   HENT:      Head: Normocephalic and atraumatic.      Right Ear: External ear normal.      Left Ear: External ear normal.     Eyes:      Conjunctiva/sclera: Conjunctivae normal.      Pupils: Pupils are equal, round, and reactive to light.     Neck:      Thyroid: No thyromegaly.      Trachea: No tracheal deviation.     Cardiovascular:      Rate and Rhythm: Normal rate and regular rhythm.      Heart sounds: Murmur heard.      No friction rub.   Pulmonary:      Effort: Pulmonary effort is normal. No respiratory distress.      Breath sounds: Normal breath sounds. No wheezing.   Abdominal:      General: Bowel sounds are normal. There is no distension.      Palpations: Abdomen is soft.      Tenderness: There is no abdominal tenderness.     Musculoskeletal:         General: No swelling or deformity. Normal range of motion.      Cervical back: Neck supple.     Skin:     General: Skin is warm and dry.      Capillary Refill: Capillary refill takes less than 2 seconds.      Findings: No erythema or rash.     Neurological:      Mental Status: He is alert and oriented to person, place, and time.      Cranial Nerves: No cranial nerve deficit.      Coordination: Coordination normal.      Deep Tendon Reflexes: Reflexes normal.     Psychiatric:         Mood and Affect: Mood normal.         Behavior: Behavior normal.         Answers submitted by the patient for this visit:  Medicare " "Annual Wellness Visit (Submitted on 6/12/2025)  How would you rate your overall health?: good  Compared to last year, how is your physical health?: same  In general, how satisfied are you with your life?: satisfied  Compared to last year, how is your eyesight?: slightly worse  Compared to last year, how is your hearing?: slightly worse  Compared to last year, how is your emotional/mental health?: same  How often is anger a problem for you?: never, rarely  How often do you feel unusually tired/fatigued?: sometimes  In the past 7 days, how much pain have you experienced?: some  If you answered \"some\" or \"a lot\", please rate the severity of your pain on a scale of 1 to 10 (1 being the least severe pain and 10 being the most intense pain).: 5/10  In the past 6 months, have you lost or gained 10 pounds without trying?: No  One or more falls in the last year: No  Do you have trouble with the stairs inside or outside your home?: No  Does your home have working smoke alarms?: Yes  Does your home have a carbon monoxide monitor?: No  Which safety hazards (if any) have you experienced in your home? Please select all that apply.: none  How would you describe your current diet? Please select all that apply.: Regular  In addition to prescription medications, are you taking any over-the-counter supplements?: No  Can you manage your medications?: Yes  Are you currently taking any opioid medications?: No  Can you walk and transfer into and out of your bed and chair?: Yes  Can you dress and groom yourself?: Yes  Can you bathe or shower yourself?: Yes  Can you feed yourself?: Yes  Can you do your laundry/ housekeeping?: Yes  Can you manage your money, pay your bills, and track your expenses?: Yes  Can you make your own meals?: Yes  Can you do your own shopping?: Yes  Within the last 12 months, have you had any hospitalizations or Emergency Department visits?: No  Do you have a living will?: No  Do you have a Durable POA (Power of " ) for healthcare decisions?: No  Do you have an Advanced Directive for end of life decisions?: No  How often have you used an illegal drug (including marijuana) or a prescription medication for non-medical reasons in the past year?: never  What is the typical number of drinks you consume in a day?: 0  What is the typical number of drinks you consume in a week?: 0  How often did you have a drink containing alcohol in the past year?: never  How many drinks did you have on a typical day  when you were drinking in the past year?: 1 to 2  How often did you have 6 or more drinks on one occasion in the past year?: never

## 2025-06-17 NOTE — ASSESSMENT & PLAN NOTE
Orders:    amLODIPine (NORVASC) 10 mg tablet; Take 1 tablet (10 mg total) by mouth daily    fenofibrate 160 MG tablet; Take 1 tablet (160 mg total) by mouth daily    losartan (COZAAR) 50 mg tablet; Take 1 tablet (50 mg total) by mouth daily    metoprolol succinate (TOPROL-XL) 50 mg 24 hr tablet; Take 0.5 tablets (25 mg total) by mouth daily    rosuvastatin (CRESTOR) 10 MG tablet; Take 1 tablet (10 mg total) by mouth daily    UA (URINE) with reflex to Scope; Future    Magnesium; Future    TSH, 3rd generation; Future    T4, free; Future    CBC and differential; Future    Comprehensive metabolic panel; Future    Lipid panel; Future    Vitamin B12; Future    Vitamin D 25 hydroxy; Future

## 2025-06-18 ENCOUNTER — OFFICE VISIT (OUTPATIENT)
Dept: UROLOGY | Facility: MEDICAL CENTER | Age: 75
End: 2025-06-18
Payer: MEDICARE

## 2025-06-18 VITALS
HEIGHT: 63 IN | OXYGEN SATURATION: 97 % | DIASTOLIC BLOOD PRESSURE: 60 MMHG | SYSTOLIC BLOOD PRESSURE: 110 MMHG | HEART RATE: 82 BPM | WEIGHT: 130 LBS | BODY MASS INDEX: 23.04 KG/M2

## 2025-06-18 DIAGNOSIS — N40.1 BPH WITH LOWER URINARY TRACT SYMPTOMS WITHOUT URINARY OBSTRUCTION: Primary | ICD-10-CM

## 2025-06-18 DIAGNOSIS — Z87.898 HISTORY OF ELEVATED PSA: ICD-10-CM

## 2025-06-18 PROCEDURE — 99213 OFFICE O/P EST LOW 20 MIN: CPT | Performed by: UROLOGY

## 2025-06-18 NOTE — PROGRESS NOTES
Name: Omero Winston      : 1950      MRN: [id number]  Encounter Provider: Garry Vasquez MD  Encounter Date: 2025   Encounter department: [facility] C[facility]  :  Assessment & Plan       Preventive health issues were discussed with patient, and age appropriate screening tests were ordered as noted in patient's After Visit Summary. Personalized health advice and appropriate referrals for health education or preventive services given if needed, as noted in patient's After Visit Summary.    History of Present Illness     HPI   Patient Care Team:  Garry Vasquez MD as PCP - General (Internal Medicine)  Obdulia Artis MD (Radiation Oncology)  Catalino Mcgrath MD (Otolaryngology)  Valeria Salazar RD (Nutrition)    Review of Systems  Medical History Reviewed by provider this encounter:  Tobacco  Allergies  Meds  Problems  Med Hx  Surg Hx  Fam Hx       Annual Wellness Visit Questionnaire   Omero is here for his Subsequent Wellness visit. Last Medicare Wellness visit information reviewed, patient interviewed and updates made to the record today.      Health Risk Assessment:   Patient rates overall health as good. Patient feels that their physical health rating is same. Patient is satisfied with their life. Eyesight was rated as slightly worse. Hearing was rated as slightly worse. Patient feels that their emotional and mental health rating is same. Patients states they are never, rarely angry. Patient states they are sometimes unusually tired/fatigued. Pain experienced in the last 7 days has been some. Patient's pain rating has been 5/10. Patient states that he has experienced no weight loss or gain in last 6 months.     Depression Screening:   PHQ-2 Score: 0      Fall Risk Screening:   In the past year, patient has experienced: no history of falling in past year      Home Safety:  Patient does not have trouble with stairs inside or outside of their home. Patient has working  smoke alarms and has no working carbon monoxide detector. Home safety hazards include: none.     Nutrition:   Current diet is Regular.     Medications:   Patient is not currently taking any over-the-counter supplements. Patient is able to manage medications.     Activities of Daily Living (ADLs)/Instrumental Activities of Daily Living (IADLs):   Walk and transfer into and out of bed and chair?: Yes  Dress and groom yourself?: Yes    Bathe or shower yourself?: Yes    Feed yourself? Yes  Do your laundry/housekeeping?: Yes  Manage your money, pay your bills and track your expenses?: Yes  Make your own meals?: Yes    Do your own shopping?: Yes    Previous Hospitalizations:   Any hospitalizations or ED visits within the last 12 months?: No      Advance Care Planning:   Living will: No    Durable POA for healthcare: No    Advanced directive: No      Preventive Screenings      Cardiovascular Screening:    General: Screening Not Indicated, History Lipid Disorder and Risks and Benefits Discussed      Diabetes Screening:     General: Screening Current and Risks and Benefits Discussed      Colorectal Cancer Screening:     General: Screening Current and Risks and Benefits Discussed      Prostate Cancer Screening:    General: Screening Current and Risks and Benefits Discussed      Osteoporosis Screening:    General: Screening Not Indicated, History Osteoporosis and Risks and Benefits Discussed      Abdominal Aortic Aneurysm (AAA) Screening:    Risk factors include: age between 65-76 yo and tobacco use        General: Risks and Benefits Discussed      Lung Cancer Screening:     General: Screening Not Indicated and Risks and Benefits Discussed      Hepatitis C Screening:    General: Screening Current and Risks and Benefits Discussed    Immunizations:  - Immunizations due: Tdap and Zoster (Shingrix)    Screening, Brief Intervention, and Referral to Treatment (SBIRT)     Screening  Typical number of drinks in a day: 0  Typical  "number of drinks in a week: 0  Interpretation: Low risk drinking behavior.    AUDIT-C Screenin) How often did you have a drink containing alcohol in the past year? never  2) How many drinks did you have on a typical day when you were drinking in the past year? 1 to 2  3) How often did you have 6 or more drinks on one occasion in the past year? never    AUDIT-C Score: 0  Interpretation: Score 0-3 (male): Negative screen for alcohol misuse    Single Item Drug Screening:  How often have you used an illegal drug (including marijuana) or a prescription medication for non-medical reasons in the past year? never    Single Item Drug Screen Score: 0  Interpretation: Negative screen for possible drug use disorder    Other Counseling Topics:   Car/seat belt/driving safety, skin self-exam, sunscreen and calcium and vitamin D intake.     Social Drivers of Health     Food Insecurity: No Food Insecurity (2025)    Nursing - Inadequate Food Risk Classification     Worried About Running Out of Food in the Last Year: Never true     Ran Out of Food in the Last Year: Never true   Transportation Needs: No Transportation Needs (2025)    PRAPARE - Transportation     Lack of Transportation (Medical): No     Lack of Transportation (Non-Medical): No   Housing Stability: Unknown (2025)    Housing Stability Vital Sign     Unable to Pay for Housing in the Last Year: No     Homeless in the Last Year: No   Utilities: Not At Risk (2025)    [facility] Utilities     Threatened with loss of utilities: No     No results found.    Objective   /58 (BP Location: Left arm, Patient Position: Sitting, Cuff Size: Standard)   Pulse 89   Temp 97.7 °F (36.5 °C) (Skin)   Resp 16   Ht 5' 3\" (1.6 m)   Wt 59 kg (130 lb)   SpO2 96%   BMI 23.03 kg/m²     Physical Exam    "

## 2025-06-18 NOTE — LETTER
2025     Garry Vasquez MD  [address] [place] [id number]    Patient: Omero Winston   YOB: 1950   Date of Visit: 2025       Dear Dr. Garry Vasquez MD:    Thank you for referring Omero Winston to me for evaluation. Below are my notes for this consultation.    If you have questions, please do not hesitate to call me. I look forward to following your patient along with you.         Sincerely,        Toro Kimbrough MD        CC: No Recipients    Toro Kimbrough MD  2025  4:39 PM  Sign when Signing Visit  Name: Omero Winston      : 1950      MRN: [id number]  Encounter Provider: Toro Kimbrough MD  Encounter Date: 2025   [facility]  :  Assessment & Plan  BPH with lower urinary tract symptoms without urinary obstruction  Markedly improved urinary pattern with alpha blockade.  Will continue Flomax 0.4 mg p.o. daily.  No need at the present time for cystoscopy or surgical intervention.  Orders:  •  Comprehensive metabolic panel; Future    History of elevated PSA  Repeat PSA is within normal limits and consistent with previous PSA in .  Repeat yearly with YAIR-MRI no dominant lesion PI-RADS 2 low likelihood of significant adenocarcinoma the prostate.  Orders:  •  PSA Total, Diagnostic; Future        History of Present Illness  Omero Winston is a 75 y.o. male who presents for follow-up of elevated PSA.  The patient's repeat PSA was normal and more than likely the elevated PSA was spurious.  Multiparametric MRI revealed a PI-RADS 2 level with no evidence of dominant lesion.  The patient has been taking tamsulosin for alpha blockade for BPH and notes that his urinary stream has markedly improved and his urinary urgency hesitancy and frequency have also decreased markedly.  Will continue on that regimen  AUA SYMPTOM SCORE      Flowsheet Row Most Recent Value   AUA SYMPTOM SCORE    How often have you had a  sensation of not emptying your bladder completely after you finished urinating? 3 (P)     How often have you had to urinate again less than two hours after you finished urinating? 3 (P)     How often have you found you stopped and started again several times when you urinate? 0 (P)     How often have you found it difficult to postpone urination? 3 (P)     How often have you had a weak urinary stream? 5 (P)     How often have you had to push or strain to begin urination? 2 (P)     How many times did you most typically get up to urinate from the time you went to bed at night until the time you got up in the morning? 1 (P)     Quality of Life: If you were to spend the rest of your life with your urinary condition just the way it is now, how would you feel about that? 3 (P)     AUA SYMPTOM SCORE 17 (P)            Review of Systems   Constitutional:  Negative for chills.   Gastrointestinal:  Negative for nausea and vomiting.   Genitourinary:  Positive for dysuria and urgency. Negative for flank pain, frequency and hematuria.     Pertinent Medical History           Medical History Reviewed by provider this encounter:  Tobacco  Allergies  Meds  Problems  Med Hx  Surg Hx  Fam Hx  Soc   Hx    .  Past Medical History  Past Medical History[1]  Past Surgical History[2]  Family History[3]   reports that he has been smoking cigarettes. He has a 17.5 pack-year smoking history. He has never used smokeless tobacco. He reports current alcohol use of about 10.0 standard drinks of alcohol per week. He reports that he does not use drugs.  Current Outpatient Medications   Medication Instructions   • amLODIPine (NORVASC) 10 mg, Oral, Daily   • Budeson-Glycopyrrol-Formoterol (Breztri Aerosphere) 160-9-4.8 MCG/ACT AERO 2 puffs, Inhalation, 2 times daily, Rinse mouth after use.   • Calcium Carbonate-Vit D-Min (Caltrate 600+D Plus Minerals) 600-800 MG-UNIT TABS 1 tablet, Oral, 2 times daily with meals   • fenofibrate 160 mg, Oral,  "Daily   • lansoprazole (PREVACID) 30 mg, Oral, Daily   • levothyroxine 25 mcg, Oral, Daily   • losartan (COZAAR) 50 mg, Oral, Daily   • magnesium Oxide (MAG-OX) 400 mg, Oral, 2 times daily   • metoprolol succinate (TOPROL-XL) 25 mg, Oral, Daily   • pancrelipase, Lip-Prot-Amyl, (Creon) 12,000 units capsule 12,000 units of lipase, Oral, 3 times daily with meals   • rosuvastatin (CRESTOR) 10 mg, Oral, Daily   • spironolactone (ALDACTONE) 12.5 mg, Oral, Daily   • tamsulosin (FLOMAX) 0.4 mg, Oral, Daily with dinner   • zolpidem (AMBIEN) 10 mg, Oral, Daily at bedtime PRN   Allergies[4]   Medications Ordered Prior to Encounter[5]   Social History[6]     Objective  /60 (BP Location: Left arm, Patient Position: Sitting, Cuff Size: Standard)   Pulse 82   Ht 5' 3\" (1.6 m)   Wt 59 kg (130 lb)   SpO2 97%   BMI 23.03 kg/m²     Physical Exam  Vitals reviewed.   Constitutional:       General: He is not in acute distress.     Appearance: Normal appearance. He is not ill-appearing, toxic-appearing or diaphoretic.   HENT:      Head: Normocephalic.      Nose: Nose normal.      Mouth/Throat:      Mouth: Mucous membranes are moist.     Eyes:      Extraocular Movements: Extraocular movements intact.     Pulmonary:      Effort: Pulmonary effort is normal. No respiratory distress.     Musculoskeletal:         General: Normal range of motion.     Skin:     General: Skin is dry.     Neurological:      Mental Status: He is alert and oriented to person, place, and time.     Psychiatric:         Mood and Affect: Mood normal.         Behavior: Behavior normal.         Thought Content: Thought content normal.         Judgment: Judgment normal.            Results   Lab Results   Component Value Date    PSA 2.232 03/03/2025    PSA 5.328 (H) 11/20/2024    PSA 2.05 05/17/2023     Lab Results   Component Value Date    CALCIUM 10.1 06/09/2025    K 5.0 06/09/2025    CO2 26 06/09/2025     06/09/2025    BUN 20 06/09/2025    CREATININE 1.37 " (H) 06/09/2025     Lab Results   Component Value Date    WBC 6.69 05/17/2023    HGB 11.7 (L) 05/17/2023    HCT 35.5 (L) 05/17/2023     (H) 05/17/2023     05/17/2023       Office Urine Dip  No results found for this or any previous visit (from the past hour).           [1]   Past Medical History:  Diagnosis Date   • A-fib (HCC)    • Arthritis    • Avascular necrosis of bone of hip, left (HCC)     replaced   • Back pain    • CAD (coronary artery disease)    • Carotid artery narrowing     left side -for endarterectomy today 3/5/2020   • Disease of thyroid gland     hypo   • GERD (gastroesophageal reflux disease)    • History of Clostridioides difficile infection    • Hyperlipidemia    • Hypertension    • Irregular heart beat    • Jejunitis    • Kidney stone    • Metastatic cancer (HCC)    • Neck pain    • Pancreatitis    • Spinal stenosis    • Voice disorder 2022   • Wears glasses    [2]   Past Surgical History:  Procedure Laterality Date   • BACK SURGERY     • CARDIAC CATHETERIZATION      cardiac cath 5/2010  adjusted meds   • CATARACT EXTRACTION Bilateral    • CERVICAL FUSION     • CHOLANGIOGRAM N/A 03/04/2021    Procedure: CHOLANGIOGRAM;  Surgeon: Raymond Baez MD;  Location:  MAIN OR;  Service: General   • CHOLECYSTECTOMY     • CHOLECYSTECTOMY LAPAROSCOPIC N/A 03/04/2021    Procedure: CHOLECYSTECTOMY LAPAROSCOPIC;  Surgeon: Raymond Baez MD;  Location:  MAIN OR;  Service: General   • COLONOSCOPY     • JOINT REPLACEMENT Left     hip   • LUMBAR FUSION     • NECK SURGERY     • ORTHOPEDIC SURGERY Left     L THR   • MO LARYNGOSCOPY W/BIOPSY MICROSCOPE/TELESCOPE Left 06/01/2022    Procedure: MICRO DIRECT LARYNGOSCOPY WITH BIOPSY, BIOPSY LEFT TONSIL;  Surgeon: Catalino Mcgrath MD;  Location:  MAIN OR;  Service: ENT   • MO TEAEC W/PATCH GRF CAROTID VERTB SUBCLAV NECK INC Left 03/05/2020    Procedure: ENDARTERECTOMY ARTERY CAROTID WITH BOVINE PATCH ANGIOPLASTY AND INTRAOP DUPLEX;  Surgeon: Zane Lopez  MD;  Location: [facility] OR;  Service: Vascular   • SPINAL FUSION     • TONSILLECTOMY     • US GUIDED LYMPH NODE BIOPSY LEFT  10/03/2022   [3]   Family History  Problem Relation Name Age of Onset   • Heart disease Mother Nara    • Parkinsonism Mother Nara    • Heart failure Mother Nara    • Heart disease Father Rashard    • Heart failure Father Rashard    [4] No Known Allergies  [5]   Current Outpatient Medications on File Prior to Visit   Medication Sig Dispense Refill   • amLODIPine (NORVASC) 10 mg tablet Take 1 tablet (10 mg total) by mouth daily 90 tablet 3   • Budeson-Glycopyrrol-Formoterol (Breztri Aerosphere) 160-9-4.8 MCG/ACT AERO Inhale 2 puffs 2 (two) times a day Rinse mouth after use. 10.7 g 0   • Calcium Carbonate-Vit D-Min (Caltrate 600+D Plus Minerals) 600-800 MG-UNIT TABS Take 1 tablet by mouth 2 (two) times a day with meals 200 tablet 6   • fenofibrate 160 MG tablet Take 1 tablet (160 mg total) by mouth daily 90 tablet 3   • lansoprazole (PREVACID) 30 mg capsule TAKE 1 CAPSULE BY MOUTH EVERY DAY 90 capsule 1   • levothyroxine 25 mcg tablet Take 1 tablet (25 mcg total) by mouth daily 90 tablet 3   • losartan (COZAAR) 50 mg tablet Take 1 tablet (50 mg total) by mouth daily 90 tablet 3   • magnesium Oxide (MAG-OX) 400 mg TABS Take 1 tablet (400 mg total) by mouth 2 (two) times a day 180 tablet 3   • metoprolol succinate (TOPROL-XL) 50 mg 24 hr tablet Take 0.5 tablets (25 mg total) by mouth daily 90 tablet 3   • pancrelipase, Lip-Prot-Amyl, (Creon) 12,000 units capsule Take 12,000 units of lipase by mouth 3 (three) times a day with meals 100 capsule 3   • rosuvastatin (CRESTOR) 10 MG tablet Take 1 tablet (10 mg total) by mouth daily 90 tablet 3   • spironolactone (ALDACTONE) 25 mg tablet Take 0.5 tablets (12.5 mg total) by mouth daily 45 tablet 3   • tamsulosin (FLOMAX) 0.4 mg Take 1 capsule (0.4 mg total) by mouth daily with dinner 90 capsule 3   • zolpidem (AMBIEN) 10 mg tablet Take 1 tablet (10 mg total)  by mouth daily at bedtime as needed for sleep 30 tablet 2   • [DISCONTINUED] atorvastatin (LIPITOR) 10 mg tablet Take 1 tablet (10 mg total) by mouth daily 30 tablet 5   • [DISCONTINUED] thiamine 100 MG tablet Take 1 tablet (100 mg total) by mouth daily 90 tablet 3     No current facility-administered medications on file prior to visit.   [6]   Social History  Tobacco Use   • Smoking status: Every Day     Current packs/day: 0.25     Average packs/day: 0.3 packs/day for 60.0 years (17.5 ttl pk-yrs)     Types: Cigarettes   • Smokeless tobacco: Never   • Tobacco comments:     Half pack per day   Vaping Use   • Vaping status: Never Used   Substance and Sexual Activity   • Alcohol use: Yes     Alcohol/week: 10.0 standard drinks of alcohol     Types: 10 Standard drinks or equivalent per week     Comment: 3 oz of wine with communion multiple times/day/week   • Drug use: Never   • Sexual activity: Not Currently     Comment: DAVIDSON

## 2025-06-18 NOTE — PROGRESS NOTES
Name: Omero Winston      : 1950      MRN: [id number]  Encounter Provider: Toro Kimbrough MD  Encounter Date: 2025   Encounter department: [facility] UROLOGY [place]  :  Assessment & Plan  BPH with lower urinary tract symptoms without urinary obstruction  Markedly improved urinary pattern with alpha blockade.  Will continue Flomax 0.4 mg p.o. daily.  No need at the present time for cystoscopy or surgical intervention.  Orders:    Comprehensive metabolic panel; Future    History of elevated PSA  Repeat PSA is within normal limits and consistent with previous PSA in .  Repeat yearly with YAIR-MRI no dominant lesion PI-RADS 2 low likelihood of significant adenocarcinoma the prostate.  Orders:    PSA Total, Diagnostic; Future        History of Present Illness   Omero Winston is a 75 y.o. male who presents for follow-up of elevated PSA.  The patient's repeat PSA was normal and more than likely the elevated PSA was spurious.  Multiparametric MRI revealed a PI-RADS 2 level with no evidence of dominant lesion.  The patient has been taking tamsulosin for alpha blockade for BPH and notes that his urinary stream has markedly improved and his urinary urgency hesitancy and frequency have also decreased markedly.  Will continue on that regimen  AUA SYMPTOM SCORE      Flowsheet Row Most Recent Value   AUA SYMPTOM SCORE    How often have you had a sensation of not emptying your bladder completely after you finished urinating? 3 (P)     How often have you had to urinate again less than two hours after you finished urinating? 3 (P)     How often have you found you stopped and started again several times when you urinate? 0 (P)     How often have you found it difficult to postpone urination? 3 (P)     How often have you had a weak urinary stream? 5 (P)     How often have you had to push or strain to begin urination? 2 (P)     How many times did you most typically get up to urinate from the time  you went to bed at night until the time you got up in the morning? 1 (P)     Quality of Life: If you were to spend the rest of your life with your urinary condition just the way it is now, how would you feel about that? 3 (P)     AUA SYMPTOM SCORE 17 (P)            Review of Systems   Constitutional:  Negative for chills.   Gastrointestinal:  Negative for nausea and vomiting.   Genitourinary:  Positive for dysuria and urgency. Negative for flank pain, frequency and hematuria.     Pertinent Medical History            Medical History Reviewed by provider this encounter:  Tobacco  Allergies  Meds  Problems  Med Hx  Surg Hx  Fam Hx  Soc   Hx    .  Past Medical History   Past Medical History[1]  Past Surgical History[2]  Family History[3]   reports that he has been smoking cigarettes. He has a 17.5 pack-year smoking history. He has never used smokeless tobacco. He reports current alcohol use of about 10.0 standard drinks of alcohol per week. He reports that he does not use drugs.  Current Outpatient Medications   Medication Instructions    amLODIPine (NORVASC) 10 mg, Oral, Daily    Budeson-Glycopyrrol-Formoterol (Breztri Aerosphere) 160-9-4.8 MCG/ACT AERO 2 puffs, Inhalation, 2 times daily, Rinse mouth after use.    Calcium Carbonate-Vit D-Min (Caltrate 600+D Plus Minerals) 600-800 MG-UNIT TABS 1 tablet, Oral, 2 times daily with meals    fenofibrate 160 mg, Oral, Daily    lansoprazole (PREVACID) 30 mg, Oral, Daily    levothyroxine 25 mcg, Oral, Daily    losartan (COZAAR) 50 mg, Oral, Daily    magnesium Oxide (MAG-OX) 400 mg, Oral, 2 times daily    metoprolol succinate (TOPROL-XL) 25 mg, Oral, Daily    pancrelipase, Lip-Prot-Amyl, (Creon) 12,000 units capsule 12,000 units of lipase, Oral, 3 times daily with meals    rosuvastatin (CRESTOR) 10 mg, Oral, Daily    spironolactone (ALDACTONE) 12.5 mg, Oral, Daily    tamsulosin (FLOMAX) 0.4 mg, Oral, Daily with dinner    zolpidem (AMBIEN) 10 mg, Oral, Daily at bedtime PRN  "  Allergies[4]   Medications Ordered Prior to Encounter[5]   Social History[6]     Objective   /60 (BP Location: Left arm, Patient Position: Sitting, Cuff Size: Standard)   Pulse 82   Ht 5' 3\" (1.6 m)   Wt 59 kg (130 lb)   SpO2 97%   BMI 23.03 kg/m²     Physical Exam  Vitals reviewed.   Constitutional:       General: He is not in acute distress.     Appearance: Normal appearance. He is not ill-appearing, toxic-appearing or diaphoretic.   HENT:      Head: Normocephalic.      Nose: Nose normal.      Mouth/Throat:      Mouth: Mucous membranes are moist.     Eyes:      Extraocular Movements: Extraocular movements intact.     Pulmonary:      Effort: Pulmonary effort is normal. No respiratory distress.     Musculoskeletal:         General: Normal range of motion.     Skin:     General: Skin is dry.     Neurological:      Mental Status: He is alert and oriented to person, place, and time.     Psychiatric:         Mood and Affect: Mood normal.         Behavior: Behavior normal.         Thought Content: Thought content normal.         Judgment: Judgment normal.            Results   Lab Results   Component Value Date    PSA 2.232 03/03/2025    PSA 5.328 (H) 11/20/2024    PSA 2.05 05/17/2023     Lab Results   Component Value Date    CALCIUM 10.1 06/09/2025    K 5.0 06/09/2025    CO2 26 06/09/2025     06/09/2025    BUN 20 06/09/2025    CREATININE 1.37 (H) 06/09/2025     Lab Results   Component Value Date    WBC 6.69 05/17/2023    HGB 11.7 (L) 05/17/2023    HCT 35.5 (L) 05/17/2023     (H) 05/17/2023     05/17/2023       Office Urine Dip  No results found for this or any previous visit (from the past hour).           [1]   Past Medical History:  Diagnosis Date    A-fib (HCC)     Arthritis     Avascular necrosis of bone of hip, left (HCC)     replaced    Back pain     CAD (coronary artery disease)     Carotid artery narrowing     left side -for endarterectomy today 3/5/2020    Disease of thyroid " gland     hypo    GERD (gastroesophageal reflux disease)     History of Clostridioides difficile infection     Hyperlipidemia     Hypertension     Irregular heart beat     Jejunitis     Kidney stone     Metastatic cancer (HCC)     Neck pain     Pancreatitis     Spinal stenosis     Voice disorder 2022    Wears glasses    [2]   Past Surgical History:  Procedure Laterality Date    BACK SURGERY      CARDIAC CATHETERIZATION      cardiac cath 5/2010  adjusted meds    CATARACT EXTRACTION Bilateral     CERVICAL FUSION      CHOLANGIOGRAM N/A 03/04/2021    Procedure: CHOLANGIOGRAM;  Surgeon: Raymond Baez MD;  Location:  MAIN OR;  Service: General    CHOLECYSTECTOMY      CHOLECYSTECTOMY LAPAROSCOPIC N/A 03/04/2021    Procedure: CHOLECYSTECTOMY LAPAROSCOPIC;  Surgeon: Raymond Baez MD;  Location:  MAIN OR;  Service: General    COLONOSCOPY      JOINT REPLACEMENT Left     hip    LUMBAR FUSION      NECK SURGERY      ORTHOPEDIC SURGERY Left     L THR    MI LARYNGOSCOPY W/BIOPSY MICROSCOPE/TELESCOPE Left 06/01/2022    Procedure: MICRO DIRECT LARYNGOSCOPY WITH BIOPSY, BIOPSY LEFT TONSIL;  Surgeon: Catalino Mcgrath MD;  Location:  MAIN OR;  Service: ENT    MI TEAEC W/PATCH GRF CAROTID VERTB SUBCLAV NECK INC Left 03/05/2020    Procedure: ENDARTERECTOMY ARTERY CAROTID WITH BOVINE PATCH ANGIOPLASTY AND INTRAOP DUPLEX;  Surgeon: Zane Lopez MD;  Location: [facility];  Service: Vascular    SPINAL FUSION      TONSILLECTOMY      US GUIDED LYMPH NODE BIOPSY LEFT  10/03/2022   [3]   Family History  Problem Relation Name Age of Onset    Heart disease Mother Nara     Parkinsonism Mother Nara     Heart failure Mother Nara     Heart disease Father Rashard     Heart failure Father Rashard    [4] No Known Allergies  [5]   Current Outpatient Medications on File Prior to Visit   Medication Sig Dispense Refill    amLODIPine (NORVASC) 10 mg tablet Take 1 tablet (10 mg total) by mouth daily 90 tablet 3    Budeson-Glycopyrrol-Formoterol (Breztri  Aerosphere) 160-9-4.8 MCG/ACT AERO Inhale 2 puffs 2 (two) times a day Rinse mouth after use. 10.7 g 0    Calcium Carbonate-Vit D-Min (Caltrate 600+D Plus Minerals) 600-800 MG-UNIT TABS Take 1 tablet by mouth 2 (two) times a day with meals 200 tablet 6    fenofibrate 160 MG tablet Take 1 tablet (160 mg total) by mouth daily 90 tablet 3    lansoprazole (PREVACID) 30 mg capsule TAKE 1 CAPSULE BY MOUTH EVERY DAY 90 capsule 1    levothyroxine 25 mcg tablet Take 1 tablet (25 mcg total) by mouth daily 90 tablet 3    losartan (COZAAR) 50 mg tablet Take 1 tablet (50 mg total) by mouth daily 90 tablet 3    magnesium Oxide (MAG-OX) 400 mg TABS Take 1 tablet (400 mg total) by mouth 2 (two) times a day 180 tablet 3    metoprolol succinate (TOPROL-XL) 50 mg 24 hr tablet Take 0.5 tablets (25 mg total) by mouth daily 90 tablet 3    pancrelipase, Lip-Prot-Amyl, (Creon) 12,000 units capsule Take 12,000 units of lipase by mouth 3 (three) times a day with meals 100 capsule 3    rosuvastatin (CRESTOR) 10 MG tablet Take 1 tablet (10 mg total) by mouth daily 90 tablet 3    spironolactone (ALDACTONE) 25 mg tablet Take 0.5 tablets (12.5 mg total) by mouth daily 45 tablet 3    tamsulosin (FLOMAX) 0.4 mg Take 1 capsule (0.4 mg total) by mouth daily with dinner 90 capsule 3    zolpidem (AMBIEN) 10 mg tablet Take 1 tablet (10 mg total) by mouth daily at bedtime as needed for sleep 30 tablet 2    [DISCONTINUED] atorvastatin (LIPITOR) 10 mg tablet Take 1 tablet (10 mg total) by mouth daily 30 tablet 5    [DISCONTINUED] thiamine 100 MG tablet Take 1 tablet (100 mg total) by mouth daily 90 tablet 3     No current facility-administered medications on file prior to visit.   [6]   Social History  Tobacco Use    Smoking status: Every Day     Current packs/day: 0.25     Average packs/day: 0.3 packs/day for 60.0 years (17.5 ttl pk-yrs)     Types: Cigarettes    Smokeless tobacco: Never    Tobacco comments:     Half pack per day   Vaping Use    Vaping  status: Never Used   Substance and Sexual Activity    Alcohol use: Yes     Alcohol/week: 10.0 standard drinks of alcohol     Types: 10 Standard drinks or equivalent per week     Comment: 3 oz of wine with communion multiple times/day/week    Drug use: Never    Sexual activity: Not Currently     Comment: DAVIDSON

## 2025-06-18 NOTE — PATIENT INSTRUCTIONS
Medicare Preventive Visit Patient Instructions  Thank you for completing your Welcome to Medicare Visit or Medicare Annual Wellness Visit today. Your next wellness visit will be due in one year (6/19/2026).  The screening/preventive services that you may require over the next 5-10 years are detailed below. Some tests may not apply to you based off risk factors and/or age. Screening tests ordered at today's visit but not completed yet may show as past due. Also, please note that scanned in results may not display below.  Preventive Screenings:  Service Recommendations Previous Testing/Comments   Colorectal Cancer Screening  Colonoscopy    Fecal Occult Blood Test (FOBT)/Fecal Immunochemical Test (FIT)  Fecal DNA/Cologuard Test  Flexible Sigmoidoscopy Age: 45-75 years old   Colonoscopy: every 10 years (May be performed more frequently if at higher risk)  OR  FOBT/FIT: every 1 year  OR  Cologuard: every 3 years  OR  Sigmoidoscopy: every 5 years  Screening may be recommended earlier than age 45 if at higher risk for colorectal cancer. Also, an individualized decision between you and your healthcare provider will decide whether screening between the ages of 76-85 would be appropriate. Colonoscopy: 12/27/2022  FOBT/FIT: Not on file  Cologuard: Not on file  Sigmoidoscopy: Not on file    Screening Current     Prostate Cancer Screening Individualized decision between patient and health care provider in men between ages of 55-69   Medicare will cover every 12 months beginning on the day after your 50th birthday PSA: 2.232 ng/mL     Screening Current     Hepatitis C Screening Once for adults born between 1945 and 1965  More frequently in patients at high risk for Hepatitis C Hep C Antibody: 10/24/2019    Screening Current   Diabetes Screening 1-2 times per year if you're at risk for diabetes or have pre-diabetes Fasting glucose: 95 mg/dL (3/3/2025)  A1C: 5.5 (3/13/2024)  Screening Current   Cholesterol Screening Once every 5  years if you don't have a lipid disorder. May order more often based on risk factors. Lipid panel: 05/17/2023  Screening Not Indicated  History Lipid Disorder      Other Preventive Screenings Covered by Medicare:  Abdominal Aortic Aneurysm (AAA) Screening: covered once if your at risk. You're considered to be at risk if you have a family history of AAA or a male between the age of 65-75 who smoking at least 100 cigarettes in your lifetime.  Lung Cancer Screening: covers low dose CT scan once per year if you meet all of the following conditions: (1) Age 55-77; (2) No signs or symptoms of lung cancer; (3) Current smoker or have quit smoking within the last 15 years; (4) You have a tobacco smoking history of at least 20 pack years (packs per day x number of years you smoked); (5) You get a written order from a healthcare provider.  Glaucoma Screening: covered annually if you're considered high risk: (1) You have diabetes OR (2) Family history of glaucoma OR (3)  aged 50 and older OR (4)  American aged 65 and older  Osteoporosis Screening: covered every 2 years if you meet one of the following conditions: (1) Have a vertebral abnormality; (2) On glucocorticoid therapy for more than 3 months; (3) Have primary hyperparathyroidism; (4) On osteoporosis medications and need to assess response to drug therapy.  HIV Screening: covered annually if you're between the age of 15-65. Also covered annually if you are younger than 15 and older than 65 with risk factors for HIV infection. For pregnant patients, it is covered up to 3 times per pregnancy.    Immunizations:  Immunization Recommendations   Influenza Vaccine Annual influenza vaccination during flu season is recommended for all persons aged >= 6 months who do not have contraindications   Pneumococcal Vaccine   * Pneumococcal conjugate vaccine = PCV13 (Prevnar 13), PCV15 (Vaxneuvance), PCV20 (Prevnar 20)  * Pneumococcal polysaccharide vaccine = PPSV23  (Pneumovax) Adults 19-65 yo with certain risk factors or if 65+ yo  If never received any pneumonia vaccine: recommend Prevnar 20 (PCV20)  Give PCV20 if previously received 1 dose of PCV13 or PPSV23   Hepatitis B Vaccine 3 dose series if at intermediate or high risk (ex: diabetes, end stage renal disease, liver disease)   Respiratory syncytial virus (RSV) Vaccine - COVERED BY MEDICARE PART D  * RSVPreF3 (Arexvy) CDC recommends that adults 60 years of age and older may receive a single dose of RSV vaccine using shared clinical decision-making (SCDM)   Tetanus (Td) Vaccine - COST NOT COVERED BY MEDICARE PART B Following completion of primary series, a booster dose should be given every 10 years to maintain immunity against tetanus. Td may also be given as tetanus wound prophylaxis.   Tdap Vaccine - COST NOT COVERED BY MEDICARE PART B Recommended at least once for all adults. For pregnant patients, recommended with each pregnancy.   Shingles Vaccine (Shingrix) - COST NOT COVERED BY MEDICARE PART B  2 shot series recommended in those 19 years and older who have or will have weakened immune systems or those 50 years and older     Health Maintenance Due:      Topic Date Due   • Colorectal Cancer Screening  12/27/2032   • Hepatitis C Screening  Completed   • Lung Cancer Screening  Discontinued     Immunizations Due:      Topic Date Due   • DTaP,Tdap,and Td Vaccines (1 - Tdap) Never done     Advance Directives   What are advance directives?  Advance directives are legal documents that state your wishes and plans for medical care. These plans are made ahead of time in case you lose your ability to make decisions for yourself. Advance directives can apply to any medical decision, such as the treatments you want, and if you want to donate organs.   What are the types of advance directives?  There are many types of advance directives, and each state has rules about how to use them. You may choose a combination of any of the  following:  Living will:  This is a written record of the treatment you want. You can also choose which treatments you do not want, which to limit, and which to stop at a certain time. This includes surgery, medicine, IV fluid, and tube feedings.   Durable power of  for healthcare (DPAHC):  This is a written record that states who you want to make healthcare choices for you when you are unable to make them for yourself. This person, called a proxy, is usually a family member or a friend. You may choose more than 1 proxy.  Do not resuscitate (DNR) order:  A DNR order is used in case your heart stops beating or you stop breathing. It is a request not to have certain forms of treatment, such as CPR. A DNR order may be included in other types of advance directives.  Medical directive:  This covers the care that you want if you are in a coma, near death, or unable to make decisions for yourself. You can list the treatments you want for each condition. Treatment may include pain medicine, surgery, blood transfusions, dialysis, IV or tube feedings, and a ventilator (breathing machine).  Values history:  This document has questions about your views, beliefs, and how you feel and think about life. This information can help others choose the care that you would choose.  Why are advance directives important?  An advance directive helps you control your care. Although spoken wishes may be used, it is better to have your wishes written down. Spoken wishes can be misunderstood, or not followed. Treatments may be given even if you do not want them. An advance directive may make it easier for your family to make difficult choices about your care.   Cigarette Smoking and Your Health   Risks to your health if you smoke:  Nicotine and other chemicals found in tobacco damage every cell in your body. Even if you are a light smoker, you have an increased risk for cancer, heart disease, and lung disease. If you are pregnant or  have diabetes, smoking increases your risk for complications.   Benefits to your health if you stop smoking:   You decrease respiratory symptoms such as coughing, wheezing, and shortness of breath.   You reduce your risk for cancers of the lung, mouth, throat, kidney, bladder, pancreas, stomach, and cervix. If you already have cancer, you increase the benefits of chemotherapy. You also reduce your risk for cancer returning or a second cancer from developing.   You reduce your risk for heart disease, blood clots, heart attack, and stroke.   You reduce your risk for lung infections, and diseases such as pneumonia, asthma, chronic bronchitis, and emphysema.  Your circulation improves. More oxygen can be delivered to your body. If you have diabetes, you lower your risk for complications, such as kidney, artery, and eye diseases. You also lower your risk for nerve damage. Nerve damage can lead to amputations, poor vision, and blindness.  You improve your body's ability to heal and to fight infections.  For more information and support to stop smoking:   Smokefree.gov  Phone: 6- 385 - 133-6885  Web Address: www.[employer]   © Copyright [employer] 2018 Information is for End User's use only and may not be sold, redistributed or otherwise used for commercial purposes. All illustrations and images included in CareNotes® are the copyrighted property of [employer]., Inc. or [employer]

## 2025-07-24 ENCOUNTER — CONSULT (OUTPATIENT)
Dept: FAMILY MEDICINE CLINIC | Facility: CLINIC | Age: 75
End: 2025-07-24
Payer: MEDICARE

## 2025-07-24 VITALS
BODY MASS INDEX: 23.21 KG/M2 | OXYGEN SATURATION: 96 % | DIASTOLIC BLOOD PRESSURE: 60 MMHG | WEIGHT: 131 LBS | HEART RATE: 74 BPM | RESPIRATION RATE: 14 BRPM | HEIGHT: 63 IN | TEMPERATURE: 98 F | SYSTOLIC BLOOD PRESSURE: 104 MMHG

## 2025-07-24 DIAGNOSIS — I71.21 ANEURYSM OF ASCENDING AORTA WITHOUT RUPTURE (HCC): ICD-10-CM

## 2025-07-24 DIAGNOSIS — K86.1 OTHER CHRONIC PANCREATITIS (HCC): ICD-10-CM

## 2025-07-24 DIAGNOSIS — Z01.818 PRE-OP EXAM: Primary | ICD-10-CM

## 2025-07-24 PROCEDURE — G2211 COMPLEX E/M VISIT ADD ON: HCPCS | Performed by: INTERNAL MEDICINE

## 2025-07-24 PROCEDURE — 93000 ELECTROCARDIOGRAM COMPLETE: CPT | Performed by: INTERNAL MEDICINE

## 2025-07-24 PROCEDURE — 99214 OFFICE O/P EST MOD 30 MIN: CPT | Performed by: INTERNAL MEDICINE

## 2025-07-24 NOTE — PROGRESS NOTES
Name: Omero Winston      : 1950      MRN: [id number]  Encounter Provider: Garry Vasquez MD  Encounter Date: 2025   Encounter department: [facility] HEART  :  Assessment & Plan  Pre-op exam  Pt is clinically stable at this time for skin surgery  Orders:    XR chest pa and lateral; Future    Protime-INR; Future    APTT; Future    UA (URINE) with reflex to Scope; Future    CBC and differential; Future    Comprehensive metabolic panel; Future    POCT ECG    Aneurysm of ascending aorta without rupture (HCC)  Stable  Continue same  Yearly CTA Chest         Other chronic pancreatitis (HCC)  Improved  Continue same       RTC in 3 mos w Blood work         History of Present Illness   75 Y O Man is here for Pre surgery Clearance, he feels oK, recent blood work and med list reviewed,...      Review of Systems   Constitutional:  Negative for chills, fatigue and fever.   HENT:  Negative for congestion, facial swelling, sore throat, trouble swallowing and voice change.    Eyes:  Negative for pain, discharge and visual disturbance.   Respiratory:  Negative for cough, shortness of breath and wheezing.    Cardiovascular:  Negative for chest pain, palpitations and leg swelling.   Gastrointestinal:  Negative for abdominal pain, blood in stool, constipation, diarrhea and nausea.   Endocrine: Negative for polydipsia, polyphagia and polyuria.   Genitourinary:  Negative for difficulty urinating, hematuria and urgency.   Musculoskeletal:  Negative for arthralgias and myalgias.   Skin:  Negative for rash.   Neurological:  Negative for dizziness, tremors, weakness and headaches.   Hematological:  Negative for adenopathy. Does not bruise/bleed easily.   Psychiatric/Behavioral:  Negative for dysphoric mood, sleep disturbance and suicidal ideas.        Objective   /60 (BP Location: Left arm, Patient Position: Sitting, Cuff Size: Standard)   Pulse 74   Temp 98 °F (36.7 °C) (Tympanic Core)   " Resp 14   Ht 5' 3\" (1.6 m)   Wt 59.4 kg (131 lb)   SpO2 96%   BMI 23.21 kg/m²      Physical Exam  Vitals and nursing note reviewed.   Constitutional:       General: He is not in acute distress.     Appearance: He is well-developed.   HENT:      Head: Normocephalic and atraumatic.      Right Ear: External ear normal.      Left Ear: External ear normal.     Eyes:      Conjunctiva/sclera: Conjunctivae normal.      Pupils: Pupils are equal, round, and reactive to light.     Neck:      Thyroid: No thyromegaly.      Trachea: No tracheal deviation.     Cardiovascular:      Rate and Rhythm: Normal rate and regular rhythm.      Heart sounds: Murmur heard.      No friction rub.   Pulmonary:      Effort: Pulmonary effort is normal. No respiratory distress.      Breath sounds: Normal breath sounds. No wheezing.   Abdominal:      General: Bowel sounds are normal. There is no distension.      Palpations: Abdomen is soft.      Tenderness: There is no abdominal tenderness.     Musculoskeletal:         General: No swelling or deformity. Normal range of motion.      Cervical back: Neck supple.     Skin:     General: Skin is warm and dry.      Capillary Refill: Capillary refill takes less than 2 seconds.      Findings: No erythema or rash.     Neurological:      Mental Status: He is alert and oriented to person, place, and time.      Cranial Nerves: No cranial nerve deficit.      Coordination: Coordination normal.      Deep Tendon Reflexes: Reflexes normal.     Psychiatric:         Mood and Affect: Mood normal.         Behavior: Behavior normal.         "

## 2025-07-26 DIAGNOSIS — K21.00 GASTROESOPHAGEAL REFLUX DISEASE WITH ESOPHAGITIS WITHOUT HEMORRHAGE: ICD-10-CM

## 2025-07-28 ENCOUNTER — APPOINTMENT (OUTPATIENT)
Dept: LAB | Facility: CLINIC | Age: 75
End: 2025-07-28
Payer: MEDICARE

## 2025-07-28 ENCOUNTER — APPOINTMENT (OUTPATIENT)
Dept: RADIOLOGY | Facility: CLINIC | Age: 75
End: 2025-07-28
Payer: MEDICARE

## 2025-07-28 DIAGNOSIS — D49.2 ODONTOGENIC TUMOR: ICD-10-CM

## 2025-07-28 DIAGNOSIS — Z01.818 PRE-OP EXAM: ICD-10-CM

## 2025-07-28 LAB
ANION GAP SERPL CALCULATED.3IONS-SCNC: 6 MMOL/L (ref 4–13)
BASOPHILS # BLD AUTO: 0.03 THOUSANDS/ÂΜL (ref 0–0.1)
BASOPHILS NFR BLD AUTO: 0 % (ref 0–1)
BUN SERPL-MCNC: 17 MG/DL (ref 5–25)
CALCIUM SERPL-MCNC: 9.9 MG/DL (ref 8.4–10.2)
CHLORIDE SERPL-SCNC: 102 MMOL/L (ref 96–108)
CO2 SERPL-SCNC: 27 MMOL/L (ref 21–32)
CREAT SERPL-MCNC: 1.39 MG/DL (ref 0.6–1.3)
EOSINOPHIL # BLD AUTO: 0.29 THOUSAND/ÂΜL (ref 0–0.61)
EOSINOPHIL NFR BLD AUTO: 4 % (ref 0–6)
ERYTHROCYTE [DISTWIDTH] IN BLOOD BY AUTOMATED COUNT: 14.5 % (ref 11.6–15.1)
GFR SERPL CREATININE-BSD FRML MDRD: 49 ML/MIN/1.73SQ M
GLUCOSE SERPL-MCNC: 92 MG/DL (ref 65–140)
HCT VFR BLD AUTO: 35.8 % (ref 36.5–49.3)
HGB BLD-MCNC: 11.9 G/DL (ref 12–17)
IMM GRANULOCYTES # BLD AUTO: 0.03 THOUSAND/UL (ref 0–0.2)
IMM GRANULOCYTES NFR BLD AUTO: 0 % (ref 0–2)
LYMPHOCYTES # BLD AUTO: 1.03 THOUSANDS/ÂΜL (ref 0.6–4.47)
LYMPHOCYTES NFR BLD AUTO: 15 % (ref 14–44)
MCH RBC QN AUTO: 32.6 PG (ref 26.8–34.3)
MCHC RBC AUTO-ENTMCNC: 33.2 G/DL (ref 31.4–37.4)
MCV RBC AUTO: 98 FL (ref 82–98)
MONOCYTES # BLD AUTO: 0.53 THOUSAND/ÂΜL (ref 0.17–1.22)
MONOCYTES NFR BLD AUTO: 8 % (ref 4–12)
NEUTROPHILS # BLD AUTO: 5.01 THOUSANDS/ÂΜL (ref 1.85–7.62)
NEUTS SEG NFR BLD AUTO: 73 % (ref 43–75)
NRBC BLD AUTO-RTO: 0 /100 WBCS
PLATELET # BLD AUTO: 273 THOUSANDS/UL (ref 149–390)
PMV BLD AUTO: 12.4 FL (ref 8.9–12.7)
POTASSIUM SERPL-SCNC: 4.7 MMOL/L (ref 3.5–5.3)
RBC # BLD AUTO: 3.65 MILLION/UL (ref 3.88–5.62)
SODIUM SERPL-SCNC: 135 MMOL/L (ref 135–147)
WBC # BLD AUTO: 6.92 THOUSAND/UL (ref 4.31–10.16)

## 2025-07-28 PROCEDURE — 85025 COMPLETE CBC W/AUTO DIFF WBC: CPT

## 2025-07-28 PROCEDURE — 36415 COLL VENOUS BLD VENIPUNCTURE: CPT

## 2025-07-28 PROCEDURE — 71046 X-RAY EXAM CHEST 2 VIEWS: CPT

## 2025-07-28 PROCEDURE — 80048 BASIC METABOLIC PNL TOTAL CA: CPT

## 2025-07-28 RX ORDER — LANSOPRAZOLE 30 MG/1
30 CAPSULE, DELAYED RELEASE ORAL DAILY
Qty: 90 CAPSULE | Refills: 1 | Status: SHIPPED | OUTPATIENT
Start: 2025-07-28

## 2025-07-31 ENCOUNTER — TELEPHONE (OUTPATIENT)
Dept: FAMILY MEDICINE CLINIC | Facility: CLINIC | Age: 75
End: 2025-07-31

## 2025-07-31 ENCOUNTER — APPOINTMENT (OUTPATIENT)
Dept: LAB | Facility: CLINIC | Age: 75
End: 2025-07-31
Payer: MEDICARE

## 2025-07-31 DIAGNOSIS — Z01.818 PRE-OP EXAM: ICD-10-CM

## 2025-07-31 LAB
BASOPHILS # BLD AUTO: 0.05 THOUSANDS/ÂΜL (ref 0–0.1)
BASOPHILS NFR BLD AUTO: 1 % (ref 0–1)
EOSINOPHIL # BLD AUTO: 0.29 THOUSAND/ÂΜL (ref 0–0.61)
EOSINOPHIL NFR BLD AUTO: 5 % (ref 0–6)
ERYTHROCYTE [DISTWIDTH] IN BLOOD BY AUTOMATED COUNT: 14.5 % (ref 11.6–15.1)
HCT VFR BLD AUTO: 33.2 % (ref 36.5–49.3)
HGB BLD-MCNC: 11.1 G/DL (ref 12–17)
IMM GRANULOCYTES # BLD AUTO: 0.02 THOUSAND/UL (ref 0–0.2)
IMM GRANULOCYTES NFR BLD AUTO: 0 % (ref 0–2)
LYMPHOCYTES # BLD AUTO: 0.9 THOUSANDS/ÂΜL (ref 0.6–4.47)
LYMPHOCYTES NFR BLD AUTO: 14 % (ref 14–44)
MCH RBC QN AUTO: 32.6 PG (ref 26.8–34.3)
MCHC RBC AUTO-ENTMCNC: 33.4 G/DL (ref 31.4–37.4)
MCV RBC AUTO: 97 FL (ref 82–98)
MONOCYTES # BLD AUTO: 0.62 THOUSAND/ÂΜL (ref 0.17–1.22)
MONOCYTES NFR BLD AUTO: 10 % (ref 4–12)
NEUTROPHILS # BLD AUTO: 4.59 THOUSANDS/ÂΜL (ref 1.85–7.62)
NEUTS SEG NFR BLD AUTO: 70 % (ref 43–75)
NRBC BLD AUTO-RTO: 0 /100 WBCS
PLATELET # BLD AUTO: 269 THOUSANDS/UL (ref 149–390)
PMV BLD AUTO: 11.9 FL (ref 8.9–12.7)
RBC # BLD AUTO: 3.41 MILLION/UL (ref 3.88–5.62)
WBC # BLD AUTO: 6.47 THOUSAND/UL (ref 4.31–10.16)

## 2025-07-31 PROCEDURE — 85025 COMPLETE CBC W/AUTO DIFF WBC: CPT

## 2025-07-31 PROCEDURE — 36415 COLL VENOUS BLD VENIPUNCTURE: CPT

## 2025-07-31 PROCEDURE — 85610 PROTHROMBIN TIME: CPT

## 2025-07-31 PROCEDURE — 85730 THROMBOPLASTIN TIME PARTIAL: CPT

## 2025-08-01 LAB
APTT PPP: 32 SECONDS (ref 23–34)
INR PPP: 1.07 (ref 0.85–1.19)
PROTHROMBIN TIME: 14.2 SECONDS (ref 12.3–15)

## 2025-08-04 ENCOUNTER — E-CONSULT (OUTPATIENT)
Age: 75
End: 2025-08-04
Payer: MEDICARE

## 2025-08-04 DIAGNOSIS — D64.9 NORMOCYTIC ANEMIA: Primary | ICD-10-CM

## 2025-08-04 PROCEDURE — 99451 NTRPROF PH1/NTRNET/EHR 5/>: CPT | Performed by: NURSE PRACTITIONER

## 2025-08-05 ENCOUNTER — ANESTHESIA EVENT (OUTPATIENT)
Dept: PERIOP | Facility: HOSPITAL | Age: 75
End: 2025-08-05
Payer: MEDICARE

## 2025-08-05 ENCOUNTER — TELEPHONE (OUTPATIENT)
Age: 75
End: 2025-08-05

## 2025-08-05 RX ORDER — ASPIRIN 81 MG/1
81 TABLET, CHEWABLE ORAL DAILY
COMMUNITY

## 2025-08-06 ENCOUNTER — HOSPITAL ENCOUNTER (OUTPATIENT)
Facility: HOSPITAL | Age: 75
Setting detail: OUTPATIENT SURGERY
Discharge: HOME | End: 2025-08-06
Attending: PLASTIC SURGERY | Admitting: PLASTIC SURGERY
Payer: MEDICARE

## 2025-08-06 ENCOUNTER — ANESTHESIA (OUTPATIENT)
Dept: PERIOP | Facility: HOSPITAL | Age: 75
End: 2025-08-06
Payer: MEDICARE

## 2025-08-06 VITALS
OXYGEN SATURATION: 96 % | TEMPERATURE: 97.8 F | DIASTOLIC BLOOD PRESSURE: 50 MMHG | HEART RATE: 60 BPM | RESPIRATION RATE: 14 BRPM | SYSTOLIC BLOOD PRESSURE: 99 MMHG

## 2025-08-06 DIAGNOSIS — C44.42 SQUAMOUS CELL CARCINOMA OF SKIN OF SCALP AND NECK: ICD-10-CM

## 2025-08-06 PROBLEM — F17.200 CURRENT EVERY DAY SMOKER: Status: ACTIVE | Noted: 2025-08-06

## 2025-08-06 PROCEDURE — 88332 PATH CONSLTJ SURG EA ADD BLK: CPT | Performed by: PLASTIC SURGERY

## 2025-08-06 PROCEDURE — 88305 TISSUE EXAM BY PATHOLOGIST: CPT | Performed by: PATHOLOGY

## 2025-08-06 PROCEDURE — 88331 PATH CONSLTJ SURG 1 BLK 1SPC: CPT | Performed by: PATHOLOGY

## 2025-08-06 PROCEDURE — 88332 PATH CONSLTJ SURG EA ADD BLK: CPT | Performed by: PATHOLOGY

## 2025-08-06 PROCEDURE — 88331 PATH CONSLTJ SURG 1 BLK 1SPC: CPT | Performed by: PLASTIC SURGERY

## 2025-08-06 RX ORDER — ONDANSETRON 2 MG/ML
4 INJECTION INTRAMUSCULAR; INTRAVENOUS ONCE AS NEEDED
Status: DISCONTINUED | OUTPATIENT
Start: 2025-08-06 | End: 2025-08-06 | Stop reason: HOSPADM

## 2025-08-06 RX ORDER — MIDAZOLAM HYDROCHLORIDE 2 MG/2ML
INJECTION, SOLUTION INTRAMUSCULAR; INTRAVENOUS AS NEEDED
Status: DISCONTINUED | OUTPATIENT
Start: 2025-08-06 | End: 2025-08-06

## 2025-08-06 RX ORDER — MAGNESIUM HYDROXIDE 1200 MG/15ML
LIQUID ORAL AS NEEDED
Status: DISCONTINUED | OUTPATIENT
Start: 2025-08-06 | End: 2025-08-06 | Stop reason: HOSPADM

## 2025-08-06 RX ORDER — BUPIVACAINE HYDROCHLORIDE AND EPINEPHRINE 2.5; 5 MG/ML; UG/ML
INJECTION, SOLUTION EPIDURAL; INFILTRATION; INTRACAUDAL; PERINEURAL AS NEEDED
Status: DISCONTINUED | OUTPATIENT
Start: 2025-08-06 | End: 2025-08-06 | Stop reason: HOSPADM

## 2025-08-06 RX ORDER — EPHEDRINE SULFATE 50 MG/ML
INJECTION INTRAVENOUS AS NEEDED
Status: DISCONTINUED | OUTPATIENT
Start: 2025-08-06 | End: 2025-08-06

## 2025-08-06 RX ORDER — PHENYLEPHRINE HCL IN 0.9% NACL 1 MG/10 ML
SYRINGE (ML) INTRAVENOUS AS NEEDED
Status: DISCONTINUED | OUTPATIENT
Start: 2025-08-06 | End: 2025-08-06

## 2025-08-06 RX ORDER — MINERAL OIL
OIL (ML) MISCELLANEOUS AS NEEDED
Status: DISCONTINUED | OUTPATIENT
Start: 2025-08-06 | End: 2025-08-06 | Stop reason: HOSPADM

## 2025-08-06 RX ORDER — PROPOFOL 10 MG/ML
INJECTION, EMULSION INTRAVENOUS AS NEEDED
Status: DISCONTINUED | OUTPATIENT
Start: 2025-08-06 | End: 2025-08-06

## 2025-08-06 RX ORDER — FENTANYL CITRATE 50 UG/ML
INJECTION, SOLUTION INTRAMUSCULAR; INTRAVENOUS AS NEEDED
Status: DISCONTINUED | OUTPATIENT
Start: 2025-08-06 | End: 2025-08-06

## 2025-08-06 RX ORDER — ONDANSETRON 2 MG/ML
INJECTION INTRAMUSCULAR; INTRAVENOUS AS NEEDED
Status: DISCONTINUED | OUTPATIENT
Start: 2025-08-06 | End: 2025-08-06

## 2025-08-06 RX ORDER — HYDROMORPHONE HCL/PF 1 MG/ML
0.25 SYRINGE (ML) INJECTION
Status: DISCONTINUED | OUTPATIENT
Start: 2025-08-06 | End: 2025-08-06 | Stop reason: HOSPADM

## 2025-08-06 RX ORDER — LIDOCAINE HYDROCHLORIDE 10 MG/ML
INJECTION, SOLUTION EPIDURAL; INFILTRATION; INTRACAUDAL; PERINEURAL AS NEEDED
Status: DISCONTINUED | OUTPATIENT
Start: 2025-08-06 | End: 2025-08-06

## 2025-08-06 RX ORDER — DIPHENHYDRAMINE HYDROCHLORIDE 50 MG/ML
12.5 INJECTION, SOLUTION INTRAMUSCULAR; INTRAVENOUS
Status: DISCONTINUED | OUTPATIENT
Start: 2025-08-06 | End: 2025-08-06 | Stop reason: HOSPADM

## 2025-08-06 RX ORDER — ROCURONIUM BROMIDE 10 MG/ML
INJECTION, SOLUTION INTRAVENOUS AS NEEDED
Status: DISCONTINUED | OUTPATIENT
Start: 2025-08-06 | End: 2025-08-06

## 2025-08-06 RX ORDER — DEXAMETHASONE SODIUM PHOSPHATE 10 MG/ML
INJECTION, SOLUTION INTRAMUSCULAR; INTRAVENOUS AS NEEDED
Status: DISCONTINUED | OUTPATIENT
Start: 2025-08-06 | End: 2025-08-06

## 2025-08-06 RX ORDER — ACETAMINOPHEN 325 MG/1
320 TABLET ORAL EVERY 6 HOURS PRN
Status: DISCONTINUED | OUTPATIENT
Start: 2025-08-06 | End: 2025-08-06 | Stop reason: HOSPADM

## 2025-08-06 RX ORDER — SODIUM CHLORIDE, SODIUM LACTATE, POTASSIUM CHLORIDE, CALCIUM CHLORIDE 600; 310; 30; 20 MG/100ML; MG/100ML; MG/100ML; MG/100ML
INJECTION, SOLUTION INTRAVENOUS CONTINUOUS PRN
Status: DISCONTINUED | OUTPATIENT
Start: 2025-08-06 | End: 2025-08-06

## 2025-08-06 RX ORDER — FENTANYL CITRATE/PF 50 MCG/ML
25 SYRINGE (ML) INJECTION
Status: DISCONTINUED | OUTPATIENT
Start: 2025-08-06 | End: 2025-08-06 | Stop reason: HOSPADM

## 2025-08-06 RX ORDER — ONDANSETRON 4 MG/1
4 TABLET, ORALLY DISINTEGRATING ORAL EVERY 6 HOURS PRN
Status: DISCONTINUED | OUTPATIENT
Start: 2025-08-06 | End: 2025-08-06 | Stop reason: HOSPADM

## 2025-08-06 RX ADMIN — MIDAZOLAM 2 MG: 1 INJECTION INTRAMUSCULAR; INTRAVENOUS at 09:11

## 2025-08-06 RX ADMIN — EPHEDRINE SULFATE 10 MG: 50 INJECTION INTRAVENOUS at 09:54

## 2025-08-06 RX ADMIN — NOREPINEPHRINE BITARTRATE 32 MCG: 1 INJECTION INTRAVENOUS at 09:56

## 2025-08-06 RX ADMIN — EPHEDRINE SULFATE 10 MG: 50 INJECTION INTRAVENOUS at 09:36

## 2025-08-06 RX ADMIN — NOREPINEPHRINE BITARTRATE 16 MCG: 1 INJECTION INTRAVENOUS at 09:39

## 2025-08-06 RX ADMIN — Medication 100 MCG: at 09:24

## 2025-08-06 RX ADMIN — DEXAMETHASONE SODIUM PHOSPHATE 10 MG: 10 INJECTION, SOLUTION INTRAMUSCULAR; INTRAVENOUS at 09:21

## 2025-08-06 RX ADMIN — Medication 100 MCG: at 09:32

## 2025-08-06 RX ADMIN — PROPOFOL 100 MG: 10 INJECTION, EMULSION INTRAVENOUS at 09:13

## 2025-08-06 RX ADMIN — ROCURONIUM BROMIDE 50 MG: 10 INJECTION INTRAVENOUS at 09:13

## 2025-08-06 RX ADMIN — SODIUM CHLORIDE, SODIUM LACTATE, POTASSIUM CHLORIDE, AND CALCIUM CHLORIDE: .6; .31; .03; .02 INJECTION, SOLUTION INTRAVENOUS at 08:52

## 2025-08-06 RX ADMIN — ONDANSETRON 4 MG: 2 INJECTION INTRAMUSCULAR; INTRAVENOUS at 10:09

## 2025-08-06 RX ADMIN — SUGAMMADEX 125 MG: 100 INJECTION, SOLUTION INTRAVENOUS at 10:31

## 2025-08-06 RX ADMIN — Medication 200 MCG: at 09:22

## 2025-08-06 RX ADMIN — HYDROMORPHONE HYDROCHLORIDE 0.25 MG: 1 INJECTION, SOLUTION INTRAMUSCULAR; INTRAVENOUS; SUBCUTANEOUS at 10:50

## 2025-08-06 RX ADMIN — FENTANYL CITRATE 50 MCG: 50 INJECTION, SOLUTION INTRAMUSCULAR; INTRAVENOUS at 09:13

## 2025-08-06 RX ADMIN — LIDOCAINE HYDROCHLORIDE 50 MG: 10 INJECTION, SOLUTION EPIDURAL; INFILTRATION; INTRACAUDAL; PERINEURAL at 09:13

## 2025-08-06 RX ADMIN — FENTANYL CITRATE 50 MCG: 50 INJECTION, SOLUTION INTRAMUSCULAR; INTRAVENOUS at 09:12

## 2025-08-06 RX ADMIN — NOREPINEPHRINE BITARTRATE 32 MCG: 1 INJECTION INTRAVENOUS at 09:28

## 2025-08-06 RX ADMIN — PHENYLEPHRINE HYDROCHLORIDE 50 MCG/MIN: 10 INJECTION INTRAVENOUS at 09:50

## 2025-08-08 PROCEDURE — 88305 TISSUE EXAM BY PATHOLOGIST: CPT | Performed by: PATHOLOGY

## (undated) DEVICE — STERILE POLYISOPRENE POWDER-FREE SURGICAL GLOVES: Brand: PROTEXIS

## (undated) DEVICE — INSTRUMENT POUCH: Brand: CONVERTORS

## (undated) DEVICE — TELFA NON-ADHERENT ABSORBENT DRESSING: Brand: TELFA

## (undated) DEVICE — Device

## (undated) DEVICE — GLOVE PI ULTRA TOUCH SZ.6.5

## (undated) DEVICE — SURGICEL FIBRILLAR 1 X 2

## (undated) DEVICE — ANTI-FOG SOLUTION WITH FOAM PAD: Brand: DEVON

## (undated) DEVICE — LIGACLIP MCA MULTIPLE CLIP APPLIERS, 20 MEDIUM CLIPS: Brand: LIGACLIP

## (undated) DEVICE — 40529 DERMAPROX PAD 11'' X 15'' X 1'': Brand: 40529 DERMAPROX PAD 11'' X 15'' X 1''

## (undated) DEVICE — SUT MONOCRYL 4-0 PS-2 27 IN Y426H

## (undated) DEVICE — SUT SILK 2-0 30 IN A305H

## (undated) DEVICE — STOCKINETTE 2P PREROLLD 6X60

## (undated) DEVICE — 3M™ IOBAN™ 2 ANTIMICROBIAL INCISE DRAPE 6650EZ: Brand: IOBAN™ 2

## (undated) DEVICE — GLOVE MICRO PROTEXIS PI  SZ 7.5

## (undated) DEVICE — SUT PROLENE 6-0 BV130 30 IN 8709H

## (undated) DEVICE — THYROID SHEET: Brand: CONVERTORS

## (undated) DEVICE — SCD SEQUENTIAL COMPRESSION COMFORT SLEEVE MEDIUM KNEE LENGTH: Brand: KENDALL SCD

## (undated) DEVICE — STANDARD SURGICAL GOWN, L: Brand: CONVERTORS

## (undated) DEVICE — PETRI DISH STERILE

## (undated) DEVICE — CLAMP TOWEL TUBING DISPOSABLE

## (undated) DEVICE — ADHESIVE SKIN HIGH VISCOSITY EXOFIN 1ML

## (undated) DEVICE — TUBING SUCTION 5MM X 12 FT

## (undated) DEVICE — PROXIMATE PLUS MD MULTI-DIRECTIONAL RELEASE SKIN STAPLERS CONTAINS 35 STAINLESS STEEL STAPLES APPROXIMATE CLOSED DIMENSIONS: 6.9MM X 3.9MM WIDE: Brand: PROXIMATE

## (undated) DEVICE — TROCAR: Brand: KII FIOS FIRST ENTRY

## (undated) DEVICE — SUT PROLENE 7-0 BV 175-6 24 IN 8735H

## (undated) DEVICE — PACK PBDS GENERAL MINOR RF

## (undated) DEVICE — GLOVE SRG BIOGEL 7

## (undated) DEVICE — LIGAMAX 5 MM ENDOSCOPIC MULTIPLE CLIP APPLIER: Brand: LIGAMAX

## (undated) DEVICE — ENDOPATH PNEUMONEEDLE INSUFFLATION NEEDLES WITH LUER LOCK CONNECTORS 120MM: Brand: ENDOPATH

## (undated) DEVICE — NEEDLE 25G X 1 1/2

## (undated) DEVICE — UTILITY MARKER,BLACK WITH LABELS: Brand: DEVON

## (undated) DEVICE — PACK PBDS SMALL EXTREMITY RF

## (undated) DEVICE — GLOVE INDICATOR PI UNDERGLOVE SZ 7.5 BLUE

## (undated) DEVICE — STERILE POLYISOPRENE POWDER-FREE SURGICAL GLOVES WITH EMOLLIENT COATING: Brand: PROTEXIS

## (undated) DEVICE — BLADE SURGEON SZ 15

## (undated) DEVICE — SUT SILK 3-0 30 IN A304H

## (undated) DEVICE — INTENDED FOR TISSUE SEPARATION, AND OTHER PROCEDURES THAT REQUIRE A SHARP SURGICAL BLADE TO PUNCTURE OR CUT.: Brand: BARD-PARKER SAFETY BLADES SIZE 11, STERILE

## (undated) DEVICE — CHLORAPREP HI-LITE 26ML ORANGE

## (undated) DEVICE — 2000CC GUARDIAN II: Brand: GUARDIAN

## (undated) DEVICE — SPONGE GAUZE 4 X 4 16 PLY STRL PLASTIC TRAY LF

## (undated) DEVICE — GLOVE SRG BIOGEL 7.5

## (undated) DEVICE — GAUZE SPONGES,16 PLY: Brand: CURITY

## (undated) DEVICE — IV BUTTERFLY 23G SAFETY

## (undated) DEVICE — TUBE EXTENSION MALE LUER 35IN

## (undated) DEVICE — BETHLEHEM UNIVERSAL OUTPATIENT: Brand: CARDINAL HEALTH

## (undated) DEVICE — BETHL CAROTID ENDARTERECTOMY: Brand: CARDINAL HEALTH

## (undated) DEVICE — VESSEL LOOPS X-RAY DETECTABLE: Brand: DEROYAL

## (undated) DEVICE — NEEDLE BLUNT 18 G X 1 1/2IN

## (undated) DEVICE — SINGLE PORT MANIFOLD: Brand: NEPTUNE 2

## (undated) DEVICE — CAROTID ARTERY SHUNT KIT,RADIOPAQUE LINE, STRAIGHT
Type: IMPLANTABLE DEVICE | Site: CAROTID | Status: NON-FUNCTIONAL
Brand: ARGYLE
Removed: 2020-03-05

## (undated) DEVICE — SYRINGE 3ML LL

## (undated) DEVICE — TUBING SET W. FILTER, GAS FLOW 30 L/MIN: Brand: N.A.

## (undated) DEVICE — ALLENTOWN LAP CHOLE APP PACK: Brand: CARDINAL HEALTH

## (undated) DEVICE — ENDOPATH 5MM CURVED SCISSORS WITH MONOPOLAR CAUTERY: Brand: ENDOPATH

## (undated) DEVICE — SUCTION COAGULATOR 12FR HAND CONTROL MEGADYNE

## (undated) DEVICE — 3M™ STERI-STRIP™ REINFORCED ADHESIVE SKIN CLOSURES, R1547, 1/2 IN X 4 IN (12 MM X 100 MM), 6 STRIPS/ENVELOPE: Brand: 3M™ STERI-STRIP™

## (undated) DEVICE — ASTOUND STANDARD SURGICAL GOWN, XXL: Brand: CONVERTORS

## (undated) DEVICE — IRRIG ENDO FLO TUBING

## (undated) DEVICE — INTENDED FOR TISSUE SEPARATION, AND OTHER PROCEDURES THAT REQUIRE A SHARP SURGICAL BLADE TO PUNCTURE OR CUT.: Brand: BARD-PARKER ® CARBON RIB-BACK BLADES

## (undated) DEVICE — TAUT CATH INTRODUCER 4.5 FR

## (undated) DEVICE — BAG DECANTER

## (undated) DEVICE — ULTRASOUND GEL STERILE FOIL PK

## (undated) DEVICE — DRAPE PROBE NEO-PROBE/ULTRASOUND

## (undated) DEVICE — DRAPE SHEET THREE QUARTER

## (undated) DEVICE — GLOVE SRG BIOGEL ORTHOPEDIC 7.5

## (undated) DEVICE — DRAPE C-ARM X-RAY

## (undated) DEVICE — IV CATH INTROCAN 14G X 2 SAFETY

## (undated) DEVICE — INTENDED FOR TISSUE SEPARATION, AND OTHER PROCEDURES THAT REQUIRE A SHARP SURGICAL BLADE TO PUNCTURE OR CUT.: Brand: BARD-PARKER SAFETY BLADES SIZE 15, STERILE

## (undated) DEVICE — GLOVE INDICATOR PI UNDERGLOVE SZ 6.5 BLUE

## (undated) DEVICE — SLEEVE SCD KNEE MEDIUM

## (undated) DEVICE — SUT VICRYL 0 UR-6 27 IN J603H

## (undated) DEVICE — SURGICAL CLIPPER BLADE GENERAL USE

## (undated) DEVICE — TROCAR: Brand: KII® SLEEVE

## (undated) DEVICE — SWABSTCK, BENZOIN TINCTURE, 1/PK, STRL: Brand: APLICARE

## (undated) DEVICE — SUT MONOCRYL 3-0 SH 27 IN Y416H

## (undated) DEVICE — SYRINGE 30ML LL

## (undated) DEVICE — SPECIMEN CONTAINER STERILE PEEL PACK